# Patient Record
Sex: MALE
[De-identification: names, ages, dates, MRNs, and addresses within clinical notes are randomized per-mention and may not be internally consistent; named-entity substitution may affect disease eponyms.]

---

## 2019-06-24 ENCOUNTER — TRANSCRIPTION ENCOUNTER (OUTPATIENT)
Age: 69
End: 2019-06-24

## 2019-06-25 ENCOUNTER — TRANSCRIPTION ENCOUNTER (OUTPATIENT)
Age: 69
End: 2019-06-25

## 2019-06-25 ENCOUNTER — INPATIENT (INPATIENT)
Facility: HOSPITAL | Age: 69
LOS: 38 days | Discharge: NURSING, REHAB OR LONG-TERM CARE | DRG: 463 | End: 2019-08-03
Attending: SURGERY | Admitting: SURGERY
Payer: COMMERCIAL

## 2019-06-25 VITALS
HEIGHT: 71 IN | OXYGEN SATURATION: 100 % | HEART RATE: 62 BPM | WEIGHT: 199.96 LBS | DIASTOLIC BLOOD PRESSURE: 61 MMHG | SYSTOLIC BLOOD PRESSURE: 105 MMHG | RESPIRATION RATE: 16 BRPM

## 2019-06-25 DIAGNOSIS — S81.811A LACERATION WITHOUT FOREIGN BODY, RIGHT LOWER LEG, INITIAL ENCOUNTER: ICD-10-CM

## 2019-06-25 LAB
ALBUMIN SERPL ELPH-MCNC: 3.5 G/DL — SIGNIFICANT CHANGE UP (ref 3.3–5.2)
ALP SERPL-CCNC: 67 U/L — SIGNIFICANT CHANGE UP (ref 40–120)
ALT FLD-CCNC: 17 U/L — SIGNIFICANT CHANGE UP
ANION GAP SERPL CALC-SCNC: 13 MMOL/L — SIGNIFICANT CHANGE UP (ref 5–17)
ANION GAP SERPL CALC-SCNC: 13 MMOL/L — SIGNIFICANT CHANGE UP (ref 5–17)
APTT BLD: 24.6 SEC — LOW (ref 27.5–36.3)
APTT BLD: 77.3 SEC — HIGH (ref 27.5–36.3)
AST SERPL-CCNC: 25 U/L — SIGNIFICANT CHANGE UP
BASE EXCESS BLDV CALC-SCNC: -1.6 MMOL/L — SIGNIFICANT CHANGE UP (ref -2–2)
BASOPHILS # BLD AUTO: 0 K/UL — SIGNIFICANT CHANGE UP (ref 0–0.2)
BASOPHILS # BLD AUTO: 0 K/UL — SIGNIFICANT CHANGE UP (ref 0–0.2)
BASOPHILS NFR BLD AUTO: 0 % — SIGNIFICANT CHANGE UP (ref 0–2)
BASOPHILS NFR BLD AUTO: 0.2 % — SIGNIFICANT CHANGE UP (ref 0–2)
BILIRUB SERPL-MCNC: 0.7 MG/DL — SIGNIFICANT CHANGE UP (ref 0.4–2)
BLD GP AB SCN SERPL QL: SIGNIFICANT CHANGE UP
BUN SERPL-MCNC: 18 MG/DL — SIGNIFICANT CHANGE UP (ref 8–20)
BUN SERPL-MCNC: 20 MG/DL — SIGNIFICANT CHANGE UP (ref 8–20)
CA-I SERPL-SCNC: 1.06 MMOL/L — LOW (ref 1.15–1.33)
CALCIUM SERPL-MCNC: 7.3 MG/DL — LOW (ref 8.6–10.2)
CALCIUM SERPL-MCNC: 8 MG/DL — LOW (ref 8.6–10.2)
CHLORIDE BLDV-SCNC: 109 MMOL/L — HIGH (ref 98–107)
CHLORIDE SERPL-SCNC: 105 MMOL/L — SIGNIFICANT CHANGE UP (ref 98–107)
CHLORIDE SERPL-SCNC: 108 MMOL/L — HIGH (ref 98–107)
CK MB CFR SERPL CALC: 7.8 NG/ML — HIGH (ref 0–6.7)
CK SERPL-CCNC: 632 U/L — HIGH (ref 30–200)
CO2 SERPL-SCNC: 19 MMOL/L — LOW (ref 22–29)
CO2 SERPL-SCNC: 21 MMOL/L — LOW (ref 22–29)
CREAT SERPL-MCNC: 1.05 MG/DL — SIGNIFICANT CHANGE UP (ref 0.5–1.3)
CREAT SERPL-MCNC: 1.34 MG/DL — HIGH (ref 0.5–1.3)
ELLIPTOCYTES BLD QL SMEAR: SLIGHT — SIGNIFICANT CHANGE UP
EOSINOPHIL # BLD AUTO: 0 K/UL — SIGNIFICANT CHANGE UP (ref 0–0.5)
EOSINOPHIL # BLD AUTO: 0.1 K/UL — SIGNIFICANT CHANGE UP (ref 0–0.5)
EOSINOPHIL NFR BLD AUTO: 0 % — SIGNIFICANT CHANGE UP (ref 0–6)
EOSINOPHIL NFR BLD AUTO: 1 % — SIGNIFICANT CHANGE UP (ref 0–6)
ETHANOL SERPL-MCNC: <10 MG/DL — SIGNIFICANT CHANGE UP
GAS PNL BLDV: 141 MMOL/L — SIGNIFICANT CHANGE UP (ref 135–145)
GAS PNL BLDV: SIGNIFICANT CHANGE UP
GAS PNL BLDV: SIGNIFICANT CHANGE UP
GLUCOSE BLDC GLUCOMTR-MCNC: 230 MG/DL — HIGH (ref 70–99)
GLUCOSE BLDV-MCNC: 179 MG/DL — HIGH (ref 70–99)
GLUCOSE SERPL-MCNC: 187 MG/DL — HIGH (ref 70–115)
GLUCOSE SERPL-MCNC: 215 MG/DL — HIGH (ref 70–115)
HCO3 BLDV-SCNC: 23 MMOL/L — SIGNIFICANT CHANGE UP (ref 20–26)
HCT VFR BLD CALC: 30 % — LOW (ref 39–50)
HCT VFR BLD CALC: 33 % — LOW (ref 42–52)
HCT VFR BLDA CALC: 34 — LOW (ref 39–50)
HGB BLD CALC-MCNC: 11.2 G/DL — LOW (ref 13–17)
HGB BLD-MCNC: 10.7 G/DL — LOW (ref 14–18)
HGB BLD-MCNC: 9.8 G/DL — LOW (ref 13–17)
INR BLD: 1.13 RATIO — SIGNIFICANT CHANGE UP (ref 0.88–1.16)
INR BLD: 1.22 RATIO — HIGH (ref 0.88–1.16)
LACTATE BLDV-MCNC: 1.9 MMOL/L — SIGNIFICANT CHANGE UP (ref 0.5–2)
LIDOCAIN IGE QN: 42 U/L — SIGNIFICANT CHANGE UP (ref 22–51)
LYMPHOCYTES # BLD AUTO: 0.23 K/UL — LOW (ref 1–3.3)
LYMPHOCYTES # BLD AUTO: 1.7 % — LOW (ref 13–44)
LYMPHOCYTES # BLD AUTO: 3.2 K/UL — SIGNIFICANT CHANGE UP (ref 1–4.8)
LYMPHOCYTES # BLD AUTO: 53 % — SIGNIFICANT CHANGE UP (ref 20–55)
MAGNESIUM SERPL-MCNC: 1.6 MG/DL — SIGNIFICANT CHANGE UP (ref 1.6–2.6)
MANUAL SMEAR VERIFICATION: SIGNIFICANT CHANGE UP
MCHC RBC-ENTMCNC: 27.2 PG — SIGNIFICANT CHANGE UP (ref 27–34)
MCHC RBC-ENTMCNC: 28.1 PG — SIGNIFICANT CHANGE UP (ref 27–31)
MCHC RBC-ENTMCNC: 32.4 G/DL — SIGNIFICANT CHANGE UP (ref 32–36)
MCHC RBC-ENTMCNC: 32.7 GM/DL — SIGNIFICANT CHANGE UP (ref 32–36)
MCV RBC AUTO: 83.3 FL — SIGNIFICANT CHANGE UP (ref 80–100)
MCV RBC AUTO: 86.6 FL — SIGNIFICANT CHANGE UP (ref 80–94)
METAMYELOCYTES # FLD: 0.9 % — HIGH (ref 0–0)
MONOCYTES # BLD AUTO: 0 K/UL — SIGNIFICANT CHANGE UP (ref 0–0.9)
MONOCYTES # BLD AUTO: 0.5 K/UL — SIGNIFICANT CHANGE UP (ref 0–0.8)
MONOCYTES NFR BLD AUTO: 0 % — LOW (ref 2–14)
MONOCYTES NFR BLD AUTO: 8.7 % — SIGNIFICANT CHANGE UP (ref 3–10)
NEUTROPHILS # BLD AUTO: 13.39 K/UL — HIGH (ref 1.8–7.4)
NEUTROPHILS # BLD AUTO: 2.2 K/UL — SIGNIFICANT CHANGE UP (ref 1.8–8)
NEUTROPHILS NFR BLD AUTO: 36.8 % — LOW (ref 37–73)
NEUTROPHILS NFR BLD AUTO: 86.1 % — HIGH (ref 43–77)
NEUTS BAND # BLD: 11.3 % — HIGH (ref 0–8)
OTHER CELLS CSF MANUAL: 15 ML/DL — LOW (ref 18–22)
OVALOCYTES BLD QL SMEAR: SLIGHT — SIGNIFICANT CHANGE UP
PCO2 BLDV: 39 MMHG — SIGNIFICANT CHANGE UP (ref 35–50)
PH BLDV: 7.39 — SIGNIFICANT CHANGE UP (ref 7.32–7.43)
PHOSPHATE SERPL-MCNC: 3 MG/DL — SIGNIFICANT CHANGE UP (ref 2.4–4.7)
PLAT MORPH BLD: NORMAL — SIGNIFICANT CHANGE UP
PLATELET # BLD AUTO: 176 K/UL — SIGNIFICANT CHANGE UP (ref 150–400)
PLATELET # BLD AUTO: 285 K/UL — SIGNIFICANT CHANGE UP (ref 150–400)
PO2 BLDV: 101 MMHG — HIGH (ref 25–45)
POIKILOCYTOSIS BLD QL AUTO: SLIGHT — SIGNIFICANT CHANGE UP
POTASSIUM BLDV-SCNC: 3.5 MMOL/L — SIGNIFICANT CHANGE UP (ref 3.4–4.5)
POTASSIUM SERPL-MCNC: 3.5 MMOL/L — SIGNIFICANT CHANGE UP (ref 3.5–5.3)
POTASSIUM SERPL-MCNC: 4.2 MMOL/L — SIGNIFICANT CHANGE UP (ref 3.5–5.3)
POTASSIUM SERPL-SCNC: 3.5 MMOL/L — SIGNIFICANT CHANGE UP (ref 3.5–5.3)
POTASSIUM SERPL-SCNC: 4.2 MMOL/L — SIGNIFICANT CHANGE UP (ref 3.5–5.3)
PROT SERPL-MCNC: 6 G/DL — LOW (ref 6.6–8.7)
PROTHROM AB SERPL-ACNC: 13.1 SEC — HIGH (ref 10–12.9)
PROTHROM AB SERPL-ACNC: 14.1 SEC — HIGH (ref 10–12.9)
RBC # BLD: 3.6 M/UL — LOW (ref 4.2–5.8)
RBC # BLD: 3.81 M/UL — LOW (ref 4.6–6.2)
RBC # FLD: 12.7 % — SIGNIFICANT CHANGE UP (ref 11–15.6)
RBC # FLD: 16.7 % — HIGH (ref 10.3–14.5)
RBC BLD AUTO: ABNORMAL
SAO2 % BLDV: 98 % — SIGNIFICANT CHANGE UP
SODIUM SERPL-SCNC: 139 MMOL/L — SIGNIFICANT CHANGE UP (ref 135–145)
SODIUM SERPL-SCNC: 140 MMOL/L — SIGNIFICANT CHANGE UP (ref 135–145)
TYPE + AB SCN PNL BLD: SIGNIFICANT CHANGE UP
WBC # BLD: 13.75 K/UL — HIGH (ref 3.8–10.5)
WBC # BLD: 6 K/UL — SIGNIFICANT CHANGE UP (ref 4.8–10.8)
WBC # FLD AUTO: 13.75 K/UL — HIGH (ref 3.8–10.5)
WBC # FLD AUTO: 6 K/UL — SIGNIFICANT CHANGE UP (ref 4.8–10.8)

## 2019-06-25 PROCEDURE — 71045 X-RAY EXAM CHEST 1 VIEW: CPT | Mod: 26

## 2019-06-25 PROCEDURE — 99222 1ST HOSP IP/OBS MODERATE 55: CPT

## 2019-06-25 PROCEDURE — 64784 REMOVE NERVE LESION: CPT | Mod: AS,59

## 2019-06-25 PROCEDURE — 15002 WOUND PREP TRK/ARM/LEG: CPT | Mod: AS

## 2019-06-25 PROCEDURE — 27310 EXPLORATION OF KNEE JOINT: CPT | Mod: 80,RT

## 2019-06-25 PROCEDURE — 27602 DECOMPRESSION OF LOWER LEG: CPT | Mod: 80,RT

## 2019-06-25 PROCEDURE — 27310 EXPLORATION OF KNEE JOINT: CPT | Mod: RT

## 2019-06-25 PROCEDURE — 35571 ART BYP POP-TIBL-PRL-OTHER: CPT | Mod: RT

## 2019-06-25 PROCEDURE — 72125 CT NECK SPINE W/O DYE: CPT | Mod: 26

## 2019-06-25 PROCEDURE — 35571 ART BYP POP-TIBL-PRL-OTHER: CPT | Mod: 80,RT

## 2019-06-25 PROCEDURE — 64913 NRV RPR W/NRV ALGRFT EA ADDL: CPT | Mod: AS

## 2019-06-25 PROCEDURE — 70450 CT HEAD/BRAIN W/O DYE: CPT | Mod: 26

## 2019-06-25 PROCEDURE — 75635 CT ANGIO ABDOMINAL ARTERIES: CPT | Mod: 26

## 2019-06-25 PROCEDURE — 64912 NRV RPR W/NRV ALGRFT 1ST: CPT | Mod: AS

## 2019-06-25 PROCEDURE — 74177 CT ABD & PELVIS W/CONTRAST: CPT | Mod: 26

## 2019-06-25 PROCEDURE — 71260 CT THORAX DX C+: CPT | Mod: 26

## 2019-06-25 PROCEDURE — 27602 DECOMPRESSION OF LOWER LEG: CPT | Mod: RT

## 2019-06-25 PROCEDURE — 99291 CRITICAL CARE FIRST HOUR: CPT

## 2019-06-25 PROCEDURE — 73560 X-RAY EXAM OF KNEE 1 OR 2: CPT | Mod: 26,RT

## 2019-06-25 RX ORDER — HYDROMORPHONE HYDROCHLORIDE 2 MG/ML
1 INJECTION INTRAMUSCULAR; INTRAVENOUS; SUBCUTANEOUS
Refills: 0 | Status: DISCONTINUED | OUTPATIENT
Start: 2019-06-25 | End: 2019-06-25

## 2019-06-25 RX ORDER — HYDROMORPHONE HYDROCHLORIDE 2 MG/ML
0.5 INJECTION INTRAMUSCULAR; INTRAVENOUS; SUBCUTANEOUS EVERY 6 HOURS
Refills: 0 | Status: DISCONTINUED | OUTPATIENT
Start: 2019-06-25 | End: 2019-06-25

## 2019-06-25 RX ORDER — INSULIN LISPRO 100/ML
VIAL (ML) SUBCUTANEOUS EVERY 4 HOURS
Refills: 0 | Status: DISCONTINUED | OUTPATIENT
Start: 2019-06-25 | End: 2019-06-26

## 2019-06-25 RX ORDER — ASPIRIN/CALCIUM CARB/MAGNESIUM 324 MG
81 TABLET ORAL DAILY
Refills: 0 | Status: DISCONTINUED | OUTPATIENT
Start: 2019-06-25 | End: 2019-06-25

## 2019-06-25 RX ORDER — HEPARIN SODIUM 5000 [USP'U]/ML
5000 INJECTION INTRAVENOUS; SUBCUTANEOUS EVERY 8 HOURS
Refills: 0 | Status: DISCONTINUED | OUTPATIENT
Start: 2019-06-25 | End: 2019-06-26

## 2019-06-25 RX ORDER — TETANUS TOXOID, REDUCED DIPHTHERIA TOXOID AND ACELLULAR PERTUSSIS VACCINE, ADSORBED 5; 2.5; 8; 8; 2.5 [IU]/.5ML; [IU]/.5ML; UG/.5ML; UG/.5ML; UG/.5ML
0.5 SUSPENSION INTRAMUSCULAR ONCE
Refills: 0 | Status: COMPLETED | OUTPATIENT
Start: 2019-06-25 | End: 2019-06-25

## 2019-06-25 RX ORDER — MAGNESIUM SULFATE 500 MG/ML
2 VIAL (ML) INJECTION ONCE
Refills: 0 | Status: COMPLETED | OUTPATIENT
Start: 2019-06-25 | End: 2019-06-25

## 2019-06-25 RX ORDER — CEFAZOLIN SODIUM 1 G
VIAL (EA) INJECTION
Refills: 0 | Status: DISCONTINUED | OUTPATIENT
Start: 2019-06-25 | End: 2019-06-26

## 2019-06-25 RX ORDER — DEXTROSE 50 % IN WATER 50 %
25 SYRINGE (ML) INTRAVENOUS ONCE
Refills: 0 | Status: DISCONTINUED | OUTPATIENT
Start: 2019-06-25 | End: 2019-06-26

## 2019-06-25 RX ORDER — PANTOPRAZOLE SODIUM 20 MG/1
40 TABLET, DELAYED RELEASE ORAL DAILY
Refills: 0 | Status: DISCONTINUED | OUTPATIENT
Start: 2019-06-25 | End: 2019-06-25

## 2019-06-25 RX ORDER — CEFAZOLIN SODIUM 1 G
2000 VIAL (EA) INJECTION ONCE
Refills: 0 | Status: DISCONTINUED | OUTPATIENT
Start: 2019-06-25 | End: 2019-06-25

## 2019-06-25 RX ORDER — ACETAMINOPHEN 500 MG
1000 TABLET ORAL ONCE
Refills: 0 | Status: DISCONTINUED | OUTPATIENT
Start: 2019-06-25 | End: 2019-06-26

## 2019-06-25 RX ORDER — ACETAMINOPHEN 500 MG
650 TABLET ORAL EVERY 6 HOURS
Refills: 0 | Status: DISCONTINUED | OUTPATIENT
Start: 2019-06-25 | End: 2019-06-26

## 2019-06-25 RX ORDER — SIMVASTATIN 20 MG/1
20 TABLET, FILM COATED ORAL AT BEDTIME
Refills: 0 | Status: DISCONTINUED | OUTPATIENT
Start: 2019-06-25 | End: 2019-06-26

## 2019-06-25 RX ORDER — OXYCODONE HYDROCHLORIDE 5 MG/1
10 TABLET ORAL EVERY 4 HOURS
Refills: 0 | Status: DISCONTINUED | OUTPATIENT
Start: 2019-06-25 | End: 2019-06-26

## 2019-06-25 RX ORDER — OXYCODONE HYDROCHLORIDE 5 MG/1
5 TABLET ORAL EVERY 4 HOURS
Refills: 0 | Status: DISCONTINUED | OUTPATIENT
Start: 2019-06-25 | End: 2019-06-26

## 2019-06-25 RX ORDER — FENTANYL CITRATE 50 UG/ML
50 INJECTION INTRAVENOUS ONCE
Refills: 0 | Status: DISCONTINUED | OUTPATIENT
Start: 2019-06-25 | End: 2019-06-25

## 2019-06-25 RX ORDER — ONDANSETRON 8 MG/1
4 TABLET, FILM COATED ORAL EVERY 6 HOURS
Refills: 0 | Status: DISCONTINUED | OUTPATIENT
Start: 2019-06-25 | End: 2019-06-26

## 2019-06-25 RX ORDER — ASPIRIN/CALCIUM CARB/MAGNESIUM 324 MG
81 TABLET ORAL DAILY
Refills: 0 | Status: DISCONTINUED | OUTPATIENT
Start: 2019-06-25 | End: 2019-06-26

## 2019-06-25 RX ORDER — DOCUSATE SODIUM 100 MG
100 CAPSULE ORAL EVERY 8 HOURS
Refills: 0 | Status: DISCONTINUED | OUTPATIENT
Start: 2019-06-25 | End: 2019-06-26

## 2019-06-25 RX ORDER — CEFAZOLIN SODIUM 1 G
2000 VIAL (EA) INJECTION EVERY 8 HOURS
Refills: 0 | Status: DISCONTINUED | OUTPATIENT
Start: 2019-06-26 | End: 2019-06-26

## 2019-06-25 RX ORDER — SODIUM CHLORIDE 9 MG/ML
1000 INJECTION, SOLUTION INTRAVENOUS
Refills: 0 | Status: DISCONTINUED | OUTPATIENT
Start: 2019-06-25 | End: 2019-06-25

## 2019-06-25 RX ORDER — DEXTROSE 50 % IN WATER 50 %
12.5 SYRINGE (ML) INTRAVENOUS ONCE
Refills: 0 | Status: DISCONTINUED | OUTPATIENT
Start: 2019-06-25 | End: 2019-06-26

## 2019-06-25 RX ORDER — HYDROMORPHONE HYDROCHLORIDE 2 MG/ML
0.5 INJECTION INTRAMUSCULAR; INTRAVENOUS; SUBCUTANEOUS EVERY 4 HOURS
Refills: 0 | Status: DISCONTINUED | OUTPATIENT
Start: 2019-06-25 | End: 2019-06-26

## 2019-06-25 RX ORDER — HYDROMORPHONE HYDROCHLORIDE 2 MG/ML
0.5 INJECTION INTRAMUSCULAR; INTRAVENOUS; SUBCUTANEOUS
Refills: 0 | Status: DISCONTINUED | OUTPATIENT
Start: 2019-06-25 | End: 2019-06-25

## 2019-06-25 RX ORDER — CEFAZOLIN SODIUM 1 G
2000 VIAL (EA) INJECTION ONCE
Refills: 0 | Status: COMPLETED | OUTPATIENT
Start: 2019-06-25 | End: 2019-06-25

## 2019-06-25 RX ORDER — ASPIRIN/CALCIUM CARB/MAGNESIUM 324 MG
81 TABLET ORAL ONCE
Refills: 0 | Status: DISCONTINUED | OUTPATIENT
Start: 2019-06-25 | End: 2019-06-25

## 2019-06-25 RX ORDER — AMLODIPINE BESYLATE 2.5 MG/1
10 TABLET ORAL DAILY
Refills: 0 | Status: DISCONTINUED | OUTPATIENT
Start: 2019-06-26 | End: 2019-06-26

## 2019-06-25 RX ORDER — CHLORHEXIDINE GLUCONATE 213 G/1000ML
1 SOLUTION TOPICAL
Refills: 0 | Status: DISCONTINUED | OUTPATIENT
Start: 2019-06-25 | End: 2019-06-26

## 2019-06-25 RX ORDER — SODIUM CHLORIDE 9 MG/ML
1000 INJECTION, SOLUTION INTRAVENOUS
Refills: 0 | Status: DISCONTINUED | OUTPATIENT
Start: 2019-06-25 | End: 2019-06-26

## 2019-06-25 RX ORDER — HYDROMORPHONE HYDROCHLORIDE 2 MG/ML
0.5 INJECTION INTRAMUSCULAR; INTRAVENOUS; SUBCUTANEOUS EVERY 4 HOURS
Refills: 0 | Status: DISCONTINUED | OUTPATIENT
Start: 2019-06-25 | End: 2019-06-25

## 2019-06-25 RX ORDER — DEXTROSE 50 % IN WATER 50 %
15 SYRINGE (ML) INTRAVENOUS ONCE
Refills: 0 | Status: DISCONTINUED | OUTPATIENT
Start: 2019-06-25 | End: 2019-06-26

## 2019-06-25 RX ORDER — GLUCAGON INJECTION, SOLUTION 0.5 MG/.1ML
1 INJECTION, SOLUTION SUBCUTANEOUS ONCE
Refills: 0 | Status: DISCONTINUED | OUTPATIENT
Start: 2019-06-25 | End: 2019-06-26

## 2019-06-25 RX ADMIN — SIMVASTATIN 20 MILLIGRAM(S): 20 TABLET, FILM COATED ORAL at 22:28

## 2019-06-25 RX ADMIN — HYDROMORPHONE HYDROCHLORIDE 1 MILLIGRAM(S): 2 INJECTION INTRAMUSCULAR; INTRAVENOUS; SUBCUTANEOUS at 19:00

## 2019-06-25 RX ADMIN — Medication 2: at 22:54

## 2019-06-25 RX ADMIN — Medication 50 GRAM(S): at 22:28

## 2019-06-25 RX ADMIN — HYDROMORPHONE HYDROCHLORIDE 1 MILLIGRAM(S): 2 INJECTION INTRAMUSCULAR; INTRAVENOUS; SUBCUTANEOUS at 20:39

## 2019-06-25 RX ADMIN — SODIUM CHLORIDE 100 MILLILITER(S): 9 INJECTION, SOLUTION INTRAVENOUS at 22:27

## 2019-06-25 RX ADMIN — Medication 100 MILLIGRAM(S): at 22:28

## 2019-06-25 RX ADMIN — Medication 81 MILLIGRAM(S): at 22:28

## 2019-06-25 RX ADMIN — HYDROMORPHONE HYDROCHLORIDE 1 MILLIGRAM(S): 2 INJECTION INTRAMUSCULAR; INTRAVENOUS; SUBCUTANEOUS at 20:44

## 2019-06-25 RX ADMIN — HEPARIN SODIUM 5000 UNIT(S): 5000 INJECTION INTRAVENOUS; SUBCUTANEOUS at 22:28

## 2019-06-25 RX ADMIN — Medication 100 MILLIGRAM(S): at 14:00

## 2019-06-25 NOTE — ED PROVIDER NOTE - ATTENDING CONTRIBUTION TO CARE
Dr. Cassidy : I have personally seen and examined this patient at the bedside. I have fully participated in the care of this patient. I have reviewed all pertinent clinical information, including history, physical exam, plan and the Resident's note and agree except as noted.     67yo M with hx of HLD, GERD pw right LE injury sp [place] crush injury. + head trauma fell 1 ft down with ? LOC. as per EMS lost 2L of blood. tourniquet place at 10:19am by EMS.  Denies f/c/n/v/cp/sob/palpitations/cough/abd.pain/d/c/dysuria/hematuria. no sick contacts/recent travel.    PE:  Head: atraumatic, normacephalic  Face: atraumatic, no crepitus no orbiral/maxillary/mandibular ttp  eyes: perrla eomi  heart: rrr s1s2  lungs: ctab  abd: soft, nt nd +bs no rebound/guarding no cva ttp  skin: warm  pelvis:stable  LE: no swelling, no calf ttp; right LE: no pulses; 15cm deep lac with bone and tendoin exposure to lateral aspect of distal thigh /right above the knee  back: no midline cervical/thoracic/lumbar ttp    -->code trauma B ; ABC stable given 2units of blood; ancef; tdap; ct scan admit to trauma team

## 2019-06-25 NOTE — CONSULT NOTE ADULT - SUBJECTIVE AND OBJECTIVE BOX
Pt Name: MARCE DE LEON    MRN: [id number]      Patient is a 68y Male admitted to the SICU s/p fork lift injury resulting in a vascular injury to the RLE. Pt also c/o left knee pain. CT scan shows a left tibial plateau fx. Pt otherwise denies c/p, sob, abdominal pain, n/v, numbness/tingling and has no other complaints.  .      REVIEW OF SYSTEMS    General: Alert, responsive, in NAD    Skin/Breast: (Pt with ace wrap dressing of the entire RLE- as per ED note pt has 10cc laceration to the RLE.)  	  Ophthalmologic: No visual changes. No redness.   	  ENMT:	No discharge. No swelling.    Respiratory and Thorax: No difficulty breathing. No cough.  	   Cardiovascular:	No chest pain. No palpitations.    Gastrointestinal:	 No abdominal pain. No diarrhea.     Genitourinary: No dysuria. No bleeding.    Musculoskeletal: SEE HPI.    Neurological: No sensory or motor changes.     Psychiatric: No anxiety or depression.    Hematology/Lymphatics: No swelling.    Endocrine: No Hx of diabetes.    ROS is otherwise negative.    PAST MEDICAL & SURGICAL HISTORY:  PAST MEDICAL & SURGICAL HISTORY:  GERD (gastroesophageal reflux disease)  HLD (hyperlipidemia)  No significant past surgical history      Allergies: No Known Allergies      Medications: acetaminophen   Tablet .. 650 milliGRAM(s) Oral every 6 hours  acetaminophen  IVPB .. 1000 milliGRAM(s) IV Intermittent once PRN  aspirin  chewable 81 milliGRAM(s) Oral daily  ceFAZolin   IVPB      chlorhexidine 2% Cloths 1 Application(s) Topical <User Schedule>  dextrose 40% Gel 15 Gram(s) Oral once PRN  dextrose 50% Injectable 12.5 Gram(s) IV Push once  dextrose 50% Injectable 25 Gram(s) IV Push once  dextrose 50% Injectable 25 Gram(s) IV Push once  diphtheria/tetanus/pertussis (acellular) Vaccine (ADAcel) 0.5 milliLiter(s) IntraMuscular once  docusate sodium 100 milliGRAM(s) Oral every 8 hours  glucagon  Injectable 1 milliGRAM(s) IntraMuscular once PRN  heparin  Injectable 5000 Unit(s) SubCutaneous every 8 hours  HYDROmorphone  Injectable 0.5 milliGRAM(s) IV Push every 6 hours PRN  HYDROmorphone  Injectable 1 milliGRAM(s) IV Push every 10 minutes PRN  insulin lispro (HumaLOG) corrective regimen sliding scale   SubCutaneous every 4 hours  magnesium sulfate  IVPB 2 Gram(s) IV Intermittent once  multiple electrolytes Injection Type 1 1000 milliLiter(s) IV Continuous <Continuous>  ondansetron Injectable 4 milliGRAM(s) IV Push every 6 hours PRN  oxyCODONE    IR 5 milliGRAM(s) Oral every 4 hours PRN  oxyCODONE    IR 10 milliGRAM(s) Oral every 4 hours PRN  simvastatin 20 milliGRAM(s) Oral at bedtime      FAMILY HISTORY:  : non-contributory    Social History:     Ambulation: Walking independently [ ] With Cane [ ] With Walker [ ]  Bedbound [ ]                           9.8    13.75 )-----------( 176      ( 25 Jun 2019 18:43 )             30.0       06-25    140  |  108<H>  |  18.0  ----------------------------<  215<H>  4.2   |  19.0<L>  |  1.05    Ca    7.3<L>      25 Jun 2019 18:43  Phos  3.0     06-25  Mg     1.6     06-25    TPro  6.0<L>  /  Alb  3.5  /  TBili  0.7  /  DBili  x   /  AST  25  /  ALT  17  /  AlkPhos  67  06-25      Vital Signs Last 24 Hrs  T(C): 36.4 (25 Jun 2019 20:00), Max: 36.7 (25 Jun 2019 17:55)  T(F): 97.6 (25 Jun 2019 20:00), Max: 98 (25 Jun 2019 17:55)  HR: 95 (25 Jun 2019 22:00) (55 - 110)  BP: 130/67 (25 Jun 2019 19:00) (105/61 - 141/63)  BP(mean): --  RR: 18 (25 Jun 2019 22:00) (16 - 23)  SpO2: 99% (25 Jun 2019 22:00) (98% - 100%)    Daily Height in cm: 170.18 (25 Jun 2019 12:02)    Daily       PHYSICAL EXAM:      Appearance: Alert, responsive, in no acute distress.    Neurological: Sensation is grossly intact to light touch. 5/5 motor function of all extremities. No focal deficits or weaknesses found.    Skin: no rash on visible skin. Skin is clean, dry and intact. No bleeding. No abrasions. No ulcerations.    Vascular: 2+ distal pulses. Cap refill < 2 sec. No signs of venous insufficiency or stasis. No extremity ulcerations. No cyanosis.    Musculoskeletal:         Left Upper Extremity:       Right Upper Extremity:       Left Lower Extremity:       Right Lower Extremity:    Imaging Studies:    A/P:  Pt is a  68y Male with    found to have    PLAN:   * Pt Name: MARCE DE LEON    MRN: [id number]      Patient is a 68y Male admitted to the SICU s/p fork lift injury resulting in a vascular injury to the RLE. Patient went straight to the OR and underwent a Right popliteal bypass and RLE fasciotomies. P Pt also c/o left knee pain. CT scan shows a left tibial plateau fx. Pt otherwise denies c/p, sob, abdominal pain, n/v, numbness/tingling and has no other complaints.  .      REVIEW OF SYSTEMS    General: Alert, responsive, in NAD    Skin/Breast: (Pt with ace wrap dressing of the entire RLE- as per ED note pt has 10cc laceration to the RLE.)  	  Ophthalmologic: No visual changes. No redness.   	  ENMT:	No discharge. No swelling.    Respiratory and Thorax: No difficulty breathing. No cough.  	   Cardiovascular:	No chest pain. No palpitations.    Gastrointestinal:	 No abdominal pain. No diarrhea.     Genitourinary: No dysuria. No bleeding.    Musculoskeletal: SEE HPI.    Neurological: No sensory or motor changes.     Psychiatric: No anxiety or depression.    Hematology/Lymphatics: No swelling.    Endocrine: No Hx of diabetes.    ROS is otherwise negative.    PAST MEDICAL & SURGICAL HISTORY:  PAST MEDICAL & SURGICAL HISTORY:  GERD (gastroesophageal reflux disease)  HLD (hyperlipidemia)  No significant past surgical history      Allergies: No Known Allergies      Medications: acetaminophen   Tablet .. 650 milliGRAM(s) Oral every 6 hours  acetaminophen  IVPB .. 1000 milliGRAM(s) IV Intermittent once PRN  aspirin  chewable 81 milliGRAM(s) Oral daily  ceFAZolin   IVPB      chlorhexidine 2% Cloths 1 Application(s) Topical <User Schedule>  dextrose 40% Gel 15 Gram(s) Oral once PRN  dextrose 50% Injectable 12.5 Gram(s) IV Push once  dextrose 50% Injectable 25 Gram(s) IV Push once  dextrose 50% Injectable 25 Gram(s) IV Push once  diphtheria/tetanus/pertussis (acellular) Vaccine (ADAcel) 0.5 milliLiter(s) IntraMuscular once  docusate sodium 100 milliGRAM(s) Oral every 8 hours  glucagon  Injectable 1 milliGRAM(s) IntraMuscular once PRN  heparin  Injectable 5000 Unit(s) SubCutaneous every 8 hours  HYDROmorphone  Injectable 0.5 milliGRAM(s) IV Push every 6 hours PRN  HYDROmorphone  Injectable 1 milliGRAM(s) IV Push every 10 minutes PRN  insulin lispro (HumaLOG) corrective regimen sliding scale   SubCutaneous every 4 hours  magnesium sulfate  IVPB 2 Gram(s) IV Intermittent once  multiple electrolytes Injection Type 1 1000 milliLiter(s) IV Continuous <Continuous>  ondansetron Injectable 4 milliGRAM(s) IV Push every 6 hours PRN  oxyCODONE    IR 5 milliGRAM(s) Oral every 4 hours PRN  oxyCODONE    IR 10 milliGRAM(s) Oral every 4 hours PRN  simvastatin 20 milliGRAM(s) Oral at bedtime      FAMILY HISTORY:  : non-contributory    Social History: Denies ETOH abuse.    Ambulation: Walking independently [ x ] With Cane [ ] With Walker [ ]  Bedbound [ ]                           9.8    13.75 )-----------( 176      ( 25 Jun 2019 18:43 )             30.0       06-25    140  |  108<H>  |  18.0  ----------------------------<  215<H>  4.2   |  19.0<L>  |  1.05    Ca    7.3<L>      25 Jun 2019 18:43  Phos  3.0     06-25  Mg     1.6     06-25    TPro  6.0<L>  /  Alb  3.5  /  TBili  0.7  /  DBili  x   /  AST  25  /  ALT  17  /  AlkPhos  67  06-25      Vital Signs Last 24 Hrs  T(C): 36.4 (25 Jun 2019 20:00), Max: 36.7 (25 Jun 2019 17:55)  T(F): 97.6 (25 Jun 2019 20:00), Max: 98 (25 Jun 2019 17:55)  HR: 95 (25 Jun 2019 22:00) (55 - 110)  BP: 130/67 (25 Jun 2019 19:00) (105/61 - 141/63)  BP(mean): --  RR: 18 (25 Jun 2019 22:00) (16 - 23)  SpO2: 99% (25 Jun 2019 22:00) (98% - 100%)    Daily Height in cm: 170.18 (25 Jun 2019 12:02)    Daily       PHYSICAL EXAM:      Appearance: Alert, responsive, in no acute distress.    Skin: no rash on visible skin. Skin is clean, dry and intact. No bleeding. No abrasions. No ulcerations. RLE completely wrapped in ACE wrap from procedure done by vascular team.    Musculoskeletal:         Left Upper Extremity:  + NROM. Non-tender. No signs of trauma. SILT. 2+ radial pulse. Compartments soft and compressible.       Right Upper Extremity:  + NROM. Non-tender. No signs of trauma. SILT. 2+ radial pulse. Compartments soft and compressible.       Left Lower Extremity: +TTP of the left knee with decreased ROM due to reported pain/injury, +dressing of the left proximal thigh where vessel was used for RLE bypass, +plantar/dorsiflexion/EHL/FHL intact, Compartments soft and compressible.       Right Lower Extremity: +ACE wrap applied by vascular team of the entire RLE. +plantarflexion. NO of dorsiflexion, EHL/FHL not intact. +Decreased sensation of the right foot.    Procedure: SPLINTING   PROCEDURE NOTE: Splinting    Performed by: Pedro Guerra PA-C     Indication: left tibial plateau fracture    The LLE was appropriately positioned. A knee immobilizer was applied. Distally, the extremity was neurovascular intact following the procedure. The patient tolerated the procedure well.     Imaging Studies: < from: CT Angio Abd Aorta w/run-off w/ IV Cont (06.25.19 @ 11:44) >   EXAM:  CT ANGIO ABD AOR W RUN(W)AW IC                         EXAM:  CT ABDOMEN AND PELVIS IC                         EXAM:  CT CHEST IC                          PROCEDURE DATE:  06/25/2019          INTERPRETATION:  CTA of the chest, abdomen and pelvis and the bilateral   lower extremities    COMPARISON: .    CLINICAL INFORMATION: .     TECHNIQUE: Contiguous axial 2.5 mm images of the chest were obtained   without intravenous contrast, followed by contiguous axial 1.25 mm slice   thickness images of the chest, abdomen and pelvis with 125 cc Omnipaque   intravenous contrast administration. Axial, sagittal and coronal and   images obtained. 3-D reconstructed images obtained.      100 mls of Omnipaque 300 was administered intravenously without   complication and 0 mls were discarded.    FINDINGS:    The airway is patent with normal caliber and contour.    There are no lung parenchymal consolidations.    There is no pleural effusion or pneumothorax.    [The thoracic and abdominal aorta arenormal in caliber. No dissection or   aneurysm seen.]     [The origins of the brachiocephalic vessels and mesenteric arteries are   patent.] The rest of the mediastinal vessels are normal.    [Cardiac chambers normal in size.]    No pericardial effusion.     The liver, spleen, gallbladder, pancreas and adrenal glands are normal.     Both kidneys show symmetric contrast uptake. There is no hydronephrosis.     Prostate mildly enlarged compressing bladder base. No bladder outlet   obstruction.     The unopacified gastrointestinal tract is within normal limits.     There are no retroperitoneal masses or lymphadenopathy    Osseous thorax, shoulder joints, scapula Y, clavicles, lower cervical   thoracal lumbar spine sacrum osseous pelvis and hips intact. Lower   extremity arterial runoff displays following findings:  A RIGHT lower extremity common femoral artery, deep femoral artery, and   the superficial femoral artery patent with abrupt occlusion of the   popliteal artery.  . Distal imaging shows collateral reconstitution of the tibioperoneal   trunk and vessels of trifurcation to the level of the ankle joint.   Dorsalis pedis artery patent. Delayed imaging shows no evidence of   extravasated contrast.  There is a deep lateral soft tissueopened wound with air lucencies   between semimembranosus muscle, gastrocnemius muscle with an air   lucencies seen within the intra-articular joint capsule. Punctate air   lucencies within the substance of the gastrocnemius muscle  No large intramuscular hematoma identified.   There is possible laceration of the of RIGHT lateral biceps femoris   muscle.  Distal calf musculature intact. No RIGHT femoral, and the, tibia-fibula   fracture deformity.    LEFT lower extremity arterial runoff shows patent common femoral,   femoral, popliteal artery, deep femoral artery, tibioperoneal trunk and   vessel trifurcation to the level of the ankle joint.  There is a depressed lateral tibial plateau fracture deformity. The LEFT   femur, femoral condyles, fibula head and neck and distal tibia fibula are   intact.  LEFT lower extremity musculature intact without hematoma.  RIGHT knee    IMPRESSION:    Occluded popliteal artery with collateral reconstitution of the   tibioperoneal trunk and the vessels oftrifurcation to the RIGHT foot.   Depressed LEFT lateral tibial plateau fracture.  "Deep laceration lateral knee soft tissues with subcutaneous and deep   interfascial air dissection with intra-articular air confirming joint   capsule laceration as described. Punctate air lucencies within the   gastrocnemius muscle.       Chest abdomen pelvic viscera intact.     No other fracture deformities.    Critical value  discussed with Dr. Nakul Orozco, on 6/25/2019 at 12:05   PM with read back.  Hospital policies for critical values including read back   policy were followed.  The verbal communication of the critical value   supplements this written report.                 DOMINGUEZ BO M.D., ATTENDING RADIOLOGIST  This document has been electronically signed. Jun 25 2019 12:46PM        < end of copied text >      A/P:  Pt is a  68y Male with a left tibial plateau fx s/p forklift crush injury today    PLAN d/w Dr. Dela Cruz:   * Dedicated L knee CT reconstruction ordered  * Maintain left knee immobilizer at all times  * NWB of the LLE  * Pain control as clinically indicated  * NPO after midnight for possible surgery tomorrow with Dr. Dela Cruz  * Continue care as per primary team Pt Name: MARCE DE LEON    MRN: [id number]      Patient is a 68y Male admitted to the SICU s/p fork lift injury resulting in a vascular injury to the RLE. Patient went straight to the OR and underwent a Right popliteal bypass and RLE fasciotomies. P Pt also c/o left knee pain. CT scan shows a left tibial plateau fx. Pt otherwise denies c/p, sob, abdominal pain, n/v, numbness/tingling and has no other complaints.  .      REVIEW OF SYSTEMS    General: Alert, responsive, in NAD    Skin/Breast: (Pt with ace wrap dressing of the entire RLE- as per ED note pt has 10cc laceration to the RLE.)  	  Ophthalmologic: No visual changes. No redness.   	  ENMT:	No discharge. No swelling.    Respiratory and Thorax: No difficulty breathing. No cough.  	   Cardiovascular:	No chest pain. No palpitations.    Gastrointestinal:	 No abdominal pain. No diarrhea.     Genitourinary: No dysuria. No bleeding.    Musculoskeletal: SEE HPI.    Neurological: +decreased sensation of the RLE    Psychiatric: No anxiety or depression.    Hematology/Lymphatics: No swelling.    Endocrine: No Hx of diabetes.    ROS is otherwise negative.    PAST MEDICAL & SURGICAL HISTORY:  PAST MEDICAL & SURGICAL HISTORY:  GERD (gastroesophageal reflux disease)  HLD (hyperlipidemia)  No significant past surgical history      Allergies: No Known Allergies      Medications: acetaminophen   Tablet .. 650 milliGRAM(s) Oral every 6 hours  acetaminophen  IVPB .. 1000 milliGRAM(s) IV Intermittent once PRN  aspirin  chewable 81 milliGRAM(s) Oral daily  ceFAZolin   IVPB      chlorhexidine 2% Cloths 1 Application(s) Topical <User Schedule>  dextrose 40% Gel 15 Gram(s) Oral once PRN  dextrose 50% Injectable 12.5 Gram(s) IV Push once  dextrose 50% Injectable 25 Gram(s) IV Push once  dextrose 50% Injectable 25 Gram(s) IV Push once  diphtheria/tetanus/pertussis (acellular) Vaccine (ADAcel) 0.5 milliLiter(s) IntraMuscular once  docusate sodium 100 milliGRAM(s) Oral every 8 hours  glucagon  Injectable 1 milliGRAM(s) IntraMuscular once PRN  heparin  Injectable 5000 Unit(s) SubCutaneous every 8 hours  HYDROmorphone  Injectable 0.5 milliGRAM(s) IV Push every 6 hours PRN  HYDROmorphone  Injectable 1 milliGRAM(s) IV Push every 10 minutes PRN  insulin lispro (HumaLOG) corrective regimen sliding scale   SubCutaneous every 4 hours  magnesium sulfate  IVPB 2 Gram(s) IV Intermittent once  multiple electrolytes Injection Type 1 1000 milliLiter(s) IV Continuous <Continuous>  ondansetron Injectable 4 milliGRAM(s) IV Push every 6 hours PRN  oxyCODONE    IR 5 milliGRAM(s) Oral every 4 hours PRN  oxyCODONE    IR 10 milliGRAM(s) Oral every 4 hours PRN  simvastatin 20 milliGRAM(s) Oral at bedtime      FAMILY HISTORY:  : non-contributory    Social History: Denies ETOH abuse.    Ambulation: Walking independently [ x ] With Cane [ ] With Walker [ ]  Bedbound [ ]                           9.8    13.75 )-----------( 176      ( 25 Jun 2019 18:43 )             30.0       06-25    140  |  108<H>  |  18.0  ----------------------------<  215<H>  4.2   |  19.0<L>  |  1.05    Ca    7.3<L>      25 Jun 2019 18:43  Phos  3.0     06-25  Mg     1.6     06-25    TPro  6.0<L>  /  Alb  3.5  /  TBili  0.7  /  DBili  x   /  AST  25  /  ALT  17  /  AlkPhos  67  06-25      Vital Signs Last 24 Hrs  T(C): 36.4 (25 Jun 2019 20:00), Max: 36.7 (25 Jun 2019 17:55)  T(F): 97.6 (25 Jun 2019 20:00), Max: 98 (25 Jun 2019 17:55)  HR: 95 (25 Jun 2019 22:00) (55 - 110)  BP: 130/67 (25 Jun 2019 19:00) (105/61 - 141/63)  BP(mean): --  RR: 18 (25 Jun 2019 22:00) (16 - 23)  SpO2: 99% (25 Jun 2019 22:00) (98% - 100%)    Daily Height in cm: 170.18 (25 Jun 2019 12:02)    Daily       PHYSICAL EXAM:      Appearance: Alert, responsive, in no acute distress.    Skin: no rash on visible skin. Skin is clean, dry and intact. No bleeding. No abrasions. No ulcerations. RLE completely wrapped in ACE wrap from procedure done by vascular team.    Musculoskeletal:         Left Upper Extremity:  + NROM. Non-tender. No signs of trauma. SILT. 2+ radial pulse. Compartments soft and compressible.       Right Upper Extremity:  + NROM. Non-tender. No signs of trauma. SILT. 2+ radial pulse. Compartments soft and compressible.       Left Lower Extremity: +TTP of the left knee with decreased ROM due to reported pain/injury, +dressing of the left proximal thigh where vessel was used for RLE bypass, +plantar/dorsiflexion/EHL/FHL intact, Compartments soft and compressible. No open wounds, lacerations or active bleeding noted.       Right Lower Extremity: +ACE wrap applied by vascular team of the entire RLE. +plantarflexion. NO of dorsiflexion, EHL/FHL not intact. +Decreased sensation of the right foot.    Procedure: SPLINTING   PROCEDURE NOTE: Splinting    Performed by: Pedro Guerra PA-C     Indication: left tibial plateau fracture    The LLE was appropriately positioned. A knee immobilizer was applied. Distally, the extremity was neurovascular intact following the procedure. The patient tolerated the procedure well.     Imaging Studies: < from: CT Angio Abd Aorta w/run-off w/ IV Cont (06.25.19 @ 11:44) >   EXAM:  CT ANGIO ABD AOR W RUN(W)AW IC                         EXAM:  CT ABDOMEN AND PELVIS IC                         EXAM:  CT CHEST IC                          PROCEDURE DATE:  06/25/2019          INTERPRETATION:  CTA of the chest, abdomen and pelvis and the bilateral   lower extremities    COMPARISON: .    CLINICAL INFORMATION: .     TECHNIQUE: Contiguous axial 2.5 mm images of the chest were obtained   without intravenous contrast, followed by contiguous axial 1.25 mm slice   thickness images of the chest, abdomen and pelvis with 125 cc Omnipaque   intravenous contrast administration. Axial, sagittal and coronal and   images obtained. 3-D reconstructed images obtained.      100 mls of Omnipaque 300 was administered intravenously without   complication and 0 mls were discarded.    FINDINGS:    The airway is patent with normal caliber and contour.    There are no lung parenchymal consolidations.    There is no pleural effusion or pneumothorax.    [The thoracic and abdominal aorta arenormal in caliber. No dissection or   aneurysm seen.]     [The origins of the brachiocephalic vessels and mesenteric arteries are   patent.] The rest of the mediastinal vessels are normal.    [Cardiac chambers normal in size.]    No pericardial effusion.     The liver, spleen, gallbladder, pancreas and adrenal glands are normal.     Both kidneys show symmetric contrast uptake. There is no hydronephrosis.     Prostate mildly enlarged compressing bladder base. No bladder outlet   obstruction.     The unopacified gastrointestinal tract is within normal limits.     There are no retroperitoneal masses or lymphadenopathy    Osseous thorax, shoulder joints, scapula Y, clavicles, lower cervical   thoracal lumbar spine sacrum osseous pelvis and hips intact. Lower   extremity arterial runoff displays following findings:  A RIGHT lower extremity common femoral artery, deep femoral artery, and   the superficial femoral artery patent with abrupt occlusion of the   popliteal artery.  . Distal imaging shows collateral reconstitution of the tibioperoneal   trunk and vessels of trifurcation to the level of the ankle joint.   Dorsalis pedis artery patent. Delayed imaging shows no evidence of   extravasated contrast.  There is a deep lateral soft tissueopened wound with air lucencies   between semimembranosus muscle, gastrocnemius muscle with an air   lucencies seen within the intra-articular joint capsule. Punctate air   lucencies within the substance of the gastrocnemius muscle  No large intramuscular hematoma identified.   There is possible laceration of the of RIGHT lateral biceps femoris   muscle.  Distal calf musculature intact. No RIGHT femoral, and the, tibia-fibula   fracture deformity.    LEFT lower extremity arterial runoff shows patent common femoral,   femoral, popliteal artery, deep femoral artery, tibioperoneal trunk and   vessel trifurcation to the level of the ankle joint.  There is a depressed lateral tibial plateau fracture deformity. The LEFT   femur, femoral condyles, fibula head and neck and distal tibia fibula are   intact.  LEFT lower extremity musculature intact without hematoma.  RIGHT knee    IMPRESSION:    Occluded popliteal artery with collateral reconstitution of the   tibioperoneal trunk and the vessels oftrifurcation to the RIGHT foot.   Depressed LEFT lateral tibial plateau fracture.  "Deep laceration lateral knee soft tissues with subcutaneous and deep   interfascial air dissection with intra-articular air confirming joint   capsule laceration as described. Punctate air lucencies within the   gastrocnemius muscle.       Chest abdomen pelvic viscera intact.     No other fracture deformities.    Critical value  discussed with Dr. Nakul Orozco, on 6/25/2019 at 12:05   PM with read back.  Hospital policies for critical values including read back   policy were followed.  The verbal communication of the critical value   supplements this written report.                 DOMINGUEZ BO M.D., ATTENDING RADIOLOGIST  This document has been electronically signed. Jun 25 2019 12:46PM        < end of copied text >      A/P:  Pt is a  68y Male with a left tibial plateau fx s/p forklift crush injury today    PLAN d/w Dr. Dela Cruz:   * Dedicated L knee CT reconstruction ordered  * Maintain left knee immobilizer at all times  * NWB of the LLE  * Pain control as clinically indicated  * NPO after midnight for possible surgery tomorrow with Dr. Dela Cruz  * Continue care as per primary team

## 2019-06-25 NOTE — CONSULT NOTE ADULT - SUBJECTIVE AND OBJECTIVE BOX
Ortho Preop Note    Patient is a 68y old  Male who presents with a chief complaint of Forklift crush to RLE (25 Jun 2019 11:48)                            10.7   6.0   )-----------( 285      ( 25 Jun 2019 10:57 )             33.0     06-25    139  |  105  |  20.0  ----------------------------<  187<H>  3.5   |  21.0<L>  |  1.34<H>    Ca    8.0<L>      25 Jun 2019 10:57    TPro  6.0<L>  /  Alb  3.5  /  TBili  0.7  /  DBili  x   /  AST  25  /  ALT  17  /  AlkPhos  67  06-25    PT/INR - ( 25 Jun 2019 10:57 )   PT: 13.1 sec;   INR: 1.13 ratio         PTT - ( 25 Jun 2019 10:57 )  PTT:24.6 sec              Unable to examine patient due to urgency of vascular injury as he was taken to the OR.   Was told by ACS team and seen on CT that the patient has a left tibial plateau fx.   We will examine patient once out of OR.   Asked ACS team to please place a KI on left leg while in OR.       Full consult to follow

## 2019-06-25 NOTE — ED ADULT TRIAGE NOTE - CHIEF COMPLAINT QUOTE
blunt force injury from a machine to right leg. Pt then fell back and hit his head. Lac to right thigh with tourniquet applied by EMS at 10:19. EMS reports approximate blood loss of 1.5 liters.

## 2019-06-25 NOTE — H&P ADULT - NSHPPHYSICALEXAM_GEN_ALL_CORE
Constitutional: Well-developed well nourished Male, no acute distress    Neurological: GCS: 15 (4/5/6). A&O x 3    HEENT: Head is normocephalic and atraumatic, mandibular alignment intact, midface stable, no blood or discharge from nares or oral cavity, no mijares sign / racoon eyes, EOMI b/l, pupils 2mm round and reactive to light b/l, no active drainage or redness    Neck: cervical collar in place, trachea midline    Respiratory: Breath sounds CTA b/l respirations are unlabored, no accessory muscle use, no conversational dyspnea    Cardiovascular: Regular rate & rhythm, +S1, S1, Chest wall is non-tender to palpation, no subQ emphysema or crepitus palpated    Gastrointestinal: Abdomen soft, non-tender, non-distended, no rebound tenderness / guarding, no ecchymosis or external signs of abdominal trauma    Pelvis: stable, Tenderness to bilateral hips    Neurospinal: C/T/L/S spines have no tenderness to palpation, no step-offs or signs of external trauma.    Musculoskeletal: unable to dorsiflex or plantarflex Right foot. Decreased sensation distal to right knee. Bilateral upper extremities mechanically stable.     Vascular: 2+ radial, femoral pulses bilaterally. 2+ DP/PT LLE, No signals in RLE DP/PT. No popliteal pulse. 2+ right femoral pulse.     Skin: 10cm full thickness laceration to posterior lateral right leg just superior to knee.

## 2019-06-25 NOTE — ED PROVIDER NOTE - PHYSICAL EXAMINATION
Gen: NAD  Head: NCAT  HEENT: PERRL, MMM, normal conjunctiva, anicteric, neck supple; 2mm reactive bilaterally  Lung: CTAB, no adventitious sounds  CV: RRR, no murmurs, rubs or gallops  Abd: soft, NTND, no rebound or guarding, no CVAT  MSK: 10cm deep laceration to the bone on the R lateral leg, no active bleeding after tourniquet released  Neuro: No focal neurologic deficits. CN II-XII grossly intact. 5/5 strength and normal sensation in all extremities. pulses questionable on the R sided DP; L DP 2+, able to move toes bilaterally  Skin: Warm and dry, no evidence of rash  Psych: normal mood and affect

## 2019-06-25 NOTE — CONSULT NOTE ADULT - SUBJECTIVE AND OBJECTIVE BOX
SICU CONSULT NOTE  Luiz Saunders  1950    HPI: 68y Male present to ED as Trauma Code B s/p crush from   Patient denies fevers/chills, denies lightheadedness/dizziness, denies SOB/chest pain, denies nausea/vomiting, denies constipation/diarrhea.  ***    ROS:  General: denies fatigue, unintended weight loss or night sweats  CV: denies chest pain, chest pressure or palpitations  Resp: denies shortness of breath, pleuritic pain, cough, or wheezing  GI: denies changes in bowel movement, melena or BRBPR  : denies dysuria, hematuria, urinary frequency or urinary urgency  MSK: denies myalgias    PAST MEDICAL & SURGICAL HISTORY:  GERD (gastroesophageal reflux disease)  HLD (hyperlipidemia)  HTN  DM  No significant past surgical history    FAMILY HISTORY:    [x] Family history not pertinent as reviewed with the patient and family    SOCIAL HISTORY:  denies toxic habits    ALLERGIES: NKA No Known Allergies      HOME MEDICATIONS: confirmed with [facility]  simvastatin 20, asa81, gentadueto (linagliptin/meformin) 2.1000 BID, luiz (amlodipine/olmesartan) 10/40 daily    --------------------------------------------------------------------------------------------    PHYSICAL EXAM: ***  General: NAD, Lying in bed comfortably  Neuro: A+Ox3  HEENT: NC/AT, EOMI  Neck: Soft, supple  Cardio: RRR, nml S1/S2  Resp: Good effort, CTA b/l  Thorax: No chest wall tenderness  Breast: No lesions/masses, no drainage  GI/Abd: Soft, NT/ND, no rebound/guarding, no masses palpated  Vascular: All 4 extremities warm.  Skin: Intact, no breakdown  Lymphatic/Nodes: No palpable lymphadenopathy  Pelvis: stable  Musculoskeletal: All 4 extremities moving spontaneously, no limitations, no spinal tenderness or stepoffs  --------------------------------------------------------------------------------------------    LABS                 9.8    x      )----------(  176       ( 2019 18:43 )               30.0      139    |  105    |  20.0   ----------------------------<  187        ( 2019 10:57 )  3.5     |  21.0   |  1.34     Ca    8.0        ( 2019 10:57 )    TPro  6.0    /  Alb  3.5    /  TBili  0.7    /  DBili  x      /  AST  25     /  ALT  17     /  AlkPhos  67     ( 2019 10:57 )    LIVER FUNCTIONS - ( 2019 10:57 )  Alb: 3.5 g/dL / Pro: 6.0 g/dL / ALK PHOS: 67 U/L / ALT: 17 U/L / AST: 25 U/L / GGT: x           PT/INR -  14.1 sec / 1.22 ratio   ( 2019 18:43 )       PTT -  77.3 sec   ( 2019 18:43 )  CAPILLARY BLOOD GLUCOSE            10:56 - VBG - pH: 7.39  | pCO2: 39    | pO2: 101   | Lactate: 1.9      --------------------------------------------------------------------------------------------  IMAGING  ***    ASSESSMENT: Patient is a 68y old male with ***    PLAN:    - INCOMPLETE***  - NPO  - IVF  - pain control  - DVT ppx  - OOB/ambulate  - strict I/Os  - Patient seen/examined with attending.  - Plan to be discussed with Attending,  SICU CONSULT NOTE  Luiz Saunders  1950    HPI: 68y Male present to ED as Trauma Code B s/p crush from forklift to RLE. Patient alleged loss 1-2L blood in the field. In trauma bay, no pulses and with R tib plateau fx. CTA done w extrav. 2U PRBC preop. Patient underwent R popliteal bypass which went uncompllicated. Postop had palpable DP/PT pulses on R. , IVF 2500, no pressor requirement. Postoperatively, patient pain improved but still has pain, numbness and tingling in RLE. Patient denies fevers/chills, denies lightheadedness/dizziness, denies SOB/chest pain, denies nausea/vomiting.    ROS:  General: denies fatigue, unintended weight loss or night sweats  CV: denies chest pain, chest pressure or palpitations  Resp: denies shortness of breath, pleuritic pain, cough, or wheezing  GI: denies changes in bowel movement, melena or BRBPR  : denies dysuria, hematuria, urinary frequency or urinary urgency  MSK: denies myalgias    PAST MEDICAL & SURGICAL HISTORY:  GERD (gastroesophageal reflux disease)  HLD (hyperlipidemia)  HTN  DM  No significant past surgical history    FAMILY HISTORY:    [x] Family history not pertinent as reviewed with the patient and family    SOCIAL HISTORY:  denies toxic habits    ALLERGIES: NKA No Known Allergies      HOME MEDICATIONS: confirmed with [facility] [place] [facility]  simvastatin 20, asa81, gentadueto (linagliptin/meformin) 2.1000 BID, luiz (amlodipine/olmesartan) 10/40 daily    --------------------------------------------------------------------------------------------    PHYSICAL EXAM:   General: NAD, Lying in bed comfortably  Neuro: A+Ox3, LLE motor and sensation intact, R foot minimal sensation and no motor below knee.  HEENT: NC/AT, EOMI  Neck: Soft, supple  Cardio: RRR, nml S1/S2  Resp: Good effort, CTA b/l  Thorax: No chest wall tenderness  GI/Abd: Soft, NT/ND, no rebound/guarding, no masses palpated  Vascular: All 4 extremities warm. including R lower leg  Skin: Intact, no breakdown. dressings c/d/i  Lymphatic/Nodes: No palpable lymphadenopathy  Pelvis: stable  Musculoskeletal: All 4 extremities moving spontaneously though limited in distal RLE, no spinal tenderness or stepoffs  --------------------------------------------------------------------------------------------    LABS                 9.8    x      )----------(  176       ( 2019 18:43 )               30.0      139    |  105    |  20.0   ----------------------------<  187        ( 2019 10:57 )  3.5     |  21.0   |  1.34     Ca    8.0        ( 2019 10:57 )    TPro  6.0    /  Alb  3.5    /  TBili  0.7    /  DBili  x      /  AST  25     /  ALT  17     /  AlkPhos  67     ( 2019 10:57 )    LIVER FUNCTIONS - ( 2019 10:57 )  Alb: 3.5 g/dL / Pro: 6.0 g/dL / ALK PHOS: 67 U/L / ALT: 17 U/L / AST: 25 U/L / GGT: x           PT/INR -  14.1 sec / 1.22 ratio   ( 2019 18:43 )       PTT -  77.3 sec   ( 2019 18:43 )        10:56 - VBG - pH: 7.39  | pCO2: 39    | pO2: 101   | Lactate: 1.9      --------------------------------------------------------------------------------------------  IMAGING  CTA Occluded popliteal artery with collateral reconstitution of the   tibioperoneal trunk and the vessels oftrifurcation to the RIGHT foot.   Depressed LEFT lateral tibial plateau fracture.  "Deep laceration lateral knee soft tissues with subcutaneous and deep   interfascial air dissection with intra-articular air confirming joint   capsule laceration as described. Punctate air lucencies within the   gastrocnemius muscle.       CT head/C spine neg

## 2019-06-25 NOTE — CONSULT NOTE ADULT - SUBJECTIVE AND OBJECTIVE BOX
Called to Trauma Room for consult for pt [place] [facility]  Pt seen in CT scan-unable to perform full exam  Noted with injury to RLE   No appreciable pulses or signals to R foot  Gross motor deficit noted in RLE  CT findings: arterial disruption below R knee  Will need urgent revascularization  High risk for limb loss  OR notified Called to Trauma Room for consult for pt [place] [facility]  Pt seen in CT scan-unable to perform full exam  Noted with injury to RLE   No appreciable pulses or signals to R foot  Gross motor deficit noted in RLE  CT findings: arterial disruption below R knee  Will need urgent revascularization  High risk for limb loss  OR notified  Ortho notified re: displaced L tibial plateau fx noted on CT  Plastic surgery Dr Ron called for consult for possible acute nerve injury RLE

## 2019-06-25 NOTE — ED PROVIDER NOTE - NS ED ROS FT
ROS: denies HA, weakness, dizziness, fevers/chills, nausea/vomiting, chest pain, SOB, diaphoresis, abdominal pain, diarrhea, joint pain, neuro deficits, dysuria/hematuria, rash    +R leg pain

## 2019-06-25 NOTE — ED PROVIDER NOTE - CLINICAL SUMMARY MEDICAL DECISION MAKING FREE TEXT BOX
trauma to R leg, leg laceration, 2L blood loss. will transfuse, trauma B activated. Will CT, likely OR

## 2019-06-25 NOTE — H&P ADULT - ASSESSMENT
67yo male s/p crush/impalement to right lower extremity by forklift in work related accident. Patient w/ pulseless RLE and in need of immediate revascularization as well as washout/possible closure of RLE laceration. Patient remains at high risk for loss of function and limb loss given patient already has reduced motor function and sensation in RLE. Patient also found to have possible Tibial plateau fracture.     - F/u imaging results  - 2g ancef and Tdap given in Trauma bay  - 1U PRBC given, 2U PRBC on hold for OR  - Emergent OR for revascularization by Vascular surgery who has already been consulted and has evaluated patient  - Ortho consult for evaluation of left tibial plateau fracture.   - After OR, admit to trauma under Dr. Caballero.   - dispo pending operative course by vascular.

## 2019-06-25 NOTE — H&P ADULT - HISTORY OF PRESENT ILLNESS
HPI:   68yMale BIBEMS on 06-25-19 by ground  activated as code B trauma. Patient was at work when he was reportedly crushed/impaled by forklift. Possible associated fall, pt denies any LOC. Per EMS, pt lost approximately 1-2 Liters of blood from RLE laceration before a tourniquet was applied at approximately 10:19am. Patient brought to [facility] where on arrival, patient protecting airway, able to with ease, had nonlabored breathing, and had good central pulses. Patient's tournigquet and dressing over RLE posterior lateral laceration taken down. No active bleeding noted from approximately 10cm full thickness laceration. No pulse signals distal to right knee. Pt unable to dorsiflex or plantarflex right foot and had decreased sensation distal to knee.       Prehospital interventions: cervical collar, right thigh tourniquet    A: Protected, patient conversing  B: CTABL. Symmetrical chest rise  C: 2+ central (carotid, femoral). 2+ LLE pulse. RLE w/o DP/PT signals  D: GCS 15, interacting. unable to dorsiflex or plantarflex right foot.   E: 10cm full thickness laceration to posterior lateral right leg.     Initial Vitals:  Temp:    98  HR:    96  BP:   98/56    RR:     18  SpO2:   95  %    CXR: Negative for evidence of hemo/pneumothorax    Trauma bay interventions: cervical collar exchanged, 1U PRBC, adacel, 2g ancef,     PMH:  GERD (gastroesophageal reflux disease) [Active]  HLD (hyperlipidemia) [Active]      PSH:  Denies any significant past surgical history        Home Medications:  states he takes metoprolol and a statin, unsure of doses    ALL:  NKDA    FMH:  No significant family history    SOC Hx:   Denies etoh, tobacco, or drug use

## 2019-06-25 NOTE — CONSULT NOTE ADULT - ASSESSMENT
68M s/p crush injury to RLE by forklift with L tibial plateau fx and R popliteal artery occlusion s/p R popliteal bypass 6/25 for acute limb ischemia    N: tylenol, oxy SS, dilaudid IV PRN, q1hr neurovascular checks  CV: amlodipine, holding home olmesartan will restart if hypertensive, appreciate vascular surgery recs  R: wean NC as tolerated to RA  GI: NPO except meds, reg diet in AM  FEN: LR@125  : jose alejandro Min in AM  Endo: ISS  ID: ancef x 3 doses postop  Hem: HSQ, asa81  MSK: plastics for possible nerve injury, ortho for tib plateau fx no official operative plan yet, flat for 6 hours, bedrest x 24 hours or until cleared by vascular surgery  Skin: R posterolateral knee lac mostly closed and packed. Dressing and packing changes after 24 hours by vascular  Ppx: no gi ppx  Dispo: SICU  discussed with attending Dr. Benavides 68M s/p crush injury to RLE by forklift with L tibial plateau fx and R popliteal artery occlusion s/p R popliteal bypass 6/25 for acute limb ischemia    N: tylenol, oxy SS, dilaudid IV PRN, q1hr neurovascular checks  CV: amlodipine, holding home olmesartan will restart if hypertensive, appreciate vascular surgery recs  R: wean NC as tolerated to RA  GI: NPO except meds, reg diet in AM  FEN: LR@125  : jose alejandro Min in AM  Endo: ISS  ID: ancef x 3 doses postop  Hem: HSQ, asa81  MSK: plastics for possible nerve injury, ortho for tib plateau fx no official operative plan yet, L knee immobilizer, HOB 30-45 degrees, bedrest x 24 hours or until cleared by vascular surgery  Skin: R posterolateral knee lac mostly closed and packed. Dressing and packing changes after 24 hours by vascular  Ppx: no gi ppx  Dispo: SICU  discussed with attending Dr. Benavides

## 2019-06-25 NOTE — H&P ADULT - ATTENDING COMMENTS
Patient seen and examined on arrival  ABC intact, mildly hypotensive. emergency release blood ordered.  CXr no PTX, no FELIPE  Right lateral knee laceration into popliteal fossa, tourniquet taken down no bleeding.  unable to obtained  doppler signals so right lower extremity  vascular consulted for pop injury  pan scanned left tibial plateau fx, right popliteal arterial injury  OR for reperfusion, hog risk for limb loss and compartment syndrome after reperfusion.

## 2019-06-25 NOTE — PATIENT PROFILE ADULT - VISION (WITH CORRECTIVE LENSES IF THE PATIENT USUALLY WEARS THEM):
Normal vision: sees adequately in most situations; can see medication labels, newsprint/Prescription Glasses

## 2019-06-25 NOTE — CONSULT NOTE ADULT - ASSESSMENT
workplace accident with penetrating right leg injury  Mechanism of injury unclear  Reported bleeding in the field, 1-2L.   Tourniquet applied for 20 mins, no bleeding in ED when tourniquet taken off.    On exam  Mildly drowsy  Right leg with blood stained dressing over popliteal area  Absent pedal pulses in RLE. Gross motor absent to intrinsic foot muscles, absent plantarflexion and dorsiflexion  Gross sensation absent in forefoot.  Palpable left DP pulse    CTA reviewed  Injuries: Right popliteal occlusion, left tibial plateau injury    Plan for emergent RLE revascularization, control of bleeding, fasciotomy, possible angiogram.  Will consult plastic surgery, likely associated nerve injury (?tibial nerve)  I explained to patient using a [place]an creole . he is at high risk for long term disability or limb loss. he expresses understanding and wishes to proceed.

## 2019-06-25 NOTE — ED PROVIDER NOTE - OBJECTIVE STATEMENT
69yo M with hx of HLD, GERD presents s/p trauma. crush injury by fork lift 1 hour ago, lost 2L. Speaks creole. Questionable LOC. +head trauma. 1ft fall onto ground. 10cm laceration to the lateral knee area, tourniquet was placed at 1019am.

## 2019-06-26 LAB
ALBUMIN SERPL ELPH-MCNC: 3.2 G/DL — LOW (ref 3.3–5.2)
ALP SERPL-CCNC: 55 U/L — SIGNIFICANT CHANGE UP (ref 40–120)
ALT FLD-CCNC: 16 U/L — SIGNIFICANT CHANGE UP
ANION GAP SERPL CALC-SCNC: 11 MMOL/L — SIGNIFICANT CHANGE UP (ref 5–17)
ANION GAP SERPL CALC-SCNC: 12 MMOL/L — SIGNIFICANT CHANGE UP (ref 5–17)
ANION GAP SERPL CALC-SCNC: 16 MMOL/L — SIGNIFICANT CHANGE UP (ref 5–17)
APPEARANCE UR: ABNORMAL
AST SERPL-CCNC: 28 U/L — SIGNIFICANT CHANGE UP
BACTERIA # UR AUTO: ABNORMAL
BASOPHILS # BLD AUTO: 0.03 K/UL — SIGNIFICANT CHANGE UP (ref 0–0.2)
BASOPHILS NFR BLD AUTO: 0.2 % — SIGNIFICANT CHANGE UP (ref 0–2)
BILIRUB SERPL-MCNC: 0.6 MG/DL — SIGNIFICANT CHANGE UP (ref 0.4–2)
BILIRUB UR-MCNC: NEGATIVE — SIGNIFICANT CHANGE UP
BUN SERPL-MCNC: 23 MG/DL — HIGH (ref 8–20)
BUN SERPL-MCNC: 29 MG/DL — HIGH (ref 8–20)
BUN SERPL-MCNC: 30 MG/DL — HIGH (ref 8–20)
CALCIUM SERPL-MCNC: 7.2 MG/DL — LOW (ref 8.6–10.2)
CALCIUM SERPL-MCNC: 7.3 MG/DL — LOW (ref 8.6–10.2)
CALCIUM SERPL-MCNC: 7.3 MG/DL — LOW (ref 8.6–10.2)
CHLORIDE SERPL-SCNC: 103 MMOL/L — SIGNIFICANT CHANGE UP (ref 98–107)
CHLORIDE SERPL-SCNC: 103 MMOL/L — SIGNIFICANT CHANGE UP (ref 98–107)
CHLORIDE SERPL-SCNC: 104 MMOL/L — SIGNIFICANT CHANGE UP (ref 98–107)
CK MB CFR SERPL CALC: 6 NG/ML — SIGNIFICANT CHANGE UP (ref 0–6.7)
CK MB CFR SERPL CALC: 8.3 NG/ML — HIGH (ref 0–6.7)
CK SERPL-CCNC: 532 U/L — HIGH (ref 30–200)
CK SERPL-CCNC: 891 U/L — HIGH (ref 30–200)
CK SERPL-CCNC: 974 U/L — HIGH (ref 30–200)
CO2 SERPL-SCNC: 21 MMOL/L — LOW (ref 22–29)
CO2 SERPL-SCNC: 22 MMOL/L — SIGNIFICANT CHANGE UP (ref 22–29)
CO2 SERPL-SCNC: 23 MMOL/L — SIGNIFICANT CHANGE UP (ref 22–29)
COLOR SPEC: YELLOW — SIGNIFICANT CHANGE UP
CREAT SERPL-MCNC: 1.35 MG/DL — HIGH (ref 0.5–1.3)
CREAT SERPL-MCNC: 1.35 MG/DL — HIGH (ref 0.5–1.3)
CREAT SERPL-MCNC: 1.36 MG/DL — HIGH (ref 0.5–1.3)
DIFF PNL FLD: ABNORMAL
EOSINOPHIL # BLD AUTO: 0.11 K/UL — SIGNIFICANT CHANGE UP (ref 0–0.5)
EOSINOPHIL NFR BLD AUTO: 0.8 % — SIGNIFICANT CHANGE UP (ref 0–6)
EPI CELLS # UR: SIGNIFICANT CHANGE UP
GLUCOSE BLDC GLUCOMTR-MCNC: 135 MG/DL — HIGH (ref 70–99)
GLUCOSE BLDC GLUCOMTR-MCNC: 144 MG/DL — HIGH (ref 70–99)
GLUCOSE BLDC GLUCOMTR-MCNC: 150 MG/DL — HIGH (ref 70–99)
GLUCOSE BLDC GLUCOMTR-MCNC: 151 MG/DL — HIGH (ref 70–99)
GLUCOSE BLDC GLUCOMTR-MCNC: 174 MG/DL — HIGH (ref 70–99)
GLUCOSE BLDC GLUCOMTR-MCNC: 177 MG/DL — HIGH (ref 70–99)
GLUCOSE BLDC GLUCOMTR-MCNC: 180 MG/DL — HIGH (ref 70–99)
GLUCOSE SERPL-MCNC: 142 MG/DL — HIGH (ref 70–115)
GLUCOSE SERPL-MCNC: 151 MG/DL — HIGH (ref 70–115)
GLUCOSE SERPL-MCNC: 169 MG/DL — HIGH (ref 70–115)
GLUCOSE UR QL: NEGATIVE MG/DL — SIGNIFICANT CHANGE UP
HBA1C BLD-MCNC: 5.9 % — HIGH (ref 4–5.6)
HCT VFR BLD CALC: 29.3 % — LOW (ref 39–50)
HCV AB S/CO SERPL IA: 0.12 S/CO — SIGNIFICANT CHANGE UP (ref 0–0.99)
HCV AB SERPL-IMP: SIGNIFICANT CHANGE UP
HGB BLD-MCNC: 9.3 G/DL — LOW (ref 13–17)
HYALINE CASTS # UR AUTO: ABNORMAL /LPF
IMM GRANULOCYTES NFR BLD AUTO: 0.5 % — SIGNIFICANT CHANGE UP (ref 0–1.5)
KETONES UR-MCNC: NEGATIVE — SIGNIFICANT CHANGE UP
LACTATE SERPL-SCNC: 1.8 MMOL/L — SIGNIFICANT CHANGE UP (ref 0.5–2)
LACTATE SERPL-SCNC: 3.3 MMOL/L — HIGH (ref 0.5–2)
LEUKOCYTE ESTERASE UR-ACNC: NEGATIVE — SIGNIFICANT CHANGE UP
LYMPHOCYTES # BLD AUTO: 0.92 K/UL — LOW (ref 1–3.3)
LYMPHOCYTES # BLD AUTO: 7 % — LOW (ref 13–44)
MAGNESIUM SERPL-MCNC: 2.5 MG/DL — SIGNIFICANT CHANGE UP (ref 1.6–2.6)
MCHC RBC-ENTMCNC: 26.8 PG — LOW (ref 27–34)
MCHC RBC-ENTMCNC: 31.7 GM/DL — LOW (ref 32–36)
MCV RBC AUTO: 84.4 FL — SIGNIFICANT CHANGE UP (ref 80–100)
MONOCYTES # BLD AUTO: 1.24 K/UL — HIGH (ref 0–0.9)
MONOCYTES NFR BLD AUTO: 9.4 % — SIGNIFICANT CHANGE UP (ref 2–14)
NEUTROPHILS # BLD AUTO: 10.86 K/UL — HIGH (ref 1.8–7.4)
NEUTROPHILS NFR BLD AUTO: 82.1 % — HIGH (ref 43–77)
NITRITE UR-MCNC: NEGATIVE — SIGNIFICANT CHANGE UP
PH UR: 5 — SIGNIFICANT CHANGE UP (ref 5–8)
PLATELET # BLD AUTO: 176 K/UL — SIGNIFICANT CHANGE UP (ref 150–400)
POTASSIUM SERPL-MCNC: 4.3 MMOL/L — SIGNIFICANT CHANGE UP (ref 3.5–5.3)
POTASSIUM SERPL-MCNC: 4.4 MMOL/L — SIGNIFICANT CHANGE UP (ref 3.5–5.3)
POTASSIUM SERPL-MCNC: 5.1 MMOL/L — SIGNIFICANT CHANGE UP (ref 3.5–5.3)
POTASSIUM SERPL-SCNC: 4.3 MMOL/L — SIGNIFICANT CHANGE UP (ref 3.5–5.3)
POTASSIUM SERPL-SCNC: 4.4 MMOL/L — SIGNIFICANT CHANGE UP (ref 3.5–5.3)
POTASSIUM SERPL-SCNC: 5.1 MMOL/L — SIGNIFICANT CHANGE UP (ref 3.5–5.3)
PROT SERPL-MCNC: 5.4 G/DL — LOW (ref 6.6–8.7)
PROT UR-MCNC: 30 MG/DL
RBC # BLD: 3.47 M/UL — LOW (ref 4.2–5.8)
RBC # FLD: 17.5 % — HIGH (ref 10.3–14.5)
RBC CASTS # UR COMP ASSIST: >50 /HPF (ref 0–4)
SODIUM SERPL-SCNC: 136 MMOL/L — SIGNIFICANT CHANGE UP (ref 135–145)
SODIUM SERPL-SCNC: 138 MMOL/L — SIGNIFICANT CHANGE UP (ref 135–145)
SODIUM SERPL-SCNC: 141 MMOL/L — SIGNIFICANT CHANGE UP (ref 135–145)
SP GR SPEC: 1.01 — SIGNIFICANT CHANGE UP (ref 1.01–1.02)
UROBILINOGEN FLD QL: NEGATIVE MG/DL — SIGNIFICANT CHANGE UP
WBC # BLD: 13.23 K/UL — HIGH (ref 3.8–10.5)
WBC # FLD AUTO: 13.23 K/UL — HIGH (ref 3.8–10.5)
WBC UR QL: SIGNIFICANT CHANGE UP

## 2019-06-26 PROCEDURE — 73562 X-RAY EXAM OF KNEE 3: CPT | Mod: 26,LT

## 2019-06-26 PROCEDURE — 99232 SBSQ HOSP IP/OBS MODERATE 35: CPT

## 2019-06-26 PROCEDURE — 27535 TREAT KNEE FRACTURE: CPT | Mod: LT

## 2019-06-26 PROCEDURE — 93010 ELECTROCARDIOGRAM REPORT: CPT

## 2019-06-26 PROCEDURE — 77071 MNL APPL STRS JT RADIOGRAPHY: CPT | Mod: LT

## 2019-06-26 PROCEDURE — 99291 CRITICAL CARE FIRST HOUR: CPT | Mod: 25

## 2019-06-26 PROCEDURE — 27331 EXPLORE/TREAT KNEE JOINT: CPT | Mod: LT

## 2019-06-26 PROCEDURE — 76000 FLUOROSCOPY <1 HR PHYS/QHP: CPT | Mod: 26

## 2019-06-26 RX ORDER — CEFAZOLIN SODIUM 1 G
2000 VIAL (EA) INJECTION
Refills: 0 | Status: COMPLETED | OUTPATIENT
Start: 2019-06-26 | End: 2019-06-27

## 2019-06-26 RX ORDER — SODIUM CHLORIDE 9 MG/ML
1000 INJECTION, SOLUTION INTRAVENOUS
Refills: 0 | Status: DISCONTINUED | OUTPATIENT
Start: 2019-06-26 | End: 2019-06-27

## 2019-06-26 RX ORDER — FENTANYL CITRATE 50 UG/ML
50 INJECTION INTRAVENOUS
Refills: 0 | Status: DISCONTINUED | OUTPATIENT
Start: 2019-06-26 | End: 2019-06-27

## 2019-06-26 RX ORDER — SODIUM CHLORIDE 9 MG/ML
1000 INJECTION, SOLUTION INTRAVENOUS ONCE
Refills: 0 | Status: COMPLETED | OUTPATIENT
Start: 2019-06-26 | End: 2019-06-26

## 2019-06-26 RX ORDER — GABAPENTIN 400 MG/1
300 CAPSULE ORAL THREE TIMES A DAY
Refills: 0 | Status: DISCONTINUED | OUTPATIENT
Start: 2019-06-26 | End: 2019-06-26

## 2019-06-26 RX ORDER — ONDANSETRON 8 MG/1
4 TABLET, FILM COATED ORAL ONCE
Refills: 0 | Status: DISCONTINUED | OUTPATIENT
Start: 2019-06-26 | End: 2019-06-27

## 2019-06-26 RX ADMIN — GABAPENTIN 300 MILLIGRAM(S): 400 CAPSULE ORAL at 13:22

## 2019-06-26 RX ADMIN — Medication 650 MILLIGRAM(S): at 01:45

## 2019-06-26 RX ADMIN — Medication 1: at 03:26

## 2019-06-26 RX ADMIN — SODIUM CHLORIDE 100 MILLILITER(S): 9 INJECTION, SOLUTION INTRAVENOUS at 21:45

## 2019-06-26 RX ADMIN — Medication 1: at 06:22

## 2019-06-26 RX ADMIN — Medication 100 MILLIGRAM(S): at 13:18

## 2019-06-26 RX ADMIN — FENTANYL CITRATE 50 MICROGRAM(S): 50 INJECTION INTRAVENOUS at 23:13

## 2019-06-26 RX ADMIN — AMLODIPINE BESYLATE 10 MILLIGRAM(S): 2.5 TABLET ORAL at 10:24

## 2019-06-26 RX ADMIN — OXYCODONE HYDROCHLORIDE 10 MILLIGRAM(S): 5 TABLET ORAL at 13:18

## 2019-06-26 RX ADMIN — HEPARIN SODIUM 5000 UNIT(S): 5000 INJECTION INTRAVENOUS; SUBCUTANEOUS at 05:23

## 2019-06-26 RX ADMIN — HYDROMORPHONE HYDROCHLORIDE 0.5 MILLIGRAM(S): 2 INJECTION INTRAMUSCULAR; INTRAVENOUS; SUBCUTANEOUS at 10:23

## 2019-06-26 RX ADMIN — OXYCODONE HYDROCHLORIDE 5 MILLIGRAM(S): 5 TABLET ORAL at 01:45

## 2019-06-26 RX ADMIN — SODIUM CHLORIDE 100 MILLILITER(S): 9 INJECTION, SOLUTION INTRAVENOUS at 05:16

## 2019-06-26 RX ADMIN — Medication 650 MILLIGRAM(S): at 05:23

## 2019-06-26 RX ADMIN — OXYCODONE HYDROCHLORIDE 10 MILLIGRAM(S): 5 TABLET ORAL at 14:00

## 2019-06-26 RX ADMIN — HYDROMORPHONE HYDROCHLORIDE 0.5 MILLIGRAM(S): 2 INJECTION INTRAMUSCULAR; INTRAVENOUS; SUBCUTANEOUS at 09:55

## 2019-06-26 RX ADMIN — HEPARIN SODIUM 5000 UNIT(S): 5000 INJECTION INTRAVENOUS; SUBCUTANEOUS at 13:18

## 2019-06-26 RX ADMIN — SODIUM CHLORIDE 1000 MILLILITER(S): 9 INJECTION, SOLUTION INTRAVENOUS at 08:03

## 2019-06-26 RX ADMIN — Medication 81 MILLIGRAM(S): at 11:43

## 2019-06-26 RX ADMIN — Medication 100 MILLIGRAM(S): at 05:15

## 2019-06-26 RX ADMIN — Medication 100 MILLIGRAM(S): at 15:12

## 2019-06-26 RX ADMIN — Medication 100 MILLIGRAM(S): at 05:23

## 2019-06-26 RX ADMIN — Medication 650 MILLIGRAM(S): at 11:44

## 2019-06-26 RX ADMIN — Medication 650 MILLIGRAM(S): at 17:22

## 2019-06-26 RX ADMIN — CHLORHEXIDINE GLUCONATE 1 APPLICATION(S): 213 SOLUTION TOPICAL at 05:24

## 2019-06-26 RX ADMIN — OXYCODONE HYDROCHLORIDE 5 MILLIGRAM(S): 5 TABLET ORAL at 02:45

## 2019-06-26 RX ADMIN — OXYCODONE HYDROCHLORIDE 10 MILLIGRAM(S): 5 TABLET ORAL at 07:56

## 2019-06-26 RX ADMIN — HYDROMORPHONE HYDROCHLORIDE 0.5 MILLIGRAM(S): 2 INJECTION INTRAMUSCULAR; INTRAVENOUS; SUBCUTANEOUS at 03:41

## 2019-06-26 RX ADMIN — Medication 650 MILLIGRAM(S): at 12:00

## 2019-06-26 RX ADMIN — HYDROMORPHONE HYDROCHLORIDE 0.5 MILLIGRAM(S): 2 INJECTION INTRAMUSCULAR; INTRAVENOUS; SUBCUTANEOUS at 03:26

## 2019-06-26 RX ADMIN — OXYCODONE HYDROCHLORIDE 10 MILLIGRAM(S): 5 TABLET ORAL at 09:00

## 2019-06-26 RX ADMIN — FENTANYL CITRATE 50 MICROGRAM(S): 50 INJECTION INTRAVENOUS at 23:30

## 2019-06-26 NOTE — PROGRESS NOTE ADULT - SUBJECTIVE AND OBJECTIVE BOX
INTERVAL HPI/OVERNIGHT EVENTS/SUBJECTIVE:  POD 1 Rt fem pop bypass for injury to popliteal vessel from fork lift.  Pt remained on bedrest through the night, palpable pulses BL DP.  Continues to lack sensation to Right forefoot.  Some sensation to Rt 5th digit.  Minimal dorsiflexion noted.  Some pain, relief with pain meds.      ICU Vital Signs Last 24 Hrs  T(C): 36.8 (26 Jun 2019 04:04), Max: 36.8 (26 Jun 2019 04:04)  T(F): 98.2 (26 Jun 2019 04:04), Max: 98.2 (26 Jun 2019 04:04)  HR: 82 (26 Jun 2019 05:00) (55 - 110)  BP: 130/67 (25 Jun 2019 19:00) (105/61 - 141/63)  BP(mean): --  ABP: 106/48 (26 Jun 2019 05:00) (91/40 - 170/68)  ABP(mean): 65 (26 Jun 2019 05:00) (55 - 94)  RR: 20 (26 Jun 2019 05:00) (12 - 27)  SpO2: 99% (26 Jun 2019 05:00) (98% - 100%)      I&O's Detail    25 Jun 2019 07:01  -  26 Jun 2019 05:57  --------------------------------------------------------  IN:    multiple electrolytes Injection Type 1: 1100 mL    Oral Fluid: 200 mL    Solution: 50 mL  Total IN: 1350 mL    OUT:    Indwelling Catheter - Urethral: 685 mL    Voided: 400 mL  Total OUT: 1085 mL    Total NET: 265 mL                MEDICATIONS  (STANDING):  acetaminophen   Tablet .. 650 milliGRAM(s) Oral every 6 hours  amLODIPine   Tablet 10 milliGRAM(s) Oral daily  aspirin  chewable 81 milliGRAM(s) Oral daily  ceFAZolin   IVPB 2000 milliGRAM(s) IV Intermittent every 8 hours  ceFAZolin   IVPB      chlorhexidine 2% Cloths 1 Application(s) Topical <User Schedule>  dextrose 50% Injectable 12.5 Gram(s) IV Push once  dextrose 50% Injectable 25 Gram(s) IV Push once  dextrose 50% Injectable 25 Gram(s) IV Push once  docusate sodium 100 milliGRAM(s) Oral every 8 hours  heparin  Injectable 5000 Unit(s) SubCutaneous every 8 hours  insulin lispro (HumaLOG) corrective regimen sliding scale   SubCutaneous every 4 hours  multiple electrolytes Injection Type 1 1000 milliLiter(s) (100 mL/Hr) IV Continuous <Continuous>  simvastatin 20 milliGRAM(s) Oral at bedtime    MEDICATIONS  (PRN):  acetaminophen  IVPB .. 1000 milliGRAM(s) IV Intermittent once PRN Mild Pain (1 - 3)  dextrose 40% Gel 15 Gram(s) Oral once PRN Blood Glucose LESS THAN 70 milliGRAM(s)/deciliter  glucagon  Injectable 1 milliGRAM(s) IntraMuscular once PRN Glucose LESS THAN 70 milligrams/deciliter  HYDROmorphone  Injectable 0.5 milliGRAM(s) IV Push every 4 hours PRN breakthrough pain  ondansetron Injectable 4 milliGRAM(s) IV Push every 6 hours PRN Nausea  oxyCODONE    IR 5 milliGRAM(s) Oral every 4 hours PRN Moderate Pain (4 - 6)  oxyCODONE    IR 10 milliGRAM(s) Oral every 4 hours PRN Severe Pain (7 - 10)      NUTRITION/IVF: NPO/Plasmalyte @ 100cc/hr    [place]:   Yes    A-LINE:   RRAL    PHYSICAL EXAM:    Gen:  NAD, awake    Eyes:  EMOI's    Neurological:  A&O x3    ENMT:  MMM    Neck:  Supple, trachea midine    Pulmonary:  Unlabored    Cardiovascular:  NSR, +2 palpable DP BL     Gastrointestinal:  soft, NTND    Genitourinary:  [place]    Extremities:  Lt knee in immobilizer    Skin:  Post op dressing with some oozing.  Reinforced overnight by nursing    Musculoskeletal:  Soft compartments noted to Rt lower extremity.  Able to feel sensation to Rt 5th digit, not to digits 1-4.  Some dorsiflexion noted to Rt foot.              LABS:  CBC Full  -  ( 26 Jun 2019 05:14 )  WBC Count : 13.23 K/uL  RBC Count : 3.47 M/uL  Hemoglobin : 9.3 g/dL  Hematocrit : 29.3 %  Platelet Count - Automated : 176 K/uL  Mean Cell Volume : 84.4 fl  Mean Cell Hemoglobin : 26.8 pg  Mean Cell Hemoglobin Concentration : 31.7 gm/dL  Auto Neutrophil # : 10.86 K/uL  Auto Lymphocyte # : 0.92 K/uL  Auto Monocyte # : 1.24 K/uL  Auto Eosinophil # : 0.11 K/uL  Auto Basophil # : 0.03 K/uL  Auto Neutrophil % : 82.1 %  Auto Lymphocyte % : 7.0 %  Auto Monocyte % : 9.4 %  Auto Eosinophil % : 0.8 %  Auto Basophil % : 0.2 %    06-26    141  |  104  |  23.0<H>  ----------------------------<  169<H>  5.1   |  21.0<L>  |  1.35<H>    Ca    7.3<L>      26 Jun 2019 05:14  Phos  3.0     06-25  Mg     2.5     06-26    TPro  5.4<L>  /  Alb  3.2<L>  /  TBili  0.6  /  DBili  x   /  AST  28  /  ALT  16  /  AlkPhos  55  06-26    PT/INR - ( 25 Jun 2019 18:43 )   PT: 14.1 sec;   INR: 1.22 ratio         PTT - ( 25 Jun 2019 18:43 )  PTT:77.3 sec    RECENT CULTURES:      LIVER FUNCTIONS - ( 26 Jun 2019 05:14 )  Alb: 3.2 g/dL / Pro: 5.4 g/dL / ALK PHOS: 55 U/L / ALT: 16 U/L / AST: 28 U/L / GGT: x           CARDIAC MARKERS ( 26 Jun 2019 05:13 )  x     / x     / 532 U/L / x     / 6.0 ng/mL  CARDIAC MARKERS ( 25 Jun 2019 18:43 )  x     / x     / 632 U/L / x     / 7.8 ng/mL      CAPILLARY BLOOD GLUCOSE      RADIOLOGY & ADDITIONAL STUDIES:    ASSESSMENT/PLAN:  68yMale presenting with:  s/p crush injury to LLE with tibial plateau fx, traumatic Rt popliteal artery POD 1 fempop bypass, SERENITY, lactic acidosis    Neuro:  Continue with pain regimen.  Maintain sleep/wake cycle    CV:  Hemodynamically normal.  On home antihypertensives.  Q2 hr vascular checks    Pulm:  OOBTC today, IS    GI/Nutrition:  Advance diet pending OR plan with Ortho    /Renal:  [place], consider d/c pending SERENITY and OR plan.  IVF rate increased with LA 3.3  Will recheck labs later today    ID:  [email or web address]    Lines/Tubes:  [place], PIV, RRAL    Endo:  ISS    Skin:  Dressings per Vascular    Heme:  H/H stable, CPK downtrending    Proph:  SQH, SCD    Dispo:  Continue SICU care Q2 hr vasc checks, likely to deescalate today. Possible OR today with Ortho for tibial plateau fx      CRITICAL CARE TIME SPENT:

## 2019-06-26 NOTE — CONSULT NOTE ADULT - ASSESSMENT
67 y/o male PMH HTN, HLD, DM presented to ED code Trauma B, crush/ impalement injury to lower extremities by forklift Pt underwent emergent surgery to repair R potpliteal artery, now requiring sx to repair L tibial plateau fracture. Denies fever, chills, cough, phlegm production, shortness of breath, dyspnea on exertion, orthopnea, PND, edema, chest pain, pressure, palpitations, irregular, fast heart beat, nausea, vomiting, melena, rectal bleed, hematuria, lightheadedness, dizziness, syncope, near syncope. Denies any cardiac history, never seen cardiologist in past.     Pre-operative cardiovascular risk evaluation and management.   - Pt underwent emergent surgery 6/25  - EKG- SR, lafb, non specific T wave abnl.   - No cardiac symptoms. Non-ischemic ECG. METs > 4/undetermined.  RCRI (revised cardiac risk index score) < %. NSQIP score < 1%. No further cardiac work up is needed if TTE normal.  Benefits of surgery outweigh the risk. Further cardiac work up will not change risk of the patient. Proceed for surgery as indicated.    HTN- continue home medications  HLD- continue statin

## 2019-06-26 NOTE — CONSULT NOTE ADULT - ATTENDING COMMENTS
Ortho Trauma Attending:  Agree with above PA note.  Note edited where necessary.  Patient has emergent surgical needs at this time. Would recommend vascular I&D open knee arthrotomy as part of their vascular repair. Will place for ORIF plateau when stable clinically. Ortho will continue to follow.    Maxwell Dela Cruz MD  Orthopaedic Trauma Surgery
69 y/o male PMH HTN, HLD, DM presented to ED code Trauma B after a  crush/ impalement injury to lower extremities by forklift. On 6/25/2019 underwent emergency surgery to repair Rt potpliteal artery and will require additional surgery to repair L tibial plateau fracture. Cardiac risk assessment was requested prior to the second surgery.   No prior history of heart disease and is very active, with physical labor.   Denies fever, chills, cough, phlegm production, shortness of breath, dyspnea on exertion, orthopnea, PND, edema, chest pain, pressure, palpitations, irregular, fast heart beat, nausea, vomiting, melena, rectal bleed, hematuria, lightheadedness, dizziness, syncope, near syncope. Denies any cardiac history, never seen cardiologist in past.   Physical exam was WNL except for LSB systolic murmur 3/6 radiates to the base of the heart. Second sound preserved (Mild aortic stenosis vs aortic valve sclerosis).   EKG- SR, lafb, non specific T wave abnl.   TTE was performed but results are not available.  Patient with no cardiac history will undergo intermediate risk surgery. Non-ischemic ECG. METs > 4/undetermined.  RCRI (revised cardiac risk index score) < 1%. NSQIP score < 1%.   No further cardiac work up is needed at the present time.  Benefits of surgery outweigh the risk. Further cardiac work up will not change risk of the patient. Proceed for surgery as indicated.
Ortho Trauma Attending:  Agree with above PA note.  Note edited where necessary.  Will discuss with ACS team timing of surgery for the plateau fracture. Possible OR as early as 6/26. Will update PRN.    Maxwell Dela Cruz MD  Orthopaedic Trauma Surgery
Workplace accident with penetrating right leg injury  Mechanism of injury unclear  Reported bleeding in the field, 1-2L.   Tourniquet applied for 20 mins, no bleeding in ED when tourniquet taken off.    On exam  Mildly drowsy  Right leg with blood stained dressing over popliteal area  Absent pedal pulses in RLE. Gross motor absent to intrinsic foot muscles, absent plantarflexion and dorsiflexion  Gross sensation absent in forefoot.  Palpable left DP pulse    CTA reviewed  Injuries: Right popliteal occlusion, left tibial plateau injury    Plan for emergent RLE revascularization, control of bleeding, fasciotomy, possible angiogram.  Will consult plastic surgery, likely associated nerve injury (?tibial nerve)  I explained to patient using a Gu[place]ian creole . he is at high risk for long term disability or limb loss. he expresses understanding and wishes to proceed.

## 2019-06-26 NOTE — PROGRESS NOTE ADULT - SUBJECTIVE AND OBJECTIVE BOX
Ortho Preop Note    [facility]    [id number]    Patient is a 68y old  Male who presents with a chief complaint of Forklift crush to RLE iwth a LLE tibial plateau fracture pending surgical repair. (2019 08:48)      Diagnosis: LLE tibial plateau fracture     Procedure: open reduction internal fixation     Surgeon: NEIL                          [id number]    13.23 )-----------( 176      ( 2019 05:14 )             29.3         141  |  104  |  23.0<H>  ----------------------------<  169<H>  5.1   |  21.0<L>  |  1.35<H>    Ca    7.3<L>      2019 05:14  Phos  3.0       Mg     2.5         TPro  5.4<L>  /  Alb  3.2<L>  /  TBili  0.6  /  DBili  x   /  AST  28  /  ALT  16  /  AlkPhos  55      PT/INR - ( 2019 18:43 )   PT: 14.1 sec;   INR: 1.22 ratio         PTT - ( 2019 18:43 )  PTT:77.3 sec  Urinalysis Basic - ( 2019 06:33 )    Color: Yellow / Appearance: Slightly Turbid / S.015 / pH: x  Gluc: x / Ketone: Negative  / Bili: Negative / Urobili: Negative mg/dL   Blood: x / Protein: 30 mg/dL / Nitrite: Negative   Leuk Esterase: Negative / RBC: >50 /HPF / WBC 0-2   Sq Epi: x / Non Sq Epi: Few / Bacteria: Few        T(C): 36.9 (19 @ 08:00), Max: 36.9 (19 @ 08:00)  HR: 70 (19 @ 09:00) (55 - 110)  BP: 98/57 (19 @ 06:20) (98/57 - 141/63)  RR: 24 (19 @ 09:00) (12 - 27)  SpO2: 94% (19 @ 09:00) (94% - 100%)    PE:    Gen: Alert, responsive, in discomfort with movement    Skin: Warm, dry and intact. No erythema or ecchymosis or bleeding noted of the injured site. No abrasions of the anterior knee.     PV: 2+ DP. No cyanosis. No calf tenderness.    Sensation: Grossly intact to light touch of the affected extremity.    Motor: + 5/5 Motor function of the affected extremity. No left LE foot drop.    MS: + left knee effusion. + left knee tenderness. + knee immobilizer in place.      Assessment & Plan:    68y  Male with a LLE tibial plateau fracture pending repair    - For OR once cleared by trauma  - HOLD as NPO until OR decision is made  - Bed rest  - DVTP - as prescribed  - continue use of knee immobilizer

## 2019-06-26 NOTE — PROGRESS NOTE ADULT - SUBJECTIVE AND OBJECTIVE BOX
Patient is a 68y old  Male who presents with a chief complaint of Forklift crush to RLE (26 Jun 2019 05:57)    Pt is S/P  R AK pop- BK pop bypass; 4 compartment fasciotomies                                   POD#   1  No acute events overnight. Pt c/o pain in RLE "pins and needles"    Vital Signs Last 24 Hrs  T(C): 36.9 (26 Jun 2019 08:00), Max: 36.9 (26 Jun 2019 08:00)  T(F): 98.4 (26 Jun 2019 08:00), Max: 98.4 (26 Jun 2019 08:00)  HR: 78 (26 Jun 2019 08:00) (55 - 110)  BP: 98/57 (26 Jun 2019 06:20) (98/57 - 141/63)  BP(mean): 71 (26 Jun 2019 06:20) (71 - 71)  RR: 23 (26 Jun 2019 08:00) (12 - 27)  SpO2: 98% (26 Jun 2019 08:00) (96% - 100%)  I&O's Detail    25 Jun 2019 07:01  -  26 Jun 2019 07:00  --------------------------------------------------------  IN:    multiple electrolytes Injection Type 1: 1200 mL    Oral Fluid: 300 mL    Solution: 50 mL    Solution: 50 mL  Total IN: 1600 mL    OUT:    Indwelling Catheter - Urethral: 755 mL    Voided: 400 mL  Total OUT: 1155 mL    Total NET: 445 mL      26 Jun 2019 07:01  -  26 Jun 2019 08:48  --------------------------------------------------------  IN:    multiple electrolytes Injection Type 1 Bolus: 1000 mL  Total IN: 1000 mL    OUT:    Indwelling Catheter - Urethral: 40 mL  Total OUT: 40 mL    Total NET: 960 mL    MEDICATIONS  (STANDING):  acetaminophen   Tablet .. 650 milliGRAM(s) Oral every 6 hours  amLODIPine   Tablet 10 milliGRAM(s) Oral daily  aspirin  chewable 81 milliGRAM(s) Oral daily  ceFAZolin   IVPB 2000 milliGRAM(s) IV Intermittent every 8 hours  ceFAZolin   IVPB      chlorhexidine 2% Cloths 1 Application(s) Topical <User Schedule>  dextrose 50% Injectable 12.5 Gram(s) IV Push once  dextrose 50% Injectable 25 Gram(s) IV Push once  dextrose 50% Injectable 25 Gram(s) IV Push once  docusate sodium 100 milliGRAM(s) Oral every 8 hours  heparin  Injectable 5000 Unit(s) SubCutaneous every 8 hours  insulin lispro (HumaLOG) corrective regimen sliding scale   SubCutaneous every 4 hours  multiple electrolytes Injection Type 1 1000 milliLiter(s) (125 mL/Hr) IV Continuous <Continuous>  simvastatin 20 milliGRAM(s) Oral at bedtime    MEDICATIONS  (PRN):  acetaminophen  IVPB .. 1000 milliGRAM(s) IV Intermittent once PRN Mild Pain (1 - 3)  dextrose 40% Gel 15 Gram(s) Oral once PRN Blood Glucose LESS THAN 70 milliGRAM(s)/deciliter  glucagon  Injectable 1 milliGRAM(s) IntraMuscular once PRN Glucose LESS THAN 70 milligrams/deciliter  HYDROmorphone  Injectable 0.5 milliGRAM(s) IV Push every 4 hours PRN breakthrough pain  ondansetron Injectable 4 milliGRAM(s) IV Push every 6 hours PRN Nausea  oxyCODONE    IR 5 milliGRAM(s) Oral every 4 hours PRN Moderate Pain (4 - 6)  oxyCODONE    IR 10 milliGRAM(s) Oral every 4 hours PRN Severe Pain (7 - 10)    PAST MEDICAL & SURGICAL HISTORY:  GERD (gastroesophageal reflux disease)  HLD (hyperlipidemia)  No significant past surgical history    Physical Exam:  General: NAD, resting comfortably in bed  Pulmonary: Nonlabored breathing, CTA  Cardiovascular: NSR  Abdominal: soft, NT/ND  Extremities: RLE wrapped with ACE which is CDI. R foot mild edema, reports sensation on dorsum of foot but platar surface insensate at this time. Gross motor absent to intrinsic foot muscles, absent plantarflexion and dorsiflexion. 2+ R DP pulse. LLE in immobilizer. Dressing is CDI.        LABS:                        9.3    13.23 )-----------( 176      ( 26 Jun 2019 05:14 )             29.3     06-26    141  |  104  |  23.0<H>  ----------------------------<  169<H>  5.1   |  21.0<L>  |  1.35<H>    Ca    7.3<L>      26 Jun 2019 05:14  Phos  3.0     06-25  Mg     2.5     06-26    TPro  5.4<L>  /  Alb  3.2<L>  /  TBili  0.6  /  DBili  x   /  AST  28  /  ALT  16  /  AlkPhos  55  06-26    PT/INR - ( 25 Jun 2019 18:43 )   PT: 14.1 sec;   INR: 1.22 ratio    PTT - ( 25 Jun 2019 18:43 )  PTT:77.3 sec    CAPILLARY BLOOD GLUCOSE  POCT Blood Glucose.: 177 mg/dL (26 Jun 2019 06:07)  POCT Blood Glucose.: 180 mg/dL (26 Jun 2019 03:20)  POCT Blood Glucose.: 230 mg/dL (25 Jun 2019 22:42)      Radiology and Additional Studies:    Assessment:68yMaleHPI:  HPI:   68yMale LEOEMS on 06-25-19 by ground  activated as code B trauma. Patient was at work when he was reportedly crushed/impaled by forklift. Possible associated fall, pt denies any LOC. Per EMS, pt lost approximately 1-2 Liters of blood from RLE laceration before a tourniquet was applied at approximately 10:19am. Patient brought to [facility] where on arrival, patient protecting airway, able to with ease, had nonlabored breathing, and had good central pulses. Patient's tournigquet and dressing over RLE posterior lateral laceration taken down. No active bleeding noted from approximately 10cm full thickness laceration. No pulse signals distal to right knee. Pt unable to dorsiflex or plantarflex right foot and had decreased sensation distal to knee.    S/P  R AK pop- BK pop bypass; 4 compartment fasciotomies                                     POD#   1    Plan:  Cont ASA and statin  Pt to RTOR today with ortho  Neurovasc checks  DM management  Consulted plastic surgery as there is likely associated nerve injury (?tibial nerve)  Pt remains at high risk for long term disability or limb loss.  Care as per primary team Patient is a 68y old  Male who presents with a chief complaint of Forklift crush to RLE (26 Jun 2019 05:57)    Pt is S/P  R AK pop- BK pop bypass; 4 compartment fasciotomies                                   POD#   1  No acute events overnight. Pt c/o pain in RLE "pins and needles"    Vital Signs Last 24 Hrs  T(C): 36.9 (26 Jun 2019 08:00), Max: 36.9 (26 Jun 2019 08:00)  T(F): 98.4 (26 Jun 2019 08:00), Max: 98.4 (26 Jun 2019 08:00)  HR: 78 (26 Jun 2019 08:00) (55 - 110)  BP: 98/57 (26 Jun 2019 06:20) (98/57 - 141/63)  BP(mean): 71 (26 Jun 2019 06:20) (71 - 71)  RR: 23 (26 Jun 2019 08:00) (12 - 27)  SpO2: 98% (26 Jun 2019 08:00) (96% - 100%)  I&O's Detail    25 Jun 2019 07:01  -  26 Jun 2019 07:00  --------------------------------------------------------  IN:    multiple electrolytes Injection Type 1: 1200 mL    Oral Fluid: 300 mL    Solution: 50 mL    Solution: 50 mL  Total IN: 1600 mL    OUT:    Indwelling Catheter - Urethral: 755 mL    Voided: 400 mL  Total OUT: 1155 mL    Total NET: 445 mL      26 Jun 2019 07:01  -  26 Jun 2019 08:48  --------------------------------------------------------  IN:    multiple electrolytes Injection Type 1 Bolus: 1000 mL  Total IN: 1000 mL    OUT:    Indwelling Catheter - Urethral: 40 mL  Total OUT: 40 mL    Total NET: 960 mL    MEDICATIONS  (STANDING):  acetaminophen   Tablet .. 650 milliGRAM(s) Oral every 6 hours  amLODIPine   Tablet 10 milliGRAM(s) Oral daily  aspirin  chewable 81 milliGRAM(s) Oral daily  ceFAZolin   IVPB 2000 milliGRAM(s) IV Intermittent every 8 hours  ceFAZolin   IVPB      chlorhexidine 2% Cloths 1 Application(s) Topical <User Schedule>  dextrose 50% Injectable 12.5 Gram(s) IV Push once  dextrose 50% Injectable 25 Gram(s) IV Push once  dextrose 50% Injectable 25 Gram(s) IV Push once  docusate sodium 100 milliGRAM(s) Oral every 8 hours  heparin  Injectable 5000 Unit(s) SubCutaneous every 8 hours  insulin lispro (HumaLOG) corrective regimen sliding scale   SubCutaneous every 4 hours  multiple electrolytes Injection Type 1 1000 milliLiter(s) (125 mL/Hr) IV Continuous <Continuous>  simvastatin 20 milliGRAM(s) Oral at bedtime    MEDICATIONS  (PRN):  acetaminophen  IVPB .. 1000 milliGRAM(s) IV Intermittent once PRN Mild Pain (1 - 3)  dextrose 40% Gel 15 Gram(s) Oral once PRN Blood Glucose LESS THAN 70 milliGRAM(s)/deciliter  glucagon  Injectable 1 milliGRAM(s) IntraMuscular once PRN Glucose LESS THAN 70 milligrams/deciliter  HYDROmorphone  Injectable 0.5 milliGRAM(s) IV Push every 4 hours PRN breakthrough pain  ondansetron Injectable 4 milliGRAM(s) IV Push every 6 hours PRN Nausea  oxyCODONE    IR 5 milliGRAM(s) Oral every 4 hours PRN Moderate Pain (4 - 6)  oxyCODONE    IR 10 milliGRAM(s) Oral every 4 hours PRN Severe Pain (7 - 10)    PAST MEDICAL & SURGICAL HISTORY:  GERD (gastroesophageal reflux disease)  HLD (hyperlipidemia)  No significant past surgical history    Physical Exam:  General: NAD, resting comfortably in bed  Pulmonary: Nonlabored breathing, CTA  Cardiovascular: NSR  Abdominal: soft, NT/ND  Extremities: RLE wrapped with ACE which is CDI. R foot mild edema, reports sensation on dorsum of foot but platar surface insensate at this time. Gross motor absent to intrinsic foot muscles, absent plantarflexion and dorsiflexion. 2+ R DP pulse. LLE in immobilizer. Dressing is CDI.        LABS:                        9.3    13.23 )-----------( 176      ( 26 Jun 2019 05:14 )             29.3     06-26    141  |  104  |  23.0<H>  ----------------------------<  169<H>  5.1   |  21.0<L>  |  1.35<H>    Ca    7.3<L>      26 Jun 2019 05:14  Phos  3.0     06-25  Mg     2.5     06-26    TPro  5.4<L>  /  Alb  3.2<L>  /  TBili  0.6  /  DBili  x   /  AST  28  /  ALT  16  /  AlkPhos  55  06-26    PT/INR - ( 25 Jun 2019 18:43 )   PT: 14.1 sec;   INR: 1.22 ratio    PTT - ( 25 Jun 2019 18:43 )  PTT:77.3 sec    CAPILLARY BLOOD GLUCOSE  POCT Blood Glucose.: 177 mg/dL (26 Jun 2019 06:07)  POCT Blood Glucose.: 180 mg/dL (26 Jun 2019 03:20)  POCT Blood Glucose.: 230 mg/dL (25 Jun 2019 22:42)      Radiology and Additional Studies:    Assessment:68yMaleHPI:  HPI:   68yMale BIBEMS on 06-25-19 by ground  activated as code B trauma. Patient was at work when he was reportedly crushed/impaled by [employer]. Possible associated fall, pt denies any LOC. Per EMS, pt lost approximately 1-2 Liters of blood from RLE laceration before a tourniquet was applied at approximately 10:19am. Patient brought to [facility] where on arrival, patient protecting airway, able to with ease, had nonlabored breathing, and had good central pulses. Patient's tournigquet and dressing over RLE posterior lateral laceration taken down. No active bleeding noted from approximately 10cm full thickness laceration. No pulse signals distal to right knee. Pt unable to dorsiflex or plantarflex right foot and had decreased sensation distal to knee.    S/P  R AK pop- BK pop bypass; 4 compartment fasciotomies                                     POD#   1    Plan:  Cont ASA and statin  Pt to RTOR today with ortho  Neurovasc checks q4h  DM management  Consulted plastic surgery as there is likely associated nerve injury (?tibial nerve)  Pt remains at high risk for long term disability or limb loss.  Care as per primary team    ADDENDUM:  Pt seen at bedside with Dr Ron  RLE dressings removed.  R upper thigh and medial calf with staples CDI  R calf soft, lat fasciotomy with pink muscle, min edema. Compartments soft,  Post pop space with interrupted sutures and packing removed and replaced  Xeroform to fasciotmy site, abd pads and wrapped with kerlex. ACE wrap for gentle compression  L groin/inner thigh dressing removed. Incision with staples CDI. DSD applied

## 2019-06-26 NOTE — PROGRESS NOTE ADULT - SUBJECTIVE AND OBJECTIVE BOX
see dictated consult    Pt with RLE crush injury and laceration to posterior lateral thigh.  S/p emergent revascularization and fasciotomy yesterday, perfusion restored.    Pt with defecits in sciatic distribution with complete loss of motor and plantar sensation.      Plan for exploration next week with nerve repair possible graft.      Pt understands severity of injury, guarded prognosis with nerve repair at this level in his age population.    Continue supportive care.

## 2019-06-26 NOTE — CONSULT NOTE ADULT - SUBJECTIVE AND OBJECTIVE BOX
[facility]                                                        Office: [address] [address]                                                       Telephone: [phone]. Fax:[phone]                                                              CARDIOLOGY CONSULTATION NOTE                                                                                             Consult requested by:  Dr. Orozco    Reason for Consultation: Pre-operative cardiovascular risk evaluation and management.     History obtained by: Patient and medical record     obtained: No    Chief complaint:    Patient is a 68y old  Male who presents with a chief complaint of Forklift crush to RLE (2019 10:40)      HPI: 69 y/o male PMH HTN, HLD, DM presented to ED code Trauma B, crush/ impalement injury to lower extremities by forklift Pt underwent emergent surgery to repair R potpliteal artery, now requiring sx to repair L tibial plateau fracture. Denies fever, chills, cough, phlegm production, shortness of breath, dyspnea on exertion, orthopnea, PND, edema, chest pain, pressure, palpitations, irregular, fast heart beat, nausea, vomiting, melena, rectal bleed, hematuria, lightheadedness, dizziness, syncope, near syncope. Denies any cardiac history, never seen cardiologist in past.       REVIEW OF SYMPTOMS: Cardiovascular:  See HPI. No chest pain,  No dyspnea,  No syncope,  No palpitations, No dizziness, No Orthopnea, No Paroxsymal nocturnal dyspnea;  Respiratory:  No Dyspnea, No cough,     Genitourinary:  No dysuria, no hematuria; Gastrointestinal:  No nausea, no vomiting. No diarrhea.  No abdominal pain. No dark color stool, no melena ; Neurological: No headache, no dizziness, no slurred speech;  Psychiatric: No agitation, no anxiety.  ALL OTHER REVIEW OF SYSTEMS ARE NEGATIVE.    ALLERGIES: Allergies  No Known Allergies  Intolerances      CURRENT MEDICATIONS:  amLODIPine   Tablet 10 milliGRAM(s) Oral daily  acetaminophen   Tablet ..  gabapentin  docusate sodium  aspirin  chewable  ceFAZolin   IVPB  ceFAZolin   IVPB  chlorhexidine 2% Cloths  dextrose 50% Injectable  dextrose 50% Injectable  dextrose 50% Injectable  heparin  Injectable  insulin lispro (HumaLOG) corrective regimen sliding scale  multiple electrolytes Injection Type 1  simvastatin      HOME MEDICATIONS:    PAST MEDICAL HISTORY  GERD (gastroesophageal reflux disease)  HLD (hyperlipidemia)      PAST SURGICAL HISTORY  No significant past surgical history      FAMILY HISTORY:      SOCIAL HISTORY:  Denies smoking/alcohol/drugs      Vital Signs Last 24 Hrs  T(C): 36.8 (2019 12:00), Max: 36.9 (2019 08:00)  T(F): 98.3 (2019 12:00), Max: 98.4 (2019 08:00)  HR: 77 (2019 12:00) (65 - 110)  BP: 98/57 (2019 06:20) (98/57 - 132/67)  BP(mean): 71 (2019 06:20) (71 - 71)  RR: 23 (2019 12:00) (12 - 29)  SpO2: 95% (2019 12:00) (94% - 100%)      PHYSICAL EXAM:  Constitutional: Comfortable . No acute distress.   HEENT: Atraumatic and normcephalic , neck is supple . no JVD. No carotid bruit. PEERL   CNS: A&Ox3. No focal deficits. EOMI. Cranial nerves II-IX are intact.   Lymph Nodes: Cervical : Not palpable.  Respiratory: CTAB  Cardiovascular: S1S2 RRR. No murmur/rubs or gallop.  Gastrointestinal: Soft non-tender and non distended . +Bowel sounds. negative Slater's sign.  Extremities: No edema.   Psychiatric: Calm . no agitation.  Skin: No skin rash/ulcers visualized to face, hands or feet.      Intake and output:    @ 07: @ 07:00  --------------------------------------------------------  IN: 1600 mL / OUT: 1155 mL / NET: 445 mL     @ 07:  -   @ 13:12  --------------------------------------------------------  IN: 1375 mL / OUT: 300 mL / NET: 1075 mL        LABS:                        9.3    13.23 )-----------( 176      ( 2019 05:14 )             29.3         136  |  103  |  29.0<H>  ----------------------------<  142<H>  4.4   |  22.0  |  1.35<H>    Ca    7.3<L>      2019 10:26  Phos  3.0       Mg     2.5         TPro  5.4<L>  /  Alb  3.2<L>  /  TBili  0.6  /  DBili  x   /  AST  28  /  ALT  16  /  AlkPhos  55      CARDIAC MARKERS ( 2019 10:26 )  x     / x     / 891 U/L / x     / 8.3 ng/mL  CARDIAC MARKERS ( 2019 05:13 )  x     / x     / 532 U/L / x     / 6.0 ng/mL  CARDIAC MARKERS ( 2019 18:43 )  x     / x     / 632 U/L / x     / 7.8 ng/mL      PT/INR - ( 2019 18:43 )   PT: 14.1 sec;   INR: 1.22 ratio         PTT - ( 2019 18:43 )  PTT:77.3 sec  Urinalysis Basic - ( 2019 06:33 )    Color: Yellow / Appearance: Slightly Turbid / S.015 / pH: x  Gluc: x / Ketone: Negative  / Bili: Negative / Urobili: Negative mg/dL   Blood: x / Protein: 30 mg/dL / Nitrite: Negative   Leuk Esterase: Negative / RBC: >50 /HPF / WBC 0-2   Sq Epi: x / Non Sq Epi: Few / Bacteria: Few        INTERPRETATION OF TELEMETRY: Reviewed by me.   ECG: SR, lafb, non specific T wave changes    RADIOLOGY & ADDITIONAL STUDIES:    X-ray:  reviewed by me.   CT scan:     ECHO FINDINGS:   pending results

## 2019-06-27 LAB
ANION GAP SERPL CALC-SCNC: 11 MMOL/L — SIGNIFICANT CHANGE UP (ref 5–17)
BASOPHILS # BLD AUTO: 0.01 K/UL — SIGNIFICANT CHANGE UP (ref 0–0.2)
BASOPHILS NFR BLD AUTO: 0.1 % — SIGNIFICANT CHANGE UP (ref 0–2)
BUN SERPL-MCNC: 29 MG/DL — HIGH (ref 8–20)
CALCIUM SERPL-MCNC: 8 MG/DL — LOW (ref 8.6–10.2)
CHLORIDE SERPL-SCNC: 106 MMOL/L — SIGNIFICANT CHANGE UP (ref 98–107)
CK MB CFR SERPL CALC: 7.9 NG/ML — HIGH (ref 0–6.7)
CK SERPL-CCNC: 1821 U/L — HIGH (ref 30–200)
CO2 SERPL-SCNC: 24 MMOL/L — SIGNIFICANT CHANGE UP (ref 22–29)
CREAT SERPL-MCNC: 1.36 MG/DL — HIGH (ref 0.5–1.3)
EOSINOPHIL # BLD AUTO: 0 K/UL — SIGNIFICANT CHANGE UP (ref 0–0.5)
EOSINOPHIL NFR BLD AUTO: 0 % — SIGNIFICANT CHANGE UP (ref 0–6)
GLUCOSE SERPL-MCNC: 174 MG/DL — HIGH (ref 70–115)
HCT VFR BLD CALC: 23.4 % — LOW (ref 39–50)
HGB BLD-MCNC: 7.4 G/DL — LOW (ref 13–17)
IMM GRANULOCYTES NFR BLD AUTO: 0.5 % — SIGNIFICANT CHANGE UP (ref 0–1.5)
LYMPHOCYTES # BLD AUTO: 0.73 K/UL — LOW (ref 1–3.3)
LYMPHOCYTES # BLD AUTO: 5.6 % — LOW (ref 13–44)
MAGNESIUM SERPL-MCNC: 3 MG/DL — HIGH (ref 1.6–2.6)
MCHC RBC-ENTMCNC: 27.2 PG — SIGNIFICANT CHANGE UP (ref 27–34)
MCHC RBC-ENTMCNC: 31.6 GM/DL — LOW (ref 32–36)
MCV RBC AUTO: 86 FL — SIGNIFICANT CHANGE UP (ref 80–100)
MONOCYTES # BLD AUTO: 1.04 K/UL — HIGH (ref 0–0.9)
MONOCYTES NFR BLD AUTO: 8 % — SIGNIFICANT CHANGE UP (ref 2–14)
NEUTROPHILS # BLD AUTO: 11.15 K/UL — HIGH (ref 1.8–7.4)
NEUTROPHILS NFR BLD AUTO: 85.8 % — HIGH (ref 43–77)
PHOSPHATE SERPL-MCNC: 2.9 MG/DL — SIGNIFICANT CHANGE UP (ref 2.4–4.7)
PLATELET # BLD AUTO: 171 K/UL — SIGNIFICANT CHANGE UP (ref 150–400)
POTASSIUM SERPL-MCNC: 4.8 MMOL/L — SIGNIFICANT CHANGE UP (ref 3.5–5.3)
POTASSIUM SERPL-SCNC: 4.8 MMOL/L — SIGNIFICANT CHANGE UP (ref 3.5–5.3)
RBC # BLD: 2.72 M/UL — LOW (ref 4.2–5.8)
RBC # FLD: 17.5 % — HIGH (ref 10.3–14.5)
SODIUM SERPL-SCNC: 141 MMOL/L — SIGNIFICANT CHANGE UP (ref 135–145)
WBC # BLD: 13 K/UL — HIGH (ref 3.8–10.5)
WBC # FLD AUTO: 13 K/UL — HIGH (ref 3.8–10.5)

## 2019-06-27 PROCEDURE — 99231 SBSQ HOSP IP/OBS SF/LOW 25: CPT

## 2019-06-27 RX ORDER — OXYCODONE HYDROCHLORIDE 5 MG/1
10 TABLET ORAL EVERY 4 HOURS
Refills: 0 | Status: DISCONTINUED | OUTPATIENT
Start: 2019-06-27 | End: 2019-07-03

## 2019-06-27 RX ORDER — DOCUSATE SODIUM 100 MG
100 CAPSULE ORAL
Refills: 0 | Status: DISCONTINUED | OUTPATIENT
Start: 2019-06-27 | End: 2019-07-03

## 2019-06-27 RX ORDER — OXYCODONE HYDROCHLORIDE 5 MG/1
5 TABLET ORAL EVERY 4 HOURS
Refills: 0 | Status: DISCONTINUED | OUTPATIENT
Start: 2019-06-27 | End: 2019-07-03

## 2019-06-27 RX ORDER — ASPIRIN/CALCIUM CARB/MAGNESIUM 324 MG
81 TABLET ORAL DAILY
Refills: 0 | Status: DISCONTINUED | OUTPATIENT
Start: 2019-06-27 | End: 2019-07-03

## 2019-06-27 RX ORDER — HEPARIN SODIUM 5000 [USP'U]/ML
5000 INJECTION INTRAVENOUS; SUBCUTANEOUS EVERY 8 HOURS
Refills: 0 | Status: DISCONTINUED | OUTPATIENT
Start: 2019-06-27 | End: 2019-07-03

## 2019-06-27 RX ORDER — HYDROMORPHONE HYDROCHLORIDE 2 MG/ML
1 INJECTION INTRAMUSCULAR; INTRAVENOUS; SUBCUTANEOUS
Refills: 0 | Status: DISCONTINUED | OUTPATIENT
Start: 2019-06-27 | End: 2019-07-03

## 2019-06-27 RX ORDER — ONDANSETRON 8 MG/1
4 TABLET, FILM COATED ORAL EVERY 6 HOURS
Refills: 0 | Status: DISCONTINUED | OUTPATIENT
Start: 2019-06-27 | End: 2019-06-27

## 2019-06-27 RX ORDER — ONDANSETRON 8 MG/1
4 TABLET, FILM COATED ORAL EVERY 6 HOURS
Refills: 0 | Status: DISCONTINUED | OUTPATIENT
Start: 2019-06-27 | End: 2019-07-03

## 2019-06-27 RX ORDER — ACETAMINOPHEN 500 MG
650 TABLET ORAL EVERY 6 HOURS
Refills: 0 | Status: DISCONTINUED | OUTPATIENT
Start: 2019-06-27 | End: 2019-07-03

## 2019-06-27 RX ORDER — SENNA PLUS 8.6 MG/1
1 TABLET ORAL
Refills: 0 | Status: DISCONTINUED | OUTPATIENT
Start: 2019-06-27 | End: 2019-07-03

## 2019-06-27 RX ORDER — SIMVASTATIN 20 MG/1
20 TABLET, FILM COATED ORAL AT BEDTIME
Refills: 0 | Status: DISCONTINUED | OUTPATIENT
Start: 2019-06-27 | End: 2019-07-03

## 2019-06-27 RX ADMIN — FENTANYL CITRATE 50 MICROGRAM(S): 50 INJECTION INTRAVENOUS at 00:21

## 2019-06-27 RX ADMIN — Medication 81 MILLIGRAM(S): at 14:15

## 2019-06-27 RX ADMIN — Medication 100 MILLIGRAM(S): at 18:07

## 2019-06-27 RX ADMIN — Medication 100 MILLIGRAM(S): at 05:10

## 2019-06-27 RX ADMIN — SIMVASTATIN 20 MILLIGRAM(S): 20 TABLET, FILM COATED ORAL at 21:15

## 2019-06-27 RX ADMIN — HYDROMORPHONE HYDROCHLORIDE 1 MILLIGRAM(S): 2 INJECTION INTRAMUSCULAR; INTRAVENOUS; SUBCUTANEOUS at 21:14

## 2019-06-27 RX ADMIN — SENNA PLUS 1 TABLET(S): 8.6 TABLET ORAL at 18:07

## 2019-06-27 RX ADMIN — OXYCODONE HYDROCHLORIDE 5 MILLIGRAM(S): 5 TABLET ORAL at 12:38

## 2019-06-27 RX ADMIN — FENTANYL CITRATE 50 MICROGRAM(S): 50 INJECTION INTRAVENOUS at 00:40

## 2019-06-27 RX ADMIN — Medication 81 MILLIGRAM(S): at 06:10

## 2019-06-27 RX ADMIN — Medication 100 MILLIGRAM(S): at 12:36

## 2019-06-27 RX ADMIN — HEPARIN SODIUM 5000 UNIT(S): 5000 INJECTION INTRAVENOUS; SUBCUTANEOUS at 14:15

## 2019-06-27 RX ADMIN — HYDROMORPHONE HYDROCHLORIDE 1 MILLIGRAM(S): 2 INJECTION INTRAMUSCULAR; INTRAVENOUS; SUBCUTANEOUS at 21:30

## 2019-06-27 RX ADMIN — HEPARIN SODIUM 5000 UNIT(S): 5000 INJECTION INTRAVENOUS; SUBCUTANEOUS at 21:15

## 2019-06-27 RX ADMIN — HEPARIN SODIUM 5000 UNIT(S): 5000 INJECTION INTRAVENOUS; SUBCUTANEOUS at 06:09

## 2019-06-27 RX ADMIN — OXYCODONE HYDROCHLORIDE 5 MILLIGRAM(S): 5 TABLET ORAL at 13:16

## 2019-06-27 NOTE — PROGRESS NOTE ADULT - ASSESSMENT
A/P: 69yo M s/p forklift crush injury to b/l LEs and s/p L greater saph vein harvest, super to inf R fempop bypass, fasciotomy 6/25, also now s/p ORIF L tibial plateau fx    -NWB LLE, f/u XR of knee, will eventually need b/l Knee MRI as per ortho  -ortho following patient  -OR with Dr. Ron on 7/1 for nerve reconstruction surgery  -cards consult for OR clearance  -reg diet  -PT/PT/PMR  -neurovasc checks q4h

## 2019-06-27 NOTE — PROGRESS NOTE ADULT - SUBJECTIVE AND OBJECTIVE BOX
ORTHO-POST-OP PROGRESS NOTE:      [id number]    [facility]      PROCEDURE: ORIF of Left lateral tibial plateau  Surgeon: Dr. Dela Cruz  DOS: 6/26     Patient seen and examined. Patient reports of moderate discomfort that is controlled by pain medications. Patient denies of acute sensory or motor changes.                             9.3    13.23 )-----------( 176      ( 26 Jun 2019 05:14 )             29.3           I&O's Detail    25 Jun 2019 07:01  -  26 Jun 2019 07:00  --------------------------------------------------------  IN:    multiple electrolytes Injection Type 1multiple electrolytes Injection Type 1: 1200 mL    Oral Fluid: 300 mL    Solution: 50 mL    Solution: 50 mL  Total IN: 1600 mL    OUT:    Indwelling Catheter - Urethral: 755 mL    Voided: 400 mL  Total OUT: 1155 mL    Total NET: 445 mL      26 Jun 2019 07:01  -  27 Jun 2019 01:24  --------------------------------------------------------  IN:    lactated ringers.: 300 mL    multiple electrolytes Injection Type 1 Bolus: 1000 mL    multiple electrolytes Injection Type 1multiple electrolytes Injection Type 1: 1000 mL    Solution: 50 mL  Total IN: 2350 mL    OUT:    Indwelling Catheter - Urethral: 330 mL    Voided: 650 mL  Total OUT: 980 mL    Total NET: 1370 mL        aspirin  chewable 81 milliGRAM(s) Oral daily  ceFAZolin   IVPB 2000 milliGRAM(s) IV Intermittent <User Schedule>  heparin  Injectable 5000 Unit(s) SubCutaneous every 8 hours        T(C): 36.9 (06-27-19 @ 00:47), Max: 37.4 (06-26-19 @ 21:26)  HR: 101 (06-27-19 @ 00:47) (70 - 106)  BP: 148/81 (06-27-19 @ 00:47) (89/52 - 148/81)  RR: 20 (06-27-19 @ 00:47) (7 - 29)  SpO2: 98% (06-27-19 @ 00:47) (94% - 100%)  Wt(kg): --      PHYSICAL EXAM:     Constitutional: Alert, responsive, in no acute distress.     Left lower Extremity: Dressing: Clean/dry/intact. No active bleeding or discharge noted. ACE wrap without staining. Knee immobilizer in place. SILT L2-S2. Calf soft nontender. +plantarflexion/dorsiflexion/EHL/FHl. Dorsalis pedis pulse 2+. BCR.                                                                A/P :  67 y/o male S/P ORIF Left tibial plateau  POD# 0    -  Pain control  -  DVT ppx: as per primary team-Hep/ASA  -  Physical therapy  -  Weight bearing status: Non-weight bearing of Left lower extremity  -  Elevate LLE  -  Postop abx: Ancef  -  Hinged knee brace of LLE  to be ordered in morning  -  Will need Bilateral knee MRI to investigate ligamentous laxity  -  Continue rest of care as per primary team

## 2019-06-27 NOTE — PROGRESS NOTE ADULT - SUBJECTIVE AND OBJECTIVE BOX
Patient is a 68y old  Male who presents with a chief complaint of Forklift crush to RLE (26 Jun 2019 05:57)    Pt is S/P  R AK pop- BK pop bypass; 4 compartment fasciotomies                                   POD# 2  Downgraded to floor yesterday. No acute events overnight. Pt c/o pain in BLE controlled with prescribed meds    Vital Signs Last 24 Hrs  T(C): 36.9 (27 Jun 2019 08:09), Max: 37.4 (26 Jun 2019 21:26)  T(F): 98.5 (27 Jun 2019 08:09), Max: 99.4 (26 Jun 2019 21:26)  HR: 91 (27 Jun 2019 08:09) (74 - 106)  BP: 132/74 (27 Jun 2019 08:09) (89/52 - 148/81)  BP(mean): 77 (27 Jun 2019 00:30) (59 - 91)  RR: 18 (27 Jun 2019 08:09) (7 - 29)  SpO2: 98% (27 Jun 2019 08:09) (94% - 100%)    MEDICATIONS  (STANDING):  aspirin  chewable 81 milliGRAM(s) Oral daily  ceFAZolin   IVPB 2000 milliGRAM(s) IV Intermittent <User Schedule>  heparin  Injectable 5000 Unit(s) SubCutaneous every 8 hours    MEDICATIONS  (PRN):    PAST MEDICAL & SURGICAL HISTORY:  GERD (gastroesophageal reflux disease)  HLD (hyperlipidemia)  No significant past surgical history    Physical Exam:  General: NAD, resting comfortably in bed  Extremities: RLE wrapped with ACE which is CDI. R foot mild edema, reports diminished sensation on dorsum of foot but plantar surface insensate at this time. Gross motor absent to intrinsic foot muscles, absent plantarflexion and dorsiflexion. 2+ R DP pulse.   RLE dressings removed.  R upper thigh and medial calf with staples CDI  R calf soft, lat fasciotomy with pink muscle, min edema. Compartments soft,  Post pop/thigh space with interrupted sutures and packing removed  Xeroform to fasciotomy site, abd pads and wrapped with kerlex. ACE wrap for gentle compression  L groin/inner thigh incision with staples CDI.     LABS:                         9.3    13.23 )-----------( 176      ( 26 Jun 2019 05:14 )             29.3   06-26    138  |  103  |  30.0<H>  ----------------------------<  151<H>  4.3   |  23.0  |  1.36<H>    Ca    7.2<L>      26 Jun 2019 15:58  Phos  3.0     06-25  Mg     2.5     06-26    TPro  5.4<L>  /  Alb  3.2<L>  /  TBili  0.6  /  DBili  x   /  AST  28  /  ALT  16  /  AlkPhos  55  06-26    PT/INR - ( 25 Jun 2019 18:43 )   PT: 14.1 sec;   INR: 1.22 ratio    PTT - ( 25 Jun 2019 18:43 )  PTT:77.3 sec    CAPILLARY BLOOD GLUCOSE  POCT Blood Glucose.: 174 mg/dL (26 Jun 2019 23:34)  POCT Blood Glucose.: 151 mg/dL (26 Jun 2019 19:49)  POCT Blood Glucose.: 135 mg/dL (26 Jun 2019 17:21)  POCT Blood Glucose.: 150 mg/dL (26 Jun 2019 14:24)  POCT Blood Glucose.: 144 mg/dL (26 Jun 2019 10:18)    Assessment:68yMaleHPI:  HPI:   68yMale BIBEMS on 06-25-19 by ground  activated as code B trauma. Patient was at work when he was reportedly crushed/impaled by forklift. Possible associated fall, pt denies any LOC. Per EMS, pt lost approximately 1-2 Liters of blood from RLE laceration before a tourniquet was applied at approximately 10:19am. Patient brought to [facility] where on arrival, patient protecting airway, able to with ease, had nonlabored breathing, and had good central pulses. Patient's tournigquet and dressing over RLE posterior lateral laceration taken down. No active bleeding noted from approximately 10cm full thickness laceration. No pulse signals distal to right knee. Pt unable to dorsiflex or plantarflex right foot and had decreased sensation distal to knee.    S/P  R AK pop- BK pop bypass; 4 compartment fasciotomies                                     POD#  2    Plan:  Cont ASA and statin  Neurovasc checks q4h  DM management  Appreciate plastic surgery input.  Cont local wound care  Pt remains at high risk for long term disability or limb loss.  Care as per primary team

## 2019-06-27 NOTE — PROGRESS NOTE ADULT - SUBJECTIVE AND OBJECTIVE BOX
INTERVAL HPI/OVERNIGHT EVENTS:    SUBJECTIVE:  Overnight, pt was taken to OR for ORIF of L tibial plateau fx.  No complications, stable postop.  Was downgraded from SICU to floor after OR.  Pt was s/p forklift crush to RLE p/w displaced L tib plateau fx now s/p ORIF of L tibial plateau.  Pt also had popliteal artery transection.  Pt's reportedly baseline paresthesias distal to knee with no propioception or motor distal to knee.  Pt also had SERENITY in unit, but resolved before downgrade.  Pt also passed TOV overnight and voided several times.  Diet was restarted and is tolerating it.  Pain controlled with pain meds.  No sob/cp/n/v/f/ch.      MEDICATIONS  (STANDING):  aspirin  chewable 81 milliGRAM(s) Oral daily  ceFAZolin   IVPB 2000 milliGRAM(s) IV Intermittent <User Schedule>  heparin  Injectable 5000 Unit(s) SubCutaneous every 8 hours    MEDICATIONS  (PRN):      Vital Signs Last 24 Hrs  T(C): 36.9 (2019 00:47), Max: 37.4 (2019 21:26)  T(F): 98.4 (2019 00:47), Max: 99.4 (2019 21:26)  HR: 101 (2019 00:47) (70 - 106)  BP: 148/81 (2019 00:47) (89/52 - 148/81)  BP(mean): 77 (2019 00:30) (59 - 91)  RR: 20 (2019 00:47) (7 - 29)  SpO2: 98% (2019 00:47) (94% - 100%)    PE  Gen: NAD  Pulm: nonlabored breathing  CV: RRR  Abd: soft, nt, nd  : voiding adequately  Ext: LLE brace in place, RLE ace wrap with moderate saturation at posterior part of ace wrap.  Vasc: 2+ dp's  Neuro: AAOx3, mild paresthesia on RLE, able to have sensory intact on LLE; able to dorsiflex/plantarflex L foot, but weaker on R foot      I&O's Detail    2019 07:01  -  2019 07:00  --------------------------------------------------------  IN:    lactated ringers.: 300 mL    multiple electrolytes Injection Type 1 Bolus: 1000 mL    multiple electrolytes Injection Type 1multiple electrolytes Injection Type 1: 1000 mL    Solution: 50 mL  Total IN: 2350 mL    OUT:    Indwelling Catheter - Urethral: 330 mL    Voided: 650 mL  Total OUT: 980 mL    Total NET: 1370 mL          LABS:                        9.3    13.23 )-----------( 176      ( 2019 05:14 )             29.3         138  |  103  |  30.0<H>  ----------------------------<  151<H>  4.3   |  23.0  |  1.36<H>    Ca    7.2<L>      2019 15:58  Phos  3.0       Mg     2.5         TPro  5.4<L>  /  Alb  3.2<L>  /  TBili  0.6  /  DBili  x   /  AST  28  /  ALT  16  /  AlkPhos  55      PT/INR - ( 2019 18:43 )   PT: 14.1 sec;   INR: 1.22 ratio         PTT - ( 2019 18:43 )  PTT:77.3 sec  Urinalysis Basic - ( 2019 06:33 )    Color: Yellow / Appearance: Slightly Turbid / S.015 / pH: x  Gluc: x / Ketone: Negative  / Bili: Negative / Urobili: Negative mg/dL   Blood: x / Protein: 30 mg/dL / Nitrite: Negative   Leuk Esterase: Negative / RBC: >50 /HPF / WBC 0-2   Sq Epi: x / Non Sq Epi: Few / Bacteria: Few        RADIOLOGY & ADDITIONAL STUDIES:

## 2019-06-27 NOTE — PROGRESS NOTE ADULT - SUBJECTIVE AND OBJECTIVE BOX
AIMEE SOLER    [id number]    History: 68y Male is status post L tibial plateau ORIF POD # 1. Patient is doing well and is comfortable. The patient's pain is controlled using the prescribed pain medications. The patient is participating in physical therapy. Denies nausea, vomiting, chest pain, shortness of breath, abdominal pain or fever. No new complaints.                            7.4    13.00 )-----------( 171      ( 27 Jun 2019 11:42 )             23.4     06-27    141  |  106  |  29.0<H>  ----------------------------<  174<H>  4.8   |  24.0  |  1.36<H>    Ca    8.0<L>      27 Jun 2019 11:42  Phos  2.9     06-27  Mg     3.0     06-27    TPro  5.4<L>  /  Alb  3.2<L>  /  TBili  0.6  /  DBili  x   /  AST  28  /  ALT  16  /  AlkPhos  55  06-26      MEDICATIONS  (STANDING):  aspirin  chewable 81 milliGRAM(s) Oral daily  docusate sodium 100 milliGRAM(s) Oral two times a day  heparin  Injectable 5000 Unit(s) SubCutaneous every 8 hours  senna 1 Tablet(s) Oral two times a day  simvastatin 20 milliGRAM(s) Oral at bedtime    MEDICATIONS  (PRN):  acetaminophen   Tablet .. 650 milliGRAM(s) Oral every 6 hours PRN Temp greater or equal to 38C (100.4F), Mild Pain (1 - 3)  HYDROmorphone  Injectable 1 milliGRAM(s) IV Push every 3 hours PRN Severe Pain (7 - 10)  ondansetron Injectable 4 milliGRAM(s) IV Push every 6 hours PRN Nausea and/or Vomiting  oxyCODONE    IR 5 milliGRAM(s) Oral every 4 hours PRN Moderate Pain (4 - 6)  oxyCODONE    IR 10 milliGRAM(s) Oral every 4 hours PRN Severe Pain (7 - 10)      Physical exam: Knee immobilizer in place. The dressing is clean, dry and intact. No drainage or discharge. No erythema is noted. The calf is supple nontender. Sensation to light touch is grossly intact distally. Extensor hallucis longus and flexor hallucis longus are intact. No foot drop. 2+ dorsalis pedis pulse. Capillary refill is less than 2 seconds. No cyanosis.    Primary Orthopedic Assessment:  • 68y Male is status post L tibial plateau ORIF POD # 1    Secondary  Orthopedic Assessment(s):   •     Secondary  Medical Assessment(s):   •     Plan:   • Pain control as clinically indicated  • DVT prophylaxis as prescribed, including use of compression devices and ankle pumps  • Continue physical therapy  • Non-weightbearing of the left lower extremity with assistance of a walker  • Incentive spirometry encouraged  • Discharge planning – anticipated discharge is for rehab

## 2019-06-28 LAB
ALBUMIN SERPL ELPH-MCNC: 3.1 G/DL — LOW (ref 3.3–5.2)
ALP SERPL-CCNC: 60 U/L — SIGNIFICANT CHANGE UP (ref 40–120)
ALT FLD-CCNC: 13 U/L — SIGNIFICANT CHANGE UP
ANION GAP SERPL CALC-SCNC: 10 MMOL/L — SIGNIFICANT CHANGE UP (ref 5–17)
AST SERPL-CCNC: 44 U/L — HIGH
BASOPHILS # BLD AUTO: 0.01 K/UL — SIGNIFICANT CHANGE UP (ref 0–0.2)
BASOPHILS NFR BLD AUTO: 0.1 % — SIGNIFICANT CHANGE UP (ref 0–2)
BILIRUB SERPL-MCNC: 0.4 MG/DL — SIGNIFICANT CHANGE UP (ref 0.4–2)
BLD GP AB SCN SERPL QL: SIGNIFICANT CHANGE UP
BUN SERPL-MCNC: 34 MG/DL — HIGH (ref 8–20)
CALCIUM SERPL-MCNC: 7.9 MG/DL — LOW (ref 8.6–10.2)
CHLORIDE SERPL-SCNC: 107 MMOL/L — SIGNIFICANT CHANGE UP (ref 98–107)
CK MB CFR SERPL CALC: 3.3 NG/ML — SIGNIFICANT CHANGE UP (ref 0–6.7)
CK SERPL-CCNC: 1509 U/L — HIGH (ref 30–200)
CO2 SERPL-SCNC: 25 MMOL/L — SIGNIFICANT CHANGE UP (ref 22–29)
CREAT SERPL-MCNC: 1.64 MG/DL — HIGH (ref 0.5–1.3)
EOSINOPHIL # BLD AUTO: 0.01 K/UL — SIGNIFICANT CHANGE UP (ref 0–0.5)
EOSINOPHIL NFR BLD AUTO: 0.1 % — SIGNIFICANT CHANGE UP (ref 0–6)
GLUCOSE BLDC GLUCOMTR-MCNC: 140 MG/DL — HIGH (ref 70–99)
GLUCOSE BLDC GLUCOMTR-MCNC: 148 MG/DL — HIGH (ref 70–99)
GLUCOSE BLDC GLUCOMTR-MCNC: 163 MG/DL — HIGH (ref 70–99)
GLUCOSE SERPL-MCNC: 198 MG/DL — HIGH (ref 70–115)
HCT VFR BLD CALC: 19.2 % — CRITICAL LOW (ref 39–50)
HGB BLD-MCNC: 6.3 G/DL — CRITICAL LOW (ref 13–17)
IMM GRANULOCYTES NFR BLD AUTO: 2.2 % — HIGH (ref 0–1.5)
LYMPHOCYTES # BLD AUTO: 0.97 K/UL — LOW (ref 1–3.3)
LYMPHOCYTES # BLD AUTO: 10.1 % — LOW (ref 13–44)
MAGNESIUM SERPL-MCNC: 3.2 MG/DL — HIGH (ref 1.8–2.6)
MCHC RBC-ENTMCNC: 26.8 PG — LOW (ref 27–34)
MCHC RBC-ENTMCNC: 31 GM/DL — LOW (ref 32–36)
MCV RBC AUTO: 86.4 FL — SIGNIFICANT CHANGE UP (ref 80–100)
MONOCYTES # BLD AUTO: 0.8 K/UL — SIGNIFICANT CHANGE UP (ref 0–0.9)
MONOCYTES NFR BLD AUTO: 8.3 % — SIGNIFICANT CHANGE UP (ref 2–14)
NEUTROPHILS # BLD AUTO: 7.63 K/UL — HIGH (ref 1.8–7.4)
NEUTROPHILS NFR BLD AUTO: 79.2 % — HIGH (ref 43–77)
PHOSPHATE SERPL-MCNC: 2.3 MG/DL — LOW (ref 2.4–4.7)
PLATELET # BLD AUTO: 158 K/UL — SIGNIFICANT CHANGE UP (ref 150–400)
POTASSIUM SERPL-MCNC: 4.4 MMOL/L — SIGNIFICANT CHANGE UP (ref 3.5–5.3)
POTASSIUM SERPL-SCNC: 4.4 MMOL/L — SIGNIFICANT CHANGE UP (ref 3.5–5.3)
PROT SERPL-MCNC: 5.7 G/DL — LOW (ref 6.6–8.7)
RBC # BLD: 2.28 M/UL — LOW (ref 4.2–5.8)
RBC # FLD: 17.7 % — HIGH (ref 10.3–14.5)
SODIUM SERPL-SCNC: 142 MMOL/L — SIGNIFICANT CHANGE UP (ref 135–145)
TYPE + AB SCN PNL BLD: SIGNIFICANT CHANGE UP
WBC # BLD: 9.63 K/UL — SIGNIFICANT CHANGE UP (ref 3.8–10.5)
WBC # FLD AUTO: 9.63 K/UL — SIGNIFICANT CHANGE UP (ref 3.8–10.5)

## 2019-06-28 PROCEDURE — 99231 SBSQ HOSP IP/OBS SF/LOW 25: CPT

## 2019-06-28 PROCEDURE — 73721 MRI JNT OF LWR EXTRE W/O DYE: CPT | Mod: 26,RT,77

## 2019-06-28 PROCEDURE — 73721 MRI JNT OF LWR EXTRE W/O DYE: CPT | Mod: 26,LT

## 2019-06-28 RX ORDER — SODIUM CHLORIDE 9 MG/ML
1000 INJECTION, SOLUTION INTRAVENOUS
Refills: 0 | Status: DISCONTINUED | OUTPATIENT
Start: 2019-06-28 | End: 2019-06-29

## 2019-06-28 RX ORDER — DEXTROSE 50 % IN WATER 50 %
25 SYRINGE (ML) INTRAVENOUS ONCE
Refills: 0 | Status: DISCONTINUED | OUTPATIENT
Start: 2019-06-28 | End: 2019-07-03

## 2019-06-28 RX ORDER — LOSARTAN POTASSIUM 100 MG/1
100 TABLET, FILM COATED ORAL DAILY
Refills: 0 | Status: DISCONTINUED | OUTPATIENT
Start: 2019-06-28 | End: 2019-06-30

## 2019-06-28 RX ORDER — INSULIN LISPRO 100/ML
VIAL (ML) SUBCUTANEOUS
Refills: 0 | Status: DISCONTINUED | OUTPATIENT
Start: 2019-06-28 | End: 2019-07-03

## 2019-06-28 RX ORDER — GLUCAGON INJECTION, SOLUTION 0.5 MG/.1ML
1 INJECTION, SOLUTION SUBCUTANEOUS ONCE
Refills: 0 | Status: DISCONTINUED | OUTPATIENT
Start: 2019-06-28 | End: 2019-07-03

## 2019-06-28 RX ORDER — DEXTROSE 50 % IN WATER 50 %
15 SYRINGE (ML) INTRAVENOUS ONCE
Refills: 0 | Status: DISCONTINUED | OUTPATIENT
Start: 2019-06-28 | End: 2019-07-03

## 2019-06-28 RX ORDER — DEXTROSE 50 % IN WATER 50 %
12.5 SYRINGE (ML) INTRAVENOUS ONCE
Refills: 0 | Status: DISCONTINUED | OUTPATIENT
Start: 2019-06-28 | End: 2019-07-03

## 2019-06-28 RX ORDER — SODIUM CHLORIDE 9 MG/ML
1000 INJECTION, SOLUTION INTRAVENOUS
Refills: 0 | Status: DISCONTINUED | OUTPATIENT
Start: 2019-06-28 | End: 2019-07-03

## 2019-06-28 RX ORDER — AMLODIPINE BESYLATE 2.5 MG/1
10 TABLET ORAL DAILY
Refills: 0 | Status: DISCONTINUED | OUTPATIENT
Start: 2019-06-28 | End: 2019-07-03

## 2019-06-28 RX ORDER — ACETAMINOPHEN 500 MG
650 TABLET ORAL ONCE
Refills: 0 | Status: COMPLETED | OUTPATIENT
Start: 2019-06-28 | End: 2019-06-28

## 2019-06-28 RX ADMIN — Medication 81 MILLIGRAM(S): at 11:51

## 2019-06-28 RX ADMIN — Medication 650 MILLIGRAM(S): at 20:36

## 2019-06-28 RX ADMIN — Medication 100 MILLIGRAM(S): at 05:38

## 2019-06-28 RX ADMIN — SIMVASTATIN 20 MILLIGRAM(S): 20 TABLET, FILM COATED ORAL at 22:19

## 2019-06-28 RX ADMIN — SODIUM CHLORIDE 125 MILLILITER(S): 9 INJECTION, SOLUTION INTRAVENOUS at 09:55

## 2019-06-28 RX ADMIN — HEPARIN SODIUM 5000 UNIT(S): 5000 INJECTION INTRAVENOUS; SUBCUTANEOUS at 16:47

## 2019-06-28 RX ADMIN — OXYCODONE HYDROCHLORIDE 5 MILLIGRAM(S): 5 TABLET ORAL at 09:48

## 2019-06-28 RX ADMIN — HEPARIN SODIUM 5000 UNIT(S): 5000 INJECTION INTRAVENOUS; SUBCUTANEOUS at 22:19

## 2019-06-28 RX ADMIN — OXYCODONE HYDROCHLORIDE 5 MILLIGRAM(S): 5 TABLET ORAL at 10:51

## 2019-06-28 RX ADMIN — SENNA PLUS 1 TABLET(S): 8.6 TABLET ORAL at 05:37

## 2019-06-28 RX ADMIN — LOSARTAN POTASSIUM 100 MILLIGRAM(S): 100 TABLET, FILM COATED ORAL at 13:41

## 2019-06-28 RX ADMIN — SENNA PLUS 1 TABLET(S): 8.6 TABLET ORAL at 17:18

## 2019-06-28 RX ADMIN — AMLODIPINE BESYLATE 10 MILLIGRAM(S): 2.5 TABLET ORAL at 11:51

## 2019-06-28 RX ADMIN — SODIUM CHLORIDE 125 MILLILITER(S): 9 INJECTION, SOLUTION INTRAVENOUS at 11:51

## 2019-06-28 RX ADMIN — Medication 85 MILLIMOLE(S): at 13:41

## 2019-06-28 RX ADMIN — HEPARIN SODIUM 5000 UNIT(S): 5000 INJECTION INTRAVENOUS; SUBCUTANEOUS at 05:38

## 2019-06-28 RX ADMIN — SODIUM CHLORIDE 125 MILLILITER(S): 9 INJECTION, SOLUTION INTRAVENOUS at 13:42

## 2019-06-28 RX ADMIN — Medication 650 MILLIGRAM(S): at 21:40

## 2019-06-28 RX ADMIN — SODIUM CHLORIDE 125 MILLILITER(S): 9 INJECTION, SOLUTION INTRAVENOUS at 20:36

## 2019-06-28 RX ADMIN — Medication 100 MILLIGRAM(S): at 17:18

## 2019-06-28 NOTE — PROGRESS NOTE ADULT - SUBJECTIVE AND OBJECTIVE BOX
Patient is a 68y old  Male who presents with a chief complaint of Forklift crush to RLE (26 Jun 2019 05:57)    Pt is S/P  R AK pop- BK pop bypass; 4 compartment fasciotomies                                   POD# 3  No events overnight. No complaints    Vital Signs Last 24 Hrs  T(C): 36.9 (28 Jun 2019 07:53), Max: 36.9 (28 Jun 2019 07:53)  T(F): 98.5 (28 Jun 2019 07:53), Max: 98.5 (28 Jun 2019 07:53)  HR: 95 (28 Jun 2019 07:53) (93 - 115)  BP: 127/67 (28 Jun 2019 07:53) (127/67 - 159/63)  BP(mean): --  RR: 20 (28 Jun 2019 07:53) (18 - 20)  SpO2: 92% (27 Jun 2019 23:03) (92% - 93%)    MEDICATIONS  (STANDING):  aspirin  chewable 81 milliGRAM(s) Oral daily  docusate sodium 100 milliGRAM(s) Oral two times a day  heparin  Injectable 5000 Unit(s) SubCutaneous every 8 hours  multiple electrolytes Injection Type 1 1000 milliLiter(s) (125 mL/Hr) IV Continuous <Continuous>  senna 1 Tablet(s) Oral two times a day  simvastatin 20 milliGRAM(s) Oral at bedtime    MEDICATIONS  (PRN):  acetaminophen   Tablet .. 650 milliGRAM(s) Oral every 6 hours PRN Temp greater or equal to 38C (100.4F), Mild Pain (1 - 3)  HYDROmorphone  Injectable 1 milliGRAM(s) IV Push every 3 hours PRN Severe Pain (7 - 10)  ondansetron Injectable 4 milliGRAM(s) IV Push every 6 hours PRN Nausea and/or Vomiting  oxyCODONE    IR 5 milliGRAM(s) Oral every 4 hours PRN Moderate Pain (4 - 6)  oxyCODONE    IR 10 milliGRAM(s) Oral every 4 hours PRN Severe Pain (7 - 10)    MEDICATIONS  (PRN):    PAST MEDICAL & SURGICAL HISTORY:  GERD (gastroesophageal reflux disease)  HLD (hyperlipidemia)  No significant past surgical history    Physical Exam:  General: NAD, resting comfortably in bed  Extremities: RLE dressings removed. R upper thigh and medial calf with staples CDI  lat fasciotomy with pink muscle, min edema. Compartments soft,  Post pop/thigh space with interrupted sutures oozing serosang fluid  Xeroform to fasciotomy site, abd pads and wrapped with kerlex. ACE wrap for gentle compression.R foot mild edema, reports no sensation on dorsum or plantar surface at this time. Gross motor absent to intrinsic foot muscles, absent plantarflexion and dorsiflexion. 2+ R DP pulse.   L groin/inner thigh incision with staples CDI.     LABS:                         7.4    13.00 )-----------( 171      ( 27 Jun 2019 11:42 )             23.4   06-27    141  |  106  |  29.0<H>  ----------------------------<  174<H>  4.8   |  24.0  |  1.36<H>    Ca    8.0<L>      27 Jun 2019 11:42  Phos  2.9     06-27  Mg     3.0     06-27    CAPILLARY BLOOD GLUCOSE    Assessment:HPI:   68yMale BIBEMS on 06-25-19 by ground  activated as code B trauma. Patient was at work when he was reportedly crushed/impaled by [employer]. Possible associated fall, pt denies any LOC. Per EMS, pt lost approximately 1-2 Liters of blood from RLE laceration before a tourniquet was applied at approximately 10:19am. Patient brought to [facility] where on arrival, patient protecting airway, able to with ease, had nonlabored breathing, and had good central pulses. Patient's tournigquet and dressing over RLE posterior lateral laceration taken down. No active bleeding noted from approximately 10cm full thickness laceration. No pulse signals distal to right knee. Pt unable to dorsiflex or plantarflex right foot and had decreased sensation distal to knee.    S/P  R AK pop- BK pop bypass; 4 compartment fasciotomies                                     POD#  3    Plan:  Cont ASA and statin  Neurovasc checks qshift  DM management  Appreciate plastic surgery input.  Cont local wound care  Pt remains at high risk for long term disability or limb loss.  Care as per primary team Patient is a 68y old  Male who presents with a chief complaint of Forklift crush to RLE (26 Jun 2019 05:57)    Pt is S/P  R AK pop- BK pop bypass; 4 compartment fasciotomies                                   POD# 3  No events overnight. No complaints    Vital Signs Last 24 Hrs  T(C): 36.9 (28 Jun 2019 07:53), Max: 36.9 (28 Jun 2019 07:53)  T(F): 98.5 (28 Jun 2019 07:53), Max: 98.5 (28 Jun 2019 07:53)  HR: 95 (28 Jun 2019 07:53) (93 - 115)  BP: 127/67 (28 Jun 2019 07:53) (127/67 - 159/63)  BP(mean): --  RR: 20 (28 Jun 2019 07:53) (18 - 20)  SpO2: 92% (27 Jun 2019 23:03) (92% - 93%)    MEDICATIONS  (STANDING):  aspirin  chewable 81 milliGRAM(s) Oral daily  docusate sodium 100 milliGRAM(s) Oral two times a day  heparin  Injectable 5000 Unit(s) SubCutaneous every 8 hours  multiple electrolytes Injection Type 1 1000 milliLiter(s) (125 mL/Hr) IV Continuous <Continuous>  senna 1 Tablet(s) Oral two times a day  simvastatin 20 milliGRAM(s) Oral at bedtime    MEDICATIONS  (PRN):  acetaminophen   Tablet .. 650 milliGRAM(s) Oral every 6 hours PRN Temp greater or equal to 38C (100.4F), Mild Pain (1 - 3)  HYDROmorphone  Injectable 1 milliGRAM(s) IV Push every 3 hours PRN Severe Pain (7 - 10)  ondansetron Injectable 4 milliGRAM(s) IV Push every 6 hours PRN Nausea and/or Vomiting  oxyCODONE    IR 5 milliGRAM(s) Oral every 4 hours PRN Moderate Pain (4 - 6)  oxyCODONE    IR 10 milliGRAM(s) Oral every 4 hours PRN Severe Pain (7 - 10)    MEDICATIONS  (PRN):    PAST MEDICAL & SURGICAL HISTORY:  GERD (gastroesophageal reflux disease)  HLD (hyperlipidemia)  No significant past surgical history    Physical Exam:  General: NAD, resting comfortably in bed  Extremities: RLE dressings removed. R upper thigh and medial calf with staples CDI  lat fasciotomy with pink muscle, min edema. Compartments soft,  Post pop/thigh space with interrupted sutures oozing serosang fluid  Xeroform to fasciotomy site, abd pads and wrapped with kerlex. ACE wrap for gentle compression.R foot mild edema, reports no sensation on dorsum or plantar surface at this time. Gross motor absent to intrinsic foot muscles, absent plantarflexion and dorsiflexion. 2+ R DP pulse.   LLE with edema, soft. L groin/inner thigh incision with staples CDI.     LABS:                         7.4    13.00 )-----------( 171      ( 27 Jun 2019 11:42 )             23.4   06-27    141  |  106  |  29.0<H>  ----------------------------<  174<H>  4.8   |  24.0  |  1.36<H>    Ca    8.0<L>      27 Jun 2019 11:42  Phos  2.9     06-27  Mg     3.0     06-27    CAPILLARY BLOOD GLUCOSE    Assessment:HPI:   68yMale BIBEMS on 06-25-19 by ground  activated as code B trauma. Patient was at work when he was reportedly crushed/impaled by [employer]. Possible associated fall, pt denies any LOC. Per EMS, pt lost approximately 1-2 Liters of blood from RLE laceration before a tourniquet was applied at approximately 10:19am. Patient brought to [facility] where on arrival, patient protecting airway, able to with ease, had nonlabored breathing, and had good central pulses. Patient's tournigquet and dressing over RLE posterior lateral laceration taken down. No active bleeding noted from approximately 10cm full thickness laceration. No pulse signals distal to right knee. Pt unable to dorsiflex or plantarflex right foot and had decreased sensation distal to knee.    S/P  R AK pop- BK pop bypass; 4 compartment fasciotomies                                     POD#  3    Plan:  Cont ASA and statin  Neurovasc checks qshift  DM management  Appreciate plastic surgery input.  Cont local wound care  Monitor CK's-pt with RLE fasciotomies and soft compartments  Pt remains at high risk for long term disability or limb loss.  Care as per primary team

## 2019-06-28 NOTE — PROGRESS NOTE ADULT - SUBJECTIVE AND OBJECTIVE BOX
AIMEE SOLER    [id number]    History:  The patient is status post left tibial plateau ORIF, POD 2. Patient is doing well. The patient's pain is controlled using the prescribed pain medications. The patient has yet to participate in physical therapy. Denies nausea, vomiting, chest pain, shortness of breath, abdominal pain or fever. No new complaints. patient reports no improvement to motor/sensory fxn of the RIGHT le    Vital Signs Last 24 Hrs  T(C): 36.7 (27 Jun 2019 23:03), Max: 36.9 (27 Jun 2019 08:09)  T(F): 98.1 (27 Jun 2019 23:03), Max: 98.5 (27 Jun 2019 08:09)  HR: 115 (27 Jun 2019 23:03) (91 - 115)  BP: 159/63 (27 Jun 2019 23:03) (132/74 - 159/63)  BP(mean): --  RR: 20 (27 Jun 2019 23:03) (18 - 20)  SpO2: 92% (27 Jun 2019 23:03) (92% - 98%)  I&O's Summary    27 Jun 2019 07:01  -  28 Jun 2019 07:00  --------------------------------------------------------  IN: 0 mL / OUT: 400 mL / NET: -400 mL                              7.4    13.00 )-----------( 171      ( 27 Jun 2019 11:42 )             23.4     06-27    141  |  106  |  29.0<H>  ----------------------------<  174<H>  4.8   |  24.0  |  1.36<H>    Ca    8.0<L>      27 Jun 2019 11:42  Phos  2.9     06-27  Mg     3.0     06-27        MEDICATIONS  (STANDING):  aspirin  chewable 81 milliGRAM(s) Oral daily  docusate sodium 100 milliGRAM(s) Oral two times a day  heparin  Injectable 5000 Unit(s) SubCutaneous every 8 hours  multiple electrolytes Injection Type 1 1000 milliLiter(s) (125 mL/Hr) IV Continuous <Continuous>  senna 1 Tablet(s) Oral two times a day  simvastatin 20 milliGRAM(s) Oral at bedtime    MEDICATIONS  (PRN):  acetaminophen   Tablet .. 650 milliGRAM(s) Oral every 6 hours PRN Temp greater or equal to 38C (100.4F), Mild Pain (1 - 3)  HYDROmorphone  Injectable 1 milliGRAM(s) IV Push every 3 hours PRN Severe Pain (7 - 10)  ondansetron Injectable 4 milliGRAM(s) IV Push every 6 hours PRN Nausea and/or Vomiting  oxyCODONE    IR 5 milliGRAM(s) Oral every 4 hours PRN Moderate Pain (4 - 6)  oxyCODONE    IR 10 milliGRAM(s) Oral every 4 hours PRN Severe Pain (7 - 10)      Physical exam: Left LE, Well padded Knee immobilizer, to the left Le.  dRESSINGS CHANGED. The wounds are clean, dry and intact. No wound erythema, discharge, drainage is noted. Sensation to light touch is grossly intact without focal deficit to the left le. No calf tenderness. Sensation to light touch is grossly intact to the left le. Left LE Motor function distally is 5/5. No foot drop. 2+ dorsalis pedis pulse. Capillary refill is less than 2 seconds. No cyanosis.  Right Le with clean ace wrap.  No change to motor/sensory exam.  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

## 2019-06-29 LAB
ANION GAP SERPL CALC-SCNC: 13 MMOL/L — SIGNIFICANT CHANGE UP (ref 5–17)
APPEARANCE UR: CLEAR — SIGNIFICANT CHANGE UP
BASOPHILS # BLD AUTO: 0.03 K/UL — SIGNIFICANT CHANGE UP (ref 0–0.2)
BASOPHILS NFR BLD AUTO: 0.3 % — SIGNIFICANT CHANGE UP (ref 0–2)
BILIRUB UR-MCNC: NEGATIVE — SIGNIFICANT CHANGE UP
BUN SERPL-MCNC: 35 MG/DL — HIGH (ref 8–20)
CALCIUM SERPL-MCNC: 7.9 MG/DL — LOW (ref 8.6–10.2)
CHLORIDE SERPL-SCNC: 104 MMOL/L — SIGNIFICANT CHANGE UP (ref 98–107)
CK MB CFR SERPL CALC: 1.6 NG/ML — SIGNIFICANT CHANGE UP (ref 0–6.7)
CK MB CFR SERPL CALC: 2 NG/ML — SIGNIFICANT CHANGE UP (ref 0–6.7)
CK SERPL-CCNC: 1120 U/L — HIGH (ref 30–200)
CK SERPL-CCNC: 769 U/L — HIGH (ref 30–200)
CO2 SERPL-SCNC: 25 MMOL/L — SIGNIFICANT CHANGE UP (ref 22–29)
COLOR SPEC: YELLOW — SIGNIFICANT CHANGE UP
CREAT SERPL-MCNC: 1.92 MG/DL — HIGH (ref 0.5–1.3)
DIFF PNL FLD: ABNORMAL
EOSINOPHIL # BLD AUTO: 0.09 K/UL — SIGNIFICANT CHANGE UP (ref 0–0.5)
EOSINOPHIL NFR BLD AUTO: 1 % — SIGNIFICANT CHANGE UP (ref 0–6)
EPI CELLS # UR: SIGNIFICANT CHANGE UP
GLUCOSE BLDC GLUCOMTR-MCNC: 135 MG/DL — HIGH (ref 70–99)
GLUCOSE BLDC GLUCOMTR-MCNC: 143 MG/DL — HIGH (ref 70–99)
GLUCOSE BLDC GLUCOMTR-MCNC: 152 MG/DL — HIGH (ref 70–99)
GLUCOSE BLDC GLUCOMTR-MCNC: 161 MG/DL — HIGH (ref 70–99)
GLUCOSE SERPL-MCNC: 132 MG/DL — HIGH (ref 70–115)
GLUCOSE UR QL: NEGATIVE MG/DL — SIGNIFICANT CHANGE UP
HCT VFR BLD CALC: 25.5 % — LOW (ref 39–50)
HCT VFR BLD CALC: 28.7 % — LOW (ref 39–50)
HGB BLD-MCNC: 8.2 G/DL — LOW (ref 13–17)
HGB BLD-MCNC: 9 G/DL — LOW (ref 13–17)
IMM GRANULOCYTES NFR BLD AUTO: 2.2 % — HIGH (ref 0–1.5)
KETONES UR-MCNC: NEGATIVE — SIGNIFICANT CHANGE UP
LEUKOCYTE ESTERASE UR-ACNC: NEGATIVE — SIGNIFICANT CHANGE UP
LYMPHOCYTES # BLD AUTO: 0.93 K/UL — LOW (ref 1–3.3)
LYMPHOCYTES # BLD AUTO: 10.4 % — LOW (ref 13–44)
MAGNESIUM SERPL-MCNC: 3.5 MG/DL — HIGH (ref 1.6–2.6)
MCHC RBC-ENTMCNC: 26.9 PG — LOW (ref 27–34)
MCHC RBC-ENTMCNC: 27.8 PG — SIGNIFICANT CHANGE UP (ref 27–34)
MCHC RBC-ENTMCNC: 31.4 GM/DL — LOW (ref 32–36)
MCHC RBC-ENTMCNC: 32.2 GM/DL — SIGNIFICANT CHANGE UP (ref 32–36)
MCV RBC AUTO: 85.7 FL — SIGNIFICANT CHANGE UP (ref 80–100)
MCV RBC AUTO: 86.4 FL — SIGNIFICANT CHANGE UP (ref 80–100)
MONOCYTES # BLD AUTO: 0.75 K/UL — SIGNIFICANT CHANGE UP (ref 0–0.9)
MONOCYTES NFR BLD AUTO: 8.4 % — SIGNIFICANT CHANGE UP (ref 2–14)
NEUTROPHILS # BLD AUTO: 6.97 K/UL — SIGNIFICANT CHANGE UP (ref 1.8–7.4)
NEUTROPHILS NFR BLD AUTO: 77.7 % — HIGH (ref 43–77)
NITRITE UR-MCNC: NEGATIVE — SIGNIFICANT CHANGE UP
PH UR: 6.5 — SIGNIFICANT CHANGE UP (ref 5–8)
PHOSPHATE SERPL-MCNC: 4.3 MG/DL — SIGNIFICANT CHANGE UP (ref 2.4–4.7)
PLATELET # BLD AUTO: 175 K/UL — SIGNIFICANT CHANGE UP (ref 150–400)
PLATELET # BLD AUTO: 185 K/UL — SIGNIFICANT CHANGE UP (ref 150–400)
POTASSIUM SERPL-MCNC: 4.6 MMOL/L — SIGNIFICANT CHANGE UP (ref 3.5–5.3)
POTASSIUM SERPL-SCNC: 4.6 MMOL/L — SIGNIFICANT CHANGE UP (ref 3.5–5.3)
PROT UR-MCNC: 15 MG/DL
RBC # BLD: 2.95 M/UL — LOW (ref 4.2–5.8)
RBC # BLD: 3.35 M/UL — LOW (ref 4.2–5.8)
RBC # FLD: 16.4 % — HIGH (ref 10.3–14.5)
RBC # FLD: 16.5 % — HIGH (ref 10.3–14.5)
RBC CASTS # UR COMP ASSIST: SIGNIFICANT CHANGE UP /HPF (ref 0–4)
SODIUM SERPL-SCNC: 142 MMOL/L — SIGNIFICANT CHANGE UP (ref 135–145)
SP GR SPEC: 1.01 — SIGNIFICANT CHANGE UP (ref 1.01–1.02)
UROBILINOGEN FLD QL: NEGATIVE MG/DL — SIGNIFICANT CHANGE UP
WBC # BLD: 8.97 K/UL — SIGNIFICANT CHANGE UP (ref 3.8–10.5)
WBC # BLD: 9.35 K/UL — SIGNIFICANT CHANGE UP (ref 3.8–10.5)
WBC # FLD AUTO: 8.97 K/UL — SIGNIFICANT CHANGE UP (ref 3.8–10.5)
WBC # FLD AUTO: 9.35 K/UL — SIGNIFICANT CHANGE UP (ref 3.8–10.5)
WBC UR QL: NEGATIVE — SIGNIFICANT CHANGE UP

## 2019-06-29 PROCEDURE — 99231 SBSQ HOSP IP/OBS SF/LOW 25: CPT

## 2019-06-29 RX ORDER — SODIUM CHLORIDE 9 MG/ML
1000 INJECTION INTRAMUSCULAR; INTRAVENOUS; SUBCUTANEOUS
Refills: 0 | Status: DISCONTINUED | OUTPATIENT
Start: 2019-06-29 | End: 2019-06-30

## 2019-06-29 RX ORDER — LACTULOSE 10 G/15ML
10 SOLUTION ORAL DAILY
Refills: 0 | Status: DISCONTINUED | OUTPATIENT
Start: 2019-06-29 | End: 2019-07-03

## 2019-06-29 RX ORDER — SODIUM CHLORIDE 9 MG/ML
1000 INJECTION, SOLUTION INTRAVENOUS
Refills: 0 | Status: DISCONTINUED | OUTPATIENT
Start: 2019-06-29 | End: 2019-06-29

## 2019-06-29 RX ADMIN — Medication 100 MILLIGRAM(S): at 17:04

## 2019-06-29 RX ADMIN — Medication 2: at 16:55

## 2019-06-29 RX ADMIN — LACTULOSE 10 GRAM(S): 10 SOLUTION ORAL at 12:00

## 2019-06-29 RX ADMIN — AMLODIPINE BESYLATE 10 MILLIGRAM(S): 2.5 TABLET ORAL at 06:09

## 2019-06-29 RX ADMIN — SODIUM CHLORIDE 100 MILLILITER(S): 9 INJECTION INTRAMUSCULAR; INTRAVENOUS; SUBCUTANEOUS at 21:49

## 2019-06-29 RX ADMIN — Medication 100 MILLIGRAM(S): at 06:09

## 2019-06-29 RX ADMIN — OXYCODONE HYDROCHLORIDE 5 MILLIGRAM(S): 5 TABLET ORAL at 07:15

## 2019-06-29 RX ADMIN — HEPARIN SODIUM 5000 UNIT(S): 5000 INJECTION INTRAVENOUS; SUBCUTANEOUS at 06:09

## 2019-06-29 RX ADMIN — SENNA PLUS 1 TABLET(S): 8.6 TABLET ORAL at 17:04

## 2019-06-29 RX ADMIN — OXYCODONE HYDROCHLORIDE 10 MILLIGRAM(S): 5 TABLET ORAL at 16:57

## 2019-06-29 RX ADMIN — OXYCODONE HYDROCHLORIDE 10 MILLIGRAM(S): 5 TABLET ORAL at 12:42

## 2019-06-29 RX ADMIN — HEPARIN SODIUM 5000 UNIT(S): 5000 INJECTION INTRAVENOUS; SUBCUTANEOUS at 14:45

## 2019-06-29 RX ADMIN — OXYCODONE HYDROCHLORIDE 10 MILLIGRAM(S): 5 TABLET ORAL at 17:26

## 2019-06-29 RX ADMIN — Medication 2: at 11:57

## 2019-06-29 RX ADMIN — LOSARTAN POTASSIUM 100 MILLIGRAM(S): 100 TABLET, FILM COATED ORAL at 06:09

## 2019-06-29 RX ADMIN — OXYCODONE HYDROCHLORIDE 10 MILLIGRAM(S): 5 TABLET ORAL at 12:01

## 2019-06-29 RX ADMIN — SODIUM CHLORIDE 100 MILLILITER(S): 9 INJECTION INTRAMUSCULAR; INTRAVENOUS; SUBCUTANEOUS at 14:48

## 2019-06-29 RX ADMIN — Medication 81 MILLIGRAM(S): at 12:01

## 2019-06-29 RX ADMIN — OXYCODONE HYDROCHLORIDE 5 MILLIGRAM(S): 5 TABLET ORAL at 06:12

## 2019-06-29 RX ADMIN — SENNA PLUS 1 TABLET(S): 8.6 TABLET ORAL at 06:09

## 2019-06-29 RX ADMIN — SIMVASTATIN 20 MILLIGRAM(S): 20 TABLET, FILM COATED ORAL at 21:49

## 2019-06-29 RX ADMIN — HEPARIN SODIUM 5000 UNIT(S): 5000 INJECTION INTRAVENOUS; SUBCUTANEOUS at 21:49

## 2019-06-29 NOTE — PROGRESS NOTE ADULT - SUBJECTIVE AND OBJECTIVE BOX
HPI/OVERNIGHT EVENTS: Patient seen and examined at bedside this AM.  No acute events overnight. RLE dressing changed. Denies fever, chills, nausea, vomiting chest pain, SOB, dizziness, abd pain or any other concerning symptoms    Vital Signs Last 24 Hrs  T(C): 37.4 (28 Jun 2019 23:00), Max: 37.8 (28 Jun 2019 20:40)  T(F): 99.4 (28 Jun 2019 23:00), Max: 100 (28 Jun 2019 20:40)  HR: 91 (28 Jun 2019 23:00) (88 - 95)  BP: 145/80 (28 Jun 2019 23:00) (125/68 - 149/78)  BP(mean): --  RR: 16 (28 Jun 2019 23:00) (16 - 20)  SpO2: 97% (28 Jun 2019 23:00) (91% - 97%)    I&O's Detail    28 Jun 2019 07:01  -  29 Jun 2019 07:00  --------------------------------------------------------  IN:    multiple electrolytes Injection Type 1: 1875 mL    Packed Red Blood Cells: 572 mL    Solution: 170 mL  Total IN: 2617 mL    OUT:    Voided: 1250 mL  Total OUT: 1250 mL    Total NET: 1367 mL    Constitutional: Patient resting comfortably in bed in no acute distress   HEENT: EOMI/PERRL b/l  Neck: No JVD  Respiratory: CTAB with unlabored respirations, no accessory muscle use or conversational dyspnea   Cardiovascular: Regular rate and rhythm with no arrythmias or murmurs?   Gastrointestinal: Abdomen soft, non-tender, non-distended with no rebound tenderness or guarding   Rectal: Not indicated Neurological: GCS 15 (E4V5M6) with no gross sensory, motor or coordination deficits   Psychiatric: Depressed mood and flat affect   Extremities: RLE dressings removed. R upper thigh and medial calf with staples CDI; lat fasciotomy with pink muscle, min edema. Compartments soft,  Post pop/thigh space with interrupted sutures oozing serosang fluid; Xeroform to fasciotomy site, abd pads and wrapped with kerlex. ACE wrap for gentle compression. R foot mild edema, reports no sensation on dorsum or plantar surface at this time. Gross motor absent to intrinsic foot muscles, absent plantarflexion and dorsiflexion. 2+ R DP pulse.   LLE with edema, soft. L groin/inner thigh incision with staples CDI.    LABS:                        8.2    9.35  )-----------( 175      ( 29 Jun 2019 01:26 )             25.5     06-28    142  |  107  |  34.0<H>  ----------------------------<  198<H>  4.4   |  25.0  |  1.64<H>    Ca    7.9<L>      28 Jun 2019 09:42  Phos  2.3     06-28  Mg     3.2     06-28    TPro  5.7<L>  /  Alb  3.1<L>  /  TBili  0.4  /  DBili  x   /  AST  44<H>  /  ALT  13  /  AlkPhos  60  06-28    MEDICATIONS  (STANDING):  amLODIPine   Tablet 10 milliGRAM(s) Oral daily  aspirin  chewable 81 milliGRAM(s) Oral daily  dextrose 5%. 1000 milliLiter(s) (50 mL/Hr) IV Continuous <Continuous>  dextrose 50% Injectable 12.5 Gram(s) IV Push once  dextrose 50% Injectable 25 Gram(s) IV Push once  dextrose 50% Injectable 25 Gram(s) IV Push once  docusate sodium 100 milliGRAM(s) Oral two times a day  heparin  Injectable 5000 Unit(s) SubCutaneous every 8 hours  insulin lispro (HumaLOG) corrective regimen sliding scale   SubCutaneous three times a day before meals  lactulose Syrup 10 Gram(s) Oral daily  losartan 100 milliGRAM(s) Oral daily  multiple electrolytes Injection Type 1 1000 milliLiter(s) (125 mL/Hr) IV Continuous <Continuous>  senna 1 Tablet(s) Oral two times a day  simvastatin 20 milliGRAM(s) Oral at bedtime    MEDICATIONS  (PRN):  acetaminophen   Tablet .. 650 milliGRAM(s) Oral every 6 hours PRN Temp greater or equal to 38C (100.4F), Mild Pain (1 - 3)  dextrose 40% Gel 15 Gram(s) Oral once PRN Blood Glucose LESS THAN 70 milliGRAM(s)/deciliter  glucagon  Injectable 1 milliGRAM(s) IntraMuscular once PRN Glucose LESS THAN 70 milligrams/deciliter  HYDROmorphone  Injectable 1 milliGRAM(s) IV Push every 3 hours PRN Severe Pain (7 - 10)  ondansetron Injectable 4 milliGRAM(s) IV Push every 6 hours PRN Nausea and/or Vomiting  oxyCODONE    IR 5 milliGRAM(s) Oral every 4 hours PRN Moderate Pain (4 - 6)  oxyCODONE    IR 10 milliGRAM(s) Oral every 4 hours PRN Severe Pain (7 - 10)    MICRO:     STUDIES: HPI/OVERNIGHT EVENTS: Patient seen and examined at bedside this AM.  No acute events overnight. RLE dressing changed. Denies fever, chills, nausea, vomiting chest pain, SOB, dizziness, abd pain or any other concerning symptoms    Vital Signs Last 24 Hrs  T(C): 37.4 (28 Jun 2019 23:00), Max: 37.8 (28 Jun 2019 20:40)  T(F): 99.4 (28 Jun 2019 23:00), Max: 100 (28 Jun 2019 20:40)  HR: 91 (28 Jun 2019 23:00) (88 - 95)  BP: 145/80 (28 Jun 2019 23:00) (125/68 - 149/78)  BP(mean): --  RR: 16 (28 Jun 2019 23:00) (16 - 20)  SpO2: 97% (28 Jun 2019 23:00) (91% - 97%)    I&O's Detail    28 Jun 2019 07:01  -  29 Jun 2019 07:00  --------------------------------------------------------  IN:    multiple electrolytes Injection Type 1: 1875 mL    Packed Red Blood Cells: 572 mL    Solution: 170 mL  Total IN: 2617 mL    OUT:    Voided: 1250 mL  Total OUT: 1250 mL    Total NET: 1367 mL    Constitutional: Patient resting comfortably in bed in no acute distress   HEENT: EOMI/PERRL b/l  Neck: No JVD  Respiratory: CTAB with unlabored respirations, no accessory muscle use or conversational dyspnea   Cardiovascular: Normal S1 and S2   Gastrointestinal: Abdomen soft, non-tender, non-distended with no rebound tenderness or guarding   Rectal: Not indicated Neurological: GCS 15 (E4V5M6) with no gross sensory, motor or coordination deficits   Psychiatric: Depressed mood and flat affect   Extremities: RLE dressings removed. R upper thigh and medial calf with staples CDI; lat fasciotomy with pink muscle, min edema. Compartments soft,  Post pop/thigh space with interrupted sutures oozing serosang fluid; Xeroform to fasciotomy site, abd pads and wrapped with kerlex. ACE wrap for gentle compression. R foot mild edema, reports no sensation on dorsum or plantar surface at this time. Gross motor absent to intrinsic foot muscles, absent plantarflexion and dorsiflexion. 2+ R DP pulse.   LLE with edema, soft. L groin/inner thigh incision with staples CDI.    LABS:                        8.2    9.35  )-----------( 175      ( 29 Jun 2019 01:26 )             25.5     06-28    142  |  107  |  34.0<H>  ----------------------------<  198<H>  4.4   |  25.0  |  1.64<H>    Ca    7.9<L>      28 Jun 2019 09:42  Phos  2.3     06-28  Mg     3.2     06-28    TPro  5.7<L>  /  Alb  3.1<L>  /  TBili  0.4  /  DBili  x   /  AST  44<H>  /  ALT  13  /  AlkPhos  60  06-28    MEDICATIONS  (STANDING):  amLODIPine   Tablet 10 milliGRAM(s) Oral daily  aspirin  chewable 81 milliGRAM(s) Oral daily  dextrose 5%. 1000 milliLiter(s) (50 mL/Hr) IV Continuous <Continuous>  dextrose 50% Injectable 12.5 Gram(s) IV Push once  dextrose 50% Injectable 25 Gram(s) IV Push once  dextrose 50% Injectable 25 Gram(s) IV Push once  docusate sodium 100 milliGRAM(s) Oral two times a day  heparin  Injectable 5000 Unit(s) SubCutaneous every 8 hours  insulin lispro (HumaLOG) corrective regimen sliding scale   SubCutaneous three times a day before meals  lactulose Syrup 10 Gram(s) Oral daily  losartan 100 milliGRAM(s) Oral daily  multiple electrolytes Injection Type 1 1000 milliLiter(s) (125 mL/Hr) IV Continuous <Continuous>  senna 1 Tablet(s) Oral two times a day  simvastatin 20 milliGRAM(s) Oral at bedtime    MEDICATIONS  (PRN):  acetaminophen   Tablet .. 650 milliGRAM(s) Oral every 6 hours PRN Temp greater or equal to 38C (100.4F), Mild Pain (1 - 3)  dextrose 40% Gel 15 Gram(s) Oral once PRN Blood Glucose LESS THAN 70 milliGRAM(s)/deciliter  glucagon  Injectable 1 milliGRAM(s) IntraMuscular once PRN Glucose LESS THAN 70 milligrams/deciliter  HYDROmorphone  Injectable 1 milliGRAM(s) IV Push every 3 hours PRN Severe Pain (7 - 10)  ondansetron Injectable 4 milliGRAM(s) IV Push every 6 hours PRN Nausea and/or Vomiting  oxyCODONE    IR 5 milliGRAM(s) Oral every 4 hours PRN Moderate Pain (4 - 6)  oxyCODONE    IR 10 milliGRAM(s) Oral every 4 hours PRN Severe Pain (7 - 10)    MICRO:     STUDIES:

## 2019-06-29 NOTE — PROGRESS NOTE ADULT - ASSESSMENT
A/P: 69yo M s/p forklift crush injury to b/l LEs and s/p L greater saph vein harvest, super to inf R fempop bypass, fasciotomy 6/25, also now s/p ORIF L tibial plateau fx.  High CPK downtrending, Cr wnl, voiding adequately.  s/p transfusion with appropirate response    -NWB LLE; MR b/l Knees done; f/u ortho recs  -ortho following patient  -OR with Dr. Ron on 7/1 for nerve reconstruction surgery  -reg diet  -PT/PT/PMR  -neurovasc checks q4h  -IVF, trend CPK, f/u Cr  -f/u H/H and vitals

## 2019-06-29 NOTE — PROGRESS NOTE ADULT - ASSESSMENT
68 year old male POD#4 s/p R AK pop- BK pop bypass and 4 compartment fasciotomies currently afebrile and hemodynamically stable with improving CPK. received 2 units pRBC with H/H increase of 6/3/19.2 to 8.2/25.5. Continue ASA and statin in addition to neurovascular checks. Rest of medical mgmt per primary team.

## 2019-06-29 NOTE — PROGRESS NOTE ADULT - SUBJECTIVE AND OBJECTIVE BOX
History:  The patient is status post left tibial plateau ORIF, POD 3. The patient's pain is controlled using the prescribed pain medications. The patient has yet to participate in physical therapy. Denies nausea, vomiting, chest pain, shortness of breath, abdominal pain or fever. No new complaints or overnight events. . patient reports no improvement to motor/sensory fxn of the RLE    Vital Signs Last 24 Hrs  T(C): 36.4 (29 Jun 2019 08:26), Max: 37.8 (28 Jun 2019 20:40)  T(F): 97.5 (29 Jun 2019 08:26), Max: 100 (28 Jun 2019 20:40)  HR: 88 (29 Jun 2019 08:26) (88 - 95)  BP: 157/83 (29 Jun 2019 08:26) (138/70 - 157/83)  BP(mean): --  RR: 19 (29 Jun 2019 08:26) (16 - 20)  SpO2: 97% (28 Jun 2019 23:00) (96% - 97%)                          8.2    9.35  )-----------( 175      ( 29 Jun 2019 01:26 )             25.5       Physical exam:NAD, resting comfortably  LLE;   well padded brace noted.  dressings  are clean, dry and intact.   Sensation to light touch is grossly intact without focal deficit to the left le.   Left LE Motor function distally is 5/5. No foot drop. 2+ dorsalis pedis pulse. Capillary refill is less than 2 seconds. No cyanosis.  compartments soft, NT  No change to motor/sensory exam.  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

## 2019-06-29 NOTE — PROGRESS NOTE ADULT - SUBJECTIVE AND OBJECTIVE BOX
INTERVAL HPI/OVERNIGHT EVENTS:    SUBJECTIVE: Pt received two units PRBC and had appropriate increase of H/H.  CPK downtrending as well. Low grade temp of 100 was reported.  Pt given tylenol.  No lightheadedness/dizziness/chills/sob/cp/n/v.  Seen this AM also does not c/o fevers.      MEDICATIONS  (STANDING):  amLODIPine   Tablet 10 milliGRAM(s) Oral daily  aspirin  chewable 81 milliGRAM(s) Oral daily  dextrose 5%. 1000 milliLiter(s) (50 mL/Hr) IV Continuous <Continuous>  dextrose 50% Injectable 12.5 Gram(s) IV Push once  dextrose 50% Injectable 25 Gram(s) IV Push once  dextrose 50% Injectable 25 Gram(s) IV Push once  docusate sodium 100 milliGRAM(s) Oral two times a day  heparin  Injectable 5000 Unit(s) SubCutaneous every 8 hours  insulin lispro (HumaLOG) corrective regimen sliding scale   SubCutaneous three times a day before meals  losartan 100 milliGRAM(s) Oral daily  multiple electrolytes Injection Type 1 1000 milliLiter(s) (125 mL/Hr) IV Continuous <Continuous>  senna 1 Tablet(s) Oral two times a day  simvastatin 20 milliGRAM(s) Oral at bedtime    MEDICATIONS  (PRN):  acetaminophen   Tablet .. 650 milliGRAM(s) Oral every 6 hours PRN Temp greater or equal to 38C (100.4F), Mild Pain (1 - 3)  dextrose 40% Gel 15 Gram(s) Oral once PRN Blood Glucose LESS THAN 70 milliGRAM(s)/deciliter  glucagon  Injectable 1 milliGRAM(s) IntraMuscular once PRN Glucose LESS THAN 70 milligrams/deciliter  HYDROmorphone  Injectable 1 milliGRAM(s) IV Push every 3 hours PRN Severe Pain (7 - 10)  ondansetron Injectable 4 milliGRAM(s) IV Push every 6 hours PRN Nausea and/or Vomiting  oxyCODONE    IR 5 milliGRAM(s) Oral every 4 hours PRN Moderate Pain (4 - 6)  oxyCODONE    IR 10 milliGRAM(s) Oral every 4 hours PRN Severe Pain (7 - 10)      Vital Signs Last 24 Hrs  T(C): 37.4 (28 Jun 2019 23:00), Max: 37.8 (28 Jun 2019 20:40)  T(F): 99.4 (28 Jun 2019 23:00), Max: 100 (28 Jun 2019 20:40)  HR: 91 (28 Jun 2019 23:00) (88 - 95)  BP: 145/80 (28 Jun 2019 23:00) (125/68 - 149/78)  BP(mean): --  RR: 16 (28 Jun 2019 23:00) (16 - 20)  SpO2: 97% (28 Jun 2019 23:00) (91% - 97%)    PE  Gen: NAD  Pulm: nonlabored breathing  CV: RRR  Abd: soft, nt, nd  : voiding adequately  Ext: LLE brace in place, RLE ace wrap with moderate saturation at posterior part of ace wrap; R foot edema  Vasc: 2+ dp's  Neuro: AAOx3, no sensation on RLE, able to have sensory intact on LLE; able to dorsiflex/plantarflex L foot, but weaker on R foot      I&O's Detail    27 Jun 2019 07:01  -  28 Jun 2019 07:00  --------------------------------------------------------  IN:  Total IN: 0 mL    OUT:    Voided: 400 mL  Total OUT: 400 mL    Total NET: -400 mL      28 Jun 2019 07:01  -  29 Jun 2019 05:05  --------------------------------------------------------  IN:    multiple electrolytes Injection Type 1: 1125 mL    Packed Red Blood Cells: 289 mL    Solution: 170 mL  Total IN: 1584 mL    OUT:    Voided: 800 mL  Total OUT: 800 mL    Total NET: 784 mL          LABS:                        8.2    9.35  )-----------( 175      ( 29 Jun 2019 01:26 )             25.5     06-28    142  |  107  |  34.0<H>  ----------------------------<  198<H>  4.4   |  25.0  |  1.64<H>    Ca    7.9<L>      28 Jun 2019 09:42  Phos  2.3     06-28  Mg     3.2     06-28    TPro  5.7<L>  /  Alb  3.1<L>  /  TBili  0.4  /  DBili  x   /  AST  44<H>  /  ALT  13  /  AlkPhos  60  06-28          RADIOLOGY & ADDITIONAL STUDIES:    MR Right knee: Findings:    There is a large posterior lateral wound as seen on the prior knee CT   which extends into the subjacent posterior lateral myofascial planes of   the gastrocnemius and soleus musculature. There is fluid and air within   this laceration defect. There is adjacent irregularity of the popliteal   vessels in this region corresponding to the site of prior popliteal   artery occlusion. The common tibial nerve is effaced and poorly defined   at the site of this laceration with the proximal and distal portions   appearing normal in signal. This may be related to underlying common   tibial nerve injury. The common peroneal nerve is surrounded by fluid   within the laceration gap with the intrinsic signal of the common   peroneal nerve appearing grossly intact without definite discontinuity.   There are cutaneous surgical staples are noted along the medial aspect of   the knee. Foci of subcutaneous air are noted anteromedially with   confluent fluid signal likely related to hematoma. There is also   intra-articular air noted within the medial aspect of the suprapatellar   recess likely related to traumatic arthrotomy. There is no knee joint   effusion. There is extensive subcutaneous edema noted throughout the   knee. There is intramuscular and myofascial edema about the imaged   portion of the soleus and lateral gastrocnemius muscle and within the   anterolateral musculature of the lower leg. Findings may be posttraumatic   in nature or be secondary to underlying subacute denervation related   changes.    There is osseous edema along the posterior margin of the lateral tibial   plateau without discrete fracture line consistent with osseous contusion.   Additional subarticular osseous contusion is noted along the posterior   margin of the lateral femoral condyle without discrete fracture line.   There is no evidence of osteonecrosis.    There is intrasubstance degeneration within the body and posterior horn   of the medial meniscus which does not definitely contact an articular   surface to constitute a tear. The articular surfaces appear grossly   intact.    The lateral meniscus is grossly preserved. The articular surfaces of the   lateral tibiofemoral compartment are grossly preserved.    The patellofemoral articular surfaces are intact.    The extensor mechanism is intact.    The anterior and posterior cruciate ligaments are preserved. Medial   collateral ligament is intact. The iliotibial band, fibular collateral   ligament, biceps femoris tendon, and popliteus tendons are intact.   Semimembranous tendinous insertion is preserved.    Impression:    Intact cruciate and collateral ligamentous structures. No evidence of   meniscal tear.    Mild osseous contusions along the posterior margin of the lateral femoral   condyle and lateral tibial plateau.    Large wound along the posterior lateral aspect of the knee with   irregularity of the adjacent popliteal vessels at the site of previously   noted right popliteal artery occlusion. The common tibial nerve in this   region is effaced and poorly defined at the level of the wound which may   be related to focal injury. The intrinsic signal of the common tibial   nerve in this region appears grossly preserved. The common peroneal nerve   is surrounded by fluid within the laceration gap without abnormal   intrinsic signal. There is diffuse intramuscular and myofascial edema   within the anterolateral musculature of the lower leg and within the   gastrocnemius and soleus musculature. Findings may be related to   myofascial strain or be secondary to subacute denervation related changes.    Diffuse subcutaneous edema about the knee with confluent fluid noted   along the anteromedial subcutaneous fat.    Air within the suprapatellar joint recess likely related to traumatic   arthrotomy.            MR Left knee:  FINDINGS:   Metallic artifact from surgical hardware in the proximal tibia   significantly limits evaluation. The ACL appears grossly intact,   evaluation of distal portions limited by artifacts. The PCL is intact.   Evaluation of the medial collateral ligament is limited due to metallic   artifacts. There is heterogeneous increased signal in the medial   collateral ligament and thinning of the ligament, possible sprain/tear.   Evaluation of the lateral collateral ligament is limited due to metallic   artifacts. Visualized portions of the fibular collateral ligament appear   grossly intact, with distal portions obscured by metallic artifact.   Evaluation of the menisci is limited due to metallic artifact. There is   peripheral extrusion of the body of the medial meniscus. Increased signal   in the body of the medial meniscus, question degeneration. Evaluation of   the lateral meniscus is severely limited due to metallic artifact.   Evaluation of the proximal tibia is limited due to extensive metallic   artifact. Limited evaluation of known proximal tibia fracture. There is a   large knee joint effusion with foci of low signal intensity consistent   with intra-articular gas. The distal quadriceps tendon and patellar   tendon are grossly intact. There is diffuse subcutaneous edema about the   knee. Subcutaneous edema and small fluid at the anterior aspect of the   patellar tendon and patella.    IMPRESSION: Severely limited exam due to metallic artifact from surgical   hardware in the proximal tibia. Limited evaluation of known proximal   tibia fracture. Large knee joint effusion with intra-articular gas.    The ACL appears grossly intact, evaluation of distal portions limited by   artifacts. The PCL is intact. Evaluation of the medial collateral   ligament is limited due to metallic artifacts. There is heterogeneous   increased signal in the medial collateral ligament and thinning of the   ligament, possible sprain/tear. Evaluation of the lateral collateral   ligament is limited due to metallic artifacts. Visualized portions of the   fibular collateral ligament appear grossly intact, with distal portions   obscured by metallic artifact.     Evaluation of the menisci is severely limited due to metallic artifact.   There is peripheral extrusion of the body medial meniscus. Increased   signal in the body of the medial meniscus, question degeneration. The   lateral meniscus cannot be evaluated for tear.

## 2019-06-30 LAB
ANION GAP SERPL CALC-SCNC: 13 MMOL/L — SIGNIFICANT CHANGE UP (ref 5–17)
ANION GAP SERPL CALC-SCNC: 13 MMOL/L — SIGNIFICANT CHANGE UP (ref 5–17)
ANION GAP SERPL CALC-SCNC: 14 MMOL/L — SIGNIFICANT CHANGE UP (ref 5–17)
BASOPHILS # BLD AUTO: 0.02 K/UL — SIGNIFICANT CHANGE UP (ref 0–0.2)
BASOPHILS # BLD AUTO: 0.03 K/UL — SIGNIFICANT CHANGE UP (ref 0–0.2)
BASOPHILS NFR BLD AUTO: 0.2 % — SIGNIFICANT CHANGE UP (ref 0–2)
BASOPHILS NFR BLD AUTO: 0.4 % — SIGNIFICANT CHANGE UP (ref 0–2)
BLD GP AB SCN SERPL QL: SIGNIFICANT CHANGE UP
BUN SERPL-MCNC: 49 MG/DL — HIGH (ref 8–20)
BUN SERPL-MCNC: 57 MG/DL — HIGH (ref 8–20)
BUN SERPL-MCNC: 59 MG/DL — HIGH (ref 8–20)
CALCIUM SERPL-MCNC: 8.1 MG/DL — LOW (ref 8.6–10.2)
CALCIUM SERPL-MCNC: 8.2 MG/DL — LOW (ref 8.6–10.2)
CALCIUM SERPL-MCNC: 9 MG/DL — SIGNIFICANT CHANGE UP (ref 8.6–10.2)
CHLORIDE SERPL-SCNC: 105 MMOL/L — SIGNIFICANT CHANGE UP (ref 98–107)
CHLORIDE SERPL-SCNC: 106 MMOL/L — SIGNIFICANT CHANGE UP (ref 98–107)
CHLORIDE SERPL-SCNC: 107 MMOL/L — SIGNIFICANT CHANGE UP (ref 98–107)
CK MB CFR SERPL CALC: 2.4 NG/ML — SIGNIFICANT CHANGE UP (ref 0–6.7)
CK SERPL-CCNC: 741 U/L — HIGH (ref 30–200)
CO2 SERPL-SCNC: 22 MMOL/L — SIGNIFICANT CHANGE UP (ref 22–29)
CO2 SERPL-SCNC: 23 MMOL/L — SIGNIFICANT CHANGE UP (ref 22–29)
CO2 SERPL-SCNC: 24 MMOL/L — SIGNIFICANT CHANGE UP (ref 22–29)
CREAT ?TM UR-MCNC: 61 MG/DL — SIGNIFICANT CHANGE UP
CREAT SERPL-MCNC: 2.98 MG/DL — HIGH (ref 0.5–1.3)
CREAT SERPL-MCNC: 3.96 MG/DL — HIGH (ref 0.5–1.3)
CREAT SERPL-MCNC: 4.4 MG/DL — HIGH (ref 0.5–1.3)
EOSINOPHIL # BLD AUTO: 0.25 K/UL — SIGNIFICANT CHANGE UP (ref 0–0.5)
EOSINOPHIL # BLD AUTO: 0.27 K/UL — SIGNIFICANT CHANGE UP (ref 0–0.5)
EOSINOPHIL NFR BLD AUTO: 3.1 % — SIGNIFICANT CHANGE UP (ref 0–6)
EOSINOPHIL NFR BLD AUTO: 3.4 % — SIGNIFICANT CHANGE UP (ref 0–6)
GLUCOSE BLDC GLUCOMTR-MCNC: 129 MG/DL — HIGH (ref 70–99)
GLUCOSE BLDC GLUCOMTR-MCNC: 131 MG/DL — HIGH (ref 70–99)
GLUCOSE BLDC GLUCOMTR-MCNC: 160 MG/DL — HIGH (ref 70–99)
GLUCOSE BLDC GLUCOMTR-MCNC: 200 MG/DL — HIGH (ref 70–99)
GLUCOSE SERPL-MCNC: 114 MG/DL — SIGNIFICANT CHANGE UP (ref 70–115)
GLUCOSE SERPL-MCNC: 115 MG/DL — SIGNIFICANT CHANGE UP (ref 70–115)
GLUCOSE SERPL-MCNC: 138 MG/DL — HIGH (ref 70–115)
HCT VFR BLD CALC: 24.3 % — LOW (ref 39–50)
HCT VFR BLD CALC: 28.3 % — LOW (ref 39–50)
HGB BLD-MCNC: 7.7 G/DL — LOW (ref 13–17)
HGB BLD-MCNC: 8.9 G/DL — LOW (ref 13–17)
IMM GRANULOCYTES NFR BLD AUTO: 2.6 % — HIGH (ref 0–1.5)
IMM GRANULOCYTES NFR BLD AUTO: 3.6 % — HIGH (ref 0–1.5)
LYMPHOCYTES # BLD AUTO: 0.95 K/UL — LOW (ref 1–3.3)
LYMPHOCYTES # BLD AUTO: 1.16 K/UL — SIGNIFICANT CHANGE UP (ref 1–3.3)
LYMPHOCYTES # BLD AUTO: 11.8 % — LOW (ref 13–44)
LYMPHOCYTES # BLD AUTO: 14.7 % — SIGNIFICANT CHANGE UP (ref 13–44)
MAGNESIUM SERPL-MCNC: 3.4 MG/DL — HIGH (ref 1.8–2.6)
MAGNESIUM UR-MCNC: 4.9 MG/DL — SIGNIFICANT CHANGE UP
MCHC RBC-ENTMCNC: 27.6 PG — SIGNIFICANT CHANGE UP (ref 27–34)
MCHC RBC-ENTMCNC: 27.6 PG — SIGNIFICANT CHANGE UP (ref 27–34)
MCHC RBC-ENTMCNC: 31.4 GM/DL — LOW (ref 32–36)
MCHC RBC-ENTMCNC: 31.7 GM/DL — LOW (ref 32–36)
MCV RBC AUTO: 87.1 FL — SIGNIFICANT CHANGE UP (ref 80–100)
MCV RBC AUTO: 87.9 FL — SIGNIFICANT CHANGE UP (ref 80–100)
MONOCYTES # BLD AUTO: 0.67 K/UL — SIGNIFICANT CHANGE UP (ref 0–0.9)
MONOCYTES # BLD AUTO: 0.75 K/UL — SIGNIFICANT CHANGE UP (ref 0–0.9)
MONOCYTES NFR BLD AUTO: 8.3 % — SIGNIFICANT CHANGE UP (ref 2–14)
MONOCYTES NFR BLD AUTO: 9.5 % — SIGNIFICANT CHANGE UP (ref 2–14)
NEUTROPHILS # BLD AUTO: 5.39 K/UL — SIGNIFICANT CHANGE UP (ref 1.8–7.4)
NEUTROPHILS # BLD AUTO: 5.97 K/UL — SIGNIFICANT CHANGE UP (ref 1.8–7.4)
NEUTROPHILS NFR BLD AUTO: 68.4 % — SIGNIFICANT CHANGE UP (ref 43–77)
NEUTROPHILS NFR BLD AUTO: 74 % — SIGNIFICANT CHANGE UP (ref 43–77)
OSMOLALITY UR: 318 MOSM/KG — SIGNIFICANT CHANGE UP (ref 300–1000)
PHOSPHATE SERPL-MCNC: 5.4 MG/DL — HIGH (ref 2.4–4.7)
PLATELET # BLD AUTO: 198 K/UL — SIGNIFICANT CHANGE UP (ref 150–400)
PLATELET # BLD AUTO: 257 K/UL — SIGNIFICANT CHANGE UP (ref 150–400)
POTASSIUM SERPL-MCNC: 4.7 MMOL/L — SIGNIFICANT CHANGE UP (ref 3.5–5.3)
POTASSIUM SERPL-MCNC: 4.8 MMOL/L — SIGNIFICANT CHANGE UP (ref 3.5–5.3)
POTASSIUM SERPL-MCNC: 4.9 MMOL/L — SIGNIFICANT CHANGE UP (ref 3.5–5.3)
POTASSIUM SERPL-SCNC: 4.7 MMOL/L — SIGNIFICANT CHANGE UP (ref 3.5–5.3)
POTASSIUM SERPL-SCNC: 4.8 MMOL/L — SIGNIFICANT CHANGE UP (ref 3.5–5.3)
POTASSIUM SERPL-SCNC: 4.9 MMOL/L — SIGNIFICANT CHANGE UP (ref 3.5–5.3)
RBC # BLD: 2.79 M/UL — LOW (ref 4.2–5.8)
RBC # BLD: 3.22 M/UL — LOW (ref 4.2–5.8)
RBC # FLD: 16 % — HIGH (ref 10.3–14.5)
RBC # FLD: 16 % — HIGH (ref 10.3–14.5)
SODIUM SERPL-SCNC: 142 MMOL/L — SIGNIFICANT CHANGE UP (ref 135–145)
SODIUM SERPL-SCNC: 142 MMOL/L — SIGNIFICANT CHANGE UP (ref 135–145)
SODIUM SERPL-SCNC: 143 MMOL/L — SIGNIFICANT CHANGE UP (ref 135–145)
SODIUM UR-SCNC: 59 MMOL/L — SIGNIFICANT CHANGE UP
TYPE + AB SCN PNL BLD: SIGNIFICANT CHANGE UP
WBC # BLD: 7.88 K/UL — SIGNIFICANT CHANGE UP (ref 3.8–10.5)
WBC # BLD: 8.07 K/UL — SIGNIFICANT CHANGE UP (ref 3.8–10.5)
WBC # FLD AUTO: 7.88 K/UL — SIGNIFICANT CHANGE UP (ref 3.8–10.5)
WBC # FLD AUTO: 8.07 K/UL — SIGNIFICANT CHANGE UP (ref 3.8–10.5)

## 2019-06-30 PROCEDURE — 76775 US EXAM ABDO BACK WALL LIM: CPT | Mod: 26

## 2019-06-30 PROCEDURE — 99223 1ST HOSP IP/OBS HIGH 75: CPT

## 2019-06-30 PROCEDURE — 99231 SBSQ HOSP IP/OBS SF/LOW 25: CPT

## 2019-06-30 RX ORDER — CHLORHEXIDINE GLUCONATE 213 G/1000ML
1 SOLUTION TOPICAL DAILY
Refills: 0 | Status: DISCONTINUED | OUTPATIENT
Start: 2019-06-30 | End: 2019-07-01

## 2019-06-30 RX ORDER — SODIUM CHLORIDE 9 MG/ML
1000 INJECTION, SOLUTION INTRAVENOUS
Refills: 0 | Status: DISCONTINUED | OUTPATIENT
Start: 2019-06-30 | End: 2019-07-03

## 2019-06-30 RX ORDER — LACTULOSE 10 G/15ML
30 SOLUTION ORAL ONCE
Refills: 0 | Status: COMPLETED | OUTPATIENT
Start: 2019-06-30 | End: 2019-06-30

## 2019-06-30 RX ORDER — SODIUM CHLORIDE 9 MG/ML
1000 INJECTION INTRAMUSCULAR; INTRAVENOUS; SUBCUTANEOUS ONCE
Refills: 0 | Status: COMPLETED | OUTPATIENT
Start: 2019-06-30 | End: 2019-06-30

## 2019-06-30 RX ORDER — SODIUM CHLORIDE 9 MG/ML
1000 INJECTION INTRAMUSCULAR; INTRAVENOUS; SUBCUTANEOUS
Refills: 0 | Status: DISCONTINUED | OUTPATIENT
Start: 2019-06-30 | End: 2019-06-30

## 2019-06-30 RX ADMIN — OXYCODONE HYDROCHLORIDE 10 MILLIGRAM(S): 5 TABLET ORAL at 22:30

## 2019-06-30 RX ADMIN — OXYCODONE HYDROCHLORIDE 10 MILLIGRAM(S): 5 TABLET ORAL at 07:10

## 2019-06-30 RX ADMIN — Medication 2: at 11:40

## 2019-06-30 RX ADMIN — SODIUM CHLORIDE 150 MILLILITER(S): 9 INJECTION, SOLUTION INTRAVENOUS at 12:46

## 2019-06-30 RX ADMIN — OXYCODONE HYDROCHLORIDE 10 MILLIGRAM(S): 5 TABLET ORAL at 21:45

## 2019-06-30 RX ADMIN — SODIUM CHLORIDE 200 MILLILITER(S): 9 INJECTION, SOLUTION INTRAVENOUS at 17:57

## 2019-06-30 RX ADMIN — SENNA PLUS 1 TABLET(S): 8.6 TABLET ORAL at 06:08

## 2019-06-30 RX ADMIN — SODIUM CHLORIDE 200 MILLILITER(S): 9 INJECTION, SOLUTION INTRAVENOUS at 16:09

## 2019-06-30 RX ADMIN — Medication 100 MILLIGRAM(S): at 06:08

## 2019-06-30 RX ADMIN — SODIUM CHLORIDE 100 MILLILITER(S): 9 INJECTION INTRAMUSCULAR; INTRAVENOUS; SUBCUTANEOUS at 08:31

## 2019-06-30 RX ADMIN — CHLORHEXIDINE GLUCONATE 1 APPLICATION(S): 213 SOLUTION TOPICAL at 11:40

## 2019-06-30 RX ADMIN — SODIUM CHLORIDE 200 MILLILITER(S): 9 INJECTION, SOLUTION INTRAVENOUS at 23:28

## 2019-06-30 RX ADMIN — LACTULOSE 30 GRAM(S): 10 SOLUTION ORAL at 08:30

## 2019-06-30 RX ADMIN — HEPARIN SODIUM 5000 UNIT(S): 5000 INJECTION INTRAVENOUS; SUBCUTANEOUS at 16:09

## 2019-06-30 RX ADMIN — HEPARIN SODIUM 5000 UNIT(S): 5000 INJECTION INTRAVENOUS; SUBCUTANEOUS at 21:43

## 2019-06-30 RX ADMIN — LOSARTAN POTASSIUM 100 MILLIGRAM(S): 100 TABLET, FILM COATED ORAL at 06:08

## 2019-06-30 RX ADMIN — Medication 100 MILLIGRAM(S): at 17:57

## 2019-06-30 RX ADMIN — SODIUM CHLORIDE 1000 MILLILITER(S): 9 INJECTION INTRAMUSCULAR; INTRAVENOUS; SUBCUTANEOUS at 10:26

## 2019-06-30 RX ADMIN — SENNA PLUS 1 TABLET(S): 8.6 TABLET ORAL at 17:57

## 2019-06-30 RX ADMIN — AMLODIPINE BESYLATE 10 MILLIGRAM(S): 2.5 TABLET ORAL at 06:08

## 2019-06-30 RX ADMIN — SIMVASTATIN 20 MILLIGRAM(S): 20 TABLET, FILM COATED ORAL at 21:43

## 2019-06-30 RX ADMIN — LACTULOSE 10 GRAM(S): 10 SOLUTION ORAL at 11:41

## 2019-06-30 RX ADMIN — OXYCODONE HYDROCHLORIDE 10 MILLIGRAM(S): 5 TABLET ORAL at 06:09

## 2019-06-30 RX ADMIN — Medication 81 MILLIGRAM(S): at 11:40

## 2019-06-30 RX ADMIN — HEPARIN SODIUM 5000 UNIT(S): 5000 INJECTION INTRAVENOUS; SUBCUTANEOUS at 06:08

## 2019-06-30 NOTE — PROGRESS NOTE ADULT - ASSESSMENT
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  High CPK downtrending, Cr uptrending and now back on fluids  -NWB LLE; MR b/l Knees done; f/u ortho recs  -ortho following patient  -OR with Dr. Ron on 7/1 for nerve reconstruction surgery  -reg diet  -PT/PT/PMR  -neurovasc checks q4h  -IVF, trend CPK, f/u Cr  -f/u H/H and vitals

## 2019-06-30 NOTE — PROGRESS NOTE ADULT - SUBJECTIVE AND OBJECTIVE BOX
Cr uptrending, restarted on fluids. Lost access over night which was reestablished this morning on rounds. Otherwise doing well; no concerns at this time. MRI complete, plan pending review by Ortho.     MEDICATIONS  (STANDING):  amLODIPine   Tablet 10 milliGRAM(s) Oral daily  aspirin  chewable 81 milliGRAM(s) Oral daily  dextrose 5%. 1000 milliLiter(s) (50 mL/Hr) IV Continuous <Continuous>  dextrose 50% Injectable 12.5 Gram(s) IV Push once  dextrose 50% Injectable 25 Gram(s) IV Push once  dextrose 50% Injectable 25 Gram(s) IV Push once  docusate sodium 100 milliGRAM(s) Oral two times a day  heparin  Injectable 5000 Unit(s) SubCutaneous every 8 hours  insulin lispro (HumaLOG) corrective regimen sliding scale   SubCutaneous three times a day before meals  lactulose Syrup 10 Gram(s) Oral daily  losartan 100 milliGRAM(s) Oral daily  senna 1 Tablet(s) Oral two times a day  simvastatin 20 milliGRAM(s) Oral at bedtime  sodium chloride 0.9%. 1000 milliLiter(s) (100 mL/Hr) IV Continuous <Continuous>    MEDICATIONS  (PRN):  acetaminophen   Tablet .. 650 milliGRAM(s) Oral every 6 hours PRN Temp greater or equal to 38C (100.4F), Mild Pain (1 - 3)  dextrose 40% Gel 15 Gram(s) Oral once PRN Blood Glucose LESS THAN 70 milliGRAM(s)/deciliter  glucagon  Injectable 1 milliGRAM(s) IntraMuscular once PRN Glucose LESS THAN 70 milligrams/deciliter  HYDROmorphone  Injectable 1 milliGRAM(s) IV Push every 3 hours PRN Severe Pain (7 - 10)  ondansetron Injectable 4 milliGRAM(s) IV Push every 6 hours PRN Nausea and/or Vomiting  oxyCODONE    IR 5 milliGRAM(s) Oral every 4 hours PRN Moderate Pain (4 - 6)  oxyCODONE    IR 10 milliGRAM(s) Oral every 4 hours PRN Severe Pain (7 - 10)      Vital Signs Last 24 Hrs  T(C): 37.3 (2019 00:15), Max: 37.3 (2019 00:15)  T(F): 99.2 (2019 00:15), Max: 99.2 (2019 00:15)  HR: 90 (2019 00:15) (88 - 98)  BP: 133/73 (2019 00:15) (133/73 - 157/83)  BP(mean): --  RR: 20 (2019 00:15) (19 - 20)  SpO2: 97% (2019 00:15) (95% - 97%)    PE  Gen:  Pulm:  CV:  Abd:  :  Ext:  Vasc:  Neuro:      I&O's Detail    2019 07:01  -  2019 07:00  --------------------------------------------------------  IN:    multiple electrolytes Injection Type 1multiple electrolytes Injection Type 1: 375 mL    sodium chloride 0.9%.: 1100 mL  Total IN: 1475 mL    OUT:    Voided: 1500 mL  Total OUT: 1500 mL    Total NET: -25 mL          LABS:                        9.0    8.97  )-----------( 185      ( 2019 12:04 )             28.7     06-29    142  |  104  |  35.0<H>  ----------------------------<  132<H>  4.6   |  25.0  |  1.92<H>    Ca    7.9<L>      2019 12:04  Phos  4.3       Mg     3.5         TPro  5.7<L>  /  Alb  3.1<L>  /  TBili  0.4  /  DBili  x   /  AST  44<H>  /  ALT  13  /  AlkPhos  60  06      Urinalysis Basic - ( 2019 22:19 )    Color: Yellow / Appearance: Clear / S.010 / pH: x  Gluc: x / Ketone: Negative  / Bili: Negative / Urobili: Negative mg/dL   Blood: x / Protein: 15 mg/dL / Nitrite: Negative   Leuk Esterase: Negative / RBC: 0-2 /HPF / WBC Negative   Sq Epi: x / Non Sq Epi: Occasional / Bacteria: x        RADIOLOGY & ADDITIONAL STUDIES: Cr uptrending, restarted on fluids. Lost access over night which was reestablished this morning on rounds. Otherwise doing well; no concerns at this time. MRI complete, plan pending review by Ortho.     MEDICATIONS  (STANDING):  amLODIPine   Tablet 10 milliGRAM(s) Oral daily  aspirin  chewable 81 milliGRAM(s) Oral daily  dextrose 5%. 1000 milliLiter(s) (50 mL/Hr) IV Continuous <Continuous>  dextrose 50% Injectable 12.5 Gram(s) IV Push once  dextrose 50% Injectable 25 Gram(s) IV Push once  dextrose 50% Injectable 25 Gram(s) IV Push once  docusate sodium 100 milliGRAM(s) Oral two times a day  heparin  Injectable 5000 Unit(s) SubCutaneous every 8 hours  insulin lispro (HumaLOG) corrective regimen sliding scale   SubCutaneous three times a day before meals  lactulose Syrup 10 Gram(s) Oral daily  losartan 100 milliGRAM(s) Oral daily  senna 1 Tablet(s) Oral two times a day  simvastatin 20 milliGRAM(s) Oral at bedtime  sodium chloride 0.9%. 1000 milliLiter(s) (100 mL/Hr) IV Continuous <Continuous>    MEDICATIONS  (PRN):  acetaminophen   Tablet .. 650 milliGRAM(s) Oral every 6 hours PRN Temp greater or equal to 38C (100.4F), Mild Pain (1 - 3)  dextrose 40% Gel 15 Gram(s) Oral once PRN Blood Glucose LESS THAN 70 milliGRAM(s)/deciliter  glucagon  Injectable 1 milliGRAM(s) IntraMuscular once PRN Glucose LESS THAN 70 milligrams/deciliter  HYDROmorphone  Injectable 1 milliGRAM(s) IV Push every 3 hours PRN Severe Pain (7 - 10)  ondansetron Injectable 4 milliGRAM(s) IV Push every 6 hours PRN Nausea and/or Vomiting  oxyCODONE    IR 5 milliGRAM(s) Oral every 4 hours PRN Moderate Pain (4 - 6)  oxyCODONE    IR 10 milliGRAM(s) Oral every 4 hours PRN Severe Pain (7 - 10)      Vital Signs Last 24 Hrs  T(C): 37.3 (2019 00:15), Max: 37.3 (2019 00:15)  T(F): 99.2 (2019 00:15), Max: 99.2 (2019 00:15)  HR: 90 (2019 00:15) (88 - 98)  BP: 133/73 (2019 00:15) (133/73 - 157/83)  BP(mean): --  RR: 20 (2019 00:15) (19 - 20)  SpO2: 97% (2019 00:15) (95% - 97%)    PE  Gen: NAD  Pulm: nonlabored breathing  CV: RRR  Abd: soft, nt, nd  : voiding adequately  Ext: LLE brace in place, RLE ace wrap with moderate saturation at posterior part of ace wrap; R foot edema  Vasc: 2+ dp's  Neuro: AAOx3, no sensation on RLE, able to have sensory intact on LLE; able to dorsiflex/plantarflex L foot, but weaker on R     I&O's Detail    2019 07:01  -  2019 07:00  --------------------------------------------------------  IN:    multiple electrolytes Injection Type 1multiple electrolytes Injection Type 1: 375 mL    sodium chloride 0.9%.: 1100 mL  Total IN: 1475 mL    OUT:    Voided: 1500 mL  Total OUT: 1500 mL    Total NET: -25 mL          LABS:                        9.0    8.97  )-----------( 185      ( 2019 12:04 )             28.7         142  |  104  |  35.0<H>  ----------------------------<  132<H>  4.6   |  25.0  |  1.92<H>    Ca    7.9<L>      2019 12:04  Phos  4.3       Mg     3.5         TPro  5.7<L>  /  Alb  3.1<L>  /  TBili  0.4  /  DBili  x   /  AST  44<H>  /  ALT  13  /  AlkPhos  60        Urinalysis Basic - ( 2019 22:19 )    Color: Yellow / Appearance: Clear / S.010 / pH: x  Gluc: x / Ketone: Negative  / Bili: Negative / Urobili: Negative mg/dL   Blood: x / Protein: 15 mg/dL / Nitrite: Negative   Leuk Esterase: Negative / RBC: 0-2 /HPF / WBC Negative   Sq Epi: x / Non Sq Epi: Occasional / Bacteria: x        RADIOLOGY & ADDITIONAL STUDIES:

## 2019-06-30 NOTE — CONSULT NOTE ADULT - ASSESSMENT
Dx : Acute Obstructive uropathy,    ( Phimosis )    Maintain Min, Hold ARBs,    Monitor U.O & Aggressive Hydration to maintain + Fluid Balance in the face of  Post - Obstructive diuresis,     Will F.U,

## 2019-06-30 NOTE — PROGRESS NOTE ADULT - ASSESSMENT
68 year old male POD#5 s/p R AK pop- BK pop bypass and 4 compartment fasciotomies     Continue management per ACS team  Continue pulse checks q 4 hrs of RLE  CPKs trending down, urine myoglobin pending  Hgb stable   Creatinine trending up, continue to give fluids and monitor UOP   Continue ASA and statin in addition to neurovascular checks  PT/PMR

## 2019-06-30 NOTE — PROGRESS NOTE ADULT - SUBJECTIVE AND OBJECTIVE BOX
SUBJECTIVE: Patient seen and examined at bedside this AM.  No acute events overnight. RLE dressing with some serosanguinous drainage. Pain is well controlled. Denies fever, chills, nausea, vomiting chest pain, SOB, dizziness.      MEDICATIONS  (STANDING):  amLODIPine   Tablet 10 milliGRAM(s) Oral daily  aspirin  chewable 81 milliGRAM(s) Oral daily  dextrose 5%. 1000 milliLiter(s) (50 mL/Hr) IV Continuous <Continuous>  dextrose 50% Injectable 12.5 Gram(s) IV Push once  dextrose 50% Injectable 25 Gram(s) IV Push once  dextrose 50% Injectable 25 Gram(s) IV Push once  docusate sodium 100 milliGRAM(s) Oral two times a day  heparin  Injectable 5000 Unit(s) SubCutaneous every 8 hours  insulin lispro (HumaLOG) corrective regimen sliding scale   SubCutaneous three times a day before meals  lactulose Syrup 10 Gram(s) Oral daily  losartan 100 milliGRAM(s) Oral daily  senna 1 Tablet(s) Oral two times a day  simvastatin 20 milliGRAM(s) Oral at bedtime  sodium chloride 0.9%. 1000 milliLiter(s) (100 mL/Hr) IV Continuous <Continuous>    MEDICATIONS  (PRN):  acetaminophen   Tablet .. 650 milliGRAM(s) Oral every 6 hours PRN Temp greater or equal to 38C (100.4F), Mild Pain (1 - 3)  dextrose 40% Gel 15 Gram(s) Oral once PRN Blood Glucose LESS THAN 70 milliGRAM(s)/deciliter  glucagon  Injectable 1 milliGRAM(s) IntraMuscular once PRN Glucose LESS THAN 70 milligrams/deciliter  HYDROmorphone  Injectable 1 milliGRAM(s) IV Push every 3 hours PRN Severe Pain (7 - 10)  ondansetron Injectable 4 milliGRAM(s) IV Push every 6 hours PRN Nausea and/or Vomiting  oxyCODONE    IR 5 milliGRAM(s) Oral every 4 hours PRN Moderate Pain (4 - 6)  oxyCODONE    IR 10 milliGRAM(s) Oral every 4 hours PRN Severe Pain (7 - 10)      Vital Signs Last 24 Hrs  T(C): 37.3 (2019 00:15), Max: 37.3 (2019 00:15)  T(F): 99.2 (2019 00:15), Max: 99.2 (2019 00:15)  HR: 90 (2019 00:15) (88 - 98)  BP: 133/73 (2019 00:15) (133/73 - 157/83)  BP(mean): --  RR: 20 (2019 00:15) (19 - 20)  SpO2: 97% (2019 00:15) (95% - 97%)    PE  Constitutional: Patient resting comfortably in bed in no acute distress   Respiratory: CTAB with unlabored respirations  Cardiovascular: Normal S1 and S2   Gastrointestinal: Abdomen soft, non-tender, non-distended with no rebound tenderness or guarding   Extremities: RLE dressings removed. R upper thigh and medial calf with staples CDI;  fasciotomy wound with pink muscle, min edema. Compartments soft,  Post pop/thigh space with interrupted sutures; Xeroform to fasciotomy site, abd pads and wrapped with kerlex. ACE wrap for gentle compression. R foot mild edema, reports no sensation on dorsum or plantar surface at this time. Gross motor absent to intrinsic foot muscles, absent plantarflexion and dorsiflexion. 2+ R DP pulse.   LLE with edema, soft. L groin/inner thigh incision with staples CDI.    I&O's Detail    2019 07:01  -  2019 07:00  --------------------------------------------------------  IN:    multiple electrolytes Injection Type 1multiple electrolytes Injection Type 1: 1875 mL    Packed Red Blood Cells: 572 mL    Solution: 170 mL  Total IN: 2617 mL    OUT:    Voided: 1250 mL  Total OUT: 1250 mL    Total NET: 1367 mL      2019 07:01  -  2019 03:45  --------------------------------------------------------  IN:    multiple electrolytes Injection Type 1multiple electrolytes Injection Type 1: 375 mL    sodium chloride 0.9%.: 600 mL  Total IN: 975 mL    OUT:    Voided: 675 mL  Total OUT: 675 mL    Total NET: 300 mL          LABS:                        9.0    8.97  )-----------( 185      ( 2019 12:04 )             28.7         142  |  104  |  35.0<H>  ----------------------------<  132<H>  4.6   |  25.0  |  1.92<H>    Ca    7.9<L>      2019 12:04  Phos  4.3       Mg     3.5         TPro  5.7<L>  /  Alb  3.1<L>  /  TBili  0.4  /  DBili  x   /  AST  44<H>  /  ALT  13  /  AlkPhos  60        Urinalysis Basic - ( 2019 22:19 )    Color: Yellow / Appearance: Clear / S.010 / pH: x  Gluc: x / Ketone: Negative  / Bili: Negative / Urobili: Negative mg/dL   Blood: x / Protein: 15 mg/dL / Nitrite: Negative   Leuk Esterase: Negative / RBC: 0-2 /HPF / WBC Negative   Sq Epi: x / Non Sq Epi: Occasional / Bacteria: x        RADIOLOGY & ADDITIONAL STUDIES:

## 2019-06-30 NOTE — CONSULT NOTE ADULT - SUBJECTIVE AND OBJECTIVE BOX
Vital Signs Last 24 Hrs,    T(C): 36.8 (2019 15:47), Max: 37.3 (2019 00:15)  T(F): 98.3 (2019 15:47), Max: 99.2 (2019 00:15)  HR: 95 (2019 15:47) (80 - 95)  BP: 142/72 (2019 15:56) (122/68 - 163/79)  RR: 18 (2019 15:47) (18 - 20)  SpO2: 96% (2019 15:47) (96% - 98%)    142    |  107    |  57.0<H>  ----------------------------<  138<H>  Ca: 8.2<L> (2019 13:38)  4.9     |  22.0   |  3.96<H>    eGFR if : 17 <L>    TPro  5.7<L>  /  Alb  3.1<L>  /  TBili  0.4    /  DBili  x      /  AST  44<H>  /  ALT  13     /  AlkPhos  60     2019 09:42                         7.7<L>  8.07  )-----------( 198      ( 2019 08:50 )             24.3<L>    Phos: 5.4 mg/dL<H> Mg:3.4 mg/dL<H>     Urinalysis Basic - ( 2019 22:19 )  Color: Yellow / Appearance: Clear / S.010 / pH: x  Gluc: x / Ketone: Negative  / Bili: Negative / Urobili: Negative mg/dL   Blood: x / Protein: 15 mg/dL<!> / Nitrite: Negative   Leuk Esterase: Negative / RBC: 0-2 /HPF / WBC Negative   Sq Epi: x / Non Sq Epi: Occasional / Bacteria: x    UCr: 61 mg/dL   Nicola:59 mmol/L UOsm:318 mosm/kg      EXAM:  CT ANGIO ABD AOR W RUN(W)AW IC                         EXAM:  CT ABDOMEN AND PELVIS IC                         EXAM:  CT CHEST IC                          PROCEDURE DATE:  2019      INTERPRETATION:  CTA of the chest, abdomen and pelvis and the bilateral   lower extremities    COMPARISON:     CLINICAL INFORMATION:     TECHNIQUE: Contiguous axial 2.5 mm images of the chest were obtained   without intravenous contrast, followed by contiguous axial 1.25 mm slice   thickness images of the chest, abdomen and pelvis with 125 cc Omnipaque   intravenous contrast administration. Axial, sagittal and coronal and   images obtained. 3-D reconstructed images obtained.    100 mls of Omnipaque 300 was administered intravenously without   complication and 0 mls were discarded.    FINDINGS:    The airway is patent with normal caliber and contour.    There are no lung parenchymal consolidations.    There is no pleural effusion or pneumothorax.    [The thoracic and abdominal aorta arenormal in caliber. No dissection or   aneurysm seen.]     [The origins of the brachiocephalic vessels and mesenteric arteries are   patent.] The rest of the mediastinal vessels are normal.    [Cardiac chambers normal in size.]    No pericardial effusion.     The liver, spleen, gallbladder, pancreas and adrenal glands are normal.     Both kidneys show symmetric contrast uptake. There is no hydronephrosis.     Prostate mildly enlarged compressing bladder base. No bladder outlet   obstruction.     The unopacified gastrointestinal tract is within normal limits.     There are no retroperitoneal masses or lymphadenopathy    Osseous thorax, shoulder joints, scapula Y, clavicles, lower cervical   thoracal lumbar spine sacrum osseous pelvis and hips intact. Lower   extremity arterial runoff displays following findings:  A RIGHT lower extremity common femoral artery, deep femoral artery, and   the superficial femoral artery patent with abrupt occlusion of the   popliteal artery.  . Distal imaging shows collateral reconstitution of the tibioperoneal   trunk and vessels of trifurcation to the level of the ankle joint.   Dorsalis pedis artery patent. Delayed imaging shows no evidence of   extravasated contrast.  There is a deep lateral soft tissueopened wound with air lucencies   between semimembranosus muscle, gastrocnemius muscle with an air   lucencies seen within the intra-articular joint capsule. Punctate air   lucencies within the substance of the gastrocnemius muscle  No large intramuscular hematoma identified.   There is possible laceration of the of RIGHT lateral biceps femoris   muscle.  Distal calf musculature intact. No RIGHT femoral, and the, tibia-fibula   fracture deformity.    LEFT lower extremity arterial runoff shows patent common femoral,   femoral, popliteal artery, deep femoral artery, tibioperoneal trunk and   vessel trifurcation to the level of the ankle joint.  There is a depressed lateral tibial plateau fracture deformity. The LEFT   femur, femoral condyles, fibula head and neck and distal tibia fibula are   intact.  LEFT lower extremity musculature intact without hematoma.  RIGHT knee    IMPRESSION:    Occluded popliteal artery with collateral reconstitution of the   tibioperoneal trunk and the vessels oftrifurcation to the RIGHT foot.   Depressed LEFT lateral tibial plateau fracture.  "Deep laceration lateral knee soft tissues with subcutaneous and deep   interfascial air dissection with intra-articular air confirming joint   capsule laceration as described. Punctate air lucencies within the   gastrocnemius muscle.       Chest abdomen pelvic viscera intact.     No other fracture deformities.    Critical value  discussed with Dr. Nakul Orozco, on 2019 at 12:05   PM with read back.  Hospital policies for critical values including read back   policy were followed.  The verbal communication of the critical value   supplements this written report.     DOMINGUEZ BO M.D., ATTENDING RADIOLOGIST  This document has been electronically signed. 2019 12:46PM

## 2019-07-01 LAB
ALBUMIN SERPL ELPH-MCNC: 3.2 G/DL — LOW (ref 3.3–5.2)
ALP SERPL-CCNC: 75 U/L — SIGNIFICANT CHANGE UP (ref 40–120)
ALT FLD-CCNC: 36 U/L — SIGNIFICANT CHANGE UP
ANION GAP SERPL CALC-SCNC: 11 MMOL/L — SIGNIFICANT CHANGE UP (ref 5–17)
ANION GAP SERPL CALC-SCNC: 11 MMOL/L — SIGNIFICANT CHANGE UP (ref 5–17)
AST SERPL-CCNC: 47 U/L — HIGH
BASOPHILS # BLD AUTO: 0.04 K/UL — SIGNIFICANT CHANGE UP (ref 0–0.2)
BASOPHILS NFR BLD AUTO: 0.5 % — SIGNIFICANT CHANGE UP (ref 0–2)
BILIRUB SERPL-MCNC: 0.8 MG/DL — SIGNIFICANT CHANGE UP (ref 0.4–2)
BUN SERPL-MCNC: 19 MG/DL — SIGNIFICANT CHANGE UP (ref 8–20)
BUN SERPL-MCNC: 22 MG/DL — HIGH (ref 8–20)
CALCIUM SERPL-MCNC: 8.7 MG/DL — SIGNIFICANT CHANGE UP (ref 8.6–10.2)
CALCIUM SERPL-MCNC: 9 MG/DL — SIGNIFICANT CHANGE UP (ref 8.6–10.2)
CHLORIDE SERPL-SCNC: 103 MMOL/L — SIGNIFICANT CHANGE UP (ref 98–107)
CHLORIDE SERPL-SCNC: 106 MMOL/L — SIGNIFICANT CHANGE UP (ref 98–107)
CK MB CFR SERPL CALC: 1.6 NG/ML — SIGNIFICANT CHANGE UP (ref 0–6.7)
CK SERPL-CCNC: 439 U/L — HIGH (ref 30–200)
CO2 SERPL-SCNC: 24 MMOL/L — SIGNIFICANT CHANGE UP (ref 22–29)
CO2 SERPL-SCNC: 27 MMOL/L — SIGNIFICANT CHANGE UP (ref 22–29)
CREAT SERPL-MCNC: 0.93 MG/DL — SIGNIFICANT CHANGE UP (ref 0.5–1.3)
CREAT SERPL-MCNC: 1.1 MG/DL — SIGNIFICANT CHANGE UP (ref 0.5–1.3)
EOSINOPHIL # BLD AUTO: 0.26 K/UL — SIGNIFICANT CHANGE UP (ref 0–0.5)
EOSINOPHIL NFR BLD AUTO: 3.1 % — SIGNIFICANT CHANGE UP (ref 0–6)
GLUCOSE BLDC GLUCOMTR-MCNC: 114 MG/DL — HIGH (ref 70–99)
GLUCOSE BLDC GLUCOMTR-MCNC: 126 MG/DL — HIGH (ref 70–99)
GLUCOSE BLDC GLUCOMTR-MCNC: 152 MG/DL — HIGH (ref 70–99)
GLUCOSE BLDC GLUCOMTR-MCNC: 158 MG/DL — HIGH (ref 70–99)
GLUCOSE SERPL-MCNC: 108 MG/DL — SIGNIFICANT CHANGE UP (ref 70–115)
GLUCOSE SERPL-MCNC: 156 MG/DL — HIGH (ref 70–115)
HCT VFR BLD CALC: 26.9 % — LOW (ref 39–50)
HGB BLD-MCNC: 8.3 G/DL — LOW (ref 13–17)
IMM GRANULOCYTES NFR BLD AUTO: 2.5 % — HIGH (ref 0–1.5)
LYMPHOCYTES # BLD AUTO: 1.36 K/UL — SIGNIFICANT CHANGE UP (ref 1–3.3)
LYMPHOCYTES # BLD AUTO: 16.2 % — SIGNIFICANT CHANGE UP (ref 13–44)
MAGNESIUM SERPL-MCNC: 2.2 MG/DL — SIGNIFICANT CHANGE UP (ref 1.6–2.6)
MAGNESIUM SERPL-MCNC: 2.6 MG/DL — SIGNIFICANT CHANGE UP (ref 1.6–2.6)
MCHC RBC-ENTMCNC: 27.4 PG — SIGNIFICANT CHANGE UP (ref 27–34)
MCHC RBC-ENTMCNC: 30.9 GM/DL — LOW (ref 32–36)
MCV RBC AUTO: 88.8 FL — SIGNIFICANT CHANGE UP (ref 80–100)
MONOCYTES # BLD AUTO: 0.76 K/UL — SIGNIFICANT CHANGE UP (ref 0–0.9)
MONOCYTES NFR BLD AUTO: 9.1 % — SIGNIFICANT CHANGE UP (ref 2–14)
NEUTROPHILS # BLD AUTO: 5.75 K/UL — SIGNIFICANT CHANGE UP (ref 1.8–7.4)
NEUTROPHILS NFR BLD AUTO: 68.6 % — SIGNIFICANT CHANGE UP (ref 43–77)
PHOSPHATE SERPL-MCNC: 3.1 MG/DL — SIGNIFICANT CHANGE UP (ref 2.4–4.7)
PHOSPHATE SERPL-MCNC: 3.5 MG/DL — SIGNIFICANT CHANGE UP (ref 2.4–4.7)
PLATELET # BLD AUTO: 251 K/UL — SIGNIFICANT CHANGE UP (ref 150–400)
POTASSIUM SERPL-MCNC: 4.6 MMOL/L — SIGNIFICANT CHANGE UP (ref 3.5–5.3)
POTASSIUM SERPL-MCNC: 5.2 MMOL/L — SIGNIFICANT CHANGE UP (ref 3.5–5.3)
POTASSIUM SERPL-SCNC: 4.6 MMOL/L — SIGNIFICANT CHANGE UP (ref 3.5–5.3)
POTASSIUM SERPL-SCNC: 5.2 MMOL/L — SIGNIFICANT CHANGE UP (ref 3.5–5.3)
PROT SERPL-MCNC: 6.6 G/DL — SIGNIFICANT CHANGE UP (ref 6.6–8.7)
RBC # BLD: 3.03 M/UL — LOW (ref 4.2–5.8)
RBC # FLD: 15.7 % — HIGH (ref 10.3–14.5)
SODIUM SERPL-SCNC: 138 MMOL/L — SIGNIFICANT CHANGE UP (ref 135–145)
SODIUM SERPL-SCNC: 144 MMOL/L — SIGNIFICANT CHANGE UP (ref 135–145)
WBC # BLD: 8.38 K/UL — SIGNIFICANT CHANGE UP (ref 3.8–10.5)
WBC # FLD AUTO: 8.38 K/UL — SIGNIFICANT CHANGE UP (ref 3.8–10.5)

## 2019-07-01 PROCEDURE — 99233 SBSQ HOSP IP/OBS HIGH 50: CPT

## 2019-07-01 PROCEDURE — 99231 SBSQ HOSP IP/OBS SF/LOW 25: CPT

## 2019-07-01 RX ADMIN — OXYCODONE HYDROCHLORIDE 10 MILLIGRAM(S): 5 TABLET ORAL at 05:10

## 2019-07-01 RX ADMIN — Medication 81 MILLIGRAM(S): at 11:29

## 2019-07-01 RX ADMIN — SODIUM CHLORIDE 200 MILLILITER(S): 9 INJECTION, SOLUTION INTRAVENOUS at 04:25

## 2019-07-01 RX ADMIN — HEPARIN SODIUM 5000 UNIT(S): 5000 INJECTION INTRAVENOUS; SUBCUTANEOUS at 05:42

## 2019-07-01 RX ADMIN — SODIUM CHLORIDE 200 MILLILITER(S): 9 INJECTION, SOLUTION INTRAVENOUS at 17:42

## 2019-07-01 RX ADMIN — Medication 2: at 17:42

## 2019-07-01 RX ADMIN — SENNA PLUS 1 TABLET(S): 8.6 TABLET ORAL at 17:42

## 2019-07-01 RX ADMIN — Medication 100 MILLIGRAM(S): at 17:42

## 2019-07-01 RX ADMIN — OXYCODONE HYDROCHLORIDE 10 MILLIGRAM(S): 5 TABLET ORAL at 22:34

## 2019-07-01 RX ADMIN — Medication 2: at 11:29

## 2019-07-01 RX ADMIN — HEPARIN SODIUM 5000 UNIT(S): 5000 INJECTION INTRAVENOUS; SUBCUTANEOUS at 14:02

## 2019-07-01 RX ADMIN — OXYCODONE HYDROCHLORIDE 10 MILLIGRAM(S): 5 TABLET ORAL at 04:24

## 2019-07-01 RX ADMIN — SODIUM CHLORIDE 200 MILLILITER(S): 9 INJECTION, SOLUTION INTRAVENOUS at 11:30

## 2019-07-01 RX ADMIN — OXYCODONE HYDROCHLORIDE 10 MILLIGRAM(S): 5 TABLET ORAL at 23:34

## 2019-07-01 RX ADMIN — Medication 100 MILLIGRAM(S): at 05:41

## 2019-07-01 RX ADMIN — SIMVASTATIN 20 MILLIGRAM(S): 20 TABLET, FILM COATED ORAL at 22:35

## 2019-07-01 RX ADMIN — AMLODIPINE BESYLATE 10 MILLIGRAM(S): 2.5 TABLET ORAL at 05:41

## 2019-07-01 RX ADMIN — HEPARIN SODIUM 5000 UNIT(S): 5000 INJECTION INTRAVENOUS; SUBCUTANEOUS at 22:35

## 2019-07-01 RX ADMIN — SENNA PLUS 1 TABLET(S): 8.6 TABLET ORAL at 05:41

## 2019-07-01 RX ADMIN — OXYCODONE HYDROCHLORIDE 5 MILLIGRAM(S): 5 TABLET ORAL at 14:25

## 2019-07-01 RX ADMIN — OXYCODONE HYDROCHLORIDE 5 MILLIGRAM(S): 5 TABLET ORAL at 14:02

## 2019-07-01 NOTE — PHYSICAL THERAPY INITIAL EVALUATION ADULT - DID THE PATIENT HAVE SURGERY?
yes/ORIF for left tibial plateau fx(6/26/19); right popliteal artery bypass and 4 compartment fasciotomy (6/25/19)

## 2019-07-01 NOTE — PROGRESS NOTE ADULT - SUBJECTIVE AND OBJECTIVE BOX
67yo M examined at bedside and found in no distress. No complaints. Yesterday patient was noticed to have SERENITY and was properly managed. No overnight events. Denies fever, chills, nausea, vomiting, diarrhea, constipation, chest pain, and sob.     Vital Signs Last 24 Hrs  T(C): 36.9 (2019 23:54), Max: 37.1 (2019 21:48)  T(F): 98.4 (2019 23:54), Max: 98.8 (2019 21:48)  HR: 92 (2019 23:54) (80 - 95)  BP: 145/83 (2019 23:54) (122/68 - 163/79)  BP(mean): --  RR: 19 (2019 23:54) (18 - 20)  SpO2: 96% (2019 21:48) (96% - 98%)    Constitutional: patient resting comfortably in bed, in no acute distress  HEENT: EOMI / PERRLA, MMM   Respiratory: non labored breathing.   Cardiovascular: RRR  Gastrointestinal: Abdomen soft, non-tender, non-distended, no rebound tenderness / guarding  Msk: LLE with brace, NWB. Diminished sensation in toes.  LABS:                        8.9    7.88  )-----------( 257      ( 2019 19:50 )             28.3     06-30    143  |  106  |  49.0<H>  ----------------------------<  115  4.7   |  24.0  |  2.98<H>    Ca    9.0      2019 19:50  Phos  5.4     06-30  Mg     3.4     06-30        Urinalysis Basic - ( 2019 22:19 )    Color: Yellow / Appearance: Clear / S.010 / pH: x  Gluc: x / Ketone: Negative  / Bili: Negative / Urobili: Negative mg/dL   Blood: x / Protein: 15 mg/dL / Nitrite: Negative   Leuk Esterase: Negative / RBC: 0-2 /HPF / WBC Negative   Sq Epi: x / Non Sq Epi: Occasional / Bacteria: x        MEDICATIONS  (STANDING):  amLODIPine   Tablet 10 milliGRAM(s) Oral daily  aspirin  chewable 81 milliGRAM(s) Oral daily  chlorhexidine 2% Cloths 1 Application(s) Topical daily  dextrose 5%. 1000 milliLiter(s) (50 mL/Hr) IV Continuous <Continuous>  dextrose 50% Injectable 12.5 Gram(s) IV Push once  dextrose 50% Injectable 25 Gram(s) IV Push once  dextrose 50% Injectable 25 Gram(s) IV Push once  docusate sodium 100 milliGRAM(s) Oral two times a day  heparin  Injectable 5000 Unit(s) SubCutaneous every 8 hours  insulin lispro (HumaLOG) corrective regimen sliding scale   SubCutaneous three times a day before meals  lactulose Syrup 10 Gram(s) Oral daily  multiple electrolytes Injection Type 1 1000 milliLiter(s) (200 mL/Hr) IV Continuous <Continuous>  senna 1 Tablet(s) Oral two times a day  simvastatin 20 milliGRAM(s) Oral at bedtime    MEDICATIONS  (PRN):  acetaminophen   Tablet .. 650 milliGRAM(s) Oral every 6 hours PRN Temp greater or equal to 38C (100.4F), Mild Pain (1 - 3)  dextrose 40% Gel 15 Gram(s) Oral once PRN Blood Glucose LESS THAN 70 milliGRAM(s)/deciliter  glucagon  Injectable 1 milliGRAM(s) IntraMuscular once PRN Glucose LESS THAN 70 milligrams/deciliter  HYDROmorphone  Injectable 1 milliGRAM(s) IV Push every 3 hours PRN Severe Pain (7 - 10)  ondansetron Injectable 4 milliGRAM(s) IV Push every 6 hours PRN Nausea and/or Vomiting  oxyCODONE    IR 5 milliGRAM(s) Oral every 4 hours PRN Moderate Pain (4 - 6)  oxyCODONE    IR 10 milliGRAM(s) Oral every 4 hours PRN Severe Pain (7 - 10)

## 2019-07-01 NOTE — PROGRESS NOTE ADULT - SUBJECTIVE AND OBJECTIVE BOX
[facility] DIVISION OF KIDNEY DISEASES AND HYPERTENSION -- FOLLOW UP NOTE  --------------------------------------------------------------------------------  Chief Complaint: Obstructive nephropathy    24 hour events/subjective:  Cr improving with villegas; decompression;        PAST HISTORY  --------------------------------------------------------------------------------  No significant changes to PMH, PSH, FHx, SHx, unless otherwise noted    ALLERGIES & MEDICATIONS  --------------------------------------------------------------------------------  Allergies    No Known Allergies    Intolerances      Standing Inpatient Medications  amLODIPine   Tablet 10 milliGRAM(s) Oral daily  aspirin  chewable 81 milliGRAM(s) Oral daily  dextrose 5%. 1000 milliLiter(s) IV Continuous <Continuous>  dextrose 50% Injectable 12.5 Gram(s) IV Push once  dextrose 50% Injectable 25 Gram(s) IV Push once  dextrose 50% Injectable 25 Gram(s) IV Push once  docusate sodium 100 milliGRAM(s) Oral two times a day  heparin  Injectable 5000 Unit(s) SubCutaneous every 8 hours  insulin lispro (HumaLOG) corrective regimen sliding scale   SubCutaneous three times a day before meals  lactulose Syrup 10 Gram(s) Oral daily  multiple electrolytes Injection Type 1 1000 milliLiter(s) IV Continuous <Continuous>  senna 1 Tablet(s) Oral two times a day  simvastatin 20 milliGRAM(s) Oral at bedtime    PRN Inpatient Medications  acetaminophen   Tablet .. 650 milliGRAM(s) Oral every 6 hours PRN  dextrose 40% Gel 15 Gram(s) Oral once PRN  glucagon  Injectable 1 milliGRAM(s) IntraMuscular once PRN  HYDROmorphone  Injectable 1 milliGRAM(s) IV Push every 3 hours PRN  ondansetron Injectable 4 milliGRAM(s) IV Push every 6 hours PRN  oxyCODONE    IR 5 milliGRAM(s) Oral every 4 hours PRN  oxyCODONE    IR 10 milliGRAM(s) Oral every 4 hours PRN      REVIEW OF SYSTEMS  --------------------------------------------------------------------------------  Gen: No weight changes, fatigue, fevers/chills, weakness  Skin: No rashes  Head/Eyes/Ears/Mouth: No headache; Normal hearing; Normal vision w/o blurriness; No sinus pain/discomfort, sore throat  Respiratory: No dyspnea, cough, wheezing, hemoptysis  CV: No chest pain, PND, orthopnea  GI: No abdominal pain, diarrhea, constipation, nausea, vomiting, melena, hematochezia  : No increased frequency, dysuria, hematuria, nocturia  MSK: No joint pain/swelling; no back pain; no edema  Neuro: No dizziness/lightheadedness, weakness, seizures, numbness, tingling  Heme: No easy bruising or bleeding  Endo: No heat/cold intolerance  Psych: No significant nervousness, anxiety, stress, depression    All other systems were reviewed and are negative, except as noted.    VITALS/PHYSICAL EXAM  --------------------------------------------------------------------------------  T(C): 36.6 (07-01-19 @ 07:24), Max: 37.1 (06-30-19 @ 21:48)  HR: 79 (07-01-19 @ 07:24) (79 - 95)  BP: 157/80 (07-01-19 @ 07:24) (122/68 - 163/79)  RR: 19 (07-01-19 @ 07:24) (18 - 19)  SpO2: 96% (06-30-19 @ 21:48) (96% - 98%)  Wt(kg): --        06-30-19 @ 07:01  -  07-01-19 @ 07:00  --------------------------------------------------------  IN: 3950 mL / OUT: 9850 mL / NET: -5900 mL    07-01-19 @ 07:01  -  07-01-19 @ 11:52  --------------------------------------------------------  IN: 600 mL / OUT: 3000 mL / NET: -2400 mL      Physical Exam:  Constitutional: Patient resting comfortably in bed in no acute distress   Extremities: RLE dressings removed. R upper thigh and medial calf with staples CDI;  fasciotomy wound with pink muscle, no sig edema. Compartments soft,  Post pop/thigh space with interrupted sutures; Xeroform to fasciotomy site, abd pads and wrapped with kerlex. ACE wrap for gentle compression. R foot mild edema, reports no sensation on dorsum or plantar surface at this time. Gross motor absent to intrinsic foot muscles, absent plantarflexion and dorsiflexion. 2+ R DP pulse.   LLE with edema, soft. L groin/inner thigh incision with staples CDI.    LABS/STUDIES  --------------------------------------------------------------------------------              8.3    8.38  >-----------<  251      [07-01-19 @ 09:17]              26.9     144  |  106  |  22.0  ----------------------------<  108      [07-01-19 @ 09:17]  4.6   |  27.0  |  1.10        Ca     8.7     [07-01-19 @ 09:17]      Mg     2.6     [07-01-19 @ 09:17]      Phos  3.1     [07-01-19 @ 09:17]    TPro  6.6  /  Alb  3.2  /  TBili  0.8  /  DBili  x   /  AST  47  /  ALT  36  /  AlkPhos  75  [07-01-19 @ 09:17]              [07-01-19 @ 09:17]    Creatinine Trend:  SCr 1.10 [07-01 @ 09:17]  SCr 2.98 [06-30 @ 19:50]  SCr 3.96 [06-30 @ 13:38]  SCr 4.40 [06-30 @ 08:49]  SCr 1.92 [06-29 @ 12:04]    Urinalysis - [06-29-19 @ 22:19]      Color Yellow / Appearance Clear / SG 1.010 / pH 6.5      Gluc Negative / Ketone Negative  / Bili Negative / Urobili Negative       Blood Small / Protein 15 / Leuk Est Negative / Nitrite Negative      RBC 0-2 / WBC Negative / Hyaline  / Gran  / Sq Epi  / Non Sq Epi Occasional / Bacteria     Urine Creatinine 61      [06-30-19 @ 11:17]  Urine Sodium 59      [06-30-19 @ 11:17]  Urine Osmolality 318      [06-30-19 @ 11:17]    HbA1c 5.9      [06-26-19 @ 05:14]    HCV 0.12, Nonreact      [06-26-19 @ 20:00]

## 2019-07-01 NOTE — PROGRESS NOTE ADULT - ASSESSMENT
68 year old male POD#6 s/p R AK pop- BK pop bypass and 4 compartment fasciotomies     Continue management per ACS team  Continue pulse checks q 4 hrs of RLE  Continue ASA and statin in addition to neurovascular checks  PT/PMR

## 2019-07-01 NOTE — PROGRESS NOTE ADULT - ASSESSMENT
Dx : Acute Obstructive uropathy,    ( Phimosis )    Maintain Min, Hold ARBs,    Monitor U.O & Aggressive Hydration to maintain + Fluid Balance in the face of  Post - Obstructive diuresis,     Creatinine normalized  Signing off  Please recall if needed

## 2019-07-01 NOTE — PROGRESS NOTE ADULT - SUBJECTIVE AND OBJECTIVE BOX
SUBJECTIVE: Patient seen and examined at bedside this AM.  No acute events overnight. RLE dressing with some serosanguinous drainage. Pain is well controlled. Denies fever, chills, nausea, vomiting chest pain, SOB, dizziness. NPO for OR today for closure of fasciotomy/RLE exploration and possible nerve repair    MEDICATIONS  (STANDING):  amLODIPine   Tablet 10 milliGRAM(s) Oral daily  aspirin  chewable 81 milliGRAM(s) Oral daily  chlorhexidine 2% Cloths 1 Application(s) Topical daily  dextrose 5%. 1000 milliLiter(s) (50 mL/Hr) IV Continuous <Continuous>  dextrose 50% Injectable 12.5 Gram(s) IV Push once  dextrose 50% Injectable 25 Gram(s) IV Push once  dextrose 50% Injectable 25 Gram(s) IV Push once  docusate sodium 100 milliGRAM(s) Oral two times a day  heparin  Injectable 5000 Unit(s) SubCutaneous every 8 hours  insulin lispro (HumaLOG) corrective regimen sliding scale   SubCutaneous three times a day before meals  lactulose Syrup 10 Gram(s) Oral daily  multiple electrolytes Injection Type 1 1000 milliLiter(s) (200 mL/Hr) IV Continuous <Continuous>  senna 1 Tablet(s) Oral two times a day  simvastatin 20 milliGRAM(s) Oral at bedtime    MEDICATIONS  (PRN):  acetaminophen   Tablet .. 650 milliGRAM(s) Oral every 6 hours PRN Temp greater or equal to 38C (100.4F), Mild Pain (1 - 3)  dextrose 40% Gel 15 Gram(s) Oral once PRN Blood Glucose LESS THAN 70 milliGRAM(s)/deciliter  glucagon  Injectable 1 milliGRAM(s) IntraMuscular once PRN Glucose LESS THAN 70 milligrams/deciliter  HYDROmorphone  Injectable 1 milliGRAM(s) IV Push every 3 hours PRN Severe Pain (7 - 10)  ondansetron Injectable 4 milliGRAM(s) IV Push every 6 hours PRN Nausea and/or Vomiting  oxyCODONE    IR 5 milliGRAM(s) Oral every 4 hours PRN Moderate Pain (4 - 6)  oxyCODONE    IR 10 milliGRAM(s) Oral every 4 hours PRN Severe Pain (7 - 10)      Vital Signs Last 24 Hrs  T(C): 36.6 (2019 07:24), Max: 37.1 (2019 21:48)  T(F): 97.8 (2019 07:24), Max: 98.8 (2019 21:48)  HR: 79 (2019 07:24) (79 - 95)  BP: 157/80 (2019 07:24) (122/68 - 163/79)  BP(mean): --  RR: 19 (2019 07:24) (18 - 19)  SpO2: 96% (2019 21:48) (96% - 98%)    PE  Constitutional: Patient resting comfortably in bed in no acute distress   Extremities: RLE dressings removed. R upper thigh and medial calf with staples CDI;  fasciotomy wound with pink muscle, no sig edema. Compartments soft,  Post pop/thigh space with interrupted sutures; Xeroform to fasciotomy site, abd pads and wrapped with kerlex. ACE wrap for gentle compression. R foot mild edema, reports no sensation on dorsum or plantar surface at this time. Gross motor absent to intrinsic foot muscles, absent plantarflexion and dorsiflexion. 2+ R DP pulse.   LLE with edema, soft. L groin/inner thigh incision with staples CDI.      LABS:                        8.9    7.88  )-----------( 257      ( 2019 19:50 )             28.3   06-30    143  |  106  |  49.0<H>  ----------------------------<  115  4.7   |  24.0  |  2.98<H>    Ca    9.0      2019 19:50  Phos  5.4     06-30  Mg     3.4     06-30    Urinalysis Basic - ( 2019 22:19 )    Color: Yellow / Appearance: Clear / S.010 / pH: x  Gluc: x / Ketone: Negative  / Bili: Negative / Urobili: Negative mg/dL   Blood: x / Protein: 15 mg/dL / Nitrite: Negative   Leuk Esterase: Negative / RBC: 0-2 /HPF / WBC Negative   Sq Epi: x / Non Sq Epi: Occasional / Bacteria: x        RADIOLOGY & ADDITIONAL STUDIES:

## 2019-07-01 NOTE — PROGRESS NOTE ADULT - ASSESSMENT
69yo M s/p crush injury to LLE who developed SERENITY yesterday and was treated w aggressive IVF resuscitation. Patient has been responding. Nephrology is following.   -POss OR today w Dr. Ron  -SHAWNA [facility]  -f/u urine myoglobin, urine lytes, and urine cultures

## 2019-07-02 ENCOUNTER — TRANSCRIPTION ENCOUNTER (OUTPATIENT)
Age: 69
End: 2019-07-02

## 2019-07-02 LAB
ACANTHOCYTES BLD QL SMEAR: SLIGHT — SIGNIFICANT CHANGE UP
ANION GAP SERPL CALC-SCNC: 9 MMOL/L — SIGNIFICANT CHANGE UP (ref 5–17)
ANISOCYTOSIS BLD QL: SLIGHT — SIGNIFICANT CHANGE UP
BASOPHILS # BLD AUTO: 0.08 K/UL — SIGNIFICANT CHANGE UP (ref 0–0.2)
BASOPHILS NFR BLD AUTO: 0.9 % — SIGNIFICANT CHANGE UP (ref 0–2)
BUN SERPL-MCNC: 16 MG/DL — SIGNIFICANT CHANGE UP (ref 8–20)
CALCIUM SERPL-MCNC: 8.9 MG/DL — SIGNIFICANT CHANGE UP (ref 8.6–10.2)
CHLORIDE SERPL-SCNC: 103 MMOL/L — SIGNIFICANT CHANGE UP (ref 98–107)
CO2 SERPL-SCNC: 25 MMOL/L — SIGNIFICANT CHANGE UP (ref 22–29)
CREAT SERPL-MCNC: 1 MG/DL — SIGNIFICANT CHANGE UP (ref 0.5–1.3)
DACRYOCYTES BLD QL SMEAR: SLIGHT — SIGNIFICANT CHANGE UP
ELLIPTOCYTES BLD QL SMEAR: SLIGHT — SIGNIFICANT CHANGE UP
EOSINOPHIL # BLD AUTO: 0.49 K/UL — SIGNIFICANT CHANGE UP (ref 0–0.5)
EOSINOPHIL NFR BLD AUTO: 5.3 % — SIGNIFICANT CHANGE UP (ref 0–6)
GIANT PLATELETS BLD QL SMEAR: PRESENT — SIGNIFICANT CHANGE UP
GLUCOSE BLDC GLUCOMTR-MCNC: 103 MG/DL — HIGH (ref 70–99)
GLUCOSE BLDC GLUCOMTR-MCNC: 119 MG/DL — HIGH (ref 70–99)
GLUCOSE BLDC GLUCOMTR-MCNC: 124 MG/DL — HIGH (ref 70–99)
GLUCOSE BLDC GLUCOMTR-MCNC: 133 MG/DL — HIGH (ref 70–99)
GLUCOSE BLDC GLUCOMTR-MCNC: 157 MG/DL — HIGH (ref 70–99)
GLUCOSE SERPL-MCNC: 96 MG/DL — SIGNIFICANT CHANGE UP (ref 70–115)
HCT VFR BLD CALC: 26.1 % — LOW (ref 39–50)
HGB BLD-MCNC: 8 G/DL — LOW (ref 13–17)
HYPOCHROMIA BLD QL: SLIGHT — SIGNIFICANT CHANGE UP
LYMPHOCYTES # BLD AUTO: 2.19 K/UL — SIGNIFICANT CHANGE UP (ref 1–3.3)
LYMPHOCYTES # BLD AUTO: 23.9 % — SIGNIFICANT CHANGE UP (ref 13–44)
MACROCYTES BLD QL: SLIGHT — SIGNIFICANT CHANGE UP
MAGNESIUM SERPL-MCNC: 2.1 MG/DL — SIGNIFICANT CHANGE UP (ref 1.6–2.6)
MANUAL SMEAR VERIFICATION: SIGNIFICANT CHANGE UP
MCHC RBC-ENTMCNC: 27 PG — SIGNIFICANT CHANGE UP (ref 27–34)
MCHC RBC-ENTMCNC: 30.7 GM/DL — LOW (ref 32–36)
MCV RBC AUTO: 88.2 FL — SIGNIFICANT CHANGE UP (ref 80–100)
MICROCYTES BLD QL: SLIGHT — SIGNIFICANT CHANGE UP
MONOCYTES # BLD AUTO: 0.24 K/UL — SIGNIFICANT CHANGE UP (ref 0–0.9)
MONOCYTES NFR BLD AUTO: 2.6 % — SIGNIFICANT CHANGE UP (ref 2–14)
MYELOCYTES NFR BLD: 0.9 % — HIGH (ref 0–0)
NEUTROPHILS # BLD AUTO: 6.1 K/UL — SIGNIFICANT CHANGE UP (ref 1.8–7.4)
NEUTROPHILS NFR BLD AUTO: 66.4 % — SIGNIFICANT CHANGE UP (ref 43–77)
OVALOCYTES BLD QL SMEAR: SLIGHT — SIGNIFICANT CHANGE UP
PHOSPHATE SERPL-MCNC: 3.2 MG/DL — SIGNIFICANT CHANGE UP (ref 2.4–4.7)
PLAT MORPH BLD: ABNORMAL
PLATELET # BLD AUTO: 270 K/UL — SIGNIFICANT CHANGE UP (ref 150–400)
PLATELET COUNT - ESTIMATE: NORMAL — SIGNIFICANT CHANGE UP
POIKILOCYTOSIS BLD QL AUTO: SLIGHT — SIGNIFICANT CHANGE UP
POTASSIUM SERPL-MCNC: 4.8 MMOL/L — SIGNIFICANT CHANGE UP (ref 3.5–5.3)
POTASSIUM SERPL-SCNC: 4.8 MMOL/L — SIGNIFICANT CHANGE UP (ref 3.5–5.3)
RBC # BLD: 2.96 M/UL — LOW (ref 4.2–5.8)
RBC # FLD: 15.1 % — HIGH (ref 10.3–14.5)
RBC BLD AUTO: ABNORMAL
SMUDGE CELLS # BLD: PRESENT — SIGNIFICANT CHANGE UP
SODIUM SERPL-SCNC: 137 MMOL/L — SIGNIFICANT CHANGE UP (ref 135–145)
WBC # BLD: 9.18 K/UL — SIGNIFICANT CHANGE UP (ref 3.8–10.5)
WBC # FLD AUTO: 9.18 K/UL — SIGNIFICANT CHANGE UP (ref 3.8–10.5)

## 2019-07-02 PROCEDURE — 99231 SBSQ HOSP IP/OBS SF/LOW 25: CPT

## 2019-07-02 RX ADMIN — SENNA PLUS 1 TABLET(S): 8.6 TABLET ORAL at 16:17

## 2019-07-02 RX ADMIN — HEPARIN SODIUM 5000 UNIT(S): 5000 INJECTION INTRAVENOUS; SUBCUTANEOUS at 05:33

## 2019-07-02 RX ADMIN — OXYCODONE HYDROCHLORIDE 10 MILLIGRAM(S): 5 TABLET ORAL at 21:46

## 2019-07-02 RX ADMIN — SENNA PLUS 1 TABLET(S): 8.6 TABLET ORAL at 05:33

## 2019-07-02 RX ADMIN — OXYCODONE HYDROCHLORIDE 10 MILLIGRAM(S): 5 TABLET ORAL at 04:53

## 2019-07-02 RX ADMIN — HEPARIN SODIUM 5000 UNIT(S): 5000 INJECTION INTRAVENOUS; SUBCUTANEOUS at 13:04

## 2019-07-02 RX ADMIN — Medication 100 MILLIGRAM(S): at 05:33

## 2019-07-02 RX ADMIN — OXYCODONE HYDROCHLORIDE 10 MILLIGRAM(S): 5 TABLET ORAL at 03:53

## 2019-07-02 RX ADMIN — Medication 100 MILLIGRAM(S): at 16:16

## 2019-07-02 RX ADMIN — OXYCODONE HYDROCHLORIDE 10 MILLIGRAM(S): 5 TABLET ORAL at 13:04

## 2019-07-02 RX ADMIN — Medication 81 MILLIGRAM(S): at 11:07

## 2019-07-02 RX ADMIN — LACTULOSE 10 GRAM(S): 10 SOLUTION ORAL at 11:07

## 2019-07-02 RX ADMIN — ONDANSETRON 4 MILLIGRAM(S): 8 TABLET, FILM COATED ORAL at 00:45

## 2019-07-02 RX ADMIN — OXYCODONE HYDROCHLORIDE 10 MILLIGRAM(S): 5 TABLET ORAL at 20:46

## 2019-07-02 RX ADMIN — SIMVASTATIN 20 MILLIGRAM(S): 20 TABLET, FILM COATED ORAL at 20:46

## 2019-07-02 RX ADMIN — OXYCODONE HYDROCHLORIDE 10 MILLIGRAM(S): 5 TABLET ORAL at 14:01

## 2019-07-02 RX ADMIN — AMLODIPINE BESYLATE 10 MILLIGRAM(S): 2.5 TABLET ORAL at 05:33

## 2019-07-02 NOTE — PROGRESS NOTE ADULT - ASSESSMENT
67yo M s/p crush injury to LLE who developed SERENITY yesterday and was treated w aggressive IVF resuscitation. Patient has been responding. Nephrology is following.   -POss OR today w Dr. Ron  -SHAWNA L[facility]  -pre-op for OR wednesday 7/3 with Dr. Ron for nerve repair. NPO past MN  Creatiine normalized but continue to follow  [facility] surgery plan re: weight bearing after rahul's procedure  IVF, resuscitation  Serial vascular/neuro exams  Dressing changes by vasc team 69yo M s/p crush injury to LLE who developed SERENITY yesterday and was treated w aggressive IVF resuscitation. Patient has been responding. Nephrology is following.   -POss OR today w Dr. Ron  -DARLENEB LLE  -pre-op for OR wednesday 7/3 with Dr. Ron for nerve repair. NPO past MN  Maintain Min for Phimosis and OR tomorrow.  Creatiine normalized but continue to follow  [facility] surgery plan re: weight bearing after rahul's procedure  IVF, resuscitation  Serial vascular/neuro exams  Dressing changes by vasc team

## 2019-07-02 NOTE — PROGRESS NOTE ADULT - SUBJECTIVE AND OBJECTIVE BOX
Vascular surgery brief PA note    patient is s/p right Le revascularization after traumatic injury     No change to general exam    Right LE with clean ace wrap in place    Motor-sensory examination to the right Le unchanged    - stable from vascular standpoint  - continue daily dressing changed to the fasciotomy site  - Plastics to take patient to OR this week for closure and exploration to determine the extent of the peripheral nerve injury

## 2019-07-02 NOTE — PROGRESS NOTE ADULT - SUBJECTIVE AND OBJECTIVE BOX
INTERVAL HPI/OVERNIGHT EVENTS:    SUBJECTIVE:  No acute events overnight. No spp complaints besides his baseline pain  Sodium has normalized to 138 yesterday as well  Ebonie reg diet  Palpable DP pulses b/l. Mild LE Edema R foot; Dressings being changed by vasc team; CDI  CK has normalized (439 from >1K); Creatinine has normalized    MEDICATIONS  (STANDING):  amLODIPine   Tablet 10 milliGRAM(s) Oral daily  aspirin  chewable 81 milliGRAM(s) Oral daily  dextrose 5%. 1000 milliLiter(s) (50 mL/Hr) IV Continuous <Continuous>  dextrose 50% Injectable 12.5 Gram(s) IV Push once  dextrose 50% Injectable 25 Gram(s) IV Push once  dextrose 50% Injectable 25 Gram(s) IV Push once  docusate sodium 100 milliGRAM(s) Oral two times a day  heparin  Injectable 5000 Unit(s) SubCutaneous every 8 hours  insulin lispro (HumaLOG) corrective regimen sliding scale   SubCutaneous three times a day before meals  lactulose Syrup 10 Gram(s) Oral daily  multiple electrolytes Injection Type 1 1000 milliLiter(s) (200 mL/Hr) IV Continuous <Continuous>  senna 1 Tablet(s) Oral two times a day  simvastatin 20 milliGRAM(s) Oral at bedtime    MEDICATIONS  (PRN):  acetaminophen   Tablet .. 650 milliGRAM(s) Oral every 6 hours PRN Temp greater or equal to 38C (100.4F), Mild Pain (1 - 3)  dextrose 40% Gel 15 Gram(s) Oral once PRN Blood Glucose LESS THAN 70 milliGRAM(s)/deciliter  glucagon  Injectable 1 milliGRAM(s) IntraMuscular once PRN Glucose LESS THAN 70 milligrams/deciliter  HYDROmorphone  Injectable 1 milliGRAM(s) IV Push every 3 hours PRN Severe Pain (7 - 10)  ondansetron Injectable 4 milliGRAM(s) IV Push every 6 hours PRN Nausea and/or Vomiting  oxyCODONE    IR 5 milliGRAM(s) Oral every 4 hours PRN Moderate Pain (4 - 6)  oxyCODONE    IR 10 milliGRAM(s) Oral every 4 hours PRN Severe Pain (7 - 10)      Vital Signs Last 24 Hrs  T(C): 37.3 (02 Jul 2019 00:43), Max: 37.3 (02 Jul 2019 00:43)  T(F): 99.1 (02 Jul 2019 00:43), Max: 99.1 (02 Jul 2019 00:43)  HR: 94 (02 Jul 2019 00:43) (64 - 94)  BP: 146/86 (02 Jul 2019 00:43) (131/71 - 157/80)  BP(mean): --  RR: 17 (02 Jul 2019 00:43) (17 - 19)  SpO2: 96% (01 Jul 2019 23:21) (96% - 96%)    PE  Constitutional: patient resting comfortably in bed, in no acute distress  HEENT: EOMI / PERRLA, MMM   Respiratory: non labored breathing.   Cardiovascular: RRR  Gastrointestinal: Abdomen soft, non-tender, non-distended, no rebound tenderness / guarding  Msk: LLE with brace, NWB. Diminished sensation in toes.      I&O's Detail    30 Jun 2019 07:01  -  01 Jul 2019 07:00  --------------------------------------------------------  IN:    multiple electrolytes Injection Type 1: 3650 mL    sodium chloride 0.9%: 300 mL  Total IN: 3950 mL    OUT:    Indwelling Catheter - Urethral: 9550 mL    Voided: 300 mL  Total OUT: 9850 mL    Total NET: -5900 mL      01 Jul 2019 07:01  -  02 Jul 2019 01:51  --------------------------------------------------------  IN:    multiple electrolytes Injection Type 1: 2800 mL  Total IN: 2800 mL    OUT:    Indwelling Catheter - Urethral: 6550 mL  Total OUT: 6550 mL    Total NET: -3750 mL          LABS:                        8.3    8.38  )-----------( 251      ( 01 Jul 2019 09:17 )             26.9     07-01    138  |  103  |  19.0  ----------------------------<  156<H>  5.2   |  24.0  |  0.93    Ca    9.0      01 Jul 2019 19:41  Phos  3.5     07-01  Mg     2.2     07-01    TPro  6.6  /  Alb  3.2<L>  /  TBili  0.8  /  DBili  x   /  AST  47<H>  /  ALT  36  /  AlkPhos  75  07-01          RADIOLOGY & ADDITIONAL STUDIES:

## 2019-07-03 ENCOUNTER — RESULT REVIEW (OUTPATIENT)
Age: 69
End: 2019-07-03

## 2019-07-03 LAB
ANION GAP SERPL CALC-SCNC: 10 MMOL/L — SIGNIFICANT CHANGE UP (ref 5–17)
APTT BLD: 29.7 SEC — SIGNIFICANT CHANGE UP (ref 27.5–36.3)
BASOPHILS # BLD AUTO: 0.04 K/UL — SIGNIFICANT CHANGE UP (ref 0–0.2)
BASOPHILS NFR BLD AUTO: 0.4 % — SIGNIFICANT CHANGE UP (ref 0–2)
BLD GP AB SCN SERPL QL: SIGNIFICANT CHANGE UP
BUN SERPL-MCNC: 17 MG/DL — SIGNIFICANT CHANGE UP (ref 8–20)
CALCIUM SERPL-MCNC: 8.4 MG/DL — LOW (ref 8.6–10.2)
CHLORIDE SERPL-SCNC: 104 MMOL/L — SIGNIFICANT CHANGE UP (ref 98–107)
CO2 SERPL-SCNC: 25 MMOL/L — SIGNIFICANT CHANGE UP (ref 22–29)
CREAT SERPL-MCNC: 1.01 MG/DL — SIGNIFICANT CHANGE UP (ref 0.5–1.3)
EOSINOPHIL # BLD AUTO: 0.32 K/UL — SIGNIFICANT CHANGE UP (ref 0–0.5)
EOSINOPHIL NFR BLD AUTO: 2.8 % — SIGNIFICANT CHANGE UP (ref 0–6)
GLUCOSE BLDC GLUCOMTR-MCNC: 147 MG/DL — HIGH (ref 70–99)
GLUCOSE BLDC GLUCOMTR-MCNC: 193 MG/DL — HIGH (ref 70–99)
GLUCOSE SERPL-MCNC: 128 MG/DL — HIGH (ref 70–115)
HCT VFR BLD CALC: 25.8 % — LOW (ref 39–50)
HGB BLD-MCNC: 8 G/DL — LOW (ref 13–17)
IMM GRANULOCYTES NFR BLD AUTO: 5.4 % — HIGH (ref 0–1.5)
INR BLD: 1.06 RATIO — SIGNIFICANT CHANGE UP (ref 0.88–1.16)
LYMPHOCYTES # BLD AUTO: 1.7 K/UL — SIGNIFICANT CHANGE UP (ref 1–3.3)
LYMPHOCYTES # BLD AUTO: 15 % — SIGNIFICANT CHANGE UP (ref 13–44)
MAGNESIUM SERPL-MCNC: 1.8 MG/DL — SIGNIFICANT CHANGE UP (ref 1.6–2.6)
MCHC RBC-ENTMCNC: 26.8 PG — LOW (ref 27–34)
MCHC RBC-ENTMCNC: 31 GM/DL — LOW (ref 32–36)
MCV RBC AUTO: 86.3 FL — SIGNIFICANT CHANGE UP (ref 80–100)
MONOCYTES # BLD AUTO: 0.83 K/UL — SIGNIFICANT CHANGE UP (ref 0–0.9)
MONOCYTES NFR BLD AUTO: 7.3 % — SIGNIFICANT CHANGE UP (ref 2–14)
NEUTROPHILS # BLD AUTO: 7.83 K/UL — HIGH (ref 1.8–7.4)
NEUTROPHILS NFR BLD AUTO: 69.1 % — SIGNIFICANT CHANGE UP (ref 43–77)
PHOSPHATE SERPL-MCNC: 3 MG/DL — SIGNIFICANT CHANGE UP (ref 2.4–4.7)
PLATELET # BLD AUTO: 301 K/UL — SIGNIFICANT CHANGE UP (ref 150–400)
POTASSIUM SERPL-MCNC: 4.7 MMOL/L — SIGNIFICANT CHANGE UP (ref 3.5–5.3)
POTASSIUM SERPL-SCNC: 4.7 MMOL/L — SIGNIFICANT CHANGE UP (ref 3.5–5.3)
PROTHROM AB SERPL-ACNC: 12.2 SEC — SIGNIFICANT CHANGE UP (ref 10–12.9)
RBC # BLD: 2.99 M/UL — LOW (ref 4.2–5.8)
RBC # FLD: 14.7 % — HIGH (ref 10.3–14.5)
SODIUM SERPL-SCNC: 139 MMOL/L — SIGNIFICANT CHANGE UP (ref 135–145)
TYPE + AB SCN PNL BLD: SIGNIFICANT CHANGE UP
WBC # BLD: 11.33 K/UL — HIGH (ref 3.8–10.5)
WBC # FLD AUTO: 11.33 K/UL — HIGH (ref 3.8–10.5)

## 2019-07-03 PROCEDURE — 88304 TISSUE EXAM BY PATHOLOGIST: CPT | Mod: 26

## 2019-07-03 PROCEDURE — 99231 SBSQ HOSP IP/OBS SF/LOW 25: CPT

## 2019-07-03 RX ORDER — HYDROMORPHONE HYDROCHLORIDE 2 MG/ML
0.5 INJECTION INTRAMUSCULAR; INTRAVENOUS; SUBCUTANEOUS
Refills: 0 | Status: DISCONTINUED | OUTPATIENT
Start: 2019-07-03 | End: 2019-07-03

## 2019-07-03 RX ORDER — ONDANSETRON 8 MG/1
4 TABLET, FILM COATED ORAL ONCE
Refills: 0 | Status: DISCONTINUED | OUTPATIENT
Start: 2019-07-03 | End: 2019-07-03

## 2019-07-03 RX ORDER — CEFAZOLIN SODIUM 1 G
1000 VIAL (EA) INJECTION EVERY 8 HOURS
Refills: 0 | Status: DISCONTINUED | OUTPATIENT
Start: 2019-07-03 | End: 2019-07-05

## 2019-07-03 RX ORDER — OXYCODONE HYDROCHLORIDE 5 MG/1
5 TABLET ORAL EVERY 4 HOURS
Refills: 0 | Status: DISCONTINUED | OUTPATIENT
Start: 2019-07-03 | End: 2019-07-10

## 2019-07-03 RX ORDER — SODIUM CHLORIDE 9 MG/ML
1000 INJECTION, SOLUTION INTRAVENOUS
Refills: 0 | Status: DISCONTINUED | OUTPATIENT
Start: 2019-07-03 | End: 2019-07-04

## 2019-07-03 RX ORDER — MAGNESIUM SULFATE 500 MG/ML
2 VIAL (ML) INJECTION ONCE
Refills: 0 | Status: DISCONTINUED | OUTPATIENT
Start: 2019-07-03 | End: 2019-07-03

## 2019-07-03 RX ORDER — ACETAMINOPHEN 500 MG
1000 TABLET ORAL ONCE
Refills: 0 | Status: COMPLETED | OUTPATIENT
Start: 2019-07-03 | End: 2019-07-04

## 2019-07-03 RX ORDER — DOCUSATE SODIUM 100 MG
100 CAPSULE ORAL
Refills: 0 | Status: DISCONTINUED | OUTPATIENT
Start: 2019-07-03 | End: 2019-07-14

## 2019-07-03 RX ORDER — ONDANSETRON 8 MG/1
4 TABLET, FILM COATED ORAL ONCE
Refills: 0 | Status: DISCONTINUED | OUTPATIENT
Start: 2019-07-03 | End: 2019-07-14

## 2019-07-03 RX ORDER — HYDROMORPHONE HYDROCHLORIDE 2 MG/ML
1 INJECTION INTRAMUSCULAR; INTRAVENOUS; SUBCUTANEOUS
Refills: 0 | Status: DISCONTINUED | OUTPATIENT
Start: 2019-07-03 | End: 2019-07-10

## 2019-07-03 RX ORDER — SENNA PLUS 8.6 MG/1
1 TABLET ORAL
Refills: 0 | Status: DISCONTINUED | OUTPATIENT
Start: 2019-07-03 | End: 2019-07-14

## 2019-07-03 RX ORDER — GABAPENTIN 400 MG/1
300 CAPSULE ORAL THREE TIMES A DAY
Refills: 0 | Status: DISCONTINUED | OUTPATIENT
Start: 2019-07-03 | End: 2019-07-14

## 2019-07-03 RX ORDER — SIMVASTATIN 20 MG/1
20 TABLET, FILM COATED ORAL AT BEDTIME
Refills: 0 | Status: DISCONTINUED | OUTPATIENT
Start: 2019-07-03 | End: 2019-07-14

## 2019-07-03 RX ORDER — AMLODIPINE BESYLATE 2.5 MG/1
10 TABLET ORAL DAILY
Refills: 0 | Status: DISCONTINUED | OUTPATIENT
Start: 2019-07-03 | End: 2019-07-14

## 2019-07-03 RX ORDER — SODIUM CHLORIDE 9 MG/ML
1000 INJECTION, SOLUTION INTRAVENOUS
Refills: 0 | Status: DISCONTINUED | OUTPATIENT
Start: 2019-07-03 | End: 2019-07-03

## 2019-07-03 RX ORDER — AMLODIPINE BESYLATE 2.5 MG/1
10 TABLET ORAL DAILY
Refills: 0 | Status: DISCONTINUED | OUTPATIENT
Start: 2019-07-03 | End: 2019-07-03

## 2019-07-03 RX ORDER — OXYCODONE HYDROCHLORIDE 5 MG/1
10 TABLET ORAL EVERY 4 HOURS
Refills: 0 | Status: DISCONTINUED | OUTPATIENT
Start: 2019-07-03 | End: 2019-07-10

## 2019-07-03 RX ORDER — GLUCAGON INJECTION, SOLUTION 0.5 MG/.1ML
1 INJECTION, SOLUTION SUBCUTANEOUS ONCE
Refills: 0 | Status: DISCONTINUED | OUTPATIENT
Start: 2019-07-03 | End: 2019-07-14

## 2019-07-03 RX ADMIN — SODIUM CHLORIDE 75 MILLILITER(S): 9 INJECTION, SOLUTION INTRAVENOUS at 16:25

## 2019-07-03 RX ADMIN — OXYCODONE HYDROCHLORIDE 10 MILLIGRAM(S): 5 TABLET ORAL at 19:24

## 2019-07-03 RX ADMIN — GABAPENTIN 300 MILLIGRAM(S): 400 CAPSULE ORAL at 22:11

## 2019-07-03 RX ADMIN — SENNA PLUS 1 TABLET(S): 8.6 TABLET ORAL at 22:11

## 2019-07-03 RX ADMIN — OXYCODONE HYDROCHLORIDE 10 MILLIGRAM(S): 5 TABLET ORAL at 05:43

## 2019-07-03 RX ADMIN — AMLODIPINE BESYLATE 10 MILLIGRAM(S): 2.5 TABLET ORAL at 16:35

## 2019-07-03 RX ADMIN — OXYCODONE HYDROCHLORIDE 10 MILLIGRAM(S): 5 TABLET ORAL at 15:05

## 2019-07-03 RX ADMIN — Medication 100 MILLIGRAM(S): at 22:11

## 2019-07-03 RX ADMIN — SIMVASTATIN 20 MILLIGRAM(S): 20 TABLET, FILM COATED ORAL at 22:12

## 2019-07-03 RX ADMIN — AMLODIPINE BESYLATE 10 MILLIGRAM(S): 2.5 TABLET ORAL at 04:42

## 2019-07-03 RX ADMIN — OXYCODONE HYDROCHLORIDE 10 MILLIGRAM(S): 5 TABLET ORAL at 15:42

## 2019-07-03 RX ADMIN — OXYCODONE HYDROCHLORIDE 10 MILLIGRAM(S): 5 TABLET ORAL at 19:54

## 2019-07-03 RX ADMIN — OXYCODONE HYDROCHLORIDE 10 MILLIGRAM(S): 5 TABLET ORAL at 04:43

## 2019-07-03 NOTE — BRIEF OPERATIVE NOTE - COMMENTS
first vac change POD 5  leave donor site dressing for 1 week unless saturated  knee immobilizer  ZAMZAM drain  restart dvt ppx 7/4   continue ancef
post-op plan: NWB LLE x6 weeks, elevate LLE, hinged ROM brace for MCL deficiency, bilateral knee MRI for ligamentous laxity, DVT prophy per vascular, f/u ortho 7/11 for wound check
Wound class I

## 2019-07-03 NOTE — PROGRESS NOTE ADULT - ASSESSMENT
69yo M s/p crush injury to LLE who developed SERENITY 7/1 and was treated w aggressive IVF resuscitation. Patient has been responding. Nephrology is following.   -OR today 7/3 w/ Dr. Ron  -SHAWNA [facility] surgery plan re: weight bearing after rahul's procedure  IVF, resuscitation  Serial vascular/neuro exams  Dressing changes by [facility] team

## 2019-07-03 NOTE — PROGRESS NOTE ADULT - SUBJECTIVE AND OBJECTIVE BOX
HPI/OVERNIGHT EVENTS: Patient seen and examined at bedside this AM. No acute events overnight per nursing reports. Pain controlled, NPO for OR today, voiding having bowel function. Denies fever, chills, nausea, vomitting, chest pain, SOB, dizziness, abd pain or any other concerning symptoms    Vital Signs Last 24 Hrs  T(C): 36.4 (02 Jul 2019 23:48), Max: 37.2 (02 Jul 2019 09:30)  T(F): 97.5 (02 Jul 2019 23:48), Max: 99 (02 Jul 2019 09:30)  HR: 87 (02 Jul 2019 23:48) (87 - 88)  BP: 147/77 (02 Jul 2019 23:48) (118/75 - 147/77)  BP(mean): --  RR: 18 (02 Jul 2019 23:48) (18 - 19)  SpO2: 94% (02 Jul 2019 23:48) (94% - 99%)    I&O's Detail    01 Jul 2019 07:01  -  02 Jul 2019 07:00  --------------------------------------------------------  IN:    multiple electrolytes Injection Type 1: 4000 mL  Total IN: 4000 mL    OUT:    Indwelling Catheter - Urethral: 7500 mL  Total OUT: 7500 mL    Total NET: -3500 mL      02 Jul 2019 07:01  -  03 Jul 2019 02:37  --------------------------------------------------------  IN:    multiple electrolytes Injection Type 1: 1300 mL  Total IN: 1300 mL    OUT:    Indwelling Catheter - Urethral: 1700 mL  Total OUT: 1700 mL    Total NET: -400 mL              PE  Constitutional: patient resting comfortably in bed, in no acute distress  HEENT: EOMI / PERRLA, MMM   Respiratory: non labored breathing.   Cardiovascular: RRR  Gastrointestinal: Abdomen soft, non-tender, non-distended, no rebound tenderness / guarding  Msk: LLE with brace, NWB. Diminished sensation in toes.    LABS:                        8.0    9.18  )-----------( 270      ( 02 Jul 2019 08:50 )             26.1     07-02    137  |  103  |  16.0  ----------------------------<  96  4.8   |  25.0  |  1.00    Ca    8.9      02 Jul 2019 08:50  Phos  3.2     07-02  Mg     2.1     07-02    TPro  6.6  /  Alb  3.2<L>  /  TBili  0.8  /  DBili  x   /  AST  47<H>  /  ALT  36  /  AlkPhos  75  07-01          MEDICATIONS  (STANDING):  amLODIPine   Tablet 10 milliGRAM(s) Oral daily  aspirin  chewable 81 milliGRAM(s) Oral daily  dextrose 5%. 1000 milliLiter(s) (50 mL/Hr) IV Continuous <Continuous>  dextrose 50% Injectable 12.5 Gram(s) IV Push once  dextrose 50% Injectable 25 Gram(s) IV Push once  dextrose 50% Injectable 25 Gram(s) IV Push once  docusate sodium 100 milliGRAM(s) Oral two times a day  heparin  Injectable 5000 Unit(s) SubCutaneous every 8 hours  insulin lispro (HumaLOG) corrective regimen sliding scale   SubCutaneous three times a day before meals  lactulose Syrup 10 Gram(s) Oral daily  multiple electrolytes Injection Type 1 1000 milliLiter(s) (100 mL/Hr) IV Continuous <Continuous>  senna 1 Tablet(s) Oral two times a day  simvastatin 20 milliGRAM(s) Oral at bedtime    MEDICATIONS  (PRN):  acetaminophen   Tablet .. 650 milliGRAM(s) Oral every 6 hours PRN Temp greater or equal to 38C (100.4F), Mild Pain (1 - 3)  dextrose 40% Gel 15 Gram(s) Oral once PRN Blood Glucose LESS THAN 70 milliGRAM(s)/deciliter  glucagon  Injectable 1 milliGRAM(s) IntraMuscular once PRN Glucose LESS THAN 70 milligrams/deciliter  HYDROmorphone  Injectable 1 milliGRAM(s) IV Push every 3 hours PRN Severe Pain (7 - 10)  ondansetron Injectable 4 milliGRAM(s) IV Push every 6 hours PRN Nausea and/or Vomiting  oxyCODONE    IR 5 milliGRAM(s) Oral every 4 hours PRN Moderate Pain (4 - 6)  oxyCODONE    IR 10 milliGRAM(s) Oral every 4 hours PRN Severe Pain (7 - 10)

## 2019-07-03 NOTE — PROGRESS NOTE ADULT - SUBJECTIVE AND OBJECTIVE BOX
68 M s/p BLE crush injury at work.  RLE revascularization, ORIF left tibial plateau.  Pt with sciatic nerve disfunction of RLE.  Absent motor to foot and ankle, absent plantar and dorsal foot sensation.  Open lateral calf fasciotomy wound.      Dressings c/d.i    Plan for exploration today.  Sciatic nerve repair, likely allorgraft vs autograft repair,  STSG to right calf.  DIscussed R/B/A with  including lengthy recovery for nerve regeneration, possiblity of limited return of sensation or motor function, likely need for assistance with ambulation.  Pt understands and agrees to proceed.

## 2019-07-03 NOTE — CHART NOTE - NSCHARTNOTEFT_GEN_A_CORE
Vascular Surgery Post-Op Note, N:     CC:  Pre-Op Dx: Laceration of right lower leg without foreign body  Tibial plateau fracture, left  Injury of right popliteal artery  Leg laceration, right, initial encounter    Procedure: Repair of sciatic nerve  Open treatment of fracture of tibial plateau  Fasciotomy, decompressive, anterior and lateral compartments, lower extremity  Fasciotomy, decompressive, lower extremity, posterior compartment  Creation, bypass, arterial, popliteal, using in situ vein graft    Surgeon:     Subjective:    Vital Signs Last 24 Hrs  T(C): 36.5 (03 Jul 2019 16:17), Max: 37.4 (03 Jul 2019 03:44)  T(F): 97.7 (03 Jul 2019 16:17), Max: 99.4 (03 Jul 2019 03:44)  HR: 88 (03 Jul 2019 16:17) (67 - 89)  BP: 145/85 (03 Jul 2019 16:17) (136/69 - 147/77)  BP(mean): --  RR: 18 (03 Jul 2019 16:17) (13 - 20)  SpO2: 95% (03 Jul 2019 16:17) (94% - 100%)    Physical Exam:  General: NAD, resting comfortably in bed, describes pain on the legs, no discomfort or other symptomatology.   Pulmonary: Nonlabored breathing, no respiratory distress  Cardiovascular: NSR  Abdominal: soft, NT/ND  Extremities:   Right: No sensation to pain on right foot (plantar, lateral or dorsal sensation). Swelling of the right foot with dorsalis pedis pulse palpable 2+, tibial pulse non palpable due to edema. No cyanosis or signs of ischemia.   Left: Sensation to pain and fine touch. Muscle strength to flexion and extension of the toes  4/5. No swelling. Dorsalis pulse palpable 2+. Good capillary refill, no cyanosis or signs of ischemia.   Skin: no hematoma, rash, ecchymosis  Neuro: A/O x 3, CNs II-XII grossly intact, normal motor/sensation on UEs.      LABS:                        8.0    11.33 )-----------( 301      ( 03 Jul 2019 06:45 )             25.8     07-03    139  |  104  |  17.0  ----------------------------<  128<H>  4.7   |  25.0  |  1.01    Ca    8.4<L>      03 Jul 2019 06:45  Phos  3.0     07-03  Mg     1.8     07-03      PT/INR - ( 03 Jul 2019 06:45 )   PT: 12.2 sec;   INR: 1.06 ratio         PTT - ( 03 Jul 2019 06:45 )  PTT:29.7 sec  CAPILLARY BLOOD GLUCOSE      POCT Blood Glucose.: 147 mg/dL (03 Jul 2019 16:21)          Radiology and Additional Studies:    Assessment:68y Male s/p above procedure    Plan:  Pain/nausea control PRN  Home meds  Incentive spirometer/OOB/Ambulate  Vitals per protocol  IVF, Diet:  ToV? Min?  AM labs Vascular Surgery Post-Op Note, N:     CC:  Pre-Op Dx: Laceration of right lower leg without foreign body  Tibial plateau fracture, left  Injury of right popliteal artery  Leg laceration, right, initial encounter    Procedure: Repair of sciatic nerve  Open treatment of fracture of tibial plateau  Fasciotomy, decompressive, anterior and lateral compartments, lower extremity  Fasciotomy, decompressive, lower extremity, posterior compartment  Creation, bypass, arterial, popliteal, using in situ vein graft    Surgeon:     Subjective:    Vital Signs Last 24 Hrs  T(C): 36.5 (03 Jul 2019 16:17), Max: 37.4 (03 Jul 2019 03:44)  T(F): 97.7 (03 Jul 2019 16:17), Max: 99.4 (03 Jul 2019 03:44)  HR: 88 (03 Jul 2019 16:17) (67 - 89)  BP: 145/85 (03 Jul 2019 16:17) (136/69 - 147/77)  BP(mean): --  RR: 18 (03 Jul 2019 16:17) (13 - 20)  SpO2: 95% (03 Jul 2019 16:17) (94% - 100%)  Grenade drainage: 10cc.     Physical Exam:  General: NAD, resting comfortably in bed, describes pain on the legs, no discomfort or other symptomatology.   Pulmonary: Nonlabored breathing, no respiratory distress  Cardiovascular: NSR  Abdominal: soft, NT/ND  Extremities:   Right: No sensation to pain on right foot (plantar, lateral or dorsal sensation). Swelling of the right foot with dorsalis pedis pulse palpable 2+, tibial pulse non palpable due to edema. No cyanosis or signs of ischemia.   Left: Sensation to pain and fine touch. Muscle strength to flexion and extension of the toes  4/5. No swelling. Dorsalis pulse palpable 2+. Good capillary refill, no cyanosis or signs of ischemia.   Skin: no hematoma, rash, ecchymosis  Neuro: A/O x 3, CNs II-XII grossly intact, normal motor/sensation on UEs.      LABS:                        8.0    11.33 )-----------( 301      ( 03 Jul 2019 06:45 )             25.8     07-03    139  |  104  |  17.0  ----------------------------<  128<H>  4.7   |  25.0  |  1.01    Ca    8.4<L>      03 Jul 2019 06:45  Phos  3.0     07-03  Mg     1.8     07-03      PT/INR - ( 03 Jul 2019 06:45 )   PT: 12.2 sec;   INR: 1.06 ratio         PTT - ( 03 Jul 2019 06:45 )  PTT:29.7 sec  CAPILLARY BLOOD GLUCOSE      POCT Blood Glucose.: 147 mg/dL (03 Jul 2019 16:21)          Radiology and Additional Studies:    Assessment:68y Male s/p above procedure    Plan:  Pain/nausea control PRN  Home meds  Incentive spirometer/OOB/Ambulate  Vitals per protocol  IVF, Diet:  ToV? Min?  AM labs Vascular Surgery Post-Op Note, N:     CC:  Pre-Op Dx: Laceration of right lower leg without foreign body  Tibial plateau fracture, left  Injury of right popliteal artery  Leg laceration, right, initial encounter    Procedure: Repair of sciatic nerve  Open treatment of fracture of tibial plateau  Fasciotomy, decompressive, anterior and lateral compartments, lower extremity  Fasciotomy, decompressive, lower extremity, posterior compartment  Creation, bypass, arterial, popliteal, using in situ vein graft    Surgeon:     Subjective:    Vital Signs Last 24 Hrs  T(C): 36.5 (03 Jul 2019 16:17), Max: 37.4 (03 Jul 2019 03:44)  T(F): 97.7 (03 Jul 2019 16:17), Max: 99.4 (03 Jul 2019 03:44)  HR: 88 (03 Jul 2019 16:17) (67 - 89)  BP: 145/85 (03 Jul 2019 16:17) (136/69 - 147/77)  BP(mean): --  RR: 18 (03 Jul 2019 16:17) (13 - 20)  SpO2: 95% (03 Jul 2019 16:17) (94% - 100%)  Grenade drainage: 10cc.     Physical Exam:  General: NAD, resting comfortably in bed, describes pain on the legs, no discomfort or other symptomatology.   Pulmonary: Nonlabored breathing, no respiratory distress  Cardiovascular: NSR  Abdominal: soft, NT/ND  Extremities:   Right: No sensation to pain on right foot (plantar, lateral or dorsal sensation). Muscle strength on the right foot 0/5. Swelling of the right foot with dorsalis pedis pulse palpable 2+, tibial pulse non palpable due to edema. No cyanosis or signs of ischemia.   Left: Sensation to pain and fine touch. Muscle strength to flexion and extension of the toes  4/5. No swelling. Dorsalis pulse palpable 2+. Good capillary refill, no cyanosis or signs of ischemia.   Skin: no hematoma, rash, ecchymosis  Neuro: A/O x 3, CNs II-XII grossly intact, normal motor/sensation on UEs.      LABS:                        8.0    11.33 )-----------( 301      ( 03 Jul 2019 06:45 )             25.8     07-03    139  |  104  |  17.0  ----------------------------<  128<H>  4.7   |  25.0  |  1.01    Ca    8.4<L>      03 Jul 2019 06:45  Phos  3.0     07-03  Mg     1.8     07-03      PT/INR - ( 03 Jul 2019 06:45 )   PT: 12.2 sec;   INR: 1.06 ratio         PTT - ( 03 Jul 2019 06:45 )  PTT:29.7 sec  CAPILLARY BLOOD GLUCOSE      POCT Blood Glucose.: 147 mg/dL (03 Jul 2019 16:21)          Radiology and Additional Studies:    Assessment:68y Male s/p above procedure    Plan:  Pain/nausea control PRN  Home meds  Incentive spirometer/OOB/Ambulate  Vitals per protocol  IVF, Diet:  ToV? Min?  AM labs Vascular Surgery Post-Op Note, N:     CC:  Pre-Op Dx: Laceration of right lower leg without foreign body  Tibial plateau fracture, left  Injury of right popliteal artery  Leg laceration, right, initial encounter    Procedure: Repair of sciatic nerve  Open treatment of fracture of tibial plateau  Fasciotomy, decompressive, anterior and lateral compartments, lower extremity  Fasciotomy, decompressive, lower extremity, posterior compartment  Creation, bypass, arterial, popliteal, using in situ vein graft    Surgeon:     Subjective:    Vital Signs Last 24 Hrs  T(C): 36.5 (03 Jul 2019 16:17), Max: 37.4 (03 Jul 2019 03:44)  T(F): 97.7 (03 Jul 2019 16:17), Max: 99.4 (03 Jul 2019 03:44)  HR: 88 (03 Jul 2019 16:17) (67 - 89)  BP: 145/85 (03 Jul 2019 16:17) (136/69 - 147/77)  BP(mean): --  RR: 18 (03 Jul 2019 16:17) (13 - 20)  SpO2: 95% (03 Jul 2019 16:17) (94% - 100%)  Grenade drainage: 10cc.     Physical Exam:  General: NAD, resting comfortably in bed, describes pain on the legs, no discomfort or other symptomatology.   Pulmonary: Nonlabored breathing, no respiratory distress  Cardiovascular: NSR  Abdominal: soft, NT/ND  Extremities:   Right: No sensation to pain on right foot (plantar, lateral or dorsal sensation). Muscle strength on the right foot 0/5. Swelling of the right foot with dorsalis pedis pulse palpable 2+, tibial pulse non palpable due to edema. No cyanosis or signs of ischemia.   Left: Sensation to pain and fine touch. Muscle strength to flexion and extension of the toes  4/5. No swelling. Dorsalis pulse palpable 2+. Good capillary refill, no cyanosis or signs of ischemia.   Skin: no hematoma, rash, ecchymosis  Neuro: A/O x 3, CNs II-XII grossly intact, normal motor/sensation on UEs.      LABS:                        8.0    11.33 )-----------( 301      ( 03 Jul 2019 06:45 )             25.8     07-03    139  |  104  |  17.0  ----------------------------<  128<H>  4.7   |  25.0  |  1.01    Ca    8.4<L>      03 Jul 2019 06:45  Phos  3.0     07-03  Mg     1.8     07-03      PT/INR - ( 03 Jul 2019 06:45 )   PT: 12.2 sec;   INR: 1.06 ratio         PTT - ( 03 Jul 2019 06:45 )  PTT:29.7 sec  CAPILLARY BLOOD GLUCOSE      POCT Blood Glucose.: 147 mg/dL (03 Jul 2019 16:21)          Radiology and Additional Studies:    Assessment:68y Male s/p above procedure    Plan:  Pain/nausea control PRN  Home meds  Incentive spirometer/OOB/Ambulate  Vitals per protocol  IVF, Diet:  ToV? Min?  AM labs.

## 2019-07-04 LAB
ANION GAP SERPL CALC-SCNC: 12 MMOL/L — SIGNIFICANT CHANGE UP (ref 5–17)
BASOPHILS # BLD AUTO: 0.03 K/UL — SIGNIFICANT CHANGE UP (ref 0–0.2)
BASOPHILS NFR BLD AUTO: 0.2 % — SIGNIFICANT CHANGE UP (ref 0–2)
BUN SERPL-MCNC: 24 MG/DL — HIGH (ref 8–20)
CALCIUM SERPL-MCNC: 8.6 MG/DL — SIGNIFICANT CHANGE UP (ref 8.6–10.2)
CHLORIDE SERPL-SCNC: 102 MMOL/L — SIGNIFICANT CHANGE UP (ref 98–107)
CO2 SERPL-SCNC: 24 MMOL/L — SIGNIFICANT CHANGE UP (ref 22–29)
CREAT SERPL-MCNC: 1.09 MG/DL — SIGNIFICANT CHANGE UP (ref 0.5–1.3)
EOSINOPHIL # BLD AUTO: 0.01 K/UL — SIGNIFICANT CHANGE UP (ref 0–0.5)
EOSINOPHIL NFR BLD AUTO: 0.1 % — SIGNIFICANT CHANGE UP (ref 0–6)
GLUCOSE BLDC GLUCOMTR-MCNC: 136 MG/DL — HIGH (ref 70–99)
GLUCOSE BLDC GLUCOMTR-MCNC: 154 MG/DL — HIGH (ref 70–99)
GLUCOSE BLDC GLUCOMTR-MCNC: 168 MG/DL — HIGH (ref 70–99)
GLUCOSE BLDC GLUCOMTR-MCNC: 175 MG/DL — HIGH (ref 70–99)
GLUCOSE SERPL-MCNC: 143 MG/DL — HIGH (ref 70–115)
HCT VFR BLD CALC: 31 % — LOW (ref 39–50)
HGB BLD-MCNC: 9.9 G/DL — LOW (ref 13–17)
IMM GRANULOCYTES NFR BLD AUTO: 1.8 % — HIGH (ref 0–1.5)
LYMPHOCYTES # BLD AUTO: 1.4 K/UL — SIGNIFICANT CHANGE UP (ref 1–3.3)
LYMPHOCYTES # BLD AUTO: 10.2 % — LOW (ref 13–44)
MAGNESIUM SERPL-MCNC: 2 MG/DL — SIGNIFICANT CHANGE UP (ref 1.6–2.6)
MCHC RBC-ENTMCNC: 28.3 PG — SIGNIFICANT CHANGE UP (ref 27–34)
MCHC RBC-ENTMCNC: 31.9 GM/DL — LOW (ref 32–36)
MCV RBC AUTO: 88.6 FL — SIGNIFICANT CHANGE UP (ref 80–100)
MONOCYTES # BLD AUTO: 0.83 K/UL — SIGNIFICANT CHANGE UP (ref 0–0.9)
MONOCYTES NFR BLD AUTO: 6 % — SIGNIFICANT CHANGE UP (ref 2–14)
NEUTROPHILS # BLD AUTO: 11.22 K/UL — HIGH (ref 1.8–7.4)
NEUTROPHILS NFR BLD AUTO: 81.7 % — HIGH (ref 43–77)
PHOSPHATE SERPL-MCNC: 3.3 MG/DL — SIGNIFICANT CHANGE UP (ref 2.4–4.7)
PLATELET # BLD AUTO: 342 K/UL — SIGNIFICANT CHANGE UP (ref 150–400)
POTASSIUM SERPL-MCNC: 4.7 MMOL/L — SIGNIFICANT CHANGE UP (ref 3.5–5.3)
POTASSIUM SERPL-SCNC: 4.7 MMOL/L — SIGNIFICANT CHANGE UP (ref 3.5–5.3)
RBC # BLD: 3.5 M/UL — LOW (ref 4.2–5.8)
RBC # FLD: 15.1 % — HIGH (ref 10.3–14.5)
SODIUM SERPL-SCNC: 138 MMOL/L — SIGNIFICANT CHANGE UP (ref 135–145)
WBC # BLD: 13.74 K/UL — HIGH (ref 3.8–10.5)
WBC # FLD AUTO: 13.74 K/UL — HIGH (ref 3.8–10.5)

## 2019-07-04 PROCEDURE — 99231 SBSQ HOSP IP/OBS SF/LOW 25: CPT

## 2019-07-04 RX ORDER — HEPARIN SODIUM 5000 [USP'U]/ML
5000 INJECTION INTRAVENOUS; SUBCUTANEOUS EVERY 8 HOURS
Refills: 0 | Status: DISCONTINUED | OUTPATIENT
Start: 2019-07-04 | End: 2019-07-05

## 2019-07-04 RX ORDER — MAGNESIUM SULFATE 500 MG/ML
2 VIAL (ML) INJECTION ONCE
Refills: 0 | Status: COMPLETED | OUTPATIENT
Start: 2019-07-04 | End: 2019-07-04

## 2019-07-04 RX ADMIN — OXYCODONE HYDROCHLORIDE 10 MILLIGRAM(S): 5 TABLET ORAL at 21:18

## 2019-07-04 RX ADMIN — Medication 50 GRAM(S): at 13:10

## 2019-07-04 RX ADMIN — OXYCODONE HYDROCHLORIDE 10 MILLIGRAM(S): 5 TABLET ORAL at 11:56

## 2019-07-04 RX ADMIN — HYDROMORPHONE HYDROCHLORIDE 1 MILLIGRAM(S): 2 INJECTION INTRAMUSCULAR; INTRAVENOUS; SUBCUTANEOUS at 23:53

## 2019-07-04 RX ADMIN — Medication 100 MILLIGRAM(S): at 06:03

## 2019-07-04 RX ADMIN — SENNA PLUS 1 TABLET(S): 8.6 TABLET ORAL at 17:01

## 2019-07-04 RX ADMIN — HYDROMORPHONE HYDROCHLORIDE 1 MILLIGRAM(S): 2 INJECTION INTRAMUSCULAR; INTRAVENOUS; SUBCUTANEOUS at 06:13

## 2019-07-04 RX ADMIN — OXYCODONE HYDROCHLORIDE 10 MILLIGRAM(S): 5 TABLET ORAL at 11:32

## 2019-07-04 RX ADMIN — Medication 400 MILLIGRAM(S): at 12:01

## 2019-07-04 RX ADMIN — OXYCODONE HYDROCHLORIDE 10 MILLIGRAM(S): 5 TABLET ORAL at 02:47

## 2019-07-04 RX ADMIN — Medication 1000 MILLIGRAM(S): at 13:00

## 2019-07-04 RX ADMIN — GABAPENTIN 300 MILLIGRAM(S): 400 CAPSULE ORAL at 21:17

## 2019-07-04 RX ADMIN — SENNA PLUS 1 TABLET(S): 8.6 TABLET ORAL at 06:03

## 2019-07-04 RX ADMIN — OXYCODONE HYDROCHLORIDE 10 MILLIGRAM(S): 5 TABLET ORAL at 03:47

## 2019-07-04 RX ADMIN — HYDROMORPHONE HYDROCHLORIDE 1 MILLIGRAM(S): 2 INJECTION INTRAMUSCULAR; INTRAVENOUS; SUBCUTANEOUS at 05:58

## 2019-07-04 RX ADMIN — HEPARIN SODIUM 5000 UNIT(S): 5000 INJECTION INTRAVENOUS; SUBCUTANEOUS at 21:18

## 2019-07-04 RX ADMIN — Medication 100 MILLIGRAM(S): at 06:01

## 2019-07-04 RX ADMIN — Medication 100 MILLIGRAM(S): at 13:17

## 2019-07-04 RX ADMIN — Medication 100 MILLIGRAM(S): at 21:17

## 2019-07-04 RX ADMIN — AMLODIPINE BESYLATE 10 MILLIGRAM(S): 2.5 TABLET ORAL at 06:03

## 2019-07-04 RX ADMIN — GABAPENTIN 300 MILLIGRAM(S): 400 CAPSULE ORAL at 13:10

## 2019-07-04 RX ADMIN — GABAPENTIN 300 MILLIGRAM(S): 400 CAPSULE ORAL at 06:03

## 2019-07-04 RX ADMIN — SIMVASTATIN 20 MILLIGRAM(S): 20 TABLET, FILM COATED ORAL at 21:18

## 2019-07-04 RX ADMIN — Medication 100 MILLIGRAM(S): at 17:01

## 2019-07-04 RX ADMIN — OXYCODONE HYDROCHLORIDE 10 MILLIGRAM(S): 5 TABLET ORAL at 22:12

## 2019-07-04 RX ADMIN — HEPARIN SODIUM 5000 UNIT(S): 5000 INJECTION INTRAVENOUS; SUBCUTANEOUS at 13:10

## 2019-07-04 NOTE — PROGRESS NOTE ADULT - SUBJECTIVE AND OBJECTIVE BOX
69y/o Male s/p exploration right lower extremity with GETA. Doing well, VSS, Pain controlled.  No anesthetic complications noted at this time.
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
## 2019-07-04 NOTE — PROGRESS NOTE ADULT - ASSESSMENT
68 M s/p BLE crush injury at work.  RLE profunda to pop w/ gsv bypass, ORIF left tibial plateau.  Pt with sciatic nerve disfunction of RLE, Absent motor to foot and ankle, absent plantar and dorsal foot sensation.   s/p repair and graft of R common peroneal nerve     donor aqua cell w/ ioban to stay until pod8  vac to stay till pod5   remaining care per primary

## 2019-07-04 NOTE — PROGRESS NOTE ADULT - SUBJECTIVE AND OBJECTIVE BOX
POD1 doing well, pain controlled.  absent motor of R foot unchanged     MEDICATIONS  (STANDING):  amLODIPine   Tablet 10 milliGRAM(s) Oral daily  ceFAZolin   IVPB 1000 milliGRAM(s) IV Intermittent every 8 hours  docusate sodium 100 milliGRAM(s) Oral two times a day  gabapentin 300 milliGRAM(s) Oral three times a day  heparin  Injectable 5000 Unit(s) SubCutaneous every 8 hours  senna 1 Tablet(s) Oral two times a day  simvastatin 20 milliGRAM(s) Oral at bedtime    MEDICATIONS  (PRN):  acetaminophen  IVPB .. 1000 milliGRAM(s) IV Intermittent once PRN Mild Pain (1 - 3)  glucagon  Injectable 1 milliGRAM(s) IntraMuscular once PRN Glucose LESS THAN 70 milligrams/deciliter  HYDROmorphone  Injectable 1 milliGRAM(s) IV Push every 3 hours PRN Severe Pain (7 - 10)  ondansetron Injectable 4 milliGRAM(s) IV Push once PRN Nausea and/or Vomiting  oxyCODONE    IR 5 milliGRAM(s) Oral every 4 hours PRN Moderate Pain (4 - 6)  oxyCODONE    IR 10 milliGRAM(s) Oral every 4 hours PRN Severe Pain (7 - 10)      Vital Signs Last 24 Hrs  T(C): 36.5 (04 Jul 2019 00:09), Max: 36.9 (03 Jul 2019 13:15)  T(F): 97.7 (04 Jul 2019 00:09), Max: 98.5 (03 Jul 2019 13:15)  HR: 87 (04 Jul 2019 00:09) (67 - 89)  BP: 131/77 (04 Jul 2019 00:09) (131/77 - 147/77)  BP(mean): --  RR: 18 (04 Jul 2019 00:09) (13 - 20)  SpO2: 99% (04 Jul 2019 00:09) (95% - 100%)    Neurological:  No sensory/motor deficits  Respiratory: Breath Sounds equal & clear to auscultation, no accessory muscle use  Cardiovascular: Regular rate & rhythm, normal S1, S2; no murmurs, gallops or rubs  Gastrointestinal: Soft, non-tender  Extremities: No peripheral edema, No cyanosis, clubbing , Dressing CDI   Vascular: Equal and normal pulses: 2+ peripheral pulses throughout. decreased sensation grossly right foot                I&O's Detail    03 Jul 2019 07:01  -  04 Jul 2019 07:00  --------------------------------------------------------  IN:  Total IN: 0 mL    OUT:    Bulb: 40 mL    Indwelling Catheter - Urethral: 2750 mL  Total OUT: 2790 mL    Total NET: -2790 mL          LABS:                        8.0    11.33 )-----------( 301      ( 03 Jul 2019 06:45 )             25.8     07-03    139  |  104  |  17.0  ----------------------------<  128<H>  4.7   |  25.0  |  1.01    Ca    8.4<L>      03 Jul 2019 06:45  Phos  3.0     07-03  Mg     1.8     07-03      PT/INR - ( 03 Jul 2019 06:45 )   PT: 12.2 sec;   INR: 1.06 ratio         PTT - ( 03 Jul 2019 06:45 )  PTT:29.7 sec      RADIOLOGY & ADDITIONAL STUDIES: POD1 doing well, pain controlled.  absent motor of R foot unchanged     MEDICATIONS  (STANDING):  amLODIPine   Tablet 10 milliGRAM(s) Oral daily  ceFAZolin   IVPB 1000 milliGRAM(s) IV Intermittent every 8 hours  docusate sodium 100 milliGRAM(s) Oral two times a day  gabapentin 300 milliGRAM(s) Oral three times a day  heparin  Injectable 5000 Unit(s) SubCutaneous every 8 hours  senna 1 Tablet(s) Oral two times a day  simvastatin 20 milliGRAM(s) Oral at bedtime    MEDICATIONS  (PRN):  acetaminophen  IVPB .. 1000 milliGRAM(s) IV Intermittent once PRN Mild Pain (1 - 3)  glucagon  Injectable 1 milliGRAM(s) IntraMuscular once PRN Glucose LESS THAN 70 milligrams/deciliter  HYDROmorphone  Injectable 1 milliGRAM(s) IV Push every 3 hours PRN Severe Pain (7 - 10)  ondansetron Injectable 4 milliGRAM(s) IV Push once PRN Nausea and/or Vomiting  oxyCODONE    IR 5 milliGRAM(s) Oral every 4 hours PRN Moderate Pain (4 - 6)  oxyCODONE    IR 10 milliGRAM(s) Oral every 4 hours PRN Severe Pain (7 - 10)      Vital Signs Last 24 Hrs  T(C): 36.5 (04 Jul 2019 00:09), Max: 36.9 (03 Jul 2019 13:15)  T(F): 97.7 (04 Jul 2019 00:09), Max: 98.5 (03 Jul 2019 13:15)  HR: 87 (04 Jul 2019 00:09) (67 - 89)  BP: 131/77 (04 Jul 2019 00:09) (131/77 - 147/77)  BP(mean): --  RR: 18 (04 Jul 2019 00:09) (13 - 20)  SpO2: 99% (04 Jul 2019 00:09) (95% - 100%)    Neurological:  No sensory/motor deficits  Respiratory: Breath Sounds equal & clear to auscultation, no accessory muscle use  Cardiovascular: Regular rate & rhythm, normal S1, S2; no murmurs, gallops or rubs  Gastrointestinal: Soft, non-tender  Extremities: No peripheral edema, No cyanosis, clubbing , Dressing CDI   Musculoskeletal: RLE with VAC applies to lateral calf on suction, donor site lateral thigh dressing intact, swelling around area of superior calf, no sensation or motor function appreciated in RLE  Vascular: Equal and normal pulses: 2+ peripheral pulses throughout.              I&O's Detail    03 Jul 2019 07:01  -  04 Jul 2019 07:00  --------------------------------------------------------  IN:  Total IN: 0 mL    OUT:    Bulb: 40 mL    Indwelling Catheter - Urethral: 2750 mL  Total OUT: 2790 mL    Total NET: -2790 mL          LABS:                        8.0    11.33 )-----------( 301      ( 03 Jul 2019 06:45 )             25.8     07-03    139  |  104  |  17.0  ----------------------------<  128<H>  4.7   |  25.0  |  1.01    Ca    8.4<L>      03 Jul 2019 06:45  Phos  3.0     07-03  Mg     1.8     07-03      PT/INR - ( 03 Jul 2019 06:45 )   PT: 12.2 sec;   INR: 1.06 ratio         PTT - ( 03 Jul 2019 06:45 )  PTT:29.7 sec      RADIOLOGY & ADDITIONAL STUDIES:

## 2019-07-04 NOTE — PROGRESS NOTE ADULT - SUBJECTIVE AND OBJECTIVE BOX
s/p repair and graft of R common peroneal nerve POD 1. no acute issues. pain controlled. tolerating diet      MEDICATIONS  (STANDING):  amLODIPine   Tablet 10 milliGRAM(s) Oral daily  ceFAZolin   IVPB 1000 milliGRAM(s) IV Intermittent every 8 hours  docusate sodium 100 milliGRAM(s) Oral two times a day  gabapentin 300 milliGRAM(s) Oral three times a day  lactated ringers. 1000 milliLiter(s) (75 mL/Hr) IV Continuous <Continuous>  senna 1 Tablet(s) Oral two times a day  simvastatin 20 milliGRAM(s) Oral at bedtime    MEDICATIONS  (PRN):  acetaminophen  IVPB .. 1000 milliGRAM(s) IV Intermittent once PRN Mild Pain (1 - 3)  glucagon  Injectable 1 milliGRAM(s) IntraMuscular once PRN Glucose LESS THAN 70 milligrams/deciliter  HYDROmorphone  Injectable 1 milliGRAM(s) IV Push every 3 hours PRN Severe Pain (7 - 10)  ondansetron Injectable 4 milliGRAM(s) IV Push once PRN Nausea and/or Vomiting  oxyCODONE    IR 5 milliGRAM(s) Oral every 4 hours PRN Moderate Pain (4 - 6)  oxyCODONE    IR 10 milliGRAM(s) Oral every 4 hours PRN Severe Pain (7 - 10)      Vital Signs Last 24 Hrs  T(C): 36.5 (04 Jul 2019 00:09), Max: 37.4 (03 Jul 2019 03:44)  T(F): 97.7 (04 Jul 2019 00:09), Max: 99.4 (03 Jul 2019 03:44)  HR: 87 (04 Jul 2019 00:09) (67 - 89)  BP: 131/77 (04 Jul 2019 00:09) (131/77 - 147/77)  BP(mean): --  RR: 18 (04 Jul 2019 00:09) (13 - 20)  SpO2: 99% (04 Jul 2019 00:09) (95% - 100%)    Physical Exam:    Neurological:  No sensory/motor deficits    Respiratory: Breath Sounds equal & clear to auscultation, no accessory muscle use    Cardiovascular: Regular rate & rhythm, normal S1, S2; no murmurs, gallops or rubs    Gastrointestinal: Soft, non-tender, normal bowel sounds    Extremities: No peripheral edema, No cyanosis, clubbing     Vascular: Equal and normal pulses: 2+ peripheral pulses throughout. decreased sensation grossly right foot    Musculoskeletal: No joint pain, swelling or deformity; no limitation of movement    Skin: No rashes      I&O's Detail    02 Jul 2019 07:01  -  03 Jul 2019 07:00  --------------------------------------------------------  IN:    multiple electrolytes Injection Type 1multiple electrolytes Injection Type 1: 1800 mL  Total IN: 1800 mL    OUT:    Indwelling Catheter - Urethral: 4600 mL  Total OUT: 4600 mL    Total NET: -2800 mL      03 Jul 2019 07:01  -  04 Jul 2019 03:36  --------------------------------------------------------  IN:  Total IN: 0 mL    OUT:    Bulb: 20 mL    Indwelling Catheter - Urethral: 2000 mL  Total OUT: 2020 mL    Total NET: -2020 mL          LABS:                        8.0    11.33 )-----------( 301      ( 03 Jul 2019 06:45 )             25.8     07-03    139  |  104  |  17.0  ----------------------------<  128<H>  4.7   |  25.0  |  1.01    Ca    8.4<L>      03 Jul 2019 06:45  Phos  3.0     07-03  Mg     1.8     07-03      PT/INR - ( 03 Jul 2019 06:45 )   PT: 12.2 sec;   INR: 1.06 ratio         PTT - ( 03 Jul 2019 06:45 )  PTT:29.7 sec      RADIOLOGY & ADDITIONAL STUDIES:

## 2019-07-05 DIAGNOSIS — S81.811A LACERATION WITHOUT FOREIGN BODY, RIGHT LOWER LEG, INITIAL ENCOUNTER: ICD-10-CM

## 2019-07-05 LAB
ANION GAP SERPL CALC-SCNC: 13 MMOL/L — SIGNIFICANT CHANGE UP (ref 5–17)
APPEARANCE UR: CLEAR — SIGNIFICANT CHANGE UP
BACTERIA # UR AUTO: ABNORMAL
BASOPHILS # BLD AUTO: 0.03 K/UL — SIGNIFICANT CHANGE UP (ref 0–0.2)
BASOPHILS NFR BLD AUTO: 0.2 % — SIGNIFICANT CHANGE UP (ref 0–2)
BILIRUB UR-MCNC: NEGATIVE — SIGNIFICANT CHANGE UP
BUN SERPL-MCNC: 27 MG/DL — HIGH (ref 8–20)
CALCIUM SERPL-MCNC: 8.9 MG/DL — SIGNIFICANT CHANGE UP (ref 8.6–10.2)
CHLORIDE SERPL-SCNC: 101 MMOL/L — SIGNIFICANT CHANGE UP (ref 98–107)
CO2 SERPL-SCNC: 24 MMOL/L — SIGNIFICANT CHANGE UP (ref 22–29)
COLOR SPEC: YELLOW — SIGNIFICANT CHANGE UP
CREAT SERPL-MCNC: 1.29 MG/DL — SIGNIFICANT CHANGE UP (ref 0.5–1.3)
DIFF PNL FLD: ABNORMAL
EOSINOPHIL # BLD AUTO: 0.07 K/UL — SIGNIFICANT CHANGE UP (ref 0–0.5)
EOSINOPHIL NFR BLD AUTO: 0.4 % — SIGNIFICANT CHANGE UP (ref 0–6)
EPI CELLS # UR: SIGNIFICANT CHANGE UP
GLUCOSE BLDC GLUCOMTR-MCNC: 113 MG/DL — HIGH (ref 70–99)
GLUCOSE BLDC GLUCOMTR-MCNC: 137 MG/DL — HIGH (ref 70–99)
GLUCOSE BLDC GLUCOMTR-MCNC: 146 MG/DL — HIGH (ref 70–99)
GLUCOSE BLDC GLUCOMTR-MCNC: 161 MG/DL — HIGH (ref 70–99)
GLUCOSE SERPL-MCNC: 112 MG/DL — SIGNIFICANT CHANGE UP (ref 70–115)
GLUCOSE UR QL: NEGATIVE MG/DL — SIGNIFICANT CHANGE UP
HCT VFR BLD CALC: 30.6 % — LOW (ref 39–50)
HGB BLD-MCNC: 9.5 G/DL — LOW (ref 13–17)
HYALINE CASTS # UR AUTO: ABNORMAL /LPF
IMM GRANULOCYTES NFR BLD AUTO: 1.3 % — SIGNIFICANT CHANGE UP (ref 0–1.5)
KETONES UR-MCNC: NEGATIVE — SIGNIFICANT CHANGE UP
LEUKOCYTE ESTERASE UR-ACNC: ABNORMAL
LYMPHOCYTES # BLD AUTO: 1.98 K/UL — SIGNIFICANT CHANGE UP (ref 1–3.3)
LYMPHOCYTES # BLD AUTO: 11.9 % — LOW (ref 13–44)
MAGNESIUM SERPL-MCNC: 2.3 MG/DL — SIGNIFICANT CHANGE UP (ref 1.6–2.6)
MCHC RBC-ENTMCNC: 28 PG — SIGNIFICANT CHANGE UP (ref 27–34)
MCHC RBC-ENTMCNC: 31 GM/DL — LOW (ref 32–36)
MCV RBC AUTO: 90.3 FL — SIGNIFICANT CHANGE UP (ref 80–100)
MONOCYTES # BLD AUTO: 1.03 K/UL — HIGH (ref 0–0.9)
MONOCYTES NFR BLD AUTO: 6.2 % — SIGNIFICANT CHANGE UP (ref 2–14)
NEUTROPHILS # BLD AUTO: 13.29 K/UL — HIGH (ref 1.8–7.4)
NEUTROPHILS NFR BLD AUTO: 80 % — HIGH (ref 43–77)
NITRITE UR-MCNC: NEGATIVE — SIGNIFICANT CHANGE UP
PH UR: 5 — SIGNIFICANT CHANGE UP (ref 5–8)
PHOSPHATE SERPL-MCNC: 3.8 MG/DL — SIGNIFICANT CHANGE UP (ref 2.4–4.7)
PLATELET # BLD AUTO: 378 K/UL — SIGNIFICANT CHANGE UP (ref 150–400)
POTASSIUM SERPL-MCNC: 4.9 MMOL/L — SIGNIFICANT CHANGE UP (ref 3.5–5.3)
POTASSIUM SERPL-SCNC: 4.9 MMOL/L — SIGNIFICANT CHANGE UP (ref 3.5–5.3)
PROT UR-MCNC: 15 MG/DL
RBC # BLD: 3.39 M/UL — LOW (ref 4.2–5.8)
RBC # FLD: 15.5 % — HIGH (ref 10.3–14.5)
RBC CASTS # UR COMP ASSIST: ABNORMAL /HPF (ref 0–4)
SODIUM SERPL-SCNC: 138 MMOL/L — SIGNIFICANT CHANGE UP (ref 135–145)
SP GR SPEC: 1.02 — SIGNIFICANT CHANGE UP (ref 1.01–1.02)
UROBILINOGEN FLD QL: NEGATIVE MG/DL — SIGNIFICANT CHANGE UP
WBC # BLD: 16.62 K/UL — HIGH (ref 3.8–10.5)
WBC # FLD AUTO: 16.62 K/UL — HIGH (ref 3.8–10.5)
WBC UR QL: SIGNIFICANT CHANGE UP

## 2019-07-05 PROCEDURE — 71045 X-RAY EXAM CHEST 1 VIEW: CPT | Mod: 26

## 2019-07-05 PROCEDURE — 99231 SBSQ HOSP IP/OBS SF/LOW 25: CPT

## 2019-07-05 RX ORDER — DEXTROSE 50 % IN WATER 50 %
12.5 SYRINGE (ML) INTRAVENOUS ONCE
Refills: 0 | Status: DISCONTINUED | OUTPATIENT
Start: 2019-07-05 | End: 2019-07-08

## 2019-07-05 RX ORDER — SODIUM CHLORIDE 9 MG/ML
1000 INJECTION, SOLUTION INTRAVENOUS
Refills: 0 | Status: DISCONTINUED | OUTPATIENT
Start: 2019-07-05 | End: 2019-07-08

## 2019-07-05 RX ORDER — INSULIN LISPRO 100/ML
VIAL (ML) SUBCUTANEOUS AT BEDTIME
Refills: 0 | Status: DISCONTINUED | OUTPATIENT
Start: 2019-07-05 | End: 2019-07-10

## 2019-07-05 RX ORDER — INSULIN LISPRO 100/ML
VIAL (ML) SUBCUTANEOUS
Refills: 0 | Status: DISCONTINUED | OUTPATIENT
Start: 2019-07-05 | End: 2019-07-10

## 2019-07-05 RX ORDER — KETOROLAC TROMETHAMINE 30 MG/ML
15 SYRINGE (ML) INJECTION ONCE
Refills: 0 | Status: DISCONTINUED | OUTPATIENT
Start: 2019-07-05 | End: 2019-07-05

## 2019-07-05 RX ORDER — TAMSULOSIN HYDROCHLORIDE 0.4 MG/1
0.4 CAPSULE ORAL ONCE
Refills: 0 | Status: COMPLETED | OUTPATIENT
Start: 2019-07-05 | End: 2019-07-05

## 2019-07-05 RX ORDER — DEXTROSE 50 % IN WATER 50 %
25 SYRINGE (ML) INTRAVENOUS ONCE
Refills: 0 | Status: DISCONTINUED | OUTPATIENT
Start: 2019-07-05 | End: 2019-07-08

## 2019-07-05 RX ORDER — ENOXAPARIN SODIUM 100 MG/ML
40 INJECTION SUBCUTANEOUS DAILY
Refills: 0 | Status: DISCONTINUED | OUTPATIENT
Start: 2019-07-05 | End: 2019-07-05

## 2019-07-05 RX ORDER — ACETAMINOPHEN 500 MG
650 TABLET ORAL EVERY 6 HOURS
Refills: 0 | Status: DISCONTINUED | OUTPATIENT
Start: 2019-07-05 | End: 2019-07-11

## 2019-07-05 RX ORDER — DEXTROSE 50 % IN WATER 50 %
15 SYRINGE (ML) INTRAVENOUS ONCE
Refills: 0 | Status: DISCONTINUED | OUTPATIENT
Start: 2019-07-05 | End: 2019-07-08

## 2019-07-05 RX ORDER — TAMSULOSIN HYDROCHLORIDE 0.4 MG/1
0.4 CAPSULE ORAL AT BEDTIME
Refills: 0 | Status: DISCONTINUED | OUTPATIENT
Start: 2019-07-06 | End: 2019-07-14

## 2019-07-05 RX ORDER — GLUCAGON INJECTION, SOLUTION 0.5 MG/.1ML
1 INJECTION, SOLUTION SUBCUTANEOUS ONCE
Refills: 0 | Status: DISCONTINUED | OUTPATIENT
Start: 2019-07-05 | End: 2019-07-08

## 2019-07-05 RX ORDER — CEFAZOLIN SODIUM 1 G
1000 VIAL (EA) INJECTION EVERY 8 HOURS
Refills: 0 | Status: DISCONTINUED | OUTPATIENT
Start: 2019-07-05 | End: 2019-07-09

## 2019-07-05 RX ORDER — ENOXAPARIN SODIUM 100 MG/ML
30 INJECTION SUBCUTANEOUS EVERY 12 HOURS
Refills: 0 | Status: DISCONTINUED | OUTPATIENT
Start: 2019-07-05 | End: 2019-07-07

## 2019-07-05 RX ADMIN — ENOXAPARIN SODIUM 40 MILLIGRAM(S): 100 INJECTION SUBCUTANEOUS at 12:20

## 2019-07-05 RX ADMIN — AMLODIPINE BESYLATE 10 MILLIGRAM(S): 2.5 TABLET ORAL at 05:06

## 2019-07-05 RX ADMIN — Medication 2: at 18:30

## 2019-07-05 RX ADMIN — Medication 100 MILLIGRAM(S): at 05:05

## 2019-07-05 RX ADMIN — OXYCODONE HYDROCHLORIDE 10 MILLIGRAM(S): 5 TABLET ORAL at 14:00

## 2019-07-05 RX ADMIN — OXYCODONE HYDROCHLORIDE 10 MILLIGRAM(S): 5 TABLET ORAL at 18:56

## 2019-07-05 RX ADMIN — OXYCODONE HYDROCHLORIDE 10 MILLIGRAM(S): 5 TABLET ORAL at 18:31

## 2019-07-05 RX ADMIN — GABAPENTIN 300 MILLIGRAM(S): 400 CAPSULE ORAL at 05:06

## 2019-07-05 RX ADMIN — HYDROMORPHONE HYDROCHLORIDE 1 MILLIGRAM(S): 2 INJECTION INTRAMUSCULAR; INTRAVENOUS; SUBCUTANEOUS at 00:23

## 2019-07-05 RX ADMIN — OXYCODONE HYDROCHLORIDE 10 MILLIGRAM(S): 5 TABLET ORAL at 02:23

## 2019-07-05 RX ADMIN — TAMSULOSIN HYDROCHLORIDE 0.4 MILLIGRAM(S): 0.4 CAPSULE ORAL at 22:59

## 2019-07-05 RX ADMIN — Medication 100 MILLIGRAM(S): at 18:30

## 2019-07-05 RX ADMIN — Medication 650 MILLIGRAM(S): at 02:22

## 2019-07-05 RX ADMIN — Medication 100 MILLIGRAM(S): at 21:33

## 2019-07-05 RX ADMIN — SIMVASTATIN 20 MILLIGRAM(S): 20 TABLET, FILM COATED ORAL at 21:33

## 2019-07-05 RX ADMIN — Medication 15 MILLIGRAM(S): at 03:26

## 2019-07-05 RX ADMIN — Medication 100 MILLIGRAM(S): at 05:06

## 2019-07-05 RX ADMIN — GABAPENTIN 300 MILLIGRAM(S): 400 CAPSULE ORAL at 21:33

## 2019-07-05 RX ADMIN — OXYCODONE HYDROCHLORIDE 10 MILLIGRAM(S): 5 TABLET ORAL at 02:53

## 2019-07-05 RX ADMIN — SENNA PLUS 1 TABLET(S): 8.6 TABLET ORAL at 05:06

## 2019-07-05 RX ADMIN — Medication 15 MILLIGRAM(S): at 03:56

## 2019-07-05 RX ADMIN — OXYCODONE HYDROCHLORIDE 10 MILLIGRAM(S): 5 TABLET ORAL at 14:56

## 2019-07-05 RX ADMIN — GABAPENTIN 300 MILLIGRAM(S): 400 CAPSULE ORAL at 13:59

## 2019-07-05 RX ADMIN — SENNA PLUS 1 TABLET(S): 8.6 TABLET ORAL at 18:30

## 2019-07-05 RX ADMIN — Medication 650 MILLIGRAM(S): at 03:08

## 2019-07-05 RX ADMIN — HEPARIN SODIUM 5000 UNIT(S): 5000 INJECTION INTRAVENOUS; SUBCUTANEOUS at 05:07

## 2019-07-05 NOTE — PROGRESS NOTE ADULT - ASSESSMENT
68 M s/p BLE crush injury at work.  RLE profunda to pop w/ gsv bypass, ORIF left tibial plateau.  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    donor aqua cell w/ ioban to stay until pod8  vac to stay till pod5   remaining care per primary

## 2019-07-05 NOTE — PROGRESS NOTE ADULT - SUBJECTIVE AND OBJECTIVE BOX
HPI/OVERNIGHT EVENTS: Patient seen and examined at bedside this AM. Patient febrile to 101 overnight. Patient's pain well controlled, tolerating diet, voiding through Min. Denies fever, chills, nausea, vomitting, chest pain, SOB, dizziness, abd pain or any other concerning symptoms    Vital Signs Last 24 Hrs  T(C): 37.1 (05 Jul 2019 04:01), Max: 38.5 (04 Jul 2019 23:49)  T(F): 98.8 (05 Jul 2019 04:01), Max: 101.3 (04 Jul 2019 23:49)  HR: 84 (05 Jul 2019 04:01) (84 - 98)  BP: 130/70 (05 Jul 2019 04:01) (122/71 - 131/72)  BP(mean): --  RR: 18 (05 Jul 2019 04:01) (18 - 18)  SpO2: 97% (05 Jul 2019 04:01) (96% - 100%)    I&O's Detail    04 Jul 2019 07:01  -  05 Jul 2019 07:00  --------------------------------------------------------  IN:    Oral Fluid: 600 mL    Solution: 100 mL  Total IN: 700 mL    OUT:    Bulb: 15 mL    Indwelling Catheter - Urethral: 4000 mL  Total OUT: 4015 mL    Total NET: -3315 mL              Constitutional: patient resting comfortably in bed, in no acute distress  HEENT: EOMI / PERRL b/l   Respiratory: CTAB respirations are unlabored, no accessory muscle use, no conversational dyspnea  Cardiovascular: regular rate & rhythm   Gastrointestinal: Abdomen soft, non-tender, non-distended, no rebound tenderness / guarding  Musculoskeletal: RLE with VAC applies to lateral calf on suction, donor site lateral thigh dressing intact, swelling around area of superior calf, no sensation or motor function appreciated in RLE  LABS:                        9.9    13.74 )-----------( 342      ( 04 Jul 2019 11:58 )             31.0     07-04    138  |  102  |  24.0<H>  ----------------------------<  143<H>  4.7   |  24.0  |  1.09    Ca    8.6      04 Jul 2019 11:58  Phos  3.3     07-04  Mg     2.0     07-04            MEDICATIONS  (STANDING):  amLODIPine   Tablet 10 milliGRAM(s) Oral daily  ceFAZolin   IVPB 1000 milliGRAM(s) IV Intermittent every 8 hours  docusate sodium 100 milliGRAM(s) Oral two times a day  gabapentin 300 milliGRAM(s) Oral three times a day  heparin  Injectable 5000 Unit(s) SubCutaneous every 8 hours  senna 1 Tablet(s) Oral two times a day  simvastatin 20 milliGRAM(s) Oral at bedtime    MEDICATIONS  (PRN):  acetaminophen   Tablet .. 650 milliGRAM(s) Oral every 6 hours PRN Temp greater or equal to 38C (100.4F), Mild Pain (1 - 3)  glucagon  Injectable 1 milliGRAM(s) IntraMuscular once PRN Glucose LESS THAN 70 milligrams/deciliter  HYDROmorphone  Injectable 1 milliGRAM(s) IV Push every 3 hours PRN Severe Pain (7 - 10)  ondansetron Injectable 4 milliGRAM(s) IV Push once PRN Nausea and/or Vomiting  oxyCODONE    IR 5 milliGRAM(s) Oral every 4 hours PRN Moderate Pain (4 - 6)  oxyCODONE    IR 10 milliGRAM(s) Oral every 4 hours PRN Severe Pain (7 - 10)
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
## 2019-07-05 NOTE — PROGRESS NOTE ADULT - SUBJECTIVE AND OBJECTIVE BOX
HPI/OVERNIGHT EVENTS:  Patient examined at bedside. O/N patient was febrile to Tmax 101.3. Fever resolved with PO tylenol. This am he denies f/n/v/cp/sob.     MEDICATIONS  (STANDING):  amLODIPine   Tablet 10 milliGRAM(s) Oral daily  docusate sodium 100 milliGRAM(s) Oral two times a day  enoxaparin Injectable 40 milliGRAM(s) SubCutaneous daily  gabapentin 300 milliGRAM(s) Oral three times a day  senna 1 Tablet(s) Oral two times a day  simvastatin 20 milliGRAM(s) Oral at bedtime    MEDICATIONS  (PRN):  acetaminophen   Tablet .. 650 milliGRAM(s) Oral every 6 hours PRN Temp greater or equal to 38C (100.4F), Mild Pain (1 - 3)  glucagon  Injectable 1 milliGRAM(s) IntraMuscular once PRN Glucose LESS THAN 70 milligrams/deciliter  HYDROmorphone  Injectable 1 milliGRAM(s) IV Push every 3 hours PRN Severe Pain (7 - 10)  ondansetron Injectable 4 milliGRAM(s) IV Push once PRN Nausea and/or Vomiting  oxyCODONE    IR 5 milliGRAM(s) Oral every 4 hours PRN Moderate Pain (4 - 6)  oxyCODONE    IR 10 milliGRAM(s) Oral every 4 hours PRN Severe Pain (7 - 10)      Vital Signs Last 24 Hrs  T(C): 37 (05 Jul 2019 08:25), Max: 38.5 (04 Jul 2019 23:49)  T(F): 98.6 (05 Jul 2019 08:25), Max: 101.3 (04 Jul 2019 23:49)  HR: 81 (05 Jul 2019 08:25) (81 - 98)  BP: 126/70 (05 Jul 2019 08:25) (126/70 - 131/72)  BP(mean): --  RR: 18 (05 Jul 2019 08:25) (18 - 18)  SpO2: 98% (05 Jul 2019 08:25) (97% - 100%)    Constitutional: patient resting comfortably in bed, in no acute distress  HEENT: EOMI / PERRL b/l, no active drainage or redness  Neck: No JVD, full ROM without pain  Respiratory: respirations are unlabored, no accessory muscle use, no conversational dyspnea  Cardiovascular: regular rate & rhythm  Gastrointestinal: Abdomen soft, non-tender, non-distended, no rebound tenderness / guarding  Neurological: GCS: 15 (4/5/6). A&O x 3; no gross sensory / motor / coordination deficits  Psychiatric: Normal mood, normal affect  Musculoskeletal: No joint pain, swelling or deformity; no limitation of movement      I&O's Detail    04 Jul 2019 07:01  -  05 Jul 2019 07:00  --------------------------------------------------------  IN:    Oral Fluid: 600 mL    Solution: 100 mL  Total IN: 700 mL    OUT:    Bulb: 15 mL    Indwelling Catheter - Urethral: 4000 mL  Total OUT: 4015 mL    Total NET: -3315 mL          LABS:                        9.9    13.74 )-----------( 342      ( 04 Jul 2019 11:58 )             31.0     07-04    138  |  102  |  24.0<H>  ----------------------------<  143<H>  4.7   |  24.0  |  1.09    Ca    8.6      04 Jul 2019 11:58  Phos  3.3     07-04  Mg     2.0     07-04 HPI/OVERNIGHT EVENTS:  Patient examined at bedside. O/N patient was febrile to Tmax 101.3. Fever resolved with PO tylenol. This AM he denies f/n/v/cp/sob. Pain is well controlled. Min in place and voiding appropriately. ZAMZAM in place.   Patient was examined by Plastics. Rec's reviewed.      MEDICATIONS  (STANDING):  amLODIPine   Tablet 10 milliGRAM(s) Oral daily  docusate sodium 100 milliGRAM(s) Oral two times a day  enoxaparin Injectable 40 milliGRAM(s) SubCutaneous daily  gabapentin 300 milliGRAM(s) Oral three times a day  senna 1 Tablet(s) Oral two times a day  simvastatin 20 milliGRAM(s) Oral at bedtime    MEDICATIONS  (PRN):  acetaminophen   Tablet .. 650 milliGRAM(s) Oral every 6 hours PRN Temp greater or equal to 38C (100.4F), Mild Pain (1 - 3)  glucagon  Injectable 1 milliGRAM(s) IntraMuscular once PRN Glucose LESS THAN 70 milligrams/deciliter  HYDROmorphone  Injectable 1 milliGRAM(s) IV Push every 3 hours PRN Severe Pain (7 - 10)  ondansetron Injectable 4 milliGRAM(s) IV Push once PRN Nausea and/or Vomiting  oxyCODONE    IR 5 milliGRAM(s) Oral every 4 hours PRN Moderate Pain (4 - 6)  oxyCODONE    IR 10 milliGRAM(s) Oral every 4 hours PRN Severe Pain (7 - 10)      Vital Signs Last 24 Hrs  T(C): 37 (05 Jul 2019 08:25), Max: 38.5 (04 Jul 2019 23:49)  T(F): 98.6 (05 Jul 2019 08:25), Max: 101.3 (04 Jul 2019 23:49)  HR: 81 (05 Jul 2019 08:25) (81 - 98)  BP: 126/70 (05 Jul 2019 08:25) (126/70 - 131/72)  BP(mean): --  RR: 18 (05 Jul 2019 08:25) (18 - 18)  SpO2: 98% (05 Jul 2019 08:25) (97% - 100%)    Constitutional: patient resting comfortably in bed, in no acute distress  HEENT: EOMI / PERRL b/l, no active drainage or redness  Neck: No JVD, full ROM without pain  Respiratory: respirations are unlabored, no accessory muscle use, no conversational dyspnea  Cardiovascular: regular rate & rhythm  Gastrointestinal: Abdomen soft, non-tender, non-distended, no rebound tenderness / guarding  Neurological: GCS: 15 (4/5/6). A&O x 3; no gross sensory / motor / coordination deficits  Psychiatric: Normal mood, normal affect  Musculoskeletal: No joint pain, swelling or deformity; no limitation of movement      I&O's Detail    04 Jul 2019 07:01  -  05 Jul 2019 07:00  --------------------------------------------------------  IN:    Oral Fluid: 600 mL    Solution: 100 mL  Total IN: 700 mL    OUT:    Bulb: 15 mL    Indwelling Catheter - Urethral: 4000 mL  Total OUT: 4015 mL    Total NET: -3315 mL          LABS:                        9.9    13.74 )-----------( 342      ( 04 Jul 2019 11:58 )             31.0     07-04    138  |  102  |  24.0<H>  ----------------------------<  143<H>  4.7   |  24.0  |  1.09    Ca    8.6      04 Jul 2019 11:58  Phos  3.3     07-04  Mg     2.0     07-04

## 2019-07-06 LAB
ANION GAP SERPL CALC-SCNC: 12 MMOL/L — SIGNIFICANT CHANGE UP (ref 5–17)
ANION GAP SERPL CALC-SCNC: 12 MMOL/L — SIGNIFICANT CHANGE UP (ref 5–17)
BASOPHILS # BLD AUTO: 0.04 K/UL — SIGNIFICANT CHANGE UP (ref 0–0.2)
BASOPHILS NFR BLD AUTO: 0.3 % — SIGNIFICANT CHANGE UP (ref 0–2)
BUN SERPL-MCNC: 22 MG/DL — HIGH (ref 8–20)
BUN SERPL-MCNC: 22 MG/DL — HIGH (ref 8–20)
CALCIUM SERPL-MCNC: 8.7 MG/DL — SIGNIFICANT CHANGE UP (ref 8.6–10.2)
CALCIUM SERPL-MCNC: 8.8 MG/DL — SIGNIFICANT CHANGE UP (ref 8.6–10.2)
CHLORIDE SERPL-SCNC: 100 MMOL/L — SIGNIFICANT CHANGE UP (ref 98–107)
CHLORIDE SERPL-SCNC: 98 MMOL/L — SIGNIFICANT CHANGE UP (ref 98–107)
CO2 SERPL-SCNC: 22 MMOL/L — SIGNIFICANT CHANGE UP (ref 22–29)
CO2 SERPL-SCNC: 25 MMOL/L — SIGNIFICANT CHANGE UP (ref 22–29)
CREAT SERPL-MCNC: 0.95 MG/DL — SIGNIFICANT CHANGE UP (ref 0.5–1.3)
CREAT SERPL-MCNC: 1 MG/DL — SIGNIFICANT CHANGE UP (ref 0.5–1.3)
EOSINOPHIL # BLD AUTO: 0.17 K/UL — SIGNIFICANT CHANGE UP (ref 0–0.5)
EOSINOPHIL NFR BLD AUTO: 1.2 % — SIGNIFICANT CHANGE UP (ref 0–6)
GLUCOSE BLDC GLUCOMTR-MCNC: 127 MG/DL — HIGH (ref 70–99)
GLUCOSE BLDC GLUCOMTR-MCNC: 132 MG/DL — HIGH (ref 70–99)
GLUCOSE BLDC GLUCOMTR-MCNC: 136 MG/DL — HIGH (ref 70–99)
GLUCOSE BLDC GLUCOMTR-MCNC: 154 MG/DL — HIGH (ref 70–99)
GLUCOSE SERPL-MCNC: 154 MG/DL — HIGH (ref 70–115)
GLUCOSE SERPL-MCNC: 171 MG/DL — HIGH (ref 70–115)
HCT VFR BLD CALC: 28.4 % — LOW (ref 39–50)
HGB BLD-MCNC: 8.8 G/DL — LOW (ref 13–17)
IMM GRANULOCYTES NFR BLD AUTO: 1 % — SIGNIFICANT CHANGE UP (ref 0–1.5)
LYMPHOCYTES # BLD AUTO: 1.25 K/UL — SIGNIFICANT CHANGE UP (ref 1–3.3)
LYMPHOCYTES # BLD AUTO: 8.5 % — LOW (ref 13–44)
MAGNESIUM SERPL-MCNC: 2.1 MG/DL — SIGNIFICANT CHANGE UP (ref 1.8–2.6)
MCHC RBC-ENTMCNC: 27.9 PG — SIGNIFICANT CHANGE UP (ref 27–34)
MCHC RBC-ENTMCNC: 31 GM/DL — LOW (ref 32–36)
MCV RBC AUTO: 90.2 FL — SIGNIFICANT CHANGE UP (ref 80–100)
MONOCYTES # BLD AUTO: 0.83 K/UL — SIGNIFICANT CHANGE UP (ref 0–0.9)
MONOCYTES NFR BLD AUTO: 5.6 % — SIGNIFICANT CHANGE UP (ref 2–14)
NEUTROPHILS # BLD AUTO: 12.27 K/UL — HIGH (ref 1.8–7.4)
NEUTROPHILS NFR BLD AUTO: 83.4 % — HIGH (ref 43–77)
PHOSPHATE SERPL-MCNC: 3.3 MG/DL — SIGNIFICANT CHANGE UP (ref 2.4–4.7)
PLATELET # BLD AUTO: 388 K/UL — SIGNIFICANT CHANGE UP (ref 150–400)
POTASSIUM SERPL-MCNC: 4.5 MMOL/L — SIGNIFICANT CHANGE UP (ref 3.5–5.3)
POTASSIUM SERPL-MCNC: 5.2 MMOL/L — SIGNIFICANT CHANGE UP (ref 3.5–5.3)
POTASSIUM SERPL-SCNC: 4.5 MMOL/L — SIGNIFICANT CHANGE UP (ref 3.5–5.3)
POTASSIUM SERPL-SCNC: 5.2 MMOL/L — SIGNIFICANT CHANGE UP (ref 3.5–5.3)
RBC # BLD: 3.15 M/UL — LOW (ref 4.2–5.8)
RBC # FLD: 15.4 % — HIGH (ref 10.3–14.5)
SODIUM SERPL-SCNC: 132 MMOL/L — LOW (ref 135–145)
SODIUM SERPL-SCNC: 137 MMOL/L — SIGNIFICANT CHANGE UP (ref 135–145)
WBC # BLD: 14.71 K/UL — HIGH (ref 3.8–10.5)
WBC # FLD AUTO: 14.71 K/UL — HIGH (ref 3.8–10.5)

## 2019-07-06 PROCEDURE — 99231 SBSQ HOSP IP/OBS SF/LOW 25: CPT

## 2019-07-06 RX ORDER — SODIUM CHLORIDE 9 MG/ML
500 INJECTION INTRAMUSCULAR; INTRAVENOUS; SUBCUTANEOUS ONCE
Refills: 0 | Status: COMPLETED | OUTPATIENT
Start: 2019-07-06 | End: 2019-07-06

## 2019-07-06 RX ADMIN — SIMVASTATIN 20 MILLIGRAM(S): 20 TABLET, FILM COATED ORAL at 21:13

## 2019-07-06 RX ADMIN — OXYCODONE HYDROCHLORIDE 10 MILLIGRAM(S): 5 TABLET ORAL at 19:53

## 2019-07-06 RX ADMIN — Medication 2: at 17:04

## 2019-07-06 RX ADMIN — GABAPENTIN 300 MILLIGRAM(S): 400 CAPSULE ORAL at 21:13

## 2019-07-06 RX ADMIN — ENOXAPARIN SODIUM 30 MILLIGRAM(S): 100 INJECTION SUBCUTANEOUS at 23:35

## 2019-07-06 RX ADMIN — AMLODIPINE BESYLATE 10 MILLIGRAM(S): 2.5 TABLET ORAL at 05:08

## 2019-07-06 RX ADMIN — ENOXAPARIN SODIUM 30 MILLIGRAM(S): 100 INJECTION SUBCUTANEOUS at 11:36

## 2019-07-06 RX ADMIN — ENOXAPARIN SODIUM 30 MILLIGRAM(S): 100 INJECTION SUBCUTANEOUS at 00:27

## 2019-07-06 RX ADMIN — Medication 1 ENEMA: at 21:12

## 2019-07-06 RX ADMIN — OXYCODONE HYDROCHLORIDE 10 MILLIGRAM(S): 5 TABLET ORAL at 06:08

## 2019-07-06 RX ADMIN — Medication 100 MILLIGRAM(S): at 21:12

## 2019-07-06 RX ADMIN — Medication 100 MILLIGRAM(S): at 05:09

## 2019-07-06 RX ADMIN — Medication 100 MILLIGRAM(S): at 13:32

## 2019-07-06 RX ADMIN — GABAPENTIN 300 MILLIGRAM(S): 400 CAPSULE ORAL at 13:32

## 2019-07-06 RX ADMIN — HYDROMORPHONE HYDROCHLORIDE 1 MILLIGRAM(S): 2 INJECTION INTRAMUSCULAR; INTRAVENOUS; SUBCUTANEOUS at 21:17

## 2019-07-06 RX ADMIN — SODIUM CHLORIDE 166.67 MILLILITER(S): 9 INJECTION INTRAMUSCULAR; INTRAVENOUS; SUBCUTANEOUS at 17:33

## 2019-07-06 RX ADMIN — HYDROMORPHONE HYDROCHLORIDE 1 MILLIGRAM(S): 2 INJECTION INTRAMUSCULAR; INTRAVENOUS; SUBCUTANEOUS at 21:12

## 2019-07-06 RX ADMIN — Medication 100 MILLIGRAM(S): at 17:04

## 2019-07-06 RX ADMIN — Medication 100 MILLIGRAM(S): at 05:08

## 2019-07-06 RX ADMIN — TAMSULOSIN HYDROCHLORIDE 0.4 MILLIGRAM(S): 0.4 CAPSULE ORAL at 21:13

## 2019-07-06 RX ADMIN — OXYCODONE HYDROCHLORIDE 10 MILLIGRAM(S): 5 TABLET ORAL at 20:53

## 2019-07-06 RX ADMIN — SENNA PLUS 1 TABLET(S): 8.6 TABLET ORAL at 05:08

## 2019-07-06 RX ADMIN — SENNA PLUS 1 TABLET(S): 8.6 TABLET ORAL at 17:04

## 2019-07-06 RX ADMIN — GABAPENTIN 300 MILLIGRAM(S): 400 CAPSULE ORAL at 05:08

## 2019-07-06 RX ADMIN — OXYCODONE HYDROCHLORIDE 10 MILLIGRAM(S): 5 TABLET ORAL at 05:08

## 2019-07-06 NOTE — PROGRESS NOTE ADULT - SUBJECTIVE AND OBJECTIVE BOX
INTERVAL HPI/OVERNIGHT EVENTS:    SUBJECTIVE:  Patient seen and examined at bedside this AM. Patient temp max to 100.1 overnight but remained afebrile. Patient's pain well controlled, improved since yesterady, tolerating diet, voiding through Min, which had to be reinserted after he failed TOV with acute urinary retention. Flomax was begun and will retry trial voiding in few days. Denies fever, chills, nausea, vomiting, chest pain, SOB, dizziness, abd pain or any other concerning symptoms.        MEDICATIONS  (STANDING):  amLODIPine   Tablet 10 milliGRAM(s) Oral daily  ceFAZolin   IVPB 1000 milliGRAM(s) IV Intermittent every 8 hours  dextrose 5%. 1000 milliLiter(s) (50 mL/Hr) IV Continuous <Continuous>  dextrose 50% Injectable 12.5 Gram(s) IV Push once  dextrose 50% Injectable 25 Gram(s) IV Push once  dextrose 50% Injectable 25 Gram(s) IV Push once  docusate sodium 100 milliGRAM(s) Oral two times a day  enoxaparin Injectable 30 milliGRAM(s) SubCutaneous every 12 hours  gabapentin 300 milliGRAM(s) Oral three times a day  insulin lispro (HumaLOG) corrective regimen sliding scale   SubCutaneous three times a day before meals  insulin lispro (HumaLOG) corrective regimen sliding scale   SubCutaneous at bedtime  senna 1 Tablet(s) Oral two times a day  simvastatin 20 milliGRAM(s) Oral at bedtime  tamsulosin 0.4 milliGRAM(s) Oral at bedtime    MEDICATIONS  (PRN):  acetaminophen   Tablet .. 650 milliGRAM(s) Oral every 6 hours PRN Temp greater or equal to 38C (100.4F), Mild Pain (1 - 3)  dextrose 40% Gel 15 Gram(s) Oral once PRN Blood Glucose LESS THAN 70 milliGRAM(s)/deciliter  glucagon  Injectable 1 milliGRAM(s) IntraMuscular once PRN Glucose LESS THAN 70 milligrams/deciliter  glucagon  Injectable 1 milliGRAM(s) IntraMuscular once PRN Glucose LESS THAN 70 milligrams/deciliter  HYDROmorphone  Injectable 1 milliGRAM(s) IV Push every 3 hours PRN Severe Pain (7 - 10)  ondansetron Injectable 4 milliGRAM(s) IV Push once PRN Nausea and/or Vomiting  oxyCODONE    IR 5 milliGRAM(s) Oral every 4 hours PRN Moderate Pain (4 - 6)  oxyCODONE    IR 10 milliGRAM(s) Oral every 4 hours PRN Severe Pain (7 - 10)      Vital Signs Last 24 Hrs  T(C): 37.8 (2019 23:54), Max: 38.2 (2019 03:08)  T(F): 100.1 (2019 23:54), Max: 100.8 (2019 03:08)  HR: 100 (2019 23:54) (81 - 100)  BP: 114/70 (2019 23:54) (114/70 - 130/70)  BP(mean): --  RR: 18 (2019 23:54) (18 - 18)  SpO2: 96% (2019 23:54) (96% - 98%)    PE  Constitutional: patient resting comfortably in bed, in no acute distress  HEENT: EOMI / PERRL b/l   Respiratory: CTAB respirations are unlabored, no accessory muscle use, no conversational dyspnea  Cardiovascular: regular rate & rhythm   Gastrointestinal: Abdomen soft, non-tender, non-distended, no rebound tenderness / guarding  Musculoskeletal: RLE with VAC applies to lateral calf on suction, donor site lateral thigh dressing intact, swelling around area of superior calf, no sensation or motor function appreciated in RLE      I&O's Detail    2019 07:01  -  2019 07:00  --------------------------------------------------------  IN:    Oral Fluid: 600 mL    Solution: 100 mL  Total IN: 700 mL    OUT:    Bulb: 15 mL    Indwelling Catheter - Urethral: 4000 mL  Total OUT: 4015 mL    Total NET: -3315 mL      2019 07:01  -  2019 02:44  --------------------------------------------------------  IN:    Oral Fluid: 805 mL  Total IN: 805 mL    OUT:    Indwelling Catheter - Urethral: 525 mL  Total OUT: 525 mL    Total NET: 280 mL          LABS:                        9.5    16.62 )-----------( 378      ( 2019 08:52 )             30.6     07-05    138  |  101  |  27.0<H>  ----------------------------<  112  4.9   |  24.0  |  1.29    Ca    8.9      2019 08:52  Phos  3.8     07-  Mg     2.3     07-05        Urinalysis Basic - ( 2019 14:10 )    Color: Yellow / Appearance: Clear / S.020 / pH: x  Gluc: x / Ketone: Negative  / Bili: Negative / Urobili: Negative mg/dL   Blood: x / Protein: 15 mg/dL / Nitrite: Negative   Leuk Esterase: Trace / RBC: 11-25 /HPF / WBC 0-2   Sq Epi: x / Non Sq Epi: Few / Bacteria: Few        RADIOLOGY & ADDITIONAL STUDIES: INTERVAL HPI/OVERNIGHT EVENTS:    SUBJECTIVE:  Patient seen and examined at bedside this AM. Patient temp max to 100.1 overnight but remained afebrile. Patient's pain well controlled, improved since yesterady, tolerating diet, voiding through Min, which had to be reinserted after he failed TOV with acute urinary retention. Flomax was begun and will retry trial voiding in few days. Denies fever, chills, nausea, vomiting, chest pain, SOB, dizziness, abd pain.    Patient has not had a bowel movement for 4 days but is passing flatus. On rectal exam scanty dark brown stool is retrieved from the anal vault. Findings and plan to purse tap water enema was discussed.        MEDICATIONS  (STANDING):  amLODIPine   Tablet 10 milliGRAM(s) Oral daily  ceFAZolin   IVPB 1000 milliGRAM(s) IV Intermittent every 8 hours  dextrose 5%. 1000 milliLiter(s) (50 mL/Hr) IV Continuous <Continuous>  dextrose 50% Injectable 12.5 Gram(s) IV Push once  dextrose 50% Injectable 25 Gram(s) IV Push once  dextrose 50% Injectable 25 Gram(s) IV Push once  docusate sodium 100 milliGRAM(s) Oral two times a day  enoxaparin Injectable 30 milliGRAM(s) SubCutaneous every 12 hours  gabapentin 300 milliGRAM(s) Oral three times a day  insulin lispro (HumaLOG) corrective regimen sliding scale   SubCutaneous three times a day before meals  insulin lispro (HumaLOG) corrective regimen sliding scale   SubCutaneous at bedtime  senna 1 Tablet(s) Oral two times a day  simvastatin 20 milliGRAM(s) Oral at bedtime  tamsulosin 0.4 milliGRAM(s) Oral at bedtime    MEDICATIONS  (PRN):  acetaminophen   Tablet .. 650 milliGRAM(s) Oral every 6 hours PRN Temp greater or equal to 38C (100.4F), Mild Pain (1 - 3)  dextrose 40% Gel 15 Gram(s) Oral once PRN Blood Glucose LESS THAN 70 milliGRAM(s)/deciliter  glucagon  Injectable 1 milliGRAM(s) IntraMuscular once PRN Glucose LESS THAN 70 milligrams/deciliter  glucagon  Injectable 1 milliGRAM(s) IntraMuscular once PRN Glucose LESS THAN 70 milligrams/deciliter  HYDROmorphone  Injectable 1 milliGRAM(s) IV Push every 3 hours PRN Severe Pain (7 - 10)  ondansetron Injectable 4 milliGRAM(s) IV Push once PRN Nausea and/or Vomiting  oxyCODONE    IR 5 milliGRAM(s) Oral every 4 hours PRN Moderate Pain (4 - 6)  oxyCODONE    IR 10 milliGRAM(s) Oral every 4 hours PRN Severe Pain (7 - 10)      Vital Signs Last 24 Hrs  T(C): 37.8 (2019 23:54), Max: 38.2 (2019 03:08)  T(F): 100.1 (2019 23:54), Max: 100.8 (2019 03:08)  HR: 100 (2019 23:54) (81 - 100)  BP: 114/70 (2019 23:54) (114/70 - 130/70)  BP(mean): --  RR: 18 (2019 23:54) (18 - 18)  SpO2: 96% (2019 23:54) (96% - 98%)    PE  Constitutional: patient resting comfortably in bed, in no acute distress  HEENT: EOMI / PERRL b/l   Respiratory: CTAB respirations are unlabored, no accessory muscle use, no conversational dyspnea  Cardiovascular: regular rate & rhythm   Gastrointestinal: Abdomen soft, non-tender, non-distended, no rebound tenderness / guarding  Musculoskeletal: RLE with VAC applies to lateral calf on suction, donor site lateral thigh dressing intact, swelling around area of superior calf, no sensation or motor function appreciated in RLE      I&O's Detail    2019 07:  -  2019 07:00  --------------------------------------------------------  IN:    Oral Fluid: 600 mL    Solution: 100 mL  Total IN: 700 mL    OUT:    Bulb: 15 mL    Indwelling Catheter - Urethral: 4000 mL  Total OUT: 4015 mL    Total NET: -3315 mL      2019 07:01  -  2019 02:44  --------------------------------------------------------  IN:    Oral Fluid: 805 mL  Total IN: 805 mL    OUT:    Indwelling Catheter - Urethral: 525 mL  Total OUT: 525 mL    Total NET: 280 mL          LABS:                        9.5    16.62 )-----------( 378      ( 2019 08:52 )             30.6     07-05    138  |  101  |  27.0<H>  ----------------------------<  112  4.9   |  24.0  |  1.29    Ca    8.9      2019 08:52  Phos  3.8     07-05  Mg     2.3     07-05        Urinalysis Basic - ( 2019 14:10 )    Color: Yellow / Appearance: Clear / S.020 / pH: x  Gluc: x / Ketone: Negative  / Bili: Negative / Urobili: Negative mg/dL   Blood: x / Protein: 15 mg/dL / Nitrite: Negative   Leuk Esterase: Trace / RBC: 11-25 /HPF / WBC 0-2   Sq Epi: x / Non Sq Epi: Few / Bacteria: Few        RADIOLOGY & ADDITIONAL STUDIES: INTERVAL HPI/OVERNIGHT EVENTS:    SUBJECTIVE:  Patient seen and examined at bedside this AM. Patient temp max to 100.1 overnight but remained afebrile. Patient's pain well controlled, improved since yesterady, tolerating diet, voiding through Min, which had to be reinserted after he failed TOV with acute urinary retention. Flomax was begun and will retry trial voiding in few days. Denies fever, chills, nausea, vomiting, chest pain, SOB, dizziness, abd pain.    Patient has not had a bowel movement for 4 days but is passing flatus. On rectal exam scanty dark brown stool is retrieved from the anal vault. Findings and plan to purse tap water enema was discussed. One 2mm Pressure point ulcer was noted on the right glute.          MEDICATIONS  (STANDING):  amLODIPine   Tablet 10 milliGRAM(s) Oral daily  ceFAZolin   IVPB 1000 milliGRAM(s) IV Intermittent every 8 hours  dextrose 5%. 1000 milliLiter(s) (50 mL/Hr) IV Continuous <Continuous>  dextrose 50% Injectable 12.5 Gram(s) IV Push once  dextrose 50% Injectable 25 Gram(s) IV Push once  dextrose 50% Injectable 25 Gram(s) IV Push once  docusate sodium 100 milliGRAM(s) Oral two times a day  enoxaparin Injectable 30 milliGRAM(s) SubCutaneous every 12 hours  gabapentin 300 milliGRAM(s) Oral three times a day  insulin lispro (HumaLOG) corrective regimen sliding scale   SubCutaneous three times a day before meals  insulin lispro (HumaLOG) corrective regimen sliding scale   SubCutaneous at bedtime  senna 1 Tablet(s) Oral two times a day  simvastatin 20 milliGRAM(s) Oral at bedtime  tamsulosin 0.4 milliGRAM(s) Oral at bedtime    MEDICATIONS  (PRN):  acetaminophen   Tablet .. 650 milliGRAM(s) Oral every 6 hours PRN Temp greater or equal to 38C (100.4F), Mild Pain (1 - 3)  dextrose 40% Gel 15 Gram(s) Oral once PRN Blood Glucose LESS THAN 70 milliGRAM(s)/deciliter  glucagon  Injectable 1 milliGRAM(s) IntraMuscular once PRN Glucose LESS THAN 70 milligrams/deciliter  glucagon  Injectable 1 milliGRAM(s) IntraMuscular once PRN Glucose LESS THAN 70 milligrams/deciliter  HYDROmorphone  Injectable 1 milliGRAM(s) IV Push every 3 hours PRN Severe Pain (7 - 10)  ondansetron Injectable 4 milliGRAM(s) IV Push once PRN Nausea and/or Vomiting  oxyCODONE    IR 5 milliGRAM(s) Oral every 4 hours PRN Moderate Pain (4 - 6)  oxyCODONE    IR 10 milliGRAM(s) Oral every 4 hours PRN Severe Pain (7 - 10)      Vital Signs Last 24 Hrs  T(C): 37.8 (2019 23:54), Max: 38.2 (2019 03:08)  T(F): 100.1 (2019 23:54), Max: 100.8 (2019 03:08)  HR: 100 (2019 23:54) (81 - 100)  BP: 114/70 (2019 23:54) (114/70 - 130/70)  BP(mean): --  RR: 18 (2019 23:54) (18 - 18)  SpO2: 96% (2019 23:54) (96% - 98%)    PE  Constitutional: patient resting comfortably in bed, in no acute distress  HEENT: EOMI / PERRL b/l   Respiratory: CTAB respirations are unlabored, no accessory muscle use, no conversational dyspnea  Cardiovascular: regular rate & rhythm   Gastrointestinal: Abdomen soft, non-tender, non-distended, no rebound tenderness / guarding  Musculoskeletal: RLE with VAC applies to lateral calf on suction, donor site lateral thigh dressing intact, swelling around area of superior calf, no sensation or motor function appreciated in RLE      I&O's Detail    2019 07:01  -  2019 07:00  --------------------------------------------------------  IN:    Oral Fluid: 600 mL    Solution: 100 mL  Total IN: 700 mL    OUT:    Bulb: 15 mL    Indwelling Catheter - Urethral: 4000 mL  Total OUT: 4015 mL    Total NET: -3315 mL      2019 07:01  -  2019 02:44  --------------------------------------------------------  IN:    Oral Fluid: 805 mL  Total IN: 805 mL    OUT:    Indwelling Catheter - Urethral: 525 mL  Total OUT: 525 mL    Total NET: 280 mL          LABS:                        9.5    16.62 )-----------( 378      ( 2019 08:52 )             30.6     07-05    138  |  101  |  27.0<H>  ----------------------------<  112  4.9   |  24.0  |  1.29    Ca    8.9      2019 08:52  Phos  3.8     07-05  Mg     2.3     07-05        Urinalysis Basic - ( 2019 14:10 )    Color: Yellow / Appearance: Clear / S.020 / pH: x  Gluc: x / Ketone: Negative  / Bili: Negative / Urobili: Negative mg/dL   Blood: x / Protein: 15 mg/dL / Nitrite: Negative   Leuk Esterase: Trace / RBC: 11-25 /HPF / WBC 0-2   Sq Epi: x / Non Sq Epi: Few / Bacteria: Few        RADIOLOGY & ADDITIONAL STUDIES:

## 2019-07-06 NOTE — PROGRESS NOTE ADULT - SUBJECTIVE AND OBJECTIVE BOX
HPI/OVERNIGHT EVENTS: Patient seen and examined at bedside this AM. Patient febrile to 100 overnight. Patient's pain well controlled, tolerating diet, voiding through Min. Denies fever, chills, nausea, vomiting, chest pain, SOB, dizziness, abd pain or any other concerning symptoms    Vital Signs Last 24 Hrs  T(C): 37.8 (2019 23:54), Max: 38.3 (2019 00:49)  T(F): 100.1 (2019 23:54), Max: 100.9 (2019 00:49)  HR: 100 (2019 23:54) (81 - 100)  BP: 114/70 (2019 23:54) (114/70 - 130/70)  BP(mean): --  RR: 18 (2019 23:54) (18 - 18)  SpO2: 96% (2019 23:54) (96% - 98%)    I&O's Detail    2019 07:01  -  2019 07:00  --------------------------------------------------------  IN:    Oral Fluid: 600 mL    Solution: 100 mL  Total IN: 700 mL    OUT:    Bulb: 15 mL    Indwelling Catheter - Urethral: 4000 mL  Total OUT: 4015 mL    Total NET: -3315 mL      2019 07:01  -  2019 00:14  --------------------------------------------------------  IN:    Oral Fluid: 805 mL  Total IN: 805 mL    OUT:    Indwelling Catheter - Urethral: 525 mL  Total OUT: 525 mL    Total NET: 280 mL              Constitutional: patient resting comfortably in bed, in no acute distress  HEENT: EOMI / PERRL b/l   Respiratory: CTAB respirations are unlabored, no accessory muscle use, no conversational dyspnea  Cardiovascular: regular rate & rhythm   Gastrointestinal: Abdomen soft, non-tender, non-distended, no rebound tenderness / guarding  Musculoskeletal: RLE with VAC applies to lateral calf on suction, donor site lateral thigh dressing intact, swelling around area of superior calf, no sensation or motor function appreciated in RLE    LABS:                        9.5    16.62 )-----------( 378      ( 2019 08:52 )             30.6     07-05    138  |  101  |  27.0<H>  ----------------------------<  112  4.9   |  24.0  |  1.29    Ca    8.9      2019 08:52  Phos  3.8     07-05  Mg     2.3     07-05        Urinalysis Basic - ( 2019 14:10 )    Color: Yellow / Appearance: Clear / S.020 / pH: x  Gluc: x / Ketone: Negative  / Bili: Negative / Urobili: Negative mg/dL   Blood: x / Protein: 15 mg/dL / Nitrite: Negative   Leuk Esterase: Trace / RBC: 11-25 /HPF / WBC 0-2   Sq Epi: x / Non Sq Epi: Few / Bacteria: Few        MEDICATIONS  (STANDING):  amLODIPine   Tablet 10 milliGRAM(s) Oral daily  ceFAZolin   IVPB 1000 milliGRAM(s) IV Intermittent every 8 hours  dextrose 5%. 1000 milliLiter(s) (50 mL/Hr) IV Continuous <Continuous>  dextrose 50% Injectable 12.5 Gram(s) IV Push once  dextrose 50% Injectable 25 Gram(s) IV Push once  dextrose 50% Injectable 25 Gram(s) IV Push once  docusate sodium 100 milliGRAM(s) Oral two times a day  enoxaparin Injectable 30 milliGRAM(s) SubCutaneous every 12 hours  gabapentin 300 milliGRAM(s) Oral three times a day  insulin lispro (HumaLOG) corrective regimen sliding scale   SubCutaneous three times a day before meals  insulin lispro (HumaLOG) corrective regimen sliding scale   SubCutaneous at bedtime  senna 1 Tablet(s) Oral two times a day  simvastatin 20 milliGRAM(s) Oral at bedtime  tamsulosin 0.4 milliGRAM(s) Oral at bedtime    MEDICATIONS  (PRN):  acetaminophen   Tablet .. 650 milliGRAM(s) Oral every 6 hours PRN Temp greater or equal to 38C (100.4F), Mild Pain (1 - 3)  dextrose 40% Gel 15 Gram(s) Oral once PRN Blood Glucose LESS THAN 70 milliGRAM(s)/deciliter  glucagon  Injectable 1 milliGRAM(s) IntraMuscular once PRN Glucose LESS THAN 70 milligrams/deciliter  glucagon  Injectable 1 milliGRAM(s) IntraMuscular once PRN Glucose LESS THAN 70 milligrams/deciliter  HYDROmorphone  Injectable 1 milliGRAM(s) IV Push every 3 hours PRN Severe Pain (7 - 10)  ondansetron Injectable 4 milliGRAM(s) IV Push once PRN Nausea and/or Vomiting  oxyCODONE    IR 5 milliGRAM(s) Oral every 4 hours PRN Moderate Pain (4 - 6)  oxyCODONE    IR 10 milliGRAM(s) Oral every 4 hours PRN Severe Pain (7 - 10)

## 2019-07-06 NOTE — PROGRESS NOTE ADULT - ASSESSMENT
68 M s/p BLE crush injury at work.  RLE profunda to pop w/ gsv bypass, ORIF left tibial plateau.  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  -Continue antibiotics per Ori  -Dressing changes by Plastics team; VAC and donor site were inspected and noted to be noninfect. Comparments RLE soft  -Donor aqua cell w/ ioban to stay until pod8  -VAC to stay till pod5   -Monitor Creatinine: 1.3 up from 1.1 prior  -Flomax is on; Will re-try dcing villegas after 1-2 doses. 68 M s/p BLE crush injury at work.  RLE profunda to pop w/ gsv bypass, ORIF left tibial plateau.  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  -Continue antibiotics per Ori  -Dressing changes by Plastics team; VAC and donor site were inspected and noted to be noninfect. Comparments RLE soft  -Donor aqua cell w/ ioban to stay until pod8  -Pressure point offloading   -VAC to stay till pod5   -Monitor Creatinine: 1.3 up from 1.1 prior  -Flomax is on; Will re-try dcing bakari after 1-2 doses.

## 2019-07-06 NOTE — PROGRESS NOTE ADULT - ASSESSMENT
68 M s/p BLE crush injury at work.  RLE profunda to pop w/ gsv bypass, ORIF left tibial plateau.  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  -Continue antibiotics  -Donor aqua cell w/ ioban to stay until pod8  -VAC to stay till pod5   -Remaining care per primary

## 2019-07-07 DIAGNOSIS — R33.9 RETENTION OF URINE, UNSPECIFIED: ICD-10-CM

## 2019-07-07 LAB
ANION GAP SERPL CALC-SCNC: 13 MMOL/L — SIGNIFICANT CHANGE UP (ref 5–17)
BASOPHILS # BLD AUTO: 0.03 K/UL — SIGNIFICANT CHANGE UP (ref 0–0.2)
BASOPHILS NFR BLD AUTO: 0.2 % — SIGNIFICANT CHANGE UP (ref 0–2)
BUN SERPL-MCNC: 21 MG/DL — HIGH (ref 8–20)
CALCIUM SERPL-MCNC: 8.8 MG/DL — SIGNIFICANT CHANGE UP (ref 8.6–10.2)
CHLORIDE SERPL-SCNC: 100 MMOL/L — SIGNIFICANT CHANGE UP (ref 98–107)
CO2 SERPL-SCNC: 22 MMOL/L — SIGNIFICANT CHANGE UP (ref 22–29)
CREAT SERPL-MCNC: 1 MG/DL — SIGNIFICANT CHANGE UP (ref 0.5–1.3)
EOSINOPHIL # BLD AUTO: 0.02 K/UL — SIGNIFICANT CHANGE UP (ref 0–0.5)
EOSINOPHIL NFR BLD AUTO: 0.1 % — SIGNIFICANT CHANGE UP (ref 0–6)
GLUCOSE BLDC GLUCOMTR-MCNC: 146 MG/DL — HIGH (ref 70–99)
GLUCOSE BLDC GLUCOMTR-MCNC: 155 MG/DL — HIGH (ref 70–99)
GLUCOSE BLDC GLUCOMTR-MCNC: 200 MG/DL — HIGH (ref 70–99)
GLUCOSE BLDC GLUCOMTR-MCNC: 204 MG/DL — HIGH (ref 70–99)
GLUCOSE SERPL-MCNC: 132 MG/DL — HIGH (ref 70–115)
HCT VFR BLD CALC: 29.8 % — LOW (ref 39–50)
HGB BLD-MCNC: 9.3 G/DL — LOW (ref 13–17)
IMM GRANULOCYTES NFR BLD AUTO: 0.6 % — SIGNIFICANT CHANGE UP (ref 0–1.5)
LYMPHOCYTES # BLD AUTO: 1.11 K/UL — SIGNIFICANT CHANGE UP (ref 1–3.3)
LYMPHOCYTES # BLD AUTO: 7.5 % — LOW (ref 13–44)
MAGNESIUM SERPL-MCNC: 2.2 MG/DL — SIGNIFICANT CHANGE UP (ref 1.8–2.6)
MCHC RBC-ENTMCNC: 28.3 PG — SIGNIFICANT CHANGE UP (ref 27–34)
MCHC RBC-ENTMCNC: 31.2 GM/DL — LOW (ref 32–36)
MCV RBC AUTO: 90.6 FL — SIGNIFICANT CHANGE UP (ref 80–100)
MONOCYTES # BLD AUTO: 0.84 K/UL — SIGNIFICANT CHANGE UP (ref 0–0.9)
MONOCYTES NFR BLD AUTO: 5.7 % — SIGNIFICANT CHANGE UP (ref 2–14)
NEUTROPHILS # BLD AUTO: 12.64 K/UL — HIGH (ref 1.8–7.4)
NEUTROPHILS NFR BLD AUTO: 85.9 % — HIGH (ref 43–77)
PHOSPHATE SERPL-MCNC: 3.7 MG/DL — SIGNIFICANT CHANGE UP (ref 2.4–4.7)
PLATELET # BLD AUTO: 372 K/UL — SIGNIFICANT CHANGE UP (ref 150–400)
POTASSIUM SERPL-MCNC: 4.7 MMOL/L — SIGNIFICANT CHANGE UP (ref 3.5–5.3)
POTASSIUM SERPL-MCNC: 5.5 MMOL/L — HIGH (ref 3.5–5.3)
POTASSIUM SERPL-SCNC: 4.7 MMOL/L — SIGNIFICANT CHANGE UP (ref 3.5–5.3)
POTASSIUM SERPL-SCNC: 5.5 MMOL/L — HIGH (ref 3.5–5.3)
RBC # BLD: 3.29 M/UL — LOW (ref 4.2–5.8)
RBC # FLD: 15.4 % — HIGH (ref 10.3–14.5)
SODIUM SERPL-SCNC: 135 MMOL/L — SIGNIFICANT CHANGE UP (ref 135–145)
WBC # BLD: 14.73 K/UL — HIGH (ref 3.8–10.5)
WBC # FLD AUTO: 14.73 K/UL — HIGH (ref 3.8–10.5)

## 2019-07-07 PROCEDURE — 93970 EXTREMITY STUDY: CPT | Mod: 26

## 2019-07-07 PROCEDURE — 99232 SBSQ HOSP IP/OBS MODERATE 35: CPT

## 2019-07-07 PROCEDURE — 93010 ELECTROCARDIOGRAM REPORT: CPT | Mod: 76

## 2019-07-07 RX ORDER — POLYETHYLENE GLYCOL 3350 17 G/17G
17 POWDER, FOR SOLUTION ORAL
Refills: 0 | Status: DISCONTINUED | OUTPATIENT
Start: 2019-07-07 | End: 2019-07-14

## 2019-07-07 RX ORDER — CALCIUM GLUCONATE 100 MG/ML
1 VIAL (ML) INTRAVENOUS ONCE
Refills: 0 | Status: COMPLETED | OUTPATIENT
Start: 2019-07-07 | End: 2019-07-07

## 2019-07-07 RX ORDER — SODIUM POLYSTYRENE SULFONATE 4.1 MEQ/G
15 POWDER, FOR SUSPENSION ORAL ONCE
Refills: 0 | Status: COMPLETED | OUTPATIENT
Start: 2019-07-07 | End: 2019-07-07

## 2019-07-07 RX ORDER — ENOXAPARIN SODIUM 100 MG/ML
80 INJECTION SUBCUTANEOUS
Refills: 0 | Status: DISCONTINUED | OUTPATIENT
Start: 2019-07-07 | End: 2019-07-14

## 2019-07-07 RX ADMIN — POLYETHYLENE GLYCOL 3350 17 GRAM(S): 17 POWDER, FOR SOLUTION ORAL at 17:48

## 2019-07-07 RX ADMIN — Medication 100 MILLIGRAM(S): at 13:54

## 2019-07-07 RX ADMIN — Medication 650 MILLIGRAM(S): at 08:00

## 2019-07-07 RX ADMIN — Medication 100 MILLIGRAM(S): at 05:28

## 2019-07-07 RX ADMIN — GABAPENTIN 300 MILLIGRAM(S): 400 CAPSULE ORAL at 22:41

## 2019-07-07 RX ADMIN — OXYCODONE HYDROCHLORIDE 10 MILLIGRAM(S): 5 TABLET ORAL at 19:41

## 2019-07-07 RX ADMIN — TAMSULOSIN HYDROCHLORIDE 0.4 MILLIGRAM(S): 0.4 CAPSULE ORAL at 22:41

## 2019-07-07 RX ADMIN — Medication 4: at 16:33

## 2019-07-07 RX ADMIN — ENOXAPARIN SODIUM 80 MILLIGRAM(S): 100 INJECTION SUBCUTANEOUS at 13:54

## 2019-07-07 RX ADMIN — SODIUM POLYSTYRENE SULFONATE 15 GRAM(S): 4.1 POWDER, FOR SUSPENSION ORAL at 13:55

## 2019-07-07 RX ADMIN — Medication 100 MILLIGRAM(S): at 17:48

## 2019-07-07 RX ADMIN — GABAPENTIN 300 MILLIGRAM(S): 400 CAPSULE ORAL at 13:55

## 2019-07-07 RX ADMIN — Medication 10 MILLIGRAM(S): at 13:55

## 2019-07-07 RX ADMIN — Medication 100 MILLIGRAM(S): at 22:41

## 2019-07-07 RX ADMIN — SIMVASTATIN 20 MILLIGRAM(S): 20 TABLET, FILM COATED ORAL at 22:41

## 2019-07-07 RX ADMIN — Medication 200 GRAM(S): at 14:27

## 2019-07-07 RX ADMIN — AMLODIPINE BESYLATE 10 MILLIGRAM(S): 2.5 TABLET ORAL at 05:28

## 2019-07-07 RX ADMIN — SENNA PLUS 1 TABLET(S): 8.6 TABLET ORAL at 05:28

## 2019-07-07 RX ADMIN — SENNA PLUS 1 TABLET(S): 8.6 TABLET ORAL at 17:48

## 2019-07-07 RX ADMIN — Medication 2: at 11:48

## 2019-07-07 RX ADMIN — Medication 650 MILLIGRAM(S): at 07:43

## 2019-07-07 RX ADMIN — OXYCODONE HYDROCHLORIDE 10 MILLIGRAM(S): 5 TABLET ORAL at 20:40

## 2019-07-07 RX ADMIN — GABAPENTIN 300 MILLIGRAM(S): 400 CAPSULE ORAL at 05:28

## 2019-07-07 NOTE — PROGRESS NOTE ADULT - ASSESSMENT
Pt is a 68 M Pt is 67 yo M after Forklift crush to RLE.     Control wound healing, manage pain medication.

## 2019-07-07 NOTE — CONSULT NOTE ADULT - SUBJECTIVE AND OBJECTIVE BOX
HPI:  HPI:   68yMale BIBEMS on 19 by ground  activated as code B trauma. Patient was at work when he was reportedly crushed/impaled by forklift. Possible associated fall, pt denies any LOC. Per EMS, pt lost approximately 1-2 Liters of blood from RLE laceration before a tourniquet was applied at approximately 10:19am. Patient brought to [facility] where on arrival, patient protecting airway, able to with ease, had nonlabored breathing, and had good central pulses. Patient's tournigquet and dressing over RLE posterior lateral laceration taken down. No active bleeding noted from approximately 10cm full thickness laceration. No pulse signals distal to right knee. Pt unable to dorsiflex or plantarflex right foot and had decreased sensation distal to knee.       Prehospital interventions: cervical collar, right thigh tourniquet    A: Protected, patient conversing  B: CTABL. Symmetrical chest rise  C: 2+ central (carotid, femoral). 2+ LLE pulse. RLE w/o DP/PT signals  D: GCS 15, interacting. unable to dorsiflex or plantarflex right foot.   E: 10cm full thickness laceration to posterior lateral right leg.     Initial Vitals:  Temp:    98  HR:    96  BP:   98/56    RR:     18  SpO2:   95  %    CXR: Negative for evidence of hemo/pneumothorax    Trauma bay interventions: cervical collar exchanged, 1U PRBC, adacel, 2g ancef,     Asked to evaluate foir urinary retention.  The patient denies baseline problems with voiding.  He does not recall having his villegas removed while in the hospital and denies failing tov.  NO FH prostate cancer.    PMH:  GERD (gastroesophageal reflux disease) [Active]  HLD (hyperlipidemia) [Active]      PSH:  Denies any significant past surgical history        Home Medications:  states he takes metoprolol and a statin, unsure of doses    ALL:  NKDA    FMH:  No significant family history    SOC Hx:   Denies etoh, tobacco, or drug use (2019 11:43)      PAST MEDICAL & SURGICAL HISTORY:  GERD (gastroesophageal reflux disease)  HLD (hyperlipidemia)  No significant past surgical history      REVIEW OF SYSTEMS:    Constitutional: No fever, weight loss or fatigue  Eyes: No eye pain, visual disturbances, or discharge  ENMT:  No difficulty hearing, tinnitus, vertigo; No sinus or throat pain  Respiratory: No cough, wheezing, chills or hemoptysis  Cardiovascular: No chest pain, palpitations, shortness of breath, dizziness or leg swelling  Gastrointestinal: No abdominal or epigastric pain. No nausea, vomiting or hematemesis; No diarrhea or constipation. No melena or hematochezia.  Genitourinary: No dysuria, frequency, hematuria or incontinence  Rectal: No pain, hemorrhoids or incontinence  Neurological: No headaches, memory loss, loss of strength, numbness or tremors  Skin: No itching, burning, rashes or lesions   Lymph Nodes: No enlarged glands  Musculoskeletal: No joint pain or swelling; No muscle, back or extremity pain  Psychiatric: No depression, anxiety, mood swings or difficulty sleeping  Heme/Lymph: No easy bruising or bleeding gums  Allergy and Immunologic: No hives or eczema    MEDICATIONS  (STANDING):  amLODIPine   Tablet 10 milliGRAM(s) Oral daily  bisacodyl Suppository 10 milliGRAM(s) Rectal once  calcium gluconate IVPB 1 Gram(s) IV Intermittent once  ceFAZolin   IVPB 1000 milliGRAM(s) IV Intermittent every 8 hours  dextrose 5%. 1000 milliLiter(s) (50 mL/Hr) IV Continuous <Continuous>  dextrose 50% Injectable 12.5 Gram(s) IV Push once  dextrose 50% Injectable 25 Gram(s) IV Push once  dextrose 50% Injectable 25 Gram(s) IV Push once  docusate sodium 100 milliGRAM(s) Oral two times a day  enoxaparin Injectable 80 milliGRAM(s) SubCutaneous two times a day  gabapentin 300 milliGRAM(s) Oral three times a day  insulin lispro (HumaLOG) corrective regimen sliding scale   SubCutaneous three times a day before meals  insulin lispro (HumaLOG) corrective regimen sliding scale   SubCutaneous at bedtime  polyethylene glycol 3350 17 Gram(s) Oral two times a day  senna 1 Tablet(s) Oral two times a day  simvastatin 20 milliGRAM(s) Oral at bedtime  sodium polystyrene sulfonate Suspension 15 Gram(s) Oral once  tamsulosin 0.4 milliGRAM(s) Oral at bedtime    MEDICATIONS  (PRN):  acetaminophen   Tablet .. 650 milliGRAM(s) Oral every 6 hours PRN Temp greater or equal to 38C (100.4F), Mild Pain (1 - 3)  dextrose 40% Gel 15 Gram(s) Oral once PRN Blood Glucose LESS THAN 70 milliGRAM(s)/deciliter  glucagon  Injectable 1 milliGRAM(s) IntraMuscular once PRN Glucose LESS THAN 70 milligrams/deciliter  glucagon  Injectable 1 milliGRAM(s) IntraMuscular once PRN Glucose LESS THAN 70 milligrams/deciliter  HYDROmorphone  Injectable 1 milliGRAM(s) IV Push every 3 hours PRN Severe Pain (7 - 10)  ondansetron Injectable 4 milliGRAM(s) IV Push once PRN Nausea and/or Vomiting  oxyCODONE    IR 5 milliGRAM(s) Oral every 4 hours PRN Moderate Pain (4 - 6)  oxyCODONE    IR 10 milliGRAM(s) Oral every 4 hours PRN Severe Pain (7 - 10)      Allergies    No Known Allergies    Intolerances        SOCIAL HISTORY:    FAMILY HISTORY:      Vital Signs Last 24 Hrs  T(C): 37 (2019 10:57), Max: 38.3 (2019 15:55)  T(F): 98.6 (2019 10:57), Max: 101 (2019 15:55)  HR: 84 (2019 07:43) (84 - 95)  BP: 129/75 (2019 07:43) (120/70 - 132/70)  BP(mean): --  RR: 19 (2019 00:28) (18 - 19)  SpO2: 98% (2019 15:55) (98% - 98%)    PHYSICAL EXAM:    General: Well developed; well nourished; in no acute distress  Head: Normocephalic; atraumatic  Respiratory: No wheezes, rales or rhonchi  Cardiovascular: Regular rate and rhythm. S1 and S2 Normal; No murmurs, gallops or rubs  Gastrointestinal: Soft non-tender non-distended; Normal bowel sounds; No hepatosplenomegally  -  Villegas draining clear yellow urine  No costovertebral angle tenderness.  Urinary bladder is clinically not distended       LABS:                        9.3    14.73 )-----------( 372      ( 2019 08:39 )             29.8         135  |  100  |  21.0<H>  ----------------------------<  132<H>  5.5<H>   |  22.0  |  1.00    Ca    8.8      2019 08:39  Phos  3.7     07-07  Mg     2.2     07-07        Urinalysis Basic - ( 2019 14:10 )    Color: Yellow / Appearance: Clear / S.020 / pH: x  Gluc: x / Ketone: Negative  / Bili: Negative / Urobili: Negative mg/dL   Blood: x / Protein: 15 mg/dL / Nitrite: Negative   Leuk Esterase: Trace / RBC: 11-25 /HPF / WBC 0-2   Sq Epi: x / Non Sq Epi: Few / Bacteria: Few        RADIOLOGY & ADDITIONAL STUDIES:

## 2019-07-07 NOTE — PROGRESS NOTE ADULT - SUBJECTIVE AND OBJECTIVE BOX
HPI/OVERNIGHT EVENTS:    BMP in the afternoon showed diminished Na+, Saline bolus was order. PJ bags draining <10 cc dark serosanguineous.    MEDICATIONS  (STANDING):  amLODIPine   Tablet 10 milliGRAM(s) Oral daily  ceFAZolin   IVPB 1000 milliGRAM(s) IV Intermittent every 8 hours  dextrose 5%. 1000 milliLiter(s) (50 mL/Hr) IV Continuous <Continuous>  dextrose 50% Injectable 12.5 Gram(s) IV Push once  dextrose 50% Injectable 25 Gram(s) IV Push once  dextrose 50% Injectable 25 Gram(s) IV Push once  docusate sodium 100 milliGRAM(s) Oral two times a day  enoxaparin Injectable 30 milliGRAM(s) SubCutaneous every 12 hours  gabapentin 300 milliGRAM(s) Oral three times a day  insulin lispro (HumaLOG) corrective regimen sliding scale   SubCutaneous three times a day before meals  insulin lispro (HumaLOG) corrective regimen sliding scale   SubCutaneous at bedtime  senna 1 Tablet(s) Oral two times a day  simvastatin 20 milliGRAM(s) Oral at bedtime  tamsulosin 0.4 milliGRAM(s) Oral at bedtime    MEDICATIONS  (PRN):  acetaminophen   Tablet .. 650 milliGRAM(s) Oral every 6 hours PRN Temp greater or equal to 38C (100.4F), Mild Pain (1 - 3)  dextrose 40% Gel 15 Gram(s) Oral once PRN Blood Glucose LESS THAN 70 milliGRAM(s)/deciliter  glucagon  Injectable 1 milliGRAM(s) IntraMuscular once PRN Glucose LESS THAN 70 milligrams/deciliter  glucagon  Injectable 1 milliGRAM(s) IntraMuscular once PRN Glucose LESS THAN 70 milligrams/deciliter  HYDROmorphone  Injectable 1 milliGRAM(s) IV Push every 3 hours PRN Severe Pain (7 - 10)  ondansetron Injectable 4 milliGRAM(s) IV Push once PRN Nausea and/or Vomiting  oxyCODONE    IR 5 milliGRAM(s) Oral every 4 hours PRN Moderate Pain (4 - 6)  oxyCODONE    IR 10 milliGRAM(s) Oral every 4 hours PRN Severe Pain (7 - 10)      Vital Signs Last 24 Hrs  T(C): 37 (2019 00:28), Max: 38.3 (2019 15:55)  T(F): 98.6 (2019 00:28), Max: 101 (2019 15:55)  HR: 93 (2019 00:28) (92 - 95)  BP: 124/78 (2019 00:28) (110/62 - 124/78)  BP(mean): --  RR: 19 (2019 00:28) (18 - 19)  SpO2: 98% (2019 15:55) (98% - 98%)    Constitutional: patient resting comfortably in bed, in no acute distress  HEENT: EOMI / PERRL b/l, no active drainage or redness  Neck: No JVD, full ROM without pain  Respiratory: respirations are unlabored, no accessory muscle use, no conversational dyspnea  Cardiovascular: regular rate & rhythm  Gastrointestinal: Abdomen soft, non-tender, non-distended, no rebound tenderness / guarding  Neurological: GCS: 15 (4/5/6). A&O x 3; Left leg sensation and motor compromised.   Psychiatric: Normal mood, normal affect  Musculoskeletal: No joint pain, swelling or deformity; no limitation of movement      I&O's Detail    2019 07:01  -  2019 07:00  --------------------------------------------------------  IN:    Oral Fluid: 805 mL  Total IN: 805 mL    OUT:    Bulb: 15 mL    Indwelling Catheter - Urethral: 2075 mL    VAC (Vacuum Assisted Closure) System: 300 mL  Total OUT: 2390 mL    Total NET: -1585 mL      2019 07:01  -  2019 01:18  --------------------------------------------------------  IN:  Total IN: 0 mL    OUT:    Bulb: 2.5 mL    Indwelling Catheter - Urethral: 900 mL    Voided: 1200 mL  Total OUT: 2102.5 mL    Total NET: -2102.5 mL          LABS:                        8.8    14.71 )-----------( 388      ( 2019 09:43 )             28.4     07-06    132<L>  |  98  |  22.0<H>  ----------------------------<  154<H>  5.2   |  22.0  |  0.95    Ca    8.8      2019 14:54  Phos  3.3     07-  Mg     2.1     -        Urinalysis Basic - ( 2019 14:10 )    Color: Yellow / Appearance: Clear / S.020 / pH: x  Gluc: x / Ketone: Negative  / Bili: Negative / Urobili: Negative mg/dL   Blood: x / Protein: 15 mg/dL / Nitrite: Negative   Leuk Esterase: Trace / RBC: 11-25 /HPF / WBC 0-2   Sq Epi: x / Non Sq Epi: Few / Bacteria: Few

## 2019-07-08 LAB
ALBUMIN SERPL ELPH-MCNC: 2.7 G/DL — LOW (ref 3.3–5.2)
ALP SERPL-CCNC: 116 U/L — SIGNIFICANT CHANGE UP (ref 40–120)
ALT FLD-CCNC: 122 U/L — HIGH
ANION GAP SERPL CALC-SCNC: 8 MMOL/L — SIGNIFICANT CHANGE UP (ref 5–17)
ANION GAP SERPL CALC-SCNC: 9 MMOL/L — SIGNIFICANT CHANGE UP (ref 5–17)
AST SERPL-CCNC: 142 U/L — HIGH
BASOPHILS # BLD AUTO: 0.02 K/UL — SIGNIFICANT CHANGE UP (ref 0–0.2)
BASOPHILS NFR BLD AUTO: 0.2 % — SIGNIFICANT CHANGE UP (ref 0–2)
BILIRUB SERPL-MCNC: 0.6 MG/DL — SIGNIFICANT CHANGE UP (ref 0.4–2)
BUN SERPL-MCNC: 15 MG/DL — SIGNIFICANT CHANGE UP (ref 8–20)
BUN SERPL-MCNC: 17 MG/DL — SIGNIFICANT CHANGE UP (ref 8–20)
CALCIUM SERPL-MCNC: 8.5 MG/DL — LOW (ref 8.6–10.2)
CALCIUM SERPL-MCNC: 8.5 MG/DL — LOW (ref 8.6–10.2)
CHLORIDE SERPL-SCNC: 100 MMOL/L — SIGNIFICANT CHANGE UP (ref 98–107)
CHLORIDE SERPL-SCNC: 97 MMOL/L — LOW (ref 98–107)
CO2 SERPL-SCNC: 27 MMOL/L — SIGNIFICANT CHANGE UP (ref 22–29)
CO2 SERPL-SCNC: 27 MMOL/L — SIGNIFICANT CHANGE UP (ref 22–29)
CREAT SERPL-MCNC: 0.84 MG/DL — SIGNIFICANT CHANGE UP (ref 0.5–1.3)
CREAT SERPL-MCNC: 0.87 MG/DL — SIGNIFICANT CHANGE UP (ref 0.5–1.3)
EOSINOPHIL # BLD AUTO: 0.16 K/UL — SIGNIFICANT CHANGE UP (ref 0–0.5)
EOSINOPHIL NFR BLD AUTO: 1.6 % — SIGNIFICANT CHANGE UP (ref 0–6)
GLUCOSE BLDC GLUCOMTR-MCNC: 152 MG/DL — HIGH (ref 70–99)
GLUCOSE BLDC GLUCOMTR-MCNC: 153 MG/DL — HIGH (ref 70–99)
GLUCOSE BLDC GLUCOMTR-MCNC: 163 MG/DL — HIGH (ref 70–99)
GLUCOSE BLDC GLUCOMTR-MCNC: 205 MG/DL — HIGH (ref 70–99)
GLUCOSE BLDC GLUCOMTR-MCNC: 214 MG/DL — HIGH (ref 70–99)
GLUCOSE SERPL-MCNC: 144 MG/DL — HIGH (ref 70–115)
GLUCOSE SERPL-MCNC: 201 MG/DL — HIGH (ref 70–115)
HCT VFR BLD CALC: 25.7 % — LOW (ref 39–50)
HCT VFR BLD CALC: 31.7 % — LOW (ref 39–50)
HGB BLD-MCNC: 8.3 G/DL — LOW (ref 13–17)
HGB BLD-MCNC: 9.4 G/DL — LOW (ref 13–17)
IMM GRANULOCYTES NFR BLD AUTO: 0.8 % — SIGNIFICANT CHANGE UP (ref 0–1.5)
LACTATE BLDV-MCNC: 0.8 MMOL/L — SIGNIFICANT CHANGE UP (ref 0.5–2)
LYMPHOCYTES # BLD AUTO: 1.33 K/UL — SIGNIFICANT CHANGE UP (ref 1–3.3)
LYMPHOCYTES # BLD AUTO: 13.1 % — SIGNIFICANT CHANGE UP (ref 13–44)
MAGNESIUM SERPL-MCNC: 2 MG/DL — SIGNIFICANT CHANGE UP (ref 1.6–2.6)
MAGNESIUM SERPL-MCNC: 2 MG/DL — SIGNIFICANT CHANGE UP (ref 1.8–2.6)
MCHC RBC-ENTMCNC: 27.8 PG — SIGNIFICANT CHANGE UP (ref 27–34)
MCHC RBC-ENTMCNC: 28.7 PG — SIGNIFICANT CHANGE UP (ref 27–34)
MCHC RBC-ENTMCNC: 29.7 GM/DL — LOW (ref 32–36)
MCHC RBC-ENTMCNC: 32.3 GM/DL — SIGNIFICANT CHANGE UP (ref 32–36)
MCV RBC AUTO: 88.9 FL — SIGNIFICANT CHANGE UP (ref 80–100)
MCV RBC AUTO: 93.8 FL — SIGNIFICANT CHANGE UP (ref 80–100)
MONOCYTES # BLD AUTO: 0.83 K/UL — SIGNIFICANT CHANGE UP (ref 0–0.9)
MONOCYTES NFR BLD AUTO: 8.2 % — SIGNIFICANT CHANGE UP (ref 2–14)
NEUTROPHILS # BLD AUTO: 7.71 K/UL — HIGH (ref 1.8–7.4)
NEUTROPHILS NFR BLD AUTO: 76.1 % — SIGNIFICANT CHANGE UP (ref 43–77)
PHOSPHATE SERPL-MCNC: 2.5 MG/DL — SIGNIFICANT CHANGE UP (ref 2.4–4.7)
PHOSPHATE SERPL-MCNC: 2.6 MG/DL — SIGNIFICANT CHANGE UP (ref 2.4–4.7)
PLATELET # BLD AUTO: 389 K/UL — SIGNIFICANT CHANGE UP (ref 150–400)
PLATELET # BLD AUTO: 415 K/UL — HIGH (ref 150–400)
POTASSIUM SERPL-MCNC: 4.3 MMOL/L — SIGNIFICANT CHANGE UP (ref 3.5–5.3)
POTASSIUM SERPL-MCNC: 4.4 MMOL/L — SIGNIFICANT CHANGE UP (ref 3.5–5.3)
POTASSIUM SERPL-SCNC: 4.3 MMOL/L — SIGNIFICANT CHANGE UP (ref 3.5–5.3)
POTASSIUM SERPL-SCNC: 4.4 MMOL/L — SIGNIFICANT CHANGE UP (ref 3.5–5.3)
PROT SERPL-MCNC: 6.9 G/DL — SIGNIFICANT CHANGE UP (ref 6.6–8.7)
RBC # BLD: 2.89 M/UL — LOW (ref 4.2–5.8)
RBC # BLD: 3.38 M/UL — LOW (ref 4.2–5.8)
RBC # FLD: 15.1 % — HIGH (ref 10.3–14.5)
RBC # FLD: 15.3 % — HIGH (ref 10.3–14.5)
SODIUM SERPL-SCNC: 132 MMOL/L — LOW (ref 135–145)
SODIUM SERPL-SCNC: 136 MMOL/L — SIGNIFICANT CHANGE UP (ref 135–145)
WBC # BLD: 10.13 K/UL — SIGNIFICANT CHANGE UP (ref 3.8–10.5)
WBC # BLD: 8.65 K/UL — SIGNIFICANT CHANGE UP (ref 3.8–10.5)
WBC # FLD AUTO: 10.13 K/UL — SIGNIFICANT CHANGE UP (ref 3.8–10.5)
WBC # FLD AUTO: 8.65 K/UL — SIGNIFICANT CHANGE UP (ref 3.8–10.5)

## 2019-07-08 PROCEDURE — 93010 ELECTROCARDIOGRAM REPORT: CPT

## 2019-07-08 PROCEDURE — 99231 SBSQ HOSP IP/OBS SF/LOW 25: CPT

## 2019-07-08 RX ORDER — SODIUM CHLORIDE 9 MG/ML
2000 INJECTION, SOLUTION INTRAVENOUS ONCE
Refills: 0 | Status: COMPLETED | OUTPATIENT
Start: 2019-07-08 | End: 2019-07-08

## 2019-07-08 RX ADMIN — OXYCODONE HYDROCHLORIDE 10 MILLIGRAM(S): 5 TABLET ORAL at 10:00

## 2019-07-08 RX ADMIN — Medication 100 MILLIGRAM(S): at 06:26

## 2019-07-08 RX ADMIN — GABAPENTIN 300 MILLIGRAM(S): 400 CAPSULE ORAL at 13:44

## 2019-07-08 RX ADMIN — SODIUM CHLORIDE 4000 MILLILITER(S): 9 INJECTION, SOLUTION INTRAVENOUS at 01:04

## 2019-07-08 RX ADMIN — POLYETHYLENE GLYCOL 3350 17 GRAM(S): 17 POWDER, FOR SOLUTION ORAL at 06:26

## 2019-07-08 RX ADMIN — Medication 650 MILLIGRAM(S): at 16:31

## 2019-07-08 RX ADMIN — AMLODIPINE BESYLATE 10 MILLIGRAM(S): 2.5 TABLET ORAL at 06:26

## 2019-07-08 RX ADMIN — Medication 100 MILLIGRAM(S): at 22:47

## 2019-07-08 RX ADMIN — Medication 4: at 11:26

## 2019-07-08 RX ADMIN — SIMVASTATIN 20 MILLIGRAM(S): 20 TABLET, FILM COATED ORAL at 22:47

## 2019-07-08 RX ADMIN — SENNA PLUS 1 TABLET(S): 8.6 TABLET ORAL at 18:10

## 2019-07-08 RX ADMIN — POLYETHYLENE GLYCOL 3350 17 GRAM(S): 17 POWDER, FOR SOLUTION ORAL at 18:10

## 2019-07-08 RX ADMIN — Medication 100 MILLIGRAM(S): at 13:43

## 2019-07-08 RX ADMIN — Medication 650 MILLIGRAM(S): at 00:25

## 2019-07-08 RX ADMIN — GABAPENTIN 300 MILLIGRAM(S): 400 CAPSULE ORAL at 06:26

## 2019-07-08 RX ADMIN — SENNA PLUS 1 TABLET(S): 8.6 TABLET ORAL at 06:26

## 2019-07-08 RX ADMIN — TAMSULOSIN HYDROCHLORIDE 0.4 MILLIGRAM(S): 0.4 CAPSULE ORAL at 22:47

## 2019-07-08 RX ADMIN — ENOXAPARIN SODIUM 80 MILLIGRAM(S): 100 INJECTION SUBCUTANEOUS at 01:05

## 2019-07-08 RX ADMIN — Medication 100 MILLIGRAM(S): at 18:10

## 2019-07-08 RX ADMIN — OXYCODONE HYDROCHLORIDE 10 MILLIGRAM(S): 5 TABLET ORAL at 10:26

## 2019-07-08 RX ADMIN — ENOXAPARIN SODIUM 80 MILLIGRAM(S): 100 INJECTION SUBCUTANEOUS at 11:26

## 2019-07-08 RX ADMIN — Medication 650 MILLIGRAM(S): at 01:07

## 2019-07-08 RX ADMIN — Medication 2: at 16:24

## 2019-07-08 RX ADMIN — ENOXAPARIN SODIUM 80 MILLIGRAM(S): 100 INJECTION SUBCUTANEOUS at 18:10

## 2019-07-08 RX ADMIN — GABAPENTIN 300 MILLIGRAM(S): 400 CAPSULE ORAL at 22:47

## 2019-07-08 NOTE — PROGRESS NOTE ADULT - SUBJECTIVE AND OBJECTIVE BOX
New findings of acute right DVT in distal femoral vein from tibioperoneal trunk. patient was started on therapeutic lovenox. no complaints of shortness of breath, difficulty breathing. tolerating diet, having bowel function  code sepsis at 1AM for fever to 102 and tachycardic to 102. patient was bolused 2L IV fluids and repeat labs drawn. no cultures drawn       MEDICATIONS  (STANDING):  amLODIPine   Tablet 10 milliGRAM(s) Oral daily  ceFAZolin   IVPB 1000 milliGRAM(s) IV Intermittent every 8 hours  dextrose 5%. 1000 milliLiter(s) (50 mL/Hr) IV Continuous <Continuous>  dextrose 50% Injectable 12.5 Gram(s) IV Push once  dextrose 50% Injectable 25 Gram(s) IV Push once  dextrose 50% Injectable 25 Gram(s) IV Push once  docusate sodium 100 milliGRAM(s) Oral two times a day  enoxaparin Injectable 80 milliGRAM(s) SubCutaneous two times a day  gabapentin 300 milliGRAM(s) Oral three times a day  insulin lispro (HumaLOG) corrective regimen sliding scale   SubCutaneous three times a day before meals  insulin lispro (HumaLOG) corrective regimen sliding scale   SubCutaneous at bedtime  polyethylene glycol 3350 17 Gram(s) Oral two times a day  senna 1 Tablet(s) Oral two times a day  simvastatin 20 milliGRAM(s) Oral at bedtime  tamsulosin 0.4 milliGRAM(s) Oral at bedtime    MEDICATIONS  (PRN):  acetaminophen   Tablet .. 650 milliGRAM(s) Oral every 6 hours PRN Temp greater or equal to 38C (100.4F), Mild Pain (1 - 3)  dextrose 40% Gel 15 Gram(s) Oral once PRN Blood Glucose LESS THAN 70 milliGRAM(s)/deciliter  glucagon  Injectable 1 milliGRAM(s) IntraMuscular once PRN Glucose LESS THAN 70 milligrams/deciliter  glucagon  Injectable 1 milliGRAM(s) IntraMuscular once PRN Glucose LESS THAN 70 milligrams/deciliter  HYDROmorphone  Injectable 1 milliGRAM(s) IV Push every 3 hours PRN Severe Pain (7 - 10)  ondansetron Injectable 4 milliGRAM(s) IV Push once PRN Nausea and/or Vomiting  oxyCODONE    IR 5 milliGRAM(s) Oral every 4 hours PRN Moderate Pain (4 - 6)  oxyCODONE    IR 10 milliGRAM(s) Oral every 4 hours PRN Severe Pain (7 - 10)      Vital Signs Last 24 Hrs  T(C): 39.3 (08 Jul 2019 00:19), Max: 39.3 (08 Jul 2019 00:19)  T(F): 102.8 (08 Jul 2019 00:19), Max: 102.8 (08 Jul 2019 00:19)  HR: 100 (08 Jul 2019 00:19) (84 - 100)  BP: 115/61 (08 Jul 2019 00:19) (115/61 - 132/70)  BP(mean): --  RR: 19 (08 Jul 2019 00:19) (18 - 19)  SpO2: 95% (07 Jul 2019 16:31) (93% - 95%)    Physical Exam:    general: no acute distress, AOx3  Respiratory: Breath Sounds equal & clear to auscultation, no accessory muscle use  Cardiovascular: Regular rate & rhythm, normal S1, S2; no murmurs, gallops or rubs  Gastrointestinal: Soft, non-tender, normal bowel sounds  Extremities: right lower extremity with no motor/decreased sensation. edema in calf with wound vac intact. ZAMZAM with serosanguinous fluid. donor site on right thigh with ioban. left lower extremity in ace wrap c/d/i  Vascular: Equal and normal pulses: 2+ peripheral pulses throughout    Musculoskeletal: No joint pain, swelling or deformity; no limitation of movement    Skin: No rashes      I&O's Detail    06 Jul 2019 07:01  -  07 Jul 2019 07:00  --------------------------------------------------------  IN:  Total IN: 0 mL    OUT:    Bulb: 7.5 mL    Indwelling Catheter - Urethral: 1900 mL    Voided: 1200 mL  Total OUT: 3107.5 mL    Total NET: -3107.5 mL      07 Jul 2019 07:01  -  08 Jul 2019 03:53  --------------------------------------------------------  IN:  Total IN: 0 mL    OUT:    Bulb: 3 mL    Indwelling Catheter - Urethral: 2775 mL  Total OUT: 2778 mL    Total NET: -2778 mL          LABS:                        8.3    10.13 )-----------( 415      ( 08 Jul 2019 00:58 )             25.7     07-08    132<L>  |  97<L>  |  17.0  ----------------------------<  144<H>  4.3   |  27.0  |  0.87    Ca    8.5<L>      08 Jul 2019 00:58  Phos  2.5     07-08  Mg     2.0     07-08    TPro  6.9  /  Alb  2.7<L>  /  TBili  0.6  /  DBili  x   /  AST  142<H>  /  ALT  122<H>  /  AlkPhos  116  07-08          RADIOLOGY & ADDITIONAL STUDIES:

## 2019-07-08 NOTE — PROGRESS NOTE ADULT - ASSESSMENT
68 year old male s/p forklift crush injury with fem pop bypass, four compartment fasciotomy; L tibial plateau repair, R common peroneal nerve graft/repair    -pain control  -encourage IS use  -consistent carb diet  -maintain euglycemia  -therapeutic lovenox for RLE DVT  -f/u plastic for dressing change POD 5  -continue flomax; continue villegas for urinary retention

## 2019-07-08 NOTE — RAPID RESPONSE TEAM SUMMARY - NSSITUATIONBACKGROUNDRRT_GEN_ALL_CORE
patient was sitting comfortably in bed. vitals were recorded as febrile to 102.8 tachycardic to 102, normotensive, respiratory rate 16 saturating well on room air. no complaints of pain, shortness of breath    on additional examination, patient had defervesced, had no complaints and was feeling well. on physical exam, lungs were clear, abdomen was soft/nontender. right lower extremity with wound vac taken down by plastic surgery did not reveal any purulent drainage or areas of necrosis.

## 2019-07-08 NOTE — CHART NOTE - NSCHARTNOTEFT_GEN_A_CORE
EVENTS:    Code Sepsis called for change in vitals, Trectal 102.8, , /74, RR17, SaO2 95% RA    Patient was evaluated at bedside. No complaints at this time.   On PE patient was resting comfortably in bed.    General: A&Ox3  CVS: S1,S2 no r/m/g  Lungs: CTA B/L  Abdomen: Soft, NT,ND   Extremities: Wound vac in place on RLE with ZAMZAM drain in place. Right leg is warm to touch and edematous with mild erythema. Pedal pulses are 1+ on the right and 2+ on the left. Staples are placed proximal to the vac site. Back was examined and revealed small areas of skin breakdown with some bleeding from the posterior donor site on the RLE.    Sepsis protocol was followed as below:  -CBC  -CMP  -Mg  -Phosphate  -Lactate  -EKG  -2L LR bolus     Will continue to f/u patient labs and vitals. Possible repeat cultures of wound once staples are removed.

## 2019-07-08 NOTE — PROGRESS NOTE ADULT - SUBJECTIVE AND OBJECTIVE BOX
INTERVAL HPI/OVERNIGHT EVENTS: code sepsis at 1AM for fever to 102 and tachycardic to 102. patient was bolused 2L IV fluids and repeat labs drawn. no cultures drawn. LE US showed DVT in R common femoral vein to the TP trunk and was placed on therapeutic lovonox. Fevers have resolved this am. Wound vac removed this am, no signs of purulent drainage or infection.    SUBJECTIVE: no n/v/sob. tolerating diet with normal bowel function. no sob.      MEDICATIONS  (STANDING):  amLODIPine   Tablet 10 milliGRAM(s) Oral daily  ceFAZolin   IVPB 1000 milliGRAM(s) IV Intermittent every 8 hours  dextrose 5%. 1000 milliLiter(s) (50 mL/Hr) IV Continuous <Continuous>  dextrose 50% Injectable 12.5 Gram(s) IV Push once  dextrose 50% Injectable 25 Gram(s) IV Push once  dextrose 50% Injectable 25 Gram(s) IV Push once  docusate sodium 100 milliGRAM(s) Oral two times a day  enoxaparin Injectable 80 milliGRAM(s) SubCutaneous two times a day  gabapentin 300 milliGRAM(s) Oral three times a day  insulin lispro (HumaLOG) corrective regimen sliding scale   SubCutaneous three times a day before meals  insulin lispro (HumaLOG) corrective regimen sliding scale   SubCutaneous at bedtime  polyethylene glycol 3350 17 Gram(s) Oral two times a day  senna 1 Tablet(s) Oral two times a day  simvastatin 20 milliGRAM(s) Oral at bedtime  tamsulosin 0.4 milliGRAM(s) Oral at bedtime    MEDICATIONS  (PRN):  acetaminophen   Tablet .. 650 milliGRAM(s) Oral every 6 hours PRN Temp greater or equal to 38C (100.4F), Mild Pain (1 - 3)  dextrose 40% Gel 15 Gram(s) Oral once PRN Blood Glucose LESS THAN 70 milliGRAM(s)/deciliter  glucagon  Injectable 1 milliGRAM(s) IntraMuscular once PRN Glucose LESS THAN 70 milligrams/deciliter  glucagon  Injectable 1 milliGRAM(s) IntraMuscular once PRN Glucose LESS THAN 70 milligrams/deciliter  HYDROmorphone  Injectable 1 milliGRAM(s) IV Push every 3 hours PRN Severe Pain (7 - 10)  ondansetron Injectable 4 milliGRAM(s) IV Push once PRN Nausea and/or Vomiting  oxyCODONE    IR 5 milliGRAM(s) Oral every 4 hours PRN Moderate Pain (4 - 6)  oxyCODONE    IR 10 milliGRAM(s) Oral every 4 hours PRN Severe Pain (7 - 10)      Vital Signs Last 24 Hrs  T(C): 37.2 (08 Jul 2019 05:30), Max: 39.3 (08 Jul 2019 00:19)  T(F): 98.9 (08 Jul 2019 05:30), Max: 102.8 (08 Jul 2019 00:19)  HR: 98 (08 Jul 2019 05:30) (94 - 102)  BP: 126/72 (08 Jul 2019 05:30) (115/61 - 145/74)  BP(mean): --  RR: 18 (08 Jul 2019 05:30) (17 - 19)  SpO2: 95% (08 Jul 2019 05:30) (93% - 96%)    PE  Gen: resting comfortably in bed  CV: rrr  Pulm:  Breath Sounds equal & clear to auscultation, no accessory muscle use  GI: soft, nontender  Ext: RLE: 98% take on split thickness skin graft. mild serous, non purulent drainage on lateral open wound near site of the adaptic. Donor site dressing clean with no signs of infection. ZAMZAM drain with sanguinous output.  Vasc: 2+ palpable LE extremities, well perfused RLE, cap refill<2 seconds  Neuro: A/Ox3      I&O's Detail    07 Jul 2019 07:01  -  08 Jul 2019 07:00  --------------------------------------------------------  IN:  Total IN: 0 mL    OUT:    Bulb: 3 mL    Indwelling Catheter - Urethral: 4775 mL  Total OUT: 4778 mL    Total NET: -4778 mL          LABS:                        8.3    10.13 )-----------( 415      ( 08 Jul 2019 00:58 )             25.7     07-08    132<L>  |  97<L>  |  17.0  ----------------------------<  144<H>  4.3   |  27.0  |  0.87    Ca    8.5<L>      08 Jul 2019 00:58  Phos  2.5     07-08  Mg     2.0     07-08    TPro  6.9  /  Alb  2.7<L>  /  TBili  0.6  /  DBili  x   /  AST  142<H>  /  ALT  122<H>  /  AlkPhos  116  07-08          RADIOLOGY & ADDITIONAL STUDIES:

## 2019-07-08 NOTE — CHART NOTE - NSCHARTNOTEFT_GEN_A_CORE
Code sepsis called on patient. Upon arrival to pt room ACS team at pt bedside. Medicine team dismissed, ACS team to complete code.

## 2019-07-08 NOTE — PROGRESS NOTE ADULT - SUBJECTIVE AND OBJECTIVE BOX
INTERVAL HPI/OVERNIGHT EVENTS:  Min was placed one day ago and he has mild penile discomfort.    MEDICATIONS  (STANDING):  amLODIPine   Tablet 10 milliGRAM(s) Oral daily  ceFAZolin   IVPB 1000 milliGRAM(s) IV Intermittent every 8 hours  docusate sodium 100 milliGRAM(s) Oral two times a day  enoxaparin Injectable 80 milliGRAM(s) SubCutaneous two times a day  gabapentin 300 milliGRAM(s) Oral three times a day  insulin lispro (HumaLOG) corrective regimen sliding scale   SubCutaneous three times a day before meals  insulin lispro (HumaLOG) corrective regimen sliding scale   SubCutaneous at bedtime  polyethylene glycol 3350 17 Gram(s) Oral two times a day  senna 1 Tablet(s) Oral two times a day  simvastatin 20 milliGRAM(s) Oral at bedtime  tamsulosin 0.4 milliGRAM(s) Oral at bedtime    MEDICATIONS  (PRN):  acetaminophen   Tablet .. 650 milliGRAM(s) Oral every 6 hours PRN Temp greater or equal to 38C (100.4F), Mild Pain (1 - 3)  glucagon  Injectable 1 milliGRAM(s) IntraMuscular once PRN Glucose LESS THAN 70 milligrams/deciliter  HYDROmorphone  Injectable 1 milliGRAM(s) IV Push every 3 hours PRN Severe Pain (7 - 10)  ondansetron Injectable 4 milliGRAM(s) IV Push once PRN Nausea and/or Vomiting  oxyCODONE    IR 5 milliGRAM(s) Oral every 4 hours PRN Moderate Pain (4 - 6)  oxyCODONE    IR 10 milliGRAM(s) Oral every 4 hours PRN Severe Pain (7 - 10)      Allergies    No Known Allergies    Intolerances        Vital Signs Last 24 Hrs  T(C): 38.4 (08 Jul 2019 16:18), Max: 39.3 (08 Jul 2019 00:19)  T(F): 101.2 (08 Jul 2019 16:18), Max: 102.8 (08 Jul 2019 00:19)  HR: 100 (08 Jul 2019 16:18) (94 - 102)  BP: 127/68 (08 Jul 2019 16:18) (115/61 - 145/74)  BP(mean): --  RR: 18 (08 Jul 2019 16:18) (17 - 19)  SpO2: 96% (08 Jul 2019 16:18) (95% - 96%)     ON PE:  General: alert and awake  Abdomen: soft, nt, nd, bs+  : na    LABS:                        9.4    8.65  )-----------( 389      ( 08 Jul 2019 09:56 )             31.7     07-08    136  |  100  |  15.0  ----------------------------<  201<H>  4.4   |  27.0  |  0.84    Ca    8.5<L>      08 Jul 2019 09:56  Phos  2.6     07-08  Mg     2.0     07-08    TPro  6.9  /  Alb  2.7<L>  /  TBili  0.6  /  DBili  x   /  AST  142<H>  /  ALT  122<H>  /  AlkPhos  116  07-08          RADIOLOGY & ADDITIONAL TESTS:

## 2019-07-09 LAB
ANION GAP SERPL CALC-SCNC: 11 MMOL/L — SIGNIFICANT CHANGE UP (ref 5–17)
BASOPHILS # BLD AUTO: 0.03 K/UL — SIGNIFICANT CHANGE UP (ref 0–0.2)
BASOPHILS NFR BLD AUTO: 0.3 % — SIGNIFICANT CHANGE UP (ref 0–2)
BUN SERPL-MCNC: 12 MG/DL — SIGNIFICANT CHANGE UP (ref 8–20)
CALCIUM SERPL-MCNC: 8.9 MG/DL — SIGNIFICANT CHANGE UP (ref 8.6–10.2)
CHLORIDE SERPL-SCNC: 96 MMOL/L — LOW (ref 98–107)
CO2 SERPL-SCNC: 26 MMOL/L — SIGNIFICANT CHANGE UP (ref 22–29)
CREAT SERPL-MCNC: 0.77 MG/DL — SIGNIFICANT CHANGE UP (ref 0.5–1.3)
EOSINOPHIL # BLD AUTO: 0.06 K/UL — SIGNIFICANT CHANGE UP (ref 0–0.5)
EOSINOPHIL NFR BLD AUTO: 0.6 % — SIGNIFICANT CHANGE UP (ref 0–6)
GLUCOSE BLDC GLUCOMTR-MCNC: 149 MG/DL — HIGH (ref 70–99)
GLUCOSE BLDC GLUCOMTR-MCNC: 155 MG/DL — HIGH (ref 70–99)
GLUCOSE BLDC GLUCOMTR-MCNC: 180 MG/DL — HIGH (ref 70–99)
GLUCOSE BLDC GLUCOMTR-MCNC: 187 MG/DL — HIGH (ref 70–99)
GLUCOSE SERPL-MCNC: 193 MG/DL — HIGH (ref 70–115)
HCT VFR BLD CALC: 29.1 % — LOW (ref 39–50)
HGB BLD-MCNC: 9.1 G/DL — LOW (ref 13–17)
IMM GRANULOCYTES NFR BLD AUTO: 0.9 % — SIGNIFICANT CHANGE UP (ref 0–1.5)
LYMPHOCYTES # BLD AUTO: 0.9 K/UL — LOW (ref 1–3.3)
LYMPHOCYTES # BLD AUTO: 9 % — LOW (ref 13–44)
MAGNESIUM SERPL-MCNC: 2 MG/DL — SIGNIFICANT CHANGE UP (ref 1.6–2.6)
MCHC RBC-ENTMCNC: 28.1 PG — SIGNIFICANT CHANGE UP (ref 27–34)
MCHC RBC-ENTMCNC: 31.3 GM/DL — LOW (ref 32–36)
MCV RBC AUTO: 89.8 FL — SIGNIFICANT CHANGE UP (ref 80–100)
MONOCYTES # BLD AUTO: 0.68 K/UL — SIGNIFICANT CHANGE UP (ref 0–0.9)
MONOCYTES NFR BLD AUTO: 6.8 % — SIGNIFICANT CHANGE UP (ref 2–14)
NEUTROPHILS # BLD AUTO: 8.21 K/UL — HIGH (ref 1.8–7.4)
NEUTROPHILS NFR BLD AUTO: 82.4 % — HIGH (ref 43–77)
PHOSPHATE SERPL-MCNC: 2.7 MG/DL — SIGNIFICANT CHANGE UP (ref 2.4–4.7)
PLATELET # BLD AUTO: 482 K/UL — HIGH (ref 150–400)
POTASSIUM SERPL-MCNC: 4 MMOL/L — SIGNIFICANT CHANGE UP (ref 3.5–5.3)
POTASSIUM SERPL-SCNC: 4 MMOL/L — SIGNIFICANT CHANGE UP (ref 3.5–5.3)
RBC # BLD: 3.24 M/UL — LOW (ref 4.2–5.8)
RBC # FLD: 15.2 % — HIGH (ref 10.3–14.5)
SODIUM SERPL-SCNC: 133 MMOL/L — LOW (ref 135–145)
WBC # BLD: 9.97 K/UL — SIGNIFICANT CHANGE UP (ref 3.8–10.5)
WBC # FLD AUTO: 9.97 K/UL — SIGNIFICANT CHANGE UP (ref 3.8–10.5)

## 2019-07-09 PROCEDURE — 99231 SBSQ HOSP IP/OBS SF/LOW 25: CPT

## 2019-07-09 PROCEDURE — 99223 1ST HOSP IP/OBS HIGH 75: CPT

## 2019-07-09 RX ADMIN — SENNA PLUS 1 TABLET(S): 8.6 TABLET ORAL at 17:28

## 2019-07-09 RX ADMIN — GABAPENTIN 300 MILLIGRAM(S): 400 CAPSULE ORAL at 13:30

## 2019-07-09 RX ADMIN — Medication 100 MILLIGRAM(S): at 06:10

## 2019-07-09 RX ADMIN — Medication 2: at 16:52

## 2019-07-09 RX ADMIN — Medication 2: at 11:46

## 2019-07-09 RX ADMIN — AMLODIPINE BESYLATE 10 MILLIGRAM(S): 2.5 TABLET ORAL at 06:10

## 2019-07-09 RX ADMIN — ENOXAPARIN SODIUM 80 MILLIGRAM(S): 100 INJECTION SUBCUTANEOUS at 17:28

## 2019-07-09 RX ADMIN — POLYETHYLENE GLYCOL 3350 17 GRAM(S): 17 POWDER, FOR SOLUTION ORAL at 17:28

## 2019-07-09 RX ADMIN — SIMVASTATIN 20 MILLIGRAM(S): 20 TABLET, FILM COATED ORAL at 23:09

## 2019-07-09 RX ADMIN — HYDROMORPHONE HYDROCHLORIDE 1 MILLIGRAM(S): 2 INJECTION INTRAMUSCULAR; INTRAVENOUS; SUBCUTANEOUS at 22:14

## 2019-07-09 RX ADMIN — SENNA PLUS 1 TABLET(S): 8.6 TABLET ORAL at 06:10

## 2019-07-09 RX ADMIN — Medication 62.5 MILLIMOLE(S): at 13:30

## 2019-07-09 RX ADMIN — Medication 2: at 08:08

## 2019-07-09 RX ADMIN — Medication 650 MILLIGRAM(S): at 17:27

## 2019-07-09 RX ADMIN — ENOXAPARIN SODIUM 80 MILLIGRAM(S): 100 INJECTION SUBCUTANEOUS at 06:10

## 2019-07-09 RX ADMIN — GABAPENTIN 300 MILLIGRAM(S): 400 CAPSULE ORAL at 23:09

## 2019-07-09 RX ADMIN — GABAPENTIN 300 MILLIGRAM(S): 400 CAPSULE ORAL at 06:10

## 2019-07-09 RX ADMIN — OXYCODONE HYDROCHLORIDE 10 MILLIGRAM(S): 5 TABLET ORAL at 09:53

## 2019-07-09 RX ADMIN — HYDROMORPHONE HYDROCHLORIDE 1 MILLIGRAM(S): 2 INJECTION INTRAMUSCULAR; INTRAVENOUS; SUBCUTANEOUS at 22:29

## 2019-07-09 RX ADMIN — POLYETHYLENE GLYCOL 3350 17 GRAM(S): 17 POWDER, FOR SOLUTION ORAL at 06:10

## 2019-07-09 RX ADMIN — TAMSULOSIN HYDROCHLORIDE 0.4 MILLIGRAM(S): 0.4 CAPSULE ORAL at 23:09

## 2019-07-09 RX ADMIN — Medication 100 MILLIGRAM(S): at 17:28

## 2019-07-09 NOTE — PROGRESS NOTE ADULT - ASSESSMENT
Assessment: 68 year old male s/p forklift crush injury with fem pop bypass (6/25), four compartment fasciotomy; L tibial plateau repair, R common peroneal nerve graft/repair (7/3) being seen for RLE graft and donor site care.    - daily dressings change: pressure dressing xeroform and 4x4s  - encourage oob  - donor site change dressing on POD8 (7/10)

## 2019-07-09 NOTE — PROGRESS NOTE ADULT - SUBJECTIVE AND OBJECTIVE BOX
Subjective and Objective:    68 year old male.................      HPI/OVERNIGHT EVENTS:    MEDICATIONS  (STANDING):  amLODIPine   Tablet 10 milliGRAM(s) Oral daily  ceFAZolin   IVPB 1000 milliGRAM(s) IV Intermittent every 8 hours  docusate sodium 100 milliGRAM(s) Oral two times a day  enoxaparin Injectable 80 milliGRAM(s) SubCutaneous two times a day  gabapentin 300 milliGRAM(s) Oral three times a day  insulin lispro (HumaLOG) corrective regimen sliding scale   SubCutaneous three times a day before meals  insulin lispro (HumaLOG) corrective regimen sliding scale   SubCutaneous at bedtime  polyethylene glycol 3350 17 Gram(s) Oral two times a day  senna 1 Tablet(s) Oral two times a day  simvastatin 20 milliGRAM(s) Oral at bedtime  tamsulosin 0.4 milliGRAM(s) Oral at bedtime    MEDICATIONS  (PRN):  acetaminophen   Tablet .. 650 milliGRAM(s) Oral every 6 hours PRN Temp greater or equal to 38C (100.4F), Mild Pain (1 - 3)  glucagon  Injectable 1 milliGRAM(s) IntraMuscular once PRN Glucose LESS THAN 70 milligrams/deciliter  HYDROmorphone  Injectable 1 milliGRAM(s) IV Push every 3 hours PRN Severe Pain (7 - 10)  ondansetron Injectable 4 milliGRAM(s) IV Push once PRN Nausea and/or Vomiting  oxyCODONE    IR 5 milliGRAM(s) Oral every 4 hours PRN Moderate Pain (4 - 6)  oxyCODONE    IR 10 milliGRAM(s) Oral every 4 hours PRN Severe Pain (7 - 10)      Vital Signs Last 24 Hrs  T(C): 36.9 (09 Jul 2019 00:07), Max: 38.4 (08 Jul 2019 16:18)  T(F): 98.4 (09 Jul 2019 00:07), Max: 101.2 (08 Jul 2019 16:18)  HR: 91 (09 Jul 2019 00:07) (91 - 100)  BP: 137/69 (09 Jul 2019 00:07) (125/72 - 137/69)  BP(mean): --  RR: 18 (09 Jul 2019 00:07) (17 - 18)  SpO2: 96% (08 Jul 2019 16:18) (95% - 96%)    General: patient resting comfortably in bed, AOx3, in NAD  Neck: No JVD, neck supple  Respiratory: respirations are unlabored, no accessory muscle use, no conversational dyspnea  Cardiovascular: regular rate & rhythm  Gastrointestinal: Abdomen soft, non-tender, non-distended,  Psychiatric: Normal mood, normal affect  Ext: RLE: 98% take on split thickness skin graft. mild serous, non purulent drainage on lateral open wound near site of the adaptic. Donor site dressing clean with no signs of infection. ZAMZAM drain with sanguinous output.    general: no acute distress, AOx3  Respiratory: Breath Sounds equal & clear to auscultation, no accessory muscle use  Cardiovascular: Regular rate & rhythm, normal S1, S2; no murmurs, gallops or rubs  Gastrointestinal: Soft, non-tender, normal bowel sounds  Extremities: right lower extremity with no motor/decreased sensation. edema in calf with wound vac intact. ZAMZAM with serosanguinous fluid. donor site on right thigh with ioban. left lower extremity in ace wrap c/d/i  Vascular: Equal and normal pulses: 2+ peripheral pulses throughout    Musculoskeletal: No joint pain, swelling or deformity; no limitation of movement      PE  Gen: resting comfortably in bed  CV: rrr  Pulm:  Breath Sounds equal & clear to auscultation, no accessory muscle use  GI: soft, nontender  Ext: RLE: 98% take on split thickness skin graft. mild serous, non purulent drainage on lateral open wound near site of the adaptic. Donor site dressing clean w/ no signs of infection. ZAMZAM drain with sanguinous output.  Vasc: 2+ palpable LE extremities, well perfused RLE, cap refill<2 secon    &O's Detail    07 Jul 2019 07:01  -  08 Jul 2019 07:00  --------------------------------------------------------  IN:  Total IN: 0 mL    OUT:    Bulb: 3 mL    Indwelling Catheter - Urethral: 4775 mL  Total OUT: 4778 mL    Total NET: -4778 mL      08 Jul 2019 07:01  -  09 Jul 2019 03:43  --------------------------------------------------------  IN:  Total IN: 0 mL    OUT:   Bulb: 4 mL  Indwelling Catheter - Urethral: 2800 mL  Total OUT: 2804 mL    Total NET: -2804 mL    LABS:                        9.4    8.65  )-----------( 389      ( 08 Jul 2019 09:56 )             31.7     07-08    136  |  100  |  15.0  ----------------------------<  201<H>  4.4   |  27.0  |  0.84    Ca    8.5<L>      08 Jul 2019 09:56  Phos  2.6     07-08  Mg     2.0     07-08    TPro  6.9  /  Alb  2.7<L>  /  TBili  0.6  /  DBili  x   /  AST  142<H>  /  ALT  122<H>  /  AlkPhos  116  07-08 Subjective and Objective:    68 year old male    HPI/OVERNIGHT EVENTS:    MEDICATIONS  (STANDING):  amLODIPine   Tablet 10 milliGRAM(s) Oral daily  ceFAZolin   IVPB 1000 milliGRAM(s) IV Intermittent every 8 hours  docusate sodium 100 milliGRAM(s) Oral two times a day  enoxaparin Injectable 80 milliGRAM(s) SubCutaneous two times a day  gabapentin 300 milliGRAM(s) Oral three times a day  insulin lispro (HumaLOG) corrective regimen sliding scale   SubCutaneous three times a day before meals  insulin lispro (HumaLOG) corrective regimen sliding scale   SubCutaneous at bedtime  polyethylene glycol 3350 17 Gram(s) Oral two times a day  senna 1 Tablet(s) Oral two times a day  simvastatin 20 milliGRAM(s) Oral at bedtime  tamsulosin 0.4 milliGRAM(s) Oral at bedtime    MEDICATIONS  (PRN):  acetaminophen   Tablet .. 650 milliGRAM(s) Oral every 6 hours PRN Temp greater or equal to 38C (100.4F), Mild Pain (1 - 3)  glucagon  Injectable 1 milliGRAM(s) IntraMuscular once PRN Glucose LESS THAN 70 milligrams/deciliter  HYDROmorphone  Injectable 1 milliGRAM(s) IV Push every 3 hours PRN Severe Pain (7 - 10)  ondansetron Injectable 4 milliGRAM(s) IV Push once PRN Nausea and/or Vomiting  oxyCODONE    IR 5 milliGRAM(s) Oral every 4 hours PRN Moderate Pain (4 - 6)  oxyCODONE    IR 10 milliGRAM(s) Oral every 4 hours PRN Severe Pain (7 - 10)      Vital Signs Last 24 Hrs  T(C): 36.9 (09 Jul 2019 00:07), Max: 38.4 (08 Jul 2019 16:18)  T(F): 98.4 (09 Jul 2019 00:07), Max: 101.2 (08 Jul 2019 16:18)  HR: 91 (09 Jul 2019 00:07) (91 - 100)  BP: 137/69 (09 Jul 2019 00:07) (125/72 - 137/69)  BP(mean): --  RR: 18 (09 Jul 2019 00:07) (17 - 18)  SpO2: 96% (08 Jul 2019 16:18) (95% - 96%)    General: patient resting comfortably in bed, AOx3, in NAD  Neck: No JVD, neck supple  Respiratory: respirations are unlabored, no accessory muscle use, no conversational dyspnea  Cardiovascular: regular rate & rhythm  Gastrointestinal: Abdomen soft, non-tender, non-distended,  Psychiatric: Normal mood, normal affect  Ext: RLE: 98% take on split thickness skin graft. mild serous, non purulent drainage on lateral open wound near site of the adaptic. Donor site dressing clean with no signs of infection. ZAMZAM drain with sanguinous output.  Vascular: Equal and normal pulses: 2+ peripheral pulses throughout    &O's Detail    07 Jul 2019 07:01  -  08 Jul 2019 07:00  --------------------------------------------------------  IN:  Total IN: 0 mL    OUT:    Bulb: 3 mL    Indwelling Catheter - Urethral: 4775 mL  Total OUT: 4778 mL    Total NET: -4778 mL      08 Jul 2019 07:01  -  09 Jul 2019 03:43  --------------------------------------------------------  IN:  Total IN: 0 mL    OUT:   Bulb: 4 mL  Indwelling Catheter - Urethral: 2800 mL  Total OUT: 2804 mL    Total NET: -2804 mL    LABS:                        9.4    8.65  )-----------( 389      ( 08 Jul 2019 09:56 )             31.7     07-08    136  |  100  |  15.0  ----------------------------<  201<H>  4.4   |  27.0  |  0.84    Ca    8.5<L>      08 Jul 2019 09:56  Phos  2.6     07-08  Mg     2.0     07-08    TPro  6.9  /  Alb  2.7<L>  /  TBili  0.6  /  DBili  x   /  AST  142<H>  /  ALT  122<H>  /  AlkPhos  116  07-08 Subjective and Objective:    68 year old M s/p BLE crush injury at work.  RLE profunda to pop w/ gsv bypass, ORIF left tibial plateau.  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    HPI/OVERNIGHT EVENTS:    MEDICATIONS  (STANDING):  amLODIPine   Tablet 10 milliGRAM(s) Oral daily  ceFAZolin   IVPB 1000 milliGRAM(s) IV Intermittent every 8 hours  docusate sodium 100 milliGRAM(s) Oral two times a day  enoxaparin Injectable 80 milliGRAM(s) SubCutaneous two times a day  gabapentin 300 milliGRAM(s) Oral three times a day  insulin lispro (HumaLOG) corrective regimen sliding scale   SubCutaneous three times a day before meals  insulin lispro (HumaLOG) corrective regimen sliding scale   SubCutaneous at bedtime  polyethylene glycol 3350 17 Gram(s) Oral two times a day  senna 1 Tablet(s) Oral two times a day  simvastatin 20 milliGRAM(s) Oral at bedtime  tamsulosin 0.4 milliGRAM(s) Oral at bedtime    MEDICATIONS  (PRN):  acetaminophen   Tablet .. 650 milliGRAM(s) Oral every 6 hours PRN Temp greater or equal to 38C (100.4F), Mild Pain (1 - 3)  glucagon  Injectable 1 milliGRAM(s) IntraMuscular once PRN Glucose LESS THAN 70 milligrams/deciliter  HYDROmorphone  Injectable 1 milliGRAM(s) IV Push every 3 hours PRN Severe Pain (7 - 10)  ondansetron Injectable 4 milliGRAM(s) IV Push once PRN Nausea and/or Vomiting  oxyCODONE    IR 5 milliGRAM(s) Oral every 4 hours PRN Moderate Pain (4 - 6)  oxyCODONE    IR 10 milliGRAM(s) Oral every 4 hours PRN Severe Pain (7 - 10)      Vital Signs Last 24 Hrs  T(C): 36.9 (09 Jul 2019 00:07), Max: 38.4 (08 Jul 2019 16:18)  T(F): 98.4 (09 Jul 2019 00:07), Max: 101.2 (08 Jul 2019 16:18)  HR: 91 (09 Jul 2019 00:07) (91 - 100)  BP: 137/69 (09 Jul 2019 00:07) (125/72 - 137/69)  BP(mean): --  RR: 18 (09 Jul 2019 00:07) (17 - 18)  SpO2: 96% (08 Jul 2019 16:18) (95% - 96%)    General: patient resting comfortably in bed, AOx3, in NAD  Neck: No JVD, neck supple  Respiratory: respirations are unlabored, no accessory muscle use, no conversational dyspnea  Cardiovascular: regular rate & rhythm  Gastrointestinal: Abdomen soft, non-tender, non-distended,  Psychiatric: Normal mood, normal affect  Ext: RLE: 98% take on split thickness skin graft. mild serous, non purulent drainage on lateral open wound near site of the adaptic. Donor site dressing clean with no signs of infection. ZAMZAM drain with sanguinous output.  Vascular: Equal and normal pulses: 2+ peripheral pulses throughout    &O's Detail    07 Jul 2019 07:01  -  08 Jul 2019 07:00  --------------------------------------------------------  IN:  Total IN: 0 mL    OUT:    Bulb: 3 mL    Indwelling Catheter - Urethral: 4775 mL  Total OUT: 4778 mL    Total NET: -4778 mL      08 Jul 2019 07:01  -  09 Jul 2019 03:43  --------------------------------------------------------  IN:  Total IN: 0 mL    OUT:   Bulb: 4 mL  Indwelling Catheter - Urethral: 2800 mL  Total OUT: 2804 mL    Total NET: -2804 mL    LABS:                        9.4    8.65  )-----------( 389      ( 08 Jul 2019 09:56 )             31.7     07-08    136  |  100  |  15.0  ----------------------------<  201<H>  4.4   |  27.0  |  0.84    Ca    8.5<L>      08 Jul 2019 09:56  Phos  2.6     07-08  Mg     2.0     07-08    TPro  6.9  /  Alb  2.7<L>  /  TBili  0.6  /  DBili  x   /  AST  142<H>  /  ALT  122<H>  /  AlkPhos  116  07-08 Subjective and Objective:    Patient seen and examined at bedside. Patient's pain well controlled, tolerating diet, and is voiding through Min. Pt denies fever, chills, nausea, vomiting, chest pain, SOB, dizziness, abdominal pain or any other concerning symptoms.     MEDICATIONS  (STANDING):  amLODIPine   Tablet 10 milliGRAM(s) Oral daily  ceFAZolin   IVPB 1000 milliGRAM(s) IV Intermittent every 8 hours  docusate sodium 100 milliGRAM(s) Oral two times a day  enoxaparin Injectable 80 milliGRAM(s) SubCutaneous two times a day  gabapentin 300 milliGRAM(s) Oral three times a day  insulin lispro (HumaLOG) corrective regimen sliding scale   SubCutaneous three times a day before meals  insulin lispro (HumaLOG) corrective regimen sliding scale   SubCutaneous at bedtime  polyethylene glycol 3350 17 Gram(s) Oral two times a day  senna 1 Tablet(s) Oral two times a day  simvastatin 20 milliGRAM(s) Oral at bedtime  tamsulosin 0.4 milliGRAM(s) Oral at bedtime    MEDICATIONS  (PRN):  acetaminophen   Tablet .. 650 milliGRAM(s) Oral every 6 hours PRN Temp greater or equal to 38C (100.4F), Mild Pain (1 - 3)  glucagon  Injectable 1 milliGRAM(s) IntraMuscular once PRN Glucose LESS THAN 70 milligrams/deciliter  HYDROmorphone  Injectable 1 milliGRAM(s) IV Push every 3 hours PRN Severe Pain (7 - 10)  ondansetron Injectable 4 milliGRAM(s) IV Push once PRN Nausea and/or Vomiting  oxyCODONE    IR 5 milliGRAM(s) Oral every 4 hours PRN Moderate Pain (4 - 6)  oxyCODONE    IR 10 milliGRAM(s) Oral every 4 hours PRN Severe Pain (7 - 10)      Vital Signs Last 24 Hrs  T(C): 36.9 (09 Jul 2019 00:07), Max: 38.4 (08 Jul 2019 16:18)  T(F): 98.4 (09 Jul 2019 00:07), Max: 101.2 (08 Jul 2019 16:18)  HR: 91 (09 Jul 2019 00:07) (91 - 100)  BP: 137/69 (09 Jul 2019 00:07) (125/72 - 137/69)  BP(mean): --  RR: 18 (09 Jul 2019 00:07) (17 - 18)  SpO2: 96% (08 Jul 2019 16:18) (95% - 96%)    General: patient resting comfortably in bed, AOx3, in NAD  Neck: No JVD, neck supple  Respiratory: respirations are unlabored, no accessory muscle use, no conversational dyspnea  Cardiovascular: regular rate & rhythm  Gastrointestinal: Abdomen soft, non-tender, non-distended,  Psychiatric: Normal mood, normal affect  Ext: RLE: 98% take on split thickness skin graft. mild serous, non purulent drainage on lateral open wound near site of the adaptic. Donor site dressing clean with no signs of infection. ZAMZAM drain with sanguinous output.  Vascular: Equal and normal pulses: 2+ peripheral pulses throughout    &O's Detail    07 Jul 2019 07:01  -  08 Jul 2019 07:00  --------------------------------------------------------  IN:  Total IN: 0 mL    OUT:    Bulb: 3 mL    Indwelling Catheter - Urethral: 4775 mL  Total OUT: 4778 mL    Total NET: -4778 mL      08 Jul 2019 07:01  -  09 Jul 2019 03:43  --------------------------------------------------------  IN:  Total IN: 0 mL    OUT:   Bulb: 4 mL  Indwelling Catheter - Urethral: 2800 mL  Total OUT: 2804 mL    Total NET: -2804 mL    LABS:                        9.4    8.65  )-----------( 389      ( 08 Jul 2019 09:56 )             31.7     07-08    136  |  100  |  15.0  ----------------------------<  201<H>  4.4   |  27.0  |  0.84    Ca    8.5<L>      08 Jul 2019 09:56  Phos  2.6     07-08  Mg     2.0     07-08    TPro  6.9  /  Alb  2.7<L>  /  TBili  0.6  /  DBili  x   /  AST  142<H>  /  ALT  122<H>  /  AlkPhos  116  07-08

## 2019-07-09 NOTE — CONSULT NOTE ADULT - SUBJECTIVE AND OBJECTIVE BOX
68yM was admitted on 06-25 after a forklift crush injury to his BLE while at work. In ED, GCS=15 with visible BLE injuries and 1-2 L of blood from the right LE, absent pulses and inability to move the right ankle.     He was found to have a right popliteal arterial injury and is s/p underwent emergent bypass -- Left greater saphenous vein used to bypass from above knee right popliteal artery to below knee right popliteal artery. He also had a four-compartment lower leg fasciotomy with wound washout and packing. He regained pulses at that time.   He has wound vac in place.     On 6/26, he then underwent ORIF of the left tibial plateau fracture. He is NWB to the [facility] with immobilizer.   He is also s/p Right peroneal repair by Dr. Ron on 7/3.     MRI LEFT KNEE - Severely limited exam due to metallic artifact from surgical   hardware in the proximal tibia. Limited evaluation of known proximal   tibia fracture. Large knee joint effusion with intra-articular gas.    The ACL appears grossly intact, evaluation of distal portions limited by   artifacts. The PCL is intact. Evaluation of the medial collateral   ligament is limited due to metallic artifacts. There is heterogeneous   increased signal in the medial collateral ligament and thinning of the   ligament, possible sprain/tear. Evaluation of the lateral collateral   ligament is limited due to metallic artifacts. Visualized portions of the   fibular collateral ligament appear grossly intact, with distal portions   obscured by metallic artifact.     Evaluation of the menisci is severely limited due to metallic artifact.   There is peripheral extrusion of the body medial meniscus. Increased   signal in the body of the medial meniscus, question degeneration. The   lateral meniscus cannot be evaluated for tear.    MRI RIGHT KNEE - Intact cruciate and collateral ligamentous structures. No evidence of   meniscal tear.    Mild osseous contusions along the posterior margin of the lateral femoral   condyle and lateral tibial plateau.    Large wound along the posterior lateral aspect of the knee with   irregularity of the adjacent popliteal vessels at the site of previously   noted right popliteal artery occlusion. The common tibial nerve in this   region is effaced and poorly defined at the level of the wound which may   be related to focal injury. The intrinsic signal of the common tibial   nerve in this region appears grossly preserved. The common peroneal nerve   is surrounded by fluid within the laceration gap without abnormal   intrinsic signal. There is diffuse intramuscular and myofascial edema   within the anterolateral musculature of the lower leg and within the   gastrocnemius and soleus musculature. Findings may be related to   myofascial strain or be secondary to subacute denervation related changes.    Diffuse subcutaneous edema about the knee with confluent fluid noted   along the anteromedial subcutaneous fat.    Air within the suprapatellar joint recess likely related to traumatic   arthrotomy.    Medically the patient had a fever last night. Found to have a right LE DVT and started on therapeutic Lovenox.   he continues to have urinary retention and requires a villegas.     REVIEW OF SYSTEMS  Constitutional - +fever, No weight loss, +fatigue  HEENT - No eye pain, No visual disturbances, No difficulty hearing, No tinnitus, No vertigo, No neck pain  Respiratory - No cough, No wheezing, No shortness of breath  Cardiovascular - No chest pain, No palpitations  Gastrointestinal - No abdominal pain, No nausea, No vomiting, No diarrhea, No constipation  Genitourinary - No dysuria, No frequency, No hematuria, No incontinence  Neurological - No headaches, No memory loss, +loss of strength, +numbness, No tremors  Skin - No itching, No rashes, +lesions   Endocrine - No temperature intolerance  Musculoskeletal - +joint pain, +joint swelling, +muscle pain  Psychiatric - +depression, No anxiety    VITALS  T(C): 37.4 (07-09-19 @ 08:29), Max: 38.4 (07-08-19 @ 16:18)  HR: 94 (07-09-19 @ 08:29) (91 - 100)  BP: 132/74 (07-09-19 @ 08:29) (127/68 - 137/69)  RR: 18 (07-09-19 @ 08:29) (18 - 18)  SpO2: 96% (07-09-19 @ 08:29) (96% - 96%)  Wt(kg): --    PAST MEDICAL & SURGICAL HISTORY  GERD (gastroesophageal reflux disease)  HLD (hyperlipidemia)  No significant past surgical history      SOCIAL HISTORY  Smoking - Denied  EtOH - Denied   Drugs - Denied    FUNCTIONAL HISTORY  Lives with wife, 2 SHAVON, then 5 STI to apartment  Independent    CURRENT FUNCTIONAL STATUS  Mod a in bed    FAMILY HISTORY   Denied by patient    RECENT LABS/IMAGING  CBC Full  -  ( 09 Jul 2019 09:37 )  WBC Count : 9.97 K/uL  RBC Count : 3.24 M/uL  Hemoglobin : 9.1 g/dL  Hematocrit : 29.1 %  Platelet Count - Automated : 482 K/uL  Mean Cell Volume : 89.8 fl  Mean Cell Hemoglobin : 28.1 pg  Mean Cell Hemoglobin Concentration : 31.3 gm/dL  Auto Neutrophil # : 8.21 K/uL  Auto Lymphocyte # : 0.90 K/uL  Auto Monocyte # : 0.68 K/uL  Auto Eosinophil # : 0.06 K/uL  Auto Basophil # : 0.03 K/uL  Auto Neutrophil % : 82.4 %  Auto Lymphocyte % : 9.0 %  Auto Monocyte % : 6.8 %  Auto Eosinophil % : 0.6 %  Auto Basophil % : 0.3 %    07-09    133<L>  |  96<L>  |  12.0  ----------------------------<  193<H>  4.0   |  26.0  |  0.77    Ca    8.9      09 Jul 2019 09:37  Phos  2.7     07-09  Mg     2.0     07-09    TPro  6.9  /  Alb  2.7<L>  /  TBili  0.6  /  DBili  x   /  AST  142<H>  /  ALT  122<H>  /  AlkPhos  116  07-08        ALLERGIES  No Known Allergies      MEDICATIONS   acetaminophen   Tablet .. 650 milliGRAM(s) Oral every 6 hours PRN  amLODIPine   Tablet 10 milliGRAM(s) Oral daily  docusate sodium 100 milliGRAM(s) Oral two times a day  enoxaparin Injectable 80 milliGRAM(s) SubCutaneous two times a day  gabapentin 300 milliGRAM(s) Oral three times a day  glucagon  Injectable 1 milliGRAM(s) IntraMuscular once PRN  HYDROmorphone  Injectable 1 milliGRAM(s) IV Push every 3 hours PRN  insulin lispro (HumaLOG) corrective regimen sliding scale   SubCutaneous three times a day before meals  insulin lispro (HumaLOG) corrective regimen sliding scale   SubCutaneous at bedtime  ondansetron Injectable 4 milliGRAM(s) IV Push once PRN  oxyCODONE    IR 5 milliGRAM(s) Oral every 4 hours PRN  oxyCODONE    IR 10 milliGRAM(s) Oral every 4 hours PRN  polyethylene glycol 3350 17 Gram(s) Oral two times a day  senna 1 Tablet(s) Oral two times a day  simvastatin 20 milliGRAM(s) Oral at bedtime  sodium phosphate IVPB 15 milliMole(s) IV Intermittent once  tamsulosin 0.4 milliGRAM(s) Oral at bedtime      ----------------------------------------------------------------------------------------  PHYSICAL EXAM  Constitutional - NAD, Uncomfortable  HEENT - NCAT, EOMI  Neck - Supple, No limited ROM  Chest - Breathing comfortably, No wheezing  Cardiovascular - S1S2   Abdomen - Soft   Extremities - Right foot swelling in PRAFO with Wound VAC/ACE wrap; Left LE in knee immbolizer/ACE  Neurologic Exam -                    Cognitive - Awake, Alert, AAO to self, place, date, year, situation     Communication - Fluent, No dysarthria     Motor - Limtied by braces and right foot drop                    LEFT    UE - ShAB 5/5, EF 5/5, EE 5/5, WE 5/5,  5/5                    RIGHT UE - ShAB 5/5, EF 5/5, EE 5/5, WE 5/5,  5/5                    LEFT    LE - HF 1/5, KE -/5, DF 4/5, PF 4/5                    RIGHT LE - HF 2/5, KE 1/5, DF 0/5, PF 0/5        Sensory - Impaired in the right foot  Psychiatric - Mood depressed  ----------------------------------------------------------------------------------------  ASSESSMENT/PLAN  68yMale with functional deficits after a multitrauma injury to the BLE   Right compartment syndrome with right popliteal arterial injury and s/p bypass/fasciotomy - WBAT, Wound VAC  Left tibial plateau fracture s/p ORIF - NWB, Immobilizer  Right foot drop - PRAFO  Right LE  DVT - Lovenox  HTN - Norvasc  HLD - Zocor  Pain - Tylenol, Neurontin, Dilaudid, Oxycodone  Constipation - Miralax, Colace, Senna  Urinary retention - Flomax, Villegas  DVT PPX - SCDs  Rehab - Will continue to follow for ongoing rehab needs and recommendations. PT/OT robby pending.   Recommend ACUTE inpatient rehabilitation for the functional deficits consisting of 3 hours of therapy/day & 24 hour RN/daily PMR physician for comorbid medical management. Will continue to follow for ongoing rehab needs and recommendations. Patient will be able to tolerate 3 hours a day.    Continue bedside therapy as well as OOB throughout the day with mobilization throughout the day with staff to maintain cardiopulmonary function and prevention of secondary complications related to debility. 68yM was admitted on 06-25 after a forklift crush injury to his BLE while at work. In ED, GCS=15 with visible BLE injuries and 1-2 L of blood from the right LE, absent pulses and inability to move the right ankle.     He was found to have a right popliteal arterial injury and is s/p underwent emergent bypass -- Left greater saphenous vein used to bypass from above knee right popliteal artery to below knee right popliteal artery. He also had a four-compartment lower leg fasciotomy with wound washout and packing. He regained pulses at that time.   He has wound vac in place.     On 6/26, he then underwent ORIF of the left tibial plateau fracture. He is NWB to the [facility] with immobilizer.   He is also s/p Right peroneal repair by Dr. Ron on 7/3.     MRI LEFT KNEE - Severely limited exam due to metallic artifact from surgical   hardware in the proximal tibia. Limited evaluation of known proximal   tibia fracture. Large knee joint effusion with intra-articular gas.    The ACL appears grossly intact, evaluation of distal portions limited by   artifacts. The PCL is intact. Evaluation of the medial collateral   ligament is limited due to metallic artifacts. There is heterogeneous   increased signal in the medial collateral ligament and thinning of the   ligament, possible sprain/tear. Evaluation of the lateral collateral   ligament is limited due to metallic artifacts. Visualized portions of the   fibular collateral ligament appear grossly intact, with distal portions   obscured by metallic artifact.     Evaluation of the menisci is severely limited due to metallic artifact.   There is peripheral extrusion of the body medial meniscus. Increased   signal in the body of the medial meniscus, question degeneration. The   lateral meniscus cannot be evaluated for tear.    MRI RIGHT KNEE - Intact cruciate and collateral ligamentous structures. No evidence of   meniscal tear.    Mild osseous contusions along the posterior margin of the lateral femoral   condyle and lateral tibial plateau.    Large wound along the posterior lateral aspect of the knee with   irregularity of the adjacent popliteal vessels at the site of previously   noted right popliteal artery occlusion. The common tibial nerve in this   region is effaced and poorly defined at the level of the wound which may   be related to focal injury. The intrinsic signal of the common tibial   nerve in this region appears grossly preserved. The common peroneal nerve   is surrounded by fluid within the laceration gap without abnormal   intrinsic signal. There is diffuse intramuscular and myofascial edema   within the anterolateral musculature of the lower leg and within the   gastrocnemius and soleus musculature. Findings may be related to   myofascial strain or be secondary to subacute denervation related changes.    Diffuse subcutaneous edema about the knee with confluent fluid noted   along the anteromedial subcutaneous fat.    Air within the suprapatellar joint recess likely related to traumatic   arthrotomy.    Medically the patient had a fever last night. Found to have a right LE DVT and started on therapeutic Lovenox.   he continues to have urinary retention and requires a villegas.     REVIEW OF SYSTEMS  Constitutional - +fever, No weight loss, +fatigue  HEENT - No eye pain, No visual disturbances, No difficulty hearing, No tinnitus, No vertigo, No neck pain  Respiratory - No cough, No wheezing, No shortness of breath  Cardiovascular - No chest pain, No palpitations  Gastrointestinal - No abdominal pain, No nausea, No vomiting, No diarrhea, No constipation  Genitourinary - No dysuria, No frequency, No hematuria, No incontinence  Neurological - No headaches, No memory loss, +loss of strength, +numbness, No tremors  Skin - No itching, No rashes, +lesions   Endocrine - No temperature intolerance  Musculoskeletal - +joint pain, +joint swelling, +muscle pain  Psychiatric - +depression, No anxiety    VITALS  T(C): 37.4 (07-09-19 @ 08:29), Max: 38.4 (07-08-19 @ 16:18)  HR: 94 (07-09-19 @ 08:29) (91 - 100)  BP: 132/74 (07-09-19 @ 08:29) (127/68 - 137/69)  RR: 18 (07-09-19 @ 08:29) (18 - 18)  SpO2: 96% (07-09-19 @ 08:29) (96% - 96%)  Wt(kg): --    PAST MEDICAL & SURGICAL HISTORY  GERD (gastroesophageal reflux disease)  HLD (hyperlipidemia)  No significant past surgical history      SOCIAL HISTORY  Smoking - Denied  EtOH - Denied   Drugs - Denied    FUNCTIONAL HISTORY  Lives with wife, 2 SHAVON, then 5 STI to apartment  Independent    CURRENT FUNCTIONAL STATUS  Mod a in bed    FAMILY HISTORY   Denied by patient    RECENT LABS/IMAGING  CBC Full  -  ( 09 Jul 2019 09:37 )  WBC Count : 9.97 K/uL  RBC Count : 3.24 M/uL  Hemoglobin : 9.1 g/dL  Hematocrit : 29.1 %  Platelet Count - Automated : 482 K/uL  Mean Cell Volume : 89.8 fl  Mean Cell Hemoglobin : 28.1 pg  Mean Cell Hemoglobin Concentration : 31.3 gm/dL  Auto Neutrophil # : 8.21 K/uL  Auto Lymphocyte # : 0.90 K/uL  Auto Monocyte # : 0.68 K/uL  Auto Eosinophil # : 0.06 K/uL  Auto Basophil # : 0.03 K/uL  Auto Neutrophil % : 82.4 %  Auto Lymphocyte % : 9.0 %  Auto Monocyte % : 6.8 %  Auto Eosinophil % : 0.6 %  Auto Basophil % : 0.3 %    07-09    133<L>  |  96<L>  |  12.0  ----------------------------<  193<H>  4.0   |  26.0  |  0.77    Ca    8.9      09 Jul 2019 09:37  Phos  2.7     07-09  Mg     2.0     07-09    TPro  6.9  /  Alb  2.7<L>  /  TBili  0.6  /  DBili  x   /  AST  142<H>  /  ALT  122<H>  /  AlkPhos  116  07-08        ALLERGIES  No Known Allergies      MEDICATIONS   acetaminophen   Tablet .. 650 milliGRAM(s) Oral every 6 hours PRN  amLODIPine   Tablet 10 milliGRAM(s) Oral daily  docusate sodium 100 milliGRAM(s) Oral two times a day  enoxaparin Injectable 80 milliGRAM(s) SubCutaneous two times a day  gabapentin 300 milliGRAM(s) Oral three times a day  glucagon  Injectable 1 milliGRAM(s) IntraMuscular once PRN  HYDROmorphone  Injectable 1 milliGRAM(s) IV Push every 3 hours PRN  insulin lispro (HumaLOG) corrective regimen sliding scale   SubCutaneous three times a day before meals  insulin lispro (HumaLOG) corrective regimen sliding scale   SubCutaneous at bedtime  ondansetron Injectable 4 milliGRAM(s) IV Push once PRN  oxyCODONE    IR 5 milliGRAM(s) Oral every 4 hours PRN  oxyCODONE    IR 10 milliGRAM(s) Oral every 4 hours PRN  polyethylene glycol 3350 17 Gram(s) Oral two times a day  senna 1 Tablet(s) Oral two times a day  simvastatin 20 milliGRAM(s) Oral at bedtime  sodium phosphate IVPB 15 milliMole(s) IV Intermittent once  tamsulosin 0.4 milliGRAM(s) Oral at bedtime      ----------------------------------------------------------------------------------------  PHYSICAL EXAM  Constitutional - NAD, Uncomfortable  HEENT - NCAT, EOMI  Neck - Supple, No limited ROM  Chest - Breathing comfortably, No wheezing  Cardiovascular - S1S2   Abdomen - Soft   Extremities - Right foot swelling in PRAFO with Wound VAC/ACE wrap; Left LE in knee immbolizer/ACE  Neurologic Exam -                    Cognitive - Awake, Alert, AAO to self, place, date, year, situation     Communication - Fluent, No dysarthria     Motor - Limtied by braces and right foot drop                    LEFT    UE - ShAB 5/5, EF 5/5, EE 5/5, WE 5/5,  5/5                    RIGHT UE - ShAB 5/5, EF 5/5, EE 5/5, WE 5/5,  5/5                    LEFT    LE - HF 1/5, KE -/5, DF 4/5, PF 4/5                    RIGHT LE - HF 2/5, KE 1/5, DF 0/5, PF 0/5        Sensory - Impaired in the right foot  Psychiatric - Mood depressed  ----------------------------------------------------------------------------------------  ASSESSMENT/PLAN  68yMale with functional deficits after a multitrauma injury to the BLE   Right compartment syndrome with right popliteal arterial injury and s/p bypass/fasciotomy - WBAT, Wound VAC  Left tibial plateau fracture s/p ORIF - NWB, Immobilizer  Right foot drop - PRAFO  Right LE  DVT - Lovenox  HTN - Norvasc  HLD - Zocor  Pain - Tylenol, Neurontin, Dilaudid, Oxycodone  Constipation - Miralax, Colace, Senna  Urinary retention - Flomax, Villegas  DVT PPX - SCDs  Rehab - Will continue to follow for ongoing rehab needs and recommendations. PT/OT robby pending.   Recommend ACUTE inpatient rehabilitation **[facility]** for the functional deficits consisting of 3 hours of therapy/day & 24 hour RN/daily PMR physician for comorbid medical management. Will continue to follow for ongoing rehab needs and recommendations. Patient will be able to tolerate 3 hours a day.    Continue bedside therapy as well as OOB throughout the day with mobilization throughout the day with staff to maintain cardiopulmonary function and prevention of secondary complications related to debility.

## 2019-07-09 NOTE — PROGRESS NOTE ADULT - ASSESSMENT
Assessment/ Plan:  68 M s/p BLE crush injury at work.  RLE profunda to pop w/ gsv bypass, ORIF left tibial plateau.  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    -Vascular was consulted, who said that he can be liberated from bed rest  -Currently on therapeutic Lovenox  -Weight bearing restrictions have lifted  -PT ordered  -PMNR ordered  -Wound vac has already been removed  -TOV tomorrow, if out of bed Assessment/ Plan:  68 M s/p BLE crush injury at work.  RLE profunda to pop w/ gsv bypass, ORIF left tibial plateau.  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    -Vascular has been appreciated, pt currently liberated from bed rest  -Currently on therapeutic Lovenox  -Weight bearing restrictions have lifted  -PT ordered  -PMNR ordered  -Maintain villegas  -TOV tomorrow, if out of bed Assessment/ Plan:  68 M s/p BLE crush injury at work.  RLE profunda to pop w/ gsv bypass, ORIF left tibial plateau.  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    -Vascular recommendations appreciated, pt currently liberated from bed rest  -Currently on therapeutic Lovenox  -Weight bearing restrictions have lifted  -PT ordered  -PMNR ordered  -Maintain villegas  -TOV tomorrow, if out of bed

## 2019-07-09 NOTE — PROGRESS NOTE ADULT - SUBJECTIVE AND OBJECTIVE BOX
INTERVAL HPI/OVERNIGHT EVENTS:    MEDICATIONS  (STANDING):  amLODIPine   Tablet 10 milliGRAM(s) Oral daily  ceFAZolin   IVPB 1000 milliGRAM(s) IV Intermittent every 8 hours  docusate sodium 100 milliGRAM(s) Oral two times a day  enoxaparin Injectable 80 milliGRAM(s) SubCutaneous two times a day  gabapentin 300 milliGRAM(s) Oral three times a day  insulin lispro (HumaLOG) corrective regimen sliding scale   SubCutaneous three times a day before meals  insulin lispro (HumaLOG) corrective regimen sliding scale   SubCutaneous at bedtime  polyethylene glycol 3350 17 Gram(s) Oral two times a day  senna 1 Tablet(s) Oral two times a day  simvastatin 20 milliGRAM(s) Oral at bedtime  tamsulosin 0.4 milliGRAM(s) Oral at bedtime    MEDICATIONS  (PRN):  acetaminophen   Tablet .. 650 milliGRAM(s) Oral every 6 hours PRN Temp greater or equal to 38C (100.4F), Mild Pain (1 - 3)  glucagon  Injectable 1 milliGRAM(s) IntraMuscular once PRN Glucose LESS THAN 70 milligrams/deciliter  HYDROmorphone  Injectable 1 milliGRAM(s) IV Push every 3 hours PRN Severe Pain (7 - 10)  ondansetron Injectable 4 milliGRAM(s) IV Push once PRN Nausea and/or Vomiting  oxyCODONE    IR 5 milliGRAM(s) Oral every 4 hours PRN Moderate Pain (4 - 6)  oxyCODONE    IR 10 milliGRAM(s) Oral every 4 hours PRN Severe Pain (7 - 10)      Allergies    No Known Allergies    Intolerances        Vital Signs Last 24 Hrs  T(C): 36.9 (09 Jul 2019 00:07), Max: 38.4 (08 Jul 2019 16:18)  T(F): 98.4 (09 Jul 2019 00:07), Max: 101.2 (08 Jul 2019 16:18)  HR: 91 (09 Jul 2019 00:07) (91 - 100)  BP: 137/69 (09 Jul 2019 00:07) (125/72 - 137/69)  BP(mean): --  RR: 18 (09 Jul 2019 00:07) (18 - 18)  SpO2: 96% (08 Jul 2019 16:18) (96% - 96%)     ON PE:  General: alert and awake  Abdomen: no reported flank pain  : bladder decompressed.     LABS:                        9.4    8.65  )-----------( 389      ( 08 Jul 2019 09:56 )             31.7     07-08    136  |  100  |  15.0  ----------------------------<  201<H>  4.4   |  27.0  |  0.84    Ca    8.5<L>      08 Jul 2019 09:56  Phos  2.6     07-08  Mg     2.0     07-08    TPro  6.9  /  Alb  2.7<L>  /  TBili  0.6  /  DBili  x   /  AST  142<H>  /  ALT  122<H>  /  AlkPhos  116  07-08          RADIOLOGY & ADDITIONAL TESTS:

## 2019-07-09 NOTE — PHYSICAL THERAPY INITIAL EVALUATION ADULT - MANUAL MUSCLE TESTING RESULTS, REHAB EVAL
RLE no strength distal to hip, LLE pt has fair quad strength (quad set, no ROM of knee) and ankle WFL

## 2019-07-09 NOTE — PROGRESS NOTE ADULT - SUBJECTIVE AND OBJECTIVE BOX
INTERVAL HPI/OVERNIGHT EVENTS: no fevers overnight. dressings changed this am. Patient's pain well controlled, tolerating diet, and is voiding through Min. Denies n/v/sob/f/c      MEDICATIONS  (STANDING):  amLODIPine   Tablet 10 milliGRAM(s) Oral daily  docusate sodium 100 milliGRAM(s) Oral two times a day  enoxaparin Injectable 80 milliGRAM(s) SubCutaneous two times a day  gabapentin 300 milliGRAM(s) Oral three times a day  insulin lispro (HumaLOG) corrective regimen sliding scale   SubCutaneous three times a day before meals  insulin lispro (HumaLOG) corrective regimen sliding scale   SubCutaneous at bedtime  polyethylene glycol 3350 17 Gram(s) Oral two times a day  senna 1 Tablet(s) Oral two times a day  simvastatin 20 milliGRAM(s) Oral at bedtime  sodium phosphate IVPB 15 milliMole(s) IV Intermittent once  tamsulosin 0.4 milliGRAM(s) Oral at bedtime    MEDICATIONS  (PRN):  acetaminophen   Tablet .. 650 milliGRAM(s) Oral every 6 hours PRN Temp greater or equal to 38C (100.4F), Mild Pain (1 - 3)  glucagon  Injectable 1 milliGRAM(s) IntraMuscular once PRN Glucose LESS THAN 70 milligrams/deciliter  HYDROmorphone  Injectable 1 milliGRAM(s) IV Push every 3 hours PRN Severe Pain (7 - 10)  ondansetron Injectable 4 milliGRAM(s) IV Push once PRN Nausea and/or Vomiting  oxyCODONE    IR 5 milliGRAM(s) Oral every 4 hours PRN Moderate Pain (4 - 6)  oxyCODONE    IR 10 milliGRAM(s) Oral every 4 hours PRN Severe Pain (7 - 10)      Vital Signs Last 24 Hrs  T(C): 37.4 (09 Jul 2019 08:29), Max: 38.4 (08 Jul 2019 16:18)  T(F): 99.4 (09 Jul 2019 08:29), Max: 101.2 (08 Jul 2019 16:18)  HR: 94 (09 Jul 2019 08:29) (91 - 100)  BP: 132/74 (09 Jul 2019 08:29) (127/68 - 137/69)  BP(mean): --  RR: 18 (09 Jul 2019 08:29) (18 - 18)  SpO2: 96% (09 Jul 2019 08:29) (96% - 96%)    PE  Gen: a/ox3	  Pulm: non labored breathing clear to auscultation bl  CV: rrr  Abd: soft non tender  Ext: 98% take on split thickness skin graft. mild serous, no purulent drainage. Donor site dressing clean with no signs of infection. ZAMZAM drain with sanguinous output. dressed with xeroform, 4x4s and curlex.       I&O's Detail    08 Jul 2019 07:01  -  09 Jul 2019 07:00  --------------------------------------------------------  IN:  Total IN: 0 mL    OUT:    Bulb: 4 mL    Indwelling Catheter - Urethral: 3900 mL  Total OUT: 3904 mL    Total NET: -3904 mL          LABS:                        9.1    9.97  )-----------( 482      ( 09 Jul 2019 09:37 )             29.1     07-09    133<L>  |  96<L>  |  12.0  ----------------------------<  193<H>  4.0   |  26.0  |  0.77    Ca    8.9      09 Jul 2019 09:37  Phos  2.7     07-09  Mg     2.0     07-09    TPro  6.9  /  Alb  2.7<L>  /  TBili  0.6  /  DBili  x   /  AST  142<H>  /  ALT  122<H>  /  AlkPhos  116  07-08          RADIOLOGY & ADDITIONAL STUDIES: INTERVAL HPI/OVERNIGHT EVENTS: Spiked 1 fever of 101.2 at 4pm. Dressings changed this am. Patient's pain well controlled, tolerating diet, and is voiding through Min. Denies n/v/sob/f/c      MEDICATIONS  (STANDING):  amLODIPine   Tablet 10 milliGRAM(s) Oral daily  docusate sodium 100 milliGRAM(s) Oral two times a day  enoxaparin Injectable 80 milliGRAM(s) SubCutaneous two times a day  gabapentin 300 milliGRAM(s) Oral three times a day  insulin lispro (HumaLOG) corrective regimen sliding scale   SubCutaneous three times a day before meals  insulin lispro (HumaLOG) corrective regimen sliding scale   SubCutaneous at bedtime  polyethylene glycol 3350 17 Gram(s) Oral two times a day  senna 1 Tablet(s) Oral two times a day  simvastatin 20 milliGRAM(s) Oral at bedtime  sodium phosphate IVPB 15 milliMole(s) IV Intermittent once  tamsulosin 0.4 milliGRAM(s) Oral at bedtime    MEDICATIONS  (PRN):  acetaminophen   Tablet .. 650 milliGRAM(s) Oral every 6 hours PRN Temp greater or equal to 38C (100.4F), Mild Pain (1 - 3)  glucagon  Injectable 1 milliGRAM(s) IntraMuscular once PRN Glucose LESS THAN 70 milligrams/deciliter  HYDROmorphone  Injectable 1 milliGRAM(s) IV Push every 3 hours PRN Severe Pain (7 - 10)  ondansetron Injectable 4 milliGRAM(s) IV Push once PRN Nausea and/or Vomiting  oxyCODONE    IR 5 milliGRAM(s) Oral every 4 hours PRN Moderate Pain (4 - 6)  oxyCODONE    IR 10 milliGRAM(s) Oral every 4 hours PRN Severe Pain (7 - 10)      Vital Signs Last 24 Hrs  T(C): 37.4 (09 Jul 2019 08:29), Max: 38.4 (08 Jul 2019 16:18)  T(F): 99.4 (09 Jul 2019 08:29), Max: 101.2 (08 Jul 2019 16:18)  HR: 94 (09 Jul 2019 08:29) (91 - 100)  BP: 132/74 (09 Jul 2019 08:29) (127/68 - 137/69)  BP(mean): --  RR: 18 (09 Jul 2019 08:29) (18 - 18)  SpO2: 96% (09 Jul 2019 08:29) (96% - 96%)    PE  Gen: a/ox3	  Pulm: non labored breathing clear to auscultation bl  CV: rrr  Abd: soft non tender  Ext: 98% take on split thickness skin graft. mild serous, no purulent drainage. Donor site dressing clean with no signs of infection. ZAMZAM drain with sanguinous output. dressed with xeroform, 4x4s and curlex.       I&O's Detail    08 Jul 2019 07:01  -  09 Jul 2019 07:00  --------------------------------------------------------  IN:  Total IN: 0 mL    OUT:    Bulb: 4 mL    Indwelling Catheter - Urethral: 3900 mL  Total OUT: 3904 mL    Total NET: -3904 mL          LABS:                        9.1    9.97  )-----------( 482      ( 09 Jul 2019 09:37 )             29.1     07-09    133<L>  |  96<L>  |  12.0  ----------------------------<  193<H>  4.0   |  26.0  |  0.77    Ca    8.9      09 Jul 2019 09:37  Phos  2.7     07-09  Mg     2.0     07-09    TPro  6.9  /  Alb  2.7<L>  /  TBili  0.6  /  DBili  x   /  AST  142<H>  /  ALT  122<H>  /  AlkPhos  116  07-08          RADIOLOGY & ADDITIONAL STUDIES:

## 2019-07-09 NOTE — OCCUPATIONAL THERAPY INITIAL EVALUATION ADULT - SENSORY TESTS
unable to palpate bilateral pedal pulses, RN reports pulses confirmed via doppler; pt with +capillary refill in bilateral toes; pt with absent sensation to right foot; pt denies changes numbness/tingling in left foot

## 2019-07-10 ENCOUNTER — TRANSCRIPTION ENCOUNTER (OUTPATIENT)
Age: 69
End: 2019-07-10

## 2019-07-10 LAB
ANION GAP SERPL CALC-SCNC: 12 MMOL/L — SIGNIFICANT CHANGE UP (ref 5–17)
BASOPHILS # BLD AUTO: 0.03 K/UL — SIGNIFICANT CHANGE UP (ref 0–0.2)
BASOPHILS NFR BLD AUTO: 0.3 % — SIGNIFICANT CHANGE UP (ref 0–2)
BUN SERPL-MCNC: 13 MG/DL — SIGNIFICANT CHANGE UP (ref 8–20)
CALCIUM SERPL-MCNC: 8.9 MG/DL — SIGNIFICANT CHANGE UP (ref 8.6–10.2)
CHLORIDE SERPL-SCNC: 96 MMOL/L — LOW (ref 98–107)
CO2 SERPL-SCNC: 26 MMOL/L — SIGNIFICANT CHANGE UP (ref 22–29)
CREAT SERPL-MCNC: 0.79 MG/DL — SIGNIFICANT CHANGE UP (ref 0.5–1.3)
CULTURE RESULTS: SIGNIFICANT CHANGE UP
CULTURE RESULTS: SIGNIFICANT CHANGE UP
EOSINOPHIL # BLD AUTO: 0.05 K/UL — SIGNIFICANT CHANGE UP (ref 0–0.5)
EOSINOPHIL NFR BLD AUTO: 0.5 % — SIGNIFICANT CHANGE UP (ref 0–6)
GLUCOSE BLDC GLUCOMTR-MCNC: 152 MG/DL — HIGH (ref 70–99)
GLUCOSE BLDC GLUCOMTR-MCNC: 156 MG/DL — HIGH (ref 70–99)
GLUCOSE BLDC GLUCOMTR-MCNC: 161 MG/DL — HIGH (ref 70–99)
GLUCOSE BLDC GLUCOMTR-MCNC: 212 MG/DL — HIGH (ref 70–99)
GLUCOSE SERPL-MCNC: 148 MG/DL — HIGH (ref 70–115)
HCT VFR BLD CALC: 28.4 % — LOW (ref 39–50)
HGB BLD-MCNC: 9 G/DL — LOW (ref 13–17)
IMM GRANULOCYTES NFR BLD AUTO: 0.9 % — SIGNIFICANT CHANGE UP (ref 0–1.5)
LYMPHOCYTES # BLD AUTO: 0.83 K/UL — LOW (ref 1–3.3)
LYMPHOCYTES # BLD AUTO: 8.4 % — LOW (ref 13–44)
MAGNESIUM SERPL-MCNC: 2.2 MG/DL — SIGNIFICANT CHANGE UP (ref 1.6–2.6)
MCHC RBC-ENTMCNC: 27.6 PG — SIGNIFICANT CHANGE UP (ref 27–34)
MCHC RBC-ENTMCNC: 31.7 GM/DL — LOW (ref 32–36)
MCV RBC AUTO: 87.1 FL — SIGNIFICANT CHANGE UP (ref 80–100)
MONOCYTES # BLD AUTO: 1.05 K/UL — HIGH (ref 0–0.9)
MONOCYTES NFR BLD AUTO: 10.6 % — SIGNIFICANT CHANGE UP (ref 2–14)
NEUTROPHILS # BLD AUTO: 7.88 K/UL — HIGH (ref 1.8–7.4)
NEUTROPHILS NFR BLD AUTO: 79.3 % — HIGH (ref 43–77)
PHOSPHATE SERPL-MCNC: 2.8 MG/DL — SIGNIFICANT CHANGE UP (ref 2.4–4.7)
PLATELET # BLD AUTO: 517 K/UL — HIGH (ref 150–400)
POTASSIUM SERPL-MCNC: 4.2 MMOL/L — SIGNIFICANT CHANGE UP (ref 3.5–5.3)
POTASSIUM SERPL-SCNC: 4.2 MMOL/L — SIGNIFICANT CHANGE UP (ref 3.5–5.3)
RBC # BLD: 3.26 M/UL — LOW (ref 4.2–5.8)
RBC # FLD: 15.6 % — HIGH (ref 10.3–14.5)
SODIUM SERPL-SCNC: 134 MMOL/L — LOW (ref 135–145)
SPECIMEN SOURCE: SIGNIFICANT CHANGE UP
SPECIMEN SOURCE: SIGNIFICANT CHANGE UP
WBC # BLD: 9.93 K/UL — SIGNIFICANT CHANGE UP (ref 3.8–10.5)
WBC # FLD AUTO: 9.93 K/UL — SIGNIFICANT CHANGE UP (ref 3.8–10.5)

## 2019-07-10 PROCEDURE — 99231 SBSQ HOSP IP/OBS SF/LOW 25: CPT

## 2019-07-10 PROCEDURE — 99232 SBSQ HOSP IP/OBS MODERATE 35: CPT

## 2019-07-10 RX ORDER — HYDROMORPHONE HYDROCHLORIDE 2 MG/ML
1 INJECTION INTRAMUSCULAR; INTRAVENOUS; SUBCUTANEOUS
Refills: 0 | Status: DISCONTINUED | OUTPATIENT
Start: 2019-07-10 | End: 2019-07-11

## 2019-07-10 RX ORDER — SODIUM HYPOCHLORITE 0.125 %
1 SOLUTION, NON-ORAL MISCELLANEOUS DAILY
Refills: 0 | Status: DISCONTINUED | OUTPATIENT
Start: 2019-07-10 | End: 2019-07-11

## 2019-07-10 RX ORDER — OXYCODONE HYDROCHLORIDE 5 MG/1
5 TABLET ORAL EVERY 4 HOURS
Refills: 0 | Status: DISCONTINUED | OUTPATIENT
Start: 2019-07-10 | End: 2019-07-14

## 2019-07-10 RX ADMIN — Medication 2: at 08:19

## 2019-07-10 RX ADMIN — SIMVASTATIN 20 MILLIGRAM(S): 20 TABLET, FILM COATED ORAL at 21:14

## 2019-07-10 RX ADMIN — OXYCODONE HYDROCHLORIDE 10 MILLIGRAM(S): 5 TABLET ORAL at 16:55

## 2019-07-10 RX ADMIN — GABAPENTIN 300 MILLIGRAM(S): 400 CAPSULE ORAL at 21:15

## 2019-07-10 RX ADMIN — POLYETHYLENE GLYCOL 3350 17 GRAM(S): 17 POWDER, FOR SOLUTION ORAL at 17:12

## 2019-07-10 RX ADMIN — OXYCODONE HYDROCHLORIDE 10 MILLIGRAM(S): 5 TABLET ORAL at 22:11

## 2019-07-10 RX ADMIN — AMLODIPINE BESYLATE 10 MILLIGRAM(S): 2.5 TABLET ORAL at 05:51

## 2019-07-10 RX ADMIN — GABAPENTIN 300 MILLIGRAM(S): 400 CAPSULE ORAL at 14:56

## 2019-07-10 RX ADMIN — OXYCODONE HYDROCHLORIDE 5 MILLIGRAM(S): 5 TABLET ORAL at 05:56

## 2019-07-10 RX ADMIN — Medication 650 MILLIGRAM(S): at 08:19

## 2019-07-10 RX ADMIN — ENOXAPARIN SODIUM 80 MILLIGRAM(S): 100 INJECTION SUBCUTANEOUS at 17:11

## 2019-07-10 RX ADMIN — Medication 1 APPLICATION(S): at 16:48

## 2019-07-10 RX ADMIN — OXYCODONE HYDROCHLORIDE 10 MILLIGRAM(S): 5 TABLET ORAL at 21:15

## 2019-07-10 RX ADMIN — SENNA PLUS 1 TABLET(S): 8.6 TABLET ORAL at 17:11

## 2019-07-10 RX ADMIN — Medication 650 MILLIGRAM(S): at 23:22

## 2019-07-10 RX ADMIN — Medication 2: at 17:10

## 2019-07-10 RX ADMIN — OXYCODONE HYDROCHLORIDE 10 MILLIGRAM(S): 5 TABLET ORAL at 12:14

## 2019-07-10 RX ADMIN — ENOXAPARIN SODIUM 80 MILLIGRAM(S): 100 INJECTION SUBCUTANEOUS at 05:51

## 2019-07-10 RX ADMIN — GABAPENTIN 300 MILLIGRAM(S): 400 CAPSULE ORAL at 05:51

## 2019-07-10 RX ADMIN — Medication 100 MILLIGRAM(S): at 17:12

## 2019-07-10 RX ADMIN — OXYCODONE HYDROCHLORIDE 10 MILLIGRAM(S): 5 TABLET ORAL at 14:26

## 2019-07-10 RX ADMIN — OXYCODONE HYDROCHLORIDE 5 MILLIGRAM(S): 5 TABLET ORAL at 06:32

## 2019-07-10 RX ADMIN — OXYCODONE HYDROCHLORIDE 10 MILLIGRAM(S): 5 TABLET ORAL at 17:40

## 2019-07-10 RX ADMIN — Medication 100 MILLIGRAM(S): at 05:51

## 2019-07-10 RX ADMIN — Medication 650 MILLIGRAM(S): at 13:37

## 2019-07-10 RX ADMIN — SENNA PLUS 1 TABLET(S): 8.6 TABLET ORAL at 05:51

## 2019-07-10 RX ADMIN — POLYETHYLENE GLYCOL 3350 17 GRAM(S): 17 POWDER, FOR SOLUTION ORAL at 05:51

## 2019-07-10 RX ADMIN — Medication 4: at 12:23

## 2019-07-10 RX ADMIN — TAMSULOSIN HYDROCHLORIDE 0.4 MILLIGRAM(S): 0.4 CAPSULE ORAL at 21:15

## 2019-07-10 NOTE — DISCHARGE NOTE PROVIDER - CARE PROVIDERS DIRECT ADDRESSES
[email or web address]

## 2019-07-10 NOTE — DISCHARGE NOTE PROVIDER - NSDCCPTREATMENT_GEN_ALL_CORE_FT
PRINCIPAL PROCEDURE  Procedure: Open treatment of fracture of tibial plateau  Findings and Treatment:       SECONDARY PROCEDURE  Procedure: Arthroscopic irrigation of knee  Findings and Treatment:     Procedure: Fasciotomy, decompressive, lower extremity, posterior compartment  Findings and Treatment:     Procedure: Debridement of wound with replacement of vacuum-assisted closure device  Findings and Treatment:     Procedure: Repair of sciatic nerve  Findings and Treatment:     Procedure: Creation, bypass, arterial, popliteal, using in situ vein graft  Findings and Treatment:

## 2019-07-10 NOTE — PROGRESS NOTE ADULT - SUBJECTIVE AND OBJECTIVE BOX
Patient in recliner and wants to go back to bed due to pain.   RN aware, but patient has not spent significant time OOB.  Continues to encourage OOB.  Swelling in the right leg is the cause of his pain.   Worked with PT earlier.     FUNCTIONAL PROGRESS  7/10  Bed Mobility  Bed Mobility Training Supine-to-Sit: moderate assist (50% patient effort);  1 person assist;  bed rails;  elevated HOB;  to long sit in bed  Bed Mobility Training Limitations: decreased ability to use legs for bridging/pushing;  decreased strength;  impaired balance;  pain    Bed-Chair Transfer Training  Transfer Training Bed-to-Chair Transfer: maximum assist (25% patient effort);  2 person assist;  nonweight-bearing   NWB LLE,WBAT RLE   slide board to w/c    Special Training  Special Training Detail: Initiated W/C training including use of leg rests,and use of BUE to propel,Min A needed particularly for turns. Pt able to propel 50ft.         REVIEW OF SYSTEMS  Constitutional - +fever,  +fatigue  Neurological - +loss of strength, +numbness, No tremors  Skin - No rashes, +lesions   Musculoskeletal - +joint pain, +joint swelling, +muscle pain    VITALS  T(C): 38.7 (07-10-19 @ 08:24), Max: 38.7 (07-10-19 @ 08:24)  HR: 91 (07-10-19 @ 08:24) (91 - 105)  BP: 120/69 (07-10-19 @ 08:24) (117/73 - 131/73)  RR: 18 (07-10-19 @ 08:24) (18 - 18)  SpO2: 96% (07-10-19 @ 08:24) (96% - 97%)  Wt(kg): --    MEDICATIONS   acetaminophen   Tablet .. 650 milliGRAM(s) every 6 hours PRN  amLODIPine   Tablet 10 milliGRAM(s) daily  docusate sodium 100 milliGRAM(s) two times a day  enoxaparin Injectable 80 milliGRAM(s) two times a day  gabapentin 300 milliGRAM(s) three times a day  glucagon  Injectable 1 milliGRAM(s) once PRN  HYDROmorphone  Injectable 1 milliGRAM(s) every 3 hours PRN  insulin lispro (HumaLOG) corrective regimen sliding scale   three times a day before meals  insulin lispro (HumaLOG) corrective regimen sliding scale   at bedtime  ondansetron Injectable 4 milliGRAM(s) once PRN  oxyCODONE    IR 5 milliGRAM(s) every 4 hours PRN  oxyCODONE    IR 10 milliGRAM(s) every 4 hours PRN  polyethylene glycol 3350 17 Gram(s) two times a day  senna 1 Tablet(s) two times a day  simvastatin 20 milliGRAM(s) at bedtime  tamsulosin 0.4 milliGRAM(s) at bedtime      RECENT LABS - Reviewed                        9.0    9.93  )-----------( 517      ( 10 Jul 2019 07:49 )             28.4     07-10    134<L>  |  96<L>  |  13.0  ----------------------------<  148<H>  4.2   |  26.0  |  0.79    Ca    8.9      10 Jul 2019 07:49  Phos  2.8     07-10  Mg     2.2     07-10              ----------------------------------------------------------------------------------------  PHYSICAL EXAM  Constitutional - NAD, Uncomfortable  Abdomen - Soft   Extremities - Right foot swelling in PRAFO with Wound VAC/ACE wrap; Left LE in knee immbolizer/ACE  Neurologic Exam -                    Cognitive - Awake, Alert, AAO to self, place, date, year, situation     Motor - Limited by braces and right foot drop                    LEFT    LE - HF 1/5, KE -/5, DF 4/5, PF 4/5                    RIGHT LE - HF 2/5, KE 1/5, DF 0/5, PF 0/5        Sensory - Impaired in the right foot  Psychiatric - Mood depressed  ----------------------------------------------------------------------------------------  ASSESSMENT/PLAN  68yMale with functional deficits after a multitrauma injury to the BLE   Right compartment syndrome with right popliteal arterial injury and s/p bypass/fasciotomy - WBAT, Wound VAC  Left tibial plateau fracture s/p ORIF - NWB, Immobilizer  Right foot drop - PRAFO  Right LE  DVT - Lovenox  HTN - Norvasc  HLD - Zocor  Pain - Tylenol, Neurontin, Dilaudid, Oxycodone  Constipation - Miralax, Colace, Senna  Urinary retention - Flomax, Min  DVT PPX - SCDs  Rehab - Continue to recommend ACUTE inpatient rehabilitation **[facility]** for the functional deficits consisting of 3 hours of therapy/day & 24 hour RN/daily PMR physician for comorbid medical management. Will continue to follow for ongoing rehab needs and recommendations. Patient will be able to tolerate 3 hours a day.    Continue bedside therapy as well as OOB throughout the day with mobilization throughout the day with staff to maintain cardiopulmonary function and prevention of secondary complications related to debility.

## 2019-07-10 NOTE — PROGRESS NOTE ADULT - ASSESSMENT
A/P:   BPH  urinary retention    Continue with villegas catheter.  Continue with flomax  TOV later when patient is at least able to stand if not walk.  Very difficult to urinate in current position.

## 2019-07-10 NOTE — DISCHARGE NOTE PROVIDER - HOSPITAL COURSE
HPI:     Patient was a 67y/o Male BIBEMS on 06-25-19 by ground and was activated as a code Trauma B. Patient was at work when he was reportedly was crushed/impaled by forklift. He possibly had an associated fall. Pt denies any LOC. Per EMS, pt lost approximately 1-2 Liters of blood from RLE laceration before a tourniquet was applied at approximately 10:19am. Patient brought to [facility] where on arrival, patient protecting airway, able to with ease, had nonlabored breathing, and had good central pulses. Patient's tourniquet and dressing over RLE posterior lateral laceration taken down. No active bleeding noted from approximately 10cm full thickness laceration. No pulse signals distal to right knee. Pt unable to dorsiflex or plantarflex right foot and had decreased sensation distal to knee. Pan Scan was completed for trauma. Vascular surgery was consulted in trauma bay. Patient went to OR emergently with Vascular surgery for revascularization of RLE which included a decompressive fasciotomy of anterior, lateral and posterior compartments and a popliteal artery bypass with saphenous vein. Patient had 2+ DP and PT pulses in RLE post-op and patient was transferred to SICU. Neurochecks were performed every 4 hours and ASA and statin were continued. HGB dropped from 8.2 to 6.0 and was transfused 2 units PRBC. Hgb remained stable post transfusion. CPK's continued to trend down. Daily dressing changes were applied to fasciotomy sites and patient was stable from vascular standpoint.        Orthopedics was consulted for LLE Tibial plateau fracture. Patient was taken to OR for open treatment of tibial plateau fracture. Post-op he is to remain NWB on LLE for 6 weeks, wear a hinged ROM brace for MCL deficiency. Bilateral MRI of knees were performed to access for ligamentous laxity. Pain was controlled. Orthopedics was re-consulted for right knee septic arthritis. Patient was brought to OR for arthroscopic irrigation of right knee.         Plastic surgery was consulted for sciatic nerve disfunction of RLE. Patient went to OR with Plastics for repair of Tibial nerve and common peroneal nerve. Grafts were used for repair and nerve protectors were applied. A right partial thickness skin graft from right lateral thigh donor site was applied to right lateral fasciotomy site. Wound vac was applied to skin graft site. ZAMZAM was left in place and d/c after minimal output noted. Wound vac was d/c on POD #5. Anterior knee skin flap was irrigated and packed. Wet to dry dressing and Dakins were applied daily to wounds. Patient to f/u in office as outpatient.         Cardiology was consulted for OR. TTE was performed which revealed an EF of 70-75%. Cardiology recommended proceeding with surgery as needed.        Nephrology was consulted for SERENITY and was found to have acute obstructive uropathy 2/2 phimosis. Min was maintained. Aggressive hydration was performed and patient underwent post-obstructive diuresis. Creatinine normalized. SERENITY resolved.        Urology was consulted for urinary retention. Flomax was started. Min was maintained and continued for prolonged duration due to patient not being able to stand and ambulate for TOV.         PT evaluated patient and continued to work with patient for extremity strengthening and recommended rehab for dispo.        OT evaluated patient as recommended rehab.                  CT Head - IMPRESSION: No acute intracranial hemorrhage or mass effect.     CT Cervical - IMPRESSION: No evidence for acute displaced fracture or subluxation.    CT CAP - IMPRESSION: Occluded popliteal artery with collateral reconstitution of the tibioperoneal trunk and the vessels of trifurcation to the RIGHT foot. Depressed LEFT lateral tibial plateau fracture. Deep laceration lateral knee soft tissues with subcutaneous and deep interfascial air dissection with intra-articular air confirming joint capsule laceration as described. Punctate air lucencies within the gastrocnemius muscle. Chest abdomen pelvic viscera intact. No other fracture deformities.    MRI L Knee -  IMPRESSION: Severely limited exam due to metallic artifact from surgical hardware in the proximal tibia. Large knee joint effusion with intra-articular gas. All ligaments appeared to be grossly intact.     MRI R Knee - Impression: Intact cruciate and collateral ligamentous structures. No evidence of meniscal tear. Mild osseous contusions along the posterior margin of the lateral femoral condyle and lateral tibial plateau. Diffuse subcutaneous edema about the knee with confluent fluid noted along the anteromedial subcutaneous fat. Air within the suprapatellar joint recess likely related to traumatic arthrotomy. HPI:     Patient was a 67y/o Male BIBEMS on 06-25-19 by ground and was activated as a code Trauma B. Patient was at work when he was reportedly was crushed/impaled by forklift. He possibly had an associated fall. Pt denies any LOC. Per EMS, pt lost approximately 1-2 Liters of blood from RLE laceration before a tourniquet was applied at approximately 10:19am. Patient brought to [facility] where on arrival, patient protecting airway, able to with ease, had nonlabored breathing, and had good central pulses. Patient's tourniquet and dressing over RLE posterior lateral laceration taken down. No active bleeding noted from approximately 10cm full thickness laceration. No pulse signals distal to right knee. Pt unable to dorsiflex or plantarflex right foot and had decreased sensation distal to knee. Pan Scan was completed for trauma. Vascular surgery was consulted in trauma bay. Patient went to OR emergently with Vascular surgery for revascularization of RLE which included a decompressive fasciotomy of anterior, lateral and posterior compartments and a popliteal artery bypass with saphenous vein. Patient had 2+ DP and PT pulses in RLE post-op and patient was transferred to SICU. Neurochecks were performed every 4 hours and ASA and statin were continued. HGB dropped from 8.2 to 6.0 and was transfused 2 units PRBC. Hgb remained stable post transfusion. CPK's continued to trend down. Daily dressing changes were applied to fasciotomy sites and patient was stable from vascular standpoint.        Orthopedics was consulted for LLE Tibial plateau fracture. Patient was taken to OR for open treatment of tibial plateau fracture. Post-op he is to remain NWB on LLE for 6 weeks, wear a hinged ROM brace for MCL deficiency. Bilateral MRI of knees were performed to access for ligamentous laxity. Pain was controlled. Orthopedics was re-consulted for right knee septic arthritis. Right knee arthrocentesis was performed showing 44K WBC and GNR. Patient was brought to OR for arthroscopic irrigation of right knee. Patient is WBAT to RLE.         Plastic surgery was consulted for sciatic nerve disfunction of RLE. Patient went to OR with Plastics for repair of Tibial nerve and common peroneal nerve. Grafts were used for repair and nerve protectors were applied. A right partial thickness skin graft from right lateral thigh donor site was applied to right lateral fasciotomy site. Wound vac was applied to skin graft site. ZAMZAM was left in place and d/c after minimal output noted. Wound vac was d/c on POD #5. Anterior knee skin flap was irrigated and packed. Wet to dry dressing and Dakins were applied daily to wounds. Patient to f/u in office as outpatient.         Rapid Response was called for patient being febrile to 102.8 and tachycardic to 102. Physical exam was unremarkable. Right lower extremity wound vac was taken down by Plastic surgery which did not reveal any purulent drainage or areas of necrosis. 2L bolus was admin and patient remained on unit.         Infectious Disease was consulted was right knee septic arthritis. Blood cultures were negative on 7/5 and 7/12. Arthrocentesis showed 44K WBC and GNR on gram stain. Vancomycin and Cefazolin was d/c and Ceftriaxone was started.         Cardiology was consulted for OR. TTE was performed which revealed an EF of 70-75%. Cardiology recommended proceeding with surgery as needed.        Nephrology was consulted for SERENITY and was found to have acute obstructive uropathy 2/2 phimosis. Min was maintained. Aggressive hydration was performed and patient underwent post-obstructive diuresis. Creatinine normalized. SERENITY resolved.        Urology was consulted for urinary retention. Flomax was started. Min was maintained and continued for prolonged duration due to patient not being able to stand and ambulate for TOV.         PT evaluated patient and continued to work with patient for extremity strengthening and recommended acute rehab for dispo.        OT evaluated patient as recommended acute rehab.         PMR evaluated patient and recommended CAT.        Diet was advanced and well tolerated. DVT ppx was provided throughout stay. Min catheter was maintained throughout stay for decreased mobility for TOV, pain was well controlled. Patient was stable for discharge to acute rehab on 8/1/19.                 CT Head - IMPRESSION: No acute intracranial hemorrhage or mass effect.     CT Cervical - IMPRESSION: No evidence for acute displaced fracture or subluxation.    CT CAP - IMPRESSION: Occluded popliteal artery with collateral reconstitution of the tibioperoneal trunk and the vessels of trifurcation to the RIGHT foot. Depressed LEFT lateral tibial plateau fracture. Deep laceration lateral knee soft tissues with subcutaneous and deep interfascial air dissection with intra-articular air confirming joint capsule laceration as described. Punctate air lucencies within the gastrocnemius muscle. Chest abdomen pelvic viscera intact. No other fracture deformities.    MRI L Knee -  IMPRESSION: Severely limited exam due to metallic artifact from surgical hardware in the proximal tibia. Large knee joint effusion with intra-articular gas. All ligaments appeared to be grossly intact.     MRI R Knee - Impression: Intact cruciate and collateral ligamentous structures. No evidence of meniscal tear. Mild osseous contusions along the posterior margin of the lateral femoral condyle and lateral tibial plateau. Diffuse subcutaneous edema about the knee with confluent fluid noted along the anteromedial subcutaneous fat. Air within the suprapatellar joint recess likely related to traumatic arthrotomy. HPI:     Patient was a 67y/o Male BIBEMS on 06-25-19 by ground and was activated as a code Trauma B. Patient was at work when he was reportedly was crushed/impaled by forklift. He possibly had an associated fall. Pt denies any LOC. Per EMS, pt lost approximately 1-2 Liters of blood from RLE laceration before a tourniquet was applied at approximately 10:19am. Patient brought to [facility] where on arrival, patient protecting airway, able to with ease, had nonlabored breathing, and had good central pulses. Patient's tourniquet and dressing over RLE posterior lateral laceration taken down. No active bleeding noted from approximately 10cm full thickness laceration. No pulse signals distal to right knee. Pt unable to dorsiflex or plantarflex right foot and had decreased sensation distal to knee. Pan Scan was completed for trauma. Vascular surgery was consulted in trauma bay. Patient went to OR emergently with Vascular surgery for revascularization of RLE which included a decompressive fasciotomy of anterior, lateral and posterior compartments and a popliteal artery bypass with saphenous vein. Patient had 2+ DP and PT pulses in RLE post-op and patient was transferred to SICU. Neurochecks were performed every 4 hours and ASA and statin were continued. HGB dropped from 8.2 to 6.0 and was transfused 2 units PRBC. Hgb remained stable post transfusion. CPK's continued to trend down. Daily dressing changes were applied to fasciotomy sites and patient was stable from vascular standpoint.        Orthopedics was consulted for LLE Tibial plateau fracture. Patient was taken to OR for open treatment of tibial plateau fracture. Post-op he is to remain NWB on LLE for 6 weeks, wear a hinged ROM brace for MCL deficiency. Bilateral MRI of knees were performed to access for ligamentous laxity. Pain was controlled. Orthopedics was re-consulted for right knee septic arthritis. Right knee arthrocentesis was performed showing 44K WBC and GNR. Patient was brought to OR for arthroscopic irrigation of right knee. Patient is WBAT to RLE.         Plastic surgery was consulted for sciatic nerve disfunction of RLE. Patient went to OR with Plastics for repair of Tibial nerve and common peroneal nerve. Grafts were used for repair and nerve protectors were applied. A right partial thickness skin graft from right lateral thigh donor site was applied to right lateral fasciotomy site. Wound vac was applied to skin graft site. ZAMZAM was left in place and d/c after minimal output noted. Wound vac was d/c on POD #5. Anterior knee skin flap was irrigated and packed. Wet to dry dressing and Dakins were applied daily to wounds. Patient to f/u in office as outpatient.         Rapid Response was called for patient being febrile to 102.8 and tachycardic to 102. Physical exam was unremarkable. Right lower extremity wound vac was taken down by Plastic surgery which did not reveal any purulent drainage or areas of necrosis. 2L bolus was admin and patient remained on unit.         Infectious Disease was consulted was right knee septic arthritis. Blood cultures were negative on 7/5 and 7/12. Arthrocentesis showed 44K WBC and GNR on gram stain. Vancomycin and Cefazolin was d/c and Ceftriaxone was started.         Cardiology was consulted for OR. TTE was performed which revealed an EF of 70-75%. Cardiology recommended proceeding with surgery as needed.        Nephrology was consulted for SERENITY and was found to have acute obstructive uropathy 2/2 phimosis. Min was maintained. Aggressive hydration was performed and patient underwent post-obstructive diuresis. Creatinine normalized. SERENITY resolved.        Urology was consulted for urinary retention. Flomax was started. Min was maintained and continued for prolonged duration due to patient not being able to stand and ambulate for TOV.         PT evaluated patient and continued to work with patient for extremity strengthening and recommended acute rehab for dispo.        OT evaluated patient as recommended acute rehab.         PMR evaluated patient and recommended CAT.        Diet was advanced and well tolerated. DVT ppx was provided throughout stay. Min catheter was maintained throughout stay for decreased mobility for TOV, pain was well controlled. Patient was stable for discharge to rehab on 8/1/19.                 CT Head - IMPRESSION: No acute intracranial hemorrhage or mass effect.     CT Cervical - IMPRESSION: No evidence for acute displaced fracture or subluxation.    CT CAP - IMPRESSION: Occluded popliteal artery with collateral reconstitution of the tibioperoneal trunk and the vessels of trifurcation to the RIGHT foot. Depressed LEFT lateral tibial plateau fracture. Deep laceration lateral knee soft tissues with subcutaneous and deep interfascial air dissection with intra-articular air confirming joint capsule laceration as described. Punctate air lucencies within the gastrocnemius muscle. Chest abdomen pelvic viscera intact. No other fracture deformities.    MRI L Knee -  IMPRESSION: Severely limited exam due to metallic artifact from surgical hardware in the proximal tibia. Large knee joint effusion with intra-articular gas. All ligaments appeared to be grossly intact.     MRI R Knee - Impression: Intact cruciate and collateral ligamentous structures. No evidence of meniscal tear. Mild osseous contusions along the posterior margin of the lateral femoral condyle and lateral tibial plateau. Diffuse subcutaneous edema about the knee with confluent fluid noted along the anteromedial subcutaneous fat. Air within the suprapatellar joint recess likely related to traumatic arthrotomy.

## 2019-07-10 NOTE — PROGRESS NOTE ADULT - SUBJECTIVE AND OBJECTIVE BOX
INTERVAL HPI/OVERNIGHT EVENTS: Patient without new complaints.    MEDICATIONS  (STANDING):  amLODIPine   Tablet 10 milliGRAM(s) Oral daily  docusate sodium 100 milliGRAM(s) Oral two times a day  enoxaparin Injectable 80 milliGRAM(s) SubCutaneous two times a day  gabapentin 300 milliGRAM(s) Oral three times a day  insulin lispro (HumaLOG) corrective regimen sliding scale   SubCutaneous three times a day before meals  insulin lispro (HumaLOG) corrective regimen sliding scale   SubCutaneous at bedtime  polyethylene glycol 3350 17 Gram(s) Oral two times a day  senna 1 Tablet(s) Oral two times a day  simvastatin 20 milliGRAM(s) Oral at bedtime  tamsulosin 0.4 milliGRAM(s) Oral at bedtime    MEDICATIONS  (PRN):  acetaminophen   Tablet .. 650 milliGRAM(s) Oral every 6 hours PRN Temp greater or equal to 38C (100.4F), Mild Pain (1 - 3)  glucagon  Injectable 1 milliGRAM(s) IntraMuscular once PRN Glucose LESS THAN 70 milligrams/deciliter  HYDROmorphone  Injectable 1 milliGRAM(s) IV Push every 3 hours PRN Severe Pain (7 - 10)  ondansetron Injectable 4 milliGRAM(s) IV Push once PRN Nausea and/or Vomiting  oxyCODONE    IR 5 milliGRAM(s) Oral every 4 hours PRN Moderate Pain (4 - 6)  oxyCODONE    IR 10 milliGRAM(s) Oral every 4 hours PRN Severe Pain (7 - 10)      Allergies    No Known Allergies    Intolerances        Vital Signs Last 24 Hrs  T(C): 38.7 (10 Jul 2019 08:24), Max: 38.7 (10 Jul 2019 08:24)  T(F): 101.7 (10 Jul 2019 08:24), Max: 101.7 (10 Jul 2019 08:24)  HR: 91 (10 Jul 2019 08:24) (91 - 105)  BP: 120/69 (10 Jul 2019 08:24) (117/73 - 131/73)  BP(mean): --  RR: 18 (10 Jul 2019 08:24) (18 - 18)  SpO2: 96% (10 Jul 2019 08:24) (96% - 97%)    ROS:  Constitutional: No fever, weight loss or fatigue  Respiratory: No cough, wheezing, chills or hemoptysis  Cardiovascular: No chest pain, palpitations, shortness of breath, dizziness or leg swelling  Genitourinary: No dysuria, frequency, hematuria or incontinence  Skin: No itching, burning, rashes or lesions   Musculoskeletal: No joint pain or swelling; No muscle, back or extremity pain  Psychiatric: No depression, anxiety, mood swings or difficulty sleeping  Allergy and Immunologic: No hives or eczema     ON PE:  General: Well developed; well nourished; in no acute distress  Head: Normocephalic; atraumatic  Respiratory: No tachypnea  Gastrointestinal: Soft non-tender non-distended;  Genitourinary: No costovertebral angle tenderness.  Urinary bladder is clinically not distended  Min draining clear  Extremities: Right leg bandaged; left LE in brace  Neurological: Alert and oriented x4  Skin: Warm and dry. No acute rash  Psychiatric: Cooperative and appropriate      LABS:                        9.0    9.93  )-----------( 517      ( 10 Jul 2019 07:49 )             28.4     07-10    134<L>  |  96<L>  |  13.0  ----------------------------<  148<H>  4.2   |  26.0  |  0.79    Ca    8.9      10 Jul 2019 07:49  Phos  2.8     07-10  Mg     2.2     07-10            RADIOLOGY & ADDITIONAL TESTS:

## 2019-07-10 NOTE — PROGRESS NOTE ADULT - ASSESSMENT
68 M s/p BLE crush injury at work.  RLE profunda to pop w/ gsv bypass, ORIF left tibial plateau.  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  - OT: AR, PT: AR vs CAT, PMR: AR  - WBAT  - plastics to do wound care:  1. recepient site: xeroform, dry  2. donor site: aquacel w dry  3. lateral wound: WTD w/ dakins  4. ZAMZAM site: 1/4" packing     - DVT ppx  - continue LLE splint  - villegas per urology for retention

## 2019-07-10 NOTE — PROGRESS NOTE ADULT - SUBJECTIVE AND OBJECTIVE BOX
HPI/OVERNIGHT EVENTS:  No acute events overnight. Dr. Ron evaluated pt at bedside and noted some murky fluid from the lateral superior aspect of the open wound and did a bedside washout. ZAMZAM was also d/c'ed. Pt tmax of 101.2 early in the evening. Otherwise tolerating a diet, voiding, and having bowel function without issues.    MEDICATIONS  (STANDING):  amLODIPine   Tablet 10 milliGRAM(s) Oral daily  docusate sodium 100 milliGRAM(s) Oral two times a day  enoxaparin Injectable 80 milliGRAM(s) SubCutaneous two times a day  gabapentin 300 milliGRAM(s) Oral three times a day  insulin lispro (HumaLOG) corrective regimen sliding scale   SubCutaneous three times a day before meals  insulin lispro (HumaLOG) corrective regimen sliding scale   SubCutaneous at bedtime  polyethylene glycol 3350 17 Gram(s) Oral two times a day  senna 1 Tablet(s) Oral two times a day  simvastatin 20 milliGRAM(s) Oral at bedtime  tamsulosin 0.4 milliGRAM(s) Oral at bedtime    MEDICATIONS  (PRN):  acetaminophen   Tablet .. 650 milliGRAM(s) Oral every 6 hours PRN Temp greater or equal to 38C (100.4F), Mild Pain (1 - 3)  glucagon  Injectable 1 milliGRAM(s) IntraMuscular once PRN Glucose LESS THAN 70 milligrams/deciliter  HYDROmorphone  Injectable 1 milliGRAM(s) IV Push every 3 hours PRN Severe Pain (7 - 10)  ondansetron Injectable 4 milliGRAM(s) IV Push once PRN Nausea and/or Vomiting  oxyCODONE    IR 5 milliGRAM(s) Oral every 4 hours PRN Moderate Pain (4 - 6)  oxyCODONE    IR 10 milliGRAM(s) Oral every 4 hours PRN Severe Pain (7 - 10)      Vital Signs Last 24 Hrs  T(C): 36.7 (09 Jul 2019 23:57), Max: 38.4 (09 Jul 2019 17:17)  T(F): 98 (09 Jul 2019 23:57), Max: 101.2 (09 Jul 2019 17:17)  HR: 95 (09 Jul 2019 23:57) (94 - 105)  BP: 126/71 (09 Jul 2019 23:57) (117/73 - 132/74)  BP(mean): --  RR: 18 (09 Jul 2019 23:57) (18 - 18)  SpO2: 96% (09 Jul 2019 23:57) (96% - 97%)    gen: nad, a&ox3  cv: rrr  resp: nonlabored breathing  gi: soft, nd, nttp  msk: RLE 98% take on STSG. superior lateral aspect s/p wound washout      I&O's Detail    08 Jul 2019 07:01  -  09 Jul 2019 07:00  --------------------------------------------------------  IN:  Total IN: 0 mL    OUT:    Bulb: 4 mL    Indwelling Catheter - Urethral: 3900 mL  Total OUT: 3904 mL    Total NET: -3904 mL      09 Jul 2019 07:01  -  10 Jul 2019 01:02  --------------------------------------------------------  IN:  Total IN: 0 mL    OUT:    Bulb: 2 mL    Indwelling Catheter - Urethral: 2200 mL  Total OUT: 2202 mL    Total NET: -2202 mL          LABS:                        9.1    9.97  )-----------( 482      ( 09 Jul 2019 09:37 )             29.1     07-09    133<L>  |  96<L>  |  12.0  ----------------------------<  193<H>  4.0   |  26.0  |  0.77    Ca    8.9      09 Jul 2019 09:37  Phos  2.7     07-09  Mg     2.0     07-09

## 2019-07-10 NOTE — DISCHARGE NOTE PROVIDER - PROVIDER TOKENS
PROVIDER:[TOKEN:[[id number]]] PROVIDER:[TOKEN:[[id number]]],PROVIDER:[TOKEN:[[id number]]],PROVIDER:[TOKEN:[[id number]]]

## 2019-07-10 NOTE — PROGRESS NOTE ADULT - ASSESSMENT
Pt is pOD 6 s/p excision and grafting of titbial/peroneal nerve injuries.      Staples removed from STSG  STSG with good take to lateral calf.  Staples removed.  Anterior knee skin flap is fluctuent with epidermolysis.  ZAMZAM clotted and removed.  Able to express 10-20cc seropurulent fluid, skin decompressed.  No pus.  Integra removed.  Open wound with viable tissue.  Anterior pocket irrigated and packed.    Recommend daily packing and wet to dry with Dakins to open wounds  Continue compression wrap, elevation, abx  D/c knee immobilizer  Conitnue supportive care . Pt is pOD 6 s/p excision and grafting of titbial/peroneal nerve injuries.      - Staples removed from STSG  - Anteruior skin flap with epidermolysis and fluctuant  - ZAMZAM removed  - Expressed 10-20mL of seropurulent fluid from integra graft site wound and ZAMZAM site.   - Integra removed, irrigated and packed   - Daily packing and wet to dry with Dakins to open wound of integra site  - Aquacel and dry gauze to donor site  - Xerform dry to recepient site  - Kerlix and ACE to cover al wounds of RLE  - Leg elevation  - D/c knee immobilizer .

## 2019-07-10 NOTE — DISCHARGE NOTE PROVIDER - NSDCCPCAREPLAN_GEN_ALL_CORE_FT
PRINCIPAL DISCHARGE DIAGNOSIS  Diagnosis: Leg laceration, right, initial encounter  Assessment and Plan of Treatment:       SECONDARY DISCHARGE DIAGNOSES  Diagnosis: Tibial plateau fracture  Assessment and Plan of Treatment: Follow all verbal and written instructions. Take medications as prescribed. DO NOT drive, operate machinery, and/or make important decisions while on prescription pain medication. DO NOT hesitate to call Doctor's office with questions or concerns.   * Non-weight bearing of the LEFT LOWER EXTREMITY  * USE hinged knee brace of left lower extremity at all times  * Pain control as clinically indicated PRINCIPAL DISCHARGE DIAGNOSIS  Diagnosis: Leg laceration, right, initial encounter  Assessment and Plan of Treatment: Receipient Site:  Wet to dry dressing until wound vac can be applied.  Upon arrival to rehab facility, apply black foam sponge vac with 100mmHg continuous therapy.  Donor site to air.  Follow up Dr Forman 1-2 weeks from discharge.      SECONDARY DISCHARGE DIAGNOSES  Diagnosis: Tibial plateau fracture  Assessment and Plan of Treatment: Follow all verbal and written instructions. Take medications as prescribed. DO NOT drive, operate machinery, and/or make important decisions while on prescription pain medication. DO NOT hesitate to call Doctor's office with questions or concerns.   * Non-weight bearing of the LEFT LOWER EXTREMITY  * USE hinged knee brace of left lower extremity at all times  * Pain control as clinically indicated

## 2019-07-10 NOTE — PROGRESS NOTE ADULT - SUBJECTIVE AND OBJECTIVE BOX
HPI/OVERNIGHT EVENTS: Patient seen and examined at bedside this AM. Patient febrile to 101.2 overnight. Pain well controlled, tolerating diet, Min in place. Denies  nausea, vomitting, chest pain, SOB, dizziness, abd pain or any other concerning symptoms    Vital Signs Last 24 Hrs  T(C): 36.7 (09 Jul 2019 23:57), Max: 38.4 (09 Jul 2019 17:17)  T(F): 98 (09 Jul 2019 23:57), Max: 101.2 (09 Jul 2019 17:17)  HR: 95 (09 Jul 2019 23:57) (94 - 105)  BP: 126/71 (09 Jul 2019 23:57) (117/73 - 132/74)  BP(mean): --  RR: 18 (09 Jul 2019 23:57) (18 - 18)  SpO2: 96% (09 Jul 2019 23:57) (96% - 97%)    I&O's Detail    08 Jul 2019 07:01  -  09 Jul 2019 07:00  --------------------------------------------------------  IN:  Total IN: 0 mL    OUT:    Bulb: 4 mL    Indwelling Catheter - Urethral: 3900 mL  Total OUT: 3904 mL    Total NET: -3904 mL      09 Jul 2019 07:01  -  10 Jul 2019 02:00  --------------------------------------------------------  IN:  Total IN: 0 mL    OUT:    Bulb: 2 mL    Indwelling Catheter - Urethral: 2200 mL  Total OUT: 2202 mL    Total NET: -2202 mL              Constitutional: patient resting comfortably in bed, in no acute distress  HEENT: EOMI / PERRL b/l   Neck: No JVD, full ROM without pain  Respiratory: CTAB respirations are unlabored, no accessory muscle use, no conversational dyspnea  Cardiovascular: regular rate & rhythm   Gastrointestinal: Abdomen soft, non-tender, non-distended, no rebound tenderness / guarding  Incision/Wound: RLE donor site good granulation, no exudate or erythema; Recipient site 95& graft inclusion, no exudate or erythema,     LABS:                        9.1    9.97  )-----------( 482      ( 09 Jul 2019 09:37 )             29.1     07-09    133<L>  |  96<L>  |  12.0  ----------------------------<  193<H>  4.0   |  26.0  |  0.77    Ca    8.9      09 Jul 2019 09:37  Phos  2.7     07-09  Mg     2.0     07-09            MEDICATIONS  (STANDING):  amLODIPine   Tablet 10 milliGRAM(s) Oral daily  docusate sodium 100 milliGRAM(s) Oral two times a day  enoxaparin Injectable 80 milliGRAM(s) SubCutaneous two times a day  gabapentin 300 milliGRAM(s) Oral three times a day  insulin lispro (HumaLOG) corrective regimen sliding scale   SubCutaneous three times a day before meals  insulin lispro (HumaLOG) corrective regimen sliding scale   SubCutaneous at bedtime  polyethylene glycol 3350 17 Gram(s) Oral two times a day  senna 1 Tablet(s) Oral two times a day  simvastatin 20 milliGRAM(s) Oral at bedtime  tamsulosin 0.4 milliGRAM(s) Oral at bedtime    MEDICATIONS  (PRN):  acetaminophen   Tablet .. 650 milliGRAM(s) Oral every 6 hours PRN Temp greater or equal to 38C (100.4F), Mild Pain (1 - 3)  glucagon  Injectable 1 milliGRAM(s) IntraMuscular once PRN Glucose LESS THAN 70 milligrams/deciliter  HYDROmorphone  Injectable 1 milliGRAM(s) IV Push every 3 hours PRN Severe Pain (7 - 10)  ondansetron Injectable 4 milliGRAM(s) IV Push once PRN Nausea and/or Vomiting  oxyCODONE    IR 5 milliGRAM(s) Oral every 4 hours PRN Moderate Pain (4 - 6)  oxyCODONE    IR 10 milliGRAM(s) Oral every 4 hours PRN Severe Pain (7 - 10) HPI/OVERNIGHT EVENTS: Patient seen and examined at bedside this AM. Patient febrile to 101.2 overnight. Pain well controlled, tolerating diet, Min in place. Denies  nausea, vomitting, chest pain, SOB, dizziness, abd pain or any other concerning symptoms    Vital Signs Last 24 Hrs  T(C): 36.7 (09 Jul 2019 23:57), Max: 38.4 (09 Jul 2019 17:17)  T(F): 98 (09 Jul 2019 23:57), Max: 101.2 (09 Jul 2019 17:17)  HR: 95 (09 Jul 2019 23:57) (94 - 105)  BP: 126/71 (09 Jul 2019 23:57) (117/73 - 132/74)  BP(mean): --  RR: 18 (09 Jul 2019 23:57) (18 - 18)  SpO2: 96% (09 Jul 2019 23:57) (96% - 97%)    I&O's Detail    08 Jul 2019 07:01  -  09 Jul 2019 07:00  --------------------------------------------------------  IN:  Total IN: 0 mL    OUT:    Bulb: 4 mL    Indwelling Catheter - Urethral: 3900 mL  Total OUT: 3904 mL    Total NET: -3904 mL      09 Jul 2019 07:01  -  10 Jul 2019 02:00  --------------------------------------------------------  IN:  Total IN: 0 mL    OUT:    Bulb: 2 mL    Indwelling Catheter - Urethral: 2200 mL  Total OUT: 2202 mL    Total NET: -2202 mL              Constitutional: patient resting comfortably in bed, in no acute distress  HEENT: EOMI / PERRL b/l   Neck: No JVD, full ROM without pain  Respiratory: CTAB respirations are unlabored, no accessory muscle use, no conversational dyspnea  Cardiovascular: regular rate & rhythm   Gastrointestinal: Abdomen soft, non-tender, non-distended, no rebound tenderness / guarding  Incision/Wound: RLE donor site good granulation, no exudate or erythema; Recipient site 95& graft inclusion, no exudate or erythema; iNTEGRA GRAFT wound removed graft, expressed murky fluid about 15mL, no eruythema    LABS:                        9.1    9.97  )-----------( 482      ( 09 Jul 2019 09:37 )             29.1     07-09    133<L>  |  96<L>  |  12.0  ----------------------------<  193<H>  4.0   |  26.0  |  0.77    Ca    8.9      09 Jul 2019 09:37  Phos  2.7     07-09  Mg     2.0     07-09            MEDICATIONS  (STANDING):  amLODIPine   Tablet 10 milliGRAM(s) Oral daily  docusate sodium 100 milliGRAM(s) Oral two times a day  enoxaparin Injectable 80 milliGRAM(s) SubCutaneous two times a day  gabapentin 300 milliGRAM(s) Oral three times a day  insulin lispro (HumaLOG) corrective regimen sliding scale   SubCutaneous three times a day before meals  insulin lispro (HumaLOG) corrective regimen sliding scale   SubCutaneous at bedtime  polyethylene glycol 3350 17 Gram(s) Oral two times a day  senna 1 Tablet(s) Oral two times a day  simvastatin 20 milliGRAM(s) Oral at bedtime  tamsulosin 0.4 milliGRAM(s) Oral at bedtime    MEDICATIONS  (PRN):  acetaminophen   Tablet .. 650 milliGRAM(s) Oral every 6 hours PRN Temp greater or equal to 38C (100.4F), Mild Pain (1 - 3)  glucagon  Injectable 1 milliGRAM(s) IntraMuscular once PRN Glucose LESS THAN 70 milligrams/deciliter  HYDROmorphone  Injectable 1 milliGRAM(s) IV Push every 3 hours PRN Severe Pain (7 - 10)  ondansetron Injectable 4 milliGRAM(s) IV Push once PRN Nausea and/or Vomiting  oxyCODONE    IR 5 milliGRAM(s) Oral every 4 hours PRN Moderate Pain (4 - 6)  oxyCODONE    IR 10 milliGRAM(s) Oral every 4 hours PRN Severe Pain (7 - 10)

## 2019-07-10 NOTE — DISCHARGE NOTE PROVIDER - CARE PROVIDER_API CALL
Maxwell Dela Cruz)  Orthopaedic Surgery  [address] [place] [address]  Phone: [phone]  Fax: [phone]  Follow Up Time: Maxwell Dela Cruz)  Orthopaedic Surgery  [address] [place] [address]  Phone: [phone]  Fax: [phone]  Follow Up Time:     Jaziel Ron)  Plastic Surgery; Surgery of the Hand  [address] [place] [address], [address]  [place] [address]  Phone: [phone]  Fax: [phone]  Follow Up Time:     Karl Murray)  Urology  [address] [facility], [facility] [address]  [place] [address]  Phone: [phone]  Fax: [phone]  Follow Up Time:

## 2019-07-11 LAB
ANION GAP SERPL CALC-SCNC: 13 MMOL/L — SIGNIFICANT CHANGE UP (ref 5–17)
BASOPHILS # BLD AUTO: 0.03 K/UL — SIGNIFICANT CHANGE UP (ref 0–0.2)
BASOPHILS NFR BLD AUTO: 0.3 % — SIGNIFICANT CHANGE UP (ref 0–2)
BUN SERPL-MCNC: 17 MG/DL — SIGNIFICANT CHANGE UP (ref 8–20)
CALCIUM SERPL-MCNC: 9 MG/DL — SIGNIFICANT CHANGE UP (ref 8.6–10.2)
CHLORIDE SERPL-SCNC: 96 MMOL/L — LOW (ref 98–107)
CO2 SERPL-SCNC: 25 MMOL/L — SIGNIFICANT CHANGE UP (ref 22–29)
CREAT SERPL-MCNC: 0.82 MG/DL — SIGNIFICANT CHANGE UP (ref 0.5–1.3)
EOSINOPHIL # BLD AUTO: 0.11 K/UL — SIGNIFICANT CHANGE UP (ref 0–0.5)
EOSINOPHIL NFR BLD AUTO: 1.1 % — SIGNIFICANT CHANGE UP (ref 0–6)
GLUCOSE SERPL-MCNC: 145 MG/DL — HIGH (ref 70–115)
HCT VFR BLD CALC: 27.8 % — LOW (ref 39–50)
HGB BLD-MCNC: 8.8 G/DL — LOW (ref 13–17)
IMM GRANULOCYTES NFR BLD AUTO: 1.1 % — SIGNIFICANT CHANGE UP (ref 0–1.5)
LYMPHOCYTES # BLD AUTO: 0.9 K/UL — LOW (ref 1–3.3)
LYMPHOCYTES # BLD AUTO: 9.3 % — LOW (ref 13–44)
MAGNESIUM SERPL-MCNC: 2.2 MG/DL — SIGNIFICANT CHANGE UP (ref 1.6–2.6)
MCHC RBC-ENTMCNC: 27.9 PG — SIGNIFICANT CHANGE UP (ref 27–34)
MCHC RBC-ENTMCNC: 31.7 GM/DL — LOW (ref 32–36)
MCV RBC AUTO: 88.3 FL — SIGNIFICANT CHANGE UP (ref 80–100)
MONOCYTES # BLD AUTO: 0.97 K/UL — HIGH (ref 0–0.9)
MONOCYTES NFR BLD AUTO: 10 % — SIGNIFICANT CHANGE UP (ref 2–14)
NEUTROPHILS # BLD AUTO: 7.57 K/UL — HIGH (ref 1.8–7.4)
NEUTROPHILS NFR BLD AUTO: 78.2 % — HIGH (ref 43–77)
PHOSPHATE SERPL-MCNC: 3.1 MG/DL — SIGNIFICANT CHANGE UP (ref 2.4–4.7)
PLATELET # BLD AUTO: 521 K/UL — HIGH (ref 150–400)
POTASSIUM SERPL-MCNC: 4.6 MMOL/L — SIGNIFICANT CHANGE UP (ref 3.5–5.3)
POTASSIUM SERPL-SCNC: 4.6 MMOL/L — SIGNIFICANT CHANGE UP (ref 3.5–5.3)
RBC # BLD: 3.15 M/UL — LOW (ref 4.2–5.8)
RBC # FLD: 15.9 % — HIGH (ref 10.3–14.5)
SODIUM SERPL-SCNC: 134 MMOL/L — LOW (ref 135–145)
WBC # BLD: 9.69 K/UL — SIGNIFICANT CHANGE UP (ref 3.8–10.5)
WBC # FLD AUTO: 9.69 K/UL — SIGNIFICANT CHANGE UP (ref 3.8–10.5)

## 2019-07-11 PROCEDURE — 73701 CT LOWER EXTREMITY W/DYE: CPT | Mod: 26,RT

## 2019-07-11 PROCEDURE — 99231 SBSQ HOSP IP/OBS SF/LOW 25: CPT

## 2019-07-11 PROCEDURE — 99232 SBSQ HOSP IP/OBS MODERATE 35: CPT

## 2019-07-11 RX ORDER — OXYCODONE HYDROCHLORIDE 5 MG/1
10 TABLET ORAL EVERY 4 HOURS
Refills: 0 | Status: DISCONTINUED | OUTPATIENT
Start: 2019-07-11 | End: 2019-07-14

## 2019-07-11 RX ORDER — HYDROMORPHONE HYDROCHLORIDE 2 MG/ML
1 INJECTION INTRAMUSCULAR; INTRAVENOUS; SUBCUTANEOUS EVERY 6 HOURS
Refills: 0 | Status: DISCONTINUED | OUTPATIENT
Start: 2019-07-11 | End: 2019-07-13

## 2019-07-11 RX ADMIN — HYDROMORPHONE HYDROCHLORIDE 1 MILLIGRAM(S): 2 INJECTION INTRAMUSCULAR; INTRAVENOUS; SUBCUTANEOUS at 13:03

## 2019-07-11 RX ADMIN — SIMVASTATIN 20 MILLIGRAM(S): 20 TABLET, FILM COATED ORAL at 21:44

## 2019-07-11 RX ADMIN — OXYCODONE HYDROCHLORIDE 10 MILLIGRAM(S): 5 TABLET ORAL at 10:48

## 2019-07-11 RX ADMIN — Medication 100 MILLIGRAM(S): at 05:38

## 2019-07-11 RX ADMIN — SENNA PLUS 1 TABLET(S): 8.6 TABLET ORAL at 17:14

## 2019-07-11 RX ADMIN — GABAPENTIN 300 MILLIGRAM(S): 400 CAPSULE ORAL at 05:38

## 2019-07-11 RX ADMIN — OXYCODONE HYDROCHLORIDE 10 MILLIGRAM(S): 5 TABLET ORAL at 17:14

## 2019-07-11 RX ADMIN — HYDROMORPHONE HYDROCHLORIDE 1 MILLIGRAM(S): 2 INJECTION INTRAMUSCULAR; INTRAVENOUS; SUBCUTANEOUS at 13:40

## 2019-07-11 RX ADMIN — Medication 100 MILLIGRAM(S): at 17:14

## 2019-07-11 RX ADMIN — POLYETHYLENE GLYCOL 3350 17 GRAM(S): 17 POWDER, FOR SOLUTION ORAL at 05:37

## 2019-07-11 RX ADMIN — TAMSULOSIN HYDROCHLORIDE 0.4 MILLIGRAM(S): 0.4 CAPSULE ORAL at 21:44

## 2019-07-11 RX ADMIN — AMLODIPINE BESYLATE 10 MILLIGRAM(S): 2.5 TABLET ORAL at 05:38

## 2019-07-11 RX ADMIN — GABAPENTIN 300 MILLIGRAM(S): 400 CAPSULE ORAL at 14:56

## 2019-07-11 RX ADMIN — GABAPENTIN 300 MILLIGRAM(S): 400 CAPSULE ORAL at 21:44

## 2019-07-11 RX ADMIN — ENOXAPARIN SODIUM 80 MILLIGRAM(S): 100 INJECTION SUBCUTANEOUS at 17:14

## 2019-07-11 RX ADMIN — OXYCODONE HYDROCHLORIDE 10 MILLIGRAM(S): 5 TABLET ORAL at 11:26

## 2019-07-11 RX ADMIN — ENOXAPARIN SODIUM 80 MILLIGRAM(S): 100 INJECTION SUBCUTANEOUS at 05:38

## 2019-07-11 RX ADMIN — SENNA PLUS 1 TABLET(S): 8.6 TABLET ORAL at 05:38

## 2019-07-11 NOTE — PROGRESS NOTE ADULT - SUBJECTIVE AND OBJECTIVE BOX
Patient see and examined oding well tolerating villegas    Vital Signs Last 24 Hrs  T(C): 37.6 (11 Jul 2019 07:48), Max: 38.7 (10 Jul 2019 08:24)  T(F): 99.6 (11 Jul 2019 07:48), Max: 101.7 (10 Jul 2019 08:24)  HR: 88 (11 Jul 2019 07:48) (87 - 104)  BP: 122/67 (11 Jul 2019 07:48) (120/69 - 138/65)  BP(mean): --  RR: 18 (11 Jul 2019 07:48) (18 - 18)  SpO2: 97% (11 Jul 2019 07:48) (96% - 97%)    Gen: NAD  HEENT: NCAT  Abd: Soft  : Villegas draining clear urine                          8.8    9.69  )-----------( 521      ( 11 Jul 2019 07:53 )             27.8     07-10    134<L>  |  96<L>  |  13.0  ----------------------------<  148<H>  4.2   |  26.0  |  0.79    Ca    8.9      10 Jul 2019 07:49  Phos  2.8     07-10  Mg     2.2     07-10

## 2019-07-11 NOTE — PROGRESS NOTE ADULT - SUBJECTIVE AND OBJECTIVE BOX
Patient in bed, wife at bedside.   More alert, eating his breakfast.   Did not tolerate OOB for long yesterday.  Discussed need to be OOB.  Pain is more controlled today. Wound VAC DC'ed.     FUNCTIONAL PROGRESS  7/10  Bed Mobility  Bed Mobility Training Supine-to-Sit: moderate assist (50% patient effort);  1 person assist;  bed rails;  elevated HOB;  to long sit in bed  Bed Mobility Training Limitations: decreased ability to use legs for bridging/pushing;  decreased strength;  impaired balance;  pain    Bed-Chair Transfer Training  Transfer Training Bed-to-Chair Transfer: maximum assist (25% patient effort);  2 person assist;  nonweight-bearing   NWB LLE,WBAT RLE   slide board to w/c    Special Training  Special Training Detail: Initiated W/C training including use of leg rests,and use of BUE to propel,Min A needed particularly for turns. Pt able to propel 50ft.     REVIEW OF SYSTEMS  Constitutional - +fever,  +fatigue  Neurological - +loss of strength, +numbness      VITALS  T(C): 37.6 (07-11-19 @ 07:48), Max: 38.1 (07-10-19 @ 23:53)  HR: 88 (07-11-19 @ 07:48) (87 - 104)  BP: 122/67 (07-11-19 @ 07:48) (122/67 - 138/65)  RR: 18 (07-11-19 @ 07:48) (18 - 18)  SpO2: 97% (07-11-19 @ 07:48) (97% - 97%)  Wt(kg): --    MEDICATIONS   amLODIPine   Tablet 10 milliGRAM(s) daily  docusate sodium 100 milliGRAM(s) two times a day  enoxaparin Injectable 80 milliGRAM(s) two times a day  gabapentin 300 milliGRAM(s) three times a day  glucagon  Injectable 1 milliGRAM(s) once PRN  HYDROmorphone  Injectable 1 milliGRAM(s) every 6 hours PRN  ondansetron Injectable 4 milliGRAM(s) once PRN  oxyCODONE    IR 5 milliGRAM(s) every 4 hours PRN  oxyCODONE    IR 10 milliGRAM(s) every 4 hours PRN  polyethylene glycol 3350 17 Gram(s) two times a day  senna 1 Tablet(s) two times a day  simvastatin 20 milliGRAM(s) at bedtime  tamsulosin 0.4 milliGRAM(s) at bedtime      RECENT LABS - Reviewed                        8.8    9.69  )-----------( 521      ( 11 Jul 2019 07:53 )             27.8     07-11    134<L>  |  96<L>  |  17.0  ----------------------------<  145<H>  4.6   |  25.0  |  0.82    Ca    9.0      11 Jul 2019 07:53  Phos  3.1     07-11  Mg     2.2     07-11              -----------------------------------------------------------------------------  PHYSICAL EXAM  Constitutional - NAD, Uncomfortable  Abdomen - Soft   Extremities - Right foot swelling in PRAFO with ACE wrap; Left LE in knee immobilizer/ACE  Neurologic Exam -                    Cognitive - Awake, Alert, AAO to self, place, date, year, situation     Motor - Limited by braces and right foot drop                    LEFT    LE - HF 1/5, KE -/5, DF 4/5, PF 4/5                    RIGHT LE - HF 2/5, KE 1/5, DF 0/5, PF 0/5        Sensory - Impaired in the right foot  Psychiatric - Mood depressed  ----------------------------------------------------------------------------------------  ASSESSMENT/PLAN  68yMale with functional deficits after a multitrauma injury to the BLE   Right compartment syndrome with right popliteal arterial injury and s/p bypass/fasciotomy - WBAT, Left tibial plateau fracture s/p ORIF - NWB, Immobilizer  Right foot drop - PRAFO  Right LE  DVT - Lovenox  HTN - Norvasc  HLD - Zocor  Pain - Tylenol, Neurontin, Dilaudid, Oxycodone  Constipation - Miralax, Colace, Senna  Urinary retention - Flomax, Min  DVT PPX - SCDs  Rehab - Continue to recommend ACUTE inpatient rehabilitation **[facility]** for the functional deficits consisting of 3 hours of therapy/day & 24 hour RN/daily PMR physician for comorbid medical management. Will continue to follow for ongoing rehab needs and recommendations. Patient will be able to tolerate 3 hours a day.    Continue bedside therapy as well as OOB throughout the day with mobilization throughout the day with staff to maintain cardiopulmonary function and prevention of secondary complications related to debility.

## 2019-07-11 NOTE — PROGRESS NOTE ADULT - SUBJECTIVE AND OBJECTIVE BOX
INTERVAL HPI/OVERNIGHT EVENTS: T of 100.5 and HR of 104 at 2353 and tylenol was given. Patient comfortable and clinically doing well. all other vitals normal and stable. Dressings changed yesterday evening.     SUBJECTIVE: reports pain in RLE. Denies f/c/sob/n/v. patient does not report subjective fevers or changes.       MEDICATIONS  (STANDING):  amLODIPine   Tablet 10 milliGRAM(s) Oral daily  Dakins Solution - 1/4 Strength 1 Application(s) Topical daily  docusate sodium 100 milliGRAM(s) Oral two times a day  enoxaparin Injectable 80 milliGRAM(s) SubCutaneous two times a day  gabapentin 300 milliGRAM(s) Oral three times a day  polyethylene glycol 3350 17 Gram(s) Oral two times a day  senna 1 Tablet(s) Oral two times a day  simvastatin 20 milliGRAM(s) Oral at bedtime  tamsulosin 0.4 milliGRAM(s) Oral at bedtime    MEDICATIONS  (PRN):  glucagon  Injectable 1 milliGRAM(s) IntraMuscular once PRN Glucose LESS THAN 70 milligrams/deciliter  HYDROmorphone  Injectable 1 milliGRAM(s) IV Push every 6 hours PRN Breakthrough pain  ondansetron Injectable 4 milliGRAM(s) IV Push once PRN Nausea and/or Vomiting  oxyCODONE    IR 5 milliGRAM(s) Oral every 4 hours PRN Moderate Pain (4 - 6)  oxyCODONE    IR 10 milliGRAM(s) Oral every 4 hours PRN Severe Pain (7 - 10)      Vital Signs Last 24 Hrs  T(C): 38.1 (10 Jul 2019 23:53), Max: 38.7 (10 Jul 2019 08:24)  T(F): 100.5 (10 Jul 2019 23:53), Max: 101.7 (10 Jul 2019 08:24)  HR: 104 (10 Jul 2019 23:53) (87 - 104)  BP: 124/66 (10 Jul 2019 23:53) (120/69 - 138/65)  BP(mean): --  RR: 18 (10 Jul 2019 23:53) (18 - 18)  SpO2: 97% (10 Jul 2019 23:53) (96% - 97%)    PE  Gen: comfortably sleeping in bed  Pulm: clear to auscultation b/l  CV: rrr  Abd: soft non tender  Ext: LLE incisions c/d/i, RLE donor site good granulation, no exudate or erythema; Recipient site 95& graft inclusion, no exudate or erythema.  Vasc: 2+ palpable R DP    I&O's Detail    09 Jul 2019 07:01  -  10 Jul 2019 07:00  --------------------------------------------------------  IN:  Total IN: 0 mL    OUT:    Bulb: 2 mL    Indwelling Catheter - Urethral: 5000 mL  Total OUT: 5002 mL    Total NET: -5002 mL      10 Jul 2019 07:01  -  11 Jul 2019 02:31  --------------------------------------------------------  IN:  Total IN: 0 mL    OUT:    Indwelling Catheter - Urethral: 500 mL  Total OUT: 500 mL    Total NET: -500 mL    LABS:                        9.0    9.93  )-----------( 517      ( 10 Jul 2019 07:49 )             28.4     07-10    134<L>  |  96<L>  |  13.0  ----------------------------<  148<H>  4.2   |  26.0  |  0.79    Ca    8.9      10 Jul 2019 07:49  Phos  2.8     07-10  Mg     2.2     07-10            RADIOLOGY & ADDITIONAL STUDIES:

## 2019-07-11 NOTE — PROGRESS NOTE ADULT - ASSESSMENT
Pt is pOD 8 s/p excision and grafting of titbial/peroneal nerve injuries.      #Intermittent fever with T of 100.5 at 2400  - clinically stable with no signs of infection  - discontinued tylenol to avoid masking fevers  - consider full workup if T > 101 (CXR, UA, UCx, Blood Cx)    #Wounds  - Staples removed from STSG  - Anteruior skin flap with epidermolysis and fluctuant  - ZAMZAM removed  - Expressed 10-20mL of seropurulent fluid from integra graft site wound and ZAMZAM site.   - Integra removed, irrigated and packed   - Daily packing and wet to dry with Dakins to open wound of integra site  - Aquacel and dry gauze to donor site  - Xerform dry to recepient site  - Kerlix and ACE to cover al wounds of RLE    #mobility  - Leg elevation  - D/c knee immobilizer  - continue LLE splint  - PMNR recs ACUTE inpatient rehabilitation **[facility]** #  - continue villegas per urology recs until standing if not ambulating    #DVT ppx  - lovenox

## 2019-07-11 NOTE — PROGRESS NOTE ADULT - ASSESSMENT
Pt is pOD 8 s/p excision and grafting of titbial/peroneal nerve injuries.      #Wounds  - Staples removed from STSG  - Anteruior skin flap with epidermolysis and fluctuant  - ZAMZAM removed  - Expressed 10-20mL of seropurulent fluid from integra graft site wound and ZAMZAM site.   - Integra removed, irrigated and packed   - Daily packing and wet to dry with Dakins to open wound of integra site  - Aquacel and dry gauze to donor site  - Xerform dry to recepient site  - Kerlix and ACE to cover al wounds of RLE    #mobility  - Leg elevation  - D/c knee immobilizer  - continue LLE splint  - PMNR recs ACUTE inpatient rehabilitation **[facility]**   #  - continue villegas per urology recs until standing if not ambulating    #DVT ppx  - lovenox Pt is pOD 8 s/p excision and grafting of titbial/peroneal nerve injuries.      #Wounds  - Staples removed from STSG  - Anteruior skin flap with epidermolysis and fluctuant  - ZAMZAM removed  - Expressed 10-20mL of seropurulent fluid from integra graft site wound and ZAMZAM site.   - Integra removed, irrigated and packed   - Daily packing and wet to dry with Dakins to open wound of integra site  - Aquacel and dry gauze to donor site  - Xerform dry to recepient site  - Kerlix and ACE to cover al wounds of RLE    #mobility  - Leg elevation  - D/c knee immobilizer  - continue LLE splint  - PMNR recs ACUTE inpatient rehabilitation **[facility]** #  - continue villegas per urology recs until standing if not ambulating    #DVT ppx  - lovenox

## 2019-07-11 NOTE — PROGRESS NOTE ADULT - SUBJECTIVE AND OBJECTIVE BOX
· Subjective and Objective: 	  INTERVAL HPI/OVERNIGHT EVENTS: T of 100.5 and HR of 104 at 2353 and tylenol was given. Patient comfortable and clinically doing well. all other vitals normal and stable. Dressings changed yesterday evening.     SUBJECTIVE: reports pain in RLE. Denies f/c/sob/n/v. patient does not report subjective fevers or changes.       MEDICATIONS  (STANDING):  amLODIPine   Tablet 10 milliGRAM(s) Oral daily  Dakins Solution - 1/4 Strength 1 Application(s) Topical daily  docusate sodium 100 milliGRAM(s) Oral two times a day  enoxaparin Injectable 80 milliGRAM(s) SubCutaneous two times a day  gabapentin 300 milliGRAM(s) Oral three times a day  polyethylene glycol 3350 17 Gram(s) Oral two times a day  senna 1 Tablet(s) Oral two times a day  simvastatin 20 milliGRAM(s) Oral at bedtime  tamsulosin 0.4 milliGRAM(s) Oral at bedtime    MEDICATIONS  (PRN):  glucagon  Injectable 1 milliGRAM(s) IntraMuscular once PRN Glucose LESS THAN 70 milligrams/deciliter  HYDROmorphone  Injectable 1 milliGRAM(s) IV Push every 6 hours PRN Breakthrough pain  ondansetron Injectable 4 milliGRAM(s) IV Push once PRN Nausea and/or Vomiting  oxyCODONE    IR 5 milliGRAM(s) Oral every 4 hours PRN Moderate Pain (4 - 6)  oxyCODONE    IR 10 milliGRAM(s) Oral every 4 hours PRN Severe Pain (7 - 10)      Vital Signs Last 24 Hrs  T(C): 38.1 (10 Jul 2019 23:53), Max: 38.7 (10 Jul 2019 08:24)  T(F): 100.5 (10 Jul 2019 23:53), Max: 101.7 (10 Jul 2019 08:24)  HR: 104 (10 Jul 2019 23:53) (87 - 104)  BP: 124/66 (10 Jul 2019 23:53) (120/69 - 138/65)  BP(mean): --  RR: 18 (10 Jul 2019 23:53) (18 - 18)  SpO2: 97% (10 Jul 2019 23:53) (96% - 97%)    PE  Gen: comfortably sleeping in bed  Pulm: clear to auscultation b/l  CV: rrr  Abd: soft non tender  Ext: LLE incisions c/d/i, RLE donor site good granulation, no exudate or erythema; Recipient site 95& graft inclusion, no exudate or erythema.  Vasc: 2+ palpable R DP    I&O's Detail    09 Jul 2019 07:01  -  10 Jul 2019 07:00  --------------------------------------------------------  IN:  Total IN: 0 mL    OUT:    Bulb: 2 mL    Indwelling Catheter - Urethral: 5000 mL  Total OUT: 5002 mL    Total NET: -5002 mL      10 Jul 2019 07:01  -  11 Jul 2019 02:31  --------------------------------------------------------  IN:  Total IN: 0 mL    OUT:    Indwelling Catheter - Urethral: 500 mL  Total OUT: 500 mL    Total NET: -500 mL    LABS:                        9.0    9.93  )-----------( 517      ( 10 Jul 2019 07:49 )             28.4     07-10    134<L>  |  96<L>  |  13.0  ----------------------------<  148<H>  4.2   |  26.0  |  0.79    Ca    8.9      10 Jul 2019 07:49  Phos  2.8     07-10  Mg     2.2     07-10

## 2019-07-11 NOTE — PROGRESS NOTE ADULT - SUBJECTIVE AND OBJECTIVE BOX
Pt Name: AIMEE SOLER    MRN: [id number]      Received call from ACS team regarding lower extremity CT scan results. Patient seen and evaluated with family at the bedside. Patient is s/p RLE nerve repair (procedure date 6/3/2019), s/p L Tibial plateau open reduction internal fixation (procedure date 6/26/2019), s/p RLE fasciatomy with popliteal bypass graft (procedure date 6/252019).  Patient states that he has pain in the right knee when he touches the area and describes the pain as intermittent and began a few days prior. Patient denies fever/chills, CP, SOB, dizziness, HA, acute changes in motor sensory exam.       ICU Vital Signs Last 24 Hrs  T(C): 37.6 (11 Jul 2019 16:35), Max: 38.9 (11 Jul 2019 14:46)  T(F): 99.7 (11 Jul 2019 16:35), Max: 102.1 (11 Jul 2019 14:46)  HR: 98 (11 Jul 2019 16:35) (88 - 109)  BP: 127/71 (11 Jul 2019 15:20) (122/67 - 127/71)  BP(mean): --  ABP: --  ABP(mean): --  RR: 18 (11 Jul 2019 15:20) (18 - 18)  SpO2: 92% (11 Jul 2019 15:20) (92% - 97%)      PAST MEDICAL & SURGICAL HISTORY:  PAST MEDICAL & SURGICAL HISTORY:  GERD (gastroesophageal reflux disease)  HLD (hyperlipidemia)  No significant past surgical history      Allergies: No Known Allergies      Medications: amLODIPine   Tablet 10 milliGRAM(s) Oral daily  docusate sodium 100 milliGRAM(s) Oral two times a day  enoxaparin Injectable 80 milliGRAM(s) SubCutaneous two times a day  gabapentin 300 milliGRAM(s) Oral three times a day  glucagon  Injectable 1 milliGRAM(s) IntraMuscular once PRN  HYDROmorphone  Injectable 1 milliGRAM(s) IV Push every 6 hours PRN  ondansetron Injectable 4 milliGRAM(s) IV Push once PRN  oxyCODONE    IR 5 milliGRAM(s) Oral every 4 hours PRN  oxyCODONE    IR 10 milliGRAM(s) Oral every 4 hours PRN  polyethylene glycol 3350 17 Gram(s) Oral two times a day  senna 1 Tablet(s) Oral two times a day  simvastatin 20 milliGRAM(s) Oral at bedtime  tamsulosin 0.4 milliGRAM(s) Oral at bedtime      FAMILY HISTORY:  : non-contributory    Social History:     Ambulation: Walking independently [ ] With Cane [ ] With Walker [ ]  Bedbound [ ]                           8.8    9.69  )-----------( 521      ( 11 Jul 2019 07:53 )             27.8       07-11    134<L>  |  96<L>  |  17.0  ----------------------------<  145<H>  4.6   |  25.0  |  0.82    Ca    9.0      11 Jul 2019 07:53  Phos  3.1     07-11  Mg     2.2     07-11        Vital Signs Last 24 Hrs  T(C): 37.6 (11 Jul 2019 16:35), Max: 38.9 (11 Jul 2019 14:46)  T(F): 99.7 (11 Jul 2019 16:35), Max: 102.1 (11 Jul 2019 14:46)  HR: 98 (11 Jul 2019 16:35) (88 - 109)  BP: 127/71 (11 Jul 2019 15:20) (122/67 - 127/71)  BP(mean): --  RR: 18 (11 Jul 2019 15:20) (18 - 18)  SpO2: 92% (11 Jul 2019 15:20) (92% - 97%)    Daily     Daily       PHYSICAL EXAM:      Appearance: Alert, responsive, in no acute distress.    Skin: No bleeding.  No ulcerations.    Vascular: 2+ distal pulses. Cap refill < 2 sec. No signs of venous insufficiency or stasis.     Musculoskeletal:         Left Lower Extremity: Dressing: Clean/dry/intact. No active bleeding or discharge noted. ACE wrap without staining. Knee immobilizer in place. SILT L2-S2. Calf soft nontender.   +plantarflexion/dorsiflexion/EHL/FHl. Dorsalis pedis pulse 2+       Right Lower Extremity: Proximal femur dressing appears C/D/I, Knee: Gauze stained with serosang fluids. Gauze removed, lateral aspect of knee tissue granulation with sutures in place, 3cm opening with packing in place, no active oozing or drainage. TTDeep palpation to anterior/medial knee, +effusion noted around anterior aspect of knee, passive flexion from 0-90 with minimal pain elicited on ROM. Distal brace in place, decreased sensation to distal extremity, minimal motor DF/PF, EHL/FHL not intact. Sterile gauze, abd pad, kerlix wrap placed after exam.     Imaging Studies:  pending official read    A/P:  Pt is a  68y Male s/p RLE nerve repair (procedure date 6/3/2019), s/p L Tibial plateau open reduction internal fixation (procedure date 6/26/2019), s/p RLE fasciatomy with popliteal bypass graft (procedure date 6/252019).     PLAN:   - Pain control   - Will defer to plastics and vascular  - No emergent orthopedic intervention planned  - Further recommendations pending official read on CT scan  - Case d/w Dr. Dela Cruz

## 2019-07-12 LAB
ANION GAP SERPL CALC-SCNC: 12 MMOL/L — SIGNIFICANT CHANGE UP (ref 5–17)
APPEARANCE UR: CLEAR — SIGNIFICANT CHANGE UP
APTT BLD: 41.1 SEC — HIGH (ref 27.5–36.3)
B PERT IGG+IGM PNL SER: ABNORMAL
BACTERIA # UR AUTO: ABNORMAL
BASOPHILS # BLD AUTO: 0.03 K/UL — SIGNIFICANT CHANGE UP (ref 0–0.2)
BASOPHILS NFR BLD AUTO: 0.3 % — SIGNIFICANT CHANGE UP (ref 0–2)
BILIRUB UR-MCNC: NEGATIVE — SIGNIFICANT CHANGE UP
BLD GP AB SCN SERPL QL: SIGNIFICANT CHANGE UP
BUN SERPL-MCNC: 16 MG/DL — SIGNIFICANT CHANGE UP (ref 8–20)
CALCIUM SERPL-MCNC: 8.8 MG/DL — SIGNIFICANT CHANGE UP (ref 8.6–10.2)
CHLORIDE SERPL-SCNC: 97 MMOL/L — LOW (ref 98–107)
CO2 SERPL-SCNC: 26 MMOL/L — SIGNIFICANT CHANGE UP (ref 22–29)
COLOR FLD: YELLOW
COLOR SPEC: YELLOW — SIGNIFICANT CHANGE UP
CREAT SERPL-MCNC: 0.84 MG/DL — SIGNIFICANT CHANGE UP (ref 0.5–1.3)
DIFF PNL FLD: ABNORMAL
EOSINOPHIL # BLD AUTO: 0.08 K/UL — SIGNIFICANT CHANGE UP (ref 0–0.5)
EOSINOPHIL NFR BLD AUTO: 0.9 % — SIGNIFICANT CHANGE UP (ref 0–6)
EPI CELLS # UR: SIGNIFICANT CHANGE UP
FLUID INTAKE SUBSTANCE CLASS: SIGNIFICANT CHANGE UP
FLUID SEGMENTED GRANULOCYTES: 93 % — SIGNIFICANT CHANGE UP
GLUCOSE SERPL-MCNC: 149 MG/DL — HIGH (ref 70–115)
GLUCOSE UR QL: NEGATIVE MG/DL — SIGNIFICANT CHANGE UP
HCT VFR BLD CALC: 24.6 % — LOW (ref 39–50)
HGB BLD-MCNC: 7.9 G/DL — LOW (ref 13–17)
HYALINE CASTS # UR AUTO: ABNORMAL /LPF
IMM GRANULOCYTES NFR BLD AUTO: 1 % — SIGNIFICANT CHANGE UP (ref 0–1.5)
INR BLD: 1.34 RATIO — HIGH (ref 0.88–1.16)
KETONES UR-MCNC: NEGATIVE — SIGNIFICANT CHANGE UP
LEUKOCYTE ESTERASE UR-ACNC: ABNORMAL
LYMPHOCYTES # BLD AUTO: 1.08 K/UL — SIGNIFICANT CHANGE UP (ref 1–3.3)
LYMPHOCYTES # BLD AUTO: 11.6 % — LOW (ref 13–44)
LYMPHOCYTES # FLD: 1 % — SIGNIFICANT CHANGE UP
MAGNESIUM SERPL-MCNC: 2.3 MG/DL — SIGNIFICANT CHANGE UP (ref 1.6–2.6)
MCHC RBC-ENTMCNC: 28.1 PG — SIGNIFICANT CHANGE UP (ref 27–34)
MCHC RBC-ENTMCNC: 32.1 GM/DL — SIGNIFICANT CHANGE UP (ref 32–36)
MCV RBC AUTO: 87.5 FL — SIGNIFICANT CHANGE UP (ref 80–100)
MONOCYTES # BLD AUTO: 1.06 K/UL — HIGH (ref 0–0.9)
MONOCYTES NFR BLD AUTO: 11.4 % — SIGNIFICANT CHANGE UP (ref 2–14)
MONOS+MACROS # FLD: 6 % — SIGNIFICANT CHANGE UP
NEUTROPHILS # BLD AUTO: 6.99 K/UL — SIGNIFICANT CHANGE UP (ref 1.8–7.4)
NEUTROPHILS NFR BLD AUTO: 74.8 % — SIGNIFICANT CHANGE UP (ref 43–77)
NITRITE UR-MCNC: NEGATIVE — SIGNIFICANT CHANGE UP
PH UR: 6.5 — SIGNIFICANT CHANGE UP (ref 5–8)
PHOSPHATE SERPL-MCNC: 3.2 MG/DL — SIGNIFICANT CHANGE UP (ref 2.4–4.7)
PLATELET # BLD AUTO: 532 K/UL — HIGH (ref 150–400)
POTASSIUM SERPL-MCNC: 4.7 MMOL/L — SIGNIFICANT CHANGE UP (ref 3.5–5.3)
POTASSIUM SERPL-SCNC: 4.7 MMOL/L — SIGNIFICANT CHANGE UP (ref 3.5–5.3)
PROT UR-MCNC: 30 MG/DL
PROTHROM AB SERPL-ACNC: 15.6 SEC — HIGH (ref 10–12.9)
RBC # BLD: 2.81 M/UL — LOW (ref 4.2–5.8)
RBC # FLD: 16.2 % — HIGH (ref 10.3–14.5)
RBC CASTS # UR COMP ASSIST: ABNORMAL /HPF (ref 0–4)
RCV VOL RI: 2650 /UL — HIGH (ref 0–5)
SODIUM SERPL-SCNC: 135 MMOL/L — SIGNIFICANT CHANGE UP (ref 135–145)
SP GR SPEC: 1 — LOW (ref 1.01–1.02)
SPECIMEN SOURCE FLD: SIGNIFICANT CHANGE UP
TOTAL NUCLEATED CELL COUNT, BODY FLUID: HIGH /UL (ref 0–5)
TUBE TYPE: SIGNIFICANT CHANGE UP
TYPE + AB SCN PNL BLD: SIGNIFICANT CHANGE UP
UROBILINOGEN FLD QL: 1 MG/DL
WBC # BLD: 9.33 K/UL — SIGNIFICANT CHANGE UP (ref 3.8–10.5)
WBC # FLD AUTO: 9.33 K/UL — SIGNIFICANT CHANGE UP (ref 3.8–10.5)
WBC UR QL: SIGNIFICANT CHANGE UP

## 2019-07-12 PROCEDURE — 71045 X-RAY EXAM CHEST 1 VIEW: CPT | Mod: 26

## 2019-07-12 PROCEDURE — 99231 SBSQ HOSP IP/OBS SF/LOW 25: CPT

## 2019-07-12 PROCEDURE — 99232 SBSQ HOSP IP/OBS MODERATE 35: CPT

## 2019-07-12 RX ORDER — SODIUM CHLORIDE 9 MG/ML
1000 INJECTION, SOLUTION INTRAVENOUS
Refills: 0 | Status: DISCONTINUED | OUTPATIENT
Start: 2019-07-12 | End: 2019-07-13

## 2019-07-12 RX ADMIN — AMLODIPINE BESYLATE 10 MILLIGRAM(S): 2.5 TABLET ORAL at 06:28

## 2019-07-12 RX ADMIN — POLYETHYLENE GLYCOL 3350 17 GRAM(S): 17 POWDER, FOR SOLUTION ORAL at 16:36

## 2019-07-12 RX ADMIN — ENOXAPARIN SODIUM 80 MILLIGRAM(S): 100 INJECTION SUBCUTANEOUS at 16:36

## 2019-07-12 RX ADMIN — OXYCODONE HYDROCHLORIDE 10 MILLIGRAM(S): 5 TABLET ORAL at 17:20

## 2019-07-12 RX ADMIN — SODIUM CHLORIDE 75 MILLILITER(S): 9 INJECTION, SOLUTION INTRAVENOUS at 11:28

## 2019-07-12 RX ADMIN — POLYETHYLENE GLYCOL 3350 17 GRAM(S): 17 POWDER, FOR SOLUTION ORAL at 06:28

## 2019-07-12 RX ADMIN — GABAPENTIN 300 MILLIGRAM(S): 400 CAPSULE ORAL at 22:00

## 2019-07-12 RX ADMIN — OXYCODONE HYDROCHLORIDE 10 MILLIGRAM(S): 5 TABLET ORAL at 16:35

## 2019-07-12 RX ADMIN — ENOXAPARIN SODIUM 80 MILLIGRAM(S): 100 INJECTION SUBCUTANEOUS at 06:29

## 2019-07-12 RX ADMIN — OXYCODONE HYDROCHLORIDE 10 MILLIGRAM(S): 5 TABLET ORAL at 07:23

## 2019-07-12 RX ADMIN — SENNA PLUS 1 TABLET(S): 8.6 TABLET ORAL at 16:35

## 2019-07-12 RX ADMIN — SIMVASTATIN 20 MILLIGRAM(S): 20 TABLET, FILM COATED ORAL at 22:00

## 2019-07-12 RX ADMIN — GABAPENTIN 300 MILLIGRAM(S): 400 CAPSULE ORAL at 15:45

## 2019-07-12 RX ADMIN — Medication 100 MILLIGRAM(S): at 16:36

## 2019-07-12 RX ADMIN — TAMSULOSIN HYDROCHLORIDE 0.4 MILLIGRAM(S): 0.4 CAPSULE ORAL at 22:00

## 2019-07-12 RX ADMIN — SENNA PLUS 1 TABLET(S): 8.6 TABLET ORAL at 06:28

## 2019-07-12 RX ADMIN — Medication 100 MILLIGRAM(S): at 06:28

## 2019-07-12 RX ADMIN — GABAPENTIN 300 MILLIGRAM(S): 400 CAPSULE ORAL at 06:28

## 2019-07-12 RX ADMIN — OXYCODONE HYDROCHLORIDE 10 MILLIGRAM(S): 5 TABLET ORAL at 06:29

## 2019-07-12 NOTE — PROGRESS NOTE ADULT - SUBJECTIVE AND OBJECTIVE BOX
INTERVAL HPI/OVERNIGHT EVENTS: fevers x 3 to 102 since yestderday afternoon. Afebrile this am at 99.8. No other events. Graft site dressing changed this am with xeroform covered by ABD pads and wrapped with kurlex on recipient site. Donor site dressing changed with aquacell and abd pads.     SUBJECTIVE: no n/v. no complaints overnight. Reported pain with movement and dressing changes      MEDICATIONS  (STANDING):  amLODIPine   Tablet 10 milliGRAM(s) Oral daily  docusate sodium 100 milliGRAM(s) Oral two times a day  enoxaparin Injectable 80 milliGRAM(s) SubCutaneous two times a day  gabapentin 300 milliGRAM(s) Oral three times a day  lactated ringers. 1000 milliLiter(s) (75 mL/Hr) IV Continuous <Continuous>  polyethylene glycol 3350 17 Gram(s) Oral two times a day  senna 1 Tablet(s) Oral two times a day  simvastatin 20 milliGRAM(s) Oral at bedtime  tamsulosin 0.4 milliGRAM(s) Oral at bedtime    MEDICATIONS  (PRN):  glucagon  Injectable 1 milliGRAM(s) IntraMuscular once PRN Glucose LESS THAN 70 milligrams/deciliter  HYDROmorphone  Injectable 1 milliGRAM(s) IV Push every 6 hours PRN Breakthrough pain  ondansetron Injectable 4 milliGRAM(s) IV Push once PRN Nausea and/or Vomiting  oxyCODONE    IR 5 milliGRAM(s) Oral every 4 hours PRN Moderate Pain (4 - 6)  oxyCODONE    IR 10 milliGRAM(s) Oral every 4 hours PRN Severe Pain (7 - 10)      Vital Signs Last 24 Hrs  T(C): 37.7 (2019 01:43), Max: 38.9 (2019 14:46)  T(F): 99.8 (2019 01:43), Max: 102.1 (2019 14:46)  HR: 102 (2019 23:48) (88 - 109)  BP: 128/71 (2019 23:48) (122/67 - 128/71)  BP(mean): --  RR: 20 (2019 23:48) (18 - 20)  SpO2: 97% (2019 23:48) (92% - 97%)    PE  Gen: alert and oriented. resting comfortably.   Pulm: non labored  Abd: s/NT/ND  Ext:  Split thickness skin graft: recipient site 95% take with minimal fibrinous exudate along edges.    R knee: open wound on the lateral aspect unchanged from yesterday. Dressings changed and packed. there is de-epithelialization of the skin on the lateral aspect of the knee with new eschar formation. Knee is tender to palpation and mildly fluctuant. palpation of fluctuance elicited minimal serous drainage from lateral open wound on lateral knee. incision on the lateral knee with fibrinous exudate along the staple line        I&O's Detail    2019 07:01  -  2019 07:00  --------------------------------------------------------  IN:  Total IN: 0 mL    OUT:    Indwelling Catheter - Urethral: 825 mL  Total OUT: 825 mL    Total NET: -825 mL          LABS:                        7.9    9.33  )-----------( 532      ( 2019 04:24 )             24.6     07-12    135  |  97<L>  |  16.0  ----------------------------<  149<H>  4.7   |  26.0  |  0.84    Ca    8.8      2019 04:24  Phos  3.2     07-12  Mg     2.3     07-12        Urinalysis Basic - ( 2019 04:35 )    Color: Yellow / Appearance: Clear / S.005 / pH: x  Gluc: x / Ketone: Negative  / Bili: Negative / Urobili: 1 mg/dL   Blood: x / Protein: 30 mg/dL / Nitrite: Negative   Leuk Esterase: Trace / RBC: 25-50 /HPF / WBC 3-5   Sq Epi: x / Non Sq Epi: Occasional / Bacteria: Occasional

## 2019-07-12 NOTE — PROGRESS NOTE ADULT - SUBJECTIVE AND OBJECTIVE BOX
HPI/OVERNIGHT EVENTS:    Pt s/p Saphen Vein [place], Femoral pop bypass and 4Cfsiotomy.  Pt rest comfortable, no acute events. Fever 101F, no complains. Blood cultures and drawn.     MEDICATIONS  (STANDING):  amLODIPine   Tablet 10 milliGRAM(s) Oral daily  docusate sodium 100 milliGRAM(s) Oral two times a day  enoxaparin Injectable 80 milliGRAM(s) SubCutaneous two times a day  gabapentin 300 milliGRAM(s) Oral three times a day  polyethylene glycol 3350 17 Gram(s) Oral two times a day  senna 1 Tablet(s) Oral two times a day  simvastatin 20 milliGRAM(s) Oral at bedtime  tamsulosin 0.4 milliGRAM(s) Oral at bedtime    MEDICATIONS  (PRN):  glucagon  Injectable 1 milliGRAM(s) IntraMuscular once PRN Glucose LESS THAN 70 milligrams/deciliter  HYDROmorphone  Injectable 1 milliGRAM(s) IV Push every 6 hours PRN Breakthrough pain  ondansetron Injectable 4 milliGRAM(s) IV Push once PRN Nausea and/or Vomiting  oxyCODONE    IR 5 milliGRAM(s) Oral every 4 hours PRN Moderate Pain (4 - 6)  oxyCODONE    IR 10 milliGRAM(s) Oral every 4 hours PRN Severe Pain (7 - 10)      Vital Signs Last 24 Hrs  T(C): 38.4 (11 Jul 2019 23:48), Max: 38.9 (11 Jul 2019 14:46)  T(F): 101.1 (11 Jul 2019 23:48), Max: 102.1 (11 Jul 2019 14:46)  HR: 102 (11 Jul 2019 23:48) (88 - 109)  BP: 128/71 (11 Jul 2019 23:48) (122/67 - 128/71)  BP(mean): --  RR: 20 (11 Jul 2019 23:48) (18 - 20)  SpO2: 97% (11 Jul 2019 23:48) (92% - 97%)    Constitutional: patient resting comfortably in bed, in no acute distress  HEENT: EOMI / PERRL b/l, no active drainage or redness  Neck: No JVD, full ROM without pain  Respiratory: respirations are unlabored, no accessory muscle use, no conversational dyspnea  Cardiovascular: regular rate & rhythm  Gastrointestinal: Abdomen soft, non-tender, non-distended, no rebound tenderness / guarding  Neurological: GCS: 15 (4/5/6). A&O x 3; no gross sensory / motor / coordination deficits except for the decrease sensibility and motion in the right feet.  Psychiatric: Normal mood, normal affect  Musculoskeletal: No joint pain, swelling or deformity.      I&O's Detail    10 Jul 2019 07:01  -  11 Jul 2019 07:00  --------------------------------------------------------  IN:  Total IN: 0 mL    OUT:    Indwelling Catheter - Urethral: 1800 mL  Total OUT: 1800 mL    Total NET: -1800 mL      11 Jul 2019 07:01  -  12 Jul 2019 01:35  --------------------------------------------------------  IN:  Total IN: 0 mL    OUT:    Indwelling Catheter - Urethral: 825 mL  Total OUT: 825 mL    Total NET: -825 mL          LABS:                        8.8    9.69  )-----------( 521      ( 11 Jul 2019 07:53 )             27.8     07-11    134<L>  |  96<L>  |  17.0  ----------------------------<  145<H>  4.6   |  25.0  |  0.82    Ca    9.0      11 Jul 2019 07:53  Phos  3.1     07-11  Mg     2.2     07-11 HPI/OVERNIGHT EVENTS:    Pt s/p Saphen Vein [place], Femoral pop bypass and 4Cfsiotomy.  Pt rest comfortable, no acute events. Fever 101F, no complains. Blood cultures and drawn. Chest Xray negative for pneumonia.     MEDICATIONS  (STANDING):  amLODIPine   Tablet 10 milliGRAM(s) Oral daily  docusate sodium 100 milliGRAM(s) Oral two times a day  enoxaparin Injectable 80 milliGRAM(s) SubCutaneous two times a day  gabapentin 300 milliGRAM(s) Oral three times a day  polyethylene glycol 3350 17 Gram(s) Oral two times a day  senna 1 Tablet(s) Oral two times a day  simvastatin 20 milliGRAM(s) Oral at bedtime  tamsulosin 0.4 milliGRAM(s) Oral at bedtime    MEDICATIONS  (PRN):  glucagon  Injectable 1 milliGRAM(s) IntraMuscular once PRN Glucose LESS THAN 70 milligrams/deciliter  HYDROmorphone  Injectable 1 milliGRAM(s) IV Push every 6 hours PRN Breakthrough pain  ondansetron Injectable 4 milliGRAM(s) IV Push once PRN Nausea and/or Vomiting  oxyCODONE    IR 5 milliGRAM(s) Oral every 4 hours PRN Moderate Pain (4 - 6)  oxyCODONE    IR 10 milliGRAM(s) Oral every 4 hours PRN Severe Pain (7 - 10)      Vital Signs Last 24 Hrs  T(C): 38.4 (11 Jul 2019 23:48), Max: 38.9 (11 Jul 2019 14:46)  T(F): 101.1 (11 Jul 2019 23:48), Max: 102.1 (11 Jul 2019 14:46)  HR: 102 (11 Jul 2019 23:48) (88 - 109)  BP: 128/71 (11 Jul 2019 23:48) (122/67 - 128/71)  BP(mean): --  RR: 20 (11 Jul 2019 23:48) (18 - 20)  SpO2: 97% (11 Jul 2019 23:48) (92% - 97%)    Constitutional: patient resting comfortably in bed, in no acute distress  HEENT: EOMI / PERRL b/l, no active drainage or redness  Neck: No JVD, full ROM without pain  Respiratory: respirations are unlabored, no accessory muscle use, no conversational dyspnea  Cardiovascular: regular rate & rhythm  Gastrointestinal: Abdomen soft, non-tender, non-distended, no rebound tenderness / guarding  Neurological: GCS: 15 (4/5/6). A&O x 3; no gross sensory / motor / coordination deficits except for the decrease sensibility and motion in the right feet.  Psychiatric: Normal mood, normal affect  Musculoskeletal: No joint pain, swelling or deformity.      I&O's Detail    10 Jul 2019 07:01  -  11 Jul 2019 07:00  --------------------------------------------------------  IN:  Total IN: 0 mL    OUT:    Indwelling Catheter - Urethral: 1800 mL  Total OUT: 1800 mL    Total NET: -1800 mL      11 Jul 2019 07:01  -  12 Jul 2019 01:35  --------------------------------------------------------  IN:  Total IN: 0 mL    OUT:    Indwelling Catheter - Urethral: 825 mL  Total OUT: 825 mL    Total NET: -825 mL          LABS:                        8.8    9.69  )-----------( 521      ( 11 Jul 2019 07:53 )             27.8     07-11    134<L>  |  96<L>  |  17.0  ----------------------------<  145<H>  4.6   |  25.0  |  0.82    Ca    9.0      11 Jul 2019 07:53  Phos  3.1     07-11  Mg     2.2     07-11

## 2019-07-12 NOTE — PROGRESS NOTE ADULT - SUBJECTIVE AND OBJECTIVE BOX
INTERVAL HPI/OVERNIGHT EVENTS: NOTE: THIS IS A REPEAT NOTE FOR TODAY AS THE COMPUTER FROZE ON PRIOR NOTE  the patient is without new complaints    MEDICATIONS  (STANDING):  amLODIPine   Tablet 10 milliGRAM(s) Oral daily  docusate sodium 100 milliGRAM(s) Oral two times a day  enoxaparin Injectable 80 milliGRAM(s) SubCutaneous two times a day  gabapentin 300 milliGRAM(s) Oral three times a day  lactated ringers. 1000 milliLiter(s) (75 mL/Hr) IV Continuous <Continuous>  polyethylene glycol 3350 17 Gram(s) Oral two times a day  senna 1 Tablet(s) Oral two times a day  simvastatin 20 milliGRAM(s) Oral at bedtime  tamsulosin 0.4 milliGRAM(s) Oral at bedtime    MEDICATIONS  (PRN):  glucagon  Injectable 1 milliGRAM(s) IntraMuscular once PRN Glucose LESS THAN 70 milligrams/deciliter  HYDROmorphone  Injectable 1 milliGRAM(s) IV Push every 6 hours PRN Breakthrough pain  ondansetron Injectable 4 milliGRAM(s) IV Push once PRN Nausea and/or Vomiting  oxyCODONE    IR 5 milliGRAM(s) Oral every 4 hours PRN Moderate Pain (4 - 6)  oxyCODONE    IR 10 milliGRAM(s) Oral every 4 hours PRN Severe Pain (7 - 10)      Allergies    No Known Allergies    Intolerances        Vital Signs Last 24 Hrs  T(C): 37.5 (2019 08:16), Max: 38.9 (2019 14:46)  T(F): 99.5 (2019 08:16), Max: 102.1 (2019 14:46)  HR: 90 (2019 08:16) (90 - 109)  BP: 127/73 (2019 08:16) (126/68 - 128/71)  BP(mean): --  RR: 20 (2019 08:16) (18 - 20)  SpO2: 97% (2019 08:16) (92% - 97%)    ROS:  Constitutional: No fever, weight loss or fatigue  Respiratory: No cough, wheezing, chills or hemoptysis  Cardiovascular: No chest pain, palpitations, shortness of breath, dizziness or leg swelling  Genitourinary: No dysuria, frequency, hematuria or incontinence  Skin: No itching, burning, rashes or lesions   Musculoskeletal: No joint pain or swelling; No muscle, back or extremity pain  Psychiatric: No depression, anxiety, mood swings or difficulty sleeping  Allergy and Immunologic: No hives or eczema     ON PE:  General: Well developed; well nourished; in no acute distress  Head: Normocephalic; atraumatic  Respiratory: No tachypnea  Gastrointestinal: Soft non-tender non-distended;  Genitourinary: No costovertebral angle tenderness.  Urinary bladder is clinically not distended  Min catheter draining clear  Extremities: Right LE bandage; Left LE brace  Neurological: Alert and oriented x4  Skin: Warm and dry. No acute rash  Psychiatric: Cooperative and appropriate      LABS:                        7.9    9.33  )-----------( 532      ( 2019 04:24 )             24.6     07-12    135  |  97<L>  |  16.0  ----------------------------<  149<H>  4.7   |  26.0  |  0.84    Ca    8.8      2019 04:24  Phos  3.2     07-12  Mg     2.3     07-12        Urinalysis Basic - ( 2019 04:35 )    Color: Yellow / Appearance: Clear / S.005 / pH: x  Gluc: x / Ketone: Negative  / Bili: Negative / Urobili: 1 mg/dL   Blood: x / Protein: 30 mg/dL / Nitrite: Negative   Leuk Esterase: Trace / RBC: 25-50 /HPF / WBC 3-5   Sq Epi: x / Non Sq Epi: Occasional / Bacteria: Occasional        RADIOLOGY & ADDITIONAL TESTS:

## 2019-07-12 NOTE — PROGRESS NOTE ADULT - SUBJECTIVE AND OBJECTIVE BOX
No events overnight.  Pt remains with intermittent fevers but normal WBC.  Seen by orthopedics and 40cc cloudy serous fluid aspirated from right knee, effusion seen on CT scan.      RLE wounds are improving.  Decr edema.  Lateral knee eschar is partial thickness and stable.  No erythema.  No purulence.  No subQ fluid collections noted.    Open wound with granulation tissue.  Able to express only a scant amount of serous fluid.  STSG healing well, dec muscle edema.      A/P:  Recommend wound VAC placement at bedside tomorrow  F/u orthopedic reocmmendations  Continue lovenox and abx

## 2019-07-12 NOTE — PROGRESS NOTE ADULT - ASSESSMENT
A/P:  BPH  urinary retention    Continue flomax  Continue villegas  TOV when patient is able to stand/ambulate a bit

## 2019-07-12 NOTE — PROGRESS NOTE ADULT - SUBJECTIVE AND OBJECTIVE BOX
Patient in bed, wife at bedside.   Limited working with therapy.   Limited participation and effort.  Discussed that if want AR, will need to demonstrate participation and tolerance.   Both understood and discussed with CM.     FUNCTIONAL PROGRESS    Bed Mobility  Bed Mobility Training Supine-to-Sit: minimum assist (75% patient effort);  verbal cues;  1 person assist;  cues for technique,assist for RLE placements;  bed rails;  elevated HOB  Bed Mobility Training Limitations: decreased ability to use legs for bridging/pushing;  decreased strength;  impaired balance;  pain    Bed-Chair Transfer Training  Transfer Training Bed-to-Chair Transfer: moderate assist (50% patient effort);  1 person assist;  set-up needed first;  nonweight-bearing   LLE   slide board bed to w/c  Transfer Training Chair-to-Bed Transfer: maximum assist (25% patient effort);  2 person assist;  nonweight-bearing   squat pivot;  LLE   w/c to recliner  Bed-to-Chair Transfer Training Transfer Safety Analysis: decreased balance;  decreased strength;  impaired balance;  pain      REVIEW OF SYSTEMS  Constitutional - +fever,  +fatigue  Neurological - + loss of strength, +numbness, No tremors  Skin - No rashes, +lesions   Musculoskeletal - +joint pain, +joint swelling, +muscle pain  Psychiatric - +depression, No anxiety    VITALS  T(C): 37.5 (19 @ 08:16), Max: 38.9 (19 @ 14:46)  HR: 90 (19 @ 08:16) (90 - 109)  BP: 127/73 (19 @ 08:16) (126/68 - 128/71)  RR: 20 (19 @ 08:16) (18 - 20)  SpO2: 97% (19 @ 08:16) (92% - 97%)  Wt(kg): --    MEDICATIONS   amLODIPine   Tablet 10 milliGRAM(s) daily  docusate sodium 100 milliGRAM(s) two times a day  enoxaparin Injectable 80 milliGRAM(s) two times a day  gabapentin 300 milliGRAM(s) three times a day  glucagon  Injectable 1 milliGRAM(s) once PRN  HYDROmorphone  Injectable 1 milliGRAM(s) every 6 hours PRN  lactated ringers. 1000 milliLiter(s) <Continuous>  ondansetron Injectable 4 milliGRAM(s) once PRN  oxyCODONE    IR 5 milliGRAM(s) every 4 hours PRN  oxyCODONE    IR 10 milliGRAM(s) every 4 hours PRN  polyethylene glycol 3350 17 Gram(s) two times a day  senna 1 Tablet(s) two times a day  simvastatin 20 milliGRAM(s) at bedtime  tamsulosin 0.4 milliGRAM(s) at bedtime      RECENT LABS - Reviewed                        7.9    9.33  )-----------( 532      ( 2019 04:24 )             24.6     07-12    135  |  97<L>  |  16.0  ----------------------------<  149<H>  4.7   |  26.0  |  0.84    Ca    8.8      2019 04:24  Phos  3.2     07-12  Mg     2.3     07-12      PT/INR - ( 2019 11:55 )   PT: 15.6 sec;   INR: 1.34 ratio         PTT - ( 2019 11:55 )  PTT:41.1 sec  Urinalysis Basic - ( 2019 04:35 )    Color: Yellow / Appearance: Clear / S.005 / pH: x  Gluc: x / Ketone: Negative  / Bili: Negative / Urobili: 1 mg/dL   Blood: x / Protein: 30 mg/dL / Nitrite: Negative   Leuk Esterase: Trace / RBC: 25-50 /HPF / WBC 3-5   Sq Epi: x / Non Sq Epi: Occasional / Bacteria: Occasional          -----------------------------------------------------------------------------  PHYSICAL EXAM  Constitutional - NAD, Comfortable  Abdomen - Soft   Extremities - Right foot swelling in PRAFO with ACE wrap; Left LE in knee immobilizer/ACE  Neurologic Exam -                    Cognitive - Awake, Alert, AAO to self, place, date, year, situation     Motor - Limited by braces and right foot drop                    LEFT    LE - HF , KE -/5, DF 4/5, PF 4/5                    RIGHT LE - HF 2/5, KE 1/5, DF 0/5, PF 0/5        Sensory - Impaired in the right foot  Psychiatric - Mood depressed  ----------------------------------------------------------------------------------------  ASSESSMENT/PLAN  68yMale with functional deficits after a multitrauma injury to the BLE   Right compartment syndrome with right popliteal arterial injury and s/p bypass/fasciotomy - WBAT, Left tibial plateau fracture s/p ORIF - NWB, Immobilizer  Right foot drop - PRAFO  Right LE  DVT - Lovenox  HTN - Norvasc  HLD - Zocor  Pain - Tylenol, Neurontin, Dilaudid, Oxycodone  Constipation - Miralax, Colace, Senna  Urinary retention - Flomax, Min  DVT PPX - SCDs  Rehab - Continue to recommend ACUTE inpatient rehabilitation **[facility]** for the functional deficits consisting of 3 hours of therapy/day & 24 hour RN/daily PMR physician for comorbid medical management. Will continue to follow for ongoing rehab needs and recommendations. Patient will be able to tolerate 3 hours a day.    Continue bedside therapy as well as OOB throughout the day with mobilization throughout the day with staff to maintain cardiopulmonary function and prevention of secondary complications related to debility.

## 2019-07-12 NOTE — PROGRESS NOTE ADULT - SUBJECTIVE AND OBJECTIVE BOX
ARTHROCENTSIS  PROCEDURE NOTE: Arthrocentesis    Performed by: Nikole Weinstein/Luis Wheat PA-C    Indication: Effusion / rule out septic joint    Consent: the risks and benefits of arthrocentesis discussed with patient, including the risk of bleeding, infection, and technical failure. The risks of not performing the procedure, failure to diagnose septic joint with resultant systemic infection, discussed with the patient. The alternatives of performing the procedure also discussed. Written consent was obtained following the discussion.    Universal Protocol: A time out was performed and the correct patient and site were verified.    The right knee joint was prepped and draped in proper sterile fashion. An 18 gauge needle was used to aspirate fluid from the joint using appropriate anatomic landmarks. Approximately 40cc cloudy, yellow fluid was obtained from the joint. The fluid was then sent to the lab for appropriate studies. The site was bandaged and no complications were noted. The patient tolerated the procedure well.    Post-Procedure Diagnosis: Effusion  Complications: None  Specimens Removed: fluid only  Prosthetic devices/implants:  total joint      Received called from Dr. Dela Cruz.  CT scan of the right knee shows moderate to large joint effusion.  Patient has been febrile for the past couple of days.  Dr. Dela Cruz would like right knee joint aspirated and sent for culture to evaluate for sight of infection.  Patient seen and examined bedside.  Patient c/o moderate pain to the knee.  Moderate effusion noted to the right knee.  No redness or warmth.  Patient tolerated the procedure well.  Aspiration site was covered with gauze and tape. ARTHROCENTSIS  PROCEDURE NOTE: Arthrocentesis    Performed by: Nikole Weinstein/Luis Wheat PA-C    Indication: Effusion / rule out septic joint    Consent: the risks and benefits of arthrocentesis discussed with patient, including the risk of bleeding, infection, and technical failure. The risks of not performing the procedure, failure to diagnose septic joint with resultant systemic infection, discussed with the patient. The alternatives of performing the procedure also discussed. Written consent was obtained following the discussion.    Universal Protocol: A time out was performed and the correct patient and site were verified.    The right knee joint was prepped and draped in proper sterile fashion. An 18 gauge needle was used to aspirate fluid from the joint using appropriate anatomic landmarks. Approximately 40cc cloudy, yellow fluid was obtained from the joint. The fluid was then sent to the lab for appropriate studies. The site was bandaged and no complications were noted. The patient tolerated the procedure well.    Post-Procedure Diagnosis: Effusion  Complications: None  Specimens Removed: fluid only  Prosthetic devices/implants:  total joint      Received called from Dr. Dela Cruz.  CT scan of the right knee shows moderate to large joint effusion.  Patient has been febrile for the past couple of days.  Dr. Dela Cruz would like right knee joint aspirated and sent for culture to evaluate for sight of infection.  Patient seen and examined bedside.  Patient c/o moderate pain to the knee.  Moderate effusion noted to the right knee.  No redness or warmth.  Patient tolerated the procedure well.  Aspiration site was covered with gauze and tape.    Addendum:  Cell count [id number].  d/w Dr. Dela Cruz.  Will likely need washout, but would prefer to wait until gram stain and cultures return

## 2019-07-12 NOTE — PROGRESS NOTE ADULT - ASSESSMENT
Pt is a 68 years old M  s/p after saphen vein harvest, Sup to inf R femPop Bypasss, 4CFasciotomy.     Plan: pain control. Dressing chances. Control fevers pikes, DVT therapeutic treated with Levonox. OT, PT, PMR f/u. Blood drawn during the night after T101.    DIPO: Acute Rehab when clinically stable. Pt is a 68 years old M  s/p after saphen vein harvest, Sup to inf R femPop Bypasss, 4CFasciotomy.     Plan: pain control. Dressing chances. Control fevers pikes, DVT therapeutic treated with Levonox. OT, PT, PMR f/u. Blood drawn during the night after T101. ChestXray negative for pneumonia.    DIPO: Acute Rehab when clinically stable.

## 2019-07-12 NOTE — PROGRESS NOTE ADULT - ASSESSMENT
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 R knee with fluctuance and tender to palpation in the setting of recurrent fevers    - NPO in anticipation for possible procedure pending final recs from attending     #Intermittent fevers   - discontinued tylenol to avoid masking fevers  - UA with minimal LE  - fu blood cx  - 7/12 CXR pending final read but no obvious sign of consolidation     #Wounds  - Staples removed from STSG  - Anterior skin flap with epidermolysis and fluctuant with eschar formation  - Expressed 5-10mL of serous fluid from integra graft site   - Integra removed, irrigated and packed   - Daily packing and wet to dry with Dakins to open wound of integra site  - Aquacel and dry gauze to donor site  - Xerform dry to recepient site  - Kerlix to cover al wounds of RLE    #mobility  - Leg elevation  - D/c knee immobilizer  - continue LLE splint  - PMNR recs ACUTE inpatient rehabilitation **[facility]** #  - continue villegas per urology recs until standing if not ambulating    #DVT ppx  - lovenox

## 2019-07-13 ENCOUNTER — TRANSCRIPTION ENCOUNTER (OUTPATIENT)
Age: 69
End: 2019-07-13

## 2019-07-13 LAB
ANION GAP SERPL CALC-SCNC: 14 MMOL/L — SIGNIFICANT CHANGE UP (ref 5–17)
B PERT IGG+IGM PNL SER: ABNORMAL
BASOPHILS # BLD AUTO: 0.03 K/UL — SIGNIFICANT CHANGE UP (ref 0–0.2)
BASOPHILS NFR BLD AUTO: 0.3 % — SIGNIFICANT CHANGE UP (ref 0–2)
BUN SERPL-MCNC: 22 MG/DL — HIGH (ref 8–20)
CALCIUM SERPL-MCNC: 9 MG/DL — SIGNIFICANT CHANGE UP (ref 8.6–10.2)
CHLORIDE SERPL-SCNC: 101 MMOL/L — SIGNIFICANT CHANGE UP (ref 98–107)
CO2 SERPL-SCNC: 24 MMOL/L — SIGNIFICANT CHANGE UP (ref 22–29)
COLOR FLD: SIGNIFICANT CHANGE UP
CREAT SERPL-MCNC: 0.89 MG/DL — SIGNIFICANT CHANGE UP (ref 0.5–1.3)
EOSINOPHIL # BLD AUTO: 0.09 K/UL — SIGNIFICANT CHANGE UP (ref 0–0.5)
EOSINOPHIL NFR BLD AUTO: 1 % — SIGNIFICANT CHANGE UP (ref 0–6)
FLUID INTAKE SUBSTANCE CLASS: SIGNIFICANT CHANGE UP
FLUID SEGMENTED GRANULOCYTES: 96 % — SIGNIFICANT CHANGE UP
GLUCOSE SERPL-MCNC: 137 MG/DL — HIGH (ref 70–115)
GRAM STN FLD: SIGNIFICANT CHANGE UP
HCT VFR BLD CALC: 26.4 % — LOW (ref 39–50)
HGB BLD-MCNC: 8.2 G/DL — LOW (ref 13–17)
IMM GRANULOCYTES NFR BLD AUTO: 0.8 % — SIGNIFICANT CHANGE UP (ref 0–1.5)
LYMPHOCYTES # BLD AUTO: 0.95 K/UL — LOW (ref 1–3.3)
LYMPHOCYTES # BLD AUTO: 10.7 % — LOW (ref 13–44)
LYMPHOCYTES # FLD: 3 % — SIGNIFICANT CHANGE UP
MAGNESIUM SERPL-MCNC: 2.4 MG/DL — SIGNIFICANT CHANGE UP (ref 1.6–2.6)
MCHC RBC-ENTMCNC: 27.2 PG — SIGNIFICANT CHANGE UP (ref 27–34)
MCHC RBC-ENTMCNC: 31.1 GM/DL — LOW (ref 32–36)
MCV RBC AUTO: 87.4 FL — SIGNIFICANT CHANGE UP (ref 80–100)
MONOCYTES # BLD AUTO: 0.76 K/UL — SIGNIFICANT CHANGE UP (ref 0–0.9)
MONOCYTES NFR BLD AUTO: 8.5 % — SIGNIFICANT CHANGE UP (ref 2–14)
MONOS+MACROS # FLD: 1 % — SIGNIFICANT CHANGE UP
NEUTROPHILS # BLD AUTO: 6.99 K/UL — SIGNIFICANT CHANGE UP (ref 1.8–7.4)
NEUTROPHILS NFR BLD AUTO: 78.7 % — HIGH (ref 43–77)
PHOSPHATE SERPL-MCNC: 3.6 MG/DL — SIGNIFICANT CHANGE UP (ref 2.4–4.7)
PLATELET # BLD AUTO: 543 K/UL — HIGH (ref 150–400)
POTASSIUM SERPL-MCNC: 4.2 MMOL/L — SIGNIFICANT CHANGE UP (ref 3.5–5.3)
POTASSIUM SERPL-SCNC: 4.2 MMOL/L — SIGNIFICANT CHANGE UP (ref 3.5–5.3)
RBC # BLD: 3.02 M/UL — LOW (ref 4.2–5.8)
RBC # FLD: 16.9 % — HIGH (ref 10.3–14.5)
RCV VOL RI: HIGH /UL (ref 0–5)
SODIUM SERPL-SCNC: 139 MMOL/L — SIGNIFICANT CHANGE UP (ref 135–145)
SPECIMEN SOURCE: SIGNIFICANT CHANGE UP
SPECIMEN SOURCE: SIGNIFICANT CHANGE UP
TOTAL NUCLEATED CELL COUNT, BODY FLUID: HIGH /UL (ref 0–5)
TUBE TYPE: SIGNIFICANT CHANGE UP
WBC # BLD: 8.89 K/UL — SIGNIFICANT CHANGE UP (ref 3.8–10.5)
WBC # FLD AUTO: 8.89 K/UL — SIGNIFICANT CHANGE UP (ref 3.8–10.5)

## 2019-07-13 PROCEDURE — 99231 SBSQ HOSP IP/OBS SF/LOW 25: CPT

## 2019-07-13 RX ORDER — CEFEPIME 1 G/1
2000 INJECTION, POWDER, FOR SOLUTION INTRAMUSCULAR; INTRAVENOUS ONCE
Refills: 0 | Status: COMPLETED | OUTPATIENT
Start: 2019-07-13 | End: 2019-07-13

## 2019-07-13 RX ORDER — CEFEPIME 1 G/1
INJECTION, POWDER, FOR SOLUTION INTRAMUSCULAR; INTRAVENOUS
Refills: 0 | Status: DISCONTINUED | OUTPATIENT
Start: 2019-07-13 | End: 2019-07-14

## 2019-07-13 RX ORDER — CEFEPIME 1 G/1
2000 INJECTION, POWDER, FOR SOLUTION INTRAMUSCULAR; INTRAVENOUS EVERY 8 HOURS
Refills: 0 | Status: DISCONTINUED | OUTPATIENT
Start: 2019-07-14 | End: 2019-07-14

## 2019-07-13 RX ADMIN — Medication 100 MILLIGRAM(S): at 05:45

## 2019-07-13 RX ADMIN — AMLODIPINE BESYLATE 10 MILLIGRAM(S): 2.5 TABLET ORAL at 05:44

## 2019-07-13 RX ADMIN — POLYETHYLENE GLYCOL 3350 17 GRAM(S): 17 POWDER, FOR SOLUTION ORAL at 17:47

## 2019-07-13 RX ADMIN — CEFEPIME 100 MILLIGRAM(S): 1 INJECTION, POWDER, FOR SOLUTION INTRAMUSCULAR; INTRAVENOUS at 20:00

## 2019-07-13 RX ADMIN — OXYCODONE HYDROCHLORIDE 10 MILLIGRAM(S): 5 TABLET ORAL at 05:48

## 2019-07-13 RX ADMIN — GABAPENTIN 300 MILLIGRAM(S): 400 CAPSULE ORAL at 05:45

## 2019-07-13 RX ADMIN — ENOXAPARIN SODIUM 80 MILLIGRAM(S): 100 INJECTION SUBCUTANEOUS at 17:48

## 2019-07-13 RX ADMIN — SODIUM CHLORIDE 75 MILLILITER(S): 9 INJECTION, SOLUTION INTRAVENOUS at 05:45

## 2019-07-13 RX ADMIN — ENOXAPARIN SODIUM 80 MILLIGRAM(S): 100 INJECTION SUBCUTANEOUS at 08:14

## 2019-07-13 RX ADMIN — OXYCODONE HYDROCHLORIDE 10 MILLIGRAM(S): 5 TABLET ORAL at 21:39

## 2019-07-13 RX ADMIN — SIMVASTATIN 20 MILLIGRAM(S): 20 TABLET, FILM COATED ORAL at 21:48

## 2019-07-13 RX ADMIN — GABAPENTIN 300 MILLIGRAM(S): 400 CAPSULE ORAL at 12:38

## 2019-07-13 RX ADMIN — TAMSULOSIN HYDROCHLORIDE 0.4 MILLIGRAM(S): 0.4 CAPSULE ORAL at 21:48

## 2019-07-13 RX ADMIN — GABAPENTIN 300 MILLIGRAM(S): 400 CAPSULE ORAL at 21:48

## 2019-07-13 RX ADMIN — SENNA PLUS 1 TABLET(S): 8.6 TABLET ORAL at 17:47

## 2019-07-13 RX ADMIN — OXYCODONE HYDROCHLORIDE 10 MILLIGRAM(S): 5 TABLET ORAL at 06:40

## 2019-07-13 RX ADMIN — Medication 100 MILLIGRAM(S): at 17:47

## 2019-07-13 RX ADMIN — OXYCODONE HYDROCHLORIDE 10 MILLIGRAM(S): 5 TABLET ORAL at 12:39

## 2019-07-13 RX ADMIN — OXYCODONE HYDROCHLORIDE 10 MILLIGRAM(S): 5 TABLET ORAL at 12:25

## 2019-07-13 RX ADMIN — SENNA PLUS 1 TABLET(S): 8.6 TABLET ORAL at 05:45

## 2019-07-13 RX ADMIN — OXYCODONE HYDROCHLORIDE 10 MILLIGRAM(S): 5 TABLET ORAL at 21:09

## 2019-07-13 NOTE — PROGRESS NOTE ADULT - ASSESSMENT
Pt is a 68 years old M  s/p after saphen vein harvest, Sup to inf R femPop Bypasss, 4CFasciotomy.     Plan:   Pain control  Dressing chances  DVT therapeutic treated with Lovenox  OT, PT, PMR f/u.   Ortho - awaiting GS, will discuss washout. Awaiting final recommendations    DIPO: Acute Rehab when clinically stable.

## 2019-07-13 NOTE — CHART NOTE - NSCHARTNOTEFT_GEN_A_CORE
Black foam wound vac placed on L lateral knee. 4x2x1 cm foam used. no air leaks. suction at 125 mmHg. will change vac qday. next change 7/16

## 2019-07-13 NOTE — PROGRESS NOTE ADULT - SUBJECTIVE AND OBJECTIVE BOX
INTERVAL HPI/OVERNIGHT EVENTS: Patient resting in bed.    MEDICATIONS  (STANDING):  amLODIPine   Tablet 10 milliGRAM(s) Oral daily  docusate sodium 100 milliGRAM(s) Oral two times a day  enoxaparin Injectable 80 milliGRAM(s) SubCutaneous two times a day  gabapentin 300 milliGRAM(s) Oral three times a day  lactated ringers. 1000 milliLiter(s) (75 mL/Hr) IV Continuous <Continuous>  polyethylene glycol 3350 17 Gram(s) Oral two times a day  senna 1 Tablet(s) Oral two times a day  simvastatin 20 milliGRAM(s) Oral at bedtime  tamsulosin 0.4 milliGRAM(s) Oral at bedtime    MEDICATIONS  (PRN):  glucagon  Injectable 1 milliGRAM(s) IntraMuscular once PRN Glucose LESS THAN 70 milligrams/deciliter  HYDROmorphone  Injectable 1 milliGRAM(s) IV Push every 6 hours PRN Breakthrough pain  ondansetron Injectable 4 milliGRAM(s) IV Push once PRN Nausea and/or Vomiting  oxyCODONE    IR 5 milliGRAM(s) Oral every 4 hours PRN Moderate Pain (4 - 6)  oxyCODONE    IR 10 milliGRAM(s) Oral every 4 hours PRN Severe Pain (7 - 10)      Allergies    No Known Allergies    Intolerances        Vital Signs Last 24 Hrs  T(C): 36.6 (2019 08:22), Max: 38 (2019 23:25)  T(F): 97.8 (2019 08:22), Max: 100.4 (2019 23:25)  HR: 102 (2019 08:22) (100 - 102)  BP: 124/65 (2019 08:22) (121/70 - 124/65)  BP(mean): --  RR: 19 (2019 08:22) (19 - 20)  SpO2: 98% (2019 15:40) (98% - 98%)    ROS:  as per HPI     ON PE:  General: Well developed; well nourished; in no acute distress  Head: Normocephalic; atraumatic  Respiratory: No tachypnea  Gastrointestinal: Soft non-tender non-distended;  Genitourinary: No costovertebral angle tenderness.  Urinary bladder is clinically not distended  Min draining clear  Neurological: Alert and oriented x4  Skin: Warm and dry. No acute rash  Psychiatric: Cooperative and appropriate      LABS:                        8.2    8.89  )-----------( 543      ( 2019 09:16 )             26.4     07-13    139  |  101  |  22.0<H>  ----------------------------<  137<H>  4.2   |  24.0  |  0.89    Ca    9.0      2019 09:16  Phos  3.6     07-  Mg     2.4     07-13      PT/INR - ( 2019 11:55 )   PT: 15.6 sec;   INR: 1.34 ratio         PTT - ( 2019 11:55 )  PTT:41.1 sec  Urinalysis Basic - ( 2019 04:35 )    Color: Yellow / Appearance: Clear / S.005 / pH: x  Gluc: x / Ketone: Negative  / Bili: Negative / Urobili: 1 mg/dL   Blood: x / Protein: 30 mg/dL / Nitrite: Negative   Leuk Esterase: Trace / RBC: 25-50 /HPF / WBC 3-5   Sq Epi: x / Non Sq Epi: Occasional / Bacteria: Occasional        RADIOLOGY & ADDITIONAL TESTS:

## 2019-07-13 NOTE — PROGRESS NOTE ADULT - ASSESSMENT
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 R knee with fluctuance and tender to palpation in the setting of recurrent fevers. Now s/p arthrocentesis qwith 40cc of yellow fluid sent for culture from orthopedics team.  - F/u arthrocentesis cultures  - Will continue daily dressing changed to RLE  - Will apply VAC to open wound on posterior lateral wound

## 2019-07-13 NOTE — PROGRESS NOTE ADULT - ASSESSMENT
A/P  Urinary retention  BPH    continue with flomax 0.4 mg  Maintain villegas  TOV when patient is able to stand

## 2019-07-13 NOTE — PROGRESS NOTE ADULT - SUBJECTIVE AND OBJECTIVE BOX
HPI/OVERNIGHT EVENTS: Patient seen and examined at bedside this AM. Patient still spiking fevers overnight, orthopedics performed arthrocentesis at bedside draining 40cc of cloudy yellow fluid. Pain well controlled. Denies fever, chills, nausea, vomitting, chest pain, SOB, dizziness, abd pain or any other concerning symptoms    Vital Signs Last 24 Hrs  T(C): 37.8 (2019 00:45), Max: 38 (2019 23:25)  T(F): 100 (2019 00:45), Max: 100.4 (2019 23:25)  HR: 100 (2019 23:25) (90 - 100)  BP: 121/70 (2019 23:25) (121/70 - 127/73)  BP(mean): --  RR: 20 (2019 23:25) (20 - 20)  SpO2: 98% (2019 15:40) (97% - 98%)    I&O's Detail    2019 07:01  -  2019 07:00  --------------------------------------------------------  IN:  Total IN: 0 mL    OUT:    Indwelling Catheter - Urethral: 825 mL  Total OUT: 825 mL    Total NET: -825 mL      2019 07:01  -  2019 01:35  --------------------------------------------------------  IN:    lactated ringers.: 225 mL  Total IN: 225 mL    OUT:    Indwelling Catheter - Urethral: 400 mL  Total OUT: 400 mL    Total NET: -175 mL              PE  Gen: alert and oriented. resting comfortably.   Pulm: non labored  Abd: s/NT/ND  Ext:  Split thickness skin graft: recipient site 95% take with minimal fibrinous exudate along edges.    R knee: open wound on the lateral aspect unchanged from yesterday. Dressings changed and packed. there is de-epithelialization of the skin on the lateral aspect of the knee with new eschar formation. Knee is tender to palpation and mildly fluctuant. palpation of fluctuance elicited minimal serous drainage from lateral open wound on lateral knee. incision on the lateral knee with fibrinous exudate along the staple line    LABS:                        7.9    9.33  )-----------( 532      ( 2019 04:24 )             24.6     07-12    135  |  97<L>  |  16.0  ----------------------------<  149<H>  4.7   |  26.0  |  0.84    Ca    8.8      2019 04:24  Phos  3.2     07-12  Mg     2.3     07-12      PT/INR - ( 2019 11:55 )   PT: 15.6 sec;   INR: 1.34 ratio         PTT - ( 2019 11:55 )  PTT:41.1 sec  Urinalysis Basic - ( 2019 04:35 )    Color: Yellow / Appearance: Clear / S.005 / pH: x  Gluc: x / Ketone: Negative  / Bili: Negative / Urobili: 1 mg/dL   Blood: x / Protein: 30 mg/dL / Nitrite: Negative   Leuk Esterase: Trace / RBC: 25-50 /HPF / WBC 3-5   Sq Epi: x / Non Sq Epi: Occasional / Bacteria: Occasional        MEDICATIONS  (STANDING):  amLODIPine   Tablet 10 milliGRAM(s) Oral daily  docusate sodium 100 milliGRAM(s) Oral two times a day  enoxaparin Injectable 80 milliGRAM(s) SubCutaneous two times a day  gabapentin 300 milliGRAM(s) Oral three times a day  lactated ringers. 1000 milliLiter(s) (75 mL/Hr) IV Continuous <Continuous>  polyethylene glycol 3350 17 Gram(s) Oral two times a day  senna 1 Tablet(s) Oral two times a day  simvastatin 20 milliGRAM(s) Oral at bedtime  tamsulosin 0.4 milliGRAM(s) Oral at bedtime    MEDICATIONS  (PRN):  glucagon  Injectable 1 milliGRAM(s) IntraMuscular once PRN Glucose LESS THAN 70 milligrams/deciliter  HYDROmorphone  Injectable 1 milliGRAM(s) IV Push every 6 hours PRN Breakthrough pain  ondansetron Injectable 4 milliGRAM(s) IV Push once PRN Nausea and/or Vomiting  oxyCODONE    IR 5 milliGRAM(s) Oral every 4 hours PRN Moderate Pain (4 - 6)  oxyCODONE    IR 10 milliGRAM(s) Oral every 4 hours PRN Severe Pain (7 - 10)

## 2019-07-13 NOTE — PROGRESS NOTE ADULT - SUBJECTIVE AND OBJECTIVE BOX
ARTHROCENTSIS  PROCEDURE NOTE: Arthrocentesis    Performed by: Luis Wheat PA-C    Indication: Effusion / rule out septic joint    Consent: the risks and benefits of arthrocentesis discussed with patient, including the risk of bleeding, infection, and technical failure. The risks of not performing the procedure, failure to diagnose septic joint with resultant systemic infection, discussed with the patient. The alternatives of performing the procedure also discussed. Written consent was obtained following the discussion.    Universal Protocol: A time out was performed and the correct patient and site were verified.    The Right knee joint was prepped and draped in proper sterile fashion.   A 18 gauge needle was used to aspirate fluid from the joint using appropriate anatomic landmarks. Approximately 25 cc cloudy serosanguinous fluid was obtained from the joint. The fluid was then sent to the lab for appropriate studies. The site was bandaged and no complications were noted. The patient tolerated the procedure well.    Post-Procedure Diagnosis: Effusion  Complications: None  Specimens Removed: fluid only  Prosthetic devices/implants:  none. Native joint    -F/U cell count

## 2019-07-13 NOTE — PROGRESS NOTE ADULT - SUBJECTIVE AND OBJECTIVE BOX
SUBJECTIVE:  AALIYAH overnight, Pain is well controlled. Resting comfortably.     MEDICATIONS  (STANDING):  amLODIPine   Tablet 10 milliGRAM(s) Oral daily  docusate sodium 100 milliGRAM(s) Oral two times a day  enoxaparin Injectable 80 milliGRAM(s) SubCutaneous two times a day  gabapentin 300 milliGRAM(s) Oral three times a day  lactated ringers. 1000 milliLiter(s) (75 mL/Hr) IV Continuous <Continuous>  polyethylene glycol 3350 17 Gram(s) Oral two times a day  senna 1 Tablet(s) Oral two times a day  simvastatin 20 milliGRAM(s) Oral at bedtime  tamsulosin 0.4 milliGRAM(s) Oral at bedtime    MEDICATIONS  (PRN):  glucagon  Injectable 1 milliGRAM(s) IntraMuscular once PRN Glucose LESS THAN 70 milligrams/deciliter  HYDROmorphone  Injectable 1 milliGRAM(s) IV Push every 6 hours PRN Breakthrough pain  ondansetron Injectable 4 milliGRAM(s) IV Push once PRN Nausea and/or Vomiting  oxyCODONE    IR 5 milliGRAM(s) Oral every 4 hours PRN Moderate Pain (4 - 6)  oxyCODONE    IR 10 milliGRAM(s) Oral every 4 hours PRN Severe Pain (7 - 10)      Vital Signs Last 24 Hrs  T(C): 37.8 (2019 00:45), Max: 38 (2019 23:25)  T(F): 100 (2019 00:45), Max: 100.4 (2019 23:25)  HR: 100 (2019 23:25) (90 - 100)  BP: 121/70 (2019 23:25) (121/70 - 127/73)  BP(mean): --  RR: 20 (2019 23:25) (20 - 20)  SpO2: 98% (2019 15:40) (97% - 98%)    PE  Constitutional: patient resting comfortably in bed, in no acute distress  HEENT: EOMI / PERRL b/l, no active drainage or redness  Neck: No JVD, full ROM without pain  Respiratory: respirations are unlabored, no accessory muscle use, no conversational dyspnea  Cardiovascular: regular rate & rhythm  Gastrointestinal: Abdomen soft, non-tender, non-distended, no rebound tenderness / guarding  Neurological: GCS: 15 (4/5/6). A&O x 3; no gross sensory / motor / coordination deficits except for the decrease sensibility and motion in the right feet.  Psychiatric: Normal mood, normal affect  Musculoskeletal: No joint pain, swelling or deformity.      I&O's Detail    2019 07:01  -  2019 07:00  --------------------------------------------------------  IN:  Total IN: 0 mL    OUT:    Indwelling Catheter - Urethral: 825 mL  Total OUT: 825 mL    Total NET: -825 mL      2019 07:01  -  2019 01:36  --------------------------------------------------------  IN:    lactated ringers.: 225 mL  Total IN: 225 mL    OUT:    Indwelling Catheter - Urethral: 400 mL  Total OUT: 400 mL    Total NET: -175 mL          LABS:                        7.9    9.33  )-----------( 532      ( 2019 04:24 )             24.6     07-12    135  |  97<L>  |  16.0  ----------------------------<  149<H>  4.7   |  26.0  |  0.84    Ca    8.8      2019 04:24  Phos  3.2     07-12  Mg     2.3     07-12      PT/INR - ( 2019 11:55 )   PT: 15.6 sec;   INR: 1.34 ratio         PTT - ( 2019 11:55 )  PTT:41.1 sec  Urinalysis Basic - ( 2019 04:35 )    Color: Yellow / Appearance: Clear / S.005 / pH: x  Gluc: x / Ketone: Negative  / Bili: Negative / Urobili: 1 mg/dL   Blood: x / Protein: 30 mg/dL / Nitrite: Negative   Leuk Esterase: Trace / RBC: 25-50 /HPF / WBC 3-5   Sq Epi: x / Non Sq Epi: Occasional / Bacteria: Occasional        RADIOLOGY & ADDITIONAL STUDIES:

## 2019-07-14 LAB
-  AMIKACIN: SIGNIFICANT CHANGE UP
-  AMIKACIN: SIGNIFICANT CHANGE UP
-  AZTREONAM: SIGNIFICANT CHANGE UP
-  AZTREONAM: SIGNIFICANT CHANGE UP
-  CEFEPIME: SIGNIFICANT CHANGE UP
-  CEFEPIME: SIGNIFICANT CHANGE UP
-  CEFTAZIDIME: SIGNIFICANT CHANGE UP
-  CEFTAZIDIME: SIGNIFICANT CHANGE UP
-  CIPROFLOXACIN: SIGNIFICANT CHANGE UP
-  CIPROFLOXACIN: SIGNIFICANT CHANGE UP
-  GENTAMICIN: SIGNIFICANT CHANGE UP
-  GENTAMICIN: SIGNIFICANT CHANGE UP
-  IMIPENEM: SIGNIFICANT CHANGE UP
-  IMIPENEM: SIGNIFICANT CHANGE UP
-  LEVOFLOXACIN: SIGNIFICANT CHANGE UP
-  LEVOFLOXACIN: SIGNIFICANT CHANGE UP
-  MEROPENEM: SIGNIFICANT CHANGE UP
-  MEROPENEM: SIGNIFICANT CHANGE UP
-  PIPERACILLIN/TAZOBACTAM: SIGNIFICANT CHANGE UP
-  PIPERACILLIN/TAZOBACTAM: SIGNIFICANT CHANGE UP
-  TOBRAMYCIN: SIGNIFICANT CHANGE UP
-  TOBRAMYCIN: SIGNIFICANT CHANGE UP
ANION GAP SERPL CALC-SCNC: 12 MMOL/L — SIGNIFICANT CHANGE UP (ref 5–17)
BASOPHILS # BLD AUTO: 0.01 K/UL — SIGNIFICANT CHANGE UP (ref 0–0.2)
BASOPHILS NFR BLD AUTO: 0.1 % — SIGNIFICANT CHANGE UP (ref 0–2)
BUN SERPL-MCNC: 14 MG/DL — SIGNIFICANT CHANGE UP (ref 8–20)
CALCIUM SERPL-MCNC: 9.1 MG/DL — SIGNIFICANT CHANGE UP (ref 8.6–10.2)
CHLORIDE SERPL-SCNC: 96 MMOL/L — LOW (ref 98–107)
CO2 SERPL-SCNC: 24 MMOL/L — SIGNIFICANT CHANGE UP (ref 22–29)
CREAT SERPL-MCNC: 0.77 MG/DL — SIGNIFICANT CHANGE UP (ref 0.5–1.3)
CULTURE RESULTS: SIGNIFICANT CHANGE UP
EOSINOPHIL # BLD AUTO: 0 K/UL — SIGNIFICANT CHANGE UP (ref 0–0.5)
EOSINOPHIL NFR BLD AUTO: 0 % — SIGNIFICANT CHANGE UP (ref 0–6)
GLUCOSE BLDC GLUCOMTR-MCNC: 189 MG/DL — HIGH (ref 70–99)
GLUCOSE BLDC GLUCOMTR-MCNC: 194 MG/DL — HIGH (ref 70–99)
GLUCOSE BLDC GLUCOMTR-MCNC: 209 MG/DL — HIGH (ref 70–99)
GLUCOSE BLDC GLUCOMTR-MCNC: 246 MG/DL — HIGH (ref 70–99)
GLUCOSE SERPL-MCNC: 202 MG/DL — HIGH (ref 70–115)
GRAM STN FLD: SIGNIFICANT CHANGE UP
GRAM STN FLD: SIGNIFICANT CHANGE UP
HCT VFR BLD CALC: 25 % — LOW (ref 39–50)
HGB BLD-MCNC: 8 G/DL — LOW (ref 13–17)
IMM GRANULOCYTES NFR BLD AUTO: 1 % — SIGNIFICANT CHANGE UP (ref 0–1.5)
LYMPHOCYTES # BLD AUTO: 0.55 K/UL — LOW (ref 1–3.3)
LYMPHOCYTES # BLD AUTO: 4.5 % — LOW (ref 13–44)
MAGNESIUM SERPL-MCNC: 2.2 MG/DL — SIGNIFICANT CHANGE UP (ref 1.6–2.6)
MCHC RBC-ENTMCNC: 27.8 PG — SIGNIFICANT CHANGE UP (ref 27–34)
MCHC RBC-ENTMCNC: 32 GM/DL — SIGNIFICANT CHANGE UP (ref 32–36)
MCV RBC AUTO: 86.8 FL — SIGNIFICANT CHANGE UP (ref 80–100)
METHOD TYPE: SIGNIFICANT CHANGE UP
METHOD TYPE: SIGNIFICANT CHANGE UP
MONOCYTES # BLD AUTO: 0.49 K/UL — SIGNIFICANT CHANGE UP (ref 0–0.9)
MONOCYTES NFR BLD AUTO: 4 % — SIGNIFICANT CHANGE UP (ref 2–14)
NEUTROPHILS # BLD AUTO: 11.01 K/UL — HIGH (ref 1.8–7.4)
NEUTROPHILS NFR BLD AUTO: 90.4 % — HIGH (ref 43–77)
ORGANISM # SPEC MICROSCOPIC CNT: SIGNIFICANT CHANGE UP
ORGANISM # SPEC MICROSCOPIC CNT: SIGNIFICANT CHANGE UP
PHOSPHATE SERPL-MCNC: 3.6 MG/DL — SIGNIFICANT CHANGE UP (ref 2.4–4.7)
PLATELET # BLD AUTO: 589 K/UL — HIGH (ref 150–400)
POTASSIUM SERPL-MCNC: 5.2 MMOL/L — SIGNIFICANT CHANGE UP (ref 3.5–5.3)
POTASSIUM SERPL-SCNC: 5.2 MMOL/L — SIGNIFICANT CHANGE UP (ref 3.5–5.3)
RBC # BLD: 2.88 M/UL — LOW (ref 4.2–5.8)
RBC # FLD: 16.7 % — HIGH (ref 10.3–14.5)
SODIUM SERPL-SCNC: 132 MMOL/L — LOW (ref 135–145)
SPECIMEN SOURCE: SIGNIFICANT CHANGE UP
WBC # BLD: 12.18 K/UL — HIGH (ref 3.8–10.5)
WBC # FLD AUTO: 12.18 K/UL — HIGH (ref 3.8–10.5)

## 2019-07-14 PROCEDURE — 29877 ARTHRS KNEE SURG DBRDMT/SHVG: CPT | Mod: RT

## 2019-07-14 PROCEDURE — 99232 SBSQ HOSP IP/OBS MODERATE 35: CPT

## 2019-07-14 RX ORDER — ONDANSETRON 8 MG/1
4 TABLET, FILM COATED ORAL ONCE
Refills: 0 | Status: DISCONTINUED | OUTPATIENT
Start: 2019-07-14 | End: 2019-07-14

## 2019-07-14 RX ORDER — ENOXAPARIN SODIUM 100 MG/ML
80 INJECTION SUBCUTANEOUS
Refills: 0 | Status: DISCONTINUED | OUTPATIENT
Start: 2019-07-14 | End: 2019-07-14

## 2019-07-14 RX ORDER — SIMVASTATIN 20 MG/1
20 TABLET, FILM COATED ORAL AT BEDTIME
Refills: 0 | Status: DISCONTINUED | OUTPATIENT
Start: 2019-07-14 | End: 2019-07-23

## 2019-07-14 RX ORDER — DOCUSATE SODIUM 100 MG
100 CAPSULE ORAL
Refills: 0 | Status: DISCONTINUED | OUTPATIENT
Start: 2019-07-14 | End: 2019-07-23

## 2019-07-14 RX ORDER — SENNA PLUS 8.6 MG/1
1 TABLET ORAL
Refills: 0 | Status: DISCONTINUED | OUTPATIENT
Start: 2019-07-14 | End: 2019-07-23

## 2019-07-14 RX ORDER — INSULIN LISPRO 100/ML
VIAL (ML) SUBCUTANEOUS EVERY 4 HOURS
Refills: 0 | Status: DISCONTINUED | OUTPATIENT
Start: 2019-07-14 | End: 2019-07-15

## 2019-07-14 RX ORDER — AMLODIPINE BESYLATE 2.5 MG/1
10 TABLET ORAL DAILY
Refills: 0 | Status: DISCONTINUED | OUTPATIENT
Start: 2019-07-14 | End: 2019-07-14

## 2019-07-14 RX ORDER — ASPIRIN/CALCIUM CARB/MAGNESIUM 324 MG
81 TABLET ORAL DAILY
Refills: 0 | Status: DISCONTINUED | OUTPATIENT
Start: 2019-07-14 | End: 2019-07-23

## 2019-07-14 RX ORDER — POLYETHYLENE GLYCOL 3350 17 G/17G
17 POWDER, FOR SOLUTION ORAL
Refills: 0 | Status: DISCONTINUED | OUTPATIENT
Start: 2019-07-14 | End: 2019-07-23

## 2019-07-14 RX ORDER — SODIUM CHLORIDE 9 MG/ML
1000 INJECTION, SOLUTION INTRAVENOUS
Refills: 0 | Status: DISCONTINUED | OUTPATIENT
Start: 2019-07-14 | End: 2019-07-15

## 2019-07-14 RX ORDER — CEFAZOLIN SODIUM 1 G
2000 VIAL (EA) INJECTION EVERY 8 HOURS
Refills: 0 | Status: DISCONTINUED | OUTPATIENT
Start: 2019-07-14 | End: 2019-07-14

## 2019-07-14 RX ORDER — VANCOMYCIN HCL 1 G
1000 VIAL (EA) INTRAVENOUS
Refills: 0 | Status: DISCONTINUED | OUTPATIENT
Start: 2019-07-14 | End: 2019-07-14

## 2019-07-14 RX ORDER — FENTANYL CITRATE 50 UG/ML
50 INJECTION INTRAVENOUS
Refills: 0 | Status: DISCONTINUED | OUTPATIENT
Start: 2019-07-14 | End: 2019-07-14

## 2019-07-14 RX ORDER — SODIUM CHLORIDE 9 MG/ML
1000 INJECTION, SOLUTION INTRAVENOUS
Refills: 0 | Status: DISCONTINUED | OUTPATIENT
Start: 2019-07-14 | End: 2019-07-14

## 2019-07-14 RX ORDER — OXYCODONE HYDROCHLORIDE 5 MG/1
5 TABLET ORAL EVERY 4 HOURS
Refills: 0 | Status: DISCONTINUED | OUTPATIENT
Start: 2019-07-14 | End: 2019-07-15

## 2019-07-14 RX ORDER — ENOXAPARIN SODIUM 100 MG/ML
80 INJECTION SUBCUTANEOUS
Refills: 0 | Status: DISCONTINUED | OUTPATIENT
Start: 2019-07-14 | End: 2019-07-23

## 2019-07-14 RX ORDER — HYDROMORPHONE HYDROCHLORIDE 2 MG/ML
0.5 INJECTION INTRAMUSCULAR; INTRAVENOUS; SUBCUTANEOUS
Refills: 0 | Status: DISCONTINUED | OUTPATIENT
Start: 2019-07-14 | End: 2019-07-17

## 2019-07-14 RX ORDER — AMLODIPINE BESYLATE 2.5 MG/1
10 TABLET ORAL DAILY
Refills: 0 | Status: DISCONTINUED | OUTPATIENT
Start: 2019-07-14 | End: 2019-07-23

## 2019-07-14 RX ORDER — VANCOMYCIN HCL 1 G
1000 VIAL (EA) INTRAVENOUS ONCE
Refills: 0 | Status: COMPLETED | OUTPATIENT
Start: 2019-07-14 | End: 2019-07-14

## 2019-07-14 RX ORDER — OXYCODONE HYDROCHLORIDE 5 MG/1
10 TABLET ORAL EVERY 4 HOURS
Refills: 0 | Status: DISCONTINUED | OUTPATIENT
Start: 2019-07-14 | End: 2019-07-21

## 2019-07-14 RX ORDER — GABAPENTIN 400 MG/1
300 CAPSULE ORAL THREE TIMES A DAY
Refills: 0 | Status: DISCONTINUED | OUTPATIENT
Start: 2019-07-14 | End: 2019-07-23

## 2019-07-14 RX ORDER — ACETAMINOPHEN 500 MG
650 TABLET ORAL EVERY 6 HOURS
Refills: 0 | Status: DISCONTINUED | OUTPATIENT
Start: 2019-07-14 | End: 2019-07-23

## 2019-07-14 RX ORDER — ONDANSETRON 8 MG/1
4 TABLET, FILM COATED ORAL EVERY 8 HOURS
Refills: 0 | Status: DISCONTINUED | OUTPATIENT
Start: 2019-07-14 | End: 2019-07-23

## 2019-07-14 RX ORDER — TAMSULOSIN HYDROCHLORIDE 0.4 MG/1
0.4 CAPSULE ORAL AT BEDTIME
Refills: 0 | Status: DISCONTINUED | OUTPATIENT
Start: 2019-07-14 | End: 2019-07-17

## 2019-07-14 RX ORDER — LACTULOSE 10 G/15ML
10 SOLUTION ORAL DAILY
Refills: 0 | Status: DISCONTINUED | OUTPATIENT
Start: 2019-07-14 | End: 2019-07-23

## 2019-07-14 RX ORDER — CEFTRIAXONE 500 MG/1
2000 INJECTION, POWDER, FOR SOLUTION INTRAMUSCULAR; INTRAVENOUS EVERY 24 HOURS
Refills: 0 | Status: DISCONTINUED | OUTPATIENT
Start: 2019-07-14 | End: 2019-07-15

## 2019-07-14 RX ADMIN — FENTANYL CITRATE 50 MICROGRAM(S): 50 INJECTION INTRAVENOUS at 09:50

## 2019-07-14 RX ADMIN — LACTULOSE 10 GRAM(S): 10 SOLUTION ORAL at 14:33

## 2019-07-14 RX ADMIN — Medication 2: at 22:12

## 2019-07-14 RX ADMIN — Medication 1: at 11:54

## 2019-07-14 RX ADMIN — CEFEPIME 100 MILLIGRAM(S): 1 INJECTION, POWDER, FOR SOLUTION INTRAMUSCULAR; INTRAVENOUS at 05:42

## 2019-07-14 RX ADMIN — Medication 100 MILLIGRAM(S): at 16:50

## 2019-07-14 RX ADMIN — SODIUM CHLORIDE 100 MILLILITER(S): 9 INJECTION, SOLUTION INTRAVENOUS at 16:51

## 2019-07-14 RX ADMIN — AMLODIPINE BESYLATE 10 MILLIGRAM(S): 2.5 TABLET ORAL at 11:54

## 2019-07-14 RX ADMIN — OXYCODONE HYDROCHLORIDE 10 MILLIGRAM(S): 5 TABLET ORAL at 05:47

## 2019-07-14 RX ADMIN — SIMVASTATIN 20 MILLIGRAM(S): 20 TABLET, FILM COATED ORAL at 22:12

## 2019-07-14 RX ADMIN — Medication 3: at 16:50

## 2019-07-14 RX ADMIN — GABAPENTIN 300 MILLIGRAM(S): 400 CAPSULE ORAL at 14:32

## 2019-07-14 RX ADMIN — OXYCODONE HYDROCHLORIDE 10 MILLIGRAM(S): 5 TABLET ORAL at 11:54

## 2019-07-14 RX ADMIN — FENTANYL CITRATE 50 MICROGRAM(S): 50 INJECTION INTRAVENOUS at 09:39

## 2019-07-14 RX ADMIN — Medication 250 MILLIGRAM(S): at 10:29

## 2019-07-14 RX ADMIN — Medication 250 MILLIGRAM(S): at 08:43

## 2019-07-14 RX ADMIN — AMLODIPINE BESYLATE 10 MILLIGRAM(S): 2.5 TABLET ORAL at 05:43

## 2019-07-14 RX ADMIN — CEFTRIAXONE 100 MILLIGRAM(S): 500 INJECTION, POWDER, FOR SOLUTION INTRAMUSCULAR; INTRAVENOUS at 14:33

## 2019-07-14 RX ADMIN — OXYCODONE HYDROCHLORIDE 10 MILLIGRAM(S): 5 TABLET ORAL at 12:39

## 2019-07-14 RX ADMIN — GABAPENTIN 300 MILLIGRAM(S): 400 CAPSULE ORAL at 22:12

## 2019-07-14 RX ADMIN — TAMSULOSIN HYDROCHLORIDE 0.4 MILLIGRAM(S): 0.4 CAPSULE ORAL at 22:12

## 2019-07-14 RX ADMIN — OXYCODONE HYDROCHLORIDE 10 MILLIGRAM(S): 5 TABLET ORAL at 07:12

## 2019-07-14 RX ADMIN — SENNA PLUS 1 TABLET(S): 8.6 TABLET ORAL at 16:50

## 2019-07-14 RX ADMIN — Medication 81 MILLIGRAM(S): at 11:54

## 2019-07-14 RX ADMIN — POLYETHYLENE GLYCOL 3350 17 GRAM(S): 17 POWDER, FOR SOLUTION ORAL at 16:50

## 2019-07-14 NOTE — CHART NOTE - NSCHARTNOTEFT_GEN_A_CORE
Source: Patient [x]  Family [x]   other [x] EMR    Current Diet: Diet, Consistent Carbohydrate Renal w/Evening Snack (07-14-19 @ 10:51)      Patient reports [ ] nausea  [ ] vomiting [ ] diarrhea [ ] constipation  [ ]chewing problems [ ] swallowing issues  [ ] other:     PO intake:  < 50% [ ]   50-75%  [ ]   %  [x]  other :    Source for PO intake [x] Patient [x] family [x] chart [ ] staff [ ] other    Current Weight:   (7/4) 173.2 lbs  (7/3) 175 lbs  No edema documented     % Weight Change     Pertinent Medications: MEDICATIONS  (STANDING):  amLODIPine   Tablet 10 milliGRAM(s) Oral daily  aspirin  chewable 81 milliGRAM(s) Oral daily  cefTRIAXone   IVPB 2000 milliGRAM(s) IV Intermittent every 24 hours  docusate sodium 100 milliGRAM(s) Oral two times a day  enoxaparin Injectable 80 milliGRAM(s) SubCutaneous two times a day  gabapentin 300 milliGRAM(s) Oral three times a day  insulin lispro (HumaLOG) corrective regimen sliding scale   SubCutaneous every 4 hours  lactated ringers. 1000 milliLiter(s) (100 mL/Hr) IV Continuous <Continuous>  lactulose Syrup 10 Gram(s) Oral daily  polyethylene glycol 3350 17 Gram(s) Oral two times a day  senna 1 Tablet(s) Oral two times a day  simvastatin 20 milliGRAM(s) Oral at bedtime  tamsulosin 0.4 milliGRAM(s) Oral at bedtime    MEDICATIONS  (PRN):  acetaminophen   Tablet .. 650 milliGRAM(s) Oral every 6 hours PRN Temp greater or equal to 38C (100.4F), Mild Pain (1 - 3)  HYDROmorphone  Injectable 0.5 milliGRAM(s) IV Push every 10 minutes PRN Moderate Pain (4 - 6)  ondansetron Injectable 4 milliGRAM(s) IV Push every 8 hours PRN Nausea and/or Vomiting  oxyCODONE    IR 5 milliGRAM(s) Oral every 4 hours PRN Moderate Pain (4 - 6)  oxyCODONE    IR 10 milliGRAM(s) Oral every 4 hours PRN Severe Pain (7 - 10)    Pertinent Labs: CBC Full  -  ( 13 Jul 2019 09:16 )  WBC Count : 8.89 K/uL  RBC Count : 3.02 M/uL  Hemoglobin : 8.2 g/dL  Hematocrit : 26.4 %  Platelet Count - Automated : 543 K/uL  Mean Cell Volume : 87.4 fl  Mean Cell Hemoglobin : 27.2 pg  Mean Cell Hemoglobin Concentration : 31.1 gm/dL  Auto Neutrophil # : 6.99 K/uL  Auto Lymphocyte # : 0.95 K/uL  Auto Monocyte # : 0.76 K/uL  Auto Eosinophil # : 0.09 K/uL  Auto Basophil # : 0.03 K/uL  Auto Neutrophil % : 78.7 %  Auto Lymphocyte % : 10.7 %  Auto Monocyte % : 8.5 %  Auto Eosinophil % : 1.0 %  Auto Basophil % : 0.3 %    Skin: Surgical incision to right leg, left leg, and right lateral thigh per documentation     Nutrition focused physical exam conducted - found signs of malnutrition [x]absent [ ]present    Subcutaneous fat loss: [ ] Orbital fat pads region, [ ]Buccal fat region, [ ]Triceps region,  [ ]Ribs region    Muscle wasting: [ ]Temples region, [ ]Clavicle region, [ ]Shoulder region, [ ]Scapula region, [ ]Interosseous region,  [ ]thigh region, [ ]Calf region    Estimated Needs:   [x] no change since previous assessment  [ ] recalculated:     Current Nutrition Diagnosis: Pt remains at nutrition risk secondary to increased nutrient needs related to increased estimated nutrient needs to facilitate healing as evidenced by large laceration in right leg requiring popliteal bypass and repair and left tibial plateau fracture s/p ORIF, now with right knee septic arthritis. Spoke with pt and family members at bedside. Pt reports good appetite/po intake at all meals, consuming >75%. Family confirms pt eating well. Pt denies any chewing/swallowing difficulty. Last BM 7/12 per documentation.     Recommendations:   1) Change diet to consistent carb, DASH/TLC.   2) Add Glucerna TID to maximize nutrition status and provide an additional 220 kcal, 10g protein per serving.  3) Rx: MVI and vitamin C 500mg daily.  4) Encourage continued good po intake at all meals.     Monitoring and Evaluation:  [x] PO intake [x] Tolerance to diet prescription [X] Weights  [X] Follow up per protocol [X] Labs

## 2019-07-14 NOTE — PROGRESS NOTE ADULT - SUBJECTIVE AND OBJECTIVE BOX
INTERVAL HPI/OVERNIGHT EVENTS: Patient is s/p irrigation of septic knee.  No new complaints    MEDICATIONS  (STANDING):  amLODIPine   Tablet 10 milliGRAM(s) Oral daily  aspirin  chewable 81 milliGRAM(s) Oral daily  cefTRIAXone   IVPB 2000 milliGRAM(s) IV Intermittent every 24 hours  docusate sodium 100 milliGRAM(s) Oral two times a day  enoxaparin Injectable 80 milliGRAM(s) SubCutaneous two times a day  gabapentin 300 milliGRAM(s) Oral three times a day  insulin lispro (HumaLOG) corrective regimen sliding scale   SubCutaneous every 4 hours  lactated ringers. 1000 milliLiter(s) (100 mL/Hr) IV Continuous <Continuous>  lactulose Syrup 10 Gram(s) Oral daily  polyethylene glycol 3350 17 Gram(s) Oral two times a day  senna 1 Tablet(s) Oral two times a day  simvastatin 20 milliGRAM(s) Oral at bedtime  tamsulosin 0.4 milliGRAM(s) Oral at bedtime    MEDICATIONS  (PRN):  acetaminophen   Tablet .. 650 milliGRAM(s) Oral every 6 hours PRN Temp greater or equal to 38C (100.4F), Mild Pain (1 - 3)  HYDROmorphone  Injectable 0.5 milliGRAM(s) IV Push every 10 minutes PRN Moderate Pain (4 - 6)  ondansetron Injectable 4 milliGRAM(s) IV Push every 8 hours PRN Nausea and/or Vomiting  oxyCODONE    IR 5 milliGRAM(s) Oral every 4 hours PRN Moderate Pain (4 - 6)  oxyCODONE    IR 10 milliGRAM(s) Oral every 4 hours PRN Severe Pain (7 - 10)      Allergies    No Known Allergies    Intolerances        Vital Signs Last 24 Hrs  T(C): 37.2 (14 Jul 2019 11:38), Max: 37.4 (14 Jul 2019 02:35)  T(F): 99 (14 Jul 2019 11:38), Max: 99.4 (14 Jul 2019 02:35)  HR: 78 (14 Jul 2019 11:38) (78 - 100)  BP: 130/58 (14 Jul 2019 11:38) (110/92 - 146/81)  BP(mean): --  RR: 18 (14 Jul 2019 11:38) (10 - 21)  SpO2: 99% (14 Jul 2019 11:38) (98% - 100%)    ROS:  as per HPI     ON PE:  General: Well developed; well nourished; in no acute distress  Head: Normocephalic; atraumatic  Respiratory: No tachypnea  Gastrointestinal: Soft non-tender non-distended;  Genitourinary: No costovertebral angle tenderness.  Urinary bladder is clinically not distended  Min draining clear  Neurological: Alert and oriented x4  Skin: Warm and dry. No acute rash  Psychiatric: Cooperative and appropriate      LABS:                        8.2    8.89  )-----------( 543      ( 13 Jul 2019 09:16 )             26.4     07-13    139  |  101  |  22.0<H>  ----------------------------<  137<H>  4.2   |  24.0  |  0.89    Ca    9.0      13 Jul 2019 09:16  Phos  3.6     07-13  Mg     2.4     07-13            RADIOLOGY & ADDITIONAL TESTS:

## 2019-07-14 NOTE — PROGRESS NOTE ADULT - ASSESSMENT
Pt is pOD 11 s/p excision and grafting of titbial/peroneal nerve injuries. R knee with fluctuance and tender to palpation in the setting of recurrent fevers. R knee arthrocentesis x 2 showed GNR and cloudy fluid.     - NPO per orthopedic surgery for washout this am    #Intermittent fevers - no new fevers over last 24  - discontinued tylenol to avoid masking fevers  - UA with minimal LE  - blood Cx 7/12 NGTD  - synovial fluid- cloudy w/ GNR,     #Wounds  - Staples removed from STSG  - Anterior skin flap with epidermolysis and fluctuant with eschar formation  - Integra removed - wound vac in place  -vac change q3dy, 4x2x1  - Aquacel and ABD to donor site  - Xerform and ABD to recipient site  - Kerlix and ACE to cover al wounds of RLE    #mobility  - Leg elevation  - continue LLE splint  - PMNR recs ACUTE inpatient rehabilitation **[facility]ANT[facility]**

## 2019-07-14 NOTE — PROGRESS NOTE ADULT - SUBJECTIVE AND OBJECTIVE BOX
INTERVAL HPI/OVERNIGHT EVENTS: No acute events overnight. No new fevers overnight. Orthopedic surgery performed 2nd arthrocentesis which redemonstrated 20 cc cloudy fluid. Cx from  showed GNR. Wound vac placed on integra site yesterday. Patient tolerated diet but now NPO in anticipation of OR this am, participating in PT and able to move from bed to chair. Voiding, and passing BM. Orthopedic surgery to do R knee washout this am.    SUBJECTIVE:      MEDICATIONS  (STANDING):  amLODIPine   Tablet 10 milliGRAM(s) Oral daily  cefepime   IVPB      cefepime   IVPB 2000 milliGRAM(s) IV Intermittent every 8 hours  docusate sodium 100 milliGRAM(s) Oral two times a day  enoxaparin Injectable 80 milliGRAM(s) SubCutaneous two times a day  gabapentin 300 milliGRAM(s) Oral three times a day  polyethylene glycol 3350 17 Gram(s) Oral two times a day  senna 1 Tablet(s) Oral two times a day  simvastatin 20 milliGRAM(s) Oral at bedtime  tamsulosin 0.4 milliGRAM(s) Oral at bedtime    MEDICATIONS  (PRN):  glucagon  Injectable 1 milliGRAM(s) IntraMuscular once PRN Glucose LESS THAN 70 milligrams/deciliter  ondansetron Injectable 4 milliGRAM(s) IV Push once PRN Nausea and/or Vomiting  oxyCODONE    IR 5 milliGRAM(s) Oral every 4 hours PRN Moderate Pain (4 - 6)  oxyCODONE    IR 10 milliGRAM(s) Oral every 4 hours PRN Severe Pain (7 - 10)      Vital Signs Last 24 Hrs  T(C): 37.4 (2019 02:35), Max: 37.4 (2019 02:35)  T(F): 99.4 (2019 02:35), Max: 99.4 (2019 02:35)  HR: 97 (2019 02:35) (97 - 102)  BP: 127/69 (2019 02:35) (124/65 - 127/69)  BP(mean): --  RR: 18 (2019 02:35) (18 - 19)  SpO2: 98% (2019 02:35) (98% - 98%)    PE  Gen: alert and oriented. resting comfortably.   Pulm: non labored  Abd: s/NT/ND  Ext:  Split thickness skin graft: recipient site 95% take with minimal fibrinous exudate along edges.  R knee: open wound on the lateral aspect unchanged from yesterday. Dressings changed and packed. there is de-epithelialization of the skin on the lateral aspect of the knee with new eschar formation. Knee is tender to palpation and mildly fluctuant. incision on the lateral knee with fibrinous exudate along the staple line. integra site wound vac      I&O's Detail    2019 07:01  -  2019 07:00  --------------------------------------------------------  IN:    lactated ringers.: 1200 mL  Total IN: 1200 mL    OUT:    Indwelling Catheter - Urethral: 1150 mL  Total OUT: 1150 mL    Total NET: 50 mL      2019 07:01  -  2019 03:20  --------------------------------------------------------  IN:    lactated ringers.: 300 mL  Total IN: 300 mL    OUT:    Indwelling Catheter - Urethral: 900 mL  Total OUT: 900 mL    Total NET: -600 mL          LABS:                        8.2    8.89  )-----------( 543      ( 2019 09:16 )             26.4     07-13    139  |  101  |  22.0<H>  ----------------------------<  137<H>  4.2   |  24.0  |  0.89    Ca    9.0      2019 09:16  Phos  3.6     07-13  Mg     2.4     07-13      PT/INR - ( 2019 11:55 )   PT: 15.6 sec;   INR: 1.34 ratio         PTT - ( 2019 11:55 )  PTT:41.1 sec  Urinalysis Basic - ( 2019 04:35 )    Color: Yellow / Appearance: Clear / S.005 / pH: x  Gluc: x / Ketone: Negative  / Bili: Negative / Urobili: 1 mg/dL   Blood: x / Protein: 30 mg/dL / Nitrite: Negative   Leuk Esterase: Trace / RBC: 25-50 /HPF / WBC 3-5   Sq Epi: x / Non Sq Epi: Occasional / Bacteria: Occasional        RADIOLOGY & ADDITIONAL STUDIES:

## 2019-07-14 NOTE — CHART NOTE - NSCHARTNOTEFT_GEN_A_CORE
Post Operative Check    Patient is post op from a Debridement and Arthroscopic irrigation of knee. He is sitting comfortably in bed in no acute distress. Denies any pain, nausea, vomiting, chest pain, or difficulty breathing. Did not have any questions or concerns at this encounter.       Vitals    T(C): 37.2 (07-14-19 @ 11:38), Max: 37.4 (07-14-19 @ 02:35)  HR: 78 (07-14-19 @ 11:38) (78 - 100)  BP: 130/58 (07-14-19 @ 11:38) (110/92 - 146/81)  RR: 18 (07-14-19 @ 11:38) (10 - 21)  SpO2: 99% (07-14-19 @ 11:38) (98% - 100%)  Wt(kg): --      07-13 @ 07:01  -  07-14 @ 07:00  --------------------------------------------------------  IN:    lactated ringers.: 300 mL  Total IN: 300 mL    OUT:    Indwelling Catheter - Urethral: 1900 mL    VAC (Vacuum Assisted Closure) System: 60 mL  Total OUT: 1960 mL    Total NET: -1660 mL      07-14 @ 07:01  -  07-14 @ 21:42  --------------------------------------------------------  IN:    lactated ringers.: 1700 mL  Total IN: 1700 mL    OUT:    Estimated Blood Loss: 5 mL    Indwelling Catheter - Urethral: 1575 mL  Total OUT: 1580 mL    Total NET: 120 mL      Labs                        8.0    12.18 )-----------( 589      ( 14 Jul 2019 15:44 )             25.0       CBC Full  -  ( 14 Jul 2019 15:44 )  WBC Count : 12.18 K/uL  Hemoglobin : 8.0 g/dL  Hematocrit : 25.0 %  Platelet Count - Automated : 589 K/uL  Mean Cell Volume : 86.8 fl  Mean Cell Hemoglobin : 27.8 pg  Mean Cell Hemoglobin Concentration : 32.0 gm/dL  Auto Neutrophil # : 11.01 K/uL  Auto Lymphocyte # : 0.55 K/uL  Auto Monocyte # : 0.49 K/uL  Auto Eosinophil # : 0.00 K/uL  Auto Basophil # : 0.01 K/uL  Auto Neutrophil % : 90.4 %  Auto Lymphocyte % : 4.5 %  Auto Monocyte % : 4.0 %  Auto Eosinophil % : 0.0 %  Auto Basophil % : 0.1 %      PHYSICAL EXAM:  GENERAL: NAD, well-developed, AAOx3  HEAD:  Atraumatic, Normocephalic  EYES: EOMI, PERRLA, conjunctiva and sclera clear  NECK: Supple, No JVD  CHEST/LUNG: Clear to auscultation bilaterally; No wheeze  HEART: Regular rate and rhythm; No rubs or gallops. Murmur appreciated.  ABDOMEN: Soft, Nontender, Nondistended; Bowel sounds present  EXTREMITIES: 2+ UE pulses. 2+ LLE peripheral Pulses w no edema. RLE Pulse dopplerable w 2+ edema. Warm and well perfused  NEUROLOGY: Decreased sensation and motor function of RLE. Sensation and motor function intact in UE and LLE      Patient is a 68y old Male s/p from a Debridement and Arthroscopic irrigation of knee. No acute complaints or pain. Wound vac placed post-operatively.    - ID to follow  - F/u introp cultures  - Ceftriaxone 2gm daily   - Monitor T-max and WBC  - Kerflex and ACE of RLE  - ACE and splint of LLE  - Wound Care per plastics

## 2019-07-14 NOTE — PROVIDER CONTACT NOTE (OTHER) - BACKGROUND
attempted to see pt today, however, upon review of the chart, pt noted to have returned to the OR today for left knee debridement.
patient here with crushing leg injury

## 2019-07-14 NOTE — PROVIDER CONTACT NOTE (OTHER) - RECOMMENDATIONS
please reorder PT, including weightbearing status for both lower extremities, when pt is medically stable.

## 2019-07-14 NOTE — BRIEF OPERATIVE NOTE - NSICDXBRIEFPOSTOP_GEN_ALL_CORE_FT
POST-OP DIAGNOSIS:  Injury of right popliteal artery 25-Jun-2019 18:03:28  Justin Waldron
POST-OP DIAGNOSIS:  Septic arthritis of knee, right 14-Jul-2019 09:11:08  Garrison Comer
POST-OP DIAGNOSIS:  Tibial plateau fracture, left 26-Jun-2019 21:08:13  Maxwell Dela Cruz

## 2019-07-14 NOTE — PROGRESS NOTE ADULT - ASSESSMENT
69y/o man with no significant PMH was admitted on 6/25 due to a trauma at work. He had a large laceration in R leg requiring popliteal bypass and repair and left tibial plateau fracture s/o ORIF. He was febrile on 7/5, started on antibiotics, due to possibility of septic arthritis had R knee arthrocentesis showing 44k WBC and GNR in gran stain, so he was taken to OR for wash out early 7/14. Cefazolin and vanocmycin both are good coverage for GPC, for GNR will switch to 3rd gen cephalosporin.     R knee septic arthritis     - Blood culture negative on 7/5 and 7/12  - Knee aspiration with GNR will follow ID and sensitivity.   - Will stop vancomycin and cefazolin  - Start ceftriaxone 2gm daily   - ESR and CRP for baseline  - Will need about 4 weeks of treatment if responds.   - Will follow Tm and WBC.     Will follow.

## 2019-07-14 NOTE — CONSULT NOTE ADULT - SUBJECTIVE AND OBJECTIVE BOX
Pt Name: AIMEE SOLER    MRN: [id number]      On 6/25, a 68yMale BIBEMS by ground  activated as code B trauma. Patient was at work when he was reportedly crushed/impaled by forklift. Possible associated fall, pt denies any LOC. Per EMS, pt lost approximately 1-2 Liters of blood from RLE laceration before a tourniquet was applied at approximately 10:19am. Patient brought to [facility] where on arrival, patient protecting airway, able to with ease, had nonlabored breathing, and had good central pulses. Patient's tournigquet and dressing over RLE posterior lateral laceration taken down. No active bleeding noted from approximately 10cm full thickness laceration. No pulse signals distal to right knee. Pt unable to dorsiflex or plantarflex right foot and had decreased sensation distal to knee. Patient went straight to the OR and underwent a Right popliteal bypass and RLE fasciotomies. P Pt also c/o left knee pain. CT scan shows a left tibial plateau fx. On June 26, the patient went back to the OR with Dr. Dela Cruz for a ORIF left tibial plateau fracture. Patient also had MCL deficiency and will be treated in a bldesoe brace at this time. On 7/3, the patient went back to the OR for attempted nerve repair and skin grafting with Dr. Ron. They repaired and grafted tibial and common peroneal nerves using graft. Fevers started to develop on 7/5. A code sepsis was called on 7/8 with high grade fever. That resolved two days latera with antibiotics and local wound care. He continued to have low grade fevers. There was a question that patient may have seeded his joint with the infection and an aspiration was performed on 7/12. The cell count was 44,795. The gram stain was positive for GNR. Due to the patient current status patient was not brought to the OR. A second aspiration was performed on 7/13 and again the cell count was high and the gram stain was positive to GNR. The patient was subsequently cleared and the decision was made to perform an arthroscopic irrigation adn debridement      REVIEW OF SYSTEMS    General: Alert, responsive, in NAD    Skin/Breast: No rashes, no pruritis   	  Ophthalmologic: No visual changes. No redness.   	  ENMT:	No discharge. No swelling.    Respiratory and Thorax: No difficulty breathing. No cough.  	   Cardiovascular:	No chest pain. No palpitations.    Gastrointestinal:	 No abdominal pain. No diarrhea.     Genitourinary: No dysuria. No bleeding.    Musculoskeletal: SEE HPI.    Neurological: No sensory or motor changes.     Psychiatric: No anxiety or depression.    Hematology/Lymphatics: No swelling.    Endocrine: No Hx of diabetes.      PAST MEDICAL & SURGICAL HISTORY:  PAST MEDICAL & SURGICAL HISTORY:  GERD (gastroesophageal reflux disease)  HLD (hyperlipidemia)  No significant past surgical history      Allergies: No Known Allergies      Medications: amLODIPine   Tablet 10 milliGRAM(s) Oral daily  cefepime   IVPB      cefepime   IVPB 2000 milliGRAM(s) IV Intermittent every 8 hours  docusate sodium 100 milliGRAM(s) Oral two times a day  enoxaparin Injectable 80 milliGRAM(s) SubCutaneous two times a day  gabapentin 300 milliGRAM(s) Oral three times a day  glucagon  Injectable 1 milliGRAM(s) IntraMuscular once PRN  ondansetron Injectable 4 milliGRAM(s) IV Push once PRN  oxyCODONE    IR 5 milliGRAM(s) Oral every 4 hours PRN  oxyCODONE    IR 10 milliGRAM(s) Oral every 4 hours PRN  polyethylene glycol 3350 17 Gram(s) Oral two times a day  senna 1 Tablet(s) Oral two times a day  simvastatin 20 milliGRAM(s) Oral at bedtime  tamsulosin 0.4 milliGRAM(s) Oral at bedtime  vancomycin  IVPB 1000 milliGRAM(s) IV Intermittent once      FAMILY HISTORY:  : non-contributory    Social History:     Ambulation: Walking independently [ ] With Cane [ ] With Walker [ ]  Bedbound [ ]                           8.2    8.89  )-----------( 543      ( 13 Jul 2019 09:16 )             26.4       07-13    139  |  101  |  22.0<H>  ----------------------------<  137<H>  4.2   |  24.0  |  0.89    Ca    9.0      13 Jul 2019 09:16  Phos  3.6     07-13  Mg     2.4     07-13        Vital Signs Last 24 Hrs  T(C): 37.4 (14 Jul 2019 02:35), Max: 37.4 (14 Jul 2019 02:35)  T(F): 99.4 (14 Jul 2019 02:35), Max: 99.4 (14 Jul 2019 02:35)  HR: 97 (14 Jul 2019 02:35) (97 - 102)  BP: 127/69 (14 Jul 2019 02:35) (124/65 - 127/69)  BP(mean): --  RR: 18 (14 Jul 2019 02:35) (18 - 19)  SpO2: 98% (14 Jul 2019 02:35) (98% - 98%)    Daily     Daily       PHYSICAL EXAM:      Appearance: Alert, responsive, in no acute distress.    Neurological: Sensation is grossly intact to light touch. 5/5 motor function of all extremities. No focal deficits or weaknesses found.    Skin: no rash on visible skin. Skin is clean, dry and intact. No bleeding. No abrasions. No ulcerations.    Vascular: 2+ distal pulses. Cap refill < 2 sec. No signs of venous insufficiency or stasis. No extremity ulcerations. No cyanosis.    rap without staining. Knee immobilizer in place. SILT L2-S2. Calf soft nontender.   +plantarflexion/dorsiflexion/EHL/FHl. Dorsalis pedis pulse 2+       Right Lower Extremity: Proximal femur dressing appears C/D/I, Knee: Gauze stained with serosang fluids. Gauze removed, lateral aspect of knee tissue granulation with sutures in place, 3cm opening with packing in place, no active oozing or drainage. TTDeep palpation to anterior/medial knee, +effusion noted around anterior aspect of knee, passive flexion from 0-90 with minimal pain elicited on ROM. Distal brace in place, decreased sensation to distal extremity, minimal motor DF/PF, EHL/FHL not intact. Sterile gauze, abd pad, kerlix wrap placed after exam.     Imaging Studies:  pending official read    A/P:  Pt is a  68y Male s/p RLE nerve repair (procedure date 6/3/2019), s/p L Tibial plateau open reduction internal fixation (procedure date 6/26/2019), s/p RLE fasciatomy with popliteal bypass graft (procedure date 6/252019).     PLAN:   - Pain control   * Bed rest  - Plan for arthroscopic I&D right LE  - continue abx after surgery  - will take cultures in the OR, although may be false negative due to patient already on Abx.

## 2019-07-14 NOTE — PROGRESS NOTE ADULT - SUBJECTIVE AND OBJECTIVE BOX
[facility]  INFECTIOUS DISEASES AND INTERNAL MEDICINE at [place]  =======================================================  Gabriel Levy MD  Diplomates American Board of Internal Medicine and Infectious Diseases  =======================================================    [id number]  AIMEE NGLLE     CC: Forklift crush to RLE and left tibial fracutre     HPI:   67y/o man with no significant PMH was admitted on 6/25 due to a trauma at work. He had a large laceration in R leg requiring popliteal bypass and repair and left tibial plateau fracture s/o ORIF. He was febrile on 7/5, started on antibiotics, due to possibility of septic arthritis had R knee arthrocentesis showing 44k WBC and GNR in gran stain, so he was taken to OR for wash out early 7/14 and ID was called for recommendation.   He was seen in PACU, awake and alert, only complaint was pain in both legs.     PAST MEDICAL & SURGICAL HISTORY:  GERD (gastroesophageal reflux disease)  HLD (hyperlipidemia)  No significant past surgical history    FMH:  No significant family history    SOC Hx:   Denies etoh, tobacco, or drug use     Allergies  No Known Allergies    Antibiotics:  ceFAZolin   IVPB 2000 milliGRAM(s) IV Intermittent every 8 hours  vancomycin  IVPB 1000 milliGRAM(s) IV Intermittent <User Schedule>    REVIEW OF SYSTEMS:  CONSTITUTIONAL:  No Fever or chills  HEENT:  No diplopia or blurred vision.  No sore throat or runny nose.  CARDIOVASCULAR:  No chest pain or SOB.  RESPIRATORY:  No cough, shortness of breath, PND or orthopnea.  GASTROINTESTINAL:  No nausea, vomiting or diarrhea.  GENITOURINARY:  No dysuria, frequency or urgency. No Blood in urine  MUSCULOSKELETAL: pain in legs  SKIN:  No change in skin, hair or nails.  NEUROLOGIC:  No paresthesias, fasciculations, seizures or weakness.  PSYCHIATRIC:  No disorder of thought or mood.  ENDOCRINE:  No heat or cold intolerance, polyuria or polydipsia.  HEMATOLOGICAL:  No easy bruising or bleeding.     Physical Exam:  Vital Signs Last 24 Hrs  T(C): 37.2 (14 Jul 2019 11:38), Max: 37.4 (14 Jul 2019 02:35)  T(F): 99 (14 Jul 2019 11:38), Max: 99.4 (14 Jul 2019 02:35)  HR: 78 (14 Jul 2019 11:38) (78 - 100)  BP: 130/58 (14 Jul 2019 11:38) (110/92 - 146/81)  RR: 18 (14 Jul 2019 11:38) (10 - 21)  SpO2: 99% (14 Jul 2019 11:38) (98% - 100%)  GEN: NAD  HEENT: normocephalic and atraumatic. EOMI. PERRL.    NECK: Supple.  No lymphadenopathy   LUNGS: Clear to auscultation.  HEART: Regular rate and rhythm without murmur.  ABDOMEN: Soft, nontender, and nondistended.  Positive bowel sounds.    : No CVA tenderness  EXTREMITIES: R leg dressed with a drain, left with fixator   NEUROLOGIC: grossly intact.  PSYCHIATRIC: Appropriate affect .  SKIN: No ulceration or induration present.    Labs:  07-13    139  |  101  |  22.0<H>  ----------------------------<  137<H>  4.2   |  24.0  |  0.89    Ca    9.0      13 Jul 2019 09:16  Phos  3.6     07-13  Mg     2.4     07-13                        8.2    8.89  )-----------( 543      ( 13 Jul 2019 09:16 )             26.4     RECENT CULTURES:  07-12 @ 15:37 .Other right knee aspiration (swabs)     Few Gram Negative Rods Identification and susceptibility to follow.  Culture in progress    07-12 @ 15:16 .Body Fluid right knee synovial fluid     Few Gram Negative Rods Identification and susceptibility to follow.  Culture in progress    Numerous White blood cells  No organisms seen    07-12 @ 02:49 .Blood     No growth at 48 hours    07-05 @ 08:52 .Blood     No growth at 5 days.    All imaging and other data have been reviewed.

## 2019-07-14 NOTE — PROGRESS NOTE ADULT - ASSESSMENT
AALIYAH o/n. Pt is preop for ortho washout of R knee.     -Hold am Lovenox  -ID consult for septic joint  -f/u wound vac placement post-op  -Post-op orders AALIYAH o/n. Pt is preop for ortho washout of R knee.   Medically stable for OR. Therapeutic Lovenox held for procedure.    -Hold am Lovenox  -ID consult for septic joint  -f/u wound vac placement post-op  -Post-op orders AALIYAH o/n. Pt is preop for ortho washout of R knee.   Medically stable for OR. Therapeutic Lovenox held for procedure.    -Hold am Lovenox  -ID consult for septic joint  -f/u wound vac placement intraop or post op	  - Patient is medically stable and optimized for operation by Ortho today  -Post-op orders

## 2019-07-14 NOTE — PROGRESS NOTE ADULT - SUBJECTIVE AND OBJECTIVE BOX
HPI/OVERNIGHT EVENTS:    Pt examined at bedside. O/N he was mildly febrile to Tmax 99.4. No general complaints. He reports mild discomfort with R leg dressing. No CP/ SOB/ n/v/d. He is pre-op for R knee washout this am.     MEDICATIONS  (STANDING):  amLODIPine   Tablet 10 milliGRAM(s) Oral daily  cefepime   IVPB      cefepime   IVPB 2000 milliGRAM(s) IV Intermittent every 8 hours  docusate sodium 100 milliGRAM(s) Oral two times a day  enoxaparin Injectable 80 milliGRAM(s) SubCutaneous two times a day  gabapentin 300 milliGRAM(s) Oral three times a day  polyethylene glycol 3350 17 Gram(s) Oral two times a day  senna 1 Tablet(s) Oral two times a day  simvastatin 20 milliGRAM(s) Oral at bedtime  tamsulosin 0.4 milliGRAM(s) Oral at bedtime    MEDICATIONS  (PRN):  glucagon  Injectable 1 milliGRAM(s) IntraMuscular once PRN Glucose LESS THAN 70 milligrams/deciliter  ondansetron Injectable 4 milliGRAM(s) IV Push once PRN Nausea and/or Vomiting  oxyCODONE    IR 5 milliGRAM(s) Oral every 4 hours PRN Moderate Pain (4 - 6)  oxyCODONE    IR 10 milliGRAM(s) Oral every 4 hours PRN Severe Pain (7 - 10)      Vital Signs Last 24 Hrs  T(C): 36.6 (2019 08:22), Max: 36.6 (2019 08:22)  T(F): 97.8 (2019 08:22), Max: 97.8 (2019 08:22)  HR: 102 (2019 08:22) (102 - 102)  BP: 124/65 (2019 08:22) (124/65 - 124/65)  BP(mean): --  RR: 19 (2019 08:22) (19 - 19)  SpO2: --    Constitutional: patient resting comfortably in bed, in no acute distress  HEENT: EOMI / PERRL b/l, no active drainage or redness  Neck: No JVD, full ROM without pain  Respiratory: respirations are unlabored, no accessory muscle use, no conversational dyspnea  Cardiovascular: regular rate & rhythm  Gastrointestinal: Abdomen soft, non-tender, non-distended, no rebound tenderness / guarding  Neurological:  A&O x 3; no gross sensory / motor / coordination deficits with UE  Psychiatric: Normal mood, normal affect  Musculoskeletal: R LE  joint space swelling. Dressing in place with ace bandage. Wound vac at bedside.       I&O's Detail    2019 07:01  -  2019 07:00  --------------------------------------------------------  IN:    lactated ringers.: 1200 mL  Total IN: 1200 mL    OUT:    Indwelling Catheter - Urethral: 1150 mL  Total OUT: 1150 mL    Total NET: 50 mL      2019 07:01  -  2019 02:33  --------------------------------------------------------  IN:    lactated ringers.: 300 mL  Total IN: 300 mL    OUT:    Indwelling Catheter - Urethral: 900 mL  Total OUT: 900 mL    Total NET: -600 mL          LABS:                        8.2    8.89  )-----------( 543      ( 2019 09:16 )             26.4     07-13    139  |  101  |  22.0<H>  ----------------------------<  137<H>  4.2   |  24.0  |  0.89    Ca    9.0      2019 09:16  Phos  3.6     07-13  Mg     2.4     07-13      PT/INR - ( 2019 11:55 )   PT: 15.6 sec;   INR: 1.34 ratio         PTT - ( 2019 11:55 )  PTT:41.1 sec  Urinalysis Basic - ( 2019 04:35 )    Color: Yellow / Appearance: Clear / S.005 / pH: x  Gluc: x / Ketone: Negative  / Bili: Negative / Urobili: 1 mg/dL   Blood: x / Protein: 30 mg/dL / Nitrite: Negative   Leuk Esterase: Trace / RBC: 25-50 /HPF / WBC 3-5   Sq Epi: x / Non Sq Epi: Occasional / Bacteria: Occasional

## 2019-07-15 LAB
ANION GAP SERPL CALC-SCNC: 11 MMOL/L — SIGNIFICANT CHANGE UP (ref 5–17)
BASOPHILS # BLD AUTO: 0.02 K/UL — SIGNIFICANT CHANGE UP (ref 0–0.2)
BASOPHILS NFR BLD AUTO: 0.2 % — SIGNIFICANT CHANGE UP (ref 0–2)
BUN SERPL-MCNC: 16 MG/DL — SIGNIFICANT CHANGE UP (ref 8–20)
CALCIUM SERPL-MCNC: 8.9 MG/DL — SIGNIFICANT CHANGE UP (ref 8.6–10.2)
CHLORIDE SERPL-SCNC: 101 MMOL/L — SIGNIFICANT CHANGE UP (ref 98–107)
CO2 SERPL-SCNC: 25 MMOL/L — SIGNIFICANT CHANGE UP (ref 22–29)
CREAT SERPL-MCNC: 0.76 MG/DL — SIGNIFICANT CHANGE UP (ref 0.5–1.3)
CRP SERPL-MCNC: 25.59 MG/DL — HIGH (ref 0–0.4)
EOSINOPHIL # BLD AUTO: 0.02 K/UL — SIGNIFICANT CHANGE UP (ref 0–0.5)
EOSINOPHIL NFR BLD AUTO: 0.2 % — SIGNIFICANT CHANGE UP (ref 0–6)
ERYTHROCYTE [SEDIMENTATION RATE] IN BLOOD: 62 MM/HR — HIGH (ref 0–20)
GLUCOSE BLDC GLUCOMTR-MCNC: 136 MG/DL — HIGH (ref 70–99)
GLUCOSE BLDC GLUCOMTR-MCNC: 146 MG/DL — HIGH (ref 70–99)
GLUCOSE BLDC GLUCOMTR-MCNC: 146 MG/DL — HIGH (ref 70–99)
GLUCOSE BLDC GLUCOMTR-MCNC: 157 MG/DL — HIGH (ref 70–99)
GLUCOSE BLDC GLUCOMTR-MCNC: 161 MG/DL — HIGH (ref 70–99)
GLUCOSE BLDC GLUCOMTR-MCNC: 165 MG/DL — HIGH (ref 70–99)
GLUCOSE BLDC GLUCOMTR-MCNC: 169 MG/DL — HIGH (ref 70–99)
GLUCOSE SERPL-MCNC: 194 MG/DL — HIGH (ref 70–115)
HCT VFR BLD CALC: 23.6 % — LOW (ref 39–50)
HGB BLD-MCNC: 7.4 G/DL — LOW (ref 13–17)
IMM GRANULOCYTES NFR BLD AUTO: 1 % — SIGNIFICANT CHANGE UP (ref 0–1.5)
LYMPHOCYTES # BLD AUTO: 0.97 K/UL — LOW (ref 1–3.3)
LYMPHOCYTES # BLD AUTO: 10.4 % — LOW (ref 13–44)
MAGNESIUM SERPL-MCNC: 2.1 MG/DL — SIGNIFICANT CHANGE UP (ref 1.6–2.6)
MCHC RBC-ENTMCNC: 26.7 PG — LOW (ref 27–34)
MCHC RBC-ENTMCNC: 31.4 GM/DL — LOW (ref 32–36)
MCV RBC AUTO: 85.2 FL — SIGNIFICANT CHANGE UP (ref 80–100)
MONOCYTES # BLD AUTO: 0.64 K/UL — SIGNIFICANT CHANGE UP (ref 0–0.9)
MONOCYTES NFR BLD AUTO: 6.9 % — SIGNIFICANT CHANGE UP (ref 2–14)
NEUTROPHILS # BLD AUTO: 7.6 K/UL — HIGH (ref 1.8–7.4)
NEUTROPHILS NFR BLD AUTO: 81.3 % — HIGH (ref 43–77)
PHOSPHATE SERPL-MCNC: 3 MG/DL — SIGNIFICANT CHANGE UP (ref 2.4–4.7)
PLATELET # BLD AUTO: 534 K/UL — HIGH (ref 150–400)
POTASSIUM SERPL-MCNC: 4 MMOL/L — SIGNIFICANT CHANGE UP (ref 3.5–5.3)
POTASSIUM SERPL-SCNC: 4 MMOL/L — SIGNIFICANT CHANGE UP (ref 3.5–5.3)
PREALB SERPL-MCNC: 6 MG/DL — LOW (ref 18–38)
RBC # BLD: 2.77 M/UL — LOW (ref 4.2–5.8)
RBC # FLD: 16.8 % — HIGH (ref 10.3–14.5)
SODIUM SERPL-SCNC: 137 MMOL/L — SIGNIFICANT CHANGE UP (ref 135–145)
WBC # BLD: 9.34 K/UL — SIGNIFICANT CHANGE UP (ref 3.8–10.5)
WBC # FLD AUTO: 9.34 K/UL — SIGNIFICANT CHANGE UP (ref 3.8–10.5)

## 2019-07-15 PROCEDURE — 36569 INSJ PICC 5 YR+ W/O IMAGING: CPT

## 2019-07-15 PROCEDURE — 99232 SBSQ HOSP IP/OBS MODERATE 35: CPT

## 2019-07-15 PROCEDURE — 99231 SBSQ HOSP IP/OBS SF/LOW 25: CPT

## 2019-07-15 PROCEDURE — 76942 ECHO GUIDE FOR BIOPSY: CPT | Mod: 26,59

## 2019-07-15 PROCEDURE — 71045 X-RAY EXAM CHEST 1 VIEW: CPT | Mod: 26

## 2019-07-15 RX ORDER — INSULIN LISPRO 100/ML
VIAL (ML) SUBCUTANEOUS
Refills: 0 | Status: DISCONTINUED | OUTPATIENT
Start: 2019-07-15 | End: 2019-07-23

## 2019-07-15 RX ORDER — SODIUM CHLORIDE 9 MG/ML
10 INJECTION INTRAMUSCULAR; INTRAVENOUS; SUBCUTANEOUS
Refills: 0 | Status: DISCONTINUED | OUTPATIENT
Start: 2019-07-15 | End: 2019-07-23

## 2019-07-15 RX ORDER — CEFEPIME 1 G/1
2000 INJECTION, POWDER, FOR SOLUTION INTRAMUSCULAR; INTRAVENOUS EVERY 8 HOURS
Refills: 0 | Status: DISCONTINUED | OUTPATIENT
Start: 2019-07-15 | End: 2019-07-20

## 2019-07-15 RX ORDER — CEFEPIME 1 G/1
INJECTION, POWDER, FOR SOLUTION INTRAMUSCULAR; INTRAVENOUS
Refills: 0 | Status: DISCONTINUED | OUTPATIENT
Start: 2019-07-15 | End: 2019-07-20

## 2019-07-15 RX ORDER — CEFEPIME 1 G/1
2000 INJECTION, POWDER, FOR SOLUTION INTRAMUSCULAR; INTRAVENOUS ONCE
Refills: 0 | Status: COMPLETED | OUTPATIENT
Start: 2019-07-15 | End: 2019-07-15

## 2019-07-15 RX ORDER — CHLORHEXIDINE GLUCONATE 213 G/1000ML
1 SOLUTION TOPICAL
Refills: 0 | Status: DISCONTINUED | OUTPATIENT
Start: 2019-07-15 | End: 2019-07-23

## 2019-07-15 RX ADMIN — OXYCODONE HYDROCHLORIDE 5 MILLIGRAM(S): 5 TABLET ORAL at 18:12

## 2019-07-15 RX ADMIN — Medication 100 MILLIGRAM(S): at 06:43

## 2019-07-15 RX ADMIN — CEFEPIME 100 MILLIGRAM(S): 1 INJECTION, POWDER, FOR SOLUTION INTRAMUSCULAR; INTRAVENOUS at 21:10

## 2019-07-15 RX ADMIN — Medication 81 MILLIGRAM(S): at 11:59

## 2019-07-15 RX ADMIN — POLYETHYLENE GLYCOL 3350 17 GRAM(S): 17 POWDER, FOR SOLUTION ORAL at 06:43

## 2019-07-15 RX ADMIN — OXYCODONE HYDROCHLORIDE 10 MILLIGRAM(S): 5 TABLET ORAL at 23:01

## 2019-07-15 RX ADMIN — ENOXAPARIN SODIUM 80 MILLIGRAM(S): 100 INJECTION SUBCUTANEOUS at 06:43

## 2019-07-15 RX ADMIN — Medication 1: at 04:02

## 2019-07-15 RX ADMIN — CEFEPIME 100 MILLIGRAM(S): 1 INJECTION, POWDER, FOR SOLUTION INTRAMUSCULAR; INTRAVENOUS at 14:23

## 2019-07-15 RX ADMIN — GABAPENTIN 300 MILLIGRAM(S): 400 CAPSULE ORAL at 14:22

## 2019-07-15 RX ADMIN — OXYCODONE HYDROCHLORIDE 10 MILLIGRAM(S): 5 TABLET ORAL at 11:59

## 2019-07-15 RX ADMIN — TAMSULOSIN HYDROCHLORIDE 0.4 MILLIGRAM(S): 0.4 CAPSULE ORAL at 21:10

## 2019-07-15 RX ADMIN — Medication 1: at 10:44

## 2019-07-15 RX ADMIN — OXYCODONE HYDROCHLORIDE 10 MILLIGRAM(S): 5 TABLET ORAL at 12:59

## 2019-07-15 RX ADMIN — AMLODIPINE BESYLATE 10 MILLIGRAM(S): 2.5 TABLET ORAL at 06:42

## 2019-07-15 RX ADMIN — GABAPENTIN 300 MILLIGRAM(S): 400 CAPSULE ORAL at 06:42

## 2019-07-15 RX ADMIN — Medication 1: at 21:10

## 2019-07-15 RX ADMIN — OXYCODONE HYDROCHLORIDE 5 MILLIGRAM(S): 5 TABLET ORAL at 19:12

## 2019-07-15 RX ADMIN — ENOXAPARIN SODIUM 80 MILLIGRAM(S): 100 INJECTION SUBCUTANEOUS at 18:10

## 2019-07-15 RX ADMIN — GABAPENTIN 300 MILLIGRAM(S): 400 CAPSULE ORAL at 21:10

## 2019-07-15 RX ADMIN — SENNA PLUS 1 TABLET(S): 8.6 TABLET ORAL at 06:42

## 2019-07-15 RX ADMIN — SIMVASTATIN 20 MILLIGRAM(S): 20 TABLET, FILM COATED ORAL at 21:10

## 2019-07-15 RX ADMIN — Medication 1: at 18:10

## 2019-07-15 NOTE — PROGRESS NOTE ADULT - SUBJECTIVE AND OBJECTIVE BOX
[facility]  INFECTIOUS DISEASES AND INTERNAL MEDICINE at [place]  =======================================================  Gabriel Levy MD  Diplomates American Board of Internal Medicine and Infectious Diseases  =======================================================    [id number]  AIMEE SOLER     Follow up: R knee septic arthritis    No fever  Has mild pain. No overnight event.     PAST MEDICAL & SURGICAL HISTORY:  GERD (gastroesophageal reflux disease)  HLD (hyperlipidemia)  No significant past surgical history    FMH:  No significant family history    SOC Hx:   Denies etoh, tobacco, or drug use     Allergies  No Known Allergies    Antibiotics:  Ceftriaxone     REVIEW OF SYSTEMS:  CONSTITUTIONAL:  No Fever or chills  HEENT:  No diplopia or blurred vision.  No sore throat or runny nose.  CARDIOVASCULAR:  No chest pain or SOB.  RESPIRATORY:  No cough, shortness of breath, PND or orthopnea.  GASTROINTESTINAL:  No nausea, vomiting or diarrhea.  GENITOURINARY:  No dysuria, frequency or urgency. No Blood in urine  MUSCULOSKELETAL: pain in legs  SKIN:  No change in skin, hair or nails.  NEUROLOGIC:  No paresthesias, fasciculations, seizures or weakness.  PSYCHIATRIC:  No disorder of thought or mood.  ENDOCRINE:  No heat or cold intolerance, polyuria or polydipsia.  HEMATOLOGICAL:  No easy bruising or bleeding.     Physical Exam:  Vital Signs Last 24 Hrs  T(C): 37.1 (15 Jul 2019 07:23), Max: 37.2 (14 Jul 2019 11:38)  T(F): 98.8 (15 Jul 2019 07:23), Max: 99 (14 Jul 2019 11:38)  HR: 88 (15 Jul 2019 07:23) (69 - 92)  BP: 122/63 (15 Jul 2019 07:23) (118/68 - 130/58)  RR: 18 (15 Jul 2019 07:23) (18 - 19)  SpO2: 97% (15 Jul 2019 07:23) (97% - 99%)  GEN: NAD  HEENT: normocephalic and atraumatic. EOMI. PERRL.    NECK: Supple.  No lymphadenopathy   LUNGS: Clear to auscultation.  HEART: Regular rate and rhythm without murmur.  ABDOMEN: Soft, nontender, and nondistended.  Positive bowel sounds.    : No CVA tenderness  EXTREMITIES: R leg dressed with a drain, left with fixator   NEUROLOGIC: grossly intact.  PSYCHIATRIC: Appropriate affect .  SKIN: No ulceration or induration present.    Labs:  07-15    137  |  101  |  16.0  ----------------------------<  194<H>  4.0   |  25.0  |  0.76    Ca    8.9      15 Jul 2019 09:43  Phos  3.0     07-15  Mg     2.1     07-15                        7.4    9.34  )-----------( 534      ( 15 Jul 2019 09:43 )             23.6     RECENT CULTURES:  07-14 @ 15:02 .Surgical Swab Right knee joint (swabs)       Few White blood cells  No organisms seen    07-14 @ 15:00 .Surgical Swab Right knee (swabs)       Few White blood cells  No organisms seen    07-12 @ 15:37 .Other right knee aspiration (swabs) Pseudomonas aeruginosa    Few Pseudomonas aeruginosa    07-12 @ 15:16 .Body Fluid right knee synovial fluid Pseudomonas aeruginosa    Few Pseudomonas aeruginosa  Culture in progress    Numerous White blood cells  No organisms seen    07-12 @ 14:25 .Blood     No growth at 48 hours    07-12 @ 02:49 .Blood     No growth at 48 hours    07-05 @ 08:52 .Blood     No growth at 5 days.    All imaging and other data have been reviewed.

## 2019-07-15 NOTE — PROGRESS NOTE ADULT - SUBJECTIVE AND OBJECTIVE BOX
Patient in bed, family at bedside.   Patient is now s/p replacement of wound vac. He is s/p wound washout of the right knee. Cultures are pending. IV  antibiotics modified.   Patient continues to be limited in his participation.   As per wife, may want a CAT now, explained the differences and may be better given his progress.     FUNCTIONAL PROGRESS  7/15  +left LE joanne brace,+VAC.+villegas. language line Nauruan creole , yolanda(#[phone]) in use via ipad video.wife present with pt's permission. nick completed bedside. pt declined transfer assessment today due to 6/10 right LE pain rest, 7/10 after movement.returned to bed.left in nad w/all needs.all lines intact. +bed alarm.handoff to nurseanibal(cont)       REVIEW OF SYSTEMS  Constitutional - No fever,  +  Neurological - +loss of strength, +numbness    VITALS  T(C): 37.1 (07-15-19 @ 07:23), Max: 37.1 (07-15-19 @ 07:23)  HR: 88 (07-15-19 @ 07:23) (69 - 92)  BP: 122/63 (07-15-19 @ 07:23) (118/68 - 122/63)  RR: 18 (07-15-19 @ 07:23) (18 - 19)  SpO2: 97% (07-15-19 @ 07:23) (97% - 97%)  Wt(kg): --    MEDICATIONS   acetaminophen   Tablet .. 650 milliGRAM(s) every 6 hours PRN  amLODIPine   Tablet 10 milliGRAM(s) daily  aspirin  chewable 81 milliGRAM(s) daily  cefepime   IVPB     cefepime   IVPB 2000 milliGRAM(s) once  cefepime   IVPB 2000 milliGRAM(s) every 8 hours  docusate sodium 100 milliGRAM(s) two times a day  enoxaparin Injectable 80 milliGRAM(s) two times a day  gabapentin 300 milliGRAM(s) three times a day  HYDROmorphone  Injectable 0.5 milliGRAM(s) every 10 minutes PRN  insulin lispro (HumaLOG) corrective regimen sliding scale   every 4 hours  lactulose Syrup 10 Gram(s) daily  ondansetron Injectable 4 milliGRAM(s) every 8 hours PRN  oxyCODONE    IR 5 milliGRAM(s) every 4 hours PRN  oxyCODONE    IR 10 milliGRAM(s) every 4 hours PRN  polyethylene glycol 3350 17 Gram(s) two times a day  senna 1 Tablet(s) two times a day  simvastatin 20 milliGRAM(s) at bedtime  tamsulosin 0.4 milliGRAM(s) at bedtime      RECENT LABS - Reviewed                        7.4    9.34  )-----------( 534      ( 15 Jul 2019 09:43 )             23.6     07-15    137  |  101  |  16.0  ----------------------------<  194<H>  4.0   |  25.0  |  0.76    Ca    8.9      15 Jul 2019 09:43  Phos  3.0     07-15  Mg     2.1     07-15              -----------------------------------------------------------------------------  PHYSICAL EXAM  Constitutional - NAD, Comfortable  Abdomen - Soft   Extremities - Right foot swelling in PRAFO with ACE wrap; Left LE in knee immobilizer/ACE  Neurologic Exam -                    Cognitive - Awake, Alert, AAO to self, place, date, year, situation     Motor - Limited by braces and right foot drop                    LEFT    LE - HF 1/5, KE -/5, DF 4/5, PF 4/5                    RIGHT LE - HF 2/5, KE 1/5, DF 0/5, PF 0/5        Sensory - Impaired in the right foot  Psychiatric - Mood depressed  ----------------------------------------------------------------------------------------  ASSESSMENT/PLAN  68yMale with functional deficits after a multitrauma injury to the BLE   Right compartment syndrome with right popliteal arterial injury and s/p bypass/fasciotomy - WBAT, Left tibial plateau fracture s/p ORIF - NWB, Immobilizer  Right foot drop - PRAFO  Right LE  DVT - Lovenox  HTN - Norvasc  HLD - Zocor  Pain - Tylenol, Neurontin, Dilaudid, Oxycodone  Constipation - Miralax, Colace, Senna  Urinary retention - Flomax, Villegas  DVT PPX - SCDs  Rehab - Discussed with wife that they are now looking into a different facility in [place]. Given his lack of progress and delayed functional recovery, recommend CAT, patient will not be able to tolerate 3 hours of therapy. Current DC still pending medical and surgical readiness.      Will continue to follow. Functional progress will determine ongoing rehab dispo recommendations, which may change.    Continue bedside therapy as well as OOB throughout the day with mobilization by staff to maintain cardiopulmonary function and prevention of secondary complications related to debility.

## 2019-07-15 NOTE — PHYSICAL THERAPY INITIAL EVALUATION ADULT - EDEMA 2+ (MILD)
right foot
left foot/right foot/bilateral LE exam limited by ace wraps and left LE hinged knee brace

## 2019-07-15 NOTE — PROCEDURE NOTE - NSPOSTPRCRAD_GEN_A_CORE
line in appropriate postion/line adjusted to depth of insertion/chest radiograph/postion of catheter/depth of insertion/ultrasound

## 2019-07-15 NOTE — PROGRESS NOTE ADULT - ASSESSMENT
69y/o man with no significant PMH was admitted on 6/25 due to a trauma at work. He had a large laceration in R leg requiring popliteal bypass and repair and left tibial plateau fracture s/o ORIF. He was febrile on 7/5, started on antibiotics, due to possibility of septic arthritis had R knee arthrocentesis showing 44k WBC and GNR in gran stain, so he was taken to OR for wash out early 7/14.     R knee septic arthritis     - Blood culture negative on 7/5 and 7/12  - Knee aspiration with a sensitive pseudomonas    - Stop ceftriaxone and start cefepime 2gm q8h   - CRP=25.59   - Will need about 4 weeks of treatment  - Will follow Tm and WBC.     Will follow.

## 2019-07-15 NOTE — PHYSICAL THERAPY INITIAL EVALUATION ADULT - MANUAL MUSCLE TESTING RESULTS, REHAB EVAL
*UEs-see OT eval. LE strength testing limited by pain *UEs-see OT eval. LE strength testing results may be limited by pain. left hip flex 3-/5, knee NT due to joanne brace, ankle df 4/5...right hip flex 2-/5, knee flex 2-/5, knee ext 2-/5, ankle df/pf 0/5

## 2019-07-15 NOTE — OCCUPATIONAL THERAPY INITIAL EVALUATION ADULT - LEVEL OF INDEPENDENCE: BED TO CHAIR, REHAB EVAL
as per RN, pt was transferred by staff via popover into chair from bed, pt unable to maintain RLE weightbearing
to be assessed

## 2019-07-15 NOTE — OCCUPATIONAL THERAPY INITIAL EVALUATION ADULT - ADDITIONAL COMMENTS
Pt has tub with door  Pt does not have any DME  Pt is right handed
Pt lives in house with 2 SHAVON and 5 steps inside up to bedroom and bathroom. Bathroom has bathtub with doors. Pt does not own any DME. Pt is right handed. Pt drives. Pt's wife is a CNA at [employer]; wife drives.

## 2019-07-15 NOTE — OCCUPATIONAL THERAPY INITIAL EVALUATION ADULT - PLANNED THERAPY INTERVENTIONS, OT EVAL
ROM/ADL retraining/neuromuscular re-education/bed mobility training/transfer training/strengthening/balance training
strengthening/balance training/transfer training/toilet/ADL retraining/bed mobility training/ROM

## 2019-07-15 NOTE — OCCUPATIONAL THERAPY INITIAL EVALUATION ADULT - LEVEL OF INDEPENDENCE:TOILET, OT EVAL
dependent (less than 25% patients effort)/secondary to catheter
+villegas/dependent (less than 25% patients effort)

## 2019-07-15 NOTE — PROGRESS NOTE ADULT - SUBJECTIVE AND OBJECTIVE BOX
Patient seen and examined. Had irrigation of septic knee. Still not ambulating    Vital Signs Last 24 Hrs  T(C): 37.1 (15 Jul 2019 07:23), Max: 37.2 (14 Jul 2019 11:38)  T(F): 98.8 (15 Jul 2019 07:23), Max: 99 (14 Jul 2019 11:38)  HR: 88 (15 Jul 2019 07:23) (69 - 92)  BP: 122/63 (15 Jul 2019 07:23) (118/68 - 130/58)  BP(mean): --  RR: 18 (15 Jul 2019 07:23) (18 - 19)  SpO2: 97% (15 Jul 2019 07:23) (97% - 99%)    Gen: NAD  HEENT: NCAT  Abd: Soft  : Min draining clear urine                          7.4    9.34  )-----------( 534      ( 15 Jul 2019 09:43 )             23.6     107-15    137  |  101  |  16.0  ----------------------------<  194<H>  4.0   |  25.0  |  0.76    Ca    8.9      15 Jul 2019 09:43  Phos  3.0     07-15  Mg     2.1     07-15

## 2019-07-15 NOTE — PHYSICAL THERAPY INITIAL EVALUATION ADULT - BED MOBILITY LIMITATIONS, REHAB EVAL
decreased ability to use legs for bridging/pushing
PT OOB in chair
impaired ability to control trunk for mobility/decreased ability to use legs for bridging/pushing

## 2019-07-15 NOTE — PROGRESS NOTE ADULT - SUBJECTIVE AND OBJECTIVE BOX
ORTHO PROGRESS NOTE:      [id number]  AIMEE SOLER    PROCEDURE:   1. orif of L tibial plateau (6/26) (Dr. Dela Cruz)  2. Arthroscopic left knee incision and drainage (7/14) (Dr. Comer)      SUBJECTIVE: 68y Patient POD #1 of right knee washout. Patient seen and examined at bedside. Patient reports of moderate discomfort that is controlled by pain medications. Patient denies of acute sensory or motor changes.   Cutltures in progress. Growing gram negative rods. ID d/keri saud and benji, started patient on ceftriaxone.                           8.0    12.18 )-----------( 589      ( 14 Jul 2019 15:44 )             25.0       I&O's Detail    14 Jul 2019 07:01  -  15 Jul 2019 07:00  --------------------------------------------------------  IN:    lactated ringers.: 3000 mL  Total IN: 3000 mL    OUT:    Estimated Blood Loss: 5 mL    Indwelling Catheter - Urethral: 3575 mL  Total OUT: 3580 mL    Total NET: -580 mL      acetaminophen   Tablet .. 650 milliGRAM(s) Oral every 6 hours PRN  amLODIPine   Tablet 10 milliGRAM(s) Oral daily  aspirin  chewable 81 milliGRAM(s) Oral daily  cefTRIAXone   IVPB 2000 milliGRAM(s) IV Intermittent every 24 hours  docusate sodium 100 milliGRAM(s) Oral two times a day  enoxaparin Injectable 80 milliGRAM(s) SubCutaneous two times a day  gabapentin 300 milliGRAM(s) Oral three times a day  HYDROmorphone  Injectable 0.5 milliGRAM(s) IV Push every 10 minutes PRN  insulin lispro (HumaLOG) corrective regimen sliding scale   SubCutaneous every 4 hours  lactated ringers. 1000 milliLiter(s) IV Continuous <Continuous>  lactulose Syrup 10 Gram(s) Oral daily  ondansetron Injectable 4 milliGRAM(s) IV Push every 8 hours PRN  oxyCODONE    IR 5 milliGRAM(s) Oral every 4 hours PRN  oxyCODONE    IR 10 milliGRAM(s) Oral every 4 hours PRN  polyethylene glycol 3350 17 Gram(s) Oral two times a day  senna 1 Tablet(s) Oral two times a day  simvastatin 20 milliGRAM(s) Oral at bedtime  tamsulosin 0.4 milliGRAM(s) Oral at bedtime    T(C): 37.1 (07-15-19 @ 07:23), Max: 37.2 (07-14-19 @ 11:38)  HR: 88 (07-15-19 @ 07:23) (69 - 100)  BP: 122/63 (07-15-19 @ 07:23) (110/92 - 146/81)  RR: 18 (07-15-19 @ 07:23) (10 - 21)  SpO2: 97% (07-15-19 @ 07:23) (97% - 100%)  Wt(kg): --      PHYSICAL EXAM:     Constitutional: Alert, responsive, in no acute distress.     Right Lower extremity:          Dressing: Clean/dry/intact. Leg wrapped with ace. Wound vac appears to be functioning properly. Dressing remained in place. Distal brace positioned.          Skin: Limited due to wrap          Sensation: Denies sensation (unchanged per patient)           Motor exam: minimal motor to distal extremity. ehl/fhl not intact. (unchanged per patient)                                                Vascular:  + DP pulses                                                      Left Lower extremity:          Dressing: Clean/dry/intact. No active bleeding or discharge noted. Brace in place and appropriately positioned         Sensation: normal to light touch. No focal deficits noted of the lower extremities.              Motor exam: EHL/FHL/DF/PF intact                                                   Vascular:  +2 DP pulses intact    A/P :  68y Male S/P Right knee washout  POD# 1    -  ABX as per ID recommendations  -  Follow up cultures  -  Pain control as clinically indicated  -  DVT ppx as per primary team  -  NWB LLE  -  Continue care as per primary team

## 2019-07-15 NOTE — PROGRESS NOTE ADULT - SUBJECTIVE AND OBJECTIVE BOX
INTERVAL HPI/OVERNIGHT EVENTS:    SUBJECTIVE: No acute events overnight  Pt went to OR yesterday for R knee washout and debridement of septic joint; Cx pending  VAC placed on leg wound  ID started ceftriaxone and dced vanc and cefazolin. Likely will need 4 weeks tx if he responds well      MEDICATIONS  (STANDING):  amLODIPine   Tablet 10 milliGRAM(s) Oral daily  aspirin  chewable 81 milliGRAM(s) Oral daily  cefTRIAXone   IVPB 2000 milliGRAM(s) IV Intermittent every 24 hours  docusate sodium 100 milliGRAM(s) Oral two times a day  enoxaparin Injectable 80 milliGRAM(s) SubCutaneous two times a day  gabapentin 300 milliGRAM(s) Oral three times a day  insulin lispro (HumaLOG) corrective regimen sliding scale   SubCutaneous every 4 hours  lactated ringers. 1000 milliLiter(s) (100 mL/Hr) IV Continuous <Continuous>  lactulose Syrup 10 Gram(s) Oral daily  polyethylene glycol 3350 17 Gram(s) Oral two times a day  senna 1 Tablet(s) Oral two times a day  simvastatin 20 milliGRAM(s) Oral at bedtime  tamsulosin 0.4 milliGRAM(s) Oral at bedtime    MEDICATIONS  (PRN):  acetaminophen   Tablet .. 650 milliGRAM(s) Oral every 6 hours PRN Temp greater or equal to 38C (100.4F), Mild Pain (1 - 3)  HYDROmorphone  Injectable 0.5 milliGRAM(s) IV Push every 10 minutes PRN Moderate Pain (4 - 6)  ondansetron Injectable 4 milliGRAM(s) IV Push every 8 hours PRN Nausea and/or Vomiting  oxyCODONE    IR 5 milliGRAM(s) Oral every 4 hours PRN Moderate Pain (4 - 6)  oxyCODONE    IR 10 milliGRAM(s) Oral every 4 hours PRN Severe Pain (7 - 10)      Vital Signs Last 24 Hrs  T(C): 36.9 (14 Jul 2019 23:48), Max: 37.4 (14 Jul 2019 02:35)  T(F): 98.4 (14 Jul 2019 23:48), Max: 99.4 (14 Jul 2019 02:35)  HR: 92 (14 Jul 2019 23:48) (78 - 100)  BP: 119/64 (14 Jul 2019 23:48) (110/92 - 146/81)  BP(mean): --  RR: 19 (14 Jul 2019 23:48) (10 - 21)  SpO2: 99% (14 Jul 2019 11:38) (98% - 100%)    PE  Gen:  Pulm:  CV:  Abd:  :  Ext:  Vasc:  Neuro:      I&O's Detail    13 Jul 2019 07:01  -  14 Jul 2019 07:00  --------------------------------------------------------  IN:    lactated ringers.: 300 mL  Total IN: 300 mL    OUT:    Indwelling Catheter - Urethral: 1900 mL    VAC (Vacuum Assisted Closure) System: 60 mL  Total OUT: 1960 mL    Total NET: -1660 mL      14 Jul 2019 07:01  -  15 Jul 2019 02:23  --------------------------------------------------------  IN:    lactated ringers.: 1700 mL  Total IN: 1700 mL    OUT:    Estimated Blood Loss: 5 mL    Indwelling Catheter - Urethral: 1575 mL  Total OUT: 1580 mL    Total NET: 120 mL          LABS:                        8.0    12.18 )-----------( 589      ( 14 Jul 2019 15:44 )             25.0     07-14    132<L>  |  96<L>  |  14.0  ----------------------------<  202<H>  5.2   |  24.0  |  0.77    Ca    9.1      14 Jul 2019 15:44  Phos  3.6     07-14  Mg     2.2     07-14            RADIOLOGY & ADDITIONAL STUDIES:

## 2019-07-15 NOTE — PROCEDURE NOTE - NSICDXPROCEDURE_GEN_ALL_CORE_FT
PROCEDURES:  US guided needle placement 15-Jul-2019 14:23:22 Alex Shearer  US guidance, for vascular access 15-Jul-2019 14:22:58 Patent right basilic vein Alex Kwok  Placement, PICC, with imaging guidance 15-Jul-2019 14:22:18 4FR  41CM length  21[facility] [employer] POWER PICC LINE ns flush good heme back right basilic vein Alex Kwok

## 2019-07-15 NOTE — OCCUPATIONAL THERAPY INITIAL EVALUATION ADULT - DIAGNOSIS, OT EVAL
s/p trauma due to crush injury with forklift with injury to bilateral LEs
Left tibial plateau fx with split depression treatment; right septic knee arthritis; Right popliteal artery injury; Right LE laceration compartment syndrome; s/p right LE skin graft; s/p fasciotomy

## 2019-07-15 NOTE — PROGRESS NOTE ADULT - ASSESSMENT
Patient is a 68y old Male s/p from a Debridement and Arthroscopic irrigation of knee. No acute complaints or pain. Wound vac placed post-operatively.    - ID to follow  - F/u introp cultures  - Ceftriaxone 2gm daily  likely 4 wks  - Monitor T-max and WBC  - Kerflex and ACE of RLE  - ACE and splint of LLE  - Wound Care per plastics. current VAC was placed in OR on 7/14

## 2019-07-15 NOTE — OCCUPATIONAL THERAPY INITIAL EVALUATION ADULT - LEVEL OF INDEPENDENCE: SIT/SUPINE, REHAB EVAL
moderate assist (50% patients effort)/maximum assist (25% patients effort)
sit to recline position in recliner chair/maximum assist (25% patients effort)

## 2019-07-15 NOTE — PHYSICAL THERAPY INITIAL EVALUATION ADULT - LEVEL OF INDEPENDENCE: CHAIR TO BED, REHAB EVAL
not assessed due to safety concerns at this time/unable to perform
recommended Shruti lift for return to bed
pt declined transfer assessment at this time due to pain

## 2019-07-15 NOTE — OCCUPATIONAL THERAPY INITIAL EVALUATION ADULT - LEVEL OF INDEPENDENCE: DRESS LOWER BODY, OT EVAL
total assist to don socks and PRAFO
left sock, LE positioning with brace/PRAFO adjustments/management/dependent (less than 25% patients effort)

## 2019-07-15 NOTE — OCCUPATIONAL THERAPY INITIAL EVALUATION ADULT - LEVEL OF INDEPENDENCE: SUPINE/SIT, REHAB EVAL
maximum assist (25% patients effort)/reclined to sit position in recliner chair
moderate assist (50% patients effort)

## 2019-07-15 NOTE — OCCUPATIONAL THERAPY INITIAL EVALUATION ADULT - GENERAL OBSERVATIONS, REHAB EVAL
Received pt seated in bedside chair, +IV locks, +villegas, +LLE Monie brace, +RLE knee immobilizer, +RLE PRAFO, +bulb brain, wife and son (sleeping) present; pt agrees to OT,
+IV, +wound vac, +catheter villegas

## 2019-07-15 NOTE — PROGRESS NOTE ADULT - SUBJECTIVE AND OBJECTIVE BOX
ORTHO PROGRESS NOTE:      [id number]  AIMEE SOLER    PROCEDURE:   Arthroscopic left knee incision and drainage      SUBJECTIVE: 68y Patient POD #1 of right knee washout. Patient seen and examined at bedside. Patient reports of moderate discomfort that is controlled by pain medications. Patient denies of acute sensory or motor changes.   Cutltures in progress. Growing gram negative rods. ID d/keri saud and ericef, started patient on ceftriaxone.                           8.0    12.18 )-----------( 589      ( 14 Jul 2019 15:44 )             25.0       I&O's Detail    14 Jul 2019 07:01  -  15 Jul 2019 07:00  --------------------------------------------------------  IN:    lactated ringers.: 3000 mL  Total IN: 3000 mL    OUT:    Estimated Blood Loss: 5 mL    Indwelling Catheter - Urethral: 3575 mL  Total OUT: 3580 mL    Total NET: -580 mL      acetaminophen   Tablet .. 650 milliGRAM(s) Oral every 6 hours PRN  amLODIPine   Tablet 10 milliGRAM(s) Oral daily  aspirin  chewable 81 milliGRAM(s) Oral daily  cefTRIAXone   IVPB 2000 milliGRAM(s) IV Intermittent every 24 hours  docusate sodium 100 milliGRAM(s) Oral two times a day  enoxaparin Injectable 80 milliGRAM(s) SubCutaneous two times a day  gabapentin 300 milliGRAM(s) Oral three times a day  HYDROmorphone  Injectable 0.5 milliGRAM(s) IV Push every 10 minutes PRN  insulin lispro (HumaLOG) corrective regimen sliding scale   SubCutaneous every 4 hours  lactated ringers. 1000 milliLiter(s) IV Continuous <Continuous>  lactulose Syrup 10 Gram(s) Oral daily  ondansetron Injectable 4 milliGRAM(s) IV Push every 8 hours PRN  oxyCODONE    IR 5 milliGRAM(s) Oral every 4 hours PRN  oxyCODONE    IR 10 milliGRAM(s) Oral every 4 hours PRN  polyethylene glycol 3350 17 Gram(s) Oral two times a day  senna 1 Tablet(s) Oral two times a day  simvastatin 20 milliGRAM(s) Oral at bedtime  tamsulosin 0.4 milliGRAM(s) Oral at bedtime    T(C): 37.1 (07-15-19 @ 07:23), Max: 37.2 (07-14-19 @ 11:38)  HR: 88 (07-15-19 @ 07:23) (69 - 100)  BP: 122/63 (07-15-19 @ 07:23) (110/92 - 146/81)  RR: 18 (07-15-19 @ 07:23) (10 - 21)  SpO2: 97% (07-15-19 @ 07:23) (97% - 100%)  Wt(kg): --      PHYSICAL EXAM:     Constitutional: Alert, responsive, in no acute distress.     Right Lower extremity:          Dressing: Clean/dry/intact. Leg wrapped with ace. Wound vac appears to be functioning properly. Dressing remained in place. Distal brace positioned.          Skin: Limited due to wrap          Sensation: Denies sensation (unchanged per patient)           Motor exam: minimal motor to distal extremity. ehl/fhl not intact. (unchanged per patient)                                                Vascular:  + DP pulses                                                      Left Lower extremity:          Dressing: Clean/dry/intact. No active bleeding or discharge noted. Brace in place and appropriately positioned         Sensation: normal to light touch. No focal deficits noted of the lower extremities.              Motor exam: EHL/FHL/DF/PF intact                                                   Vascular:  +2 DP pulses intact    A/P :  68y Male S/P Right knee washout  POD# 1    -  ABX as per ID recommendations  -  Follow up cultures  -  Pain control as clinically indicated  -  DVT ppx as per primary team  -  NWB LLE  -  Continue care as per primary team

## 2019-07-15 NOTE — PHYSICAL THERAPY INITIAL EVALUATION ADULT - DID THE PATIENT HAVE SURGERY?
yes/s/p right knee I & D. prior ORIF left tibial plateau. prior right popliteal artery graft, prior right nerve graft and skin graft right LE.

## 2019-07-15 NOTE — PHYSICAL THERAPY INITIAL EVALUATION ADULT - SITTING BALANCE: STATIC
pt only able to achieve ~half way supine to sit. unable to fully assess unsupported sitting balance at this time.
good balance
good minus

## 2019-07-15 NOTE — OCCUPATIONAL THERAPY INITIAL EVALUATION ADULT - LEVEL OF INDEPENDENCE: SIT/STAND, REHAB EVAL
unable to perform
unable to perform at this time due to LLE NWB and RLE deficits; pt initiated bilateral UE "pushups" from chair to assist with repositioning self side to side and forward/backward; pt unable to position RLE foot due to knee immobilizer

## 2019-07-15 NOTE — PHYSICAL THERAPY INITIAL EVALUATION ADULT - IMPAIRMENTS CONTRIBUTING IMPAIRED BED MOBILITY, REHAB EVAL
decreased strength/decreased ROM/impaired motor control
decreased sensation/pain/impaired motor control/impaired balance/decreased strength/decreased ROM

## 2019-07-15 NOTE — OCCUPATIONAL THERAPY INITIAL EVALUATION ADULT - MANUAL MUSCLE TESTING RESULTS, REHAB EVAL
no strength deficits were identified
bilateral shoulders grossly assessed with AROM against gravity 3/5, bilateral elbows grossly assessed with AROM against gravity 3/5, bilateral gross grasp 5/5

## 2019-07-15 NOTE — OCCUPATIONAL THERAPY INITIAL EVALUATION ADULT - LIVES WITH, PROFILE
spouse/2 children, in a private house with 2 steps to enter no railing and 5 steps inside with railing; wife works but is available to assist/children
spouse/children

## 2019-07-15 NOTE — PROGRESS NOTE ADULT - SUBJECTIVE AND OBJECTIVE BOX
Ortho Post Op Check - late chart entry    Name: AIMEE SOLER    MR #: [id number]    Procedure: R knee arthroscopic washout  Surgeon: Nahomi    Pt comfortable without complaints, pain controlled  Denies CP, SOB, N/V, numbness/tingling     General Exam:  Vital Signs Last 24 Hrs  T(C): --  T(F): --  HR: --  BP: --  BP(mean): --  RR: --  SpO2: --    General: Pt Alert and oriented, NAD, controlled pain.  Dressings C/D/I. No bleeding.  Pulses: 2+ dorsalis pedis pulse. Cap refill < 2 sec.  Sensation: Grossly intact to light touch without deficit.  Motor: + EHL/FHL/TA/GS                          8.0    12.18 )-----------( 589      ( 14 Jul 2019 15:44 )             25.0   14 Jul 2019 15:44    132    |  96     |  14.0   ----------------------------<  202    5.2     |  24.0   |  0.77     Phos  3.6       14 Jul 2019 15:44  Mg     2.2       14 Jul 2019 15:44        Post-op X-Ray:    A/P: 68yMale POD#0 s/p right knee arthroscopic washout  - Stable  - Pain Control  - DVT ppx: lovenox  - Post op abx: as per ID  - Weight bearing status: NWB LLE, WBAT RLE

## 2019-07-16 LAB
-  AMIKACIN: SIGNIFICANT CHANGE UP
-  AMPICILLIN/SULBACTAM: SIGNIFICANT CHANGE UP
-  AMPICILLIN: SIGNIFICANT CHANGE UP
-  AZTREONAM: SIGNIFICANT CHANGE UP
-  CEFEPIME: SIGNIFICANT CHANGE UP
-  CEFOTAXIME: SIGNIFICANT CHANGE UP
-  CEFTAZIDIME: SIGNIFICANT CHANGE UP
-  CEFTRIAXONE: SIGNIFICANT CHANGE UP
-  CIPROFLOXACIN: SIGNIFICANT CHANGE UP
-  ERTAPENEM: SIGNIFICANT CHANGE UP
-  GENTAMICIN: SIGNIFICANT CHANGE UP
-  IMIPENEM: SIGNIFICANT CHANGE UP
-  LEVOFLOXACIN: SIGNIFICANT CHANGE UP
-  MEROPENEM: SIGNIFICANT CHANGE UP
-  PIPERACILLIN/TAZOBACTAM: SIGNIFICANT CHANGE UP
-  TOBRAMYCIN: SIGNIFICANT CHANGE UP
ALBUMIN SERPL ELPH-MCNC: 2.6 G/DL — LOW (ref 3.3–5.2)
ALP SERPL-CCNC: 156 U/L — HIGH (ref 40–120)
ALT FLD-CCNC: 167 U/L — HIGH
ANION GAP SERPL CALC-SCNC: 12 MMOL/L — SIGNIFICANT CHANGE UP (ref 5–17)
ANION GAP SERPL CALC-SCNC: 14 MMOL/L — SIGNIFICANT CHANGE UP (ref 5–17)
APPEARANCE UR: CLEAR — SIGNIFICANT CHANGE UP
APTT BLD: 42 SEC — HIGH (ref 27.5–36.3)
AST SERPL-CCNC: 161 U/L — HIGH
BASOPHILS # BLD AUTO: 0.01 K/UL — SIGNIFICANT CHANGE UP (ref 0–0.2)
BASOPHILS # BLD AUTO: 0.04 K/UL — SIGNIFICANT CHANGE UP (ref 0–0.2)
BASOPHILS NFR BLD AUTO: 0.1 % — SIGNIFICANT CHANGE UP (ref 0–2)
BASOPHILS NFR BLD AUTO: 0.6 % — SIGNIFICANT CHANGE UP (ref 0–2)
BILIRUB SERPL-MCNC: 0.5 MG/DL — SIGNIFICANT CHANGE UP (ref 0.4–2)
BILIRUB UR-MCNC: NEGATIVE — SIGNIFICANT CHANGE UP
BUN SERPL-MCNC: 17 MG/DL — SIGNIFICANT CHANGE UP (ref 8–20)
BUN SERPL-MCNC: 22 MG/DL — HIGH (ref 8–20)
CALCIUM SERPL-MCNC: 8.8 MG/DL — SIGNIFICANT CHANGE UP (ref 8.6–10.2)
CALCIUM SERPL-MCNC: 9.3 MG/DL — SIGNIFICANT CHANGE UP (ref 8.6–10.2)
CHLORIDE SERPL-SCNC: 94 MMOL/L — LOW (ref 98–107)
CHLORIDE SERPL-SCNC: 96 MMOL/L — LOW (ref 98–107)
CO2 SERPL-SCNC: 23 MMOL/L — SIGNIFICANT CHANGE UP (ref 22–29)
CO2 SERPL-SCNC: 25 MMOL/L — SIGNIFICANT CHANGE UP (ref 22–29)
COLOR SPEC: YELLOW — SIGNIFICANT CHANGE UP
CREAT SERPL-MCNC: 0.78 MG/DL — SIGNIFICANT CHANGE UP (ref 0.5–1.3)
CREAT SERPL-MCNC: 0.95 MG/DL — SIGNIFICANT CHANGE UP (ref 0.5–1.3)
DIFF PNL FLD: ABNORMAL
EOSINOPHIL # BLD AUTO: 0.07 K/UL — SIGNIFICANT CHANGE UP (ref 0–0.5)
EOSINOPHIL # BLD AUTO: 0.09 K/UL — SIGNIFICANT CHANGE UP (ref 0–0.5)
EOSINOPHIL NFR BLD AUTO: 1.1 % — SIGNIFICANT CHANGE UP (ref 0–6)
EOSINOPHIL NFR BLD AUTO: 1.1 % — SIGNIFICANT CHANGE UP (ref 0–6)
EPI CELLS # UR: SIGNIFICANT CHANGE UP
GLUCOSE BLDC GLUCOMTR-MCNC: 112 MG/DL — HIGH (ref 70–99)
GLUCOSE BLDC GLUCOMTR-MCNC: 117 MG/DL — HIGH (ref 70–99)
GLUCOSE BLDC GLUCOMTR-MCNC: 150 MG/DL — HIGH (ref 70–99)
GLUCOSE BLDC GLUCOMTR-MCNC: 192 MG/DL — HIGH (ref 70–99)
GLUCOSE SERPL-MCNC: 138 MG/DL — HIGH (ref 70–115)
GLUCOSE SERPL-MCNC: 144 MG/DL — HIGH (ref 70–115)
GLUCOSE UR QL: NEGATIVE MG/DL — SIGNIFICANT CHANGE UP
HCT VFR BLD CALC: 25.7 % — LOW (ref 39–50)
HCT VFR BLD CALC: 28.1 % — LOW (ref 39–50)
HGB BLD-MCNC: 8 G/DL — LOW (ref 13–17)
HGB BLD-MCNC: 8.7 G/DL — LOW (ref 13–17)
IMM GRANULOCYTES NFR BLD AUTO: 1.7 % — HIGH (ref 0–1.5)
IMM GRANULOCYTES NFR BLD AUTO: 1.9 % — HIGH (ref 0–1.5)
INR BLD: 1.34 RATIO — HIGH (ref 0.88–1.16)
KETONES UR-MCNC: NEGATIVE — SIGNIFICANT CHANGE UP
LACTATE BLDV-MCNC: 1.1 MMOL/L — SIGNIFICANT CHANGE UP (ref 0.5–2)
LEUKOCYTE ESTERASE UR-ACNC: NEGATIVE — SIGNIFICANT CHANGE UP
LYMPHOCYTES # BLD AUTO: 0.79 K/UL — LOW (ref 1–3.3)
LYMPHOCYTES # BLD AUTO: 1 K/UL — SIGNIFICANT CHANGE UP (ref 1–3.3)
LYMPHOCYTES # BLD AUTO: 12.2 % — LOW (ref 13–44)
LYMPHOCYTES # BLD AUTO: 12.5 % — LOW (ref 13–44)
MAGNESIUM SERPL-MCNC: 2.1 MG/DL — SIGNIFICANT CHANGE UP (ref 1.6–2.6)
MCHC RBC-ENTMCNC: 26.8 PG — LOW (ref 27–34)
MCHC RBC-ENTMCNC: 27 PG — SIGNIFICANT CHANGE UP (ref 27–34)
MCHC RBC-ENTMCNC: 31 GM/DL — LOW (ref 32–36)
MCHC RBC-ENTMCNC: 31.1 GM/DL — LOW (ref 32–36)
MCV RBC AUTO: 86.5 FL — SIGNIFICANT CHANGE UP (ref 80–100)
MCV RBC AUTO: 86.8 FL — SIGNIFICANT CHANGE UP (ref 80–100)
METHOD TYPE: SIGNIFICANT CHANGE UP
MONOCYTES # BLD AUTO: 0.28 K/UL — SIGNIFICANT CHANGE UP (ref 0–0.9)
MONOCYTES # BLD AUTO: 0.49 K/UL — SIGNIFICANT CHANGE UP (ref 0–0.9)
MONOCYTES NFR BLD AUTO: 4.4 % — SIGNIFICANT CHANGE UP (ref 2–14)
MONOCYTES NFR BLD AUTO: 6 % — SIGNIFICANT CHANGE UP (ref 2–14)
NEUTROPHILS # BLD AUTO: 5.05 K/UL — SIGNIFICANT CHANGE UP (ref 1.8–7.4)
NEUTROPHILS # BLD AUTO: 6.46 K/UL — SIGNIFICANT CHANGE UP (ref 1.8–7.4)
NEUTROPHILS NFR BLD AUTO: 78.7 % — HIGH (ref 43–77)
NEUTROPHILS NFR BLD AUTO: 79.7 % — HIGH (ref 43–77)
NITRITE UR-MCNC: NEGATIVE — SIGNIFICANT CHANGE UP
PH UR: 6.5 — SIGNIFICANT CHANGE UP (ref 5–8)
PHOSPHATE SERPL-MCNC: 4.3 MG/DL — SIGNIFICANT CHANGE UP (ref 2.4–4.7)
PLATELET # BLD AUTO: 513 K/UL — HIGH (ref 150–400)
PLATELET # BLD AUTO: 617 K/UL — HIGH (ref 150–400)
POTASSIUM SERPL-MCNC: 4.2 MMOL/L — SIGNIFICANT CHANGE UP (ref 3.5–5.3)
POTASSIUM SERPL-MCNC: 5 MMOL/L — SIGNIFICANT CHANGE UP (ref 3.5–5.3)
POTASSIUM SERPL-SCNC: 4.2 MMOL/L — SIGNIFICANT CHANGE UP (ref 3.5–5.3)
POTASSIUM SERPL-SCNC: 5 MMOL/L — SIGNIFICANT CHANGE UP (ref 3.5–5.3)
PROT SERPL-MCNC: 7.9 G/DL — SIGNIFICANT CHANGE UP (ref 6.6–8.7)
PROT UR-MCNC: 15 MG/DL
PROTHROM AB SERPL-ACNC: 15.5 SEC — HIGH (ref 10–12.9)
RBC # BLD: 2.96 M/UL — LOW (ref 4.2–5.8)
RBC # BLD: 3.25 M/UL — LOW (ref 4.2–5.8)
RBC # FLD: 17.3 % — HIGH (ref 10.3–14.5)
RBC # FLD: 17.4 % — HIGH (ref 10.3–14.5)
RBC CASTS # UR COMP ASSIST: SIGNIFICANT CHANGE UP /HPF (ref 0–4)
SODIUM SERPL-SCNC: 131 MMOL/L — LOW (ref 135–145)
SODIUM SERPL-SCNC: 133 MMOL/L — LOW (ref 135–145)
SP GR SPEC: 1.01 — SIGNIFICANT CHANGE UP (ref 1.01–1.02)
UROBILINOGEN FLD QL: 1 MG/DL
WBC # BLD: 6.34 K/UL — SIGNIFICANT CHANGE UP (ref 3.8–10.5)
WBC # BLD: 8.21 K/UL — SIGNIFICANT CHANGE UP (ref 3.8–10.5)
WBC # FLD AUTO: 6.34 K/UL — SIGNIFICANT CHANGE UP (ref 3.8–10.5)
WBC # FLD AUTO: 8.21 K/UL — SIGNIFICANT CHANGE UP (ref 3.8–10.5)
WBC UR QL: SIGNIFICANT CHANGE UP

## 2019-07-16 PROCEDURE — 99232 SBSQ HOSP IP/OBS MODERATE 35: CPT

## 2019-07-16 RX ORDER — SODIUM CHLORIDE 9 MG/ML
500 INJECTION, SOLUTION INTRAVENOUS ONCE
Refills: 0 | Status: COMPLETED | OUTPATIENT
Start: 2019-07-16 | End: 2019-07-16

## 2019-07-16 RX ADMIN — POLYETHYLENE GLYCOL 3350 17 GRAM(S): 17 POWDER, FOR SOLUTION ORAL at 17:51

## 2019-07-16 RX ADMIN — ENOXAPARIN SODIUM 80 MILLIGRAM(S): 100 INJECTION SUBCUTANEOUS at 06:16

## 2019-07-16 RX ADMIN — AMLODIPINE BESYLATE 10 MILLIGRAM(S): 2.5 TABLET ORAL at 06:16

## 2019-07-16 RX ADMIN — Medication 650 MILLIGRAM(S): at 16:54

## 2019-07-16 RX ADMIN — CHLORHEXIDINE GLUCONATE 1 APPLICATION(S): 213 SOLUTION TOPICAL at 08:07

## 2019-07-16 RX ADMIN — OXYCODONE HYDROCHLORIDE 10 MILLIGRAM(S): 5 TABLET ORAL at 00:00

## 2019-07-16 RX ADMIN — GABAPENTIN 300 MILLIGRAM(S): 400 CAPSULE ORAL at 13:27

## 2019-07-16 RX ADMIN — Medication 100 MILLIGRAM(S): at 17:51

## 2019-07-16 RX ADMIN — Medication 1: at 12:26

## 2019-07-16 RX ADMIN — OXYCODONE HYDROCHLORIDE 10 MILLIGRAM(S): 5 TABLET ORAL at 06:14

## 2019-07-16 RX ADMIN — OXYCODONE HYDROCHLORIDE 10 MILLIGRAM(S): 5 TABLET ORAL at 21:50

## 2019-07-16 RX ADMIN — SODIUM CHLORIDE 500 MILLILITER(S): 9 INJECTION, SOLUTION INTRAVENOUS at 00:20

## 2019-07-16 RX ADMIN — SENNA PLUS 1 TABLET(S): 8.6 TABLET ORAL at 17:51

## 2019-07-16 RX ADMIN — ENOXAPARIN SODIUM 80 MILLIGRAM(S): 100 INJECTION SUBCUTANEOUS at 17:52

## 2019-07-16 RX ADMIN — OXYCODONE HYDROCHLORIDE 10 MILLIGRAM(S): 5 TABLET ORAL at 16:00

## 2019-07-16 RX ADMIN — CEFEPIME 100 MILLIGRAM(S): 1 INJECTION, POWDER, FOR SOLUTION INTRAMUSCULAR; INTRAVENOUS at 13:27

## 2019-07-16 RX ADMIN — GABAPENTIN 300 MILLIGRAM(S): 400 CAPSULE ORAL at 06:16

## 2019-07-16 RX ADMIN — Medication 81 MILLIGRAM(S): at 12:12

## 2019-07-16 RX ADMIN — Medication 650 MILLIGRAM(S): at 00:15

## 2019-07-16 RX ADMIN — TAMSULOSIN HYDROCHLORIDE 0.4 MILLIGRAM(S): 0.4 CAPSULE ORAL at 21:34

## 2019-07-16 RX ADMIN — OXYCODONE HYDROCHLORIDE 10 MILLIGRAM(S): 5 TABLET ORAL at 21:35

## 2019-07-16 RX ADMIN — Medication 650 MILLIGRAM(S): at 01:10

## 2019-07-16 RX ADMIN — GABAPENTIN 300 MILLIGRAM(S): 400 CAPSULE ORAL at 21:34

## 2019-07-16 RX ADMIN — CEFEPIME 100 MILLIGRAM(S): 1 INJECTION, POWDER, FOR SOLUTION INTRAMUSCULAR; INTRAVENOUS at 21:34

## 2019-07-16 RX ADMIN — OXYCODONE HYDROCHLORIDE 10 MILLIGRAM(S): 5 TABLET ORAL at 15:53

## 2019-07-16 RX ADMIN — SIMVASTATIN 20 MILLIGRAM(S): 20 TABLET, FILM COATED ORAL at 21:35

## 2019-07-16 RX ADMIN — CEFEPIME 100 MILLIGRAM(S): 1 INJECTION, POWDER, FOR SOLUTION INTRAMUSCULAR; INTRAVENOUS at 06:17

## 2019-07-16 NOTE — CHART NOTE - NSCHARTNOTEFT_GEN_A_CORE
Lateral R Knee wound vac changed today 7/16. Wound appeared well with good granulation tissue and bleeding. No fluid accumulation, purulence, or signs of infection. Black foam 4x2x1 was used and vac had good seal with no leak. Next change Friday 7/19

## 2019-07-16 NOTE — PROGRESS NOTE ADULT - SUBJECTIVE AND OBJECTIVE BOX
INTERVAL HPI/OVERNIGHT EVENTS: Code sepsis: at 23:45 a code sepsis was called for Temp 101.7 rectal, , PO2 97%, /78. Villegas out this morning. PT has been able to sit him in chair with lifting device.    MEDICATIONS  (STANDING):  amLODIPine   Tablet 10 milliGRAM(s) Oral daily  aspirin  chewable 81 milliGRAM(s) Oral daily  cefepime   IVPB      cefepime   IVPB 2000 milliGRAM(s) IV Intermittent every 8 hours  chlorhexidine 2% Cloths 1 Application(s) Topical <User Schedule>  docusate sodium 100 milliGRAM(s) Oral two times a day  enoxaparin Injectable 80 milliGRAM(s) SubCutaneous two times a day  gabapentin 300 milliGRAM(s) Oral three times a day  insulin lispro (HumaLOG) corrective regimen sliding scale   SubCutaneous Before meals and at bedtime  lactulose Syrup 10 Gram(s) Oral daily  polyethylene glycol 3350 17 Gram(s) Oral two times a day  senna 1 Tablet(s) Oral two times a day  simvastatin 20 milliGRAM(s) Oral at bedtime  tamsulosin 0.4 milliGRAM(s) Oral at bedtime    MEDICATIONS  (PRN):  acetaminophen   Tablet .. 650 milliGRAM(s) Oral every 6 hours PRN Temp greater or equal to 38C (100.4F), Mild Pain (1 - 3)  HYDROmorphone  Injectable 0.5 milliGRAM(s) IV Push every 10 minutes PRN Moderate Pain (4 - 6)  ondansetron Injectable 4 milliGRAM(s) IV Push every 8 hours PRN Nausea and/or Vomiting  oxyCODONE    IR 5 milliGRAM(s) Oral every 4 hours PRN Moderate Pain (4 - 6)  oxyCODONE    IR 10 milliGRAM(s) Oral every 4 hours PRN Severe Pain (7 - 10)  sodium chloride 0.9% lock flush 10 milliLiter(s) IV Push every 1 hour PRN Pre/post blood products, medications, blood draw, and to maintain line patency      Vital Signs Last 24 Hrs  T(C): 36.9 (2019 07:30), Max: 38.7 (15 Jul 2019 23:58)  T(F): 98.5 (2019 07:30), Max: 101.6 (15 Jul 2019 23:58)  HR: 95 (2019 07:30) (95 - 103)  BP: 134/74 (2019 07:30) (130/73 - 135/74)  BP(mean): --  RR: 20 (2019 07:30) (18 - 20)  SpO2: 98% (2019 07:30) (97% - 100%)    PE  Gen: a/o x 3. non distressed  Pulm: non labored, CTA  CV: warm well perfused, rrr  Abd: soft/ non tender non distended  : villegas out  Ext: RLE: dressings changed. Superficial skin necrosis on the lateral aspect of the R knee. no signs of fluid collection in knee or lower leg. Incisions are C/D/I. wound vac on integra site on Lateral knee place with good suction and no leak. no signs of infection. Split thickness donor graft well healing.  LLE: dressings changed. incisions c/d/i and well healed. no signs of infection  Vasc: 2+ b/l DP pulses      I&O's Detail    15 Jul 2019 07:01  -  2019 07:00  --------------------------------------------------------  IN:  Total IN: 0 mL    OUT:    Indwelling Catheter - Urethral: 1650 mL  Total OUT: 1650 mL    Total NET: -1650 mL          LABS:                        8.7    6.34  )-----------( 513      ( 2019 08:55 )             28.1     07-16    131<L>  |  94<L>  |  22.0<H>  ----------------------------<  144<H>  4.2   |  25.0  |  0.95    Ca    8.8      2019 00:19  Phos  3.0     07-15  Mg     2.1     07-15    TPro  7.9  /  Alb  2.6<L>  /  TBili  0.5  /  DBili  x   /  AST  161<H>  /  ALT  167<H>  /  AlkPhos  156<H>  07-16    PT/INR - ( 2019 00:19 )   PT: 15.5 sec;   INR: 1.34 ratio         PTT - ( 2019 00:19 )  PTT:42.0 sec  Urinalysis Basic - ( 2019 00:32 )    Color: Yellow / Appearance: Clear / S.010 / pH: x  Gluc: x / Ketone: Negative  / Bili: Negative / Urobili: 1 mg/dL   Blood: x / Protein: 15 mg/dL / Nitrite: Negative   Leuk Esterase: Negative / RBC: 0-2 /HPF / WBC 0-2   Sq Epi: x / Non Sq Epi: Few / Bacteria: x        RADIOLOGY & ADDITIONAL STUDIES:

## 2019-07-16 NOTE — PROGRESS NOTE ADULT - SUBJECTIVE AND OBJECTIVE BOX
Patient seen and examined at bedside. comfortable in bed, no orthopedic complaints.    Vital Signs Last 24 Hrs  T(C): 36.6 (16 Jul 2019 06:10), Max: 38.7 (15 Jul 2019 23:58)  T(F): 97.9 (16 Jul 2019 06:10), Max: 101.6 (15 Jul 2019 23:58)  HR: 95 (16 Jul 2019 06:10) (95 - 103)  BP: 132/79 (16 Jul 2019 06:10) (130/73 - 135/74)  BP(mean): --  RR: 20 (16 Jul 2019 00:25) (18 - 20)  SpO2: 100% (16 Jul 2019 00:25) (97% - 100%)    RLE: Ace bandage dressing C/D/I. Cut ace bandage around knee to reveal arthroscopic incisions. Sutures C/D/I. No erythema, no active drainage. covered with dry dressing. Sensation at baseline. no dorsi/plantarflexion (at baseline as per patient). Ext warm cap refill brisk. Compartment soft, compressible throughout.                          8.0    8.21  )-----------( 617      ( 16 Jul 2019 00:19 )             25.7     A/P: 68 y.o M s/p right knee arthroscopic washout POD #2, with ORIF left tib plateau on 6/26  - NWB LLE  - DVTP  - knee dressing changed  - rest of RLE dressing management as per plastics  - abx as per ID  - cont care primary team

## 2019-07-16 NOTE — PROGRESS NOTE ADULT - ASSESSMENT
Pt is a 68 years old male called for a Code sepsis during the night for a 101.7F rectal, , /78. Pt is no distress or discomfort, Vitals stable.     Plan:   f/u blood cultures.   f/u Lactate  Control fever  f/u with ID  Cefepime until 7/15.   Therapeutic Levenox.

## 2019-07-16 NOTE — PROGRESS NOTE ADULT - SUBJECTIVE AND OBJECTIVE BOX
Patient seen and examined at bedside. comfortable in bed, no orthopedic complaints. Doing well. No other complaints. Denies fevers, chills and sweats.     Vital Signs Last 24 Hrs  T(C): 36.6 (16 Jul 2019 06:10), Max: 38.7 (15 Jul 2019 23:58)  T(F): 97.9 (16 Jul 2019 06:10), Max: 101.6 (15 Jul 2019 23:58)  HR: 95 (16 Jul 2019 06:10) (95 - 103)  BP: 132/79 (16 Jul 2019 06:10) (130/73 - 135/74)  BP(mean): --  RR: 20 (16 Jul 2019 00:25) (18 - 20)  SpO2: 100% (16 Jul 2019 00:25) (97% - 100%)    RLE: Ace bandage dressing C/D/I. Cut ace bandage around knee to reveal arthroscopic incisions. Sutures C/D/I. No erythema, no active drainage. covered with dry dressing. Sensation at baseline. no dorsi/plantarflexion (at baseline as per patient). Ext warm cap refill brisk. Compartment soft, compressible throughout.                          8.0    8.21  )-----------( 617      ( 16 Jul 2019 00:19 )             25.7     A/P: 68 y.o M s/p right knee arthroscopic washout POD #2  - NWB LLE  - DVTP  - knee dressing changed by PA  - rest of RLE dressing management as per plastics  - abx as per ID- will need long term antibiotics  - [facility] care primary team

## 2019-07-16 NOTE — PROGRESS NOTE ADULT - SUBJECTIVE AND OBJECTIVE BOX
[facility]  INFECTIOUS DISEASES AND INTERNAL MEDICINE at [place]  =======================================================  Gabriel Levy MD  Diplomates American Board of Internal Medicine and Infectious Diseases  =======================================================    [id number]  AIMEE SOLER     Follow up: R knee septic arthritis    Had fever  Has mild pain.    PAST MEDICAL & SURGICAL HISTORY:  GERD (gastroesophageal reflux disease)  HLD (hyperlipidemia)  No significant past surgical history    FMH:  No significant family history    SOC Hx:   Denies etoh, tobacco, or drug use     Allergies  No Known Allergies    Antibiotics:  Cefepime     REVIEW OF SYSTEMS:  CONSTITUTIONAL:  No Fever or chills  HEENT:  No diplopia or blurred vision.  No sore throat or runny nose.  CARDIOVASCULAR:  No chest pain or SOB.  RESPIRATORY:  No cough, shortness of breath, PND or orthopnea.  GASTROINTESTINAL:  No nausea, vomiting or diarrhea.  GENITOURINARY:  No dysuria, frequency or urgency. No Blood in urine  MUSCULOSKELETAL: pain in legs  SKIN:  No change in skin, hair or nails.  NEUROLOGIC:  No paresthesias, fasciculations, seizures or weakness.  PSYCHIATRIC:  No disorder of thought or mood.  ENDOCRINE:  No heat or cold intolerance, polyuria or polydipsia.  HEMATOLOGICAL:  No easy bruising or bleeding.     Physical Exam:  Vital Signs Last 24 Hrs  T(C): 36.9 (2019 07:30), Max: 38.7 (15 Jul 2019 23:58)  T(F): 98.5 (2019 07:30), Max: 101.6 (15 Jul 2019 23:58)  HR: 95 (2019 07:30) (95 - 103)  BP: 134/74 (2019 07:30) (130/73 - 135/74)  RR: 20 (2019 07:30) (18 - 20)  SpO2: 98% (2019 07:30) (97% - 100%)  GEN: NAD  HEENT: normocephalic and atraumatic. EOMI. PERRL.    NECK: Supple.  No lymphadenopathy   LUNGS: Clear to auscultation.  HEART: Regular rate and rhythm without murmur.  ABDOMEN: Soft, nontender, and nondistended.  Positive bowel sounds.    : No CVA tenderness  EXTREMITIES: R leg dressed with a drain, left with fixator   NEUROLOGIC: grossly intact.  PSYCHIATRIC: Appropriate affect .  SKIN: No ulceration or induration present.    Labs:      133<L>  |  96<L>  |  17.0  ----------------------------<  138<H>  5.0   |  23.0  |  0.78    Ca    9.3      2019 08:55  Phos  4.3       Mg     2.1         TPro  7.9  /  Alb  2.6<L>  /  TBili  0.5  /  DBili  x   /  AST  161<H>  /  ALT  167<H>  /  AlkPhos  156<H>                          8.7    6.34  )-----------( 513      ( 2019 08:55 )             28.1     PT/INR - ( 2019 00:19 )   PT: 15.5 sec;   INR: 1.34 ratio    PTT - ( 2019 00:19 )  PTT:42.0 sec  Urinalysis Basic - ( 2019 00:32 )    Color: Yellow / Appearance: Clear / S.010 / pH: x  Gluc: x / Ketone: Negative  / Bili: Negative / Urobili: 1 mg/dL   Blood: x / Protein: 15 mg/dL / Nitrite: Negative   Leuk Esterase: Negative / RBC: 0-2 /HPF / WBC 0-2   Sq Epi: x / Non Sq Epi: Few / Bacteria: x  LIVER FUNCTIONS - ( 2019 00:19 )  Alb: 2.6 g/dL / Pro: 7.9 g/dL / ALK PHOS: 156 U/L / ALT: 167 U/L / AST: 161 U/L / GGT: x           RECENT CULTURES:   @ 15:02 .Surgical Swab Right knee joint (swabs)     Gram Negative Rods Growing in broth media only  Culture in progress    Few White blood cells  No organisms seen    0714 @ 15:00 .Surgical Swab Right knee (swabs) Pseudomonas aeruginosa    Few Pseudomonas aeruginosa  Culture in progress    Few White blood cells  No organisms seen    07 @ 15:37 .Other right knee aspiration (swabs) Pseudomonas aeruginosa    Few Pseudomonas aeruginosa    07- @ 15:16 .Body Fluid right knee synovial fluid Pseudomonas aeruginosa    Few Pseudomonas aeruginosa  Culture in progress    Numerous White blood cells  No organisms seen    07 @ 14:25 .Blood     No growth at 48 hours    07-12 @ 02:49 .Blood     No growth at 48 hours    07-05 @ 08:52 .Blood     No growth at 5 days.    All imaging and other data have been reviewed.

## 2019-07-16 NOTE — PROGRESS NOTE ADULT - ASSESSMENT
Pt is pOD 13 s/p excision and grafting of titbial/peroneal nerve injuries. POD2 s/p R knee washout after septic joint with GNR (pseudamonas). Patient has had recurrent fevers overnight and a code sepsis this am. upon looking at all surgical incisions and dressings, index of suspicion for Lower extremity infection is low and differential for infection source remains broad. WBCs remain normal.    - will replace wound vac today and look for fluid drainage or signs of infection of R lat knee integra site infection.  - consider repeat CT scan of LEs to look for possible infection source  - continue Abx per primary team and ID recs  - continue daily dressing changes and q3 wound vac changes  - Min out- due to void today. monitor for signs of UTI

## 2019-07-16 NOTE — PROGRESS NOTE ADULT - ASSESSMENT
67y/o man with no significant PMH was admitted on 6/25 due to a trauma at work. He had a large laceration in R leg requiring popliteal bypass and repair and left tibial plateau fracture s/o ORIF. He was febrile on 7/5, started on antibiotics, due to possibility of septic arthritis had R knee arthrocentesis showing 44k WBC and GNR in gram stain, so he was taken to OR for wash out early 7/14.     R knee septic arthritis     - Blood culture negative on 7/5 and 7/12  - Knee aspiration with a sensitive pseudomonas    - Continue cefepime 2gm q8h   - CRP=25.59   - Will need about 4 weeks of treatment  - Will follow Tm, had fever 101. As per ortho wound looked clean with no collection, good granulation tissue.   - No leukocytosis.     Will follow. 67y/o man with no significant PMH was admitted on 6/25 due to a trauma at work. He had a large laceration in R leg requiring popliteal bypass and repair and left tibial plateau fracture s/o ORIF. He was febrile on 7/5, started on antibiotics, due to possibility of septic arthritis had R knee arthrocentesis showing 44k WBC and GNR in gram stain, so he was taken to OR for wash out early 7/14.     R knee septic arthritis     - Blood culture negative on 7/5 and 7/12  - Knee aspiration with a sensitive pseudomonas    - Continue cefepime 2gm q8h   - CRP=25.59   - Will need about 4 weeks of treatment  - Will follow Tm, had fever 101. As per ortho wound looked clean with no collection, good granulation tissue.   - Repeat blood culture today  - No leukocytosis.     Will follow.

## 2019-07-16 NOTE — PROGRESS NOTE ADULT - ASSESSMENT
Pt s/p forklift crush to RLE, displaced L tib plateau fc, posterior Knee lac, popliteal artery transection, crush injury, SERENITY, RLE DVT and resolver hyponatremia. Called for a Code sepsis at 23PM for a fever of 101.7 rectal, HR: 103, O2 97%, /78. Villegas output 1650. Lactate 1.1. Sepsis protocol followed, 500cc Plasmalyte bolus given until Lactate report. Blood cultures and UA.     Plan:     f/u blood cultures,   f/u Cefepime started 7/15 until 7/30  Therapeutic Levenox  ID: ceftriaxone 2 mg daily for 4 weeks.   Maintain villegas until pt can stand.   Seat in the chair with PT help when possible  f/u PT, OT, PMNR

## 2019-07-16 NOTE — PROGRESS NOTE ADULT - SUBJECTIVE AND OBJECTIVE BOX
HPI/OVERNIGHT EVENTS:    Code sepsis called during the night for a fever of 101.7F rectal, HR: 103, /78, O2 97%.  Lactate 1.1. Bolus of plasmalyte given, blood culture drawn.     MEDICATIONS  (STANDING):  amLODIPine   Tablet 10 milliGRAM(s) Oral daily  aspirin  chewable 81 milliGRAM(s) Oral daily  cefepime   IVPB      cefepime   IVPB 2000 milliGRAM(s) IV Intermittent every 8 hours  chlorhexidine 2% Cloths 1 Application(s) Topical <User Schedule>  docusate sodium 100 milliGRAM(s) Oral two times a day  enoxaparin Injectable 80 milliGRAM(s) SubCutaneous two times a day  gabapentin 300 milliGRAM(s) Oral three times a day  insulin lispro (HumaLOG) corrective regimen sliding scale   SubCutaneous Before meals and at bedtime  lactulose Syrup 10 Gram(s) Oral daily  polyethylene glycol 3350 17 Gram(s) Oral two times a day  senna 1 Tablet(s) Oral two times a day  simvastatin 20 milliGRAM(s) Oral at bedtime  tamsulosin 0.4 milliGRAM(s) Oral at bedtime    MEDICATIONS  (PRN):  acetaminophen   Tablet .. 650 milliGRAM(s) Oral every 6 hours PRN Temp greater or equal to 38C (100.4F), Mild Pain (1 - 3)  HYDROmorphone  Injectable 0.5 milliGRAM(s) IV Push every 10 minutes PRN Moderate Pain (4 - 6)  ondansetron Injectable 4 milliGRAM(s) IV Push every 8 hours PRN Nausea and/or Vomiting  oxyCODONE    IR 5 milliGRAM(s) Oral every 4 hours PRN Moderate Pain (4 - 6)  oxyCODONE    IR 10 milliGRAM(s) Oral every 4 hours PRN Severe Pain (7 - 10)  sodium chloride 0.9% lock flush 10 milliLiter(s) IV Push every 1 hour PRN Pre/post blood products, medications, blood draw, and to maintain line patency      Vital Signs Last 24 Hrs  T(C): 36.9 (2019 07:30), Max: 38.7 (15 Jul 2019 23:58)  T(F): 98.5 (2019 07:30), Max: 101.6 (15 Jul 2019 23:58)  HR: 95 (2019 07:30) (95 - 103)  BP: 134/74 (2019 07:30) (130/73 - 135/74)  BP(mean): --  RR: 20 (2019 07:30) (18 - 20)  SpO2: 98% (2019 07:30) (97% - 100%)    Constitutional: patient resting comfortably in bed, in no acute distress  HEENT: EOMI / PERRL b/l, no active drainage or redness  Neck: No JVD, full ROM without pain  Respiratory: respirations are unlabored, no accessory muscle use, no conversational dyspnea  Cardiovascular: regular rate & rhythm  Gastrointestinal: Abdomen soft, non-tender, non-distended, no rebound tenderness / guarding  Neurological: GCS: 15 (4/5/6). A&O x 3; no gross sensory / motor / coordination deficits  Psychiatric: Normal mood, normal affect  Musculoskeletal: usual pain in the right leg.       I&O's Detail    15 Jul 2019 07:01  -  2019 07:00  --------------------------------------------------------  IN:  Total IN: 0 mL    OUT:    Indwelling Catheter - Urethral: 1650 mL  Total OUT: 1650 mL    Total NET: -1650 mL          LABS:                        8.7    6.34  )-----------( 513      ( 2019 08:55 )             28.1     07-16    131<L>  |  94<L>  |  22.0<H>  ----------------------------<  144<H>  4.2   |  25.0  |  0.95    Ca    8.8      2019 00:19  Phos  3.0     07-15  Mg     2.1     07-15    TPro  7.9  /  Alb  2.6<L>  /  TBili  0.5  /  DBili  x   /  AST  161<H>  /  ALT  167<H>  /  AlkPhos  156<H>  07-16    PT/INR - ( 2019 00:19 )   PT: 15.5 sec;   INR: 1.34 ratio         PTT - ( 2019 00:19 )  PTT:42.0 sec  Urinalysis Basic - ( 2019 00:32 )    Color: Yellow / Appearance: Clear / S.010 / pH: x  Gluc: x / Ketone: Negative  / Bili: Negative / Urobili: 1 mg/dL   Blood: x / Protein: 15 mg/dL / Nitrite: Negative   Leuk Esterase: Negative / RBC: 0-2 /HPF / WBC 0-2   Sq Epi: x / Non Sq Epi: Few / Bacteria: x      Code sepsis called on the patient during the night for a rever of 101.7. , O2 97%, BP: 135/78. Lactate 1.1. Fluids were placed, bood culture and PHY done.

## 2019-07-16 NOTE — PROGRESS NOTE ADULT - SUBJECTIVE AND OBJECTIVE BOX
HPI/OVERNIGHT EVENTS:    Pt called for a Code sepsis at 23PM for a fever of 101.7 rectal, HR: 103, O2 97%, /78. Min output 1650. Lactate 1.1. Sepsis protocol followed, 500cc Plasmalyte bolus given until Lactate report. Blood cultures and UA.     MEDICATIONS  (STANDING):  amLODIPine   Tablet 10 milliGRAM(s) Oral daily  aspirin  chewable 81 milliGRAM(s) Oral daily  cefepime   IVPB      cefepime   IVPB 2000 milliGRAM(s) IV Intermittent every 8 hours  chlorhexidine 2% Cloths 1 Application(s) Topical <User Schedule>  docusate sodium 100 milliGRAM(s) Oral two times a day  enoxaparin Injectable 80 milliGRAM(s) SubCutaneous two times a day  gabapentin 300 milliGRAM(s) Oral three times a day  insulin lispro (HumaLOG) corrective regimen sliding scale   SubCutaneous Before meals and at bedtime  lactulose Syrup 10 Gram(s) Oral daily  multiple electrolytes Injection Type 1 Bolus 500 milliLiter(s) IV Bolus once  polyethylene glycol 3350 17 Gram(s) Oral two times a day  senna 1 Tablet(s) Oral two times a day  simvastatin 20 milliGRAM(s) Oral at bedtime  tamsulosin 0.4 milliGRAM(s) Oral at bedtime    MEDICATIONS  (PRN):  acetaminophen   Tablet .. 650 milliGRAM(s) Oral every 6 hours PRN Temp greater or equal to 38C (100.4F), Mild Pain (1 - 3)  HYDROmorphone  Injectable 0.5 milliGRAM(s) IV Push every 10 minutes PRN Moderate Pain (4 - 6)  ondansetron Injectable 4 milliGRAM(s) IV Push every 8 hours PRN Nausea and/or Vomiting  oxyCODONE    IR 5 milliGRAM(s) Oral every 4 hours PRN Moderate Pain (4 - 6)  oxyCODONE    IR 10 milliGRAM(s) Oral every 4 hours PRN Severe Pain (7 - 10)  sodium chloride 0.9% lock flush 10 milliLiter(s) IV Push every 1 hour PRN Pre/post blood products, medications, blood draw, and to maintain line patency      Vital Signs Last 24 Hrs  T(C): 38.1 (2019 00:25), Max: 38.7 (15 Jul 2019 23:58)  T(F): 100.6 (2019 00:25), Max: 101.6 (15 Jul 2019 23:58)  HR: 98 (2019 00:25) (69 - 103)  BP: 132/75 (2019 00:25) (118/68 - 135/74)  BP(mean): --  RR: 20 (2019 00:25) (18 - 20)  SpO2: 100% (2019 00:25) (97% - 100%)    Constitutional: patient resting comfortably in bed, in no acute distress  HEENT: EOMI / PERRL b/l, no active drainage or redness  Neck: No JVD, full ROM without pain  Respiratory: respirations are unlabored, no accessory muscle use, no conversational dyspnea  Cardiovascular: regular rate & rhythm  Gastrointestinal: Abdomen soft, non-tender, non-distended, no rebound tenderness / guarding  Neurological: GCS: 15 (4/5/6). A&O x 3; no gross sensory / motor / coordination deficits  Psychiatric: Normal mood, normal affect  Musculoskeletal: No joint pain, swelling or deformity; no limitation of movement      I&O's Detail    2019 07:01  -  15 Jul 2019 07:00  --------------------------------------------------------  IN:    lactated ringers.: 3000 mL  Total IN: 3000 mL    OUT:    Estimated Blood Loss: 5 mL    Indwelling Catheter - Urethral: 3575 mL  Total OUT: 3580 mL    Total NET: -580 mL      15 Jul 2019 07:01  -  2019 01:40  --------------------------------------------------------  IN:  Total IN: 0 mL    OUT:    Indwelling Catheter - Urethral: 1650 mL  Total OUT: 1650 mL    Total NET: -1650 mL          LABS:                        8.0    8.21  )-----------( 617      ( 2019 00:19 )             25.7     07-16    131<L>  |  94<L>  |  22.0<H>  ----------------------------<  144<H>  4.2   |  25.0  |  0.95    Ca    8.8      2019 00:19  Phos  3.0     07-15  Mg     2.1     07-15    TPro  7.9  /  Alb  2.6<L>  /  TBili  0.5  /  DBili  x   /  AST  161<H>  /  ALT  167<H>  /  AlkPhos  156<H>  07-16    PT/INR - ( 2019 00:19 )   PT: 15.5 sec;   INR: 1.34 ratio         PTT - ( 2019 00:19 )  PTT:42.0 sec  Urinalysis Basic - ( 2019 00:32 )    Color: Yellow / Appearance: Clear / S.010 / pH: x  Gluc: x / Ketone: Negative  / Bili: Negative / Urobili: 1 mg/dL   Blood: x / Protein: 15 mg/dL / Nitrite: Negative   Leuk Esterase: Negative / RBC: 0-2 /HPF / WBC 0-2   Sq Epi: x / Non Sq Epi: Few / Bacteria: x HPI/OVERNIGHT EVENTS:    Pt called for a Code sepsis at 23PM for a fever of 101.7 rectal, HR: 103, O2 97%, /78. Min output 1650. Lactate 1.1. Sepsis protocol followed, 500cc Plasmalyte bolus given until Lactate report. Blood cultures and UA.     MEDICATIONS  (STANDING):  amLODIPine   Tablet 10 milliGRAM(s) Oral daily  aspirin  chewable 81 milliGRAM(s) Oral daily  cefepime   IVPB      cefepime   IVPB 2000 milliGRAM(s) IV Intermittent every 8 hours  chlorhexidine 2% Cloths 1 Application(s) Topical <User Schedule>  docusate sodium 100 milliGRAM(s) Oral two times a day  enoxaparin Injectable 80 milliGRAM(s) SubCutaneous two times a day  gabapentin 300 milliGRAM(s) Oral three times a day  insulin lispro (HumaLOG) corrective regimen sliding scale   SubCutaneous Before meals and at bedtime  lactulose Syrup 10 Gram(s) Oral daily  multiple electrolytes Injection Type 1 Bolus 500 milliLiter(s) IV Bolus once  polyethylene glycol 3350 17 Gram(s) Oral two times a day  senna 1 Tablet(s) Oral two times a day  simvastatin 20 milliGRAM(s) Oral at bedtime  tamsulosin 0.4 milliGRAM(s) Oral at bedtime    MEDICATIONS  (PRN):  acetaminophen   Tablet .. 650 milliGRAM(s) Oral every 6 hours PRN Temp greater or equal to 38C (100.4F), Mild Pain (1 - 3)  HYDROmorphone  Injectable 0.5 milliGRAM(s) IV Push every 10 minutes PRN Moderate Pain (4 - 6)  ondansetron Injectable 4 milliGRAM(s) IV Push every 8 hours PRN Nausea and/or Vomiting  oxyCODONE    IR 5 milliGRAM(s) Oral every 4 hours PRN Moderate Pain (4 - 6)  oxyCODONE    IR 10 milliGRAM(s) Oral every 4 hours PRN Severe Pain (7 - 10)  sodium chloride 0.9% lock flush 10 milliLiter(s) IV Push every 1 hour PRN Pre/post blood products, medications, blood draw, and to maintain line patency      Vital Signs Last 24 Hrs  T(C): 38.1 (2019 00:25), Max: 38.7 (15 Jul 2019 23:58)  T(F): 100.6 (2019 00:25), Max: 101.6 (15 Jul 2019 23:58)  HR: 98 (2019 00:25) (69 - 103)  BP: 132/75 (2019 00:25) (118/68 - 135/74)  BP(mean): --  RR: 20 (2019 00:25) (18 - 20)  SpO2: 100% (2019 00:25) (97% - 100%)    Constitutional: patient resting comfortably in bed, in no acute distress  HEENT: EOMI / PERRL b/l, no active drainage or redness  Neck: No JVD, full ROM without pain  Respiratory: respirations are unlabored, no accessory muscle use, no conversational dyspnea  Cardiovascular: regular rate & rhythm  Gastrointestinal: Abdomen soft, non-tender, non-distended, no rebound tenderness / guarding  Neurological: GCS: 15 (4/5/6). A&O x 3; no gross sensory / motor / coordination deficits except for right leg and toe.   Psychiatric: Normal mood, normal affect  Musculoskeletal: mild pain on right leg.       I&O's Detail    2019 07:01  -  15 Jul 2019 07:00  --------------------------------------------------------  IN:    lactated ringers.: 3000 mL  Total IN: 3000 mL    OUT:    Estimated Blood Loss: 5 mL    Indwelling Catheter - Urethral: 3575 mL  Total OUT: 3580 mL    Total NET: -580 mL      15 Jul 2019 07:01  -  2019 01:40  --------------------------------------------------------  IN:  Total IN: 0 mL    OUT:    Indwelling Catheter - Urethral: 1650 mL  Total OUT: 1650 mL    Total NET: -1650 mL          LABS:                        8.0    8.21  )-----------( 617      ( 2019 00:19 )             25.7     07-16    131<L>  |  94<L>  |  22.0<H>  ----------------------------<  144<H>  4.2   |  25.0  |  0.95    Ca    8.8      2019 00:19  Phos  3.0     07-15  Mg     2.1     07-15    TPro  7.9  /  Alb  2.6<L>  /  TBili  0.5  /  DBili  x   /  AST  161<H>  /  ALT  167<H>  /  AlkPhos  156<H>  07-16    PT/INR - ( 2019 00:19 )   PT: 15.5 sec;   INR: 1.34 ratio         PTT - ( 2019 00:19 )  PTT:42.0 sec  Urinalysis Basic - ( 2019 00:32 )    Color: Yellow / Appearance: Clear / S.010 / pH: x  Gluc: x / Ketone: Negative  / Bili: Negative / Urobili: 1 mg/dL   Blood: x / Protein: 15 mg/dL / Nitrite: Negative   Leuk Esterase: Negative / RBC: 0-2 /HPF / WBC 0-2   Sq Epi: x / Non Sq Epi: Few / Bacteria: x

## 2019-07-16 NOTE — PROGRESS NOTE ADULT - SUBJECTIVE AND OBJECTIVE BOX
HPI/OVERNIGHT EVENTS:    Code sepsis: at 23:45 a code sepsis was called for Temp 101.7 rectal, , PO2 97%, /78.     MEDICATIONS  (STANDING):  amLODIPine   Tablet 10 milliGRAM(s) Oral daily  aspirin  chewable 81 milliGRAM(s) Oral daily  cefepime   IVPB      cefepime   IVPB 2000 milliGRAM(s) IV Intermittent every 8 hours  chlorhexidine 2% Cloths 1 Application(s) Topical <User Schedule>  docusate sodium 100 milliGRAM(s) Oral two times a day  enoxaparin Injectable 80 milliGRAM(s) SubCutaneous two times a day  gabapentin 300 milliGRAM(s) Oral three times a day  insulin lispro (HumaLOG) corrective regimen sliding scale   SubCutaneous Before meals and at bedtime  lactulose Syrup 10 Gram(s) Oral daily  polyethylene glycol 3350 17 Gram(s) Oral two times a day  senna 1 Tablet(s) Oral two times a day  simvastatin 20 milliGRAM(s) Oral at bedtime  tamsulosin 0.4 milliGRAM(s) Oral at bedtime    MEDICATIONS  (PRN):  acetaminophen   Tablet .. 650 milliGRAM(s) Oral every 6 hours PRN Temp greater or equal to 38C (100.4F), Mild Pain (1 - 3)  HYDROmorphone  Injectable 0.5 milliGRAM(s) IV Push every 10 minutes PRN Moderate Pain (4 - 6)  ondansetron Injectable 4 milliGRAM(s) IV Push every 8 hours PRN Nausea and/or Vomiting  oxyCODONE    IR 5 milliGRAM(s) Oral every 4 hours PRN Moderate Pain (4 - 6)  oxyCODONE    IR 10 milliGRAM(s) Oral every 4 hours PRN Severe Pain (7 - 10)  sodium chloride 0.9% lock flush 10 milliLiter(s) IV Push every 1 hour PRN Pre/post blood products, medications, blood draw, and to maintain line patency      Vital Signs Last 24 Hrs  T(C): 101.7 ; Max: 38.7 (15 Jul 2019 23:58)  T(F): 98.5 (2019 07:30), Max: 101.6 (15 Jul 2019 23:58)  HR: 95 (2019 07:30) (95 - 103)  BP: 134/74 (2019 07:30) (130/73 - 135/74)  BP(mean): --  RR: 20 (2019 07:30) (18 - 20)  SpO2: 98% (2019 07:30) (97% - 100%)    Constitutional: patient resting comfortably in bed, in no acute distress  HEENT: EOMI / PERRL b/l, no active drainage or redness  Neck: No JVD, full ROM without pain  Respiratory: respirations are unlabored, no accessory muscle use, no conversational dyspnea  Cardiovascular: regular rate & rhythm  Gastrointestinal: Abdomen soft, non-tender, non-distended, no rebound tenderness / guarding  Neurological: GCS: 15 (4/5/6). A&O x 3; no gross sensory / motor / coordination deficits  Psychiatric: Normal mood, normal affect      I&O's Detail    15 Jul 2019 07:01  -  2019 07:00  --------------------------------------------------------  IN:  Total IN: 0 mL    OUT:    Indwelling Catheter - Urethral: 1650 mL  Total OUT: 1650 mL    Total NET: -1650 mL          LABS:                        8.0    8.21  )-----------( 617      ( 2019 00:19 )             25.7     07-16    131<L>  |  94<L>  |  22.0<H>  ----------------------------<  144<H>  4.2   |  25.0  |  0.95    Ca    8.8      2019 00:19  Phos  3.0     07-15  Mg     2.1     07-15    TPro  7.9  /  Alb  2.6<L>  /  TBili  0.5  /  DBili  x   /  AST  161<H>  /  ALT  167<H>  /  AlkPhos  156<H>  07-16    PT/INR - ( 2019 00:19 )   PT: 15.5 sec;   INR: 1.34 ratio         PTT - ( 2019 00:19 )  PTT:42.0 sec  Urinalysis Basic - ( 2019 00:32 )    Color: Yellow / Appearance: Clear / S.010 / pH: x  Gluc: x / Ketone: Negative  / Bili: Negative / Urobili: 1 mg/dL   Blood: x / Protein: 15 mg/dL / Nitrite: Negative   Leuk Esterase: Negative / RBC: 0-2 /HPF / WBC 0-2   Sq Epi: x / Non Sq Epi: Few / Bacteria: x

## 2019-07-16 NOTE — PROGRESS NOTE ADULT - SUBJECTIVE AND OBJECTIVE BOX
Patient in bed, wife at bedside, as is PT.  Patient planning to work on transfers for OOB.  Continues to be fatigued, with pain.   Overnight had code sepsis. On Maxipime.     FUNCTIONAL PROGRESS    Bed Mobility  Bed Mobility Training Supine-to-Sit: moderate assist (50% patient effort);  2 person assist;  verbal cues  Bed Mobility Training Limitations: decreased ability to use legs for bridging/pushing;  decreased strength;  decreased ROM;  impaired balance;  pain    Bed-Chair Transfer Training  Transfer Training Bed-to-Chair Transfer: moderate assist (50% patient effort);  2 person assist;  verbal cues;  NWB left LE; WBAT right LE    transfer board  Transfer Training Chair-to-Bed Transfer: wc to recliner;  moderate assist (50% patient effort);  2 person assist;  NWB left LE; WBAT right LE    transfer board  Bed-to-Chair Transfer Training Transfer Safety Analysis: decreased balance;  inability to maintain weight-bearing restrictions w/o assist;  decreased ROM;  decreased strength;  pain;  impaired balance;  transfer board      REVIEW OF SYSTEMS  Constitutional - +fever,  +fatigue  Musculoskeletal - +joint pain, +joint swelling, +muscle pain    VITALS  T(C): 36.9 (19 @ 07:30), Max: 38.7 (07-15-19 @ 23:58)  HR: 95 (19 @ 07:30) (95 - 103)  BP: 134/74 (19 @ 07:30) (130/73 - 135/74)  RR: 20 (19 @ 07:30) (18 - 20)  SpO2: 98% (19 @ 07:30) (97% - 100%)  Wt(kg): --    MEDICATIONS   acetaminophen   Tablet .. 650 milliGRAM(s) every 6 hours PRN  amLODIPine   Tablet 10 milliGRAM(s) daily  aspirin  chewable 81 milliGRAM(s) daily  cefepime   IVPB     cefepime   IVPB 2000 milliGRAM(s) every 8 hours  chlorhexidine 2% Cloths 1 Application(s) <User Schedule>  docusate sodium 100 milliGRAM(s) two times a day  enoxaparin Injectable 80 milliGRAM(s) two times a day  gabapentin 300 milliGRAM(s) three times a day  HYDROmorphone  Injectable 0.5 milliGRAM(s) every 10 minutes PRN  insulin lispro (HumaLOG) corrective regimen sliding scale   Before meals and at bedtime  lactulose Syrup 10 Gram(s) daily  ondansetron Injectable 4 milliGRAM(s) every 8 hours PRN  oxyCODONE    IR 5 milliGRAM(s) every 4 hours PRN  oxyCODONE    IR 10 milliGRAM(s) every 4 hours PRN  polyethylene glycol 3350 17 Gram(s) two times a day  senna 1 Tablet(s) two times a day  simvastatin 20 milliGRAM(s) at bedtime  sodium chloride 0.9% lock flush 10 milliLiter(s) every 1 hour PRN  tamsulosin 0.4 milliGRAM(s) at bedtime      RECENT LABS - Reviewed                        8.7    6.34  )-----------( 513      ( 2019 08:55 )             28.1     07-16    133<L>  |  96<L>  |  17.0  ----------------------------<  138<H>  5.0   |  23.0  |  0.78    Ca    9.3      2019 08:55  Phos  4.3     -  Mg     2.1     -    TPro  7.9  /  Alb  2.6<L>  /  TBili  0.5  /  DBili  x   /  AST  161<H>  /  ALT  167<H>  /  AlkPhos  156<H>      PT/INR - ( 2019 00:19 )   PT: 15.5 sec;   INR: 1.34 ratio         PTT - ( 2019 00:19 )  PTT:42.0 sec  Urinalysis Basic - ( 2019 00:32 )    Color: Yellow / Appearance: Clear / S.010 / pH: x  Gluc: x / Ketone: Negative  / Bili: Negative / Urobili: 1 mg/dL   Blood: x / Protein: 15 mg/dL / Nitrite: Negative   Leuk Esterase: Negative / RBC: 0-2 /HPF / WBC 0-2   Sq Epi: x / Non Sq Epi: Few / Bacteria: x            -----------------------------------------------------------------------------  PHYSICAL EXAM  Constitutional - NAD, fatigued  Abdomen - Soft   Extremities - Right foot swelling in PRAFO with ACE wrap; Left LE in knee immobilizer/ACE  Neurologic Exam -                    Cognitive - Awake, Alert, AAO to self, place, date, year, situation     Motor - Limited by braces and right foot drop                    LEFT    LE - HF 1/5, KE -/5, DF 4/5, PF 4/5                    RIGHT LE - HF 2/5, KE 1/5, DF 0/5, PF 0/5        Sensory - Impaired in the right foot  Psychiatric - Mood depressed  ----------------------------------------------------------------------------------------  ASSESSMENT/PLAN  68yMale with functional deficits after a multitrauma injury to the BLE   Right compartment syndrome with right popliteal arterial injury and s/p bypass/fasciotomy - WBAT, Left tibial plateau fracture s/p ORIF - NWB, Immobilizer  Septic Joint - Maxipime  Right foot drop - PRAFO  Right LE  DVT - Lovenox  HTN - Norvasc  HLD - Zocor  Pain - Tylenol, Neurontin, Dilaudid, Oxycodone  Constipation - Miralax, Colace, Senna  Urinary retention - Flomax, Min  DVT PPX - SCDs  Rehab - After multiple discussions, patient is making limited progress in the time frame thus far. Has limited motivation or tolerance to sitting. Have discussed with wife prior and today as well as with PT and CM. Recommend CAT, patient will not be able to tolerate 3 hours of therapy. Current DC still pending medical and surgical readiness.      Continue bedside therapy as well as OOB throughout the day with mobilization by staff to maintain cardiopulmonary function and prevention of secondary complications related to debility.    Will sign off, please reconsult for additional rehab dispo needs if functional status changes.

## 2019-07-16 NOTE — PROGRESS NOTE ADULT - SUBJECTIVE AND OBJECTIVE BOX
INTERVAL HPI/OVERNIGHT EVENTS:    MEDICATIONS  (STANDING):  amLODIPine   Tablet 10 milliGRAM(s) Oral daily  aspirin  chewable 81 milliGRAM(s) Oral daily  cefepime   IVPB      cefepime   IVPB 2000 milliGRAM(s) IV Intermittent every 8 hours  chlorhexidine 2% Cloths 1 Application(s) Topical <User Schedule>  docusate sodium 100 milliGRAM(s) Oral two times a day  enoxaparin Injectable 80 milliGRAM(s) SubCutaneous two times a day  gabapentin 300 milliGRAM(s) Oral three times a day  insulin lispro (HumaLOG) corrective regimen sliding scale   SubCutaneous Before meals and at bedtime  lactulose Syrup 10 Gram(s) Oral daily  polyethylene glycol 3350 17 Gram(s) Oral two times a day  senna 1 Tablet(s) Oral two times a day  simvastatin 20 milliGRAM(s) Oral at bedtime  tamsulosin 0.4 milliGRAM(s) Oral at bedtime    MEDICATIONS  (PRN):  acetaminophen   Tablet .. 650 milliGRAM(s) Oral every 6 hours PRN Temp greater or equal to 38C (100.4F), Mild Pain (1 - 3)  HYDROmorphone  Injectable 0.5 milliGRAM(s) IV Push every 10 minutes PRN Moderate Pain (4 - 6)  ondansetron Injectable 4 milliGRAM(s) IV Push every 8 hours PRN Nausea and/or Vomiting  oxyCODONE    IR 5 milliGRAM(s) Oral every 4 hours PRN Moderate Pain (4 - 6)  oxyCODONE    IR 10 milliGRAM(s) Oral every 4 hours PRN Severe Pain (7 - 10)  sodium chloride 0.9% lock flush 10 milliLiter(s) IV Push every 1 hour PRN Pre/post blood products, medications, blood draw, and to maintain line patency      Allergies    No Known Allergies    Intolerances        Vital Signs Last 24 Hrs  T(C): 36.6 (2019 06:10), Max: 38.7 (15 Jul 2019 23:58)  T(F): 97.9 (2019 06:10), Max: 101.6 (15 Jul 2019 23:58)  HR: 95 (2019 06:10) (95 - 103)  BP: 132/79 (2019 06:10) (130/73 - 135/74)  BP(mean): --  RR: 20 (2019 00:25) (18 - 20)  SpO2: 100% (2019 00:25) (97% - 100%)     ON PE:  General: alert and awake  Abdomen: Bladder decompressed  : urine clear. Min intact    LABS:                        8.0    8.21  )-----------( 617      ( 2019 00:19 )             25.7     07-16    131<L>  |  94<L>  |  22.0<H>  ----------------------------<  144<H>  4.2   |  25.0  |  0.95    Ca    8.8      2019 00:19  Phos  3.0     07-15  Mg     2.1     07-15    TPro  7.9  /  Alb  2.6<L>  /  TBili  0.5  /  DBili  x   /  AST  161<H>  /  ALT  167<H>  /  AlkPhos  156<H>  07-16    PT/INR - ( 2019 00:19 )   PT: 15.5 sec;   INR: 1.34 ratio         PTT - ( 2019 00:19 )  PTT:42.0 sec  Urinalysis Basic - ( 2019 00:32 )    Color: Yellow / Appearance: Clear / S.010 / pH: x  Gluc: x / Ketone: Negative  / Bili: Negative / Urobili: 1 mg/dL   Blood: x / Protein: 15 mg/dL / Nitrite: Negative   Leuk Esterase: Negative / RBC: 0-2 /HPF / WBC 0-2   Sq Epi: x / Non Sq Epi: Few / Bacteria: x        RADIOLOGY & ADDITIONAL TESTS:

## 2019-07-17 LAB
-  AMIKACIN: SIGNIFICANT CHANGE UP
-  AZTREONAM: SIGNIFICANT CHANGE UP
-  CEFEPIME: SIGNIFICANT CHANGE UP
-  CEFTAZIDIME: SIGNIFICANT CHANGE UP
-  CIPROFLOXACIN: SIGNIFICANT CHANGE UP
-  GENTAMICIN: SIGNIFICANT CHANGE UP
-  IMIPENEM: SIGNIFICANT CHANGE UP
-  LEVOFLOXACIN: SIGNIFICANT CHANGE UP
-  MEROPENEM: SIGNIFICANT CHANGE UP
-  PIPERACILLIN/TAZOBACTAM: SIGNIFICANT CHANGE UP
-  TOBRAMYCIN: SIGNIFICANT CHANGE UP
ALBUMIN SERPL ELPH-MCNC: 2.5 G/DL — LOW (ref 3.3–5.2)
ALP SERPL-CCNC: 144 U/L — HIGH (ref 40–120)
ALT FLD-CCNC: 122 U/L — HIGH
ANION GAP SERPL CALC-SCNC: 13 MMOL/L — SIGNIFICANT CHANGE UP (ref 5–17)
APTT BLD: 42.9 SEC — HIGH (ref 27.5–36.3)
AST SERPL-CCNC: 89 U/L — HIGH
BASOPHILS # BLD AUTO: 0.03 K/UL — SIGNIFICANT CHANGE UP (ref 0–0.2)
BASOPHILS NFR BLD AUTO: 0.4 % — SIGNIFICANT CHANGE UP (ref 0–2)
BILIRUB SERPL-MCNC: 0.7 MG/DL — SIGNIFICANT CHANGE UP (ref 0.4–2)
BUN SERPL-MCNC: 18 MG/DL — SIGNIFICANT CHANGE UP (ref 8–20)
CALCIUM SERPL-MCNC: 9 MG/DL — SIGNIFICANT CHANGE UP (ref 8.6–10.2)
CHLORIDE SERPL-SCNC: 97 MMOL/L — LOW (ref 98–107)
CO2 SERPL-SCNC: 25 MMOL/L — SIGNIFICANT CHANGE UP (ref 22–29)
CREAT SERPL-MCNC: 0.74 MG/DL — SIGNIFICANT CHANGE UP (ref 0.5–1.3)
CULTURE RESULTS: SIGNIFICANT CHANGE UP
EOSINOPHIL # BLD AUTO: 0.06 K/UL — SIGNIFICANT CHANGE UP (ref 0–0.5)
EOSINOPHIL NFR BLD AUTO: 0.8 % — SIGNIFICANT CHANGE UP (ref 0–6)
GLUCOSE BLDC GLUCOMTR-MCNC: 130 MG/DL — HIGH (ref 70–99)
GLUCOSE BLDC GLUCOMTR-MCNC: 131 MG/DL — HIGH (ref 70–99)
GLUCOSE BLDC GLUCOMTR-MCNC: 161 MG/DL — HIGH (ref 70–99)
GLUCOSE BLDC GLUCOMTR-MCNC: 194 MG/DL — HIGH (ref 70–99)
GLUCOSE SERPL-MCNC: 135 MG/DL — HIGH (ref 70–115)
HCT VFR BLD CALC: 26.9 % — LOW (ref 39–50)
HGB BLD-MCNC: 8.4 G/DL — LOW (ref 13–17)
IMM GRANULOCYTES NFR BLD AUTO: 1.6 % — HIGH (ref 0–1.5)
INR BLD: 1.41 RATIO — HIGH (ref 0.88–1.16)
LYMPHOCYTES # BLD AUTO: 1.17 K/UL — SIGNIFICANT CHANGE UP (ref 1–3.3)
LYMPHOCYTES # BLD AUTO: 15.6 % — SIGNIFICANT CHANGE UP (ref 13–44)
MAGNESIUM SERPL-MCNC: 2.2 MG/DL — SIGNIFICANT CHANGE UP (ref 1.6–2.6)
MCHC RBC-ENTMCNC: 26.9 PG — LOW (ref 27–34)
MCHC RBC-ENTMCNC: 31.2 GM/DL — LOW (ref 32–36)
MCV RBC AUTO: 86.2 FL — SIGNIFICANT CHANGE UP (ref 80–100)
METHOD TYPE: SIGNIFICANT CHANGE UP
MONOCYTES # BLD AUTO: 0.6 K/UL — SIGNIFICANT CHANGE UP (ref 0–0.9)
MONOCYTES NFR BLD AUTO: 8 % — SIGNIFICANT CHANGE UP (ref 2–14)
NEUTROPHILS # BLD AUTO: 5.5 K/UL — SIGNIFICANT CHANGE UP (ref 1.8–7.4)
NEUTROPHILS NFR BLD AUTO: 73.6 % — SIGNIFICANT CHANGE UP (ref 43–77)
ORGANISM # SPEC MICROSCOPIC CNT: SIGNIFICANT CHANGE UP
ORGANISM # SPEC MICROSCOPIC CNT: SIGNIFICANT CHANGE UP
PHOSPHATE SERPL-MCNC: 3.4 MG/DL — SIGNIFICANT CHANGE UP (ref 2.4–4.7)
PLATELET # BLD AUTO: 581 K/UL — HIGH (ref 150–400)
POTASSIUM SERPL-MCNC: 4.6 MMOL/L — SIGNIFICANT CHANGE UP (ref 3.5–5.3)
POTASSIUM SERPL-SCNC: 4.6 MMOL/L — SIGNIFICANT CHANGE UP (ref 3.5–5.3)
PROT SERPL-MCNC: 7.9 G/DL — SIGNIFICANT CHANGE UP (ref 6.6–8.7)
PROTHROM AB SERPL-ACNC: 16.4 SEC — HIGH (ref 10–12.9)
RBC # BLD: 3.12 M/UL — LOW (ref 4.2–5.8)
RBC # FLD: 17.4 % — HIGH (ref 10.3–14.5)
SODIUM SERPL-SCNC: 135 MMOL/L — SIGNIFICANT CHANGE UP (ref 135–145)
SPECIMEN SOURCE: SIGNIFICANT CHANGE UP
WBC # BLD: 7.48 K/UL — SIGNIFICANT CHANGE UP (ref 3.8–10.5)
WBC # FLD AUTO: 7.48 K/UL — SIGNIFICANT CHANGE UP (ref 3.8–10.5)

## 2019-07-17 PROCEDURE — 99232 SBSQ HOSP IP/OBS MODERATE 35: CPT

## 2019-07-17 RX ORDER — TAMSULOSIN HYDROCHLORIDE 0.4 MG/1
0.8 CAPSULE ORAL AT BEDTIME
Refills: 0 | Status: DISCONTINUED | OUTPATIENT
Start: 2019-07-17 | End: 2019-07-23

## 2019-07-17 RX ADMIN — TAMSULOSIN HYDROCHLORIDE 0.8 MILLIGRAM(S): 0.4 CAPSULE ORAL at 21:20

## 2019-07-17 RX ADMIN — SENNA PLUS 1 TABLET(S): 8.6 TABLET ORAL at 05:51

## 2019-07-17 RX ADMIN — Medication 81 MILLIGRAM(S): at 12:00

## 2019-07-17 RX ADMIN — OXYCODONE HYDROCHLORIDE 10 MILLIGRAM(S): 5 TABLET ORAL at 21:59

## 2019-07-17 RX ADMIN — Medication 100 MILLIGRAM(S): at 05:52

## 2019-07-17 RX ADMIN — AMLODIPINE BESYLATE 10 MILLIGRAM(S): 2.5 TABLET ORAL at 05:52

## 2019-07-17 RX ADMIN — OXYCODONE HYDROCHLORIDE 10 MILLIGRAM(S): 5 TABLET ORAL at 13:02

## 2019-07-17 RX ADMIN — SENNA PLUS 1 TABLET(S): 8.6 TABLET ORAL at 17:34

## 2019-07-17 RX ADMIN — CEFEPIME 100 MILLIGRAM(S): 1 INJECTION, POWDER, FOR SOLUTION INTRAMUSCULAR; INTRAVENOUS at 21:20

## 2019-07-17 RX ADMIN — SIMVASTATIN 20 MILLIGRAM(S): 20 TABLET, FILM COATED ORAL at 21:20

## 2019-07-17 RX ADMIN — ENOXAPARIN SODIUM 80 MILLIGRAM(S): 100 INJECTION SUBCUTANEOUS at 17:34

## 2019-07-17 RX ADMIN — GABAPENTIN 300 MILLIGRAM(S): 400 CAPSULE ORAL at 13:12

## 2019-07-17 RX ADMIN — OXYCODONE HYDROCHLORIDE 10 MILLIGRAM(S): 5 TABLET ORAL at 22:15

## 2019-07-17 RX ADMIN — CEFEPIME 100 MILLIGRAM(S): 1 INJECTION, POWDER, FOR SOLUTION INTRAMUSCULAR; INTRAVENOUS at 05:52

## 2019-07-17 RX ADMIN — GABAPENTIN 300 MILLIGRAM(S): 400 CAPSULE ORAL at 21:20

## 2019-07-17 RX ADMIN — GABAPENTIN 300 MILLIGRAM(S): 400 CAPSULE ORAL at 05:52

## 2019-07-17 RX ADMIN — CEFEPIME 100 MILLIGRAM(S): 1 INJECTION, POWDER, FOR SOLUTION INTRAMUSCULAR; INTRAVENOUS at 13:12

## 2019-07-17 RX ADMIN — OXYCODONE HYDROCHLORIDE 10 MILLIGRAM(S): 5 TABLET ORAL at 17:35

## 2019-07-17 RX ADMIN — LACTULOSE 10 GRAM(S): 10 SOLUTION ORAL at 12:00

## 2019-07-17 RX ADMIN — Medication 1: at 16:41

## 2019-07-17 RX ADMIN — OXYCODONE HYDROCHLORIDE 10 MILLIGRAM(S): 5 TABLET ORAL at 12:02

## 2019-07-17 RX ADMIN — ENOXAPARIN SODIUM 80 MILLIGRAM(S): 100 INJECTION SUBCUTANEOUS at 05:51

## 2019-07-17 RX ADMIN — CHLORHEXIDINE GLUCONATE 1 APPLICATION(S): 213 SOLUTION TOPICAL at 05:52

## 2019-07-17 RX ADMIN — POLYETHYLENE GLYCOL 3350 17 GRAM(S): 17 POWDER, FOR SOLUTION ORAL at 05:51

## 2019-07-17 RX ADMIN — OXYCODONE HYDROCHLORIDE 10 MILLIGRAM(S): 5 TABLET ORAL at 04:17

## 2019-07-17 RX ADMIN — Medication 100 MILLIGRAM(S): at 17:34

## 2019-07-17 RX ADMIN — POLYETHYLENE GLYCOL 3350 17 GRAM(S): 17 POWDER, FOR SOLUTION ORAL at 17:34

## 2019-07-17 RX ADMIN — Medication 1: at 21:20

## 2019-07-17 NOTE — PROGRESS NOTE ADULT - ASSESSMENT
Pt is pOD 14 s/p excision and grafting of titbial/peroneal nerve injuries. POD3 s/p R knee washout after septic joint with GNR (pseudamonas). Patient has had recurrent fevers. upon looking at all surgical incisions and dressings, index of suspicion for Lower extremity infection is low and differential for infection source remains broad. WBCs remain normal.    - daily fevers  - all surgical incisions examined with no source of infection or new fluid collections noted.  - Wound vac replaced yesterday without sign of infection.   - consider repeat CT scan of LEs to look for possible infection source  - continue Abx per primary team and ID recs  - continue daily dressing changes and q3 wound vac changes   - Min replaced yesterday 7/17

## 2019-07-17 NOTE — PROGRESS NOTE ADULT - ASSESSMENT
Pt is pOD 14 s/p excision and grafting of titbial/peroneal nerve injuries. POD3 s/p R knee washout after septic joint with GNR (pseudamonas). Patient has had recurrent fevers. upon looking at all surgical incisions and dressings, index of suspicion for Lower extremity infection is low and differential for infection source remains broad. WBCs remain normal.      #Intermittent fevers - last fever 101.6 7/16  - all blood cx NGTD  - all surgical incisions examined with no source of infection or new fluid collections noted.  - Wound vac replaced yesterday without sign of infection.   - consider repeat CT scan of LEs to look for possible infection source  - cefepime (7/15-??) will appreciate ID recs      #DVT  - Therapeutic lovenox    #Wounds  - no signs of deep infection or underlying abscess   - Integra removed - wound vac in place next change fri 7/19  -vac change q3dy, 4x2x1  - Xerform and ABD to recipient site  - Kerlix and ACE to cover al wounds of RLE    #mobility  - Leg elevation  - continue LLE splint  - PMNR recs ACUTE inpatient rehabilitation

## 2019-07-17 NOTE — PROGRESS NOTE ADULT - SUBJECTIVE AND OBJECTIVE BOX
INTERVAL HPI/OVERNIGHT EVENTS: T101.6 at 1700. Wound vac changed yesterday afternoon with no signs of fluid collection or infection. Good granulation and bleeding in the integra vac site. No other events overnight. no f/c/sob/n/v. trial void failed yesterday. villegas back in place      MEDICATIONS  (STANDING):  amLODIPine   Tablet 10 milliGRAM(s) Oral daily  aspirin  chewable 81 milliGRAM(s) Oral daily  cefepime   IVPB      cefepime   IVPB 2000 milliGRAM(s) IV Intermittent every 8 hours  chlorhexidine 2% Cloths 1 Application(s) Topical <User Schedule>  docusate sodium 100 milliGRAM(s) Oral two times a day  enoxaparin Injectable 80 milliGRAM(s) SubCutaneous two times a day  gabapentin 300 milliGRAM(s) Oral three times a day  insulin lispro (HumaLOG) corrective regimen sliding scale   SubCutaneous Before meals and at bedtime  lactulose Syrup 10 Gram(s) Oral daily  polyethylene glycol 3350 17 Gram(s) Oral two times a day  senna 1 Tablet(s) Oral two times a day  simvastatin 20 milliGRAM(s) Oral at bedtime  tamsulosin 0.4 milliGRAM(s) Oral at bedtime    MEDICATIONS  (PRN):  acetaminophen   Tablet .. 650 milliGRAM(s) Oral every 6 hours PRN Temp greater or equal to 38C (100.4F), Mild Pain (1 - 3)  HYDROmorphone  Injectable 0.5 milliGRAM(s) IV Push every 10 minutes PRN Moderate Pain (4 - 6)  ondansetron Injectable 4 milliGRAM(s) IV Push every 8 hours PRN Nausea and/or Vomiting  oxyCODONE    IR 5 milliGRAM(s) Oral every 4 hours PRN Moderate Pain (4 - 6)  oxyCODONE    IR 10 milliGRAM(s) Oral every 4 hours PRN Severe Pain (7 - 10)  sodium chloride 0.9% lock flush 10 milliLiter(s) IV Push every 1 hour PRN Pre/post blood products, medications, blood draw, and to maintain line patency      Vital Signs Last 24 Hrs  T(C): 37.3 (2019 00:48), Max: 38.7 (2019 16:51)  T(F): 99.1 (2019 00:48), Max: 101.6 (2019 16:51)  HR: 95 (2019 00:48) (95 - 107)  BP: 124/73 (2019 00:48) (116/74 - 134/74)  BP(mean): --  RR: 18 (2019 00:48) (18 - 20)  SpO2: 98% (2019 00:48) (98% - 100%)    Gen: a/o x 3. non distressed  Pulm: non labored, CTA  CV: warm well perfused, rrr  Abd: soft/ non tender non distended  : villegas out  Ext: RLE: dressings changed. Superficial skin necrosis on the lateral aspect of the R knee. no signs of fluid collection in knee or lower leg. Incisions are C/D/I. wound vac on integra site on Lateral knee place with good suction and no leak. no signs of infection. Split thickness donor graft well healing.  LLE: dressings changed. incisions c/d/i and well healed. no signs of infection  Vasc: 2+ b/l DP pulses    I&O's Detail    15 Jul 2019 07:01  -  2019 07:00  --------------------------------------------------------  IN:  Total IN: 0 mL    OUT:    Indwelling Catheter - Urethral: 1650 mL  Total OUT: 1650 mL    Total NET: -1650 mL      2019 07:01  -  2019 02:05  --------------------------------------------------------  IN:  Total IN: 0 mL    OUT:    Indwelling Catheter - Urethral: 1400 mL  Total OUT: 1400 mL    Total NET: -1400 mL          LABS:                        8.7    6.34  )-----------( 513      ( 2019 08:55 )             28.1     07-16    133<L>  |  96<L>  |  17.0  ----------------------------<  138<H>  5.0   |  23.0  |  0.78    Ca    9.3      2019 08:55  Phos  4.3       Mg     2.1         TPro  7.9  /  Alb  2.6<L>  /  TBili  0.5  /  DBili  x   /  AST  161<H>  /  ALT  167<H>  /  AlkPhos  156<H>      PT/INR - ( 2019 00:19 )   PT: 15.5 sec;   INR: 1.34 ratio         PTT - ( 2019 00:19 )  PTT:42.0 sec  Urinalysis Basic - ( 2019 00:32 )    Color: Yellow / Appearance: Clear / S.010 / pH: x  Gluc: x / Ketone: Negative  / Bili: Negative / Urobili: 1 mg/dL   Blood: x / Protein: 15 mg/dL / Nitrite: Negative   Leuk Esterase: Negative / RBC: 0-2 /HPF / WBC 0-2   Sq Epi: x / Non Sq Epi: Few / Bacteria: x        RADIOLOGY & ADDITIONAL STUDIES:

## 2019-07-17 NOTE — PROGRESS NOTE ADULT - SUBJECTIVE AND OBJECTIVE BOX
ORTHO-POST-OP PROGRESS NOTE:      [id number]    AIMEE SOLER      PROCEDURE:  Arthroscopic washout of the right knee 7/14/19; POD #3  SUBJECTIVE: 68y Patient seen and examined bedside. Patient reports of moderate discomfort to his bilateral legs overnight that is controlled by pain medications. Patient denies nausea, vomiting, CP, SOB, fevers, chills.  He has no other orthopedic complaints at this time                            8.7    6.34  )-----------( 513      ( 16 Jul 2019 08:55 )             28.1               I&O's Detail    16 Jul 2019 07:01  -  17 Jul 2019 07:00  --------------------------------------------------------  IN:  Total IN: 0 mL    OUT:    Indwelling Catheter - Urethral: 3200 mL  Total OUT: 3200 mL    Total NET: -3200 mL        Culture - Surgical Swab (07.14.19 @ 15:02)    Gram Stain:   Few White blood cells  No organisms seen    Specimen Source: .Surgical Swab Right knee joint (swabs)    Culture Results:   Gram Negative Rods Growing in broth media only  Culture in progress    Culture - Surgical Swab (07.14.19 @ 15:00)    -  Piperacillin/Tazobactam: S <=16    -  Tobramycin: S <=4    -  Gentamicin: S <=4    -  Imipenem: S <=1    -  Levofloxacin: S <=2    -  Meropenem: S <=1    Gram Stain:   Few White blood cells  No organisms seen    -  Amikacin: S <=16    -  Ampicillin: R >16    -  Ampicillin/Sulbactam: R >16/8    -  Aztreonam: S 8    -  Cefepime: S <=4    -  Cefotaxime: R 16    -  Ceftazidime: S 4    -  Ceftriaxone: R 32    -  Ciprofloxacin: S <=1    -  Ertapenem: R <=1    Specimen Source: .Surgical Swab Right knee (swabs)    Culture Results:   Few Pseudomonas aeruginosa  Culture in progress    Organism Identification: Pseudomonas aeruginosa    Organism: Pseudomonas aeruginosa    Method Type: NICHOLAS                  acetaminophen   Tablet .. 650 milliGRAM(s) Oral every 6 hours PRN  amLODIPine   Tablet 10 milliGRAM(s) Oral daily  aspirin  chewable 81 milliGRAM(s) Oral daily  cefepime   IVPB      cefepime   IVPB 2000 milliGRAM(s) IV Intermittent every 8 hours  chlorhexidine 2% Cloths 1 Application(s) Topical <User Schedule>  docusate sodium 100 milliGRAM(s) Oral two times a day  enoxaparin Injectable 80 milliGRAM(s) SubCutaneous two times a day  gabapentin 300 milliGRAM(s) Oral three times a day  HYDROmorphone  Injectable 0.5 milliGRAM(s) IV Push every 10 minutes PRN  insulin lispro (HumaLOG) corrective regimen sliding scale   SubCutaneous Before meals and at bedtime  lactulose Syrup 10 Gram(s) Oral daily  ondansetron Injectable 4 milliGRAM(s) IV Push every 8 hours PRN  oxyCODONE    IR 5 milliGRAM(s) Oral every 4 hours PRN  oxyCODONE    IR 10 milliGRAM(s) Oral every 4 hours PRN  polyethylene glycol 3350 17 Gram(s) Oral two times a day  senna 1 Tablet(s) Oral two times a day  simvastatin 20 milliGRAM(s) Oral at bedtime  sodium chloride 0.9% lock flush 10 milliLiter(s) IV Push every 1 hour PRN  tamsulosin 0.4 milliGRAM(s) Oral at bedtime        T(C): 37.3 (07-17-19 @ 00:48), Max: 38.7 (07-16-19 @ 16:51)  HR: 100 (07-17-19 @ 05:50) (95 - 107)  BP: 117/69 (07-17-19 @ 05:50) (116/74 - 124/73)  RR: 18 (07-17-19 @ 00:48) (18 - 18)  SpO2: 98% (07-17-19 @ 00:48) (98% - 100%)  Wt(kg): --      PHYSICAL EXAM:   Constitutional: Alert, responsive, in no acute distress.   Right Lower Extremity:          Dressing: Ace wrap Clean/dry/intact. No active bleeding or discharge noted.  Dressings underneath ace wrap at sight of arthroscopic incisions are clean, dry and intact         Skin: clean, dry and intact. No active discharge.          Sensation: sensation decreased of the right dorsal foot, toe and lateral foot - baseline for patient             Motor exam: No EHL/FHL, dorsi/plantarflexion - baseline for patient                                                            Vascular:  No cyanosis + warm well perfused; capillary refill <2 seconds.  2+ distal pulses.  Compartments NTTP, soft/compressible through out    Left Lower Extremity:        Skin: Ace wrap noted under knee immobilizer which is clean, dry and intact.  Skin is clean, dry and intacy        Sensation:  to light touch is grossly intact without focal deficit..         Motor: Left LE Motor function distally is 5/5. No foot drop.         Vascular: 2+ dorsalis pedis pulse. Capillary refill <2 seconds. No cyanosis + warm well perfused.  Compartments NTTP, soft/compressible through out                                                      A/P: 68 y.o M s/p right knee arthroscopic washout POD #3  - NWB LLE  - DVTP  - rest of RLE dressing management as per plastics  - abx as per ID- will need long term antibiotics  - cont care primary team

## 2019-07-17 NOTE — PROGRESS NOTE ADULT - SUBJECTIVE AND OBJECTIVE BOX
INTERVAL HPI/OVERNIGHT EVENTS: T101.6 at 1700. Wound vac changed yesterday afternoon with no signs of fluid collection or infection. Good granulation and bleeding in the integra vac site. No other events overnight.     SUBJECTIVE:      MEDICATIONS  (STANDING):  amLODIPine   Tablet 10 milliGRAM(s) Oral daily  aspirin  chewable 81 milliGRAM(s) Oral daily  cefepime   IVPB      cefepime   IVPB 2000 milliGRAM(s) IV Intermittent every 8 hours  chlorhexidine 2% Cloths 1 Application(s) Topical <User Schedule>  docusate sodium 100 milliGRAM(s) Oral two times a day  enoxaparin Injectable 80 milliGRAM(s) SubCutaneous two times a day  gabapentin 300 milliGRAM(s) Oral three times a day  insulin lispro (HumaLOG) corrective regimen sliding scale   SubCutaneous Before meals and at bedtime  lactulose Syrup 10 Gram(s) Oral daily  polyethylene glycol 3350 17 Gram(s) Oral two times a day  senna 1 Tablet(s) Oral two times a day  simvastatin 20 milliGRAM(s) Oral at bedtime  tamsulosin 0.4 milliGRAM(s) Oral at bedtime    MEDICATIONS  (PRN):  acetaminophen   Tablet .. 650 milliGRAM(s) Oral every 6 hours PRN Temp greater or equal to 38C (100.4F), Mild Pain (1 - 3)  HYDROmorphone  Injectable 0.5 milliGRAM(s) IV Push every 10 minutes PRN Moderate Pain (4 - 6)  ondansetron Injectable 4 milliGRAM(s) IV Push every 8 hours PRN Nausea and/or Vomiting  oxyCODONE    IR 5 milliGRAM(s) Oral every 4 hours PRN Moderate Pain (4 - 6)  oxyCODONE    IR 10 milliGRAM(s) Oral every 4 hours PRN Severe Pain (7 - 10)  sodium chloride 0.9% lock flush 10 milliLiter(s) IV Push every 1 hour PRN Pre/post blood products, medications, blood draw, and to maintain line patency      Vital Signs Last 24 Hrs  T(C): 37.3 (2019 00:48), Max: 38.7 (2019 16:51)  T(F): 99.1 (2019 00:48), Max: 101.6 (2019 16:51)  HR: 95 (2019 00:48) (95 - 107)  BP: 124/73 (2019 00:48) (116/74 - 134/74)  BP(mean): --  RR: 18 (2019 00:48) (18 - 20)  SpO2: 98% (2019 00:48) (98% - 100%)    Gen: a/o x 3. non distressed  Pulm: non labored, CTA  CV: warm well perfused, rrr  Abd: soft/ non tender non distended  : villegas out  Ext: RLE: dressings changed. Superficial skin necrosis on the lateral aspect of the R knee. no signs of fluid collection in knee or lower leg. Incisions are C/D/I. wound vac on integra site on Lateral knee place with good suction and no leak. no signs of infection. Split thickness donor graft well healing.  LLE: dressings changed. incisions c/d/i and well healed. no signs of infection  Vasc: 2+ b/l DP pulses      I&O's Detail    15 Jul 2019 07:01  -  2019 07:00  --------------------------------------------------------  IN:  Total IN: 0 mL    OUT:    Indwelling Catheter - Urethral: 1650 mL  Total OUT: 1650 mL    Total NET: -1650 mL      2019 07:01  -  2019 01:31  --------------------------------------------------------  IN:  Total IN: 0 mL    OUT:    Indwelling Catheter - Urethral: 1400 mL  Total OUT: 1400 mL    Total NET: -1400 mL          LABS:                        8.7    6.34  )-----------( 513      ( 2019 08:55 )             28.1     07-16    133<L>  |  96<L>  |  17.0  ----------------------------<  138<H>  5.0   |  23.0  |  0.78    Ca    9.3      2019 08:55  Phos  4.3     -  Mg     2.1     -    TPro  7.9  /  Alb  2.6<L>  /  TBili  0.5  /  DBili  x   /  AST  161<H>  /  ALT  167<H>  /  AlkPhos  156<H>      PT/INR - ( 2019 00:19 )   PT: 15.5 sec;   INR: 1.34 ratio         PTT - ( 2019 00:19 )  PTT:42.0 sec  Urinalysis Basic - ( 2019 00:32 )    Color: Yellow / Appearance: Clear / S.010 / pH: x  Gluc: x / Ketone: Negative  / Bili: Negative / Urobili: 1 mg/dL   Blood: x / Protein: 15 mg/dL / Nitrite: Negative   Leuk Esterase: Negative / RBC: 0-2 /HPF / WBC 0-2   Sq Epi: x / Non Sq Epi: Few / Bacteria: x        RADIOLOGY & ADDITIONAL STUDIES:

## 2019-07-17 NOTE — PROGRESS NOTE ADULT - SUBJECTIVE AND OBJECTIVE BOX
PROCEDURE:  Arthroscopic washout of the right knee 7/14/19; POD #3  SUBJECTIVE: 68y Patient seen and examined bedside. Patient reports of moderate discomfort to his bilateral legs overnight that is controlled by pain medications. Patient denies nausea, vomiting, CP, SOB, fevers, chills.  He has no other orthopedic complaints at this time                            8.7    6.34  )-----------( 513      ( 16 Jul 2019 08:55 )             28.1               I&O's Detail    16 Jul 2019 07:01  -  17 Jul 2019 07:00  --------------------------------------------------------  IN:  Total IN: 0 mL    OUT:    Indwelling Catheter - Urethral: 3200 mL  Total OUT: 3200 mL    Total NET: -3200 mL        Culture - Surgical Swab (07.14.19 @ 15:02)    Gram Stain:   Few White blood cells  No organisms seen    Specimen Source: .Surgical Swab Right knee joint (swabs)    Culture Results:   Gram Negative Rods Growing in broth media only  Culture in progress    Culture - Surgical Swab (07.14.19 @ 15:00)    -  Piperacillin/Tazobactam: S <=16    -  Tobramycin: S <=4    -  Gentamicin: S <=4    -  Imipenem: S <=1    -  Levofloxacin: S <=2    -  Meropenem: S <=1    Gram Stain:   Few White blood cells  No organisms seen    -  Amikacin: S <=16    -  Ampicillin: R >16    -  Ampicillin/Sulbactam: R >16/8    -  Aztreonam: S 8    -  Cefepime: S <=4    -  Cefotaxime: R 16    -  Ceftazidime: S 4    -  Ceftriaxone: R 32    -  Ciprofloxacin: S <=1    -  Ertapenem: R <=1    Specimen Source: .Surgical Swab Right knee (swabs)    Culture Results:   Few Pseudomonas aeruginosa  Culture in progress    Organism Identification: Pseudomonas aeruginosa    Organism: Pseudomonas aeruginosa    Method Type: NICHOLAS                  acetaminophen   Tablet .. 650 milliGRAM(s) Oral every 6 hours PRN  amLODIPine   Tablet 10 milliGRAM(s) Oral daily  aspirin  chewable 81 milliGRAM(s) Oral daily  cefepime   IVPB      cefepime   IVPB 2000 milliGRAM(s) IV Intermittent every 8 hours  chlorhexidine 2% Cloths 1 Application(s) Topical <User Schedule>  docusate sodium 100 milliGRAM(s) Oral two times a day  enoxaparin Injectable 80 milliGRAM(s) SubCutaneous two times a day  gabapentin 300 milliGRAM(s) Oral three times a day  HYDROmorphone  Injectable 0.5 milliGRAM(s) IV Push every 10 minutes PRN  insulin lispro (HumaLOG) corrective regimen sliding scale   SubCutaneous Before meals and at bedtime  lactulose Syrup 10 Gram(s) Oral daily  ondansetron Injectable 4 milliGRAM(s) IV Push every 8 hours PRN  oxyCODONE    IR 5 milliGRAM(s) Oral every 4 hours PRN  oxyCODONE    IR 10 milliGRAM(s) Oral every 4 hours PRN  polyethylene glycol 3350 17 Gram(s) Oral two times a day  senna 1 Tablet(s) Oral two times a day  simvastatin 20 milliGRAM(s) Oral at bedtime  sodium chloride 0.9% lock flush 10 milliLiter(s) IV Push every 1 hour PRN  tamsulosin 0.4 milliGRAM(s) Oral at bedtime        T(C): 37.3 (07-17-19 @ 00:48), Max: 38.7 (07-16-19 @ 16:51)  HR: 100 (07-17-19 @ 05:50) (95 - 107)  BP: 117/69 (07-17-19 @ 05:50) (116/74 - 124/73)  RR: 18 (07-17-19 @ 00:48) (18 - 18)  SpO2: 98% (07-17-19 @ 00:48) (98% - 100%)  Wt(kg): --      PHYSICAL EXAM:   Constitutional: Alert, responsive, in no acute distress.   Right Lower Extremity:          Dressing: Ace wrap Clean/dry/intact. No active bleeding or discharge noted.  Dressings underneath ace wrap at sight of arthroscopic incisions are clean, dry and intact         Skin: clean, dry and intact. No active discharge.          Sensation: sensation decreased of the right dorsal foot, toe and lateral foot - baseline for patient             Motor exam: No EHL/FHL, dorsi/plantarflexion - baseline for patient                                                            Vascular:  No cyanosis + warm well perfused; capillary refill <2 seconds.  2+ distal pulses.  Compartments NTTP, soft/compressible through out    Left Lower Extremity:        Skin: Ace wrap noted under knee immobilizer which is clean, dry and intact.  Skin is clean, dry and intacy        Sensation:  to light touch is grossly intact without focal deficit..         Motor: Left LE Motor function distally is 5/5. No foot drop.         Vascular: 2+ dorsalis pedis pulse. Capillary refill <2 seconds. No cyanosis + warm well perfused.  Compartments NTTP, soft/compressible through out                                                      A/P: 68 y.o M s/p right knee arthroscopic washout POD #3  - NWB LLE  - DVTP  - rest of RLE dressing management as per plastics  - abx as per ID- will need long term antibiotics  - cont care primary team

## 2019-07-17 NOTE — PROGRESS NOTE ADULT - ASSESSMENT
67y/o man with no significant PMH was admitted on 6/25 due to a trauma at work. He had a large laceration in R leg requiring popliteal bypass and repair and left tibial plateau fracture s/o ORIF. He was febrile on 7/5, started on antibiotics, due to possibility of septic arthritis had R knee arthrocentesis showing 44k WBC and GNR in gram stain, so he was taken to OR for wash out early 7/14.     R knee septic arthritis     - Blood culture negative on 7/5 and 7/12  - Knee aspiration with a sensitive pseudomonas    - Continue cefepime 2gm q8h   - CRP=25.59   - Will need about 4 weeks of treatment  - Will follow Tm, had fever 101 last evening.   - Repeat blood culture pending   - No leukocytosis.     Will follow.

## 2019-07-17 NOTE — PROGRESS NOTE ADULT - SUBJECTIVE AND OBJECTIVE BOX
Pr remains intermittently febrile with no known source.  WBC is normal.  S/p washout of right knee by orhtopedics.  Aspirate growing out GNRs.      RIght knee wound is clean.  VAC changed.  Latera knee eschar is dry and stable, likely partial thickness in most areas.  OPen wound is clean with healthy granulation tissue.  Unable to express drainage, no fluctuence.    Skin graft over calf muscle is healing well.  Decr muscle edema, graft with 80-90% take.    Foot warm.  No sign of wound infection.    A/P:  Knee wound is not the likely source of his persistent fevers  Continue wound VAC for now  Will plan for debridement and integra of lateral knee wound for early next week.    Continue daily dressing changes and abx

## 2019-07-17 NOTE — PROGRESS NOTE ADULT - SUBJECTIVE AND OBJECTIVE BOX
INTERVAL HPI/OVERNIGHT EVENTS: Patient without new complaints.  Min was removed yesterday    MEDICATIONS  (STANDING):  amLODIPine   Tablet 10 milliGRAM(s) Oral daily  aspirin  chewable 81 milliGRAM(s) Oral daily  cefepime   IVPB      cefepime   IVPB 2000 milliGRAM(s) IV Intermittent every 8 hours  chlorhexidine 2% Cloths 1 Application(s) Topical <User Schedule>  docusate sodium 100 milliGRAM(s) Oral two times a day  enoxaparin Injectable 80 milliGRAM(s) SubCutaneous two times a day  gabapentin 300 milliGRAM(s) Oral three times a day  insulin lispro (HumaLOG) corrective regimen sliding scale   SubCutaneous Before meals and at bedtime  lactulose Syrup 10 Gram(s) Oral daily  polyethylene glycol 3350 17 Gram(s) Oral two times a day  senna 1 Tablet(s) Oral two times a day  simvastatin 20 milliGRAM(s) Oral at bedtime  tamsulosin 0.4 milliGRAM(s) Oral at bedtime    MEDICATIONS  (PRN):  acetaminophen   Tablet .. 650 milliGRAM(s) Oral every 6 hours PRN Temp greater or equal to 38C (100.4F), Mild Pain (1 - 3)  HYDROmorphone  Injectable 0.5 milliGRAM(s) IV Push every 10 minutes PRN Moderate Pain (4 - 6)  ondansetron Injectable 4 milliGRAM(s) IV Push every 8 hours PRN Nausea and/or Vomiting  oxyCODONE    IR 5 milliGRAM(s) Oral every 4 hours PRN Moderate Pain (4 - 6)  oxyCODONE    IR 10 milliGRAM(s) Oral every 4 hours PRN Severe Pain (7 - 10)  sodium chloride 0.9% lock flush 10 milliLiter(s) IV Push every 1 hour PRN Pre/post blood products, medications, blood draw, and to maintain line patency      Allergies    No Known Allergies    Intolerances        Vital Signs Last 24 Hrs  T(C): 37.4 (2019 07:45), Max: 38.7 (2019 16:51)  T(F): 99.4 (2019 07:45), Max: 101.6 (2019 16:51)  HR: 98 (2019 07:45) (95 - 107)  BP: 125/75 (2019 07:45) (116/74 - 125/75)  BP(mean): --  RR: 18 (2019 07:45) (18 - 18)  SpO2: 97% (2019 07:45) (97% - 100%)    ROS:  as per HPI     ON PE:  General: Well developed; well nourished; in no acute distress  Head: Normocephalic; atraumatic  Respiratory: No tachypnea  Gastrointestinal: Soft non-tender non-distended;  Genitourinary: No costovertebral angle tenderness.  Urinary bladder is clinically not distended  Min in place draining clear  Extremities:right LE in bandage; left in brace  Neurological: Alert and oriented x4  Skin: Warm and dry. No acute rash  Psychiatric: Cooperative and appropriate      LABS:                        8.4    7.48  )-----------( 581      ( 2019 08:12 )             26.9     07-16    133<L>  |  96<L>  |  17.0  ----------------------------<  138<H>  5.0   |  23.0  |  0.78    Ca    9.3      2019 08:55  Phos  4.3     07-16  Mg     2.1     07-16    TPro  7.9  /  Alb  2.6<L>  /  TBili  0.5  /  DBili  x   /  AST  161<H>  /  ALT  167<H>  /  AlkPhos  156<H>  07-16    PT/INR - ( 2019 08:12 )   PT: 16.4 sec;   INR: 1.41 ratio         PTT - ( 2019 08:12 )  PTT:42.9 sec  Urinalysis Basic - ( 2019 00:32 )    Color: Yellow / Appearance: Clear / S.010 / pH: x  Gluc: x / Ketone: Negative  / Bili: Negative / Urobili: 1 mg/dL   Blood: x / Protein: 15 mg/dL / Nitrite: Negative   Leuk Esterase: Negative / RBC: 0-2 /HPF / WBC 0-2   Sq Epi: x / Non Sq Epi: Few / Bacteria: x        RADIOLOGY & ADDITIONAL TESTS:

## 2019-07-17 NOTE — PROGRESS NOTE ADULT - SUBJECTIVE AND OBJECTIVE BOX
[facility]  INFECTIOUS DISEASES AND INTERNAL MEDICINE at [place]  =======================================================  Gabriel Levy MD  Diplomates American Board of Internal Medicine and Infectious Diseases  =======================================================    [facility]-[id number]  AIMEE SOLER     Follow up: R knee septic arthritis    Had fever yesterday evening, most likely the same infectious process.   Has mild pain.    PAST MEDICAL & SURGICAL HISTORY:  GERD (gastroesophageal reflux disease)  HLD (hyperlipidemia)  No significant past surgical history    FMH:  No significant family history    SOC Hx:   Denies etoh, tobacco, or drug use     Allergies  No Known Allergies    Antibiotics:  Cefepime     REVIEW OF SYSTEMS:  CONSTITUTIONAL:  No Fever or chills  HEENT:  No diplopia or blurred vision.  No sore throat or runny nose.  CARDIOVASCULAR:  No chest pain or SOB.  RESPIRATORY:  No cough, shortness of breath, PND or orthopnea.  GASTROINTESTINAL:  No nausea, vomiting or diarrhea.  GENITOURINARY:  No dysuria, frequency or urgency. No Blood in urine  MUSCULOSKELETAL: pain in legs  SKIN:  No change in skin, hair or nails.  NEUROLOGIC:  No paresthesias, fasciculations, seizures or weakness.  PSYCHIATRIC:  No disorder of thought or mood.  ENDOCRINE:  No heat or cold intolerance, polyuria or polydipsia.  HEMATOLOGICAL:  No easy bruising or bleeding.     Physical Exam:  Vital Signs Last 24 Hrs  T(C): 37.4 (2019 07:45), Max: 38.7 (2019 16:51)  T(F): 99.4 (2019 07:45), Max: 101.6 (2019 16:51)  HR: 98 (2019 07:45) (95 - 107)  BP: 125/75 (2019 07:45) (116/74 - 125/75)  BP(mean): --  RR: 18 (2019 07:45) (18 - 18)  SpO2: 97% (2019 07:45) (97% - 100%)  GEN: NAD  HEENT: normocephalic and atraumatic. EOMI. PERRL.    NECK: Supple.  No lymphadenopathy   LUNGS: Clear to auscultation.  HEART: Regular rate and rhythm without murmur.  ABDOMEN: Soft, nontender, and nondistended.  Positive bowel sounds.    : No CVA tenderness  EXTREMITIES: R leg dressed with a drain, left with fixator   NEUROLOGIC: grossly intact.  PSYCHIATRIC: Appropriate affect .  SKIN: No ulceration or induration present.    Labs:      135  |  97<L>  |  18.0  ----------------------------<  135<H>  4.6   |  25.0  |  0.74    Ca    9.0      2019 08:12  Phos  3.4       Mg     2.2         TPro  7.9  /  Alb  2.5<L>  /  TBili  0.7  /  DBili  x   /  AST  89<H>  /  ALT  122<H>  /  AlkPhos  144<H>                          8.4    7.48  )-----------( 581      ( 2019 08:12 )             26.9     PT/INR - ( 2019 08:12 )   PT: 16.4 sec;   INR: 1.41 ratio    PTT - ( 2019 08:12 )  PTT:42.9 sec  Urinalysis Basic - ( 2019 00:32 )    Color: Yellow / Appearance: Clear / S.010 / pH: x  Gluc: x / Ketone: Negative  / Bili: Negative / Urobili: 1 mg/dL   Blood: x / Protein: 15 mg/dL / Nitrite: Negative   Leuk Esterase: Negative / RBC: 0-2 /HPF / WBC 0-2   Sq Epi: x / Non Sq Epi: Few / Bacteria: x      LIVER FUNCTIONS - ( 2019 08:12 )  Alb: 2.5 g/dL / Pro: 7.9 g/dL / ALK PHOS: 144 U/L / ALT: 122 U/L / AST: 89 U/L / GGT: x           RECENT CULTURES:   @ 15:02 .Surgical Swab Right knee joint (swabs)     Gram Negative Rods Growing in broth media only  Culture in progress    Few White blood cells  No organisms seen     @ 15:00 .Surgical Swab Right knee (swabs) Pseudomonas aeruginosa    Few Pseudomonas aeruginosa  Culture in progress    Few White blood cells  No organisms seen     @ 15:37 .Other right knee aspiration (swabs) Pseudomonas aeruginosa    Few Pseudomonas aeruginosa     @ 15:16 .Body Fluid right knee synovial fluid Pseudomonas aeruginosa    Few Pseudomonas aeruginosa  Culture in progress    Numerous White blood cells  No organisms seen     @ 14:25 .Blood     No growth at 48 hours     @ 02:49 .Blood     No growth at 5 days.     @ 08:52 .Blood     No growth at 5 days.    All imaging and other data have been reviewed.

## 2019-07-17 NOTE — PROGRESS NOTE ADULT - ASSESSMENT
A/P:  BPH  Urinary retention    Apparently villegas was removed yesterday and the patient was unable to void.   Villegas replaced.    DO NOT remove villegas until patient is ambulatory as it is very difficult to void in his current position.  will increase flomax

## 2019-07-18 LAB
ANION GAP SERPL CALC-SCNC: 10 MMOL/L — SIGNIFICANT CHANGE UP (ref 5–17)
APPEARANCE UR: CLEAR — SIGNIFICANT CHANGE UP
BACTERIA # UR AUTO: NEGATIVE — SIGNIFICANT CHANGE UP
BASOPHILS # BLD AUTO: 0.02 K/UL — SIGNIFICANT CHANGE UP (ref 0–0.2)
BASOPHILS NFR BLD AUTO: 0.2 % — SIGNIFICANT CHANGE UP (ref 0–2)
BILIRUB UR-MCNC: NEGATIVE — SIGNIFICANT CHANGE UP
BUN SERPL-MCNC: 18 MG/DL — SIGNIFICANT CHANGE UP (ref 8–20)
CALCIUM SERPL-MCNC: 8.6 MG/DL — SIGNIFICANT CHANGE UP (ref 8.6–10.2)
CHLORIDE SERPL-SCNC: 95 MMOL/L — LOW (ref 98–107)
CHLORIDE UR-SCNC: <27 MMOL/L — SIGNIFICANT CHANGE UP
CO2 SERPL-SCNC: 24 MMOL/L — SIGNIFICANT CHANGE UP (ref 22–29)
COLOR SPEC: YELLOW — SIGNIFICANT CHANGE UP
CREAT ?TM UR-MCNC: 75 MG/DL — SIGNIFICANT CHANGE UP
CREAT SERPL-MCNC: 0.79 MG/DL — SIGNIFICANT CHANGE UP (ref 0.5–1.3)
DIFF PNL FLD: ABNORMAL
EOSINOPHIL # BLD AUTO: 0.08 K/UL — SIGNIFICANT CHANGE UP (ref 0–0.5)
EOSINOPHIL NFR BLD AUTO: 0.9 % — SIGNIFICANT CHANGE UP (ref 0–6)
EPI CELLS # UR: SIGNIFICANT CHANGE UP
GLUCOSE BLDC GLUCOMTR-MCNC: 171 MG/DL — HIGH (ref 70–99)
GLUCOSE BLDC GLUCOMTR-MCNC: 176 MG/DL — HIGH (ref 70–99)
GLUCOSE BLDC GLUCOMTR-MCNC: 178 MG/DL — HIGH (ref 70–99)
GLUCOSE BLDC GLUCOMTR-MCNC: 223 MG/DL — HIGH (ref 70–99)
GLUCOSE SERPL-MCNC: 152 MG/DL — HIGH (ref 70–115)
GLUCOSE UR QL: NEGATIVE MG/DL — SIGNIFICANT CHANGE UP
HCT VFR BLD CALC: 23.7 % — LOW (ref 39–50)
HGB BLD-MCNC: 7.4 G/DL — LOW (ref 13–17)
IMM GRANULOCYTES NFR BLD AUTO: 1.6 % — HIGH (ref 0–1.5)
KETONES UR-MCNC: NEGATIVE — SIGNIFICANT CHANGE UP
LEUKOCYTE ESTERASE UR-ACNC: NEGATIVE — SIGNIFICANT CHANGE UP
LYMPHOCYTES # BLD AUTO: 1.09 K/UL — SIGNIFICANT CHANGE UP (ref 1–3.3)
LYMPHOCYTES # BLD AUTO: 12.3 % — LOW (ref 13–44)
MAGNESIUM SERPL-MCNC: 2.2 MG/DL — SIGNIFICANT CHANGE UP (ref 1.6–2.6)
MCHC RBC-ENTMCNC: 26.5 PG — LOW (ref 27–34)
MCHC RBC-ENTMCNC: 31.2 GM/DL — LOW (ref 32–36)
MCV RBC AUTO: 84.9 FL — SIGNIFICANT CHANGE UP (ref 80–100)
MONOCYTES # BLD AUTO: 0.85 K/UL — SIGNIFICANT CHANGE UP (ref 0–0.9)
MONOCYTES NFR BLD AUTO: 9.6 % — SIGNIFICANT CHANGE UP (ref 2–14)
NEUTROPHILS # BLD AUTO: 6.69 K/UL — SIGNIFICANT CHANGE UP (ref 1.8–7.4)
NEUTROPHILS NFR BLD AUTO: 75.4 % — SIGNIFICANT CHANGE UP (ref 43–77)
NITRITE UR-MCNC: NEGATIVE — SIGNIFICANT CHANGE UP
OSMOLALITY UR: 414 MOSM/KG — SIGNIFICANT CHANGE UP (ref 300–1000)
PH UR: 6 — SIGNIFICANT CHANGE UP (ref 5–8)
PHOSPHATE SERPL-MCNC: 2.7 MG/DL — SIGNIFICANT CHANGE UP (ref 2.4–4.7)
PLATELET # BLD AUTO: 520 K/UL — HIGH (ref 150–400)
POTASSIUM SERPL-MCNC: 4.4 MMOL/L — SIGNIFICANT CHANGE UP (ref 3.5–5.3)
POTASSIUM SERPL-SCNC: 4.4 MMOL/L — SIGNIFICANT CHANGE UP (ref 3.5–5.3)
PROT UR-MCNC: 30 MG/DL
RBC # BLD: 2.79 M/UL — LOW (ref 4.2–5.8)
RBC # FLD: 17.2 % — HIGH (ref 10.3–14.5)
RBC CASTS # UR COMP ASSIST: SIGNIFICANT CHANGE UP /HPF (ref 0–4)
SODIUM SERPL-SCNC: 129 MMOL/L — LOW (ref 135–145)
SODIUM UR-SCNC: <30 MMOL/L — SIGNIFICANT CHANGE UP
SP GR SPEC: 1.01 — SIGNIFICANT CHANGE UP (ref 1.01–1.02)
UROBILINOGEN FLD QL: NEGATIVE MG/DL — SIGNIFICANT CHANGE UP
WBC # BLD: 8.87 K/UL — SIGNIFICANT CHANGE UP (ref 3.8–10.5)
WBC # FLD AUTO: 8.87 K/UL — SIGNIFICANT CHANGE UP (ref 3.8–10.5)
WBC UR QL: SIGNIFICANT CHANGE UP

## 2019-07-18 PROCEDURE — 99232 SBSQ HOSP IP/OBS MODERATE 35: CPT

## 2019-07-18 RX ORDER — HYDROMORPHONE HYDROCHLORIDE 2 MG/ML
0.5 INJECTION INTRAMUSCULAR; INTRAVENOUS; SUBCUTANEOUS ONCE
Refills: 0 | Status: DISCONTINUED | OUTPATIENT
Start: 2019-07-18 | End: 2019-07-18

## 2019-07-18 RX ADMIN — ENOXAPARIN SODIUM 80 MILLIGRAM(S): 100 INJECTION SUBCUTANEOUS at 17:13

## 2019-07-18 RX ADMIN — GABAPENTIN 300 MILLIGRAM(S): 400 CAPSULE ORAL at 13:13

## 2019-07-18 RX ADMIN — OXYCODONE HYDROCHLORIDE 10 MILLIGRAM(S): 5 TABLET ORAL at 00:00

## 2019-07-18 RX ADMIN — Medication 1: at 12:52

## 2019-07-18 RX ADMIN — GABAPENTIN 300 MILLIGRAM(S): 400 CAPSULE ORAL at 22:00

## 2019-07-18 RX ADMIN — POLYETHYLENE GLYCOL 3350 17 GRAM(S): 17 POWDER, FOR SOLUTION ORAL at 17:14

## 2019-07-18 RX ADMIN — CHLORHEXIDINE GLUCONATE 1 APPLICATION(S): 213 SOLUTION TOPICAL at 08:13

## 2019-07-18 RX ADMIN — Medication 81 MILLIGRAM(S): at 11:30

## 2019-07-18 RX ADMIN — SIMVASTATIN 20 MILLIGRAM(S): 20 TABLET, FILM COATED ORAL at 21:59

## 2019-07-18 RX ADMIN — Medication 100 MILLIGRAM(S): at 17:13

## 2019-07-18 RX ADMIN — CEFEPIME 100 MILLIGRAM(S): 1 INJECTION, POWDER, FOR SOLUTION INTRAMUSCULAR; INTRAVENOUS at 05:57

## 2019-07-18 RX ADMIN — OXYCODONE HYDROCHLORIDE 10 MILLIGRAM(S): 5 TABLET ORAL at 21:00

## 2019-07-18 RX ADMIN — GABAPENTIN 300 MILLIGRAM(S): 400 CAPSULE ORAL at 05:56

## 2019-07-18 RX ADMIN — CEFEPIME 100 MILLIGRAM(S): 1 INJECTION, POWDER, FOR SOLUTION INTRAMUSCULAR; INTRAVENOUS at 21:59

## 2019-07-18 RX ADMIN — Medication 1: at 17:13

## 2019-07-18 RX ADMIN — CHLORHEXIDINE GLUCONATE 1 APPLICATION(S): 213 SOLUTION TOPICAL at 05:56

## 2019-07-18 RX ADMIN — Medication 100 MILLIGRAM(S): at 05:57

## 2019-07-18 RX ADMIN — POLYETHYLENE GLYCOL 3350 17 GRAM(S): 17 POWDER, FOR SOLUTION ORAL at 05:57

## 2019-07-18 RX ADMIN — ENOXAPARIN SODIUM 80 MILLIGRAM(S): 100 INJECTION SUBCUTANEOUS at 05:57

## 2019-07-18 RX ADMIN — HYDROMORPHONE HYDROCHLORIDE 0.5 MILLIGRAM(S): 2 INJECTION INTRAMUSCULAR; INTRAVENOUS; SUBCUTANEOUS at 23:37

## 2019-07-18 RX ADMIN — TAMSULOSIN HYDROCHLORIDE 0.8 MILLIGRAM(S): 0.4 CAPSULE ORAL at 21:59

## 2019-07-18 RX ADMIN — OXYCODONE HYDROCHLORIDE 10 MILLIGRAM(S): 5 TABLET ORAL at 20:13

## 2019-07-18 RX ADMIN — HYDROMORPHONE HYDROCHLORIDE 0.5 MILLIGRAM(S): 2 INJECTION INTRAMUSCULAR; INTRAVENOUS; SUBCUTANEOUS at 22:19

## 2019-07-18 RX ADMIN — Medication 1: at 08:12

## 2019-07-18 RX ADMIN — SENNA PLUS 1 TABLET(S): 8.6 TABLET ORAL at 17:14

## 2019-07-18 RX ADMIN — CEFEPIME 100 MILLIGRAM(S): 1 INJECTION, POWDER, FOR SOLUTION INTRAMUSCULAR; INTRAVENOUS at 13:12

## 2019-07-18 RX ADMIN — AMLODIPINE BESYLATE 10 MILLIGRAM(S): 2.5 TABLET ORAL at 05:56

## 2019-07-18 RX ADMIN — OXYCODONE HYDROCHLORIDE 10 MILLIGRAM(S): 5 TABLET ORAL at 11:30

## 2019-07-18 RX ADMIN — SENNA PLUS 1 TABLET(S): 8.6 TABLET ORAL at 05:57

## 2019-07-18 RX ADMIN — Medication 2: at 21:59

## 2019-07-18 NOTE — PROGRESS NOTE ADULT - ASSESSMENT
Pt is pOD 15 s/p excision and grafting of titbial/peroneal nerve injuries. POD4 s/p R knee washout after septic joint with GNR (pseudamonas). Patient was afebrile for the past 24 hours. upon looking at all surgical incisions and dressings, index of suspicion for Lower extremity infection is low and differential for infection source remains broad. WBCs remain normal.    - all surgical incisions examined with no source of infection or new fluid collections noted.  - Wound vac replaced yesterday without sign of infection.   - continue Abx per primary team and ID recs  	- continue daily dressing changes and q3 wound vac changes

## 2019-07-18 NOTE — PROGRESS NOTE ADULT - SUBJECTIVE AND OBJECTIVE BOX
HPI/OVERNIGHT EVENTS: Patient seen and examined at bedside this AM. No acute events overnight per nursing reports. Afebrile for the last 24 hours. No other complaints. Denies fever, chills, nausea, vomitting, chest pain, SOB, dizziness, abd pain or any other concerning symptoms    Vital Signs Last 24 Hrs  T(C): 37 (18 Jul 2019 07:37), Max: 37.7 (17 Jul 2019 15:46)  T(F): 98.6 (18 Jul 2019 07:37), Max: 99.9 (17 Jul 2019 23:40)  HR: 94 (18 Jul 2019 07:37) (88 - 114)  BP: 127/77 (18 Jul 2019 07:37) (116/98 - 131/74)  BP(mean): --  RR: 18 (18 Jul 2019 07:37) (18 - 19)  SpO2: 99% (18 Jul 2019 07:37) (97% - 99%)    I&O's Detail    17 Jul 2019 07:01  -  18 Jul 2019 07:00  --------------------------------------------------------  IN:  Total IN: 0 mL    OUT:    Indwelling Catheter - Urethral: 2450 mL  Total OUT: 2450 mL    Total NET: -2450 mL            Gen: a/o x 3. non distressed  Pulm: non labored, CTA  CV: warm well perfused, rrr  Abd: soft/ non tender non distended  : villegas out  Ext: RLE: dressings changed. Superficial skin necrosis on the lateral aspect of the R knee. no signs of fluid collection in knee or lower leg.  wound vac on integra site on Lateral knee place with good suction and no leak. no signs of infection. Split thickness donor graft well healing 80-90% take.   Vasc: 2+ b/l DP pulses    LABS:                        7.4    8.87  )-----------( 520      ( 18 Jul 2019 05:42 )             23.7     07-18    129<L>  |  95<L>  |  18.0  ----------------------------<  152<H>  4.4   |  24.0  |  0.79    Ca    8.6      18 Jul 2019 05:42  Phos  2.7     07-18  Mg     2.2     07-18    TPro  7.9  /  Alb  2.5<L>  /  TBili  0.7  /  DBili  x   /  AST  89<H>  /  ALT  122<H>  /  AlkPhos  144<H>  07-17    PT/INR - ( 17 Jul 2019 08:12 )   PT: 16.4 sec;   INR: 1.41 ratio         PTT - ( 17 Jul 2019 08:12 )  PTT:42.9 sec      MEDICATIONS  (STANDING):  amLODIPine   Tablet 10 milliGRAM(s) Oral daily  aspirin  chewable 81 milliGRAM(s) Oral daily  cefepime   IVPB      cefepime   IVPB 2000 milliGRAM(s) IV Intermittent every 8 hours  chlorhexidine 2% Cloths 1 Application(s) Topical <User Schedule>  docusate sodium 100 milliGRAM(s) Oral two times a day  enoxaparin Injectable 80 milliGRAM(s) SubCutaneous two times a day  gabapentin 300 milliGRAM(s) Oral three times a day  insulin lispro (HumaLOG) corrective regimen sliding scale   SubCutaneous Before meals and at bedtime  lactulose Syrup 10 Gram(s) Oral daily  polyethylene glycol 3350 17 Gram(s) Oral two times a day  senna 1 Tablet(s) Oral two times a day  simvastatin 20 milliGRAM(s) Oral at bedtime  tamsulosin 0.8 milliGRAM(s) Oral at bedtime    MEDICATIONS  (PRN):  acetaminophen   Tablet .. 650 milliGRAM(s) Oral every 6 hours PRN Temp greater or equal to 38C (100.4F), Mild Pain (1 - 3)  ondansetron Injectable 4 milliGRAM(s) IV Push every 8 hours PRN Nausea and/or Vomiting  oxyCODONE    IR 5 milliGRAM(s) Oral every 4 hours PRN Moderate Pain (4 - 6)  oxyCODONE    IR 10 milliGRAM(s) Oral every 4 hours PRN Severe Pain (7 - 10)  sodium chloride 0.9% lock flush 10 milliLiter(s) IV Push every 1 hour PRN Pre/post blood products, medications, blood draw, and to maintain line patency

## 2019-07-18 NOTE — PROGRESS NOTE ADULT - SUBJECTIVE AND OBJECTIVE BOX
HPI/OVERNIGHT EVENTS:    Vac changed on 7/17. Good granulation and bleeding in the integra vac site. No other events overnight. no f/c/sob/n/v. Min in place.     MEDICATIONS  (STANDING):  amLODIPine   Tablet 10 milliGRAM(s) Oral daily  aspirin  chewable 81 milliGRAM(s) Oral daily  cefepime   IVPB      cefepime   IVPB 2000 milliGRAM(s) IV Intermittent every 8 hours  chlorhexidine 2% Cloths 1 Application(s) Topical <User Schedule>  docusate sodium 100 milliGRAM(s) Oral two times a day  enoxaparin Injectable 80 milliGRAM(s) SubCutaneous two times a day  gabapentin 300 milliGRAM(s) Oral three times a day  insulin lispro (HumaLOG) corrective regimen sliding scale   SubCutaneous Before meals and at bedtime  lactulose Syrup 10 Gram(s) Oral daily  polyethylene glycol 3350 17 Gram(s) Oral two times a day  senna 1 Tablet(s) Oral two times a day  simvastatin 20 milliGRAM(s) Oral at bedtime  tamsulosin 0.8 milliGRAM(s) Oral at bedtime    MEDICATIONS  (PRN):  acetaminophen   Tablet .. 650 milliGRAM(s) Oral every 6 hours PRN Temp greater or equal to 38C (100.4F), Mild Pain (1 - 3)  ondansetron Injectable 4 milliGRAM(s) IV Push every 8 hours PRN Nausea and/or Vomiting  oxyCODONE    IR 5 milliGRAM(s) Oral every 4 hours PRN Moderate Pain (4 - 6)  oxyCODONE    IR 10 milliGRAM(s) Oral every 4 hours PRN Severe Pain (7 - 10)  sodium chloride 0.9% lock flush 10 milliLiter(s) IV Push every 1 hour PRN Pre/post blood products, medications, blood draw, and to maintain line patency      Vital Signs Last 24 Hrs  T(C): 37.7 (17 Jul 2019 23:40), Max: 37.7 (17 Jul 2019 15:46)  T(F): 99.9 (17 Jul 2019 23:40), Max: 99.9 (17 Jul 2019 23:40)  HR: 102 (17 Jul 2019 23:40) (88 - 114)  BP: 131/74 (17 Jul 2019 23:40) (116/98 - 131/74)  BP(mean): --  RR: 18 (17 Jul 2019 23:40) (18 - 19)  SpO2: 99% (17 Jul 2019 23:40) (97% - 99%)    Constitutional: patient resting comfortably in bed, in no acute distress  HEENT: EOMI / PERRL b/l, no active drainage or redness  Neck: No JVD, full ROM without pain  Respiratory: respirations are unlabored, no accessory muscle use, no conversational dyspnea  Cardiovascular: regular rate & rhythm  Gastrointestinal: Abdomen soft, non-tender, non-distended, no rebound tenderness / guarding  Neurological: GCS: 15 (4/5/6). A&O x 3; no gross sensory / motor / coordination deficits  Psychiatric: Normal mood, normal affect  Musculoskeletal: leg pain and limited range of motion bc of the casts. Dressing changes by Plastics.       I&O's Detail    16 Jul 2019 07:01  -  17 Jul 2019 07:00  --------------------------------------------------------  IN:  Total IN: 0 mL    OUT:    Indwelling Catheter - Urethral: 3200 mL  Total OUT: 3200 mL    Total NET: -3200 mL      17 Jul 2019 07:01  -  18 Jul 2019 00:56  --------------------------------------------------------  IN:  Total IN: 0 mL    OUT:    Indwelling Catheter - Urethral: 2450 mL  Total OUT: 2450 mL    Total NET: -2450 mL          LABS:                        8.4    7.48  )-----------( 581      ( 17 Jul 2019 08:12 )             26.9     07-17    135  |  97<L>  |  18.0  ----------------------------<  135<H>  4.6   |  25.0  |  0.74    Ca    9.0      17 Jul 2019 08:12  Phos  3.4     07-17  Mg     2.2     07-17    TPro  7.9  /  Alb  2.5<L>  /  TBili  0.7  /  DBili  x   /  AST  89<H>  /  ALT  122<H>  /  AlkPhos  144<H>  07-17    PT/INR - ( 17 Jul 2019 08:12 )   PT: 16.4 sec;   INR: 1.41 ratio         PTT - ( 17 Jul 2019 08:12 )  PTT:42.9 sec

## 2019-07-18 NOTE — PROVIDER CONTACT NOTE (MEDICATION) - BACKGROUND
pt came in on 6/25/19 after a work accident where he was crushed and impaled by a forklift. pt is post op right popliteal bypass RLE fasciatomies, and right thigh graft.

## 2019-07-18 NOTE — PROGRESS NOTE ADULT - SUBJECTIVE AND OBJECTIVE BOX
INTERVAL HPI/OVERNIGHT EVENTS:  Resting comfortably with the villegas intact.    MEDICATIONS  (STANDING):  amLODIPine   Tablet 10 milliGRAM(s) Oral daily  aspirin  chewable 81 milliGRAM(s) Oral daily  cefepime   IVPB      cefepime   IVPB 2000 milliGRAM(s) IV Intermittent every 8 hours  chlorhexidine 2% Cloths 1 Application(s) Topical <User Schedule>  docusate sodium 100 milliGRAM(s) Oral two times a day  enoxaparin Injectable 80 milliGRAM(s) SubCutaneous two times a day  gabapentin 300 milliGRAM(s) Oral three times a day  insulin lispro (HumaLOG) corrective regimen sliding scale   SubCutaneous Before meals and at bedtime  lactulose Syrup 10 Gram(s) Oral daily  polyethylene glycol 3350 17 Gram(s) Oral two times a day  senna 1 Tablet(s) Oral two times a day  simvastatin 20 milliGRAM(s) Oral at bedtime  tamsulosin 0.8 milliGRAM(s) Oral at bedtime    MEDICATIONS  (PRN):  acetaminophen   Tablet .. 650 milliGRAM(s) Oral every 6 hours PRN Temp greater or equal to 38C (100.4F), Mild Pain (1 - 3)  ondansetron Injectable 4 milliGRAM(s) IV Push every 8 hours PRN Nausea and/or Vomiting  oxyCODONE    IR 5 milliGRAM(s) Oral every 4 hours PRN Moderate Pain (4 - 6)  oxyCODONE    IR 10 milliGRAM(s) Oral every 4 hours PRN Severe Pain (7 - 10)  sodium chloride 0.9% lock flush 10 milliLiter(s) IV Push every 1 hour PRN Pre/post blood products, medications, blood draw, and to maintain line patency      Allergies    No Known Allergies    Intolerances        Vital Signs Last 24 Hrs  T(C): 37 (2019 07:37), Max: 37.7 (2019 23:40)  T(F): 98.6 (2019 07:37), Max: 99.9 (2019 23:40)  HR: 94 (2019 07:37) (88 - 102)  BP: 127/77 (2019 07:37) (123/76 - 131/74)  BP(mean): --  RR: 18 (2019 07:37) (18 - 19)  SpO2: 99% (2019 07:37) (97% - 99%)     ON PE:  General: alert and awake  Abdomen: soft, nt, nd, bs+  : Clear yellow urine in the villegas    LABS:                        7.4    8.87  )-----------( 520      ( 2019 05:42 )             23.7     07-18    129<L>  |  95<L>  |  18.0  ----------------------------<  152<H>  4.4   |  24.0  |  0.79    Ca    8.6      2019 05:42  Phos  2.7     07-18  Mg     2.2     07-18    TPro  7.9  /  Alb  2.5<L>  /  TBili  0.7  /  DBili  x   /  AST  89<H>  /  ALT  122<H>  /  AlkPhos  144<H>  07-17    PT/INR - ( 2019 08:12 )   PT: 16.4 sec;   INR: 1.41 ratio         PTT - ( 2019 08:12 )  PTT:42.9 sec  Urinalysis Basic - ( 2019 16:03 )    Color: Yellow / Appearance: Clear / S.010 / pH: x  Gluc: x / Ketone: Negative  / Bili: Negative / Urobili: Negative mg/dL   Blood: x / Protein: 30 mg/dL / Nitrite: Negative   Leuk Esterase: Negative / RBC: x / WBC x   Sq Epi: x / Non Sq Epi: x / Bacteria: x        RADIOLOGY & ADDITIONAL TESTS:

## 2019-07-18 NOTE — PROGRESS NOTE ADULT - SUBJECTIVE AND OBJECTIVE BOX
[facility]  INFECTIOUS DISEASES AND INTERNAL MEDICINE at [place]  =======================================================  Gabriel Levy MD  Diplomates American Board of Internal Medicine and Infectious Diseases  =======================================================    [id number]  AIMEE SOLER     Follow up: R knee septic arthritis    No more fever. Sitting on chair. No new complaint.   Has mild pain.    PAST MEDICAL & SURGICAL HISTORY:  GERD (gastroesophageal reflux disease)  HLD (hyperlipidemia)  No significant past surgical history    FMH:  No significant family history    SOC Hx:   Denies etoh, tobacco, or drug use     Allergies  No Known Allergies    Antibiotics:  Cefepime     REVIEW OF SYSTEMS:  CONSTITUTIONAL:  No Fever or chills  HEENT:  No diplopia or blurred vision.  No sore throat or runny nose.  CARDIOVASCULAR:  No chest pain or SOB.  RESPIRATORY:  No cough, shortness of breath, PND or orthopnea.  GASTROINTESTINAL:  No nausea, vomiting or diarrhea.  GENITOURINARY:  No dysuria, frequency or urgency. No Blood in urine  MUSCULOSKELETAL: pain in legs  SKIN:  No change in skin, hair or nails.  NEUROLOGIC:  No paresthesias, fasciculations, seizures or weakness.  PSYCHIATRIC:  No disorder of thought or mood.  ENDOCRINE:  No heat or cold intolerance, polyuria or polydipsia.  HEMATOLOGICAL:  No easy bruising or bleeding.     Physical Exam:  Vital Signs Last 24 Hrs  T(C): 37 (18 Jul 2019 07:37), Max: 37.7 (17 Jul 2019 15:46)  T(F): 98.6 (18 Jul 2019 07:37), Max: 99.9 (17 Jul 2019 23:40)  HR: 94 (18 Jul 2019 07:37) (88 - 114)  BP: 127/77 (18 Jul 2019 07:37) (116/98 - 131/74)  BP(mean): --  RR: 18 (18 Jul 2019 07:37) (18 - 19)  SpO2: 99% (18 Jul 2019 07:37) (97% - 99%)  GEN: NAD  HEENT: normocephalic and atraumatic. EOMI. PERRL.    NECK: Supple.  No lymphadenopathy   LUNGS: Clear to auscultation.  HEART: Regular rate and rhythm without murmur.  ABDOMEN: Soft, nontender, and nondistended.  Positive bowel sounds.    : No CVA tenderness  EXTREMITIES: R leg dressed with a drain, left with fixator   NEUROLOGIC: grossly intact.  PSYCHIATRIC: Appropriate affect .  SKIN: No ulceration or induration present.    Labs:  07-18    129<L>  |  95<L>  |  18.0  ----------------------------<  152<H>  4.4   |  24.0  |  0.79    Ca    8.6      18 Jul 2019 05:42  Phos  2.7     07-18  Mg     2.2     07-18    TPro  7.9  /  Alb  2.5<L>  /  TBili  0.7  /  DBili  x   /  AST  89<H>  /  ALT  122<H>  /  AlkPhos  144<H>  07-17                        7.4    8.87  )-----------( 520      ( 18 Jul 2019 05:42 )             23.7     PT/INR - ( 17 Jul 2019 08:12 )   PT: 16.4 sec;   INR: 1.41 ratio     PTT - ( 17 Jul 2019 08:12 )  PTT:42.9 sec    LIVER FUNCTIONS - ( 17 Jul 2019 08:12 )  Alb: 2.5 g/dL / Pro: 7.9 g/dL / ALK PHOS: 144 U/L / ALT: 122 U/L / AST: 89 U/L / GGT: x           RECENT CULTURES:  07-16 @ 00:19 .Blood     No growth at 48 hours    07-14 @ 15:02 .Surgical Swab Right knee joint (swabs) Pseudomonas aeruginosa    Pseudomonas aeruginosa Growing in broth media only  Culture in progress    Few White blood cells  No organisms seen    07-14 @ 15:00 .Surgical Swab Right knee (swabs) Pseudomonas aeruginosa    Few Pseudomonas aeruginosa  Culture in progress    Few White blood cells  No organisms seen    07-12 @ 15:37 .Other right knee aspiration (swabs) Pseudomonas aeruginosa    Few Pseudomonas aeruginosa    07-12 @ 15:16 .Body Fluid right knee synovial fluid Pseudomonas aeruginosa    Few Pseudomonas aeruginosa    Numerous White blood cells  No organisms seen    07-12 @ 14:25 .Blood     No growth at 5 days.    07-12 @ 02:49 .Blood     No growth at 5 days.    07-05 @ 08:52 .Blood     No growth at 5 days.    All imaging and other data have been reviewed.

## 2019-07-18 NOTE — PROGRESS NOTE ADULT - SUBJECTIVE AND OBJECTIVE BOX
Patient seen and eval at bedside. Patient has no complaints. Denies pain. Denies CP, SOB, dizziness.    Vital Signs Last 24 Hrs  T(C): 37 (18 Jul 2019 07:37), Max: 37.7 (17 Jul 2019 15:46)  T(F): 98.6 (18 Jul 2019 07:37), Max: 99.9 (17 Jul 2019 23:40)  HR: 94 (18 Jul 2019 07:37) (88 - 114)  BP: 127/77 (18 Jul 2019 07:37) (116/98 - 131/74)  RR: 18 (18 Jul 2019 07:37) (18 - 19)  SpO2: 99% (18 Jul 2019 07:37) (97% - 99%)    PE: NAD, alert awake  Left LE: Monie brace in place. No skin break down noted on exam from brace, well padded.  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  ·	Pain control  ·	DVT propx: Lov  ·	PT - NWB LLE  ·	PICC line  ·	Cont Abx as per ID  ·	VAC/right LE dressings as per Plastics/primary team  ·	Transfusion prbc as per primary team for anemia  ·	Hyponatremia - management as per primary team  ·	Dispo: Rehab when cleared by primary team

## 2019-07-18 NOTE — PROGRESS NOTE ADULT - ASSESSMENT
Pt is pOD 14 s/p excision and grafting of titbial/peroneal nerve injuries. POD3 s/p R knee washout after septic joint with GNR (pseudamonas). Patient has had recurrent fevers. upon looking at all surgical incisions and dressings, index of suspicion for Lower extremity infection is low and differential for infection source remains broad. WBCs remain normal.      #Intermittent fevers - last fever  - all blood cx NGTD  - all surgical incisions examined with no source of infection or new fluid collections noted.  - Wound vac replaced yesterday without sign of infection.   - consider repeat CT scan of LEs to look for possible infection source  - cefepime (7/15-??) will appreciate ID recs      #DVT  - Therapeutic lovenox    #Wounds  - no signs of deep infection or underlying abscess   - Integra removed - wound vac in place next change fri 7/19  -vac change q3dy, 4x2x1  - Xerform and ABD to recipient site  - Kerlix and ACE to cover al wounds of RLE    #mobility  - Leg elevation  - continue LLE splint  - PMNR recs ACUTE inpatient rehabilitation

## 2019-07-18 NOTE — PROGRESS NOTE ADULT - ASSESSMENT
69y/o man with no significant PMH was admitted on 6/25 due to a trauma at work. He had a large laceration in R leg requiring popliteal bypass and repair and left tibial plateau fracture s/o ORIF. He was febrile on 7/5, started on antibiotics, due to possibility of septic arthritis had R knee arthrocentesis showing 44k WBC and GNR in gram stain, so he was taken to OR for wash out early 7/14.     R knee septic arthritis     - Blood culture negative on 7/5, 7/12 and 7/16  - Knee aspiration with a sensitive pseudomonas    - Continue cefepime 2gm q8h   - CRP=25.59, will trend it   - At least 4 weeks of treatment  - No leukocytosis or fever.  - Will nee a PICC/midline.   - Ortho follow up.     Will follow.

## 2019-07-19 LAB
ALBUMIN SERPL ELPH-MCNC: 2.7 G/DL — LOW (ref 3.3–5.2)
ALP SERPL-CCNC: 140 U/L — HIGH (ref 40–120)
ALT FLD-CCNC: 96 U/L — HIGH
ANION GAP SERPL CALC-SCNC: 11 MMOL/L — SIGNIFICANT CHANGE UP (ref 5–17)
AST SERPL-CCNC: 82 U/L — HIGH
BASOPHILS # BLD AUTO: 0.02 K/UL — SIGNIFICANT CHANGE UP (ref 0–0.2)
BASOPHILS NFR BLD AUTO: 0.2 % — SIGNIFICANT CHANGE UP (ref 0–2)
BILIRUB DIRECT SERPL-MCNC: 0.2 MG/DL — SIGNIFICANT CHANGE UP (ref 0–0.3)
BILIRUB INDIRECT FLD-MCNC: 0.5 MG/DL — SIGNIFICANT CHANGE UP (ref 0.2–1)
BILIRUB SERPL-MCNC: 0.7 MG/DL — SIGNIFICANT CHANGE UP (ref 0.4–2)
BUN SERPL-MCNC: 15 MG/DL — SIGNIFICANT CHANGE UP (ref 8–20)
CALCIUM SERPL-MCNC: 9.1 MG/DL — SIGNIFICANT CHANGE UP (ref 8.6–10.2)
CHLORIDE SERPL-SCNC: 97 MMOL/L — LOW (ref 98–107)
CO2 SERPL-SCNC: 26 MMOL/L — SIGNIFICANT CHANGE UP (ref 22–29)
CREAT SERPL-MCNC: 0.77 MG/DL — SIGNIFICANT CHANGE UP (ref 0.5–1.3)
CRP SERPL-MCNC: 23.08 MG/DL — HIGH (ref 0–0.4)
CULTURE RESULTS: SIGNIFICANT CHANGE UP
CULTURE RESULTS: SIGNIFICANT CHANGE UP
EOSINOPHIL # BLD AUTO: 0.15 K/UL — SIGNIFICANT CHANGE UP (ref 0–0.5)
EOSINOPHIL NFR BLD AUTO: 1.8 % — SIGNIFICANT CHANGE UP (ref 0–6)
GLUCOSE BLDC GLUCOMTR-MCNC: 122 MG/DL — HIGH (ref 70–99)
GLUCOSE BLDC GLUCOMTR-MCNC: 142 MG/DL — HIGH (ref 70–99)
GLUCOSE BLDC GLUCOMTR-MCNC: 148 MG/DL — HIGH (ref 70–99)
GLUCOSE BLDC GLUCOMTR-MCNC: 159 MG/DL — HIGH (ref 70–99)
GLUCOSE SERPL-MCNC: 138 MG/DL — HIGH (ref 70–115)
HCT VFR BLD CALC: 28 % — LOW (ref 39–50)
HGB BLD-MCNC: 8.5 G/DL — LOW (ref 13–17)
IMM GRANULOCYTES NFR BLD AUTO: 1.5 % — SIGNIFICANT CHANGE UP (ref 0–1.5)
LYMPHOCYTES # BLD AUTO: 1.64 K/UL — SIGNIFICANT CHANGE UP (ref 1–3.3)
LYMPHOCYTES # BLD AUTO: 19.9 % — SIGNIFICANT CHANGE UP (ref 13–44)
MAGNESIUM SERPL-MCNC: 2.4 MG/DL — SIGNIFICANT CHANGE UP (ref 1.6–2.6)
MCHC RBC-ENTMCNC: 26.3 PG — LOW (ref 27–34)
MCHC RBC-ENTMCNC: 30.4 GM/DL — LOW (ref 32–36)
MCV RBC AUTO: 86.7 FL — SIGNIFICANT CHANGE UP (ref 80–100)
MONOCYTES # BLD AUTO: 0.62 K/UL — SIGNIFICANT CHANGE UP (ref 0–0.9)
MONOCYTES NFR BLD AUTO: 7.5 % — SIGNIFICANT CHANGE UP (ref 2–14)
NEUTROPHILS # BLD AUTO: 5.7 K/UL — SIGNIFICANT CHANGE UP (ref 1.8–7.4)
NEUTROPHILS NFR BLD AUTO: 69.1 % — SIGNIFICANT CHANGE UP (ref 43–77)
ORGANISM # SPEC MICROSCOPIC CNT: SIGNIFICANT CHANGE UP
PHOSPHATE SERPL-MCNC: 2.9 MG/DL — SIGNIFICANT CHANGE UP (ref 2.4–4.7)
PLATELET # BLD AUTO: 580 K/UL — HIGH (ref 150–400)
POTASSIUM SERPL-MCNC: 4.6 MMOL/L — SIGNIFICANT CHANGE UP (ref 3.5–5.3)
POTASSIUM SERPL-SCNC: 4.6 MMOL/L — SIGNIFICANT CHANGE UP (ref 3.5–5.3)
PROT SERPL-MCNC: 8.2 G/DL — SIGNIFICANT CHANGE UP (ref 6.6–8.7)
RBC # BLD: 3.23 M/UL — LOW (ref 4.2–5.8)
RBC # FLD: 17.2 % — HIGH (ref 10.3–14.5)
SODIUM SERPL-SCNC: 134 MMOL/L — LOW (ref 135–145)
SPECIMEN SOURCE: SIGNIFICANT CHANGE UP
SPECIMEN SOURCE: SIGNIFICANT CHANGE UP
WBC # BLD: 8.25 K/UL — SIGNIFICANT CHANGE UP (ref 3.8–10.5)
WBC # FLD AUTO: 8.25 K/UL — SIGNIFICANT CHANGE UP (ref 3.8–10.5)

## 2019-07-19 PROCEDURE — 99231 SBSQ HOSP IP/OBS SF/LOW 25: CPT

## 2019-07-19 RX ORDER — HYDROMORPHONE HYDROCHLORIDE 2 MG/ML
0.5 INJECTION INTRAMUSCULAR; INTRAVENOUS; SUBCUTANEOUS ONCE
Refills: 0 | Status: DISCONTINUED | OUTPATIENT
Start: 2019-07-19 | End: 2019-07-19

## 2019-07-19 RX ADMIN — HYDROMORPHONE HYDROCHLORIDE 0.5 MILLIGRAM(S): 2 INJECTION INTRAMUSCULAR; INTRAVENOUS; SUBCUTANEOUS at 08:09

## 2019-07-19 RX ADMIN — HYDROMORPHONE HYDROCHLORIDE 0.5 MILLIGRAM(S): 2 INJECTION INTRAMUSCULAR; INTRAVENOUS; SUBCUTANEOUS at 08:24

## 2019-07-19 RX ADMIN — CHLORHEXIDINE GLUCONATE 1 APPLICATION(S): 213 SOLUTION TOPICAL at 05:38

## 2019-07-19 RX ADMIN — GABAPENTIN 300 MILLIGRAM(S): 400 CAPSULE ORAL at 13:06

## 2019-07-19 RX ADMIN — OXYCODONE HYDROCHLORIDE 10 MILLIGRAM(S): 5 TABLET ORAL at 23:30

## 2019-07-19 RX ADMIN — POLYETHYLENE GLYCOL 3350 17 GRAM(S): 17 POWDER, FOR SOLUTION ORAL at 05:38

## 2019-07-19 RX ADMIN — AMLODIPINE BESYLATE 10 MILLIGRAM(S): 2.5 TABLET ORAL at 05:38

## 2019-07-19 RX ADMIN — Medication 1: at 22:35

## 2019-07-19 RX ADMIN — OXYCODONE HYDROCHLORIDE 10 MILLIGRAM(S): 5 TABLET ORAL at 04:26

## 2019-07-19 RX ADMIN — Medication 100 MILLIGRAM(S): at 18:01

## 2019-07-19 RX ADMIN — SENNA PLUS 1 TABLET(S): 8.6 TABLET ORAL at 18:02

## 2019-07-19 RX ADMIN — Medication 81 MILLIGRAM(S): at 13:04

## 2019-07-19 RX ADMIN — CEFEPIME 100 MILLIGRAM(S): 1 INJECTION, POWDER, FOR SOLUTION INTRAMUSCULAR; INTRAVENOUS at 22:35

## 2019-07-19 RX ADMIN — GABAPENTIN 300 MILLIGRAM(S): 400 CAPSULE ORAL at 05:38

## 2019-07-19 RX ADMIN — OXYCODONE HYDROCHLORIDE 10 MILLIGRAM(S): 5 TABLET ORAL at 05:43

## 2019-07-19 RX ADMIN — POLYETHYLENE GLYCOL 3350 17 GRAM(S): 17 POWDER, FOR SOLUTION ORAL at 18:01

## 2019-07-19 RX ADMIN — ENOXAPARIN SODIUM 80 MILLIGRAM(S): 100 INJECTION SUBCUTANEOUS at 18:01

## 2019-07-19 RX ADMIN — OXYCODONE HYDROCHLORIDE 10 MILLIGRAM(S): 5 TABLET ORAL at 22:35

## 2019-07-19 RX ADMIN — SIMVASTATIN 20 MILLIGRAM(S): 20 TABLET, FILM COATED ORAL at 22:35

## 2019-07-19 RX ADMIN — LACTULOSE 10 GRAM(S): 10 SOLUTION ORAL at 13:04

## 2019-07-19 RX ADMIN — CEFEPIME 100 MILLIGRAM(S): 1 INJECTION, POWDER, FOR SOLUTION INTRAMUSCULAR; INTRAVENOUS at 13:04

## 2019-07-19 RX ADMIN — SENNA PLUS 1 TABLET(S): 8.6 TABLET ORAL at 05:38

## 2019-07-19 RX ADMIN — TAMSULOSIN HYDROCHLORIDE 0.8 MILLIGRAM(S): 0.4 CAPSULE ORAL at 22:35

## 2019-07-19 RX ADMIN — CEFEPIME 100 MILLIGRAM(S): 1 INJECTION, POWDER, FOR SOLUTION INTRAMUSCULAR; INTRAVENOUS at 05:38

## 2019-07-19 RX ADMIN — Medication 100 MILLIGRAM(S): at 05:38

## 2019-07-19 RX ADMIN — ENOXAPARIN SODIUM 80 MILLIGRAM(S): 100 INJECTION SUBCUTANEOUS at 05:38

## 2019-07-19 RX ADMIN — GABAPENTIN 300 MILLIGRAM(S): 400 CAPSULE ORAL at 22:35

## 2019-07-19 NOTE — PROGRESS NOTE ADULT - SUBJECTIVE AND OBJECTIVE BOX
Pt Name: AIMEE SOLER    MRN: [id number]      Patient seen and eval at bedside. Patient has no complaints. Denies pain. Denies CP, SOB, dizziness.      PAST MEDICAL & SURGICAL HISTORY:  PAST MEDICAL & SURGICAL HISTORY:  GERD (gastroesophageal reflux disease)  HLD (hyperlipidemia)  No significant past surgical history      Allergies: No Known Allergies      Medications: acetaminophen   Tablet .. 650 milliGRAM(s) Oral every 6 hours PRN  amLODIPine   Tablet 10 milliGRAM(s) Oral daily  aspirin  chewable 81 milliGRAM(s) Oral daily  cefepime   IVPB      cefepime   IVPB 2000 milliGRAM(s) IV Intermittent every 8 hours  chlorhexidine 2% Cloths 1 Application(s) Topical <User Schedule>  docusate sodium 100 milliGRAM(s) Oral two times a day  enoxaparin Injectable 80 milliGRAM(s) SubCutaneous two times a day  gabapentin 300 milliGRAM(s) Oral three times a day  insulin lispro (HumaLOG) corrective regimen sliding scale   SubCutaneous Before meals and at bedtime  lactulose Syrup 10 Gram(s) Oral daily  ondansetron Injectable 4 milliGRAM(s) IV Push every 8 hours PRN  oxyCODONE    IR 5 milliGRAM(s) Oral every 4 hours PRN  oxyCODONE    IR 10 milliGRAM(s) Oral every 4 hours PRN  polyethylene glycol 3350 17 Gram(s) Oral two times a day  senna 1 Tablet(s) Oral two times a day  simvastatin 20 milliGRAM(s) Oral at bedtime  sodium chloride 0.9% lock flush 10 milliLiter(s) IV Push every 1 hour PRN  tamsulosin 0.8 milliGRAM(s) Oral at bedtime        Ambulation: Walking independently [ ] With Cane [ ] With Walker [ ]  Bedbound [ ]                           8.5    8.25  )-----------( 580      ( 19 Jul 2019 09:15 )             28.0     07-19    134<L>  |  97<L>  |  15.0  ----------------------------<  138<H>  4.6   |  26.0  |  0.77    Ca    9.1      19 Jul 2019 09:15  Phos  2.9     07-19  Mg     2.4     07-19    TPro  8.2  /  Alb  2.7<L>  /  TBili  0.7  /  DBili  0.2  /  AST  82<H>  /  ALT  96<H>  /  AlkPhos  140<H>  07-19      PHYSICAL EXAM:    Vital Signs Last 24 Hrs  T(C): 37.4 (19 Jul 2019 07:38), Max: 37.6 (18 Jul 2019 15:35)  T(F): 99.4 (19 Jul 2019 07:38), Max: 99.6 (18 Jul 2019 15:35)  HR: 102 (19 Jul 2019 07:38) (91 - 103)  BP: 138/75 (19 Jul 2019 07:38) (115/72 - 138/75)  BP(mean): --  RR: 18 (19 Jul 2019 07:38) (18 - 18)  SpO2: 99% (19 Jul 2019 07:38) (97% - 99%)  Daily     Daily       PE: NAD, alert awake  Left LE: Monie brace in place. No skin break down noted on exam from brace, well padded.  Dressing over left leg C/D/I, no bleeding or drainage  EHL/TA/GS/FHL intact, Gross SILT s/s/DP/SP/tib distrib, DP pulse 2+, calf soft, NT  Right LE: Dressing/ Wound VAC as per vascular, C/D/I  Sensory/motor exam diminished right foot, AFO brace applied well padded  calf soft, compressible, DP pulse 2+    A/P: s/p left tibial plateau ORIF 6/26/19, s/p right knee arthroscopic I&D POD#5,   ·	Pain control  ·	DVT propx: Lov  ·	PT - NWB LLE  ·	PICC line  ·	Cont Abx as per ID  ·	VAC/right LE dressings as per Plastics/primary team  ·	Transfusion prbc as per primary team for anemia  ·	Hyponatremia - management as per primary team  Dispo: Rehab when cleared by primary team

## 2019-07-19 NOTE — PROGRESS NOTE ADULT - ASSESSMENT
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 Patient was afebrile for the past 24 hours. upon looking at all surgical incisions and dressings, index of suspicion for Lower extremity infection is low and differential for infection source remains broad. WBCs remain normal.    - all surgical incisions examined with no source of infection or new fluid collections noted.  - Wound vac replaced yesterday without sign of infection.   - continue Abx per primary team and ID recs  	- continue daily dressing changes and q3 wound vac changes- next change 7/19 Pt is s/p excision and grafting of titbial/peroneal nerve injuries, s/p R knee washout after septic joint with GNR (pseudamonas). Patient was afebrile for the past 24 hours. upon looking at all surgical incisions and dressings, index of suspicion for Lower extremity infection is low and differential for infection source remains broad. WBCs remain normal.    - all surgical incisions examined with no source of infection or new fluid collections noted. some necrotic eschar on R L knee wound  - Wound vac replaced today without sign of infection.   - continue Abx per primary team and ID recs  	- continue daily dressing changes and q3 wound vac changes- next change 7/19

## 2019-07-19 NOTE — PROGRESS NOTE ADULT - ASSESSMENT
69y/o man with no significant PMH was admitted on 6/25 due to a trauma at work. He had a large laceration in R leg requiring popliteal bypass and repair and left tibial plateau fracture s/o ORIF. He was febrile on 7/5, started on antibiotics, due to possibility of septic arthritis had R knee arthrocentesis showing 44k WBC and pseudomonas he was taken to OR for wash out on 7/14.     R knee septic arthritis     - Blood culture negative on 7/5, 7/12 and 7/16  - Knee aspiration with a sensitive pseudomonas    - Continue cefepime 2gm q8h   - CRP=25.59, will trend it   - At least 4 weeks of treatment  - No leukocytosis or fever.  - Will need a PICC/midline when ready for discharge.   - Surgery follow up noted, no sign of collection or infection.     Will follow.

## 2019-07-19 NOTE — PROGRESS NOTE ADULT - SUBJECTIVE AND OBJECTIVE BOX
INTERVAL HPI/OVERNIGHT EVENTS: no acute events overnight. no new fevers in past 24. No n/v/sob. tolerating diet.     MEDICATIONS  (STANDING):  amLODIPine   Tablet 10 milliGRAM(s) Oral daily  aspirin  chewable 81 milliGRAM(s) Oral daily  cefepime   IVPB      cefepime   IVPB 2000 milliGRAM(s) IV Intermittent every 8 hours  chlorhexidine 2% Cloths 1 Application(s) Topical <User Schedule>  docusate sodium 100 milliGRAM(s) Oral two times a day  enoxaparin Injectable 80 milliGRAM(s) SubCutaneous two times a day  gabapentin 300 milliGRAM(s) Oral three times a day  insulin lispro (HumaLOG) corrective regimen sliding scale   SubCutaneous Before meals and at bedtime  lactulose Syrup 10 Gram(s) Oral daily  polyethylene glycol 3350 17 Gram(s) Oral two times a day  senna 1 Tablet(s) Oral two times a day  simvastatin 20 milliGRAM(s) Oral at bedtime  tamsulosin 0.8 milliGRAM(s) Oral at bedtime    MEDICATIONS  (PRN):  acetaminophen   Tablet .. 650 milliGRAM(s) Oral every 6 hours PRN Temp greater or equal to 38C (100.4F), Mild Pain (1 - 3)  ondansetron Injectable 4 milliGRAM(s) IV Push every 8 hours PRN Nausea and/or Vomiting  oxyCODONE    IR 5 milliGRAM(s) Oral every 4 hours PRN Moderate Pain (4 - 6)  oxyCODONE    IR 10 milliGRAM(s) Oral every 4 hours PRN Severe Pain (7 - 10)  sodium chloride 0.9% lock flush 10 milliLiter(s) IV Push every 1 hour PRN Pre/post blood products, medications, blood draw, and to maintain line patency      Vital Signs Last 24 Hrs  T(C): 37.3 (2019 23:40), Max: 37.6 (2019 15:35)  T(F): 99.2 (2019 23:40), Max: 99.6 (2019 15:35)  HR: 102 (2019 23:40) (91 - 103)  BP: 124/69 (2019 23:40) (124/69 - 127/77)  BP(mean): --  RR: 18 (2019 23:40) (18 - 19)  SpO2: 99% (2019 23:40) (97% - 99%)    Constitutional: patient resting comfortably in bed, in no acute distress  HEENT: EOMI / PERRL b/l  Respiratory: respirations are unlabored, no accessory muscle use, no conversational dyspnea  Cardiovascular: regular rate & rhythm  Gastrointestinal: Abdomen soft, non-tender, non-distended, no rebound tenderness / guarding  Neurological: GCS: 15 (4/5/6). A&O x 3; no gross sensory / motor / coordination deficits  Psychiatric: Normal mood, normal affect  Musculoskeletal: leg pain and limited range of motion bc of the casts. Dressing changes by Plastics.   Extremities: RLE: dressings changed. Superficial skin necrosis on the lateral aspect of the R knee. no signs of fluid collection in knee or lower leg.  wound vac on integra site on Lateral knee place with good suction and no leak. no signs of infection. Split thickness donor graft well healing 80-90% take.   Vasc: 2+ b/l DP pulses    I&O's Detail    2019 07:  -  2019 07:00  --------------------------------------------------------  IN:  Total IN: 0 mL    OUT:    Indwelling Catheter - Urethral: 2450 mL  Total OUT: 2450 mL    Total NET: -2450 mL      2019 07:  -  2019 00:52  --------------------------------------------------------  IN:  Total IN: 0 mL    OUT:    Indwelling Catheter - Urethral: 1750 mL  Total OUT: 1750 mL    Total NET: -1750 mL          LABS:                        7.4    8.87  )-----------( 520      ( 2019 05:42 )             23.7     07-18    129<L>  |  95<L>  |  18.0  ----------------------------<  152<H>  4.4   |  24.0  |  0.79    Ca    8.6      2019 05:42  Phos  2.7     07-  Mg     2.2     -18    TPro  7.9  /  Alb  2.5<L>  /  TBili  0.7  /  DBili  x   /  AST  89<H>  /  ALT  122<H>  /  AlkPhos  144<H>  07-17    PT/INR - ( 2019 08:12 )   PT: 16.4 sec;   INR: 1.41 ratio         PTT - ( 2019 08:12 )  PTT:42.9 sec  Urinalysis Basic - ( 2019 16:03 )    Color: Yellow / Appearance: Clear / S.010 / pH: x  Gluc: x / Ketone: Negative  / Bili: Negative / Urobili: Negative mg/dL   Blood: x / Protein: 30 mg/dL / Nitrite: Negative   Leuk Esterase: Negative / RBC: 0-2 /HPF / WBC 0-2   Sq Epi: x / Non Sq Epi: Few / Bacteria: Negative        RADIOLOGY & ADDITIONAL STUDIES: INTERVAL HPI/OVERNIGHT EVENTS: Patient and wife reported increased pain last night. During PT, RLE was bumped during transfer and no acute events overnight. no new fevers in past 24. No n/v/sob. tolerating diet. Dressings and wound vac changed today. next wound vac change .     MEDICATIONS  (STANDING):  amLODIPine   Tablet 10 milliGRAM(s) Oral daily  aspirin  chewable 81 milliGRAM(s) Oral daily  cefepime   IVPB      cefepime   IVPB 2000 milliGRAM(s) IV Intermittent every 8 hours  chlorhexidine 2% Cloths 1 Application(s) Topical <User Schedule>  docusate sodium 100 milliGRAM(s) Oral two times a day  enoxaparin Injectable 80 milliGRAM(s) SubCutaneous two times a day  gabapentin 300 milliGRAM(s) Oral three times a day  insulin lispro (HumaLOG) corrective regimen sliding scale   SubCutaneous Before meals and at bedtime  lactulose Syrup 10 Gram(s) Oral daily  polyethylene glycol 3350 17 Gram(s) Oral two times a day  senna 1 Tablet(s) Oral two times a day  simvastatin 20 milliGRAM(s) Oral at bedtime  tamsulosin 0.8 milliGRAM(s) Oral at bedtime    MEDICATIONS  (PRN):  acetaminophen   Tablet .. 650 milliGRAM(s) Oral every 6 hours PRN Temp greater or equal to 38C (100.4F), Mild Pain (1 - 3)  ondansetron Injectable 4 milliGRAM(s) IV Push every 8 hours PRN Nausea and/or Vomiting  oxyCODONE    IR 5 milliGRAM(s) Oral every 4 hours PRN Moderate Pain (4 - 6)  oxyCODONE    IR 10 milliGRAM(s) Oral every 4 hours PRN Severe Pain (7 - 10)  sodium chloride 0.9% lock flush 10 milliLiter(s) IV Push every 1 hour PRN Pre/post blood products, medications, blood draw, and to maintain line patency      Vital Signs Last 24 Hrs  T(C): 37.3 (2019 23:40), Max: 37.6 (2019 15:35)  T(F): 99.2 (2019 23:40), Max: 99.6 (2019 15:35)  HR: 102 (2019 23:40) (91 - 103)  BP: 124/69 (2019 23:40) (124/69 - 127/77)  BP(mean): --  RR: 18 (2019 23:40) (18 - 19)  SpO2: 99% (2019 23:40) (97% - 99%)    Constitutional: patient resting comfortably in bed, in no acute distress  HEENT: EOMI / PERRL b/l  Respiratory: respirations are unlabored, no accessory muscle use, no conversational dyspnea  Cardiovascular: regular rate & rhythm  Gastrointestinal: Abdomen soft, non-tender, non-distended, no rebound tenderness / guarding  Neurological A&O x 3  Psychiatric: Normal mood, normal affect  Musculoskeletal: leg pain and limited range of motion bc of the casts. Dressing changes today.  Extremities: RLE: dressings changed. Superficial skin necrosis on the lateral aspect of the R knee. no signs of fluid collection in knee or lower leg.  wound vac on integra site on Lateral knee place with good suction and no leak. no signs of infection. Split thickness donor graft well healing 80-90% take.   Vasc: 2+ b/l DP pulses    I&O's Detail    2019 07:01  -  2019 07:00  --------------------------------------------------------  IN:  Total IN: 0 mL    OUT:    Indwelling Catheter - Urethral: 2450 mL  Total OUT: 2450 mL    Total NET: -2450 mL      2019 07:01  -  2019 00:52  --------------------------------------------------------  IN:  Total IN: 0 mL    OUT:    Indwelling Catheter - Urethral: 1750 mL  Total OUT: 1750 mL    Total NET: -1750 mL          LABS:                        7.4    8.87  )-----------( 520      ( 2019 05:42 )             23.7     07-18    129<L>  |  95<L>  |  18.0  ----------------------------<  152<H>  4.4   |  24.0  |  0.79    Ca    8.6      2019 05:42  Phos  2.7     07-  Mg     2.2     -    TPro  7.9  /  Alb  2.5<L>  /  TBili  0.7  /  DBili  x   /  AST  89<H>  /  ALT  122<H>  /  AlkPhos  144<H>  07    PT/INR - ( 2019 08:12 )   PT: 16.4 sec;   INR: 1.41 ratio         PTT - ( 2019 08:12 )  PTT:42.9 sec  Urinalysis Basic - ( 2019 16:03 )    Color: Yellow / Appearance: Clear / S.010 / pH: x  Gluc: x / Ketone: Negative  / Bili: Negative / Urobili: Negative mg/dL   Blood: x / Protein: 30 mg/dL / Nitrite: Negative   Leuk Esterase: Negative / RBC: 0-2 /HPF / WBC 0-2   Sq Epi: x / Non Sq Epi: Few / Bacteria: Negative        RADIOLOGY & ADDITIONAL STUDIES:

## 2019-07-19 NOTE — PROGRESS NOTE ADULT - ASSESSMENT
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 Patient has had recurrent fevers. upon looking at all surgical incisions and dressings, index of suspicion for Lower extremity infection is low and differential for infection source remains broad. WBCs remain normal.      #Intermittent fevers - last fever  - all blood cx NGTD  - all surgical incisions examined with no source of infection or new fluid collections noted.  - Wound vac replaced yesterday without sign of infection.   - consider repeat CT scan of LEs to look for possible infection source  - cefepime (7/15-??) will appreciate ID recs  - if new fevers, CT LLE      #DVT  - Therapeutic lovenox    #Wounds  - no signs of deep infection or underlying abscess   - Integra removed - wound vac in place next change fri 7/19  -vac change q3dy, 4x2x1  - Xerform and ABD to recipient site  - Kerlix and ACE to cover al wounds of RLE    #mobility  - Leg elevation  - continue LLE splint  - PMNR recs ACUTE inpatient rehabilitation

## 2019-07-19 NOTE — PROGRESS NOTE ADULT - SUBJECTIVE AND OBJECTIVE BOX
INTERVAL HPI/OVERNIGHT EVENTS:    MEDICATIONS  (STANDING):  amLODIPine   Tablet 10 milliGRAM(s) Oral daily  aspirin  chewable 81 milliGRAM(s) Oral daily  cefepime   IVPB      cefepime   IVPB 2000 milliGRAM(s) IV Intermittent every 8 hours  chlorhexidine 2% Cloths 1 Application(s) Topical <User Schedule>  docusate sodium 100 milliGRAM(s) Oral two times a day  enoxaparin Injectable 80 milliGRAM(s) SubCutaneous two times a day  gabapentin 300 milliGRAM(s) Oral three times a day  insulin lispro (HumaLOG) corrective regimen sliding scale   SubCutaneous Before meals and at bedtime  lactulose Syrup 10 Gram(s) Oral daily  polyethylene glycol 3350 17 Gram(s) Oral two times a day  senna 1 Tablet(s) Oral two times a day  simvastatin 20 milliGRAM(s) Oral at bedtime  tamsulosin 0.8 milliGRAM(s) Oral at bedtime    MEDICATIONS  (PRN):  acetaminophen   Tablet .. 650 milliGRAM(s) Oral every 6 hours PRN Temp greater or equal to 38C (100.4F), Mild Pain (1 - 3)  ondansetron Injectable 4 milliGRAM(s) IV Push every 8 hours PRN Nausea and/or Vomiting  oxyCODONE    IR 5 milliGRAM(s) Oral every 4 hours PRN Moderate Pain (4 - 6)  oxyCODONE    IR 10 milliGRAM(s) Oral every 4 hours PRN Severe Pain (7 - 10)  sodium chloride 0.9% lock flush 10 milliLiter(s) IV Push every 1 hour PRN Pre/post blood products, medications, blood draw, and to maintain line patency      Allergies    No Known Allergies    Intolerances        Vital Signs Last 24 Hrs  T(C): 37.4 (2019 07:38), Max: 37.6 (2019 15:35)  T(F): 99.4 (2019 07:38), Max: 99.6 (2019 15:35)  HR: 102 (2019 07:38) (91 - 103)  BP: 138/75 (2019 07:38) (115/72 - 138/75)  BP(mean): --  RR: 18 (2019 07:38) (18 - 18)  SpO2: 99% (2019 07:38) (97% - 99%)     ON PE:  General: alert and awake  Abdomen: no flank pain  : Bladder decompressed    LABS:                        7.4    8.87  )-----------( 520      ( 2019 05:42 )             23.7     07-18    129<L>  |  95<L>  |  18.0  ----------------------------<  152<H>  4.4   |  24.0  |  0.79    Ca    8.6      2019 05:42  Phos  2.7     07-18  Mg     2.2     07-18        Urinalysis Basic - ( 2019 16:03 )    Color: Yellow / Appearance: Clear / S.010 / pH: x  Gluc: x / Ketone: Negative  / Bili: Negative / Urobili: Negative mg/dL   Blood: x / Protein: 30 mg/dL / Nitrite: Negative   Leuk Esterase: Negative / RBC: 0-2 /HPF / WBC 0-2   Sq Epi: x / Non Sq Epi: Few / Bacteria: Negative        RADIOLOGY & ADDITIONAL TESTS:

## 2019-07-19 NOTE — PROGRESS NOTE ADULT - SUBJECTIVE AND OBJECTIVE BOX
INTERVAL HPI/OVERNIGHT EVENTS: No fevers in last 24. Patient reported pain and was given 0.5 IV dilaudid overnight. no other issues. denies F/C/sob/N/V.     SUBJECTIVE:      MEDICATIONS  (STANDING):  amLODIPine   Tablet 10 milliGRAM(s) Oral daily  aspirin  chewable 81 milliGRAM(s) Oral daily  cefepime   IVPB      cefepime   IVPB 2000 milliGRAM(s) IV Intermittent every 8 hours  chlorhexidine 2% Cloths 1 Application(s) Topical <User Schedule>  docusate sodium 100 milliGRAM(s) Oral two times a day  enoxaparin Injectable 80 milliGRAM(s) SubCutaneous two times a day  gabapentin 300 milliGRAM(s) Oral three times a day  insulin lispro (HumaLOG) corrective regimen sliding scale   SubCutaneous Before meals and at bedtime  lactulose Syrup 10 Gram(s) Oral daily  polyethylene glycol 3350 17 Gram(s) Oral two times a day  senna 1 Tablet(s) Oral two times a day  simvastatin 20 milliGRAM(s) Oral at bedtime  tamsulosin 0.8 milliGRAM(s) Oral at bedtime    MEDICATIONS  (PRN):  acetaminophen   Tablet .. 650 milliGRAM(s) Oral every 6 hours PRN Temp greater or equal to 38C (100.4F), Mild Pain (1 - 3)  ondansetron Injectable 4 milliGRAM(s) IV Push every 8 hours PRN Nausea and/or Vomiting  oxyCODONE    IR 5 milliGRAM(s) Oral every 4 hours PRN Moderate Pain (4 - 6)  oxyCODONE    IR 10 milliGRAM(s) Oral every 4 hours PRN Severe Pain (7 - 10)  sodium chloride 0.9% lock flush 10 milliLiter(s) IV Push every 1 hour PRN Pre/post blood products, medications, blood draw, and to maintain line patency      Vital Signs Last 24 Hrs  T(C): 37.3 (2019 23:40), Max: 37.6 (2019 15:35)  T(F): 99.2 (2019 23:40), Max: 99.6 (2019 15:35)  HR: 102 (2019 23:40) (91 - 103)  BP: 124/69 (2019 23:40) (124/69 - 127/77)  BP(mean): --  RR: 18 (2019 23:40) (18 - 19)  SpO2: 99% (2019 23:40) (97% - 99%)    Constitutional: patient resting comfortably in bed, in no acute distress  HEENT: EOMI / PERRL b/l  Respiratory: respirations are unlabored, no accessory muscle use, no conversational dyspnea  Cardiovascular: regular rate & rhythm  Gastrointestinal: Abdomen soft, non-tender, non-distended, no rebound tenderness / guarding  Neurological: GCS: 15 (4/5/6). A&O x 3; no gross sensory / motor / coordination deficits  Psychiatric: Normal mood, normal affect  Musculoskeletal: leg pain and limited range of motion bc of the casts. Dressing changes by Plastics.   Extremities: RLE: dressings changed. Superficial skin necrosis on the lateral aspect of the R knee. no signs of fluid collection in knee or lower leg.  wound vac on integra site on Lateral knee place with good suction and no leak. no signs of infection. Split thickness donor graft well healing 80-90% take.   Vasc: 2+ b/l DP pulses        I&O's Detail    2019 07:01  -  2019 07:00  --------------------------------------------------------  IN:  Total IN: 0 mL    OUT:    Indwelling Catheter - Urethral: 2450 mL  Total OUT: 2450 mL    Total NET: -2450 mL      2019 07:01  -  2019 00:40  --------------------------------------------------------  IN:  Total IN: 0 mL    OUT:    Indwelling Catheter - Urethral: 1750 mL  Total OUT: 1750 mL    Total NET: -1750 mL          LABS:                        7.4    8.87  )-----------( 520      ( 2019 05:42 )             23.7     07-18    129<L>  |  95<L>  |  18.0  ----------------------------<  152<H>  4.4   |  24.0  |  0.79    Ca    8.6      2019 05:42  Phos  2.7       Mg     2.2         TPro  7.9  /  Alb  2.5<L>  /  TBili  0.7  /  DBili  x   /  AST  89<H>  /  ALT  122<H>  /  AlkPhos  144<H>      PT/INR - ( 2019 08:12 )   PT: 16.4 sec;   INR: 1.41 ratio         PTT - ( 2019 08:12 )  PTT:42.9 sec  Urinalysis Basic - ( 2019 16:03 )    Color: Yellow / Appearance: Clear / S.010 / pH: x  Gluc: x / Ketone: Negative  / Bili: Negative / Urobili: Negative mg/dL   Blood: x / Protein: 30 mg/dL / Nitrite: Negative   Leuk Esterase: Negative / RBC: 0-2 /HPF / WBC 0-2   Sq Epi: x / Non Sq Epi: Few / Bacteria: Negative        RADIOLOGY & ADDITIONAL STUDIES:

## 2019-07-20 LAB
ANION GAP SERPL CALC-SCNC: 10 MMOL/L — SIGNIFICANT CHANGE UP (ref 5–17)
BASOPHILS # BLD AUTO: 0.02 K/UL — SIGNIFICANT CHANGE UP (ref 0–0.2)
BASOPHILS NFR BLD AUTO: 0.3 % — SIGNIFICANT CHANGE UP (ref 0–2)
BUN SERPL-MCNC: 16 MG/DL — SIGNIFICANT CHANGE UP (ref 8–20)
CALCIUM SERPL-MCNC: 9 MG/DL — SIGNIFICANT CHANGE UP (ref 8.6–10.2)
CHLORIDE SERPL-SCNC: 97 MMOL/L — LOW (ref 98–107)
CO2 SERPL-SCNC: 25 MMOL/L — SIGNIFICANT CHANGE UP (ref 22–29)
CREAT SERPL-MCNC: 0.73 MG/DL — SIGNIFICANT CHANGE UP (ref 0.5–1.3)
EOSINOPHIL # BLD AUTO: 0.07 K/UL — SIGNIFICANT CHANGE UP (ref 0–0.5)
EOSINOPHIL NFR BLD AUTO: 1.1 % — SIGNIFICANT CHANGE UP (ref 0–6)
GLUCOSE BLDC GLUCOMTR-MCNC: 138 MG/DL — HIGH (ref 70–99)
GLUCOSE BLDC GLUCOMTR-MCNC: 138 MG/DL — HIGH (ref 70–99)
GLUCOSE BLDC GLUCOMTR-MCNC: 141 MG/DL — HIGH (ref 70–99)
GLUCOSE BLDC GLUCOMTR-MCNC: 208 MG/DL — HIGH (ref 70–99)
GLUCOSE SERPL-MCNC: 164 MG/DL — HIGH (ref 70–115)
HCT VFR BLD CALC: 25.6 % — LOW (ref 39–50)
HGB BLD-MCNC: 7.9 G/DL — LOW (ref 13–17)
IMM GRANULOCYTES NFR BLD AUTO: 1.3 % — SIGNIFICANT CHANGE UP (ref 0–1.5)
LYMPHOCYTES # BLD AUTO: 1.11 K/UL — SIGNIFICANT CHANGE UP (ref 1–3.3)
LYMPHOCYTES # BLD AUTO: 17.4 % — SIGNIFICANT CHANGE UP (ref 13–44)
MAGNESIUM SERPL-MCNC: 2.3 MG/DL — SIGNIFICANT CHANGE UP (ref 1.6–2.6)
MCHC RBC-ENTMCNC: 26.3 PG — LOW (ref 27–34)
MCHC RBC-ENTMCNC: 30.9 GM/DL — LOW (ref 32–36)
MCV RBC AUTO: 85.3 FL — SIGNIFICANT CHANGE UP (ref 80–100)
MONOCYTES # BLD AUTO: 0.53 K/UL — SIGNIFICANT CHANGE UP (ref 0–0.9)
MONOCYTES NFR BLD AUTO: 8.3 % — SIGNIFICANT CHANGE UP (ref 2–14)
NEUTROPHILS # BLD AUTO: 4.56 K/UL — SIGNIFICANT CHANGE UP (ref 1.8–7.4)
NEUTROPHILS NFR BLD AUTO: 71.6 % — SIGNIFICANT CHANGE UP (ref 43–77)
PHOSPHATE SERPL-MCNC: 2.8 MG/DL — SIGNIFICANT CHANGE UP (ref 2.4–4.7)
PLATELET # BLD AUTO: 525 K/UL — HIGH (ref 150–400)
POTASSIUM SERPL-MCNC: 4.4 MMOL/L — SIGNIFICANT CHANGE UP (ref 3.5–5.3)
POTASSIUM SERPL-SCNC: 4.4 MMOL/L — SIGNIFICANT CHANGE UP (ref 3.5–5.3)
RBC # BLD: 3 M/UL — LOW (ref 4.2–5.8)
RBC # FLD: 17.2 % — HIGH (ref 10.3–14.5)
SODIUM SERPL-SCNC: 132 MMOL/L — LOW (ref 135–145)
WBC # BLD: 6.37 K/UL — SIGNIFICANT CHANGE UP (ref 3.8–10.5)
WBC # FLD AUTO: 6.37 K/UL — SIGNIFICANT CHANGE UP (ref 3.8–10.5)

## 2019-07-20 PROCEDURE — 99231 SBSQ HOSP IP/OBS SF/LOW 25: CPT

## 2019-07-20 PROCEDURE — 99232 SBSQ HOSP IP/OBS MODERATE 35: CPT

## 2019-07-20 RX ORDER — CEFEPIME 1 G/1
2000 INJECTION, POWDER, FOR SOLUTION INTRAMUSCULAR; INTRAVENOUS EVERY 8 HOURS
Refills: 0 | Status: DISCONTINUED | OUTPATIENT
Start: 2019-07-20 | End: 2019-07-23

## 2019-07-20 RX ADMIN — Medication 2: at 12:43

## 2019-07-20 RX ADMIN — POLYETHYLENE GLYCOL 3350 17 GRAM(S): 17 POWDER, FOR SOLUTION ORAL at 05:44

## 2019-07-20 RX ADMIN — Medication 100 MILLIGRAM(S): at 05:45

## 2019-07-20 RX ADMIN — OXYCODONE HYDROCHLORIDE 10 MILLIGRAM(S): 5 TABLET ORAL at 18:02

## 2019-07-20 RX ADMIN — GABAPENTIN 300 MILLIGRAM(S): 400 CAPSULE ORAL at 05:45

## 2019-07-20 RX ADMIN — OXYCODONE HYDROCHLORIDE 10 MILLIGRAM(S): 5 TABLET ORAL at 12:51

## 2019-07-20 RX ADMIN — Medication 100 MILLIGRAM(S): at 16:41

## 2019-07-20 RX ADMIN — AMLODIPINE BESYLATE 10 MILLIGRAM(S): 2.5 TABLET ORAL at 05:45

## 2019-07-20 RX ADMIN — SIMVASTATIN 20 MILLIGRAM(S): 20 TABLET, FILM COATED ORAL at 22:23

## 2019-07-20 RX ADMIN — GABAPENTIN 300 MILLIGRAM(S): 400 CAPSULE ORAL at 22:22

## 2019-07-20 RX ADMIN — OXYCODONE HYDROCHLORIDE 10 MILLIGRAM(S): 5 TABLET ORAL at 18:29

## 2019-07-20 RX ADMIN — Medication 81 MILLIGRAM(S): at 12:52

## 2019-07-20 RX ADMIN — GABAPENTIN 300 MILLIGRAM(S): 400 CAPSULE ORAL at 12:50

## 2019-07-20 RX ADMIN — Medication 650 MILLIGRAM(S): at 18:29

## 2019-07-20 RX ADMIN — OXYCODONE HYDROCHLORIDE 10 MILLIGRAM(S): 5 TABLET ORAL at 13:50

## 2019-07-20 RX ADMIN — CEFEPIME 100 MILLIGRAM(S): 1 INJECTION, POWDER, FOR SOLUTION INTRAMUSCULAR; INTRAVENOUS at 05:44

## 2019-07-20 RX ADMIN — TAMSULOSIN HYDROCHLORIDE 0.8 MILLIGRAM(S): 0.4 CAPSULE ORAL at 22:22

## 2019-07-20 RX ADMIN — CEFEPIME 100 MILLIGRAM(S): 1 INJECTION, POWDER, FOR SOLUTION INTRAMUSCULAR; INTRAVENOUS at 22:22

## 2019-07-20 RX ADMIN — ENOXAPARIN SODIUM 80 MILLIGRAM(S): 100 INJECTION SUBCUTANEOUS at 05:44

## 2019-07-20 RX ADMIN — ENOXAPARIN SODIUM 80 MILLIGRAM(S): 100 INJECTION SUBCUTANEOUS at 18:01

## 2019-07-20 RX ADMIN — OXYCODONE HYDROCHLORIDE 10 MILLIGRAM(S): 5 TABLET ORAL at 22:23

## 2019-07-20 RX ADMIN — SENNA PLUS 1 TABLET(S): 8.6 TABLET ORAL at 05:44

## 2019-07-20 RX ADMIN — OXYCODONE HYDROCHLORIDE 10 MILLIGRAM(S): 5 TABLET ORAL at 23:15

## 2019-07-20 RX ADMIN — CHLORHEXIDINE GLUCONATE 1 APPLICATION(S): 213 SOLUTION TOPICAL at 05:48

## 2019-07-20 RX ADMIN — LACTULOSE 10 GRAM(S): 10 SOLUTION ORAL at 12:52

## 2019-07-20 RX ADMIN — Medication 650 MILLIGRAM(S): at 16:40

## 2019-07-20 RX ADMIN — SENNA PLUS 1 TABLET(S): 8.6 TABLET ORAL at 16:41

## 2019-07-20 RX ADMIN — CEFEPIME 100 MILLIGRAM(S): 1 INJECTION, POWDER, FOR SOLUTION INTRAMUSCULAR; INTRAVENOUS at 13:54

## 2019-07-20 NOTE — PROGRESS NOTE ADULT - ASSESSMENT
69y/o man with no significant PMH was admitted on 6/25 due to a trauma at work. He had a large laceration in R leg  s/p RLE arterial bypass, s/p RLE fasciotomy on 6/25/19, s/p ORIF LEFT tibial plateau 6/26/19;  s/p Repair of sciatic nerve 7/3/19  He was febrile on 7/5, started on antibiotics, due to possibility of septic arthritis had R knee arthrocentesis showing 44k WBC and pseudomonas he was taken to OR;   s/p debridement and irrigation of right knee 7/14/19      R knee septic arthritis   Pseudomonas infection    - Blood culture negative on 7/5, 7/12 and 7/16  - Knee aspiration with a sensitive pseudomonas    - Continue cefepime 2gm q8h  - last day 8/12/19  - anticipate 4 weeks of IV antibiotics  has  PICC/midline in ARM      - follow up all outstanding cultures  - trend temperature and WBC curve  - repeat cultures from blood and all sources if febrile.

## 2019-07-20 NOTE — PROGRESS NOTE ADULT - ASSESSMENT
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 No evidence of active wound infection of RLE however w/ minor eschar that will require debridement     - To OR this week for debridement of eschar    - continue Abx per primary team and ID recs  	- continue daily dressing changes and q3 wound vac changes

## 2019-07-20 NOTE — PROGRESS NOTE ADULT - SUBJECTIVE AND OBJECTIVE BOX
HPI/OVERNIGHT EVENTS:     No fevers in last 24. no other issues. denies F/C/sob/N/V. PICline bleeding thru the conection/insertion. Sterile procedure done to clean, clear and reseal the picline placement in the right arm.     MEDICATIONS  (STANDING):  amLODIPine   Tablet 10 milliGRAM(s) Oral daily  aspirin  chewable 81 milliGRAM(s) Oral daily  cefepime   IVPB      cefepime   IVPB 2000 milliGRAM(s) IV Intermittent every 8 hours  chlorhexidine 2% Cloths 1 Application(s) Topical <User Schedule>  docusate sodium 100 milliGRAM(s) Oral two times a day  enoxaparin Injectable 80 milliGRAM(s) SubCutaneous two times a day  gabapentin 300 milliGRAM(s) Oral three times a day  insulin lispro (HumaLOG) corrective regimen sliding scale   SubCutaneous Before meals and at bedtime  lactulose Syrup 10 Gram(s) Oral daily  polyethylene glycol 3350 17 Gram(s) Oral two times a day  senna 1 Tablet(s) Oral two times a day  simvastatin 20 milliGRAM(s) Oral at bedtime  tamsulosin 0.8 milliGRAM(s) Oral at bedtime    MEDICATIONS  (PRN):  acetaminophen   Tablet .. 650 milliGRAM(s) Oral every 6 hours PRN Temp greater or equal to 38C (100.4F), Mild Pain (1 - 3)  ondansetron Injectable 4 milliGRAM(s) IV Push every 8 hours PRN Nausea and/or Vomiting  oxyCODONE    IR 5 milliGRAM(s) Oral every 4 hours PRN Moderate Pain (4 - 6)  oxyCODONE    IR 10 milliGRAM(s) Oral every 4 hours PRN Severe Pain (7 - 10)  sodium chloride 0.9% lock flush 10 milliLiter(s) IV Push every 1 hour PRN Pre/post blood products, medications, blood draw, and to maintain line patency      Vital Signs Last 24 Hrs  T(C): 37.9 (2019 23:40), Max: 37.9 (2019 23:40)  T(F): 100.3 (2019 23:40), Max: 100.3 (2019 23:40)  HR: 97 (2019 23:40) (97 - 105)  BP: 121/74 (2019 23:40) (113/66 - 138/75)  BP(mean): --  RR: 18 (2019 23:40) (18 - 19)  SpO2: 97% (2019 23:40) (97% - 99%)    Constitutional: patient resting comfortably in bed, in no acute distress  HEENT: EOMI / PERRL b/l, no active drainage or redness  Neck: No JVD, full ROM without pain  Respiratory: respirations are unlabored, no accessory muscle use, no conversational dyspnea  Cardiovascular: regular rate & rhythm  Gastrointestinal: Abdomen soft, non-tender, non-distended, no rebound tenderness / guarding  Neurological: GCS: 15 (4/5/6). A&O x 3; no gross sensory / motor / coordination deficits  Psychiatric: Normal mood, normal affect  Musculoskeletal: No joint pain, swelling or deformity; Leg cast and graft present.       I&O's Detail    2019 07:01  -  2019 07:00  --------------------------------------------------------  IN:  Total IN: 0 mL    OUT:    Indwelling Catheter - Urethral: 3750 mL  Total OUT: 3750 mL    Total NET: -3750 mL      2019 07:01  -  2019 04:51  --------------------------------------------------------  IN:  Total IN: 0 mL    OUT:    Indwelling Catheter - Urethral: 900 mL  Total OUT: 900 mL    Total NET: -900 mL          LABS:                        8.5    8.25  )-----------( 580      ( 2019 09:15 )             28.0     0719    134<L>  |  97<L>  |  15.0  ----------------------------<  138<H>  4.6   |  26.0  |  0.77    Ca    9.1      2019 09:15  Phos  2.9     0719  Mg     2.4         TPro  8.2  /  Alb  2.7<L>  /  TBili  0.7  /  DBili  0.2  /  AST  82<H>  /  ALT  96<H>  /  AlkPhos  140<H>        Urinalysis Basic - ( 2019 16:03 )    Color: Yellow / Appearance: Clear / S.010 / pH: x  Gluc: x / Ketone: Negative  / Bili: Negative / Urobili: Negative mg/dL   Blood: x / Protein: 30 mg/dL / Nitrite: Negative   Leuk Esterase: Negative / RBC: 0-2 /HPF / WBC 0-2   Sq Epi: x / Non Sq Epi: Few / Bacteria: Negative HPI/OVERNIGHT EVENTS:     No fevers in last 48. no other issues. denies F/C/sob/N/V. PICline bleeding thru the conection/insertion. Sterile procedure done to clean, clear and reseal the picline placement in the right arm.     MEDICATIONS  (STANDING):  amLODIPine   Tablet 10 milliGRAM(s) Oral daily  aspirin  chewable 81 milliGRAM(s) Oral daily  cefepime   IVPB      cefepime   IVPB 2000 milliGRAM(s) IV Intermittent every 8 hours  chlorhexidine 2% Cloths 1 Application(s) Topical <User Schedule>  docusate sodium 100 milliGRAM(s) Oral two times a day  enoxaparin Injectable 80 milliGRAM(s) SubCutaneous two times a day  gabapentin 300 milliGRAM(s) Oral three times a day  insulin lispro (HumaLOG) corrective regimen sliding scale   SubCutaneous Before meals and at bedtime  lactulose Syrup 10 Gram(s) Oral daily  polyethylene glycol 3350 17 Gram(s) Oral two times a day  senna 1 Tablet(s) Oral two times a day  simvastatin 20 milliGRAM(s) Oral at bedtime  tamsulosin 0.8 milliGRAM(s) Oral at bedtime    MEDICATIONS  (PRN):  acetaminophen   Tablet .. 650 milliGRAM(s) Oral every 6 hours PRN Temp greater or equal to 38C (100.4F), Mild Pain (1 - 3)  ondansetron Injectable 4 milliGRAM(s) IV Push every 8 hours PRN Nausea and/or Vomiting  oxyCODONE    IR 5 milliGRAM(s) Oral every 4 hours PRN Moderate Pain (4 - 6)  oxyCODONE    IR 10 milliGRAM(s) Oral every 4 hours PRN Severe Pain (7 - 10)  sodium chloride 0.9% lock flush 10 milliLiter(s) IV Push every 1 hour PRN Pre/post blood products, medications, blood draw, and to maintain line patency      Vital Signs Last 24 Hrs  T(C): 37.9 (2019 23:40), Max: 37.9 (2019 23:40)  T(F): 100.3 (2019 23:40), Max: 100.3 (2019 23:40)  HR: 97 (2019 23:40) (97 - 105)  BP: 121/74 (2019 23:40) (113/66 - 138/75)  BP(mean): --  RR: 18 (2019 23:40) (18 - 19)  SpO2: 97% (2019 23:40) (97% - 99%)    Constitutional: patient resting comfortably in bed, in no acute distress  HEENT: EOMI / PERRL b/l, no active drainage or redness  Neck: No JVD, full ROM without pain  Respiratory: respirations are unlabored, no accessory muscle use, no conversational dyspnea  Cardiovascular: regular rate & rhythm  Gastrointestinal: Abdomen soft, non-tender, non-distended, no rebound tenderness / guarding  Neurological: GCS: 15 (4/5/6). A&O x 3; no gross sensory / motor / coordination deficits  Psychiatric: Normal mood, normal affect  Musculoskeletal: No joint pain, swelling or deformity; Leg cast and graft present.       I&O's Detail    2019 07:01  -  2019 07:00  --------------------------------------------------------  IN:  Total IN: 0 mL    OUT:    Indwelling Catheter - Urethral: 3750 mL  Total OUT: 3750 mL    Total NET: -3750 mL      2019 07:01  -  2019 04:51  --------------------------------------------------------  IN:  Total IN: 0 mL    OUT:    Indwelling Catheter - Urethral: 900 mL  Total OUT: 900 mL    Total NET: -900 mL          LABS:                        8.5    8.25  )-----------( 580      ( 2019 09:15 )             28.0     0719    134<L>  |  97<L>  |  15.0  ----------------------------<  138<H>  4.6   |  26.0  |  0.77    Ca    9.1      2019 09:15  Phos  2.9     0719  Mg     2.4         TPro  8.2  /  Alb  2.7<L>  /  TBili  0.7  /  DBili  0.2  /  AST  82<H>  /  ALT  96<H>  /  AlkPhos  140<H>        Urinalysis Basic - ( 2019 16:03 )    Color: Yellow / Appearance: Clear / S.010 / pH: x  Gluc: x / Ketone: Negative  / Bili: Negative / Urobili: Negative mg/dL   Blood: x / Protein: 30 mg/dL / Nitrite: Negative   Leuk Esterase: Negative / RBC: 0-2 /HPF / WBC 0-2   Sq Epi: x / Non Sq Epi: Few / Bacteria: Negative

## 2019-07-20 NOTE — PROGRESS NOTE ADULT - SUBJECTIVE AND OBJECTIVE BOX
[facility]  INFECTIOUS DISEASES AND INTERNAL MEDICINE at [place]  =======================================================  Gabriel Levy MD  Diplomates American Board of Internal Medicine and Infectious Diseases  Telephone [phone]  Fax            [phone]  =======================================================    [id number]  AIMEE NGLLE   Follow up: R knee septic arthritis; left leg fracture; pseudomonas infection  patient seen and examined.   No more fever.   Pain is controlled.       ===================================================  REVIEW OF SYSTEMS:  as above  all other ROS negative  =======================================================  Allergies    No Known Allergies    Intolerances    Antibiotics:  cefepime   IVPB 2000 milliGRAM(s) IV Intermittent every 8 hours    Other medications:  amLODIPine   Tablet 10 milliGRAM(s) Oral daily  aspirin  chewable 81 milliGRAM(s) Oral daily  chlorhexidine 2% Cloths 1 Application(s) Topical <User Schedule>  docusate sodium 100 milliGRAM(s) Oral two times a day  enoxaparin Injectable 80 milliGRAM(s) SubCutaneous two times a day  gabapentin 300 milliGRAM(s) Oral three times a day  insulin lispro (HumaLOG) corrective regimen sliding scale   SubCutaneous Before meals and at bedtime  lactulose Syrup 10 Gram(s) Oral daily  polyethylene glycol 3350 17 Gram(s) Oral two times a day  senna 1 Tablet(s) Oral two times a day  simvastatin 20 milliGRAM(s) Oral at bedtime  tamsulosin 0.8 milliGRAM(s) Oral at bedtime    ======================================================  Physical Exam:  ============  T(F): 99.6 (20 Jul 2019 09:07), Max: 100.3 (19 Jul 2019 23:40)  HR: 93 (20 Jul 2019 09:07)  BP: 117/72 (20 Jul 2019 09:07)  RR: 18 (20 Jul 2019 09:07)  SpO2: 97% (20 Jul 2019 09:07) (97% - 99%)  temp max in last 48H T(F): , Max: 100.3 (07-19-19 @ 23:40)    General:  No acute distress.  Eye: Pupils are equal, round and reactive to light, Extraocular movements are intact, Normal conjunctiva.  HENT: Normocephalic, Oral mucosa is moist, No pharyngeal erythema, No sinus tenderness.  Neck: Supple, No lymphadenopathy.  Respiratory: Lungs are clear to auscultation, Respirations are non-labored.  Cardiovascular: Normal rate, Regular rhythm,    RIGHT PICC line  Gastrointestinal: Soft, Non-tender, Non-distended, Normal bowel sounds.  Genitourinary: No costovertebral angle tenderness.  Lymphatics: No lymphadenopathy neck,   Musculoskeletal: LEFT knee with brace; LLE with dressing in place,  RLE with dressing + WOUND VAC  Integumentary: No rash.  Neurologic: Alert, Oriented, No focal deficits, Cranial Nerves II-XII are grossly intact.  Psychiatric: Appropriate mood & affect.  =======================================================  Labs:                        8.5    8.25  )-----------( 580      ( 19 Jul 2019 09:15 )             28.0       WBC Count: 8.25 K/uL (07-19-19 @ 09:15)  WBC Count: 8.87 K/uL (07-18-19 @ 05:42)  WBC Count: 7.48 K/uL (07-17-19 @ 08:12)  WBC Count: 6.34 K/uL (07-16-19 @ 08:55)  WBC Count: 8.21 K/uL (07-16-19 @ 00:19)      07-19    134<L>  |  97<L>  |  15.0  ----------------------------<  138<H>  4.6   |  26.0  |  0.77    Ca    9.1      19 Jul 2019 09:15  Phos  2.9     07-19  Mg     2.4     07-19    TPro  8.2  /  Alb  2.7<L>  /  TBili  0.7  /  DBili  0.2  /  AST  82<H>  /  ALT  96<H>  /  AlkPhos  140<H>  07-19      Culture - Blood (collected 07-16-19 @ 00:19)  Source: .Blood    Culture - Blood (collected 07-16-19 @ 00:19)  Source: .Blood    Culture - Surgical Swab (collected 07-14-19 @ 15:02)  Source: .Surgical Swab Right knee joint (swabs)  Gram Stain (07-14-19 @ 16:17):    Few White blood cells    No organisms seen  Final Report (07-19-19 @ 13:45):    Pseudomonas aeruginosa Growing in broth media only  Organism: Pseudomonas aeruginosa (07-19-19 @ 13:45)  Organism: Pseudomonas aeruginosa (07-19-19 @ 13:45)    Sensitivities:      -  Amikacin: S <=16      -  Aztreonam: S 8      -  Cefepime: S <=4      -  Ceftazidime: S 4      -  Ciprofloxacin: S <=1      -  Gentamicin: S <=4      -  Imipenem: S <=1      -  Levofloxacin: S <=2      -  Meropenem: S <=1      -  Piperacillin/Tazobactam: S <=16      -  Tobramycin: S <=4      Method Type: NICHOLAS    Culture - Surgical Swab (collected 07-14-19 @ 15:00)  Source: .Surgical Swab Right knee (swabs)  Gram Stain (07-14-19 @ 16:19):    Few White blood cells    No organisms seen  Final Report (07-19-19 @ 13:45):    Few Pseudomonas aeruginosa  Organism: Pseudomonas aeruginosa (07-19-19 @ 13:45)  Organism: Pseudomonas aeruginosa (07-19-19 @ 13:45)    Sensitivities:      -  Amikacin: S <=16      -  Ampicillin: R >16      -  Ampicillin/Sulbactam: R >16/8      -  Aztreonam: S 8      -  Cefepime: S <=4      -  Cefotaxime: R 16      -  Ceftazidime: S 4      -  Ceftriaxone: R 32      -  Ciprofloxacin: S <=1      -  Ertapenem: R <=1      -  Gentamicin: S <=4      -  Imipenem: S <=1      -  Levofloxacin: S <=2      -  Meropenem: S <=1      -  Piperacillin/Tazobactam: S <=16      -  Tobramycin: S <=4      Method Type: NICHOLAS    Culture - Other (collected 07-12-19 @ 15:37)  Source: .Other right knee aspiration (swabs)  Final Report (07-14-19 @ 15:20):    Few Pseudomonas aeruginosa  Organism: Pseudomonas aeruginosa (07-14-19 @ 15:20)  Organism: Pseudomonas aeruginosa (07-14-19 @ 15:20)    Sensitivities:      -  Amikacin: S <=16      -  Aztreonam: S <=4      -  Cefepime: S <=4      -  Ceftazidime: S 4      -  Ciprofloxacin: S <=1      -  Gentamicin: S <=4      -  Imipenem: S <=1      -  Levofloxacin: S <=2      -  Meropenem: S <=1      -  Piperacillin/Tazobactam: S <=16      -  Tobramycin: S <=4      Method Type: NICHOLAS    Culture - Body Fluid with Gram Stain (collected 07-12-19 @ 15:16)  Source: .Body Fluid right knee synovial fluid  Gram Stain (07-13-19 @ 13:01):    Numerous White blood cells    No organisms seen  Final Report (07-17-19 @ 11:50):    Few Pseudomonas aeruginosa  Organism: Pseudomonas aeruginosa (07-17-19 @ 11:50)  Organism: Pseudomonas aeruginosa (07-17-19 @ 11:50)    Sensitivities:      -  Amikacin: S <=16      -  Aztreonam: S <=4      -  Cefepime: S <=4      -  Ceftazidime: S 4      -  Ciprofloxacin: S <=1      -  Gentamicin: S <=4      -  Imipenem: S <=1      -  Levofloxacin: S <=2      -  Meropenem: S <=1      -  Piperacillin/Tazobactam: S <=16      -  Tobramycin: S <=4      Method Type: NICHOLAS    Culture - Blood (collected 07-12-19 @ 14:25)  Source: .Blood  Final Report (07-17-19 @ 15:00):    No growth at 5 days.    Culture - Blood (collected 07-12-19 @ 14:25)  Source: .Blood  Final Report (07-17-19 @ 15:00):    No growth at 5 days.    Culture - Blood (collected 07-12-19 @ 02:49)  Source: .Blood  Final Report (07-17-19 @ 03:00):    No growth at 5 days.      Creatinine, Serum: 0.77 mg/dL (07-19-19 @ 09:15)  Creatinine, Serum: 0.79 mg/dL (07-18-19 @ 05:42)  Creatinine, Serum: 0.74 mg/dL (07-17-19 @ 08:12)  Creatinine, Serum: 0.78 mg/dL (07-16-19 @ 08:55)  Creatinine, Serum: 0.95 mg/dL (07-16-19 @ 00:19)    C-Reactive Protein, Serum: 23.08 mg/dL (07-19-19 @ 09:15)  C-Reactive Protein, Serum: 25.59 mg/dL (07-15-19 @ 09:43)    Sedimentation Rate, Erythrocyte: 62 mm/hr (07-15-19 @ 09:43)

## 2019-07-20 NOTE — PROGRESS NOTE ADULT - SUBJECTIVE AND OBJECTIVE BOX
pt comfortable in bed this am  feels better   no new complaints   tm 100.3       Musculoskeletal: leg pain and limited range of motion bc of the casts. Dressing changes today.  Extremities: RLE: Superficial skin eschar on the lateral aspect of the R knee. no signs of fluid collection in knee or lower leg.  wound vac on integra site on Lateral knee place with good suction and no leak.  Split thickness donor graft well healing 80-90% take.   Vasc: 2+ b/l DP pulses

## 2019-07-20 NOTE — PROGRESS NOTE ADULT - SUBJECTIVE AND OBJECTIVE BOX
INTERVAL HPI/OVERNIGHT EVENTS:    MEDICATIONS  (STANDING):  amLODIPine   Tablet 10 milliGRAM(s) Oral daily  aspirin  chewable 81 milliGRAM(s) Oral daily  cefepime   IVPB 2000 milliGRAM(s) IV Intermittent every 8 hours  chlorhexidine 2% Cloths 1 Application(s) Topical <User Schedule>  docusate sodium 100 milliGRAM(s) Oral two times a day  enoxaparin Injectable 80 milliGRAM(s) SubCutaneous two times a day  gabapentin 300 milliGRAM(s) Oral three times a day  insulin lispro (HumaLOG) corrective regimen sliding scale   SubCutaneous Before meals and at bedtime  lactulose Syrup 10 Gram(s) Oral daily  polyethylene glycol 3350 17 Gram(s) Oral two times a day  senna 1 Tablet(s) Oral two times a day  simvastatin 20 milliGRAM(s) Oral at bedtime  tamsulosin 0.8 milliGRAM(s) Oral at bedtime    MEDICATIONS  (PRN):  acetaminophen   Tablet .. 650 milliGRAM(s) Oral every 6 hours PRN Temp greater or equal to 38C (100.4F), Mild Pain (1 - 3)  ondansetron Injectable 4 milliGRAM(s) IV Push every 8 hours PRN Nausea and/or Vomiting  oxyCODONE    IR 5 milliGRAM(s) Oral every 4 hours PRN Moderate Pain (4 - 6)  oxyCODONE    IR 10 milliGRAM(s) Oral every 4 hours PRN Severe Pain (7 - 10)  sodium chloride 0.9% lock flush 10 milliLiter(s) IV Push every 1 hour PRN Pre/post blood products, medications, blood draw, and to maintain line patency      Allergies    No Known Allergies    Intolerances        Vital Signs Last 24 Hrs  T(C): 37.6 (2019 09:07), Max: 37.9 (2019 23:40)  T(F): 99.6 (2019 09:07), Max: 100.3 (2019 23:40)  HR: 93 (2019 09:07) (93 - 105)  BP: 117/72 (2019 09:07) (113/66 - 129/73)  BP(mean): --  RR: 18 (2019 09:07) (18 - 19)  SpO2: 97% (2019 09:07) (97% - 99%)     ON PE:  General: alert and awake  Abdomen: No flank pain  : Min intact , Bladder decompressed.    LABS:                        7.9    6.37  )-----------( 525      ( 2019 12:02 )             25.6     07-20    132<L>  |  97<L>  |  16.0  ----------------------------<  164<H>  4.4   |  25.0  |  0.73    Ca    9.0      2019 12:02  Phos  2.8     07-20  Mg     2.3     07-20    TPro  8.2  /  Alb  2.7<L>  /  TBili  0.7  /  DBili  0.2  /  AST  82<H>  /  ALT  96<H>  /  AlkPhos  140<H>  07-19      Urinalysis Basic - ( 2019 16:03 )    Color: Yellow / Appearance: Clear / S.010 / pH: x  Gluc: x / Ketone: Negative  / Bili: Negative / Urobili: Negative mg/dL   Blood: x / Protein: 30 mg/dL / Nitrite: Negative   Leuk Esterase: Negative / RBC: 0-2 /HPF / WBC 0-2   Sq Epi: x / Non Sq Epi: Few / Bacteria: Negative        RADIOLOGY & ADDITIONAL TESTS:

## 2019-07-20 NOTE — PROGRESS NOTE ADULT - ASSESSMENT
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 Patient has had recurrent fevers but right now is in 48 h without fevers. Upon looking at all surgical incisions and dressings, index of suspicion for Lower extremity infection is low and differential for infection source remains broad. WBCs remain normal.    Plan:     Control hyponatremia  Pain management  f/u CRP values.

## 2019-07-21 LAB
ANION GAP SERPL CALC-SCNC: 11 MMOL/L — SIGNIFICANT CHANGE UP (ref 5–17)
APPEARANCE UR: CLEAR — SIGNIFICANT CHANGE UP
BACTERIA # UR AUTO: ABNORMAL
BILIRUB UR-MCNC: NEGATIVE — SIGNIFICANT CHANGE UP
BUN SERPL-MCNC: 20 MG/DL — SIGNIFICANT CHANGE UP (ref 8–20)
CALCIUM SERPL-MCNC: 9.2 MG/DL — SIGNIFICANT CHANGE UP (ref 8.6–10.2)
CHLORIDE SERPL-SCNC: 98 MMOL/L — SIGNIFICANT CHANGE UP (ref 98–107)
CO2 SERPL-SCNC: 24 MMOL/L — SIGNIFICANT CHANGE UP (ref 22–29)
COLOR SPEC: YELLOW — SIGNIFICANT CHANGE UP
CREAT SERPL-MCNC: 0.84 MG/DL — SIGNIFICANT CHANGE UP (ref 0.5–1.3)
CULTURE RESULTS: SIGNIFICANT CHANGE UP
CULTURE RESULTS: SIGNIFICANT CHANGE UP
DIFF PNL FLD: ABNORMAL
EPI CELLS # UR: SIGNIFICANT CHANGE UP
GLUCOSE BLDC GLUCOMTR-MCNC: 110 MG/DL — HIGH (ref 70–99)
GLUCOSE BLDC GLUCOMTR-MCNC: 141 MG/DL — HIGH (ref 70–99)
GLUCOSE BLDC GLUCOMTR-MCNC: 153 MG/DL — HIGH (ref 70–99)
GLUCOSE BLDC GLUCOMTR-MCNC: 167 MG/DL — HIGH (ref 70–99)
GLUCOSE SERPL-MCNC: 117 MG/DL — HIGH (ref 70–115)
GLUCOSE UR QL: NEGATIVE MG/DL — SIGNIFICANT CHANGE UP
HCT VFR BLD CALC: 26.2 % — LOW (ref 39–50)
HGB BLD-MCNC: 8.1 G/DL — LOW (ref 13–17)
HYALINE CASTS # UR AUTO: ABNORMAL /LPF
KETONES UR-MCNC: NEGATIVE — SIGNIFICANT CHANGE UP
LEUKOCYTE ESTERASE UR-ACNC: NEGATIVE — SIGNIFICANT CHANGE UP
MCHC RBC-ENTMCNC: 26.2 PG — LOW (ref 27–34)
MCHC RBC-ENTMCNC: 30.9 GM/DL — LOW (ref 32–36)
MCV RBC AUTO: 84.8 FL — SIGNIFICANT CHANGE UP (ref 80–100)
NITRITE UR-MCNC: NEGATIVE — SIGNIFICANT CHANGE UP
PH UR: 6 — SIGNIFICANT CHANGE UP (ref 5–8)
PLATELET # BLD AUTO: 577 K/UL — HIGH (ref 150–400)
POTASSIUM SERPL-MCNC: 5.4 MMOL/L — HIGH (ref 3.5–5.3)
POTASSIUM SERPL-SCNC: 5.4 MMOL/L — HIGH (ref 3.5–5.3)
PROT UR-MCNC: 30 MG/DL
RBC # BLD: 3.09 M/UL — LOW (ref 4.2–5.8)
RBC # FLD: 17.3 % — HIGH (ref 10.3–14.5)
RBC CASTS # UR COMP ASSIST: SIGNIFICANT CHANGE UP /HPF (ref 0–4)
SODIUM SERPL-SCNC: 133 MMOL/L — LOW (ref 135–145)
SP GR SPEC: 1.01 — SIGNIFICANT CHANGE UP (ref 1.01–1.02)
SPECIMEN SOURCE: SIGNIFICANT CHANGE UP
SPECIMEN SOURCE: SIGNIFICANT CHANGE UP
UROBILINOGEN FLD QL: NEGATIVE MG/DL — SIGNIFICANT CHANGE UP
WBC # BLD: 7.6 K/UL — SIGNIFICANT CHANGE UP (ref 3.8–10.5)
WBC # FLD AUTO: 7.6 K/UL — SIGNIFICANT CHANGE UP (ref 3.8–10.5)
WBC UR QL: SIGNIFICANT CHANGE UP

## 2019-07-21 PROCEDURE — 99232 SBSQ HOSP IP/OBS MODERATE 35: CPT

## 2019-07-21 PROCEDURE — 99223 1ST HOSP IP/OBS HIGH 75: CPT

## 2019-07-21 PROCEDURE — 71045 X-RAY EXAM CHEST 1 VIEW: CPT | Mod: 26

## 2019-07-21 RX ADMIN — ENOXAPARIN SODIUM 80 MILLIGRAM(S): 100 INJECTION SUBCUTANEOUS at 06:50

## 2019-07-21 RX ADMIN — LACTULOSE 10 GRAM(S): 10 SOLUTION ORAL at 12:25

## 2019-07-21 RX ADMIN — GABAPENTIN 300 MILLIGRAM(S): 400 CAPSULE ORAL at 06:50

## 2019-07-21 RX ADMIN — Medication 100 MILLIGRAM(S): at 06:50

## 2019-07-21 RX ADMIN — Medication 100 MILLIGRAM(S): at 17:23

## 2019-07-21 RX ADMIN — Medication 650 MILLIGRAM(S): at 16:25

## 2019-07-21 RX ADMIN — Medication 650 MILLIGRAM(S): at 17:17

## 2019-07-21 RX ADMIN — SIMVASTATIN 20 MILLIGRAM(S): 20 TABLET, FILM COATED ORAL at 22:24

## 2019-07-21 RX ADMIN — Medication 1: at 08:10

## 2019-07-21 RX ADMIN — CEFEPIME 100 MILLIGRAM(S): 1 INJECTION, POWDER, FOR SOLUTION INTRAMUSCULAR; INTRAVENOUS at 14:05

## 2019-07-21 RX ADMIN — GABAPENTIN 300 MILLIGRAM(S): 400 CAPSULE ORAL at 22:24

## 2019-07-21 RX ADMIN — OXYCODONE HYDROCHLORIDE 10 MILLIGRAM(S): 5 TABLET ORAL at 17:42

## 2019-07-21 RX ADMIN — ENOXAPARIN SODIUM 80 MILLIGRAM(S): 100 INJECTION SUBCUTANEOUS at 17:24

## 2019-07-21 RX ADMIN — CEFEPIME 100 MILLIGRAM(S): 1 INJECTION, POWDER, FOR SOLUTION INTRAMUSCULAR; INTRAVENOUS at 22:24

## 2019-07-21 RX ADMIN — POLYETHYLENE GLYCOL 3350 17 GRAM(S): 17 POWDER, FOR SOLUTION ORAL at 06:51

## 2019-07-21 RX ADMIN — POLYETHYLENE GLYCOL 3350 17 GRAM(S): 17 POWDER, FOR SOLUTION ORAL at 17:24

## 2019-07-21 RX ADMIN — SENNA PLUS 1 TABLET(S): 8.6 TABLET ORAL at 17:24

## 2019-07-21 RX ADMIN — OXYCODONE HYDROCHLORIDE 10 MILLIGRAM(S): 5 TABLET ORAL at 07:37

## 2019-07-21 RX ADMIN — OXYCODONE HYDROCHLORIDE 10 MILLIGRAM(S): 5 TABLET ORAL at 06:51

## 2019-07-21 RX ADMIN — TAMSULOSIN HYDROCHLORIDE 0.8 MILLIGRAM(S): 0.4 CAPSULE ORAL at 22:24

## 2019-07-21 RX ADMIN — SENNA PLUS 1 TABLET(S): 8.6 TABLET ORAL at 06:50

## 2019-07-21 RX ADMIN — OXYCODONE HYDROCHLORIDE 10 MILLIGRAM(S): 5 TABLET ORAL at 17:20

## 2019-07-21 RX ADMIN — GABAPENTIN 300 MILLIGRAM(S): 400 CAPSULE ORAL at 12:24

## 2019-07-21 RX ADMIN — Medication 81 MILLIGRAM(S): at 12:24

## 2019-07-21 RX ADMIN — CHLORHEXIDINE GLUCONATE 1 APPLICATION(S): 213 SOLUTION TOPICAL at 07:37

## 2019-07-21 RX ADMIN — CEFEPIME 100 MILLIGRAM(S): 1 INJECTION, POWDER, FOR SOLUTION INTRAMUSCULAR; INTRAVENOUS at 06:50

## 2019-07-21 NOTE — CONSULT NOTE ADULT - SUBJECTIVE AND OBJECTIVE BOX
Patient is a 68y old  Male who presents with a chief complaint of Forklift crush to RLE (21 Jul 2019 15:53)    HPI:   68yMale BIBEMS on 06-25-19 by ground  activated as code B trauma. Patient was at work when he was reportedly crushed/impaled by forklift. Possible associated fall, pt denies any LOC. Per EMS, pt lost approximately 1-2 Liters of blood from RLE laceration before a tourniquet was applied at approximately 10:19am. Patient brought to [facility] where on arrival, patient protecting airway, able to with ease, had nonlabored breathing, and had good central pulses. Patient's tournigquet and dressing over RLE posterior lateral laceration taken down. No active bleeding noted from approximately 10cm full thickness laceration. No pulse signals distal to right knee. Pt unable to dorsiflex or plantarflex right foot and had decreased sensation distal to knee.     Above noted. Pt seen and examined.  He had :  Summary:   1. Left ventricular ejection fraction, by visual estimation, is 70 to   75%.   2. Hyperdynamic global left ventricular systolic function.   3. Moderately increased LV wall thickness.   4. Spectral Doppler shows impaired relaxation pattern of left   ventricular myocardial filling (Grade I diastolic dysfunction).   5. Normal right ventricular size and function.   6. There is no evidence of pericardial effusion.    MD J Luis Electronically signed on 6/26/2019 at 5:41:19 PM  	a large laceration in R leg, s/p RLE arterial bypass, s/p RLE fasciotomy on 6/25/19, s/p ORIF LEFT tibial plateau 6/26/19;  s/p Repair of sciatic nerve 7/3/19, He was febrile on 7/5, started on antibiotics, due to possibility of septic arthritis had R knee arthrocentesis showing 44k WBC   s/p debridement and irrigation of right knee 7/14/19.  Pt is febrile. He has an Eschar and plan is to perform debridement next week. He denies any history of HF, CAD, CRI or insulin requiring diabetes. Prior to this accident, he was active without any cardiac symptoms.      Initial Vitals:  Temp:    98  HR:    96  BP:   98/56    RR:     18  SpO2:   95  %    PMH:  GERD (gastroesophageal reflux disease) [Active]  HLD (hyperlipidemia) [Active]  DM    PSH:  As above    ALL:  NKDA    FMH:  No significant family history    SOC Hx:   Denies etoh, tobacco, or drug use (25 Jun 2019 11:43)    MEDICATIONS  (STANDING):  amLODIPine   Tablet 10 milliGRAM(s) Oral daily  aspirin  chewable 81 milliGRAM(s) Oral daily  cefepime   IVPB 2000 milliGRAM(s) IV Intermittent every 8 hours  chlorhexidine 2% Cloths 1 Application(s) Topical <User Schedule>  docusate sodium 100 milliGRAM(s) Oral two times a day  enoxaparin Injectable 80 milliGRAM(s) SubCutaneous two times a day  gabapentin 300 milliGRAM(s) Oral three times a day  insulin lispro (HumaLOG) corrective regimen sliding scale   SubCutaneous Before meals and at bedtime  lactulose Syrup 10 Gram(s) Oral daily  polyethylene glycol 3350 17 Gram(s) Oral two times a day  senna 1 Tablet(s) Oral two times a day  simvastatin 20 milliGRAM(s) Oral at bedtime  tamsulosin 0.8 milliGRAM(s) Oral at bedtime    MEDICATIONS  (PRN):  acetaminophen   Tablet .. 650 milliGRAM(s) Oral every 6 hours PRN Temp greater or equal to 38C (100.4F), Mild Pain (1 - 3)  ondansetron Injectable 4 milliGRAM(s) IV Push every 8 hours PRN Nausea and/or Vomiting  oxyCODONE    IR 5 milliGRAM(s) Oral every 4 hours PRN Moderate Pain (4 - 6)  oxyCODONE    IR 10 milliGRAM(s) Oral every 4 hours PRN Severe Pain (7 - 10)  sodium chloride 0.9% lock flush 10 milliLiter(s) IV Push every 1 hour PRN Pre/post blood products, medications, blood draw, and to maintain line patency    REVIEW OF SYSTEMS:  CONSTITUTIONAL: + fever, weight loss,   EYES: No eye pain, visual disturbances, or discharge  ENMT:  No difficulty hearing, tinnitus, vertigo; No sinus or throat pain  NECK: No pain or stiffness  RESPIRATORY: No cough, wheezing, chills or hemoptysis; No Shortness of Breath  CARDIOVASCULAR: No chest pain, palpitations, passing out, dizziness,   GASTROINTESTINAL: No abdominal or epigastric pain. No nausea, vomiting, or hematemesis; No diarrhea or constipation. No melena or hematochezia.  GENITOURINARY: Urinary retention, has a villegas now.   NEUROLOGICAL: No headaches, memory loss, loss of strength, numbness, or tremors  SKIN: No itching, burning, rashes, or lesions   LYMPH Nodes: No enlarged glands  ENDOCRINE: No heat or cold intolerance; No hair loss  MUSCULOSKELETAL: + left leg and hip pain.   PSYCHIATRIC: No depression, anxiety, mood swings, or difficulty sleeping  HEME/LYMPH: No easy bruising, or bleeding gums  ALLERY AND IMMUNOLOGIC: No hives or eczema	    Vital Signs Last 24 Hrs  T(C): 38.3 (21 Jul 2019 15:20), Max: 38.3 (21 Jul 2019 15:20)  T(F): 101 (21 Jul 2019 15:20), Max: 101 (21 Jul 2019 15:20)  HR: 97 (21 Jul 2019 15:20) (89 - 97)  BP: 119/76 (21 Jul 2019 15:20) (90/56 - 123/74)  BP(mean): --  RR: 18 (21 Jul 2019 15:20) (18 - 18)  SpO2: 98% (21 Jul 2019 15:20) (98% - 99%)  I&O's Detail    20 Jul 2019 07:01  -  21 Jul 2019 07:00  --------------------------------------------------------  IN:  Total IN: 0 mL    OUT:    Indwelling Catheter - Urethral: 1600 mL  Total OUT: 1600 mL    Total NET: -1600 mL    HYSICAL EXAM:  Appearance: Normal, well nourished	  HEENT:   Normal oral mucosa, PERRL, EOMI, sclera non-icteric	  Lymphatic: No cervical lymphadenopathy  Cardiovascular: Normal S1 S2, No JVD, No cardiac murmurs, No carotid bruits,   Respiratory: Lungs clear to auscultation	  Psychiatry: A & O x 3, Mood & affect appropriate  Gastrointestinal:  Soft, Non-tender, + BS, no bruits	  Skin: No rashes, No ecchymoses, No cyanosis  Neurologic: Grossly non-focal with full strength in all four extremities  Extremities: left leg in brace, right leg wrapped.     ECG:   NSR, LVH by voltage, no new st/t changes/ 	    LABS:                        8.1    7.60  )-----------( 577      ( 21 Jul 2019 11:54 )             26.2     07-21    133<L>  |  98  |  20.0  ----------------------------<  117<H>  5.4<H>   |  24.0  |  0.84    Ca    9.2      21 Jul 2019 11:54  Phos  2.8     07-20  Mg     2.3     07-20

## 2019-07-21 NOTE — PROGRESS NOTE ADULT - SUBJECTIVE AND OBJECTIVE BOX
INTERVAL HPI/OVERNIGHT EVENTS:    SUBJECTIVE: tmax 100.6. laying in bed comfortably. no new complaints. no f/c/n/v. tolerating diet    MEDICATIONS  (STANDING):  amLODIPine   Tablet 10 milliGRAM(s) Oral daily  aspirin  chewable 81 milliGRAM(s) Oral daily  cefepime   IVPB 2000 milliGRAM(s) IV Intermittent every 8 hours  chlorhexidine 2% Cloths 1 Application(s) Topical <User Schedule>  docusate sodium 100 milliGRAM(s) Oral two times a day  enoxaparin Injectable 80 milliGRAM(s) SubCutaneous two times a day  gabapentin 300 milliGRAM(s) Oral three times a day  insulin lispro (HumaLOG) corrective regimen sliding scale   SubCutaneous Before meals and at bedtime  lactulose Syrup 10 Gram(s) Oral daily  polyethylene glycol 3350 17 Gram(s) Oral two times a day  senna 1 Tablet(s) Oral two times a day  simvastatin 20 milliGRAM(s) Oral at bedtime  tamsulosin 0.8 milliGRAM(s) Oral at bedtime    MEDICATIONS  (PRN):  acetaminophen   Tablet .. 650 milliGRAM(s) Oral every 6 hours PRN Temp greater or equal to 38C (100.4F), Mild Pain (1 - 3)  ondansetron Injectable 4 milliGRAM(s) IV Push every 8 hours PRN Nausea and/or Vomiting  oxyCODONE    IR 5 milliGRAM(s) Oral every 4 hours PRN Moderate Pain (4 - 6)  oxyCODONE    IR 10 milliGRAM(s) Oral every 4 hours PRN Severe Pain (7 - 10)  sodium chloride 0.9% lock flush 10 milliLiter(s) IV Push every 1 hour PRN Pre/post blood products, medications, blood draw, and to maintain line patency      Vital Signs Last 24 Hrs  T(C): 36.3 (21 Jul 2019 08:48), Max: 38.1 (20 Jul 2019 16:05)  T(F): 97.4 (21 Jul 2019 08:48), Max: 100.6 (20 Jul 2019 16:05)  HR: 89 (21 Jul 2019 08:48) (89 - 102)  BP: 90/56 (21 Jul 2019 08:48) (90/56 - 123/74)  BP(mean): --  RR: 18 (21 Jul 2019 08:48) (18 - 18)  SpO2: 99% (21 Jul 2019 08:48) (99% - 99%)    PE  Musculoskeletal: leg pain and limited range of motion bc of the casts. Dressing changes today.  Extremities: RLE: Superficial skin eschar on the lateral aspect of the R knee. no signs of fluid collection in knee or lower leg.  wound vac on integra site on Lateral knee place with good suction and no leak.  Split thickness donor graft well healing 80-90% take.   Vasc: 2+ b/l DP pulses    I&O's Detail    20 Jul 2019 07:01  -  21 Jul 2019 07:00  --------------------------------------------------------  IN:  Total IN: 0 mL    OUT:    Indwelling Catheter - Urethral: 1600 mL  Total OUT: 1600 mL    Total NET: -1600 mL          LABS:                        8.1    7.60  )-----------( 577      ( 21 Jul 2019 11:54 )             26.2     07-21    133<L>  |  98  |  20.0  ----------------------------<  117<H>  5.4<H>   |  24.0  |  0.84    Ca    9.2      21 Jul 2019 11:54  Phos  2.8     07-20  Mg     2.3     07-20            RADIOLOGY & ADDITIONAL STUDIES:

## 2019-07-21 NOTE — CONSULT NOTE ADULT - ASSESSMENT
Mr. Saunders had multiple surgeries. He is febrile on ABX.   Septic process is in progress. Cont with BP meds.  Pt has tolerated multiple and long operation in the pasty 2 month without any cardiac issues.   He denies hx of CHF, CRI, cp or insulin requiring diabetes.   He is table from our stand point to proceed with surgery.   DVT prophylaxis as per primay/surgical team.  Please call me if needed.  Thank you

## 2019-07-21 NOTE — PROGRESS NOTE ADULT - SUBJECTIVE AND OBJECTIVE BOX
[facility]  INFECTIOUS DISEASES AND INTERNAL MEDICINE at [place]  =======================================================  Gabriel Bateman MD [facility] HospitalASMITA Levy MD  Diplomates American Board of Internal Medicine and Infectious Diseases  Telephone [phone]  Fax            [phone]  =======================================================    [id number]  AIMEE SOLER   Follow up: R knee septic arthritis; left leg fracture; pseudomonas infection  patient seen and examined.   No more fever.   Pain is controlled.     ===================================================  REVIEW OF SYSTEMS:  as above  all other ROS negative  =======================================================  Allergies  No Known Allergies     Antibiotics:  cefepime   IVPB 2000 milliGRAM(s) IV Intermittent every 8 hours    ======================================================  Physical Exam:  ============  T(F): 97.4 (21 Jul 2019 08:48), Max: 100.6 (20 Jul 2019 16:05)  HR: 89 (21 Jul 2019 08:48)  BP: 90/56 (21 Jul 2019 08:48)  RR: 18 (21 Jul 2019 08:48)  SpO2: 99% (21 Jul 2019 08:48) (99% - 99%)  temp max in last 48H T(F): , Max: 100.6 (07-20-19 @ 16:05)    General:  No acute distress.  Eye: Pupils are equal, round and reactive to light, Extraocular movements are intact, Normal conjunctiva.  HENT: Normocephalic, Oral mucosa is moist, No pharyngeal erythema, No sinus tenderness.  Neck: Supple, No lymphadenopathy.  Respiratory: Lungs are clear to auscultation, Respirations are non-labored.  Cardiovascular: Normal rate, Regular rhythm,    RIGHT PICC line  Gastrointestinal: Soft, Non-tender, Non-distended, Normal bowel sounds.  Genitourinary: No costovertebral angle tenderness.  Lymphatics: No lymphadenopathy neck,   Musculoskeletal: LEFT knee with brace; LLE with dressing in place,  RLE with dressing + WOUND VAC  Integumentary: No rash.  Neurologic: Alert, Oriented, No focal deficits, Cranial Nerves II-XII are grossly intact.  Psychiatric: Appropriate mood & affect.    =======================================================  Labs:                        8.1    7.60  )-----------( 577      ( 21 Jul 2019 11:54 )             26.2       WBC Count: 7.60 K/uL (07-21-19 @ 11:54)  WBC Count: 6.37 K/uL (07-20-19 @ 12:02)  WBC Count: 8.25 K/uL (07-19-19 @ 09:15)  WBC Count: 8.87 K/uL (07-18-19 @ 05:42)  WBC Count: 7.48 K/uL (07-17-19 @ 08:12)      07-21    133<L>  |  98  |  20.0  ----------------------------<  117<H>  5.4<H>   |  24.0  |  0.84    Ca    9.2      21 Jul 2019 11:54  Phos  2.8     07-20  Mg     2.3     07-20        Culture - Blood (collected 07-16-19 @ 00:19)  Source: .Blood  Final Report (07-21-19 @ 01:00):    No growth at 5 days.    Culture - Blood (collected 07-16-19 @ 00:19)  Source: .Blood  Final Report (07-21-19 @ 01:00):    No growth at 5 days.    Culture - Surgical Swab (collected 07-14-19 @ 15:02)  Source: .Surgical Swab Right knee joint (swabs)  Gram Stain (07-14-19 @ 16:17):    Few White blood cells    No organisms seen  Final Report (07-19-19 @ 13:45):    Pseudomonas aeruginosa Growing in broth media only  Organism: Pseudomonas aeruginosa (07-19-19 @ 13:45)  Organism: Pseudomonas aeruginosa (07-19-19 @ 13:45)    Sensitivities:      -  Amikacin: S <=16      -  Aztreonam: S 8      -  Cefepime: S <=4      -  Ceftazidime: S 4      -  Ciprofloxacin: S <=1      -  Gentamicin: S <=4      -  Imipenem: S <=1      -  Levofloxacin: S <=2      -  Meropenem: S <=1      -  Piperacillin/Tazobactam: S <=16      -  Tobramycin: S <=4      Method Type: NICHOLAS    Culture - Surgical Swab (collected 07-14-19 @ 15:00)  Source: .Surgical Swab Right knee (swabs)  Gram Stain (07-14-19 @ 16:19):    Few White blood cells    No organisms seen  Final Report (07-19-19 @ 13:45):    Few Pseudomonas aeruginosa  Organism: Pseudomonas aeruginosa (07-19-19 @ 13:45)  Organism: Pseudomonas aeruginosa (07-19-19 @ 13:45)    Sensitivities:      -  Amikacin: S <=16      -  Ampicillin: R >16      -  Ampicillin/Sulbactam: R >16/8      -  Aztreonam: S 8      -  Cefepime: S <=4      -  Cefotaxime: R 16      -  Ceftazidime: S 4      -  Ceftriaxone: R 32      -  Ciprofloxacin: S <=1      -  Ertapenem: R <=1      -  Gentamicin: S <=4      -  Imipenem: S <=1      -  Levofloxacin: S <=2      -  Meropenem: S <=1      -  Piperacillin/Tazobactam: S <=16      -  Tobramycin: S <=4      Method Type: NICHOLAS    Culture - Other (collected 07-12-19 @ 15:37)  Source: .Other right knee aspiration (swabs)  Final Report (07-14-19 @ 15:20):    Few Pseudomonas aeruginosa  Organism: Pseudomonas aeruginosa (07-14-19 @ 15:20)  Organism: Pseudomonas aeruginosa (07-14-19 @ 15:20)    Sensitivities:      -  Amikacin: S <=16      -  Aztreonam: S <=4      -  Cefepime: S <=4      -  Ceftazidime: S 4      -  Ciprofloxacin: S <=1      -  Gentamicin: S <=4      -  Imipenem: S <=1      -  Levofloxacin: S <=2      -  Meropenem: S <=1      -  Piperacillin/Tazobactam: S <=16      -  Tobramycin: S <=4      Method Type: NICHOLAS    Culture - Body Fluid with Gram Stain (collected 07-12-19 @ 15:16)  Source: .Body Fluid right knee synovial fluid  Gram Stain (07-13-19 @ 13:01):    Numerous White blood cells    No organisms seen  Final Report (07-17-19 @ 11:50):    Few Pseudomonas aeruginosa  Organism: Pseudomonas aeruginosa (07-17-19 @ 11:50)  Organism: Pseudomonas aeruginosa (07-17-19 @ 11:50)    Sensitivities:      -  Amikacin: S <=16      -  Aztreonam: S <=4      -  Cefepime: S <=4      -  Ceftazidime: S 4      -  Ciprofloxacin: S <=1      -  Gentamicin: S <=4      -  Imipenem: S <=1      -  Levofloxacin: S <=2      -  Meropenem: S <=1      -  Piperacillin/Tazobactam: S <=16      -  Tobramycin: S <=4      Method Type: NICHOLAS    Culture - Blood (collected 07-12-19 @ 14:25)  Source: .Blood  Final Report (07-17-19 @ 15:00):    No growth at 5 days.    Culture - Blood (collected 07-12-19 @ 14:25)  Source: .Blood  Final Report (07-17-19 @ 15:00):    No growth at 5 days.    Culture - Blood (collected 07-12-19 @ 02:49)  Source: .Blood  Final Report (07-17-19 @ 03:00):    No growth at 5 days.

## 2019-07-21 NOTE — PROGRESS NOTE ADULT - SUBJECTIVE AND OBJECTIVE BOX
HPI/OVERNIGHT EVENTS: Febrile to 100.6 at 4pm yesterday. Dressing changed in the PM. No acute events overnight. Continues having adequate output from villegas. Pt denies fever, chills, nausea, vomiting, chest pain, SOB, dizziness, abd pain or any other concerning symptoms    Vital Signs Last 24 Hrs  T(C): 37.1 (21 Jul 2019 00:20), Max: 38.1 (20 Jul 2019 16:05)  T(F): 98.7 (21 Jul 2019 00:20), Max: 100.6 (20 Jul 2019 16:05)  HR: 92 (21 Jul 2019 00:20) (92 - 102)  BP: 123/74 (21 Jul 2019 00:20) (115/69 - 129/73)  BP(mean): --  RR: 18 (21 Jul 2019 00:20) (18 - 18)  SpO2: 99% (21 Jul 2019 00:20) (97% - 99%)    I&O's Detail    19 Jul 2019 07:01  -  20 Jul 2019 07:00  --------------------------------------------------------  IN:  Total IN: 0 mL    OUT:    Indwelling Catheter - Urethral: 2650 mL  Total OUT: 2650 mL    Total NET: -2650 mL      20 Jul 2019 07:01  -  21 Jul 2019 02:33  --------------------------------------------------------  IN:  Total IN: 0 mL    OUT:    Indwelling Catheter - Urethral: 1600 mL  Total OUT: 1600 mL    Total NET: -1600 mL      Constitutional: patient resting comfortably in bed, in no acute distress  HEENT: EOMI / PERRL b/l   Neck: No JVD, full ROM without pain  Respiratory: CTAB respirations are unlabored, no accessory muscle use  Cardiovascular: regular rate & rhythm   Gastrointestinal: Abdomen soft, non-tender, non-distended, no rebound tenderness / guarding  Incision/Wound: Wound site healing well, wound vac in place.  Neurological: A&O x 3    LABS:                        7.9    6.37  )-----------( 525      ( 20 Jul 2019 12:02 )             25.6     07-20    132<L>  |  97<L>  |  16.0  ----------------------------<  164<H>  4.4   |  25.0  |  0.73    Ca    9.0      20 Jul 2019 12:02  Phos  2.8     07-20  Mg     2.3     07-20    TPro  8.2  /  Alb  2.7<L>  /  TBili  0.7  /  DBili  0.2  /  AST  82<H>  /  ALT  96<H>  /  AlkPhos  140<H>  07-19    MEDICATIONS  (STANDING):  amLODIPine   Tablet 10 milliGRAM(s) Oral daily  aspirin  chewable 81 milliGRAM(s) Oral daily  cefepime   IVPB 2000 milliGRAM(s) IV Intermittent every 8 hours  chlorhexidine 2% Cloths 1 Application(s) Topical <User Schedule>  docusate sodium 100 milliGRAM(s) Oral two times a day  enoxaparin Injectable 80 milliGRAM(s) SubCutaneous two times a day  gabapentin 300 milliGRAM(s) Oral three times a day  insulin lispro (HumaLOG) corrective regimen sliding scale   SubCutaneous Before meals and at bedtime  lactulose Syrup 10 Gram(s) Oral daily  polyethylene glycol 3350 17 Gram(s) Oral two times a day  senna 1 Tablet(s) Oral two times a day  simvastatin 20 milliGRAM(s) Oral at bedtime  tamsulosin 0.8 milliGRAM(s) Oral at bedtime    MEDICATIONS  (PRN):  acetaminophen   Tablet .. 650 milliGRAM(s) Oral every 6 hours PRN Temp greater or equal to 38C (100.4F), Mild Pain (1 - 3)  ondansetron Injectable 4 milliGRAM(s) IV Push every 8 hours PRN Nausea and/or Vomiting  oxyCODONE    IR 5 milliGRAM(s) Oral every 4 hours PRN Moderate Pain (4 - 6)  oxyCODONE    IR 10 milliGRAM(s) Oral every 4 hours PRN Severe Pain (7 - 10)  sodium chloride 0.9% lock flush 10 milliLiter(s) IV Push every 1 hour PRN Pre/post blood products, medications, blood draw, and to maintain line patency

## 2019-07-21 NOTE — PROGRESS NOTE ADULT - ASSESSMENT
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 Patient spiked a fever of 100.6 yesterday afternoon. WBC of 6.37.    - OR with PRS Tuesday 7/23 for debridement  - per ID, continue cefepime  - per Uro, continue villegas

## 2019-07-21 NOTE — PROGRESS NOTE ADULT - ASSESSMENT
Urinary retention. Maintain Min .Pt is tentatively scheduled (as per notes) for OR for debridement on Tuesday. Would not remove Min at this time.

## 2019-07-21 NOTE — CONSULT NOTE ADULT - PROVIDER SPECIALTY LIST ADULT
Cardiology
Cardiology
Nephrology
Orthopedics
Rehab Medicine
SICU
Vascular Surgery
Urology

## 2019-07-21 NOTE — CONSULT NOTE ADULT - REASON FOR ADMISSION
Forklift crush to RLE
Fork lift crush to LLE
Forklift crush to RLE

## 2019-07-21 NOTE — PROGRESS NOTE ADULT - SUBJECTIVE AND OBJECTIVE BOX
INTERVAL HPI/OVERNIGHT EVENTS:    MEDICATIONS  (STANDING):  amLODIPine   Tablet 10 milliGRAM(s) Oral daily  aspirin  chewable 81 milliGRAM(s) Oral daily  cefepime   IVPB 2000 milliGRAM(s) IV Intermittent every 8 hours  chlorhexidine 2% Cloths 1 Application(s) Topical <User Schedule>  docusate sodium 100 milliGRAM(s) Oral two times a day  enoxaparin Injectable 80 milliGRAM(s) SubCutaneous two times a day  gabapentin 300 milliGRAM(s) Oral three times a day  insulin lispro (HumaLOG) corrective regimen sliding scale   SubCutaneous Before meals and at bedtime  lactulose Syrup 10 Gram(s) Oral daily  polyethylene glycol 3350 17 Gram(s) Oral two times a day  senna 1 Tablet(s) Oral two times a day  simvastatin 20 milliGRAM(s) Oral at bedtime  tamsulosin 0.8 milliGRAM(s) Oral at bedtime    MEDICATIONS  (PRN):  acetaminophen   Tablet .. 650 milliGRAM(s) Oral every 6 hours PRN Temp greater or equal to 38C (100.4F), Mild Pain (1 - 3)  ondansetron Injectable 4 milliGRAM(s) IV Push every 8 hours PRN Nausea and/or Vomiting  oxyCODONE    IR 5 milliGRAM(s) Oral every 4 hours PRN Moderate Pain (4 - 6)  oxyCODONE    IR 10 milliGRAM(s) Oral every 4 hours PRN Severe Pain (7 - 10)  sodium chloride 0.9% lock flush 10 milliLiter(s) IV Push every 1 hour PRN Pre/post blood products, medications, blood draw, and to maintain line patency      Allergies    No Known Allergies    Intolerances        Vital Signs Last 24 Hrs  T(C): 36.3 (21 Jul 2019 08:48), Max: 38.1 (20 Jul 2019 16:05)  T(F): 97.4 (21 Jul 2019 08:48), Max: 100.6 (20 Jul 2019 16:05)  HR: 89 (21 Jul 2019 08:48) (89 - 102)  BP: 90/56 (21 Jul 2019 08:48) (90/56 - 123/74)  BP(mean): --  RR: 18 (21 Jul 2019 08:48) (18 - 18)  SpO2: 99% (21 Jul 2019 08:48) (99% - 99%)     ON PE:  General: alert and awake  Abdomen: No flank pain reported   : urine clear. Bladder decompressed.    LABS:                        8.1    7.60  )-----------( 577      ( 21 Jul 2019 11:54 )             26.2     07-21    133<L>  |  98  |  20.0  ----------------------------<  117<H>  5.4<H>   |  24.0  |  0.84    Ca    9.2      21 Jul 2019 11:54  Phos  2.8     07-20  Mg     2.3     07-20            RADIOLOGY & ADDITIONAL TESTS:

## 2019-07-21 NOTE — PROGRESS NOTE ADULT - ASSESSMENT
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 No evidence of active wound infection of RLE however w/ minor eschar that will require debridement     - To OR this week for debridement of eschar    - continue Abx per primary team and ID recs  	- continue daily dressing changes and q3 wound vac changes: next vac change 7/22

## 2019-07-21 NOTE — CONSULT NOTE ADULT - CONSULT REQUESTED DATE/TIME
14-Jul-2019
21-Jul-2019 16:53
25-Jun-2019 11:48
25-Jun-2019 12:58
25-Jun-2019 19:13
25-Jun-2019 22:28
26-Jun-2019 13:12
30-Jun-2019 16:04
07-Jul-2019

## 2019-07-22 ENCOUNTER — TRANSCRIPTION ENCOUNTER (OUTPATIENT)
Age: 69
End: 2019-07-22

## 2019-07-22 LAB
ANION GAP SERPL CALC-SCNC: 10 MMOL/L — SIGNIFICANT CHANGE UP (ref 5–17)
BASOPHILS # BLD AUTO: 0.03 K/UL — SIGNIFICANT CHANGE UP (ref 0–0.2)
BASOPHILS NFR BLD AUTO: 0.4 % — SIGNIFICANT CHANGE UP (ref 0–2)
BUN SERPL-MCNC: 18 MG/DL — SIGNIFICANT CHANGE UP (ref 8–20)
CALCIUM SERPL-MCNC: 9.1 MG/DL — SIGNIFICANT CHANGE UP (ref 8.6–10.2)
CHLORIDE SERPL-SCNC: 97 MMOL/L — LOW (ref 98–107)
CO2 SERPL-SCNC: 25 MMOL/L — SIGNIFICANT CHANGE UP (ref 22–29)
CREAT SERPL-MCNC: 0.78 MG/DL — SIGNIFICANT CHANGE UP (ref 0.5–1.3)
EOSINOPHIL # BLD AUTO: 0.08 K/UL — SIGNIFICANT CHANGE UP (ref 0–0.5)
EOSINOPHIL NFR BLD AUTO: 1.2 % — SIGNIFICANT CHANGE UP (ref 0–6)
GLUCOSE BLDC GLUCOMTR-MCNC: 122 MG/DL — HIGH (ref 70–99)
GLUCOSE BLDC GLUCOMTR-MCNC: 145 MG/DL — HIGH (ref 70–99)
GLUCOSE BLDC GLUCOMTR-MCNC: 164 MG/DL — HIGH (ref 70–99)
GLUCOSE BLDC GLUCOMTR-MCNC: 189 MG/DL — HIGH (ref 70–99)
GLUCOSE SERPL-MCNC: 130 MG/DL — HIGH (ref 70–115)
HCT VFR BLD CALC: 26.7 % — LOW (ref 39–50)
HGB BLD-MCNC: 8.1 G/DL — LOW (ref 13–17)
IMM GRANULOCYTES NFR BLD AUTO: 2.5 % — HIGH (ref 0–1.5)
LYMPHOCYTES # BLD AUTO: 1.62 K/UL — SIGNIFICANT CHANGE UP (ref 1–3.3)
LYMPHOCYTES # BLD AUTO: 23.9 % — SIGNIFICANT CHANGE UP (ref 13–44)
MAGNESIUM SERPL-MCNC: 2.3 MG/DL — SIGNIFICANT CHANGE UP (ref 1.6–2.6)
MCHC RBC-ENTMCNC: 25.9 PG — LOW (ref 27–34)
MCHC RBC-ENTMCNC: 30.3 GM/DL — LOW (ref 32–36)
MCV RBC AUTO: 85.3 FL — SIGNIFICANT CHANGE UP (ref 80–100)
MONOCYTES # BLD AUTO: 0.54 K/UL — SIGNIFICANT CHANGE UP (ref 0–0.9)
MONOCYTES NFR BLD AUTO: 8 % — SIGNIFICANT CHANGE UP (ref 2–14)
NEUTROPHILS # BLD AUTO: 4.34 K/UL — SIGNIFICANT CHANGE UP (ref 1.8–7.4)
NEUTROPHILS NFR BLD AUTO: 64 % — SIGNIFICANT CHANGE UP (ref 43–77)
PHOSPHATE SERPL-MCNC: 3.1 MG/DL — SIGNIFICANT CHANGE UP (ref 2.4–4.7)
PLATELET # BLD AUTO: 522 K/UL — HIGH (ref 150–400)
POTASSIUM SERPL-MCNC: 4.6 MMOL/L — SIGNIFICANT CHANGE UP (ref 3.5–5.3)
POTASSIUM SERPL-SCNC: 4.6 MMOL/L — SIGNIFICANT CHANGE UP (ref 3.5–5.3)
RBC # BLD: 3.13 M/UL — LOW (ref 4.2–5.8)
RBC # FLD: 17.4 % — HIGH (ref 10.3–14.5)
SODIUM SERPL-SCNC: 132 MMOL/L — LOW (ref 135–145)
WBC # BLD: 6.78 K/UL — SIGNIFICANT CHANGE UP (ref 3.8–10.5)
WBC # FLD AUTO: 6.78 K/UL — SIGNIFICANT CHANGE UP (ref 3.8–10.5)

## 2019-07-22 PROCEDURE — 99231 SBSQ HOSP IP/OBS SF/LOW 25: CPT

## 2019-07-22 PROCEDURE — 99233 SBSQ HOSP IP/OBS HIGH 50: CPT

## 2019-07-22 RX ADMIN — SENNA PLUS 1 TABLET(S): 8.6 TABLET ORAL at 06:55

## 2019-07-22 RX ADMIN — CHLORHEXIDINE GLUCONATE 1 APPLICATION(S): 213 SOLUTION TOPICAL at 06:50

## 2019-07-22 RX ADMIN — CEFEPIME 100 MILLIGRAM(S): 1 INJECTION, POWDER, FOR SOLUTION INTRAMUSCULAR; INTRAVENOUS at 06:54

## 2019-07-22 RX ADMIN — GABAPENTIN 300 MILLIGRAM(S): 400 CAPSULE ORAL at 22:40

## 2019-07-22 RX ADMIN — Medication 650 MILLIGRAM(S): at 13:47

## 2019-07-22 RX ADMIN — POLYETHYLENE GLYCOL 3350 17 GRAM(S): 17 POWDER, FOR SOLUTION ORAL at 06:56

## 2019-07-22 RX ADMIN — OXYCODONE HYDROCHLORIDE 10 MILLIGRAM(S): 5 TABLET ORAL at 01:06

## 2019-07-22 RX ADMIN — CEFEPIME 100 MILLIGRAM(S): 1 INJECTION, POWDER, FOR SOLUTION INTRAMUSCULAR; INTRAVENOUS at 13:48

## 2019-07-22 RX ADMIN — Medication 650 MILLIGRAM(S): at 13:31

## 2019-07-22 RX ADMIN — OXYCODONE HYDROCHLORIDE 10 MILLIGRAM(S): 5 TABLET ORAL at 02:00

## 2019-07-22 RX ADMIN — TAMSULOSIN HYDROCHLORIDE 0.8 MILLIGRAM(S): 0.4 CAPSULE ORAL at 22:40

## 2019-07-22 RX ADMIN — Medication 1: at 22:29

## 2019-07-22 RX ADMIN — GABAPENTIN 300 MILLIGRAM(S): 400 CAPSULE ORAL at 06:58

## 2019-07-22 RX ADMIN — POLYETHYLENE GLYCOL 3350 17 GRAM(S): 17 POWDER, FOR SOLUTION ORAL at 17:38

## 2019-07-22 RX ADMIN — GABAPENTIN 300 MILLIGRAM(S): 400 CAPSULE ORAL at 13:48

## 2019-07-22 RX ADMIN — Medication 81 MILLIGRAM(S): at 11:55

## 2019-07-22 RX ADMIN — CEFEPIME 100 MILLIGRAM(S): 1 INJECTION, POWDER, FOR SOLUTION INTRAMUSCULAR; INTRAVENOUS at 22:40

## 2019-07-22 RX ADMIN — LACTULOSE 10 GRAM(S): 10 SOLUTION ORAL at 11:55

## 2019-07-22 RX ADMIN — AMLODIPINE BESYLATE 10 MILLIGRAM(S): 2.5 TABLET ORAL at 06:54

## 2019-07-22 RX ADMIN — ENOXAPARIN SODIUM 80 MILLIGRAM(S): 100 INJECTION SUBCUTANEOUS at 06:54

## 2019-07-22 RX ADMIN — Medication 100 MILLIGRAM(S): at 06:54

## 2019-07-22 RX ADMIN — ENOXAPARIN SODIUM 80 MILLIGRAM(S): 100 INJECTION SUBCUTANEOUS at 17:38

## 2019-07-22 RX ADMIN — Medication 1: at 17:38

## 2019-07-22 RX ADMIN — SENNA PLUS 1 TABLET(S): 8.6 TABLET ORAL at 17:34

## 2019-07-22 RX ADMIN — SIMVASTATIN 20 MILLIGRAM(S): 20 TABLET, FILM COATED ORAL at 22:40

## 2019-07-22 RX ADMIN — Medication 100 MILLIGRAM(S): at 17:34

## 2019-07-22 NOTE — CHART NOTE - NSCHARTNOTEFT_GEN_A_CORE
Source: Patient     Current Diet: Consistent Carbohydrate, Renal + Glucerna TID     Patient reports appetite/intake improving. Likes and takes Glucerna supplements     PO intake:  75%    Current Weight:   (7/22) 70kg  (7/4) 78kg     % Weight Change -- 1+ pedal edema     Pertinent Medications: MEDICATIONS  (STANDING):  amLODIPine   Tablet 10 milliGRAM(s) Oral daily  aspirin  chewable 81 milliGRAM(s) Oral daily  cefepime   IVPB 2000 milliGRAM(s) IV Intermittent every 8 hours  chlorhexidine 2% Cloths 1 Application(s) Topical <User Schedule>  docusate sodium 100 milliGRAM(s) Oral two times a day  enoxaparin Injectable 80 milliGRAM(s) SubCutaneous two times a day  gabapentin 300 milliGRAM(s) Oral three times a day  insulin lispro (HumaLOG) corrective regimen sliding scale   SubCutaneous Before meals and at bedtime  lactulose Syrup 10 Gram(s) Oral daily  polyethylene glycol 3350 17 Gram(s) Oral two times a day  senna 1 Tablet(s) Oral two times a day  simvastatin 20 milliGRAM(s) Oral at bedtime  tamsulosin 0.8 milliGRAM(s) Oral at bedtime    MEDICATIONS  (PRN):  acetaminophen   Tablet .. 650 milliGRAM(s) Oral every 6 hours PRN Temp greater or equal to 38C (100.4F), Mild Pain (1 - 3)  ondansetron Injectable 4 milliGRAM(s) IV Push every 8 hours PRN Nausea and/or Vomiting  sodium chloride 0.9% lock flush 10 milliLiter(s) IV Push every 1 hour PRN Pre/post blood products, medications, blood draw, and to maintain line patency    Pertinent Labs: CBC Full  -  ( 22 Jul 2019 07:12 )  WBC Count : 6.78 K/uL  RBC Count : 3.13 M/uL  Hemoglobin : 8.1 g/dL  Hematocrit : 26.7 %  Platelet Count - Automated : 522 K/uL  Mean Cell Volume : 85.3 fl  Mean Cell Hemoglobin : 25.9 pg  Mean Cell Hemoglobin Concentration : 30.3 gm/dL  Auto Neutrophil # : 4.34 K/uL  Auto Lymphocyte # : 1.62 K/uL  Auto Monocyte # : 0.54 K/uL  Auto Eosinophil # : 0.08 K/uL  Auto Basophil # : 0.03 K/uL  Auto Neutrophil % : 64.0 %  Auto Lymphocyte % : 23.9 %  Auto Monocyte % : 8.0 %  Auto Eosinophil % : 1.2 %  Auto Basophil % : 0.4 %  Na 132, Glu 130     Skin: Surgical incision to right leg, left leg, and right lateral thigh per documentation     Nutrition focused physical exam previously conducted - found signs of malnutrition [x]absent [ ]present    Subcutaneous fat loss: [ ] Orbital fat pads region, [ ]Buccal fat region, [ ]Triceps region,  [ ]Ribs region    Muscle wasting: [ ]Temples region, [ ]Clavicle region, [ ]Shoulder region, [ ]Scapula region, [ ]Interosseous region,  [ ]thigh region, [ ]Calf region    Estimated Needs:   [x] no change since previous assessment  [ ] recalculated:     Current Nutrition Diagnosis: Pt remains at nutrition risk secondary to increased nutrient needs related to increased estimated nutrient needs to facilitate healing as evidenced by large laceration in right leg requiring popliteal bypass and repair and left tibial plateau fracture s/p ORIF, now with right knee septic arthritis. Spoke with pt and family members at bedside. Pt reports good appetite/po intake at all meals, consuming >75%. Family confirms pt eating well. Pt denies any chewing/swallowing difficulty. Last BM 7/12 per documentation.     Recommendations:   1) Continue current nutrition plan.     Monitoring and Evaluation:  [x] PO intake [x] Tolerance to diet prescription [X] Weights  [X] Follow up per protocol [X] Labs.

## 2019-07-22 NOTE — PROGRESS NOTE ADULT - ASSESSMENT
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 Patient is afebrile with a temp of 98.0. Pt. WBC is within normal range at 6.78    -OR with PRS Tuesday 7/23 for Rt knee debridement and integra. (Pre op with appropriate labs)  -Continue villegas, given presence of urinary retention  -No changes to current pain regimen  -BP well controlled on Norvasc  -Tolerating consistent carbohydrate diet, with plans for NPO after midnight   -Continue with OOBC  -Continue cefepime 2gm q8h  - last day 8/12/19 as per ID recs  -Dvt ppx with Lovenox

## 2019-07-22 NOTE — PROGRESS NOTE ADULT - SUBJECTIVE AND OBJECTIVE BOX
[facility]  INFECTIOUS DISEASES AND INTERNAL MEDICINE at [facility]  =======================================================  Gabriel Levy MD  Diplomates American Board of Internal Medicine and Infectious Diseases  Telephone [phone]  Fax            [phone]  =======================================================    [id number]  AIMEE SOLER   Follow up: R knee septic arthritis; left leg fracture; pseudomonas infection  patient seen and examined.   No more fever.   Pain is controlled.     ===================================================  REVIEW OF SYSTEMS:  as above  all other ROS negative  =======================================================  Allergies  No Known Allergies     Antibiotics:  cefepime   IVPB 2000 milliGRAM(s) IV Intermittent every 8 hours    ======================================================  Physical Exam:  ============   Vital Signs Last 24 Hrs  T(C): 36.7 (22 Jul 2019 08:19), Max: 38.3 (21 Jul 2019 15:20)  T(F): 98 (22 Jul 2019 08:19), Max: 101 (21 Jul 2019 15:20)  HR: 95 (22 Jul 2019 08:19) (88 - 97)  BP: 126/77 (22 Jul 2019 08:19) (108/75 - 126/77)  BP(mean): --  RR: 18 (22 Jul 2019 08:19) (18 - 18)  SpO2: 95% (22 Jul 2019 08:19) (95% - 98%)    General:  No acute distress.  Eye: Pupils are equal, round and reactive to light, Extraocular movements are intact, Normal conjunctiva.  HENT: Normocephalic, Oral mucosa is moist, No pharyngeal erythema, No sinus tenderness.  Neck: Supple, No lymphadenopathy.  Respiratory: Lungs are clear to auscultation, Respirations are non-labored.  Cardiovascular: Normal rate, Regular rhythm,    RIGHT PICC line  Gastrointestinal: Soft, Non-tender, Non-distended, Normal bowel sounds.  Genitourinary: No costovertebral angle tenderness.  Lymphatics: No lymphadenopathy neck,   Musculoskeletal: LEFT knee with brace; LLE with dressing in place,  RLE with dressing + WOUND VAC  Integumentary: No rash.  Neurologic: Alert, Oriented, No focal deficits, Cranial Nerves II-XII are grossly intact.  Psychiatric: Appropriate mood & affect.    =======================================================  Labs:                                             8.1    6.78  )-----------( 522      ( 22 Jul 2019 07:12 )             26.7   07-22    132<L>  |  97<L>  |  18.0  ----------------------------<  130<H>  4.6   |  25.0  |  0.78    Ca    9.1      22 Jul 2019 07:12  Phos  3.1     07-22  Mg     2.3     07-22          WBC Count: 7.60 K/uL (07-21-19 @ 11:54)  WBC Count: 6.37 K/uL (07-20-19 @ 12:02)  WBC Count: 8.25 K/uL (07-19-19 @ 09:15)  WBC Count: 8.87 K/uL (07-18-19 @ 05:42)  WBC Count: 7.48 K/uL (07-17-19 @ 08:12)      07-21    133<L>  |  98  |  20.0  ----------------------------<  117<H>  5.4<H>   |  24.0  |  0.84    Ca    9.2      21 Jul 2019 11:54  Phos  2.8     07-20  Mg     2.3     07-20        Culture - Blood (collected 07-16-19 @ 00:19)  Source: .Blood  Final Report (07-21-19 @ 01:00):    No growth at 5 days.    Culture - Blood (collected 07-16-19 @ 00:19)  Source: .Blood  Final Report (07-21-19 @ 01:00):    No growth at 5 days.    Culture - Surgical Swab (collected 07-14-19 @ 15:02)  Source: .Surgical Swab Right knee joint (swabs)  Gram Stain (07-14-19 @ 16:17):    Few White blood cells    No organisms seen  Final Report (07-19-19 @ 13:45):    Pseudomonas aeruginosa Growing in broth media only  Organism: Pseudomonas aeruginosa (07-19-19 @ 13:45)  Organism: Pseudomonas aeruginosa (07-19-19 @ 13:45)    Sensitivities:      -  Amikacin: S <=16      -  Aztreonam: S 8      -  Cefepime: S <=4      -  Ceftazidime: S 4      -  Ciprofloxacin: S <=1      -  Gentamicin: S <=4      -  Imipenem: S <=1      -  Levofloxacin: S <=2      -  Meropenem: S <=1      -  Piperacillin/Tazobactam: S <=16      -  Tobramycin: S <=4      Method Type: NICHOLAS    Culture - Surgical Swab (collected 07-14-19 @ 15:00)  Source: .Surgical Swab Right knee (swabs)  Gram Stain (07-14-19 @ 16:19):    Few White blood cells    No organisms seen  Final Report (07-19-19 @ 13:45):    Few Pseudomonas aeruginosa  Organism: Pseudomonas aeruginosa (07-19-19 @ 13:45)  Organism: Pseudomonas aeruginosa (07-19-19 @ 13:45)    Sensitivities:      -  Amikacin: S <=16      -  Ampicillin: R >16      -  Ampicillin/Sulbactam: R >16/8      -  Aztreonam: S 8      -  Cefepime: S <=4      -  Cefotaxime: R 16      -  Ceftazidime: S 4      -  Ceftriaxone: R 32      -  Ciprofloxacin: S <=1      -  Ertapenem: R <=1      -  Gentamicin: S <=4      -  Imipenem: S <=1      -  Levofloxacin: S <=2      -  Meropenem: S <=1      -  Piperacillin/Tazobactam: S <=16      -  Tobramycin: S <=4      Method Type: NICHOLAS    Culture - Other (collected 07-12-19 @ 15:37)  Source: .Other right knee aspiration (swabs)  Final Report (07-14-19 @ 15:20):    Few Pseudomonas aeruginosa  Organism: Pseudomonas aeruginosa (07-14-19 @ 15:20)  Organism: Pseudomonas aeruginosa (07-14-19 @ 15:20)    Sensitivities:      -  Amikacin: S <=16      -  Aztreonam: S <=4      -  Cefepime: S <=4      -  Ceftazidime: S 4      -  Ciprofloxacin: S <=1      -  Gentamicin: S <=4      -  Imipenem: S <=1      -  Levofloxacin: S <=2      -  Meropenem: S <=1      -  Piperacillin/Tazobactam: S <=16      -  Tobramycin: S <=4      Method Type: NICHOLAS    Culture - Body Fluid with Gram Stain (collected 07-12-19 @ 15:16)  Source: .Body Fluid right knee synovial fluid  Gram Stain (07-13-19 @ 13:01):    Numerous White blood cells    No organisms seen  Final Report (07-17-19 @ 11:50):    Few Pseudomonas aeruginosa  Organism: Pseudomonas aeruginosa (07-17-19 @ 11:50)  Organism: Pseudomonas aeruginosa (07-17-19 @ 11:50)    Sensitivities:      -  Amikacin: S <=16      -  Aztreonam: S <=4      -  Cefepime: S <=4      -  Ceftazidime: S 4      -  Ciprofloxacin: S <=1      -  Gentamicin: S <=4      -  Imipenem: S <=1      -  Levofloxacin: S <=2      -  Meropenem: S <=1      -  Piperacillin/Tazobactam: S <=16      -  Tobramycin: S <=4      Method Type: NICHOLAS    Culture - Blood (collected 07-12-19 @ 14:25)  Source: .Blood  Final Report (07-17-19 @ 15:00):    No growth at 5 days.    Culture - Blood (collected 07-12-19 @ 14:25)  Source: .Blood  Final Report (07-17-19 @ 15:00):    No growth at 5 days.    Culture - Blood (collected 07-12-19 @ 02:49)  Source: .Blood  Final Report (07-17-19 @ 03:00):    No growth at 5 days.

## 2019-07-22 NOTE — PROGRESS NOTE ADULT - SUBJECTIVE AND OBJECTIVE BOX
INTERVAL HPI/OVERNIGHT EVENTS:  Feels well.  Resting comfortable with the villegas in  MEDICATIONS  (STANDING):  amLODIPine   Tablet 10 milliGRAM(s) Oral daily  aspirin  chewable 81 milliGRAM(s) Oral daily  cefepime   IVPB 2000 milliGRAM(s) IV Intermittent every 8 hours  chlorhexidine 2% Cloths 1 Application(s) Topical <User Schedule>  docusate sodium 100 milliGRAM(s) Oral two times a day  enoxaparin Injectable 80 milliGRAM(s) SubCutaneous two times a day  gabapentin 300 milliGRAM(s) Oral three times a day  insulin lispro (HumaLOG) corrective regimen sliding scale   SubCutaneous Before meals and at bedtime  lactulose Syrup 10 Gram(s) Oral daily  polyethylene glycol 3350 17 Gram(s) Oral two times a day  senna 1 Tablet(s) Oral two times a day  simvastatin 20 milliGRAM(s) Oral at bedtime  tamsulosin 0.8 milliGRAM(s) Oral at bedtime    MEDICATIONS  (PRN):  acetaminophen   Tablet .. 650 milliGRAM(s) Oral every 6 hours PRN Temp greater or equal to 38C (100.4F), Mild Pain (1 - 3)  ondansetron Injectable 4 milliGRAM(s) IV Push every 8 hours PRN Nausea and/or Vomiting  sodium chloride 0.9% lock flush 10 milliLiter(s) IV Push every 1 hour PRN Pre/post blood products, medications, blood draw, and to maintain line patency      Allergies    No Known Allergies    Intolerances        Vital Signs Last 24 Hrs  T(C): 36.7 (2019 08:19), Max: 38.3 (2019 15:20)  T(F): 98 (2019 08:19), Max: 101 (2019 15:20)  HR: 95 (2019 08:19) (88 - 97)  BP: 126/77 (2019 08:19) (108/75 - 126/77)  BP(mean): --  RR: 18 (2019 08:19) (18 - 18)  SpO2: 95% (2019 08:19) (95% - 98%)     ON PE:  General: alert and awake  Abdomen: soft, nt, nd, bs+       LABS:                        8.1    6.78  )-----------( 522      ( 2019 07:12 )             26.7     07-    132<L>  |  97<L>  |  18.0  ----------------------------<  130<H>  4.6   |  25.0  |  0.78    Ca    9.1      2019 07:12  Phos  3.1       Mg     2.3             Urinalysis Basic - ( 2019 17:50 )    Color: Yellow / Appearance: Clear / S.015 / pH: x  Gluc: x / Ketone: Negative  / Bili: Negative / Urobili: Negative mg/dL   Blood: x / Protein: 30 mg/dL / Nitrite: Negative   Leuk Esterase: Negative / RBC: 0-2 /HPF / WBC 0-2   Sq Epi: x / Non Sq Epi: Occasional / Bacteria: Occasional        RADIOLOGY & ADDITIONAL TESTS:

## 2019-07-22 NOTE — PROGRESS NOTE ADULT - SUBJECTIVE AND OBJECTIVE BOX
No new events.  Pt with intermittent low grade fevers.  WBC normal. Feeling better.    Exam:  NAD.  RLE wound clean.  Stable lateral knee eschar, thinning, no erythema.  Knee effusion resolved.  STSG healing well.   Open wound with clean granulation tissue.      A/P: Plan for right knee debridement and integra tomorrow  NPO @ [facility]

## 2019-07-22 NOTE — PROGRESS NOTE ADULT - ASSESSMENT
67y/o man with no significant PMH was admitted on 6/25 due to a trauma at work. He had a large laceration in R leg  s/p RLE arterial bypass, s/p RLE fasciotomy on 6/25/19, s/p ORIF LEFT tibial plateau 6/26/19;  s/p Repair of sciatic nerve 7/3/19  He was febrile on 7/5, started on antibiotics, due to possibility of septic arthritis had R knee arthrocentesis showing 44k WBC and pseudomonas he was taken to OR;   s/p debridement and irrigation of right knee 7/14/19    R knee septic arthritis   Pseudomonas infection    - Blood culture negative on 7/5, 7/12 and 7/16  - Knee aspiration with a sensitive pseudomonas    - Continue cefepime 2gm q8h  - last day 8/12/19  - anticipate 4 weeks of IV antibiotics  has  PICC/midline in ARM  FOR REPEAT DEBRIDEMENT WILL D/W SURGERY

## 2019-07-22 NOTE — PROGRESS NOTE ADULT - SUBJECTIVE AND OBJECTIVE BOX
HPI/OVERNIGHT EVENTS:    Afebrile at 98.0 at 8:19am this morning. Dressing changed in the PM. No acute events overnight. Continues having adequate output from villegas. Pt denies fever, chills, nausea, vomiting, chest pain, SOB, dizziness, abd pain or any other concerning symptoms. Patient is due to go to the OR tomorrow for Rt knee debridement and integra. He has been cleared by plastics who saw him this morning. They state that he has a stable lateral knee eschar, with no erythema and STSG healing well.      MEDICATIONS  (STANDING):  amLODIPine   Tablet 10 milliGRAM(s) Oral daily  aspirin  chewable 81 milliGRAM(s) Oral daily  cefepime   IVPB 2000 milliGRAM(s) IV Intermittent every 8 hours  chlorhexidine 2% Cloths 1 Application(s) Topical <User Schedule>  docusate sodium 100 milliGRAM(s) Oral two times a day  enoxaparin Injectable 80 milliGRAM(s) SubCutaneous two times a day  gabapentin 300 milliGRAM(s) Oral three times a day  insulin lispro (HumaLOG) corrective regimen sliding scale   SubCutaneous Before meals and at bedtime  lactulose Syrup 10 Gram(s) Oral daily  polyethylene glycol 3350 17 Gram(s) Oral two times a day  senna 1 Tablet(s) Oral two times a day  simvastatin 20 milliGRAM(s) Oral at bedtime  tamsulosin 0.8 milliGRAM(s) Oral at bedtime    MEDICATIONS  (PRN):  acetaminophen   Tablet .. 650 milliGRAM(s) Oral every 6 hours PRN Temp greater or equal to 38C (100.4F), Mild Pain (1 - 3)  ondansetron Injectable 4 milliGRAM(s) IV Push every 8 hours PRN Nausea and/or Vomiting  sodium chloride 0.9% lock flush 10 milliLiter(s) IV Push every 1 hour PRN Pre/post blood products, medications, blood draw, and to maintain line patency      Vital Signs Last 24 Hrs  T(C): 36.7 (2019 08:19), Max: 38.3 (2019 15:20)  T(F): 98 (2019 08:19), Max: 101 (2019 15:20)  HR: 95 (2019 08:19) (88 - 97)  BP: 126/77 (2019 08:19) (108/75 - 126/77)  BP(mean): --  RR: 18 (2019 08:19) (18 - 18)  SpO2: 95% (2019 08:19) (95% - 98%)    Constitutional: patient resting comfortably in bed, in no acute distress  HEENT: EOMI / PERRL b/l, no active drainage or redness  Neck: No JVD, full ROM without pain  Respiratory: respirations are unlabored, no accessory muscle use, no conversational dyspnea  Cardiovascular: regular rate & rhythm  Gastrointestinal: Abdomen soft, non-tender, non-distended, no rebound tenderness / guarding  Neurological: GCS: 15 (4/5/6). A&O x 3; no gross sensory / motor / coordination deficits  Psychiatric: Normal mood, normal affect  Musculoskeletal: No joint pain, swelling or deformity; no limitation of movement    I&O's Detail    2019 07:  -  2019 07:00  --------------------------------------------------------  IN:  Total IN: 0 mL    OUT:    Indwelling Catheter - Urethral: 1650 mL  Total OUT: 1650 mL    Total NET: -1650 mL      2019 07:01  -  2019 09:22  --------------------------------------------------------  IN:  Total IN: 0 mL    OUT:    Indwelling Catheter - Urethral: 900 mL  Total OUT: 900 mL    Total NET: -900 mL          LABS:                        8.1    6.78  )-----------( 522      ( 2019 07:12 )             26.7         132<L>  |  97<L>  |  18.0  ----------------------------<  130<H>  4.6   |  25.0  |  0.78    Ca    9.1      2019 07:12  Phos  3.1     07-22  Mg     2.3     07-22        Urinalysis Basic - ( 2019 17:50 )    Color: Yellow / Appearance: Clear / S.015 / pH: x  Gluc: x / Ketone: Negative  / Bili: Negative / Urobili: Negative mg/dL   Blood: x / Protein: 30 mg/dL / Nitrite: Negative   Leuk Esterase: Negative / RBC: 0-2 /HPF / WBC 0-2   Sq Epi: x / Non Sq Epi: Occasional / Bacteria: Occasional

## 2019-07-23 LAB
ANION GAP SERPL CALC-SCNC: 12 MMOL/L — SIGNIFICANT CHANGE UP (ref 5–17)
APTT BLD: 32.8 SEC — SIGNIFICANT CHANGE UP (ref 27.5–36.3)
BASOPHILS # BLD AUTO: 0.02 K/UL — SIGNIFICANT CHANGE UP (ref 0–0.2)
BASOPHILS NFR BLD AUTO: 0.3 % — SIGNIFICANT CHANGE UP (ref 0–2)
BLD GP AB SCN SERPL QL: SIGNIFICANT CHANGE UP
BUN SERPL-MCNC: 20 MG/DL — SIGNIFICANT CHANGE UP (ref 8–20)
CALCIUM SERPL-MCNC: 9.3 MG/DL — SIGNIFICANT CHANGE UP (ref 8.6–10.2)
CHLORIDE SERPL-SCNC: 99 MMOL/L — SIGNIFICANT CHANGE UP (ref 98–107)
CO2 SERPL-SCNC: 24 MMOL/L — SIGNIFICANT CHANGE UP (ref 22–29)
CREAT SERPL-MCNC: 0.76 MG/DL — SIGNIFICANT CHANGE UP (ref 0.5–1.3)
EOSINOPHIL # BLD AUTO: 0.04 K/UL — SIGNIFICANT CHANGE UP (ref 0–0.5)
EOSINOPHIL NFR BLD AUTO: 0.5 % — SIGNIFICANT CHANGE UP (ref 0–6)
GLUCOSE BLDC GLUCOMTR-MCNC: 123 MG/DL — HIGH (ref 70–99)
GLUCOSE BLDC GLUCOMTR-MCNC: 147 MG/DL — HIGH (ref 70–99)
GLUCOSE BLDC GLUCOMTR-MCNC: 164 MG/DL — HIGH (ref 70–99)
GLUCOSE BLDC GLUCOMTR-MCNC: 182 MG/DL — HIGH (ref 70–99)
GLUCOSE SERPL-MCNC: 141 MG/DL — HIGH (ref 70–115)
HCT VFR BLD CALC: 28.7 % — LOW (ref 39–50)
HGB BLD-MCNC: 8.9 G/DL — LOW (ref 13–17)
IMM GRANULOCYTES NFR BLD AUTO: 2.7 % — HIGH (ref 0–1.5)
INR BLD: 1.3 RATIO — HIGH (ref 0.88–1.16)
LYMPHOCYTES # BLD AUTO: 1.47 K/UL — SIGNIFICANT CHANGE UP (ref 1–3.3)
LYMPHOCYTES # BLD AUTO: 18.9 % — SIGNIFICANT CHANGE UP (ref 13–44)
MAGNESIUM SERPL-MCNC: 2.5 MG/DL — SIGNIFICANT CHANGE UP (ref 1.6–2.6)
MCHC RBC-ENTMCNC: 26.7 PG — LOW (ref 27–34)
MCHC RBC-ENTMCNC: 31 GM/DL — LOW (ref 32–36)
MCV RBC AUTO: 86.2 FL — SIGNIFICANT CHANGE UP (ref 80–100)
MONOCYTES # BLD AUTO: 0.53 K/UL — SIGNIFICANT CHANGE UP (ref 0–0.9)
MONOCYTES NFR BLD AUTO: 6.8 % — SIGNIFICANT CHANGE UP (ref 2–14)
NEUTROPHILS # BLD AUTO: 5.49 K/UL — SIGNIFICANT CHANGE UP (ref 1.8–7.4)
NEUTROPHILS NFR BLD AUTO: 70.8 % — SIGNIFICANT CHANGE UP (ref 43–77)
PHOSPHATE SERPL-MCNC: 3 MG/DL — SIGNIFICANT CHANGE UP (ref 2.4–4.7)
PLATELET # BLD AUTO: 553 K/UL — HIGH (ref 150–400)
POTASSIUM SERPL-MCNC: 4.4 MMOL/L — SIGNIFICANT CHANGE UP (ref 3.5–5.3)
POTASSIUM SERPL-SCNC: 4.4 MMOL/L — SIGNIFICANT CHANGE UP (ref 3.5–5.3)
PROTHROM AB SERPL-ACNC: 15.1 SEC — HIGH (ref 10–12.9)
RBC # BLD: 3.33 M/UL — LOW (ref 4.2–5.8)
RBC # FLD: 17.1 % — HIGH (ref 10.3–14.5)
SODIUM SERPL-SCNC: 135 MMOL/L — SIGNIFICANT CHANGE UP (ref 135–145)
WBC # BLD: 7.76 K/UL — SIGNIFICANT CHANGE UP (ref 3.8–10.5)
WBC # FLD AUTO: 7.76 K/UL — SIGNIFICANT CHANGE UP (ref 3.8–10.5)

## 2019-07-23 PROCEDURE — 99231 SBSQ HOSP IP/OBS SF/LOW 25: CPT

## 2019-07-23 PROCEDURE — 99233 SBSQ HOSP IP/OBS HIGH 50: CPT

## 2019-07-23 RX ORDER — TAMSULOSIN HYDROCHLORIDE 0.4 MG/1
0.4 CAPSULE ORAL AT BEDTIME
Refills: 0 | Status: DISCONTINUED | OUTPATIENT
Start: 2019-07-23 | End: 2019-08-03

## 2019-07-23 RX ORDER — ACETAMINOPHEN 500 MG
650 TABLET ORAL EVERY 6 HOURS
Refills: 0 | Status: DISCONTINUED | OUTPATIENT
Start: 2019-07-23 | End: 2019-08-03

## 2019-07-23 RX ORDER — ASPIRIN/CALCIUM CARB/MAGNESIUM 324 MG
81 TABLET ORAL DAILY
Refills: 0 | Status: DISCONTINUED | OUTPATIENT
Start: 2019-07-23 | End: 2019-08-03

## 2019-07-23 RX ORDER — SIMVASTATIN 20 MG/1
20 TABLET, FILM COATED ORAL AT BEDTIME
Refills: 0 | Status: DISCONTINUED | OUTPATIENT
Start: 2019-07-23 | End: 2019-08-03

## 2019-07-23 RX ORDER — OXYCODONE HYDROCHLORIDE 5 MG/1
10 TABLET ORAL EVERY 4 HOURS
Refills: 0 | Status: DISCONTINUED | OUTPATIENT
Start: 2019-07-23 | End: 2019-07-27

## 2019-07-23 RX ORDER — ENOXAPARIN SODIUM 100 MG/ML
80 INJECTION SUBCUTANEOUS
Refills: 0 | Status: DISCONTINUED | OUTPATIENT
Start: 2019-07-23 | End: 2019-08-03

## 2019-07-23 RX ORDER — CEFEPIME 1 G/1
2000 INJECTION, POWDER, FOR SOLUTION INTRAMUSCULAR; INTRAVENOUS EVERY 8 HOURS
Refills: 0 | Status: DISCONTINUED | OUTPATIENT
Start: 2019-07-23 | End: 2019-08-03

## 2019-07-23 RX ORDER — ONDANSETRON 8 MG/1
4 TABLET, FILM COATED ORAL ONCE
Refills: 0 | Status: DISCONTINUED | OUTPATIENT
Start: 2019-07-23 | End: 2019-07-23

## 2019-07-23 RX ORDER — GABAPENTIN 400 MG/1
300 CAPSULE ORAL THREE TIMES A DAY
Refills: 0 | Status: DISCONTINUED | OUTPATIENT
Start: 2019-07-23 | End: 2019-08-03

## 2019-07-23 RX ORDER — SODIUM CHLORIDE 9 MG/ML
1000 INJECTION, SOLUTION INTRAVENOUS
Refills: 0 | Status: DISCONTINUED | OUTPATIENT
Start: 2019-07-23 | End: 2019-07-23

## 2019-07-23 RX ORDER — DOCUSATE SODIUM 100 MG
100 CAPSULE ORAL
Refills: 0 | Status: DISCONTINUED | OUTPATIENT
Start: 2019-07-23 | End: 2019-08-03

## 2019-07-23 RX ORDER — SENNA PLUS 8.6 MG/1
2 TABLET ORAL AT BEDTIME
Refills: 0 | Status: DISCONTINUED | OUTPATIENT
Start: 2019-07-23 | End: 2019-08-03

## 2019-07-23 RX ORDER — OXYCODONE HYDROCHLORIDE 5 MG/1
5 TABLET ORAL EVERY 4 HOURS
Refills: 0 | Status: DISCONTINUED | OUTPATIENT
Start: 2019-07-23 | End: 2019-07-27

## 2019-07-23 RX ORDER — METOCLOPRAMIDE HCL 10 MG
10 TABLET ORAL ONCE
Refills: 0 | Status: DISCONTINUED | OUTPATIENT
Start: 2019-07-23 | End: 2019-07-23

## 2019-07-23 RX ORDER — FENTANYL CITRATE 50 UG/ML
50 INJECTION INTRAVENOUS
Refills: 0 | Status: DISCONTINUED | OUTPATIENT
Start: 2019-07-23 | End: 2019-07-23

## 2019-07-23 RX ORDER — ONDANSETRON 8 MG/1
4 TABLET, FILM COATED ORAL ONCE
Refills: 0 | Status: DISCONTINUED | OUTPATIENT
Start: 2019-07-23 | End: 2019-08-03

## 2019-07-23 RX ORDER — FENTANYL CITRATE 50 UG/ML
25 INJECTION INTRAVENOUS
Refills: 0 | Status: DISCONTINUED | OUTPATIENT
Start: 2019-07-23 | End: 2019-07-23

## 2019-07-23 RX ORDER — AMLODIPINE BESYLATE 2.5 MG/1
10 TABLET ORAL DAILY
Refills: 0 | Status: DISCONTINUED | OUTPATIENT
Start: 2019-07-23 | End: 2019-08-03

## 2019-07-23 RX ADMIN — OXYCODONE HYDROCHLORIDE 10 MILLIGRAM(S): 5 TABLET ORAL at 21:00

## 2019-07-23 RX ADMIN — FENTANYL CITRATE 25 MICROGRAM(S): 50 INJECTION INTRAVENOUS at 19:45

## 2019-07-23 RX ADMIN — FENTANYL CITRATE 25 MICROGRAM(S): 50 INJECTION INTRAVENOUS at 19:35

## 2019-07-23 RX ADMIN — TAMSULOSIN HYDROCHLORIDE 0.4 MILLIGRAM(S): 0.4 CAPSULE ORAL at 21:52

## 2019-07-23 RX ADMIN — GABAPENTIN 300 MILLIGRAM(S): 400 CAPSULE ORAL at 06:04

## 2019-07-23 RX ADMIN — CEFEPIME 100 MILLIGRAM(S): 1 INJECTION, POWDER, FOR SOLUTION INTRAMUSCULAR; INTRAVENOUS at 05:43

## 2019-07-23 RX ADMIN — FENTANYL CITRATE 25 MICROGRAM(S): 50 INJECTION INTRAVENOUS at 20:00

## 2019-07-23 RX ADMIN — FENTANYL CITRATE 25 MICROGRAM(S): 50 INJECTION INTRAVENOUS at 20:15

## 2019-07-23 RX ADMIN — CEFEPIME 100 MILLIGRAM(S): 1 INJECTION, POWDER, FOR SOLUTION INTRAMUSCULAR; INTRAVENOUS at 21:53

## 2019-07-23 RX ADMIN — OXYCODONE HYDROCHLORIDE 10 MILLIGRAM(S): 5 TABLET ORAL at 20:21

## 2019-07-23 RX ADMIN — GABAPENTIN 300 MILLIGRAM(S): 400 CAPSULE ORAL at 21:52

## 2019-07-23 RX ADMIN — SIMVASTATIN 20 MILLIGRAM(S): 20 TABLET, FILM COATED ORAL at 21:52

## 2019-07-23 RX ADMIN — AMLODIPINE BESYLATE 10 MILLIGRAM(S): 2.5 TABLET ORAL at 06:04

## 2019-07-23 RX ADMIN — SODIUM CHLORIDE 125 MILLILITER(S): 9 INJECTION, SOLUTION INTRAVENOUS at 10:04

## 2019-07-23 RX ADMIN — CEFEPIME 100 MILLIGRAM(S): 1 INJECTION, POWDER, FOR SOLUTION INTRAMUSCULAR; INTRAVENOUS at 13:27

## 2019-07-23 RX ADMIN — SENNA PLUS 2 TABLET(S): 8.6 TABLET ORAL at 21:52

## 2019-07-23 RX ADMIN — CHLORHEXIDINE GLUCONATE 1 APPLICATION(S): 213 SOLUTION TOPICAL at 05:43

## 2019-07-23 NOTE — PROGRESS NOTE ADULT - ASSESSMENT
69y/o man with no significant PMH was admitted on 6/25 due to a trauma at work. He had a large laceration in R leg  s/p RLE arterial bypass, s/p RLE fasciotomy on 6/25/19, s/p ORIF LEFT tibial plateau 6/26/19;  s/p Repair of sciatic nerve 7/3/19  He was febrile on 7/5, started on antibiotics, due to possibility of septic arthritis had R knee arthrocentesis showing 44k WBC and pseudomonas he was taken to OR;   s/p debridement and irrigation of right knee 7/14/19    R knee septic arthritis   Pseudomonas infection    - Blood culture negative on 7/5, 7/12 and 7/16  - Knee aspiration with a sensitive pseudomonas    - Continue cefepime 2gm q8h  - last day 8/12/19  - anticipate 4 weeks of IV antibiotics  has  PICC/midline in ARM  FOR REPEAT DEBRIDEMENT TODAY   WILL D/W SURGERY

## 2019-07-23 NOTE — PROGRESS NOTE ADULT - SUBJECTIVE AND OBJECTIVE BOX
SUBJECTIVE:  No acute events overnight. Pain is well controlled. Pt denies fever, chills, nausea, vomiting, chest pain, SOB, dizziness, abd pain or any other concerning symptoms. Currently NPO for OR today.    MEDICATIONS  (STANDING):  amLODIPine   Tablet 10 milliGRAM(s) Oral daily  aspirin  chewable 81 milliGRAM(s) Oral daily  cefepime   IVPB 2000 milliGRAM(s) IV Intermittent every 8 hours  chlorhexidine 2% Cloths 1 Application(s) Topical <User Schedule>  docusate sodium 100 milliGRAM(s) Oral two times a day  enoxaparin Injectable 80 milliGRAM(s) SubCutaneous two times a day  gabapentin 300 milliGRAM(s) Oral three times a day  insulin lispro (HumaLOG) corrective regimen sliding scale   SubCutaneous Before meals and at bedtime  lactulose Syrup 10 Gram(s) Oral daily  polyethylene glycol 3350 17 Gram(s) Oral two times a day  senna 1 Tablet(s) Oral two times a day  simvastatin 20 milliGRAM(s) Oral at bedtime  tamsulosin 0.8 milliGRAM(s) Oral at bedtime    MEDICATIONS  (PRN):  acetaminophen   Tablet .. 650 milliGRAM(s) Oral every 6 hours PRN Temp greater or equal to 38C (100.4F), Mild Pain (1 - 3)  ondansetron Injectable 4 milliGRAM(s) IV Push every 8 hours PRN Nausea and/or Vomiting  sodium chloride 0.9% lock flush 10 milliLiter(s) IV Push every 1 hour PRN Pre/post blood products, medications, blood draw, and to maintain line patency      Vital Signs Last 24 Hrs  T(C): 36.6 (2019 00:08), Max: 37.6 (2019 15:40)  T(F): 97.9 (2019 00:08), Max: 99.7 (2019 15:40)  HR: 91 (2019 00:08) (86 - 95)  BP: 121/79 (2019 00:08) (107/65 - 126/77)  BP(mean): --  RR: 18 (2019 00:08) (17 - 18)  SpO2: 100% (2019 00:08) (95% - 100%)    PE  Constitutional: patient resting comfortably in bed, in no acute distress  HEENT: EOMI / PERRL b/l, no active drainage or redness  Neck: No JVD, full ROM without pain  Respiratory: respirations are unlabored, no accessory muscle use, no conversational dyspnea  Cardiovascular: regular rate & rhythm  Gastrointestinal: Abdomen soft, non-tender, non-distended, no rebound tenderness / guarding  Neurological: GCS: 15 (4/5/6). A&O x 3; no gross sensory / motor / coordination deficits  Psychiatric: Normal mood, normal affect  Musculoskeletal: No joint pain, swelling or deformity; no limitation of movement    I&O's Detail    2019 07:01  -  2019 07:00  --------------------------------------------------------  IN:  Total IN: 0 mL    OUT:    Indwelling Catheter - Urethral: 1650 mL  Total OUT: 1650 mL    Total NET: -1650 mL      2019 07:01  -  2019 01:19  --------------------------------------------------------  IN:    Oral Fluid: 100 mL    Solution: 50 mL  Total IN: 150 mL    OUT:    Indwelling Catheter - Urethral: 2350 mL  Total OUT: 2350 mL    Total NET: -2200 mL          LABS:                        8.1    6.78  )-----------( 522      ( 2019 07:12 )             26.7         132<L>  |  97<L>  |  18.0  ----------------------------<  130<H>  4.6   |  25.0  |  0.78    Ca    9.1      2019 07:12  Phos  3.1     -  Mg     2.3             Urinalysis Basic - ( 2019 17:50 )    Color: Yellow / Appearance: Clear / S.015 / pH: x  Gluc: x / Ketone: Negative  / Bili: Negative / Urobili: Negative mg/dL   Blood: x / Protein: 30 mg/dL / Nitrite: Negative   Leuk Esterase: Negative / RBC: 0-2 /HPF / WBC 0-2   Sq Epi: x / Non Sq Epi: Occasional / Bacteria: Occasional        RADIOLOGY & ADDITIONAL STUDIES:

## 2019-07-23 NOTE — BRIEF OPERATIVE NOTE - OPERATION/FINDINGS
Debridement of RLE lateral wound w/ placement of integra and wound vac
right knee septic arthritis
lateral split depression tibial plateau fracture
wc 2. tibial nerve unsalvageable - 2 cm resected and repaired using graft.   5 cm of common peroneal nerve unsalvageable, resected and repaired using graft.   Nerve protectors applied to the anastomosis.   Wound site fascia and subq tissue closed. Skin closed using running chromic stitch. 3 cm portion left open and covered with integra.   R partial thickness skin graft from R lateral thigh donor site - skin graft applied to R lateral fasciotomy site. Covered with xeroform and wound vac applied.   Repair and grafting of tibial and common peroneal nerves using graft.
Right popliteal artery injury. Left greater saphenous vein used to bypass from above knee right popliteal artery to below knee right popliteal artery. Then a four-compartment lower leg fasciotomy was performed to right lower leg. Right lateral knee wound thoroughly washed out with pulsevac, packed with plain packing and closed primary with nylon sutures. Right dorsalis pedis and posterior tibial pulses 2+ post-op.

## 2019-07-23 NOTE — BRIEF OPERATIVE NOTE - NSICDXBRIEFPROCEDURE_GEN_ALL_CORE_FT
PROCEDURES:  Fasciotomy, decompressive, anterior and lateral compartments, lower extremity 25-Jun-2019 18:06:38  Justin Waldron  Fasciotomy, decompressive, lower extremity, posterior compartment 25-Jun-2019 18:06:03  Justin Waldron  Creation, bypass, arterial, popliteal, using in situ vein graft 25-Jun-2019 18:04:00  Justin Waldron
PROCEDURES:  Debridement of wound with replacement of vacuum-assisted closure device 23-Jul-2019 19:23:44  Misbah Caban
PROCEDURES:  Debridement, knee 14-Jul-2019 09:10:21  Garrison Comer  Arthroscopic irrigation of knee 14-Jul-2019 09:10:14  Garrison Comer
PROCEDURES:  Repair of sciatic nerve 03-Jul-2019 13:51:32  Amber Grover
PROCEDURES:  Open treatment of fracture of tibial plateau 26-Jun-2019 21:07:31  Maxwell Dela Cruz

## 2019-07-23 NOTE — PROGRESS NOTE ADULT - SUBJECTIVE AND OBJECTIVE BOX
Patient seen and examined doing well. Still not ambulating    Vital Signs Last 24 Hrs  T(C): 37.3 (23 Jul 2019 10:45), Max: 37.6 (22 Jul 2019 15:40)  T(F): 99.1 (23 Jul 2019 10:45), Max: 99.7 (22 Jul 2019 15:40)  HR: 82 (23 Jul 2019 10:45) (82 - 92)  BP: 112/72 (23 Jul 2019 10:45) (107/65 - 128/74)  BP(mean): --  RR: 18 (23 Jul 2019 07:55) (17 - 20)  SpO2: 99% (23 Jul 2019 07:55) (98% - 100%)    Gen: NAD  HEENT: NCAT  Abd: Soft  : Min draining clear urine                          8.9    7.76  )-----------( 553      ( 23 Jul 2019 09:15 )             28.7     07-23    135  |  99  |  20.0  ----------------------------<  141<H>  4.4   |  24.0  |  0.76    Ca    9.3      23 Jul 2019 09:15  Phos  3.0     07-23  Mg     2.5     07-23

## 2019-07-23 NOTE — PROGRESS NOTE ADULT - SUBJECTIVE AND OBJECTIVE BOX
[facility]  INFECTIOUS DISEASES AND INTERNAL MEDICINE at [place]  =======================================================  Gabriel Levy MD  Diplomates American Board of Internal Medicine and Infectious Diseases  Telephone [phone]  Fax            [phone]  =======================================================    [id number]  AIMEE SOLER   Follow up: R knee septic arthritis; left leg fracture; pseudomonas infection  patient seen and examined.   No more fever.   Pain is controlled.     ===================================================  REVIEW OF SYSTEMS:  as above  all other ROS negative  =======================================================  Allergies  No Known Allergies     Antibiotics:  cefepime   IVPB 2000 milliGRAM(s) IV Intermittent every 8 hours    ======================================================  Physical Exam:  ============    Vital Signs Last 24 Hrs  T(C): 37.2 (23 Jul 2019 07:55), Max: 37.6 (22 Jul 2019 15:40)  T(F): 99 (23 Jul 2019 07:55), Max: 99.7 (22 Jul 2019 15:40)  HR: 92 (23 Jul 2019 07:55) (86 - 92)  BP: 120/75 (23 Jul 2019 07:55) (107/65 - 128/74)  BP(mean): --  RR: 18 (23 Jul 2019 07:55) (17 - 20)  SpO2: 99% (23 Jul 2019 07:55) (98% - 100%)    General:  No acute distress.  Eye: Pupils are equal, round and reactive to light, Extraocular movements are intact, Normal conjunctiva.  HENT: Normocephalic, Oral mucosa is moist, No pharyngeal erythema, No sinus tenderness.  Neck: Supple, No lymphadenopathy.  Respiratory: Lungs are clear to auscultation, Respirations are non-labored.  Cardiovascular: Normal rate, Regular rhythm,    RIGHT PICC line  Gastrointestinal: Soft, Non-tender, Non-distended, Normal bowel sounds.  Genitourinary: No costovertebral angle tenderness.  Lymphatics: No lymphadenopathy neck,   Musculoskeletal: LEFT knee with brace; LLE with dressing in place,  RLE with dressing + WOUND VAC  Integumentary: No rash.  Neurologic: Alert, Oriented, No focal deficits, Cranial Nerves II-XII are grossly intact.  Psychiatric: Appropriate mood & affect.    =======================================================  Labs:                                             -                       8.9    7.76  )-----------( 553      ( 23 Jul 2019 09:15 )             28.7        07-23    135  |  99  |  20.0  ----------------------------<  141<H>  4.4   |  24.0  |  0.76    Ca    9.3      23 Jul 2019 09:15  Phos  3.0     07-23  Mg     2.5     07-23        Culture - Blood (collected 07-16-19 @ 00:19)  Source: .Blood  Final Report (07-21-19 @ 01:00):    No growth at 5 days.    Culture - Blood (collected 07-16-19 @ 00:19)  Source: .Blood  Final Report (07-21-19 @ 01:00):    No growth at 5 days.    Culture - Surgical Swab (collected 07-14-19 @ 15:02)  Source: .Surgical Swab Right knee joint (swabs)  Gram Stain (07-14-19 @ 16:17):    Few White blood cells    No organisms seen  Final Report (07-19-19 @ 13:45):    Pseudomonas aeruginosa Growing in broth media only  Organism: Pseudomonas aeruginosa (07-19-19 @ 13:45)  Organism: Pseudomonas aeruginosa (07-19-19 @ 13:45)    Sensitivities:      -  Amikacin: S <=16      -  Aztreonam: S 8      -  Cefepime: S <=4      -  Ceftazidime: S 4      -  Ciprofloxacin: S <=1      -  Gentamicin: S <=4      -  Imipenem: S <=1      -  Levofloxacin: S <=2      -  Meropenem: S <=1      -  Piperacillin/Tazobactam: S <=16      -  Tobramycin: S <=4      Method Type: NICHOLAS    Culture - Surgical Swab (collected 07-14-19 @ 15:00)  Source: .Surgical Swab Right knee (swabs)  Gram Stain (07-14-19 @ 16:19):    Few White blood cells    No organisms seen  Final Report (07-19-19 @ 13:45):    Few Pseudomonas aeruginosa  Organism: Pseudomonas aeruginosa (07-19-19 @ 13:45)  Organism: Pseudomonas aeruginosa (07-19-19 @ 13:45)    Sensitivities:      -  Amikacin: S <=16      -  Ampicillin: R >16      -  Ampicillin/Sulbactam: R >16/8      -  Aztreonam: S 8      -  Cefepime: S <=4      -  Cefotaxime: R 16      -  Ceftazidime: S 4      -  Ceftriaxone: R 32      -  Ciprofloxacin: S <=1      -  Ertapenem: R <=1      -  Gentamicin: S <=4      -  Imipenem: S <=1      -  Levofloxacin: S <=2      -  Meropenem: S <=1      -  Piperacillin/Tazobactam: S <=16      -  Tobramycin: S <=4      Method Type: NICHOLAS    Culture - Other (collected 07-12-19 @ 15:37)  Source: .Other right knee aspiration (swabs)  Final Report (07-14-19 @ 15:20):    Few Pseudomonas aeruginosa  Organism: Pseudomonas aeruginosa (07-14-19 @ 15:20)  Organism: Pseudomonas aeruginosa (07-14-19 @ 15:20)    Sensitivities:      -  Amikacin: S <=16      -  Aztreonam: S <=4      -  Cefepime: S <=4      -  Ceftazidime: S 4      -  Ciprofloxacin: S <=1      -  Gentamicin: S <=4      -  Imipenem: S <=1      -  Levofloxacin: S <=2      -  Meropenem: S <=1      -  Piperacillin/Tazobactam: S <=16      -  Tobramycin: S <=4      Method Type: NICHOLAS    Culture - Body Fluid with Gram Stain (collected 07-12-19 @ 15:16)  Source: .Body Fluid right knee synovial fluid  Gram Stain (07-13-19 @ 13:01):    Numerous White blood cells    No organisms seen  Final Report (07-17-19 @ 11:50):    Few Pseudomonas aeruginosa  Organism: Pseudomonas aeruginosa (07-17-19 @ 11:50)  Organism: Pseudomonas aeruginosa (07-17-19 @ 11:50)    Sensitivities:      -  Amikacin: S <=16      -  Aztreonam: S <=4      -  Cefepime: S <=4      -  Ceftazidime: S 4      -  Ciprofloxacin: S <=1      -  Gentamicin: S <=4      -  Imipenem: S <=1      -  Levofloxacin: S <=2      -  Meropenem: S <=1      -  Piperacillin/Tazobactam: S <=16      -  Tobramycin: S <=4      Method Type: NICHOLAS    Culture - Blood (collected 07-12-19 @ 14:25)  Source: .Blood  Final Report (07-17-19 @ 15:00):    No growth at 5 days.    Culture - Blood (collected 07-12-19 @ 14:25)  Source: .Blood  Final Report (07-17-19 @ 15:00):    No growth at 5 days.    Culture - Blood (collected 07-12-19 @ 02:49)  Source: .Blood  Final Report (07-17-19 @ 03:00):    No growth at 5 days.

## 2019-07-23 NOTE — PRE-OP CHECKLIST - NOTHING BY MOUTH SINCE
03-Jul-2019 00:01
22-Jul-2019 11:59
13-Jul-2019 00:00
25-Jun-2019 07:00
25-Jun-2019 07:00
22-Jul-2019 23:59

## 2019-07-23 NOTE — PROGRESS NOTE ADULT - SUBJECTIVE AND OBJECTIVE BOX
Pr remains stable.  No new events.    AVSS    RIght knee wound with stable eschar.  Open posterior wound with grnaulation tissue.  No cellulitis, no knee effusion.      Plan for debridement and integra today to improve soft tissue coverage of open wound.  Decr infection risk.    R/B/A explained and consent obtained.

## 2019-07-23 NOTE — PROGRESS NOTE ADULT - ASSESSMENT
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    -OR with PRS Today  for Rt knee debridement and integra, will need POC after  -Continue villegas, given presence of urinary retention  -No changes to current pain regimen  -BP well controlled on Norvasc  -Tolerating consistent carbohydrate diet, with plans for NPO after midnight   -Continue with OOBC  -Continue cefepime 2gm q8h  - last day 8/12/19 as per ID recs  -Dvt ppx with Lovenox

## 2019-07-23 NOTE — PROGRESS NOTE ADULT - ASSESSMENT
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 No evidence of active wound infection of RLE however w/ minor eschar that will require debridement     - To OR today for debridement, possible integra graft placement

## 2019-07-23 NOTE — CHART NOTE - NSCHARTNOTEFT_GEN_A_CORE
POST-OPERATIVE NOTE    Subjective:    Patient is s/p Right knee debridement of wound and integra with stable eschar. Pt was seen recovering appropriately. Post operatively, we resumed his meds, resumed this diet to regular, and are giving him tylenol for pain. Pt is also a diabetic so we resumed his sliding scale insulin. Pt denies pain, f/c, nausea, vomiting, chest pain, sob. He states that he is not in any discomfort, and just a little tired. No other complaints at this time.     Vital Signs Last 24 Hrs  T(C): 36.3 (23 Jul 2019 19:10), Max: 37.5 (23 Jul 2019 17:42)  T(F): 97.4 (23 Jul 2019 19:10), Max: 99.5 (23 Jul 2019 17:42)  HR: 102 (23 Jul 2019 19:40) (82 - 102)  BP: 145/76 (23 Jul 2019 19:40) (110/66 - 159/80)  BP(mean): 94 (23 Jul 2019 19:40) (94 - 99)  RR: 19 (23 Jul 2019 19:40) (13 - 22)  SpO2: 100% (23 Jul 2019 19:40) (96% - 100%)  I&O's Detail    22 Jul 2019 07:01  -  23 Jul 2019 07:00  --------------------------------------------------------  IN:    Oral Fluid: 100 mL    Solution: 100 mL  Total IN: 200 mL    OUT:    Indwelling Catheter - Urethral: 3050 mL    VAC (Vacuum Assisted Closure) System: 25 mL  Total OUT: 3075 mL    Total NET: -2875 mL      23 Jul 2019 07:01  -  23 Jul 2019 20:15  --------------------------------------------------------  IN:  Total IN: 0 mL    OUT:  Indwelling Catheter - Urethral: 200mL  Total OUT: 200mL    Total NET: -200 mL    cefepime   IVPB 2000  amlodipine   Tablet 10  aspirin enteric coated 81  cefepime   IVPB 2000  enoxaparin Injectable 80  tamsulosin 0.4    PAST MEDICAL & SURGICAL HISTORY:  GERD (gastroesophageal reflux disease)  HLD (hyperlipidemia)  No significant past surgical history    Physical Exam:  General: No acute distress, pt sleeping comfortably in PACU bed. Vitals stable, SaO2 100% on 2L NC  Pulmonary: CTAB, Non labored breathing, no respiratory distress.  Cardiovascular: NSR, S1 S2  Abdominal: soft, Non tender, non distended.   Extremities: Wound vac in place on Right lower leg, running continuous at 125mmHg. No tenderness elicited on RLE and LLE. No sign of effusions.      LABS:                        8.9    7.76  )-----------( 553      ( 23 Jul 2019 09:15 )             28.7     07-23    135  |  99  |  20.0  ----------------------------<  141<H>  4.4   |  24.0  |  0.76    Ca    9.3      23 Jul 2019 09:15  Phos  3.0     07-23  Mg     2.5     07-23      PT/INR - ( 23 Jul 2019 09:15 )   PT: 15.1 sec;   INR: 1.30 ratio         PTT - ( 23 Jul 2019 09:15 )  PTT:32.8 sec  CAPILLARY BLOOD GLUCOSE      POCT Blood Glucose.: 164 mg/dL (23 Jul 2019 19:52)  POCT Blood Glucose.: 123 mg/dL (23 Jul 2019 13:30)  POCT Blood Glucose.: 147 mg/dL (23 Jul 2019 08:07)  POCT Blood Glucose.: 189 mg/dL (22 Jul 2019 21:03)      Radiology and Additional Studies:    Assessment:  The patient is a 68y Male who is s/p Rt knee debridement of wound and integra with stable eschar.     Plan:  - Pain control with tylenol as needed  - Transfer to room  - Resume regular diet  - Resume antibiotics  - Reinstate villegas catheter  - Pt will resume sliding scale insulin to manage his diabetes  - OOB and ambulating as tolerated  - F/u AM labs

## 2019-07-23 NOTE — PRE-OP CHECKLIST - HAIR REMOVAL
hair removal not indicated

## 2019-07-23 NOTE — BRIEF OPERATIVE NOTE - ASSISTANT(S)
Dr. Lamberto Ramos PGY4
Lenore BROWN, Isamar Pgy-2
Manan Gaston, NP
[place]
Dr. Justin Waldron PGY-2  Dr. Alex Christianson PGY-1

## 2019-07-23 NOTE — PROGRESS NOTE ADULT - SUBJECTIVE AND OBJECTIVE BOX
HPI/OVERNIGHT EVENTS: Patient seen and examined at bedside this AM. No acute events overnight per nursing reports. Afebrile overnight, pain well controlled, tolerating diet, having bowel function, Min in place. Denies fever, chills, nausea, vomitting, chest pain, SOB, dizziness, abd pain or any other concerning symptoms    Vital Signs Last 24 Hrs  T(C): 36.8 (2019 06:01), Max: 37.6 (2019 15:40)  T(F): 98.3 (2019 06:01), Max: 99.7 (2019 15:40)  HR: 88 (2019 06:01) (86 - 91)  BP: 128/74 (2019 06:01) (107/65 - 128/74)  BP(mean): --  RR: 20 (2019 06:01) (17 - 20)  SpO2: 98% (2019 06:01) (98% - 100%)    I&O's Detail    2019 07:01  -  2019 07:00  --------------------------------------------------------  IN:    Oral Fluid: 100 mL    Solution: 100 mL  Total IN: 200 mL    OUT:    Indwelling Catheter - Urethral: 3050 mL    VAC (Vacuum Assisted Closure) System: 25 mL  Total OUT: 3075 mL    Total NET: -2875 mL              PE  Musculoskeletal: leg pain and limited range of motion bc of the casts. Dressing changes today.  Extremities: RLE: Superficial skin eschar on the lateral aspect of the R knee. no signs of fluid collection in knee or lower leg.  wound vac on integra site on Lateral knee place with good suction and no leak.  Split thickness donor graft well healing 80-90% take.   Vasc: 2+ b/l DP pulses    LABS:                        8.1    6.78  )-----------( 522      ( 2019 07:12 )             26.7     07-22    132<L>  |  97<L>  |  18.0  ----------------------------<  130<H>  4.6   |  25.0  |  0.78    Ca    9.1      2019 07:12  Phos  3.1     -  Mg     2.3     -        Urinalysis Basic - ( 2019 17:50 )    Color: Yellow / Appearance: Clear / S.015 / pH: x  Gluc: x / Ketone: Negative  / Bili: Negative / Urobili: Negative mg/dL   Blood: x / Protein: 30 mg/dL / Nitrite: Negative   Leuk Esterase: Negative / RBC: 0-2 /HPF / WBC 0-2   Sq Epi: x / Non Sq Epi: Occasional / Bacteria: Occasional        MEDICATIONS  (STANDING):  amLODIPine   Tablet 10 milliGRAM(s) Oral daily  aspirin  chewable 81 milliGRAM(s) Oral daily  cefepime   IVPB 2000 milliGRAM(s) IV Intermittent every 8 hours  chlorhexidine 2% Cloths 1 Application(s) Topical <User Schedule>  docusate sodium 100 milliGRAM(s) Oral two times a day  enoxaparin Injectable 80 milliGRAM(s) SubCutaneous two times a day  gabapentin 300 milliGRAM(s) Oral three times a day  insulin lispro (HumaLOG) corrective regimen sliding scale   SubCutaneous Before meals and at bedtime  lactated ringers. 1000 milliLiter(s) (125 mL/Hr) IV Continuous <Continuous>  lactulose Syrup 10 Gram(s) Oral daily  polyethylene glycol 3350 17 Gram(s) Oral two times a day  senna 1 Tablet(s) Oral two times a day  simvastatin 20 milliGRAM(s) Oral at bedtime  tamsulosin 0.8 milliGRAM(s) Oral at bedtime    MEDICATIONS  (PRN):  acetaminophen   Tablet .. 650 milliGRAM(s) Oral every 6 hours PRN Temp greater or equal to 38C (100.4F), Mild Pain (1 - 3)  ondansetron Injectable 4 milliGRAM(s) IV Push every 8 hours PRN Nausea and/or Vomiting  sodium chloride 0.9% lock flush 10 milliLiter(s) IV Push every 1 hour PRN Pre/post blood products, medications, blood draw, and to maintain line patency

## 2019-07-23 NOTE — BRIEF OPERATIVE NOTE - NSICDXBRIEFPREOP_GEN_ALL_CORE_FT
PRE-OP DIAGNOSIS:  Injury of right popliteal artery 25-Jun-2019 18:03:07  Justin Waldron
PRE-OP DIAGNOSIS:  Wound, open with complication 23-Jul-2019 19:31:33  Misbah Caban
PRE-OP DIAGNOSIS:  Septic arthritis of knee, right 14-Jul-2019 09:10:38  Garrison Comer
PRE-OP DIAGNOSIS:  Tibial plateau fracture, left 26-Jun-2019 21:07:51  Maxwell Dela Cruz

## 2019-07-24 LAB
ANION GAP SERPL CALC-SCNC: 10 MMOL/L — SIGNIFICANT CHANGE UP (ref 5–17)
APTT BLD: 33 SEC — SIGNIFICANT CHANGE UP (ref 27.5–36.3)
BASOPHILS # BLD AUTO: 0.03 K/UL — SIGNIFICANT CHANGE UP (ref 0–0.2)
BASOPHILS NFR BLD AUTO: 0.3 % — SIGNIFICANT CHANGE UP (ref 0–2)
BUN SERPL-MCNC: 20 MG/DL — SIGNIFICANT CHANGE UP (ref 8–20)
CALCIUM SERPL-MCNC: 8.9 MG/DL — SIGNIFICANT CHANGE UP (ref 8.6–10.2)
CHLORIDE SERPL-SCNC: 99 MMOL/L — SIGNIFICANT CHANGE UP (ref 98–107)
CO2 SERPL-SCNC: 23 MMOL/L — SIGNIFICANT CHANGE UP (ref 22–29)
CREAT SERPL-MCNC: 0.71 MG/DL — SIGNIFICANT CHANGE UP (ref 0.5–1.3)
EOSINOPHIL # BLD AUTO: 0.02 K/UL — SIGNIFICANT CHANGE UP (ref 0–0.5)
EOSINOPHIL NFR BLD AUTO: 0.2 % — SIGNIFICANT CHANGE UP (ref 0–6)
GLUCOSE BLDC GLUCOMTR-MCNC: 148 MG/DL — HIGH (ref 70–99)
GLUCOSE BLDC GLUCOMTR-MCNC: 154 MG/DL — HIGH (ref 70–99)
GLUCOSE BLDC GLUCOMTR-MCNC: 160 MG/DL — HIGH (ref 70–99)
GLUCOSE BLDC GLUCOMTR-MCNC: 160 MG/DL — HIGH (ref 70–99)
GLUCOSE SERPL-MCNC: 152 MG/DL — HIGH (ref 70–115)
HCT VFR BLD CALC: 26.8 % — LOW (ref 39–50)
HGB BLD-MCNC: 7.9 G/DL — LOW (ref 13–17)
IMM GRANULOCYTES NFR BLD AUTO: 2.1 % — HIGH (ref 0–1.5)
INR BLD: 1.49 RATIO — HIGH (ref 0.88–1.16)
LYMPHOCYTES # BLD AUTO: 1.79 K/UL — SIGNIFICANT CHANGE UP (ref 1–3.3)
LYMPHOCYTES # BLD AUTO: 20.5 % — SIGNIFICANT CHANGE UP (ref 13–44)
MAGNESIUM SERPL-MCNC: 2.2 MG/DL — SIGNIFICANT CHANGE UP (ref 1.6–2.6)
MCHC RBC-ENTMCNC: 26.5 PG — LOW (ref 27–34)
MCHC RBC-ENTMCNC: 29.5 GM/DL — LOW (ref 32–36)
MCV RBC AUTO: 89.9 FL — SIGNIFICANT CHANGE UP (ref 80–100)
MONOCYTES # BLD AUTO: 0.71 K/UL — SIGNIFICANT CHANGE UP (ref 0–0.9)
MONOCYTES NFR BLD AUTO: 8.1 % — SIGNIFICANT CHANGE UP (ref 2–14)
NEUTROPHILS # BLD AUTO: 6 K/UL — SIGNIFICANT CHANGE UP (ref 1.8–7.4)
NEUTROPHILS NFR BLD AUTO: 68.8 % — SIGNIFICANT CHANGE UP (ref 43–77)
PHOSPHATE SERPL-MCNC: 3.1 MG/DL — SIGNIFICANT CHANGE UP (ref 2.4–4.7)
PLATELET # BLD AUTO: 419 K/UL — HIGH (ref 150–400)
POTASSIUM SERPL-MCNC: 4.6 MMOL/L — SIGNIFICANT CHANGE UP (ref 3.5–5.3)
POTASSIUM SERPL-SCNC: 4.6 MMOL/L — SIGNIFICANT CHANGE UP (ref 3.5–5.3)
PROTHROM AB SERPL-ACNC: 17.4 SEC — HIGH (ref 10–12.9)
RBC # BLD: 2.98 M/UL — LOW (ref 4.2–5.8)
RBC # FLD: 17.3 % — HIGH (ref 10.3–14.5)
SODIUM SERPL-SCNC: 132 MMOL/L — LOW (ref 135–145)
WBC # BLD: 8.73 K/UL — SIGNIFICANT CHANGE UP (ref 3.8–10.5)
WBC # FLD AUTO: 8.73 K/UL — SIGNIFICANT CHANGE UP (ref 3.8–10.5)

## 2019-07-24 PROCEDURE — 99231 SBSQ HOSP IP/OBS SF/LOW 25: CPT

## 2019-07-24 PROCEDURE — 99232 SBSQ HOSP IP/OBS MODERATE 35: CPT

## 2019-07-24 RX ORDER — INSULIN LISPRO 100/ML
VIAL (ML) SUBCUTANEOUS
Refills: 0 | Status: DISCONTINUED | OUTPATIENT
Start: 2019-07-24 | End: 2019-08-03

## 2019-07-24 RX ADMIN — CEFEPIME 100 MILLIGRAM(S): 1 INJECTION, POWDER, FOR SOLUTION INTRAMUSCULAR; INTRAVENOUS at 06:06

## 2019-07-24 RX ADMIN — Medication 1 TABLET(S): at 12:13

## 2019-07-24 RX ADMIN — Medication 81 MILLIGRAM(S): at 12:14

## 2019-07-24 RX ADMIN — SIMVASTATIN 20 MILLIGRAM(S): 20 TABLET, FILM COATED ORAL at 21:56

## 2019-07-24 RX ADMIN — OXYCODONE HYDROCHLORIDE 10 MILLIGRAM(S): 5 TABLET ORAL at 22:31

## 2019-07-24 RX ADMIN — CEFEPIME 100 MILLIGRAM(S): 1 INJECTION, POWDER, FOR SOLUTION INTRAMUSCULAR; INTRAVENOUS at 21:55

## 2019-07-24 RX ADMIN — OXYCODONE HYDROCHLORIDE 10 MILLIGRAM(S): 5 TABLET ORAL at 21:55

## 2019-07-24 RX ADMIN — ENOXAPARIN SODIUM 80 MILLIGRAM(S): 100 INJECTION SUBCUTANEOUS at 17:25

## 2019-07-24 RX ADMIN — GABAPENTIN 300 MILLIGRAM(S): 400 CAPSULE ORAL at 13:14

## 2019-07-24 RX ADMIN — GABAPENTIN 300 MILLIGRAM(S): 400 CAPSULE ORAL at 06:06

## 2019-07-24 RX ADMIN — SENNA PLUS 2 TABLET(S): 8.6 TABLET ORAL at 21:56

## 2019-07-24 RX ADMIN — Medication 100 MILLIGRAM(S): at 06:06

## 2019-07-24 RX ADMIN — ENOXAPARIN SODIUM 80 MILLIGRAM(S): 100 INJECTION SUBCUTANEOUS at 06:06

## 2019-07-24 RX ADMIN — OXYCODONE HYDROCHLORIDE 10 MILLIGRAM(S): 5 TABLET ORAL at 09:05

## 2019-07-24 RX ADMIN — OXYCODONE HYDROCHLORIDE 10 MILLIGRAM(S): 5 TABLET ORAL at 05:15

## 2019-07-24 RX ADMIN — GABAPENTIN 300 MILLIGRAM(S): 400 CAPSULE ORAL at 21:55

## 2019-07-24 RX ADMIN — AMLODIPINE BESYLATE 10 MILLIGRAM(S): 2.5 TABLET ORAL at 06:06

## 2019-07-24 RX ADMIN — TAMSULOSIN HYDROCHLORIDE 0.4 MILLIGRAM(S): 0.4 CAPSULE ORAL at 21:56

## 2019-07-24 RX ADMIN — CEFEPIME 100 MILLIGRAM(S): 1 INJECTION, POWDER, FOR SOLUTION INTRAMUSCULAR; INTRAVENOUS at 13:13

## 2019-07-24 RX ADMIN — Medication 1: at 13:13

## 2019-07-24 RX ADMIN — Medication 100 MILLIGRAM(S): at 17:25

## 2019-07-24 RX ADMIN — Medication 1: at 22:04

## 2019-07-24 RX ADMIN — OXYCODONE HYDROCHLORIDE 10 MILLIGRAM(S): 5 TABLET ORAL at 04:03

## 2019-07-24 NOTE — PROGRESS NOTE ADULT - SUBJECTIVE AND OBJECTIVE BOX
HPI/OVERNIGHT EVENTS: Patient seen and examined at bedside this AM. No acute events overnight per nursing reports. Pain well controlled, tolerating diet. Currently a/p LLE wound debridement and VAC placement. Denies fever, chills, nausea, vomitting, chest pain, SOB, dizziness, abd pain or any other concerning symptoms    Vital Signs Last 24 Hrs  T(C): 36.7 (23 Jul 2019 23:21), Max: 37.5 (23 Jul 2019 17:42)  T(F): 98.1 (23 Jul 2019 23:21), Max: 99.5 (23 Jul 2019 17:42)  HR: 62 (23 Jul 2019 23:21) (62 - 102)  BP: 118/64 (23 Jul 2019 23:21) (112/72 - 159/80)  BP(mean): 94 (23 Jul 2019 19:40) (94 - 99)  RR: 18 (23 Jul 2019 23:21) (13 - 22)  SpO2: 95% (23 Jul 2019 23:21) (95% - 100%)    I&O's Detail    22 Jul 2019 07:01  -  23 Jul 2019 07:00  --------------------------------------------------------  IN:    Oral Fluid: 100 mL    Solution: 100 mL  Total IN: 200 mL    OUT:    Indwelling Catheter - Urethral: 3050 mL    VAC (Vacuum Assisted Closure) System: 25 mL  Total OUT: 3075 mL    Total NET: -2875 mL      23 Jul 2019 07:01  -  24 Jul 2019 00:01  --------------------------------------------------------  IN:    lactated ringers.: 250 mL    Oral Fluid: 120 mL  Total IN: 370 mL    OUT:    Indwelling Catheter - Urethral: 1675 mL  Total OUT: 1675 mL    Total NET: -1305 mL            Musculoskeletal: leg pain and limited range of motion bc of the casts. Dressing changes today.  Extremities: RLE: Superficial skin eschar on the lateral aspect of the R knee. no signs of fluid collection in knee or lower leg.  wound vac on integra site on Lateral knee place with good suction and no leak.  Split thickness donor graft well healing 80-90% take.   Vasc: 2+ b/l DP pulses    LABS:                        8.9    7.76  )-----------( 553      ( 23 Jul 2019 09:15 )             28.7     07-23    135  |  99  |  20.0  ----------------------------<  141<H>  4.4   |  24.0  |  0.76    Ca    9.3      23 Jul 2019 09:15  Phos  3.0     07-23  Mg     2.5     07-23      PT/INR - ( 23 Jul 2019 09:15 )   PT: 15.1 sec;   INR: 1.30 ratio         PTT - ( 23 Jul 2019 09:15 )  PTT:32.8 sec      MEDICATIONS  (STANDING):  amLODIPine   Tablet 10 milliGRAM(s) Oral daily  aspirin enteric coated 81 milliGRAM(s) Oral daily  cefepime   IVPB 2000 milliGRAM(s) IV Intermittent every 8 hours  docusate sodium 100 milliGRAM(s) Oral two times a day  enoxaparin Injectable 80 milliGRAM(s) SubCutaneous two times a day  gabapentin 300 milliGRAM(s) Oral three times a day  multivitamin 1 Tablet(s) Oral daily  senna 2 Tablet(s) Oral at bedtime  simvastatin 20 milliGRAM(s) Oral at bedtime  tamsulosin 0.4 milliGRAM(s) Oral at bedtime    MEDICATIONS  (PRN):  acetaminophen   Tablet .. 650 milliGRAM(s) Oral every 6 hours PRN Temp greater or equal to 38C (100.4F), Mild Pain (1 - 3)  ondansetron Injectable 4 milliGRAM(s) IV Push once PRN Nausea and/or Vomiting  oxyCODONE    IR 5 milliGRAM(s) Oral every 4 hours PRN Moderate Pain (4 - 6)  oxyCODONE    IR 10 milliGRAM(s) Oral every 4 hours PRN Severe Pain (7 - 10)

## 2019-07-24 NOTE — PROGRESS NOTE ADULT - SUBJECTIVE AND OBJECTIVE BOX
INTERVAL HPI/OVERNIGHT EVENTS:    MEDICATIONS  (STANDING):  amLODIPine   Tablet 10 milliGRAM(s) Oral daily  aspirin enteric coated 81 milliGRAM(s) Oral daily  cefepime   IVPB 2000 milliGRAM(s) IV Intermittent every 8 hours  docusate sodium 100 milliGRAM(s) Oral two times a day  enoxaparin Injectable 80 milliGRAM(s) SubCutaneous two times a day  gabapentin 300 milliGRAM(s) Oral three times a day  multivitamin 1 Tablet(s) Oral daily  senna 2 Tablet(s) Oral at bedtime  simvastatin 20 milliGRAM(s) Oral at bedtime  tamsulosin 0.4 milliGRAM(s) Oral at bedtime    MEDICATIONS  (PRN):  acetaminophen   Tablet .. 650 milliGRAM(s) Oral every 6 hours PRN Temp greater or equal to 38C (100.4F), Mild Pain (1 - 3)  ondansetron Injectable 4 milliGRAM(s) IV Push once PRN Nausea and/or Vomiting  oxyCODONE    IR 5 milliGRAM(s) Oral every 4 hours PRN Moderate Pain (4 - 6)  oxyCODONE    IR 10 milliGRAM(s) Oral every 4 hours PRN Severe Pain (7 - 10)      Allergies    No Known Allergies    Intolerances        Vital Signs Last 24 Hrs  T(C): 36.8 (24 Jul 2019 07:37), Max: 37.5 (23 Jul 2019 17:42)  T(F): 98.3 (24 Jul 2019 07:37), Max: 99.5 (23 Jul 2019 17:42)  HR: 100 (24 Jul 2019 07:37) (62 - 107)  BP: 126/75 (24 Jul 2019 07:37) (112/72 - 159/80)  BP(mean): 88 (23 Jul 2019 21:00) (86 - 99)  RR: 18 (24 Jul 2019 07:37) (13 - 22)  SpO2: 100% (24 Jul 2019 07:37) (95% - 100%)     ON PE:  General: alert and awake  Abdomen: no reported flank pain  : bladder decompressed    LABS:                        8.9    7.76  )-----------( 553      ( 23 Jul 2019 09:15 )             28.7     07-23    135  |  99  |  20.0  ----------------------------<  141<H>  4.4   |  24.0  |  0.76    Ca    9.3      23 Jul 2019 09:15  Phos  3.0     07-23  Mg     2.5     07-23      PT/INR - ( 23 Jul 2019 09:15 )   PT: 15.1 sec;   INR: 1.30 ratio         PTT - ( 23 Jul 2019 09:15 )  PTT:32.8 sec      RADIOLOGY & ADDITIONAL TESTS:

## 2019-07-24 NOTE — PROGRESS NOTE ADULT - SUBJECTIVE AND OBJECTIVE BOX
HPI/OVERNIGHT EVENTS: Patient seen and examined at bedside this AM. No acute events overnight per nursing reports. Pain well controlled, tolerating diet. Currently a/p LLE wound debridement and VAC placement. Denies fever, chills, nausea, vomitting, chest pain, SOB, dizziness, abd pain or any other concerning symptoms      Vital Signs Last 24 Hrs  T(C): 36.7 (23 Jul 2019 23:21), Max: 37.5 (23 Jul 2019 17:42)  T(F): 98.1 (23 Jul 2019 23:21), Max: 99.5 (23 Jul 2019 17:42)  HR: 62 (23 Jul 2019 23:21) (62 - 102)  BP: 118/64 (23 Jul 2019 23:21) (112/72 - 159/80)  BP(mean): 94 (23 Jul 2019 19:40) (94 - 99)  RR: 18 (23 Jul 2019 23:21) (13 - 22)  SpO2: 95% (23 Jul 2019 23:21) (95% - 100%)    I&O's Detail    22 Jul 2019 07:01  -  23 Jul 2019 07:00  --------------------------------------------------------  IN:    Oral Fluid: 100 mL    Solution: 100 mL  Total IN: 200 mL    OUT:    Indwelling Catheter - Urethral: 3050 mL    VAC (Vacuum Assisted Closure) System: 25 mL  Total OUT: 3075 mL    Total NET: -2875 mL      23 Jul 2019 07:01  -  24 Jul 2019 00:04  --------------------------------------------------------  IN:    lactated ringers.: 250 mL    Oral Fluid: 120 mL  Total IN: 370 mL    OUT:    Indwelling Catheter - Urethral: 1675 mL  Total OUT: 1675 mL    Total NET: -1305 mL              Constitutional: patient resting comfortably in bed, in no acute distress  HEENT: EOMI / PERRL b/l   Neck: No JVD, full ROM without pain  Respiratory: CTAB respirations are unlabored, no accessory muscle use, no conversational dyspnea  Cardiovascular: regular rate & rhythm   Gastrointestinal: Abdomen soft, non-tender, non-distended, no rebound tenderness / guarding   Musculoskeletal: leg pain and limited range of motion bc of the casts. Dressing changes today.  Extremities: RLE: Superficial skin eschar on the lateral aspect of the R knee. no signs of fluid collection in knee or lower leg.  wound vac on integra site on Lateral knee place with good suction and no leak.  Split thickness donor graft well healing 80-90% take.   Vasc: 2+ b/l DP pulses    LABS:                        8.9    7.76  )-----------( 553      ( 23 Jul 2019 09:15 )             28.7     07-23    135  |  99  |  20.0  ----------------------------<  141<H>  4.4   |  24.0  |  0.76    Ca    9.3      23 Jul 2019 09:15  Phos  3.0     07-23  Mg     2.5     07-23      PT/INR - ( 23 Jul 2019 09:15 )   PT: 15.1 sec;   INR: 1.30 ratio         PTT - ( 23 Jul 2019 09:15 )  PTT:32.8 sec      MEDICATIONS  (STANDING):  amLODIPine   Tablet 10 milliGRAM(s) Oral daily  aspirin enteric coated 81 milliGRAM(s) Oral daily  cefepime   IVPB 2000 milliGRAM(s) IV Intermittent every 8 hours  docusate sodium 100 milliGRAM(s) Oral two times a day  enoxaparin Injectable 80 milliGRAM(s) SubCutaneous two times a day  gabapentin 300 milliGRAM(s) Oral three times a day  multivitamin 1 Tablet(s) Oral daily  senna 2 Tablet(s) Oral at bedtime  simvastatin 20 milliGRAM(s) Oral at bedtime  tamsulosin 0.4 milliGRAM(s) Oral at bedtime    MEDICATIONS  (PRN):  acetaminophen   Tablet .. 650 milliGRAM(s) Oral every 6 hours PRN Temp greater or equal to 38C (100.4F), Mild Pain (1 - 3)  ondansetron Injectable 4 milliGRAM(s) IV Push once PRN Nausea and/or Vomiting  oxyCODONE    IR 5 milliGRAM(s) Oral every 4 hours PRN Moderate Pain (4 - 6)  oxyCODONE    IR 10 milliGRAM(s) Oral every 4 hours PRN Severe Pain (7 - 10)

## 2019-07-24 NOTE — PROGRESS NOTE ADULT - ASSESSMENT
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 No evidence of active wound infection of RLE however; now s/p RLE debridement and VAC placement.   - Pain control as needed  - Next VAC change on 7/26  - Rest of care per primary team

## 2019-07-24 NOTE — PROVIDER CONTACT NOTE (OTHER) - REASON
Chart reviewed. Events noted for Surgical Intervention. PT requests updated MD orders for PT Evaluation

## 2019-07-24 NOTE — PROGRESS NOTE ADULT - SUBJECTIVE AND OBJECTIVE BOX
[facility]  INFECTIOUS DISEASES AND INTERNAL MEDICINE at [place]  =======================================================  Gabriel Levy MD  Diplomates American Board of Internal Medicine and Infectious Diseases  Telephone [phone]  Fax            [phone]  =======================================================    [id number]  AIMEE SOLER   Follow up: R knee septic arthritis; left leg fracture; pseudomonas infection  patient seen and examined.   No more fever.   Pain is controlled.   S/P OR WITH DR FLORES YESTERDAY      ===================================================  REVIEW OF SYSTEMS:  as above  all other ROS negative  =======================================================  Allergies  No Known Allergies     Antibiotics:  cefepime   IVPB 2000 milliGRAM(s) IV Intermittent every 8 hours    ======================================================  Physical Exam:  ============    Vital Signs Last 24 Hrs  T(C): 36.8 (24 Jul 2019 07:37), Max: 37.5 (23 Jul 2019 17:42)  T(F): 98.3 (24 Jul 2019 07:37), Max: 99.5 (23 Jul 2019 17:42)  HR: 100 (24 Jul 2019 07:37) (62 - 107)  BP: 126/75 (24 Jul 2019 07:37) (112/72 - 159/80)  BP(mean): 88 (23 Jul 2019 21:00) (86 - 99)  RR: 18 (24 Jul 2019 07:37) (13 - 22)  SpO2: 100% (24 Jul 2019 07:37) (95% - 100%)    General:  No acute distress.  Eye: Pupils are equal, round and reactive to light, Extraocular movements are intact, Normal conjunctiva.  HENT: Normocephalic, Oral mucosa is moist, No pharyngeal erythema, No sinus tenderness.  Neck: Supple, No lymphadenopathy.  Respiratory: Lungs are clear to auscultation, Respirations are non-labored.  Cardiovascular: Normal rate, Regular rhythm,    RIGHT PICC line  Gastrointestinal: Soft, Non-tender, Non-distended, Normal bowel sounds.  Genitourinary: No costovertebral angle tenderness.  Lymphatics: No lymphadenopathy neck,   Musculoskeletal: LEFT knee with brace; LLE with dressing in place,  RLE with dressing + WOUND VAC  Integumentary: No rash.  Neurologic: Alert, Oriented, No focal deficits, Cranial Nerves II-XII are grossly intact.  Psychiatric: Appropriate mood & affect.    =======================================================  Labs:                                             -                                7.9    8.73  )-----------( 419      ( 24 Jul 2019 08:00 )             26.8   07-24    132<L>  |  99  |  20.0  ----------------------------<  152<H>  4.6   |  23.0  |  0.71    Ca    8.9      24 Jul 2019 08:00  Phos  3.1     07-24  Mg     2.2     07-24            Culture - Blood (collected 07-16-19 @ 00:19)  Source: .Blood  Final Report (07-21-19 @ 01:00):    No growth at 5 days.    Culture - Blood (collected 07-16-19 @ 00:19)  Source: .Blood  Final Report (07-21-19 @ 01:00):    No growth at 5 days.    Culture - Surgical Swab (collected 07-14-19 @ 15:02)  Source: .Surgical Swab Right knee joint (swabs)  Gram Stain (07-14-19 @ 16:17):    Few White blood cells    No organisms seen  Final Report (07-19-19 @ 13:45):    Pseudomonas aeruginosa Growing in broth media only  Organism: Pseudomonas aeruginosa (07-19-19 @ 13:45)  Organism: Pseudomonas aeruginosa (07-19-19 @ 13:45)    Sensitivities:      -  Amikacin: S <=16      -  Aztreonam: S 8      -  Cefepime: S <=4      -  Ceftazidime: S 4      -  Ciprofloxacin: S <=1      -  Gentamicin: S <=4      -  Imipenem: S <=1      -  Levofloxacin: S <=2      -  Meropenem: S <=1      -  Piperacillin/Tazobactam: S <=16      -  Tobramycin: S <=4      Method Type: NICHOLAS    Culture - Surgical Swab (collected 07-14-19 @ 15:00)  Source: .Surgical Swab Right knee (swabs)  Gram Stain (07-14-19 @ 16:19):    Few White blood cells    No organisms seen  Final Report (07-19-19 @ 13:45):    Few Pseudomonas aeruginosa  Organism: Pseudomonas aeruginosa (07-19-19 @ 13:45)  Organism: Pseudomonas aeruginosa (07-19-19 @ 13:45)    Sensitivities:      -  Amikacin: S <=16      -  Ampicillin: R >16      -  Ampicillin/Sulbactam: R >16/8      -  Aztreonam: S 8      -  Cefepime: S <=4      -  Cefotaxime: R 16      -  Ceftazidime: S 4      -  Ceftriaxone: R 32      -  Ciprofloxacin: S <=1      -  Ertapenem: R <=1      -  Gentamicin: S <=4      -  Imipenem: S <=1      -  Levofloxacin: S <=2      -  Meropenem: S <=1      -  Piperacillin/Tazobactam: S <=16      -  Tobramycin: S <=4      Method Type: NICHOLAS    Culture - Other (collected 07-12-19 @ 15:37)  Source: .Other right knee aspiration (swabs)  Final Report (07-14-19 @ 15:20):    Few Pseudomonas aeruginosa  Organism: Pseudomonas aeruginosa (07-14-19 @ 15:20)  Organism: Pseudomonas aeruginosa (07-14-19 @ 15:20)    Sensitivities:      -  Amikacin: S <=16      -  Aztreonam: S <=4      -  Cefepime: S <=4      -  Ceftazidime: S 4      -  Ciprofloxacin: S <=1      -  Gentamicin: S <=4      -  Imipenem: S <=1      -  Levofloxacin: S <=2      -  Meropenem: S <=1      -  Piperacillin/Tazobactam: S <=16      -  Tobramycin: S <=4      Method Type: NICHOLAS    Culture - Body Fluid with Gram Stain (collected 07-12-19 @ 15:16)  Source: .Body Fluid right knee synovial fluid  Gram Stain (07-13-19 @ 13:01):    Numerous White blood cells    No organisms seen  Final Report (07-17-19 @ 11:50):    Few Pseudomonas aeruginosa  Organism: Pseudomonas aeruginosa (07-17-19 @ 11:50)  Organism: Pseudomonas aeruginosa (07-17-19 @ 11:50)    Sensitivities:      -  Amikacin: S <=16      -  Aztreonam: S <=4      -  Cefepime: S <=4      -  Ceftazidime: S 4      -  Ciprofloxacin: S <=1      -  Gentamicin: S <=4      -  Imipenem: S <=1      -  Levofloxacin: S <=2      -  Meropenem: S <=1      -  Piperacillin/Tazobactam: S <=16      -  Tobramycin: S <=4      Method Type: NICHOLAS    Culture - Blood (collected 07-12-19 @ 14:25)  Source: .Blood  Final Report (07-17-19 @ 15:00):    No growth at 5 days.    Culture - Blood (collected 07-12-19 @ 14:25)  Source: .Blood  Final Report (07-17-19 @ 15:00):    No growth at 5 days.    Culture - Blood (collected 07-12-19 @ 02:49)  Source: .Blood  Final Report (07-17-19 @ 03:00):    No growth at 5 days.

## 2019-07-24 NOTE — PROGRESS NOTE ADULT - ASSESSMENT
67y/o man with no significant PMH was admitted on 6/25 due to a trauma at work. He had a large laceration in R leg  s/p RLE arterial bypass, s/p RLE fasciotomy on 6/25/19, s/p ORIF LEFT tibial plateau 6/26/19;  s/p Repair of sciatic nerve 7/3/19  He was febrile on 7/5, started on antibiotics, due to possibility of septic arthritis had R knee arthrocentesis showing 44k WBC and pseudomonas he was taken to OR;   s/p debridement and irrigation of right knee 7/14/19    R knee septic arthritis   Pseudomonas infection    - Blood culture negative on 7/5, 7/12 and 7/16  - Knee aspiration with a sensitive pseudomonas    - Continue cefepime 2gm q8h  - last day 8/12/19  - anticipate 4 weeks of IV antibiotics  has  PICC/midline in ARM  s/p OR BY PLASTIC SURGERY YESTERDAY  EVENTUAL CAT

## 2019-07-25 LAB
CRP SERPL-MCNC: 11.16 MG/DL — HIGH (ref 0–0.4)
GLUCOSE BLDC GLUCOMTR-MCNC: 117 MG/DL — HIGH (ref 70–99)
GLUCOSE BLDC GLUCOMTR-MCNC: 178 MG/DL — HIGH (ref 70–99)
GLUCOSE BLDC GLUCOMTR-MCNC: 190 MG/DL — HIGH (ref 70–99)
GLUCOSE BLDC GLUCOMTR-MCNC: 194 MG/DL — HIGH (ref 70–99)

## 2019-07-25 PROCEDURE — 99232 SBSQ HOSP IP/OBS MODERATE 35: CPT

## 2019-07-25 RX ADMIN — Medication 81 MILLIGRAM(S): at 11:11

## 2019-07-25 RX ADMIN — Medication 1: at 11:11

## 2019-07-25 RX ADMIN — Medication 1: at 11:06

## 2019-07-25 RX ADMIN — Medication 100 MILLIGRAM(S): at 05:31

## 2019-07-25 RX ADMIN — AMLODIPINE BESYLATE 10 MILLIGRAM(S): 2.5 TABLET ORAL at 05:31

## 2019-07-25 RX ADMIN — SENNA PLUS 2 TABLET(S): 8.6 TABLET ORAL at 21:42

## 2019-07-25 RX ADMIN — Medication 100 MILLIGRAM(S): at 19:08

## 2019-07-25 RX ADMIN — ENOXAPARIN SODIUM 80 MILLIGRAM(S): 100 INJECTION SUBCUTANEOUS at 05:31

## 2019-07-25 RX ADMIN — TAMSULOSIN HYDROCHLORIDE 0.4 MILLIGRAM(S): 0.4 CAPSULE ORAL at 21:42

## 2019-07-25 RX ADMIN — GABAPENTIN 300 MILLIGRAM(S): 400 CAPSULE ORAL at 13:38

## 2019-07-25 RX ADMIN — GABAPENTIN 300 MILLIGRAM(S): 400 CAPSULE ORAL at 21:42

## 2019-07-25 RX ADMIN — Medication 650 MILLIGRAM(S): at 20:17

## 2019-07-25 RX ADMIN — CEFEPIME 100 MILLIGRAM(S): 1 INJECTION, POWDER, FOR SOLUTION INTRAMUSCULAR; INTRAVENOUS at 13:38

## 2019-07-25 RX ADMIN — ENOXAPARIN SODIUM 80 MILLIGRAM(S): 100 INJECTION SUBCUTANEOUS at 19:07

## 2019-07-25 RX ADMIN — Medication 650 MILLIGRAM(S): at 19:11

## 2019-07-25 RX ADMIN — GABAPENTIN 300 MILLIGRAM(S): 400 CAPSULE ORAL at 05:30

## 2019-07-25 RX ADMIN — Medication 1 TABLET(S): at 11:11

## 2019-07-25 RX ADMIN — CEFEPIME 100 MILLIGRAM(S): 1 INJECTION, POWDER, FOR SOLUTION INTRAMUSCULAR; INTRAVENOUS at 21:42

## 2019-07-25 RX ADMIN — SIMVASTATIN 20 MILLIGRAM(S): 20 TABLET, FILM COATED ORAL at 21:42

## 2019-07-25 RX ADMIN — CEFEPIME 100 MILLIGRAM(S): 1 INJECTION, POWDER, FOR SOLUTION INTRAMUSCULAR; INTRAVENOUS at 05:31

## 2019-07-25 RX ADMIN — Medication 1: at 19:08

## 2019-07-25 NOTE — PROGRESS NOTE ADULT - SUBJECTIVE AND OBJECTIVE BOX
[facility]  INFECTIOUS DISEASES AND INTERNAL MEDICINE at [place]  =======================================================  Gabriel Levy MD  Diplomates American Board of Internal Medicine and Infectious Diseases  Telephone [phone]  Fax            [phone]  =======================================================    [id number]  AIMEE SOLER   Follow up: R knee septic arthritis; left leg fracture; pseudomonas infection  patient seen and examined.   No more fever.   Pain is controlled.   S/P OR       ===================================================  REVIEW OF SYSTEMS:  as above  all other ROS negative  =======================================================  Allergies  No Known Allergies     Antibiotics:  cefepime   IVPB 2000 milliGRAM(s) IV Intermittent every 8 hours    ======================================================  Physical Exam:  ============    Vital Signs Last 24 Hrs  T(C): 37.7 (25 Jul 2019 07:51), Max: 37.7 (24 Jul 2019 20:35)  T(F): 99.9 (25 Jul 2019 07:51), Max: 99.9 (25 Jul 2019 07:51)  HR: 52 (25 Jul 2019 07:51) (52 - 98)  BP: 115/69 (25 Jul 2019 07:51) (114/67 - 125/73)  BP(mean): --  RR: 18 (25 Jul 2019 07:51) (17 - 18)  SpO2: 99% (25 Jul 2019 07:51) (97% - 100%)    General:  No acute distress.  Eye: Pupils are equal, round and reactive to light, Extraocular movements are intact, Normal conjunctiva.  HENT: Normocephalic, Oral mucosa is moist, No pharyngeal erythema, No sinus tenderness.  Neck: Supple, No lymphadenopathy.  Respiratory: Lungs are clear to auscultation, Respirations are non-labored.  Cardiovascular: Normal rate, Regular rhythm,    RIGHT PICC line  Gastrointestinal: Soft, Non-tender, Non-distended, Normal bowel sounds.  Genitourinary: No costovertebral angle tenderness.  Lymphatics: No lymphadenopathy neck,   Musculoskeletal: LEFT knee with brace; LLE with dressing in place,  RLE with dressing + WOUND VAC  Integumentary: No rash.  Neurologic: Alert, Oriented, No focal deficits, Cranial Nerves II-XII are grossly intact.  Psychiatric: Appropriate mood & affect.    =======================================================  Labs:                                             -                                                   7.9    8.73  )-----------( 419      ( 24 Jul 2019 08:00 )             26.8   07-24    132<L>  |  99  |  20.0  ----------------------------<  152<H>  4.6   |  23.0  |  0.71    Ca    8.9      24 Jul 2019 08:00  Phos  3.1     07-24  Mg     2.2     07-24          Culture - Blood (collected 07-16-19 @ 00:19)  Source: .Blood  Final Report (07-21-19 @ 01:00):    No growth at 5 days.    Culture - Blood (collected 07-16-19 @ 00:19)  Source: .Blood  Final Report (07-21-19 @ 01:00):    No growth at 5 days.    Culture - Surgical Swab (collected 07-14-19 @ 15:02)  Source: .Surgical Swab Right knee joint (swabs)  Gram Stain (07-14-19 @ 16:17):    Few White blood cells    No organisms seen  Final Report (07-19-19 @ 13:45):    Pseudomonas aeruginosa Growing in broth media only  Organism: Pseudomonas aeruginosa (07-19-19 @ 13:45)  Organism: Pseudomonas aeruginosa (07-19-19 @ 13:45)    Sensitivities:      -  Amikacin: S <=16      -  Aztreonam: S 8      -  Cefepime: S <=4      -  Ceftazidime: S 4      -  Ciprofloxacin: S <=1      -  Gentamicin: S <=4      -  Imipenem: S <=1      -  Levofloxacin: S <=2      -  Meropenem: S <=1      -  Piperacillin/Tazobactam: S <=16      -  Tobramycin: S <=4      Method Type: NICHOLAS    Culture - Surgical Swab (collected 07-14-19 @ 15:00)  Source: .Surgical Swab Right knee (swabs)  Gram Stain (07-14-19 @ 16:19):    Few White blood cells    No organisms seen  Final Report (07-19-19 @ 13:45):    Few Pseudomonas aeruginosa  Organism: Pseudomonas aeruginosa (07-19-19 @ 13:45)  Organism: Pseudomonas aeruginosa (07-19-19 @ 13:45)    Sensitivities:      -  Amikacin: S <=16      -  Ampicillin: R >16      -  Ampicillin/Sulbactam: R >16/8      -  Aztreonam: S 8      -  Cefepime: S <=4      -  Cefotaxime: R 16      -  Ceftazidime: S 4      -  Ceftriaxone: R 32      -  Ciprofloxacin: S <=1      -  Ertapenem: R <=1      -  Gentamicin: S <=4      -  Imipenem: S <=1      -  Levofloxacin: S <=2      -  Meropenem: S <=1      -  Piperacillin/Tazobactam: S <=16      -  Tobramycin: S <=4      Method Type: NICHOLAS    Culture - Other (collected 07-12-19 @ 15:37)  Source: .Other right knee aspiration (swabs)  Final Report (07-14-19 @ 15:20):    Few Pseudomonas aeruginosa  Organism: Pseudomonas aeruginosa (07-14-19 @ 15:20)  Organism: Pseudomonas aeruginosa (07-14-19 @ 15:20)    Sensitivities:      -  Amikacin: S <=16      -  Aztreonam: S <=4      -  Cefepime: S <=4      -  Ceftazidime: S 4      -  Ciprofloxacin: S <=1      -  Gentamicin: S <=4      -  Imipenem: S <=1      -  Levofloxacin: S <=2      -  Meropenem: S <=1      -  Piperacillin/Tazobactam: S <=16      -  Tobramycin: S <=4      Method Type: NICHOLAS    Culture - Body Fluid with Gram Stain (collected 07-12-19 @ 15:16)  Source: .Body Fluid right knee synovial fluid  Gram Stain (07-13-19 @ 13:01):    Numerous White blood cells    No organisms seen  Final Report (07-17-19 @ 11:50):    Few Pseudomonas aeruginosa  Organism: Pseudomonas aeruginosa (07-17-19 @ 11:50)  Organism: Pseudomonas aeruginosa (07-17-19 @ 11:50)    Sensitivities:      -  Amikacin: S <=16      -  Aztreonam: S <=4      -  Cefepime: S <=4      -  Ceftazidime: S 4      -  Ciprofloxacin: S <=1      -  Gentamicin: S <=4      -  Imipenem: S <=1      -  Levofloxacin: S <=2      -  Meropenem: S <=1      -  Piperacillin/Tazobactam: S <=16      -  Tobramycin: S <=4      Method Type: NICHOLAS    Culture - Blood (collected 07-12-19 @ 14:25)  Source: .Blood  Final Report (07-17-19 @ 15:00):    No growth at 5 days.    Culture - Blood (collected 07-12-19 @ 14:25)  Source: .Blood  Final Report (07-17-19 @ 15:00):    No growth at 5 days.    Culture - Blood (collected 07-12-19 @ 02:49)  Source: .Blood  Final Report (07-17-19 @ 03:00):    No growth at 5 days.

## 2019-07-25 NOTE — PROGRESS NOTE ADULT - ASSESSMENT
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 Afebrile overnight.    - Pain control as needed  - Next VAC change on 7/26  - Rest of care per primary team

## 2019-07-25 NOTE — PROGRESS NOTE ADULT - ASSESSMENT
69y/o man with no significant PMH was admitted on 6/25 due to a trauma at work. He had a large laceration in R leg  s/p RLE arterial bypass, s/p RLE fasciotomy on 6/25/19, s/p ORIF LEFT tibial plateau 6/26/19;  s/p Repair of sciatic nerve 7/3/19  He was febrile on 7/5, started on antibiotics, due to possibility of septic arthritis had R knee arthrocentesis showing 44k WBC and pseudomonas he was taken to OR;   s/p debridement and irrigation of right knee 7/14/19    R knee septic arthritis   Pseudomonas infection    - Blood culture negative on 7/5, 7/12 and 7/16  - Knee aspiration with a sensitive pseudomonas    - Continue cefepime 2gm q8h  - last day 8/12/19  - anticipate 4 weeks of IV antibiotics  has  PICC/midline in ARM  s/p OR BY PLASTIC SURGERY YESTERDAY  EVENTUAL CAT  WIFE AT BEDSIDE 69y/o man with no significant PMH was admitted on 6/25 due to a trauma at work. He had a large laceration in R leg  s/p RLE arterial bypass, s/p RLE fasciotomy on 6/25/19, s/p ORIF LEFT tibial plateau 6/26/19;  s/p Repair of sciatic nerve 7/3/19  He was febrile on 7/5, started on antibiotics, due to possibility of septic arthritis had R knee arthrocentesis showing 44k WBC and pseudomonas he was taken to OR;   s/p debridement and irrigation of right knee 7/14/19    R knee septic arthritis   Pseudomonas infection    - Blood culture negative on 7/5, 7/12 and 7/16  - Knee aspiration with a sensitive pseudomonas    - Continue cefepime 2gm q8h  - last day 8/12/19  - anticipate 4 weeks of IV antibiotics  has  PICC/midline in ARM  s/p OR BY PLASTIC SURGERY YESTERDAY  EVENTUAL CAT  WIFE AT BEDSIDE  WILL FOLLOW UP AS NEEDED PLEASE CALL IF QUESTIONS

## 2019-07-25 NOTE — PROGRESS NOTE ADULT - ASSESSMENT
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 No evidence of active wound infection of RLE however; now s/p RLE debridement and VAC placement.   - Pain control as needed  - Next VAC change on 7/26  - Rest of care per primary team    DISPO: AR when clinically stable

## 2019-07-25 NOTE — PROGRESS NOTE ADULT - SUBJECTIVE AND OBJECTIVE BOX
HPI/OVERNIGHT EVENTS:    Patient seen and examined at bedside this AM. No acute events overnight per nursing reports. Pain well controlled, tolerating diet. Currently a/p LLE wound debridement and VAC placement. Denies fever, chills, nausea, vomitting, chest pain, SOB, dizziness, abd pain or any other concerning symptoms    MEDICATIONS  (STANDING):  amLODIPine   Tablet 10 milliGRAM(s) Oral daily  aspirin enteric coated 81 milliGRAM(s) Oral daily  cefepime   IVPB 2000 milliGRAM(s) IV Intermittent every 8 hours  docusate sodium 100 milliGRAM(s) Oral two times a day  enoxaparin Injectable 80 milliGRAM(s) SubCutaneous two times a day  gabapentin 300 milliGRAM(s) Oral three times a day  insulin lispro (HumaLOG) corrective regimen sliding scale   SubCutaneous Before meals and at bedtime  multivitamin 1 Tablet(s) Oral daily  senna 2 Tablet(s) Oral at bedtime  simvastatin 20 milliGRAM(s) Oral at bedtime  tamsulosin 0.4 milliGRAM(s) Oral at bedtime    MEDICATIONS  (PRN):  acetaminophen   Tablet .. 650 milliGRAM(s) Oral every 6 hours PRN Temp greater or equal to 38C (100.4F), Mild Pain (1 - 3)  ondansetron Injectable 4 milliGRAM(s) IV Push once PRN Nausea and/or Vomiting  oxyCODONE    IR 5 milliGRAM(s) Oral every 4 hours PRN Moderate Pain (4 - 6)  oxyCODONE    IR 10 milliGRAM(s) Oral every 4 hours PRN Severe Pain (7 - 10)      Vital Signs Last 24 Hrs  T(C): 37.7 (24 Jul 2019 23:30), Max: 37.7 (24 Jul 2019 20:35)  T(F): 99.8 (24 Jul 2019 23:30), Max: 99.8 (24 Jul 2019 20:35)  HR: 89 (24 Jul 2019 23:30) (89 - 100)  BP: 118/73 (24 Jul 2019 23:30) (115/66 - 126/75)  BP(mean): --  RR: 18 (24 Jul 2019 23:30) (17 - 18)  SpO2: 99% (24 Jul 2019 20:35) (97% - 100%)    Constitutional: patient resting comfortably in bed, in no acute distress  HEENT: EOMI / PERRL b/l, no active drainage or redness  Neck: No JVD, full ROM without pain  Respiratory: respirations are unlabored, no accessory muscle use, no conversational dyspnea  Cardiovascular: regular rate & rhythm  Gastrointestinal: Abdomen soft, non-tender, non-distended, no rebound tenderness / guarding  Neurological: GCS: 15 (4/5/6). A&O x 3; no gross sensory / motor / coordination deficits  Psychiatric: Normal mood, normal affect  Musculoskeletal: No joint pain, swelling or deformity; no limitation of movement      I&O's Detail    23 Jul 2019 07:01  -  24 Jul 2019 07:00  --------------------------------------------------------  IN:    lactated ringers.: 250 mL    Oral Fluid: 120 mL  Total IN: 370 mL    OUT:    Indwelling Catheter - Urethral: 1675 mL  Total OUT: 1675 mL    Total NET: -1305 mL          LABS:                        7.9    8.73  )-----------( 419      ( 24 Jul 2019 08:00 )             26.8     07-24    132<L>  |  99  |  20.0  ----------------------------<  152<H>  4.6   |  23.0  |  0.71    Ca    8.9      24 Jul 2019 08:00  Phos  3.1     07-24  Mg     2.2     07-24      PT/INR - ( 24 Jul 2019 08:00 )   PT: 17.4 sec;   INR: 1.49 ratio         PTT - ( 24 Jul 2019 08:00 )  PTT:33.0 sec

## 2019-07-25 NOTE — PROGRESS NOTE ADULT - SUBJECTIVE AND OBJECTIVE BOX
HPI/OVERNIGHT EVENTS:  Patient seen and examined at bedside. No acute events overnight. Pain well controlled and tolerating diet. Denies fever, chills, nausea, vomiting, chest pain, SOB, dizziness, abd pain or any other concerning symptoms.      MEDICATIONS  (STANDING):  amLODIPine   Tablet 10 milliGRAM(s) Oral daily  aspirin enteric coated 81 milliGRAM(s) Oral daily  cefepime   IVPB 2000 milliGRAM(s) IV Intermittent every 8 hours  docusate sodium 100 milliGRAM(s) Oral two times a day  enoxaparin Injectable 80 milliGRAM(s) SubCutaneous two times a day  gabapentin 300 milliGRAM(s) Oral three times a day  insulin lispro (HumaLOG) corrective regimen sliding scale   SubCutaneous Before meals and at bedtime  multivitamin 1 Tablet(s) Oral daily  senna 2 Tablet(s) Oral at bedtime  simvastatin 20 milliGRAM(s) Oral at bedtime  tamsulosin 0.4 milliGRAM(s) Oral at bedtime    MEDICATIONS  (PRN):  acetaminophen   Tablet .. 650 milliGRAM(s) Oral every 6 hours PRN Temp greater or equal to 38C (100.4F), Mild Pain (1 - 3)  ondansetron Injectable 4 milliGRAM(s) IV Push once PRN Nausea and/or Vomiting  oxyCODONE    IR 5 milliGRAM(s) Oral every 4 hours PRN Moderate Pain (4 - 6)  oxyCODONE    IR 10 milliGRAM(s) Oral every 4 hours PRN Severe Pain (7 - 10)      Vital Signs Last 24 Hrs  T(C): 37.7 (24 Jul 2019 23:30), Max: 37.7 (24 Jul 2019 20:35)  T(F): 99.8 (24 Jul 2019 23:30), Max: 99.8 (24 Jul 2019 20:35)  HR: 89 (24 Jul 2019 23:30) (89 - 100)  BP: 118/73 (24 Jul 2019 23:30) (115/66 - 126/75)  BP(mean): --  RR: 18 (24 Jul 2019 23:30) (17 - 18)  SpO2: 99% (24 Jul 2019 20:35) (97% - 100%)    Constitutional: patient resting comfortably in bed, in no acute distress  HEENT: EOMI / PERRL b/l, no active drainage or redness  Neck: No JVD, full ROM without pain  Respiratory: respirations are unlabored, no accessory muscle use, no conversational dyspnea  Cardiovascular: regular rate & rhythm  Gastrointestinal: Abdomen soft, non-tender, non-distended, no rebound tenderness / guarding  Neurological: GCS: 15 (4/5/6). A&O x 3; no gross sensory / motor / coordination deficits  Psychiatric: Normal mood, normal affect  Musculoskeletal: No joint pain, swelling or deformity; no limitation of movement      I&O's Detail    23 Jul 2019 07:01  -  24 Jul 2019 07:00  --------------------------------------------------------  IN:    lactated ringers.: 250 mL    Oral Fluid: 120 mL  Total IN: 370 mL    OUT:    Indwelling Catheter - Urethral: 1675 mL  Total OUT: 1675 mL    Total NET: -1305 mL          LABS:                        7.9    8.73  )-----------( 419      ( 24 Jul 2019 08:00 )             26.8     07-24    132<L>  |  99  |  20.0  ----------------------------<  152<H>  4.6   |  23.0  |  0.71    Ca    8.9      24 Jul 2019 08:00  Phos  3.1     07-24  Mg     2.2     07-24      PT/INR - ( 24 Jul 2019 08:00 )   PT: 17.4 sec;   INR: 1.49 ratio         PTT - ( 24 Jul 2019 08:00 )  PTT:33.0 sec

## 2019-07-26 LAB
ANION GAP SERPL CALC-SCNC: 13 MMOL/L — SIGNIFICANT CHANGE UP (ref 5–17)
BASOPHILS # BLD AUTO: 0.05 K/UL — SIGNIFICANT CHANGE UP (ref 0–0.2)
BASOPHILS NFR BLD AUTO: 0.7 % — SIGNIFICANT CHANGE UP (ref 0–2)
BUN SERPL-MCNC: 16 MG/DL — SIGNIFICANT CHANGE UP (ref 8–20)
CALCIUM SERPL-MCNC: 8.9 MG/DL — SIGNIFICANT CHANGE UP (ref 8.6–10.2)
CHLORIDE SERPL-SCNC: 102 MMOL/L — SIGNIFICANT CHANGE UP (ref 98–107)
CO2 SERPL-SCNC: 21 MMOL/L — LOW (ref 22–29)
CREAT SERPL-MCNC: 0.67 MG/DL — SIGNIFICANT CHANGE UP (ref 0.5–1.3)
CULTURE RESULTS: SIGNIFICANT CHANGE UP
EOSINOPHIL # BLD AUTO: 0.04 K/UL — SIGNIFICANT CHANGE UP (ref 0–0.5)
EOSINOPHIL NFR BLD AUTO: 0.5 % — SIGNIFICANT CHANGE UP (ref 0–6)
GLUCOSE BLDC GLUCOMTR-MCNC: 124 MG/DL — HIGH (ref 70–99)
GLUCOSE BLDC GLUCOMTR-MCNC: 148 MG/DL — HIGH (ref 70–99)
GLUCOSE BLDC GLUCOMTR-MCNC: 185 MG/DL — HIGH (ref 70–99)
GLUCOSE BLDC GLUCOMTR-MCNC: 203 MG/DL — HIGH (ref 70–99)
GLUCOSE SERPL-MCNC: 121 MG/DL — HIGH (ref 70–115)
HCT VFR BLD CALC: 28.5 % — LOW (ref 39–50)
HGB BLD-MCNC: 8.4 G/DL — LOW (ref 13–17)
IMM GRANULOCYTES NFR BLD AUTO: 1.5 % — SIGNIFICANT CHANGE UP (ref 0–1.5)
LYMPHOCYTES # BLD AUTO: 2.48 K/UL — SIGNIFICANT CHANGE UP (ref 1–3.3)
LYMPHOCYTES # BLD AUTO: 33.8 % — SIGNIFICANT CHANGE UP (ref 13–44)
MAGNESIUM SERPL-MCNC: 2.2 MG/DL — SIGNIFICANT CHANGE UP (ref 1.6–2.6)
MCHC RBC-ENTMCNC: 26.6 PG — LOW (ref 27–34)
MCHC RBC-ENTMCNC: 29.5 GM/DL — LOW (ref 32–36)
MCV RBC AUTO: 90.2 FL — SIGNIFICANT CHANGE UP (ref 80–100)
MONOCYTES # BLD AUTO: 0.53 K/UL — SIGNIFICANT CHANGE UP (ref 0–0.9)
MONOCYTES NFR BLD AUTO: 7.2 % — SIGNIFICANT CHANGE UP (ref 2–14)
NEUTROPHILS # BLD AUTO: 4.13 K/UL — SIGNIFICANT CHANGE UP (ref 1.8–7.4)
NEUTROPHILS NFR BLD AUTO: 56.3 % — SIGNIFICANT CHANGE UP (ref 43–77)
PHOSPHATE SERPL-MCNC: 3.1 MG/DL — SIGNIFICANT CHANGE UP (ref 2.4–4.7)
PLATELET # BLD AUTO: 395 K/UL — SIGNIFICANT CHANGE UP (ref 150–400)
POTASSIUM SERPL-MCNC: 5 MMOL/L — SIGNIFICANT CHANGE UP (ref 3.5–5.3)
POTASSIUM SERPL-SCNC: 5 MMOL/L — SIGNIFICANT CHANGE UP (ref 3.5–5.3)
RBC # BLD: 3.16 M/UL — LOW (ref 4.2–5.8)
RBC # FLD: 17 % — HIGH (ref 10.3–14.5)
SODIUM SERPL-SCNC: 136 MMOL/L — SIGNIFICANT CHANGE UP (ref 135–145)
SPECIMEN SOURCE: SIGNIFICANT CHANGE UP
WBC # BLD: 7.34 K/UL — SIGNIFICANT CHANGE UP (ref 3.8–10.5)
WBC # FLD AUTO: 7.34 K/UL — SIGNIFICANT CHANGE UP (ref 3.8–10.5)

## 2019-07-26 PROCEDURE — 99231 SBSQ HOSP IP/OBS SF/LOW 25: CPT

## 2019-07-26 RX ADMIN — CEFEPIME 100 MILLIGRAM(S): 1 INJECTION, POWDER, FOR SOLUTION INTRAMUSCULAR; INTRAVENOUS at 22:32

## 2019-07-26 RX ADMIN — GABAPENTIN 300 MILLIGRAM(S): 400 CAPSULE ORAL at 05:17

## 2019-07-26 RX ADMIN — OXYCODONE HYDROCHLORIDE 5 MILLIGRAM(S): 5 TABLET ORAL at 15:32

## 2019-07-26 RX ADMIN — Medication 1: at 12:04

## 2019-07-26 RX ADMIN — SIMVASTATIN 20 MILLIGRAM(S): 20 TABLET, FILM COATED ORAL at 22:32

## 2019-07-26 RX ADMIN — GABAPENTIN 300 MILLIGRAM(S): 400 CAPSULE ORAL at 22:32

## 2019-07-26 RX ADMIN — Medication 1 TABLET(S): at 12:04

## 2019-07-26 RX ADMIN — Medication 81 MILLIGRAM(S): at 12:05

## 2019-07-26 RX ADMIN — SENNA PLUS 2 TABLET(S): 8.6 TABLET ORAL at 22:32

## 2019-07-26 RX ADMIN — CEFEPIME 100 MILLIGRAM(S): 1 INJECTION, POWDER, FOR SOLUTION INTRAMUSCULAR; INTRAVENOUS at 05:17

## 2019-07-26 RX ADMIN — ENOXAPARIN SODIUM 80 MILLIGRAM(S): 100 INJECTION SUBCUTANEOUS at 05:17

## 2019-07-26 RX ADMIN — GABAPENTIN 300 MILLIGRAM(S): 400 CAPSULE ORAL at 13:51

## 2019-07-26 RX ADMIN — ENOXAPARIN SODIUM 80 MILLIGRAM(S): 100 INJECTION SUBCUTANEOUS at 17:14

## 2019-07-26 RX ADMIN — CEFEPIME 100 MILLIGRAM(S): 1 INJECTION, POWDER, FOR SOLUTION INTRAMUSCULAR; INTRAVENOUS at 13:51

## 2019-07-26 RX ADMIN — Medication 100 MILLIGRAM(S): at 17:14

## 2019-07-26 RX ADMIN — AMLODIPINE BESYLATE 10 MILLIGRAM(S): 2.5 TABLET ORAL at 05:17

## 2019-07-26 RX ADMIN — TAMSULOSIN HYDROCHLORIDE 0.4 MILLIGRAM(S): 0.4 CAPSULE ORAL at 22:32

## 2019-07-26 RX ADMIN — OXYCODONE HYDROCHLORIDE 5 MILLIGRAM(S): 5 TABLET ORAL at 15:30

## 2019-07-26 RX ADMIN — Medication 100 MILLIGRAM(S): at 05:17

## 2019-07-26 NOTE — PROGRESS NOTE ADULT - SUBJECTIVE AND OBJECTIVE BOX
Patient seen and examined doing well resting in bed    Vital Signs Last 24 Hrs  T(C): 36.8 (26 Jul 2019 08:07), Max: 37.8 (25 Jul 2019 16:14)  T(F): 98.2 (26 Jul 2019 08:07), Max: 100.1 (25 Jul 2019 16:14)  HR: 98 (26 Jul 2019 08:07) (85 - 98)  BP: 109/61 (26 Jul 2019 08:07) (109/61 - 119/71)  BP(mean): --  RR: 18 (26 Jul 2019 08:07) (17 - 18)  SpO2: 99% (26 Jul 2019 05:15) (95% - 100%)    Gen: NAD  HEENT: NCAT  Abd: Soft  : Min draining clear urine                          8.4    7.34  )-----------( 395      ( 26 Jul 2019 09:55 )             28.5     07-26    136  |  102  |  16.0  ----------------------------<  121<H>  5.0   |  21.0<L>  |  0.67    Ca    8.9      26 Jul 2019 09:55  Phos  3.1     07-26  Mg     2.2     07-26

## 2019-07-26 NOTE — PROGRESS NOTE ADULT - SUBJECTIVE AND OBJECTIVE BOX
HPI/OVERNIGHT EVENTS:  Pt had a temp to 100.1F overnight. No acute complaints/events. Pt is tolerating diet. Min intact. Denies SOB, CP, n/v.    MEDICATIONS  (STANDING):  amLODIPine   Tablet 10 milliGRAM(s) Oral daily  aspirin enteric coated 81 milliGRAM(s) Oral daily  cefepime   IVPB 2000 milliGRAM(s) IV Intermittent every 8 hours  docusate sodium 100 milliGRAM(s) Oral two times a day  enoxaparin Injectable 80 milliGRAM(s) SubCutaneous two times a day  gabapentin 300 milliGRAM(s) Oral three times a day  insulin lispro (HumaLOG) corrective regimen sliding scale   SubCutaneous Before meals and at bedtime  multivitamin 1 Tablet(s) Oral daily  senna 2 Tablet(s) Oral at bedtime  simvastatin 20 milliGRAM(s) Oral at bedtime  tamsulosin 0.4 milliGRAM(s) Oral at bedtime    MEDICATIONS  (PRN):  acetaminophen   Tablet .. 650 milliGRAM(s) Oral every 6 hours PRN Temp greater or equal to 38C (100.4F), Mild Pain (1 - 3)  ondansetron Injectable 4 milliGRAM(s) IV Push once PRN Nausea and/or Vomiting  oxyCODONE    IR 5 milliGRAM(s) Oral every 4 hours PRN Moderate Pain (4 - 6)  oxyCODONE    IR 10 milliGRAM(s) Oral every 4 hours PRN Severe Pain (7 - 10)      Vital Signs Last 24 Hrs  T(C): 37.4 (25 Jul 2019 23:25), Max: 37.8 (25 Jul 2019 16:14)  T(F): 99.4 (25 Jul 2019 23:25), Max: 100.1 (25 Jul 2019 16:14)  HR: 85 (25 Jul 2019 23:25) (52 - 96)  BP: 117/65 (25 Jul 2019 23:25) (114/67 - 119/71)  BP(mean): --  RR: 18 (25 Jul 2019 23:25) (17 - 18)  SpO2: 95% (25 Jul 2019 23:25) (95% - 100%)    Constitutional: patient resting comfortably in bed, in no acute distress  HEENT: EOMI / PERRL b/l, no active drainage or redness  Neck: No JVD, full ROM without pain  Respiratory: respirations are unlabored, no accessory muscle use, no conversational dyspnea  Cardiovascular: regular rate & rhythm  Gastrointestinal: Abdomen soft, non-tender, non-distended, no rebound tenderness / guarding  Neurological: GCS: 15 (4/5/6). A&O x 3; no gross sensory / motor / coordination deficits  Psychiatric: Normal mood, normal affect  Musculoskeletal: wound vac to RLE with dressing in placed       I&O's Detail    25 Jul 2019 07:01  -  26 Jul 2019 03:14  --------------------------------------------------------  IN:  Total IN: 0 mL    OUT:    Indwelling Catheter - Urethral: 1900 mL  Total OUT: 1900 mL    Total NET: -1900 mL          LABS:                        7.9    8.73  )-----------( 419      ( 24 Jul 2019 08:00 )             26.8     07-24    132<L>  |  99  |  20.0  ----------------------------<  152<H>  4.6   |  23.0  |  0.71    Ca    8.9      24 Jul 2019 08:00  Phos  3.1     07-24  Mg     2.2     07-24      PT/INR - ( 24 Jul 2019 08:00 )   PT: 17.4 sec;   INR: 1.49 ratio         PTT - ( 24 Jul 2019 08:00 )  PTT:33.0 sec

## 2019-07-26 NOTE — PROGRESS NOTE ADULT - ASSESSMENT
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 Afebrile overnight.    - Pain control as needed  - Next VAC change on 7/30  - Abx and rest of care per primary team

## 2019-07-26 NOTE — PROGRESS NOTE ADULT - SUBJECTIVE AND OBJECTIVE BOX
INTERVAL HPI/OVERNIGHT EVENTS:  Had a temp to 100.1F overnight. No acute complaints/events. Pt is tolerating diet. Min intact. Denies SOB, CP, n/v. Wound vac changed today. 7cm x 12cm triangle shaped black foam was used. Vac was appropriately sealed with no leak and good suction at 125mmHg. next vac change due 7/30.      MEDICATIONS  (STANDING):  amLODIPine   Tablet 10 milliGRAM(s) Oral daily  aspirin enteric coated 81 milliGRAM(s) Oral daily  cefepime   IVPB 2000 milliGRAM(s) IV Intermittent every 8 hours  docusate sodium 100 milliGRAM(s) Oral two times a day  enoxaparin Injectable 80 milliGRAM(s) SubCutaneous two times a day  gabapentin 300 milliGRAM(s) Oral three times a day  insulin lispro (HumaLOG) corrective regimen sliding scale   SubCutaneous Before meals and at bedtime  multivitamin 1 Tablet(s) Oral daily  senna 2 Tablet(s) Oral at bedtime  simvastatin 20 milliGRAM(s) Oral at bedtime  tamsulosin 0.4 milliGRAM(s) Oral at bedtime    MEDICATIONS  (PRN):  acetaminophen   Tablet .. 650 milliGRAM(s) Oral every 6 hours PRN Temp greater or equal to 38C (100.4F), Mild Pain (1 - 3)  ondansetron Injectable 4 milliGRAM(s) IV Push once PRN Nausea and/or Vomiting  oxyCODONE    IR 5 milliGRAM(s) Oral every 4 hours PRN Moderate Pain (4 - 6)  oxyCODONE    IR 10 milliGRAM(s) Oral every 4 hours PRN Severe Pain (7 - 10)      Vital Signs Last 24 Hrs  T(C): 36.8 (26 Jul 2019 08:07), Max: 37.8 (25 Jul 2019 16:14)  T(F): 98.2 (26 Jul 2019 08:07), Max: 100.1 (25 Jul 2019 16:14)  HR: 98 (26 Jul 2019 08:07) (85 - 98)  BP: 109/61 (26 Jul 2019 08:07) (109/61 - 119/71)  BP(mean): --  RR: 18 (26 Jul 2019 08:07) (17 - 18)  SpO2: 99% (26 Jul 2019 05:15) (95% - 100%)    PE  Gen: alert and oriented  Pulm: non labored,   CV: warm well perfused. no dyspnea  Ext: wound vac replaced. integra site intact with bleeding and granulation tissue. Graft site with some fibrinous scarring and small areas of bleeding. Donor site healing well with some scabbing. Some dependent edema in R ankle. Dressing changed with xeroform underneath black foam for wound vac. ABD placed on graft site. Xeroform and ace bandage from foot to knee.       I&O's Detail    25 Jul 2019 07:01  -  26 Jul 2019 07:00  --------------------------------------------------------  IN:  Total IN: 0 mL    OUT:    Indwelling Catheter - Urethral: 3900 mL    VAC (Vacuum Assisted Closure) System: 50 mL  Total OUT: 3950 mL    Total NET: -3950 mL          LABS:                        8.4    7.34  )-----------( 395      ( 26 Jul 2019 09:55 )             28.5     07-26    136  |  102  |  16.0  ----------------------------<  121<H>  5.0   |  21.0<L>  |  0.67    Ca    8.9      26 Jul 2019 09:55  Phos  3.1     07-26  Mg     2.2     07-26            RADIOLOGY & ADDITIONAL STUDIES:

## 2019-07-26 NOTE — PROGRESS NOTE ADULT - ASSESSMENT
pt examined at bedside Overnight he had a rise in temp to 100.3. It resolved with Tylenol PO,     -Monitor VS  -Monitor I's and O's   - f/u vac replacement  -f/u Plastics rec

## 2019-07-27 LAB
CRP SERPL-MCNC: 5.88 MG/DL — HIGH (ref 0–0.4)
GLUCOSE BLDC GLUCOMTR-MCNC: 114 MG/DL — HIGH (ref 70–99)
GLUCOSE BLDC GLUCOMTR-MCNC: 126 MG/DL — HIGH (ref 70–99)
GLUCOSE BLDC GLUCOMTR-MCNC: 134 MG/DL — HIGH (ref 70–99)
GLUCOSE BLDC GLUCOMTR-MCNC: 166 MG/DL — HIGH (ref 70–99)

## 2019-07-27 RX ORDER — OXYCODONE HYDROCHLORIDE 5 MG/1
10 TABLET ORAL EVERY 4 HOURS
Refills: 0 | Status: DISCONTINUED | OUTPATIENT
Start: 2019-07-27 | End: 2019-08-03

## 2019-07-27 RX ORDER — OXYCODONE HYDROCHLORIDE 5 MG/1
5 TABLET ORAL EVERY 4 HOURS
Refills: 0 | Status: DISCONTINUED | OUTPATIENT
Start: 2019-07-27 | End: 2019-08-03

## 2019-07-27 RX ADMIN — ENOXAPARIN SODIUM 80 MILLIGRAM(S): 100 INJECTION SUBCUTANEOUS at 17:15

## 2019-07-27 RX ADMIN — Medication 81 MILLIGRAM(S): at 12:41

## 2019-07-27 RX ADMIN — CEFEPIME 100 MILLIGRAM(S): 1 INJECTION, POWDER, FOR SOLUTION INTRAMUSCULAR; INTRAVENOUS at 06:08

## 2019-07-27 RX ADMIN — Medication 100 MILLIGRAM(S): at 06:08

## 2019-07-27 RX ADMIN — SENNA PLUS 2 TABLET(S): 8.6 TABLET ORAL at 21:20

## 2019-07-27 RX ADMIN — GABAPENTIN 300 MILLIGRAM(S): 400 CAPSULE ORAL at 21:20

## 2019-07-27 RX ADMIN — Medication 650 MILLIGRAM(S): at 17:00

## 2019-07-27 RX ADMIN — Medication 1 TABLET(S): at 12:41

## 2019-07-27 RX ADMIN — GABAPENTIN 300 MILLIGRAM(S): 400 CAPSULE ORAL at 06:09

## 2019-07-27 RX ADMIN — AMLODIPINE BESYLATE 10 MILLIGRAM(S): 2.5 TABLET ORAL at 06:09

## 2019-07-27 RX ADMIN — SIMVASTATIN 20 MILLIGRAM(S): 20 TABLET, FILM COATED ORAL at 21:20

## 2019-07-27 RX ADMIN — Medication 650 MILLIGRAM(S): at 16:02

## 2019-07-27 RX ADMIN — ENOXAPARIN SODIUM 80 MILLIGRAM(S): 100 INJECTION SUBCUTANEOUS at 06:09

## 2019-07-27 RX ADMIN — Medication 100 MILLIGRAM(S): at 17:15

## 2019-07-27 RX ADMIN — TAMSULOSIN HYDROCHLORIDE 0.4 MILLIGRAM(S): 0.4 CAPSULE ORAL at 21:20

## 2019-07-27 RX ADMIN — Medication 1: at 17:15

## 2019-07-27 RX ADMIN — CEFEPIME 100 MILLIGRAM(S): 1 INJECTION, POWDER, FOR SOLUTION INTRAMUSCULAR; INTRAVENOUS at 21:19

## 2019-07-27 RX ADMIN — CEFEPIME 100 MILLIGRAM(S): 1 INJECTION, POWDER, FOR SOLUTION INTRAMUSCULAR; INTRAVENOUS at 15:36

## 2019-07-27 RX ADMIN — GABAPENTIN 300 MILLIGRAM(S): 400 CAPSULE ORAL at 15:38

## 2019-07-27 NOTE — PROGRESS NOTE ADULT - ASSESSMENT
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 Afebrile overnight.    - Next VAC change on 7/30  - Abx  per primary team  - Pt cleared by plastics to dc to rehab w/ vac   - Vac until integra incorporated

## 2019-07-27 NOTE — PROGRESS NOTE ADULT - SUBJECTIVE AND OBJECTIVE BOX
Patient seen and examined doing well. No acute events    Vital Signs Last 24 Hrs  T(C): 37.3 (27 Jul 2019 07:49), Max: 37.7 (27 Jul 2019 04:59)  T(F): 99.1 (27 Jul 2019 07:49), Max: 99.8 (27 Jul 2019 04:59)  HR: 82 (27 Jul 2019 07:49) (82 - 100)  BP: 109/69 (27 Jul 2019 07:49) (106/62 - 119/69)  BP(mean): --  RR: 18 (27 Jul 2019 07:49) (18 - 18)  SpO2: 99% (27 Jul 2019 07:49) (99% - 100%)      Gen: NAD  HEENT: NCAT  Abd: Soft  : Min draining clear urine                          8.4    7.34  )-----------( 395      ( 26 Jul 2019 09:55 )             28.5     07-26    136  |  102  |  16.0  ----------------------------<  121<H>  5.0   |  21.0<L>  |  0.67    Ca    8.9      26 Jul 2019 09:55  Phos  3.1     07-26  Mg     2.2     07-26

## 2019-07-27 NOTE — PROGRESS NOTE ADULT - SUBJECTIVE AND OBJECTIVE BOX
afebrile  no leukocytosis   no acute events      MEDICATIONS  (STANDING):  amLODIPine   Tablet 10 milliGRAM(s) Oral daily  aspirin enteric coated 81 milliGRAM(s) Oral daily  cefepime   IVPB 2000 milliGRAM(s) IV Intermittent every 8 hours  docusate sodium 100 milliGRAM(s) Oral two times a day  enoxaparin Injectable 80 milliGRAM(s) SubCutaneous two times a day  gabapentin 300 milliGRAM(s) Oral three times a day  insulin lispro (HumaLOG) corrective regimen sliding scale   SubCutaneous Before meals and at bedtime  multivitamin 1 Tablet(s) Oral daily  senna 2 Tablet(s) Oral at bedtime  simvastatin 20 milliGRAM(s) Oral at bedtime  tamsulosin 0.4 milliGRAM(s) Oral at bedtime    MEDICATIONS  (PRN):  acetaminophen   Tablet .. 650 milliGRAM(s) Oral every 6 hours PRN Temp greater or equal to 38C (100.4F), Mild Pain (1 - 3)  ondansetron Injectable 4 milliGRAM(s) IV Push once PRN Nausea and/or Vomiting  oxyCODONE    IR 5 milliGRAM(s) Oral every 4 hours PRN Moderate Pain (4 - 6)  oxyCODONE    IR 10 milliGRAM(s) Oral every 4 hours PRN Severe Pain (7 - 10)      Vital Signs Last 24 Hrs  T(C): 37.3 (27 Jul 2019 07:49), Max: 37.7 (27 Jul 2019 04:59)  T(F): 99.1 (27 Jul 2019 07:49), Max: 99.8 (27 Jul 2019 04:59)  HR: 82 (27 Jul 2019 07:49) (82 - 100)  BP: 109/69 (27 Jul 2019 07:49) (106/62 - 119/69)  BP(mean): --  RR: 18 (27 Jul 2019 07:49) (18 - 18)  SpO2: 99% (27 Jul 2019 07:49) (99% - 100%)    PE  Gen: alert and oriented  Pulm: non labored,   CV: warm well perfused. no dyspnea  RLE 2+ distal pulses, no sens or motor, no edema, warm well perfused  vac in place w/ suction  donor site well healed         I&O's Detail    26 Jul 2019 07:01  -  27 Jul 2019 07:00  --------------------------------------------------------  IN:  Total IN: 0 mL    OUT:    Indwelling Catheter - Urethral: 1400 mL  Total OUT: 1400 mL    Total NET: -1400 mL          LABS:                        8.4    7.34  )-----------( 395      ( 26 Jul 2019 09:55 )             28.5     07-26    136  |  102  |  16.0  ----------------------------<  121<H>  5.0   |  21.0<L>  |  0.67    Ca    8.9      26 Jul 2019 09:55  Phos  3.1     07-26  Mg     2.2     07-26            RADIOLOGY & ADDITIONAL STUDIES:

## 2019-07-28 LAB
GLUCOSE BLDC GLUCOMTR-MCNC: 106 MG/DL — HIGH (ref 70–99)
GLUCOSE BLDC GLUCOMTR-MCNC: 122 MG/DL — HIGH (ref 70–99)
GLUCOSE BLDC GLUCOMTR-MCNC: 139 MG/DL — HIGH (ref 70–99)
GLUCOSE BLDC GLUCOMTR-MCNC: 166 MG/DL — HIGH (ref 70–99)

## 2019-07-28 PROCEDURE — 99232 SBSQ HOSP IP/OBS MODERATE 35: CPT

## 2019-07-28 RX ADMIN — Medication 100 MILLIGRAM(S): at 05:34

## 2019-07-28 RX ADMIN — Medication 81 MILLIGRAM(S): at 11:52

## 2019-07-28 RX ADMIN — SENNA PLUS 2 TABLET(S): 8.6 TABLET ORAL at 21:20

## 2019-07-28 RX ADMIN — AMLODIPINE BESYLATE 10 MILLIGRAM(S): 2.5 TABLET ORAL at 05:34

## 2019-07-28 RX ADMIN — GABAPENTIN 300 MILLIGRAM(S): 400 CAPSULE ORAL at 13:20

## 2019-07-28 RX ADMIN — Medication 100 MILLIGRAM(S): at 17:54

## 2019-07-28 RX ADMIN — ENOXAPARIN SODIUM 80 MILLIGRAM(S): 100 INJECTION SUBCUTANEOUS at 05:34

## 2019-07-28 RX ADMIN — Medication 1: at 16:55

## 2019-07-28 RX ADMIN — TAMSULOSIN HYDROCHLORIDE 0.4 MILLIGRAM(S): 0.4 CAPSULE ORAL at 21:20

## 2019-07-28 RX ADMIN — GABAPENTIN 300 MILLIGRAM(S): 400 CAPSULE ORAL at 05:34

## 2019-07-28 RX ADMIN — CEFEPIME 100 MILLIGRAM(S): 1 INJECTION, POWDER, FOR SOLUTION INTRAMUSCULAR; INTRAVENOUS at 13:20

## 2019-07-28 RX ADMIN — CEFEPIME 100 MILLIGRAM(S): 1 INJECTION, POWDER, FOR SOLUTION INTRAMUSCULAR; INTRAVENOUS at 21:20

## 2019-07-28 RX ADMIN — CEFEPIME 100 MILLIGRAM(S): 1 INJECTION, POWDER, FOR SOLUTION INTRAMUSCULAR; INTRAVENOUS at 05:35

## 2019-07-28 RX ADMIN — Medication 1 TABLET(S): at 11:52

## 2019-07-28 RX ADMIN — ENOXAPARIN SODIUM 80 MILLIGRAM(S): 100 INJECTION SUBCUTANEOUS at 17:54

## 2019-07-28 RX ADMIN — SIMVASTATIN 20 MILLIGRAM(S): 20 TABLET, FILM COATED ORAL at 21:21

## 2019-07-28 RX ADMIN — GABAPENTIN 300 MILLIGRAM(S): 400 CAPSULE ORAL at 21:20

## 2019-07-28 NOTE — PROGRESS NOTE ADULT - SUBJECTIVE AND OBJECTIVE BOX
HPI/OVERNIGHT EVENTS:  No acute overnight events. Patient denies pain, fever, chest pain, shortness of breath or any new or concerning symptoms.     MEDICATIONS  (STANDING):  amLODIPine   Tablet 10 milliGRAM(s) Oral daily  aspirin enteric coated 81 milliGRAM(s) Oral daily  cefepime   IVPB 2000 milliGRAM(s) IV Intermittent every 8 hours  docusate sodium 100 milliGRAM(s) Oral two times a day  enoxaparin Injectable 80 milliGRAM(s) SubCutaneous two times a day  gabapentin 300 milliGRAM(s) Oral three times a day  insulin lispro (HumaLOG) corrective regimen sliding scale   SubCutaneous Before meals and at bedtime  multivitamin 1 Tablet(s) Oral daily  senna 2 Tablet(s) Oral at bedtime  simvastatin 20 milliGRAM(s) Oral at bedtime  tamsulosin 0.4 milliGRAM(s) Oral at bedtime    MEDICATIONS  (PRN):  acetaminophen   Tablet .. 650 milliGRAM(s) Oral every 6 hours PRN Temp greater or equal to 38C (100.4F), Mild Pain (1 - 3)  ondansetron Injectable 4 milliGRAM(s) IV Push once PRN Nausea and/or Vomiting  oxyCODONE    IR 5 milliGRAM(s) Oral every 4 hours PRN Moderate Pain (4 - 6)  oxyCODONE    IR 10 milliGRAM(s) Oral every 4 hours PRN Severe Pain (7 - 10)      Vital Signs Last 24 Hrs  T(C): 37.7 (27 Jul 2019 23:21), Max: 37.9 (27 Jul 2019 15:59)  T(F): 99.8 (27 Jul 2019 23:21), Max: 100.3 (27 Jul 2019 15:59)  HR: 91 (27 Jul 2019 23:21) (82 - 100)  BP: 123/72 (27 Jul 2019 23:21) (99/55 - 123/72)  BP(mean): --  RR: 18 (27 Jul 2019 23:21) (18 - 18)  SpO2: 100% (27 Jul 2019 23:21) (99% - 100%)    Constitutional: patient resting comfortably in bed, in no acute distress  HEENT: EOMI / PERRL b/l, no active drainage or redness  Neck: No JVD, full ROM without pain  Respiratory: respirations are unlabored, no accessory muscle use, no conversational dyspnea  Cardiovascular: regular rate & rhythm  Gastrointestinal: Abdomen soft, non-tender, non-distended, no rebound tenderness / guarding  Neurological: GCS: 15 (4/5/6). A&O x 3; no gross sensory / motor / coordination deficits  Psychiatric: Normal mood, normal affect  Musculoskeletal: No joint pain, swelling or deformity; no limitation of movement      I&O's Detail    26 Jul 2019 07:01  -  27 Jul 2019 07:00  --------------------------------------------------------  IN:  Total IN: 0 mL    OUT:    Indwelling Catheter - Urethral: 1400 mL  Total OUT: 1400 mL    Total NET: -1400 mL          LABS:                        8.4    7.34  )-----------( 395      ( 26 Jul 2019 09:55 )             28.5     07-26    136  |  102  |  16.0  ----------------------------<  121<H>  5.0   |  21.0<L>  |  0.67    Ca    8.9      26 Jul 2019 09:55  Phos  3.1     07-26  Mg     2.2     07-26 HPI/OVERNIGHT EVENTS:  No acute overnight events. Patient denies pain, fever, chest pain, shortness of breath or any new or concerning symptoms.     MEDICATIONS  (STANDING):  amLODIPine   Tablet 10 milliGRAM(s) Oral daily  aspirin enteric coated 81 milliGRAM(s) Oral daily  cefepime   IVPB 2000 milliGRAM(s) IV Intermittent every 8 hours  docusate sodium 100 milliGRAM(s) Oral two times a day  enoxaparin Injectable 80 milliGRAM(s) SubCutaneous two times a day  gabapentin 300 milliGRAM(s) Oral three times a day  insulin lispro (HumaLOG) corrective regimen sliding scale   SubCutaneous Before meals and at bedtime  multivitamin 1 Tablet(s) Oral daily  senna 2 Tablet(s) Oral at bedtime  simvastatin 20 milliGRAM(s) Oral at bedtime  tamsulosin 0.4 milliGRAM(s) Oral at bedtime    MEDICATIONS  (PRN):  acetaminophen   Tablet .. 650 milliGRAM(s) Oral every 6 hours PRN Temp greater or equal to 38C (100.4F), Mild Pain (1 - 3)  ondansetron Injectable 4 milliGRAM(s) IV Push once PRN Nausea and/or Vomiting  oxyCODONE    IR 5 milliGRAM(s) Oral every 4 hours PRN Moderate Pain (4 - 6)  oxyCODONE    IR 10 milliGRAM(s) Oral every 4 hours PRN Severe Pain (7 - 10)      Vital Signs Last 24 Hrs  T(C): 37.7 (27 Jul 2019 23:21), Max: 37.9 (27 Jul 2019 15:59)  T(F): 99.8 (27 Jul 2019 23:21), Max: 100.3 (27 Jul 2019 15:59)  HR: 91 (27 Jul 2019 23:21) (82 - 100)  BP: 123/72 (27 Jul 2019 23:21) (99/55 - 123/72)  BP(mean): --  RR: 18 (27 Jul 2019 23:21) (18 - 18)  SpO2: 100% (27 Jul 2019 23:21) (99% - 100%)    Constitutional: patient resting comfortably in bed, in no acute distress  HEENT: EOMI / PERRL b/l, no active drainage or redness  Neck: No JVD, full ROM without pain  Respiratory: respirations are unlabored, no accessory muscle use, no conversational dyspnea  Cardiovascular: regular rate & rhythm. Warm and well perfused.  Gastrointestinal: Abdomen soft, non-tender, non-distended, no rebound tenderness / guarding  Neurological: GCS: 15 (4/5/6). A&O x 3; no gross sensory / motor / coordination deficits  Psychiatric: Normal mood, normal affect  Musculoskeletal: No joint pain, swelling or deformity; no limitation of movement  Ext: wound vac replaced. integra site intact with bleeding and granulation tissue. Graft site with some fibrinous scarring and small areas of bleeding. Donor site healing well with some scabbing. Some dependent edema in R ankle. Dressing changed with xeroform underneath black foam for wound vac. ABD placed on graft site. Xeroform and ace bandage from foot to knee.     I&O's Detail    26 Jul 2019 07:01  -  27 Jul 2019 07:00  --------------------------------------------------------  IN:  Total IN: 0 mL    OUT:    Indwelling Catheter - Urethral: 1400 mL  Total OUT: 1400 mL    Total NET: -1400 mL          LABS:                        8.4    7.34  )-----------( 395      ( 26 Jul 2019 09:55 )             28.5     07-26    136  |  102  |  16.0  ----------------------------<  121<H>  5.0   |  21.0<L>  |  0.67    Ca    8.9      26 Jul 2019 09:55  Phos  3.1     07-26  Mg     2.2     07-26

## 2019-07-28 NOTE — PROGRESS NOTE ADULT - SUBJECTIVE AND OBJECTIVE BOX
INTERVAL HPI/OVERNIGHT EVENTS: No acute events.  remains afebrile. Patient is stable: tolerating diet, voiding via villegas, , pain well controlled.     MEDICATIONS  (STANDING):  amLODIPine   Tablet 10 milliGRAM(s) Oral daily  aspirin enteric coated 81 milliGRAM(s) Oral daily  cefepime   IVPB 2000 milliGRAM(s) IV Intermittent every 8 hours  docusate sodium 100 milliGRAM(s) Oral two times a day  enoxaparin Injectable 80 milliGRAM(s) SubCutaneous two times a day  gabapentin 300 milliGRAM(s) Oral three times a day  insulin lispro (HumaLOG) corrective regimen sliding scale   SubCutaneous Before meals and at bedtime  multivitamin 1 Tablet(s) Oral daily  senna 2 Tablet(s) Oral at bedtime  simvastatin 20 milliGRAM(s) Oral at bedtime  tamsulosin 0.4 milliGRAM(s) Oral at bedtime    MEDICATIONS  (PRN):  acetaminophen   Tablet .. 650 milliGRAM(s) Oral every 6 hours PRN Temp greater or equal to 38C (100.4F), Mild Pain (1 - 3)  ondansetron Injectable 4 milliGRAM(s) IV Push once PRN Nausea and/or Vomiting  oxyCODONE    IR 5 milliGRAM(s) Oral every 4 hours PRN Moderate Pain (4 - 6)  oxyCODONE    IR 10 milliGRAM(s) Oral every 4 hours PRN Severe Pain (7 - 10)      Vital Signs Last 24 Hrs  T(C): 37.7 (27 Jul 2019 23:21), Max: 37.9 (27 Jul 2019 15:59)  T(F): 99.8 (27 Jul 2019 23:21), Max: 100.3 (27 Jul 2019 15:59)  HR: 91 (27 Jul 2019 23:21) (82 - 100)  BP: 123/72 (27 Jul 2019 23:21) (99/55 - 123/72)  BP(mean): --  RR: 18 (27 Jul 2019 23:21) (18 - 18)  SpO2: 100% (27 Jul 2019 23:21) (99% - 100%)    Constitutional: NAD, well-groomed, well-developed  HEENT: PERRLA, EOMI, no drainage or redness  Respiratory: Breath Sounds equal & clear to percussion & auscultation, no accessory muscle use  Cardiovascular: Regular rate & rhythm, normal S1, S2; no murmurs, gallops or rubs; no S3, S4  Gastrointestinal: Soft, non-tender, no hepatosplenomegaly, normal bowel sounds  Extremities: No peripheral edema, No cyanosis, clubbing. LLE with extension brace in place.  RLE with posterior flexion brace in place  Vascular: Equal and normal pulses: 2+ peripheral pulses throughout  Neurological: GCS:15 A&O x 3; no sensory, motor or coordination deficits, normal reflexes  Psychiatric: Normal mood, normal affect  Musculoskeletal: No joint pain, swelling or deformity; no limitation of movement  Skin: No rashes      I&O's Detail    26 Jul 2019 07:01  -  27 Jul 2019 07:00  --------------------------------------------------------  IN:  Total IN: 0 mL    OUT:    Indwelling Catheter - Urethral: 1400 mL  Total OUT: 1400 mL    Total NET: -1400 mL          LABS:                        8.4    7.34  )-----------( 395      ( 26 Jul 2019 09:55 )             28.5     07-26    136  |  102  |  16.0  ----------------------------<  121<H>  5.0   |  21.0<L>  |  0.67    Ca    8.9      26 Jul 2019 09:55  Phos  3.1     07-26  Mg     2.2     07-26            RADIOLOGY & ADDITIONAL STUDIES:

## 2019-07-28 NOTE — PROGRESS NOTE ADULT - ASSESSMENT
69yo male s/p Forklift crush injury   1) Displaced L Tibial Plateau fx s/p ORIF 6/26/19  2) Posterior Knee Laceration with transection of Popliteal Artery s/p L Greater Saphenous Vein Super to Inferior Fem-Pop Bypass and Four Compartment Fasciotomy 6/25/19  3) Common Peroneal and Tibial Nerve injuries s/p Nerve Repair and Grafting 7/3/19  4) SERENITY 2/2 obstructive uropathy/rhabdomyolysis now resolved  5) Urinary Retention (Min in place, urology following)  6) RLE DVT on Therapeutic Lovenox  7) Hyponatremia (resolved)  Subsequently developed 8) R Knee Septic Arthritis s/o Athrocentesis 7/12/19 and 7/13/19 and subsequent R knee debridement and arthroscopic irrigation on 7/14/19, 8 week course of cefepime (7/15-8/12/19) appreciate ID recommendations and invovement    R knee wound s/p debridement, washout and integra graft placement 7/23/19, plastic surgery team managing site, appreciate involvement    Clinically stable and doing well.    Continue Therapeutic Lovenox, ASA  Diabetic Diet with Glucerna as tolerated  FSBG, ISS  Cefepime abx therapy via PICC line  OOB with PT, NWB LLE, WBAT RLE  Min catheter to remain in place until mobile as per urology    Dispo to [facility], pending wound vac therapy of graft site. Reassessment Monday 7/29 and Tuesday 7/30 by plastics surgery and further recommendations to follow.

## 2019-07-29 LAB
ANION GAP SERPL CALC-SCNC: 7 MMOL/L — SIGNIFICANT CHANGE UP (ref 5–17)
BASOPHILS # BLD AUTO: 0.03 K/UL — SIGNIFICANT CHANGE UP (ref 0–0.2)
BASOPHILS NFR BLD AUTO: 0.5 % — SIGNIFICANT CHANGE UP (ref 0–2)
BUN SERPL-MCNC: 11 MG/DL — SIGNIFICANT CHANGE UP (ref 8–20)
CALCIUM SERPL-MCNC: 9.1 MG/DL — SIGNIFICANT CHANGE UP (ref 8.6–10.2)
CHLORIDE SERPL-SCNC: 104 MMOL/L — SIGNIFICANT CHANGE UP (ref 98–107)
CO2 SERPL-SCNC: 25 MMOL/L — SIGNIFICANT CHANGE UP (ref 22–29)
CREAT SERPL-MCNC: 0.66 MG/DL — SIGNIFICANT CHANGE UP (ref 0.5–1.3)
EOSINOPHIL # BLD AUTO: 0.07 K/UL — SIGNIFICANT CHANGE UP (ref 0–0.5)
EOSINOPHIL NFR BLD AUTO: 1.1 % — SIGNIFICANT CHANGE UP (ref 0–6)
GLUCOSE BLDC GLUCOMTR-MCNC: 101 MG/DL — HIGH (ref 70–99)
GLUCOSE BLDC GLUCOMTR-MCNC: 131 MG/DL — HIGH (ref 70–99)
GLUCOSE BLDC GLUCOMTR-MCNC: 154 MG/DL — HIGH (ref 70–99)
GLUCOSE BLDC GLUCOMTR-MCNC: 185 MG/DL — HIGH (ref 70–99)
GLUCOSE SERPL-MCNC: 123 MG/DL — HIGH (ref 70–115)
HCT VFR BLD CALC: 28.5 % — LOW (ref 39–50)
HGB BLD-MCNC: 8.5 G/DL — LOW (ref 13–17)
IMM GRANULOCYTES NFR BLD AUTO: 0.9 % — SIGNIFICANT CHANGE UP (ref 0–1.5)
LYMPHOCYTES # BLD AUTO: 2.26 K/UL — SIGNIFICANT CHANGE UP (ref 1–3.3)
LYMPHOCYTES # BLD AUTO: 35.6 % — SIGNIFICANT CHANGE UP (ref 13–44)
MAGNESIUM SERPL-MCNC: 2.1 MG/DL — SIGNIFICANT CHANGE UP (ref 1.8–2.6)
MCHC RBC-ENTMCNC: 25.6 PG — LOW (ref 27–34)
MCHC RBC-ENTMCNC: 29.8 GM/DL — LOW (ref 32–36)
MCV RBC AUTO: 85.8 FL — SIGNIFICANT CHANGE UP (ref 80–100)
MONOCYTES # BLD AUTO: 0.5 K/UL — SIGNIFICANT CHANGE UP (ref 0–0.9)
MONOCYTES NFR BLD AUTO: 7.9 % — SIGNIFICANT CHANGE UP (ref 2–14)
NEUTROPHILS # BLD AUTO: 3.42 K/UL — SIGNIFICANT CHANGE UP (ref 1.8–7.4)
NEUTROPHILS NFR BLD AUTO: 54 % — SIGNIFICANT CHANGE UP (ref 43–77)
PHOSPHATE SERPL-MCNC: 3.5 MG/DL — SIGNIFICANT CHANGE UP (ref 2.4–4.7)
PLATELET # BLD AUTO: 398 K/UL — SIGNIFICANT CHANGE UP (ref 150–400)
POTASSIUM SERPL-MCNC: 4.3 MMOL/L — SIGNIFICANT CHANGE UP (ref 3.5–5.3)
POTASSIUM SERPL-SCNC: 4.3 MMOL/L — SIGNIFICANT CHANGE UP (ref 3.5–5.3)
RBC # BLD: 3.32 M/UL — LOW (ref 4.2–5.8)
RBC # FLD: 17.2 % — HIGH (ref 10.3–14.5)
SODIUM SERPL-SCNC: 136 MMOL/L — SIGNIFICANT CHANGE UP (ref 135–145)
WBC # BLD: 6.34 K/UL — SIGNIFICANT CHANGE UP (ref 3.8–10.5)
WBC # FLD AUTO: 6.34 K/UL — SIGNIFICANT CHANGE UP (ref 3.8–10.5)

## 2019-07-29 RX ADMIN — ENOXAPARIN SODIUM 80 MILLIGRAM(S): 100 INJECTION SUBCUTANEOUS at 18:43

## 2019-07-29 RX ADMIN — Medication 1: at 13:20

## 2019-07-29 RX ADMIN — CEFEPIME 100 MILLIGRAM(S): 1 INJECTION, POWDER, FOR SOLUTION INTRAMUSCULAR; INTRAVENOUS at 21:31

## 2019-07-29 RX ADMIN — TAMSULOSIN HYDROCHLORIDE 0.4 MILLIGRAM(S): 0.4 CAPSULE ORAL at 21:31

## 2019-07-29 RX ADMIN — GABAPENTIN 300 MILLIGRAM(S): 400 CAPSULE ORAL at 21:31

## 2019-07-29 RX ADMIN — AMLODIPINE BESYLATE 10 MILLIGRAM(S): 2.5 TABLET ORAL at 05:27

## 2019-07-29 RX ADMIN — Medication 100 MILLIGRAM(S): at 18:43

## 2019-07-29 RX ADMIN — ENOXAPARIN SODIUM 80 MILLIGRAM(S): 100 INJECTION SUBCUTANEOUS at 05:27

## 2019-07-29 RX ADMIN — GABAPENTIN 300 MILLIGRAM(S): 400 CAPSULE ORAL at 13:19

## 2019-07-29 RX ADMIN — Medication 100 MILLIGRAM(S): at 05:28

## 2019-07-29 RX ADMIN — SIMVASTATIN 20 MILLIGRAM(S): 20 TABLET, FILM COATED ORAL at 21:31

## 2019-07-29 RX ADMIN — Medication 650 MILLIGRAM(S): at 18:43

## 2019-07-29 RX ADMIN — CEFEPIME 100 MILLIGRAM(S): 1 INJECTION, POWDER, FOR SOLUTION INTRAMUSCULAR; INTRAVENOUS at 05:27

## 2019-07-29 RX ADMIN — SENNA PLUS 2 TABLET(S): 8.6 TABLET ORAL at 21:31

## 2019-07-29 RX ADMIN — GABAPENTIN 300 MILLIGRAM(S): 400 CAPSULE ORAL at 05:27

## 2019-07-29 RX ADMIN — Medication 1 TABLET(S): at 13:19

## 2019-07-29 RX ADMIN — Medication 81 MILLIGRAM(S): at 13:19

## 2019-07-29 RX ADMIN — Medication 650 MILLIGRAM(S): at 20:33

## 2019-07-29 RX ADMIN — Medication 1: at 21:31

## 2019-07-29 RX ADMIN — CEFEPIME 100 MILLIGRAM(S): 1 INJECTION, POWDER, FOR SOLUTION INTRAMUSCULAR; INTRAVENOUS at 13:20

## 2019-07-29 NOTE — PHYSICAL THERAPY INITIAL EVALUATION ADULT - MUSCLE TONE ASSESSMENT, REHAB EVAL
bilateral upper extremities/left LE WFL at ankle. unable to assess full left LE due to hinged knee brace. right LE appears WFL for hip flex, knee flex. no active movement right ankle at this time and pain with passive movement. further assessment may be needed./normal
bilateral lower extremities/normal/bilateral upper extremities
normal
(UEs-see OT assessment). left LE tone appears WNL at left ankle and hip. testing wth knee flexion limited by joanne brace...right LE testing limited by pain. unable to passively flex hip/knee for tone assessment and no active right ankle df/pf.

## 2019-07-29 NOTE — PHYSICAL THERAPY INITIAL EVALUATION ADULT - CAPILLARY REFILL, LLE
+pedal pulse/less than/equal to 3 secs
less than/equal to 3 secs
unable to assess pedal pulse due to ace wraps(nurse, demian, made aware); capillary refill 3 seconds

## 2019-07-29 NOTE — PHYSICAL THERAPY INITIAL EVALUATION ADULT - LEVEL OF INDEPENDENCE: STAND/SIT, REHAB EVAL
maximum assist (25% patients effort)
unable to perform/not assessed due to safety concerns at this time. pt unable to sit at edge of bed
pt declined transfer assessment at this time due to pain

## 2019-07-29 NOTE — PHYSICAL THERAPY INITIAL EVALUATION ADULT - NEUROVASCULAR ASSESSMENT LLE
no numbness/no tingling
denies/warm/exam limited by joanne brace
no tingling/no numbness
c/o some numbness in left foot/left foot without discoloration; left LE exam limited by ace wraps and hinged knee brace/warm

## 2019-07-29 NOTE — PHYSICAL THERAPY INITIAL EVALUATION ADULT - ASR EQUIP NEEDS DISCH PT EVAL
rolling walker (5 inch wheels)
ongoing assessment
ongoing assessment.
sliding board/wheelchair/bedside commode

## 2019-07-29 NOTE — PHYSICAL THERAPY INITIAL EVALUATION ADULT - LEVEL OF INDEPENDENCE: SIT/STAND, REHAB EVAL
unable to perform/not assessed due to safety concerns at this time. pt unable to sit at edge of bed.
Pt was able to perform press ups with UEs in chair and clear buttocks from seat as well as reposition himself side to side and forward/back in chair Unable to stand 2*2 no strength in RLE, NWB on LLE and difficulty weightshifting on his feet 2*2 bracing./unable to perform
maximum assist (25% patients effort)
pt declined transfer assessment at this time due to pain

## 2019-07-29 NOTE — PHYSICAL THERAPY INITIAL EVALUATION ADULT - IMPAIRMENTS FOUND, PT EVAL
gait, locomotion, and balance/muscle strength
tone/gait, locomotion, and balance/ROM/muscle strength/sensory integrity
ROM/gait, locomotion, and balance/muscle strength

## 2019-07-29 NOTE — PHYSICAL THERAPY INITIAL EVALUATION ADULT - LEVEL OF INDEPENDENCE: SIT/SUPINE, REHAB EVAL
maximum assist (25% patients effort)
dependent (less than 25% patients effort)/maximum assist (25% patients effort)
maximum assist (25% patients effort)

## 2019-07-29 NOTE — PROGRESS NOTE ADULT - SUBJECTIVE AND OBJECTIVE BOX
INTERVAL HPI/OVERNIGHT EVENTS: No acute events. patient remains afebrile. pain is well controlled. Resting comfortably      MEDICATIONS  (STANDING):  amLODIPine   Tablet 10 milliGRAM(s) Oral daily  aspirin enteric coated 81 milliGRAM(s) Oral daily  cefepime   IVPB 2000 milliGRAM(s) IV Intermittent every 8 hours  docusate sodium 100 milliGRAM(s) Oral two times a day  enoxaparin Injectable 80 milliGRAM(s) SubCutaneous two times a day  gabapentin 300 milliGRAM(s) Oral three times a day  insulin lispro (HumaLOG) corrective regimen sliding scale   SubCutaneous Before meals and at bedtime  multivitamin 1 Tablet(s) Oral daily  senna 2 Tablet(s) Oral at bedtime  simvastatin 20 milliGRAM(s) Oral at bedtime  tamsulosin 0.4 milliGRAM(s) Oral at bedtime    MEDICATIONS  (PRN):  acetaminophen   Tablet .. 650 milliGRAM(s) Oral every 6 hours PRN Temp greater or equal to 38C (100.4F), Mild Pain (1 - 3)  ondansetron Injectable 4 milliGRAM(s) IV Push once PRN Nausea and/or Vomiting  oxyCODONE    IR 5 milliGRAM(s) Oral every 4 hours PRN Moderate Pain (4 - 6)  oxyCODONE    IR 10 milliGRAM(s) Oral every 4 hours PRN Severe Pain (7 - 10)      Vital Signs Last 24 Hrs  T(C): 37.6 (28 Jul 2019 23:14), Max: 37.6 (28 Jul 2019 23:14)  T(F): 99.7 (28 Jul 2019 23:14), Max: 99.7 (28 Jul 2019 23:14)  HR: 92 (28 Jul 2019 23:14) (82 - 96)  BP: 127/72 (28 Jul 2019 23:14) (111/68 - 127/72)  BP(mean): --  RR: 18 (28 Jul 2019 23:14) (18 - 18)  SpO2: 98% (28 Jul 2019 23:14) (98% - 100%)    PE  Gen: a/ox3  Pulm: non labored. equal chest rise  CV: rrr  Abd: soft non tender   : villegas in place  Ext: wound vac c/d/i. good suction. no leak.      I&O's Detail    27 Jul 2019 07:01  -  28 Jul 2019 07:00  --------------------------------------------------------  IN:    Oral Fluid: 550 mL  Total IN: 550 mL    OUT:    Indwelling Catheter - Urethral: 2350 mL  Total OUT: 2350 mL    Total NET: -1800 mL      28 Jul 2019 07:01  -  29 Jul 2019 02:12  --------------------------------------------------------  IN:  Total IN: 0 mL    OUT:    Voided: 1650 mL  Total OUT: 1650 mL    Total NET: -1650 mL          LABS:                RADIOLOGY & ADDITIONAL STUDIES:

## 2019-07-29 NOTE — PHYSICAL THERAPY INITIAL EVALUATION ADULT - DID THE PATIENT HAVE SURGERY?
s/p Debridement of RLE lateral wound w/ placement of integra and wound vac/yes s/p Debridement of right LE lateral wound w/ placement of integra and wound vac/yes

## 2019-07-29 NOTE — PROGRESS NOTE ADULT - SUBJECTIVE AND OBJECTIVE BOX
SUBJECTIVE:  No acute events. Afebrile, vitals stable. Patient is tolerating diet, voiding via villegas, , pain well controlled. Denies nausea/vomiting/sob.     MEDICATIONS  (STANDING):  amLODIPine   Tablet 10 milliGRAM(s) Oral daily  aspirin enteric coated 81 milliGRAM(s) Oral daily  cefepime   IVPB 2000 milliGRAM(s) IV Intermittent every 8 hours  docusate sodium 100 milliGRAM(s) Oral two times a day  enoxaparin Injectable 80 milliGRAM(s) SubCutaneous two times a day  gabapentin 300 milliGRAM(s) Oral three times a day  insulin lispro (HumaLOG) corrective regimen sliding scale   SubCutaneous Before meals and at bedtime  multivitamin 1 Tablet(s) Oral daily  senna 2 Tablet(s) Oral at bedtime  simvastatin 20 milliGRAM(s) Oral at bedtime  tamsulosin 0.4 milliGRAM(s) Oral at bedtime    MEDICATIONS  (PRN):  acetaminophen   Tablet .. 650 milliGRAM(s) Oral every 6 hours PRN Temp greater or equal to 38C (100.4F), Mild Pain (1 - 3)  ondansetron Injectable 4 milliGRAM(s) IV Push once PRN Nausea and/or Vomiting  oxyCODONE    IR 5 milliGRAM(s) Oral every 4 hours PRN Moderate Pain (4 - 6)  oxyCODONE    IR 10 milliGRAM(s) Oral every 4 hours PRN Severe Pain (7 - 10)      Vital Signs Last 24 Hrs  T(C): 37.6 (28 Jul 2019 23:14), Max: 37.6 (28 Jul 2019 23:14)  T(F): 99.7 (28 Jul 2019 23:14), Max: 99.7 (28 Jul 2019 23:14)  HR: 92 (28 Jul 2019 23:14) (82 - 96)  BP: 127/72 (28 Jul 2019 23:14) (111/68 - 127/72)  BP(mean): --  RR: 18 (28 Jul 2019 23:14) (18 - 18)  SpO2: 98% (28 Jul 2019 23:14) (98% - 100%)    PE  Constitutional: NAD, well-groomed, well-developed  HEENT: PERRLA, EOMI, no drainage or redness  Respiratory: Breath Sounds equal & clear to percussion & auscultation, no accessory muscle use  Cardiovascular: Regular rate & rhythm, normal S1, S2; no murmurs, gallops or rubs; no S3, S4  Gastrointestinal: Soft, non-tender, no hepatosplenomegaly, normal bowel sounds  Extremities: No peripheral edema, No cyanosis, clubbing. LLE with extension brace in place.  RLE with posterior flexion brace in place  Vascular: Equal and normal pulses: 2+ peripheral pulses throughout  Neurological: GCS:15 A&O x 3; no sensory, motor or coordination deficits, normal reflexes  Psychiatric: Normal mood, normal affect  Musculoskeletal: No joint pain, swelling or deformity; no limitation of movement  Skin: No rashes      I&O's Detail    27 Jul 2019 07:01  -  28 Jul 2019 07:00  --------------------------------------------------------  IN:    Oral Fluid: 550 mL  Total IN: 550 mL    OUT:    Indwelling Catheter - Urethral: 2350 mL  Total OUT: 2350 mL    Total NET: -1800 mL      28 Jul 2019 07:01  -  29 Jul 2019 01:43  --------------------------------------------------------  IN:  Total IN: 0 mL    OUT:    Voided: 1650 mL  Total OUT: 1650 mL    Total NET: -1650 mL          LABS:                RADIOLOGY & ADDITIONAL STUDIES:

## 2019-07-29 NOTE — PHYSICAL THERAPY INITIAL EVALUATION ADULT - PERTINENT HX OF CURRENT PROBLEM, REHAB EVAL
67yo male s/p crush/impalement to right lower extremity by forklift in work related accident. Patient w/ pulseless RLE and in need of immediate revascularization as well as washout/possible closure of RLE laceration. Patient remains at high risk for loss of function and limb loss given patient already has reduced motor function and sensation in RLE. Patient also found to have possible Tibial plateau fracture.now s/p left tibial ORIF,right LE fasciotomy,nerve graft,skin graft,and arterial bypass.
68yMale BIBEMS on 06-25-19 by ground  activated as code B trauma. Patient was at work when he was reportedly crushed/impaled by forklift. pt with left tibial plateau fx s/p ORIF, right arterial injury s/p popliteal bypass and 4 comartment fasciotomy)
admitted on 6/25 2*2 forklift crush injury to his BLE at work. RLE right popliteal arterial injury and is s/p emergent bypass and 4 compartment fasciotomy with washout and packing on 6/25. + LLE tibial plateau fx, now s/p ORIF on 6/26, Peroneal nerve damage s/p repair/graft from tibial nerve on 7/3.
68m with forklift injury s/p excision and grafting of tibial/peroneal nerve injuries, s/p R knee washout after septic joint with GNR pseudomonas No evidence of active wound infection of RLE however; now s/p RLE debridement and VAC placement.

## 2019-07-29 NOTE — PHYSICAL THERAPY INITIAL EVALUATION ADULT - LEVEL OF INDEPENDENCE: BED TO CHAIR, REHAB EVAL
Per RN, pt was transferred prior to PT arrival via Pop over transfer, pt unable to maintain any weight on RLE
due to weakness, pain. Attempted squat pivot transfer but pt. unable to assist and unsafe at this time. Recommend vanesa for OOB at this time./unable to perform
pt declined transfer assessment at this time due to pain.
unable to perform/not assessed due to safety concerns at this time

## 2019-07-29 NOTE — PHYSICAL THERAPY INITIAL EVALUATION ADULT - CRITERIA FOR SKILLED THERAPEUTIC INTERVENTIONS
anticipated discharge recommendation/impairments found/rehab potential/functional limitations in following categories
functional limitations in following categories/impairments found/risk reduction/prevention/therapy frequency/anticipated equipment needs at discharge/anticipated discharge recommendation/rehab potential
therapy frequency/functional limitations in following categories/impairments found/risk reduction/prevention/anticipated discharge recommendation/rehab potential/anticipated equipment needs at discharge
impairments found/anticipated discharge recommendation/Acute vs CAT

## 2019-07-29 NOTE — PHYSICAL THERAPY INITIAL EVALUATION ADULT - FOLLOWS COMMANDS/ANSWERS QUESTIONS, REHAB EVAL
able to follow single-step instructions/100% of the time
100% of the time/able to follow single-step instructions
100% of the time/able to follow single-step instructions
100% of the time

## 2019-07-29 NOTE — PHYSICAL THERAPY INITIAL EVALUATION ADULT - PLANNED THERAPY INTERVENTIONS, PT EVAL
bed mobility training/transfer training/balance training/ROM/strengthening
balance training/neuromuscular re-education/ROM/transfer training/bed mobility training/strengthening
balance training/bed mobility training/gait training/ROM/transfer training
balance training/strengthening/transfer training/ROM/bed mobility training

## 2019-07-29 NOTE — PHYSICAL THERAPY INITIAL EVALUATION ADULT - ADDITIONAL COMMENTS
As per previous PT eval: pt states he lives with his wife and daughter in a 2-story house with 2 steps to enter (no rail) then ~6 to living area(right rail ascending). pt, wife,and daughter all work. no DME. pt was independent prior to admit.
pt states he lives with his wife and daughter in a 2-story house with 2 steps to enter (no rail) then ~6 to living area(right rail ascending). pt, wife,and daughter all work. no DME. pt was independent prior to admit.
pt states he lives with his wife and daughter in a 2-story house with 2 steps to enter (no rail) then ~6 to living area(right rail ascending). pt, wife,and daughter all work. no DME. pt was independent prior to admit.
Pt lives in a private home with his spouse and children. 2 steps to enter with handrails, 5 steps inside with handrails. Pt was independent PTA without assist device. Pt owns no DME

## 2019-07-29 NOTE — PHYSICAL THERAPY INITIAL EVALUATION ADULT - GROSSLY INTACT, SENSORY
(*UEs-see OT eval). left LE testing limited by joanne brace. pt reports +sensation left foot when provided with light touch. right LE testing limited by ace wrap. pt reports absent sensation right foot when provided with light touch.
see above
Grossly Intact/Left UE/Right UE/LE sensation testing limited by ace wraps hindering access to skin. further testing may be needed. see vascular notes for sensory status.

## 2019-07-29 NOTE — PHYSICAL THERAPY INITIAL EVALUATION ADULT - LEVEL OF INDEPENDENCE: SUPINE/SIT, REHAB EVAL
maximum assist (25% patients effort)/pt able to get ~ half way to sitting at edge of bed. limited by LE pain.
maximum assist (25% patients effort)
maximum assist (25% patients effort)

## 2019-07-29 NOTE — PROGRESS NOTE ADULT - ASSESSMENT
69yo male s/p Forklift crush injury   1) Displaced L Tibial Plateau fx s/p ORIF 6/26/19  2) Posterior Knee Laceration with transection of Popliteal Artery s/p L Greater Saphenous Vein Super to Inferior Fem-Pop Bypass and Four Compartment Fasciotomy 6/25/19  3) Common Peroneal and Tibial Nerve injuries s/p Nerve Repair and Grafting 7/3/19  4) SERENITY 2/2 obstructive uropathy/rhabdomyolysis now resolved  5) Urinary Retention (Min in place, urology following)  6) RLE DVT on Therapeutic Lovenox  7) Hyponatremia (resolved)  Subsequently developed 8) R Knee Septic Arthritis s/o Athrocentesis 7/12/19 and 7/13/19 and subsequent R knee debridement and arthroscopic irrigation on 7/14/19, 8 week course of cefepime (7/15-8/12/19) appreciate ID recommendations and involvement    R knee wound s/p debridement, washout and integra graft placement 7/23/19, plastic surgery team managing site, appreciate involvement    Clinically stable and doing well.    Continue Therapeutic Lovenox, ASA  Diabetic Diet with Glucerna as tolerated  FSBG, ISS  Cefepime abx therapy via PICC line  OOB with PT, NWB LLE, WBAT RLE  Min catheter to remain in place until mobile as per urology    Dispo to [facility], pending wound vac therapy of graft site. Reassessment Monday 7/29 and Tuesday 7/30 by plastics surgery and further recommendations to follow.

## 2019-07-29 NOTE — PHYSICAL THERAPY INITIAL EVALUATION ADULT - CAPILLARY REFILL, RLE
unable to assess pedal pulse due to ace wraps(nurse, demian, made aware); capillary refill 3 seconds
less than/equal to 3 secs/+pedal pulse
less than/equal to 3 secs

## 2019-07-29 NOTE — PHYSICAL THERAPY INITIAL EVALUATION ADULT - FUNCTIONAL LIMITATIONS, PT EVAL
home management/work/community/leisure/self-care
self-care/home management/work/community/leisure
home management/work/community/leisure/self-care

## 2019-07-29 NOTE — PROGRESS NOTE ADULT - SUBJECTIVE AND OBJECTIVE BOX
Pt is doing well at this point s/p BLE crush injury.    AVSS    VAC in place to right knee wound.  Integra adherent with good take, no cellulitis.    Foot warm, edema improving    A/P:  Ideally pt will continue the wound VAC for approx 3 more weeks until wound is ready for skin grafting  OK to d/c to rehab at anytime with VAC in place  Pt will need outpt f/u for surgical planning

## 2019-07-29 NOTE — PHYSICAL THERAPY INITIAL EVALUATION ADULT - PATELLAR REFLEX
unable to assess reflexes at this time due to ace wraps/dressings, pain with movement. and left hinged knee brace
N/A

## 2019-07-29 NOTE — PHYSICAL THERAPY INITIAL EVALUATION ADULT - RANGE OF MOTION EXAMINATION, REHAB EVAL
*UEs-see OT eval
[facility]
bilateral upper extremity ROM was WFL (within functional limits)/bilateral hips and left ankle WFL, bilateral knees not tested 2*2 bracing, Right ankle no Active ROM, Pt in PRAFO did not test PROM

## 2019-07-29 NOTE — PHYSICAL THERAPY INITIAL EVALUATION ADULT - LEVEL OF INDEPENDENCE: GAIT, REHAB EVAL
unable to perform
not assessed due to safety concerns at this time. pt unable to sit at edge of bed/unable to perform
unable to perform

## 2019-07-29 NOTE — PHYSICAL THERAPY INITIAL EVALUATION ADULT - NS ASR WT BEARING DETAIL RLE
weight-bearing as tolerated/with brace (PRAFO)
weight-bearing as tolerated
weight-bearing as tolerated

## 2019-07-29 NOTE — PHYSICAL THERAPY INITIAL EVALUATION ADULT - DIAGNOSIS, PT EVAL
decreased functional mobility
mobility dysfunction due to decreased strength and motor function in LEs
gait dysfunction due to bilateral LE injuries
functional limitations 2*2 multitrauma

## 2019-07-29 NOTE — PHYSICAL THERAPY INITIAL EVALUATION ADULT - ORIENTATION, REHAB EVAL
oriented to person, place, time and situation

## 2019-07-29 NOTE — PHYSICAL THERAPY INITIAL EVALUATION ADULT - PASSIVE RANGE OF MOTION EXAMINATION, REHAB EVAL
left knee NT due to hinged knee brace in place and c/o pain with movement. left hip flex ~60 degrees during supine to sit, left ankle df/pf WFL...right hip flex ~60 degrees in semi-torres position, knee flex ~40 degrees in semi-torres position, ankle df/pf WFL/bilateral upper extremity Passive ROM was WNL (within normal limits)
bilateral hip flex to 90 degrees in sitting...left knee in full extension, unable to examine passive left knee flexion due to joanne brace, left ankle df/pf WNL...right knee flexion to ~10 degrees(limited by pain), right knee in full extension, ankle df to neutral(limited by pain)
WFL with exception to right ankle and left knee N/A due to braces

## 2019-07-29 NOTE — PHYSICAL THERAPY INITIAL EVALUATION ADULT - MD ORDER
PT eval and treat. NWB left LE.
PT eval and treat. WBAT right LE. NWB left LE.
PT evaluate and treat
PT evaluation and treatment

## 2019-07-29 NOTE — PHYSICAL THERAPY INITIAL EVALUATION ADULT - NEUROVASCULAR ASSESSMENT RLE
sensation absent
exam limited by ace wrap/warm/absent sensation below ankle
no numbness/no tingling
warm/exam limited by ace wraps/dressings/unable to complete sensory exam due to pain after mobility assesment. will need further assessment

## 2019-07-29 NOTE — PHYSICAL THERAPY INITIAL EVALUATION ADULT - REFERRING PHYSICIAN, REHAB EVAL
CESAR stein(ordering); dr. gaytan(attending)
jose juan ren(ordering); dr. gaytan(attending)
Mark Good
Herve Olivas

## 2019-07-29 NOTE — PHYSICAL THERAPY INITIAL EVALUATION ADULT - GENERAL OBSERVATIONS, REHAB EVAL
awake in bed. +vac, + left LE joanne brace, +right LE PRAFO, +villegas. language line [place] creole , cyn(#[id number]) in use via ipad video throughout.
awake in bed. left LE with hinged knee brace in place. +villegas. +iv. Mexican creole  via ipad (jaymie #[id number]) in use throughout.
Pt received supine in bed +LLE joanne, + RLE knee immobilizer, PRAFO and ZAMZAM drain, + villegas. c/o 6/10 pain, pt agreeable to PT
Pt. greeted resting in bed +right LE PRAFO, left LE joanne brace, right LE wound vac, villegas catheter, IV lock, agreeable to PT

## 2019-07-29 NOTE — PHYSICAL THERAPY INITIAL EVALUATION ADULT - MANUAL MUSCLE TESTING RESULTS, REHAB EVAL
UEs WFL, right LE 3+/5 with exception to right ankle N/A in PRAFO, left LE 2-/ 5 linited by pain with exception to knee flex N/A due to joanne at 0 degrees

## 2019-07-30 LAB
GLUCOSE BLDC GLUCOMTR-MCNC: 106 MG/DL — HIGH (ref 70–99)
GLUCOSE BLDC GLUCOMTR-MCNC: 118 MG/DL — HIGH (ref 70–99)
GLUCOSE BLDC GLUCOMTR-MCNC: 169 MG/DL — HIGH (ref 70–99)
GLUCOSE BLDC GLUCOMTR-MCNC: 222 MG/DL — HIGH (ref 70–99)

## 2019-07-30 PROCEDURE — 99232 SBSQ HOSP IP/OBS MODERATE 35: CPT

## 2019-07-30 RX ADMIN — Medication 1: at 21:24

## 2019-07-30 RX ADMIN — Medication 650 MILLIGRAM(S): at 17:16

## 2019-07-30 RX ADMIN — Medication 100 MILLIGRAM(S): at 05:20

## 2019-07-30 RX ADMIN — CEFEPIME 100 MILLIGRAM(S): 1 INJECTION, POWDER, FOR SOLUTION INTRAMUSCULAR; INTRAVENOUS at 21:25

## 2019-07-30 RX ADMIN — GABAPENTIN 300 MILLIGRAM(S): 400 CAPSULE ORAL at 21:24

## 2019-07-30 RX ADMIN — ENOXAPARIN SODIUM 80 MILLIGRAM(S): 100 INJECTION SUBCUTANEOUS at 17:16

## 2019-07-30 RX ADMIN — Medication 1 TABLET(S): at 12:30

## 2019-07-30 RX ADMIN — SENNA PLUS 2 TABLET(S): 8.6 TABLET ORAL at 21:24

## 2019-07-30 RX ADMIN — AMLODIPINE BESYLATE 10 MILLIGRAM(S): 2.5 TABLET ORAL at 05:20

## 2019-07-30 RX ADMIN — Medication 650 MILLIGRAM(S): at 18:16

## 2019-07-30 RX ADMIN — SIMVASTATIN 20 MILLIGRAM(S): 20 TABLET, FILM COATED ORAL at 21:24

## 2019-07-30 RX ADMIN — ENOXAPARIN SODIUM 80 MILLIGRAM(S): 100 INJECTION SUBCUTANEOUS at 05:20

## 2019-07-30 RX ADMIN — Medication 81 MILLIGRAM(S): at 12:30

## 2019-07-30 RX ADMIN — Medication 2: at 12:29

## 2019-07-30 RX ADMIN — CEFEPIME 100 MILLIGRAM(S): 1 INJECTION, POWDER, FOR SOLUTION INTRAMUSCULAR; INTRAVENOUS at 14:57

## 2019-07-30 RX ADMIN — Medication 100 MILLIGRAM(S): at 17:16

## 2019-07-30 RX ADMIN — TAMSULOSIN HYDROCHLORIDE 0.4 MILLIGRAM(S): 0.4 CAPSULE ORAL at 21:24

## 2019-07-30 RX ADMIN — GABAPENTIN 300 MILLIGRAM(S): 400 CAPSULE ORAL at 05:20

## 2019-07-30 RX ADMIN — CEFEPIME 100 MILLIGRAM(S): 1 INJECTION, POWDER, FOR SOLUTION INTRAMUSCULAR; INTRAVENOUS at 05:20

## 2019-07-30 RX ADMIN — GABAPENTIN 300 MILLIGRAM(S): 400 CAPSULE ORAL at 14:57

## 2019-07-30 NOTE — CHART NOTE - NSCHARTNOTEFT_GEN_A_CORE
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 Spoke with pt and family members at bedside. Pt reports good appetite/po intake at all meals, consuming >75%. Family confirms pt eating well, and drinking Glucerna TID.     PO intake:    %  [x ]     Source for PO intake [x ] Patient [x ] family [x ] chart     Current Weight: Could not obtain current wt d/t pt OOB   (7/22) 70kg  (7/4) 78kg         Pertinent Medications: MEDICATIONS  (STANDING):  amLODIPine   Tablet 10 milliGRAM(s) Oral daily  aspirin enteric coated 81 milliGRAM(s) Oral daily  cefepime   IVPB 2000 milliGRAM(s) IV Intermittent every 8 hours  docusate sodium 100 milliGRAM(s) Oral two times a day  enoxaparin Injectable 80 milliGRAM(s) SubCutaneous two times a day  gabapentin 300 milliGRAM(s) Oral three times a day  insulin lispro (HumaLOG) corrective regimen sliding scale   SubCutaneous Before meals and at bedtime  multivitamin 1 Tablet(s) Oral daily  senna 2 Tablet(s) Oral at bedtime  simvastatin 20 milliGRAM(s) Oral at bedtime  tamsulosin 0.4 milliGRAM(s) Oral at bedtime    MEDICATIONS  (PRN):  acetaminophen   Tablet .. 650 milliGRAM(s) Oral every 6 hours PRN Temp greater or equal to 38C (100.4F), Mild Pain (1 - 3)  ondansetron Injectable 4 milliGRAM(s) IV Push once PRN Nausea and/or Vomiting  oxyCODONE    IR 5 milliGRAM(s) Oral every 4 hours PRN Moderate Pain (4 - 6)  oxyCODONE    IR 10 milliGRAM(s) Oral every 4 hours PRN Severe Pain (7 - 10)    Pertinent Labs: CBC Full  -  ( 29 Jul 2019 07:59 )  WBC Count : 6.34 K/uL  RBC Count : 3.32 M/uL  Hemoglobin : 8.5 g/dL  Hematocrit : 28.5 %  Platelet Count - Automated : 398 K/uL  Mean Cell Volume : 85.8 fl  Mean Cell Hemoglobin : 25.6 pg  Mean Cell Hemoglobin Concentration : 29.8 gm/dL  Auto Neutrophil # : 3.42 K/uL  Auto Lymphocyte # : 2.26 K/uL  Auto Monocyte # : 0.50 K/uL  Auto Eosinophil # : 0.07 K/uL  Auto Basophil # : 0.03 K/uL  Auto Neutrophil % : 54.0 %  Auto Lymphocyte % : 35.6 %  Auto Monocyte % : 7.9 %  Auto Eosinophil % : 1.1 %  Auto Basophil % : 0.5 %    Skin: Surgical incision to right leg, left leg, and right lateral thigh per documentation     Nutrition focused physical exam previously conducted - found signs of malnutrition [x]absent [ ]present    Subcutaneous fat loss: [ ] Orbital fat pads region, [ ]Buccal fat region, [ ]Triceps region,  [ ]Ribs region    Muscle wasting: [ ]Temples region, [ ]Clavicle region, [ ]Shoulder region, [ ]Scapula region, [ ]Interosseous region,  [ ]thigh region, [ ]Calf region      Estimated Needs:   [x ] no change since previous assessment      Current Nutrition Diagnosis: Pt remains at nutrition risk secondary to increased nutrient needs related to increased estimated nutrient needs to facilitate healing as evidenced by large laceration in right leg requiring popliteal bypass and repair and left tibial plateau fracture s/p ORIF, now with right knee septic arthritis.      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 Spoke with pt and family members at bedside. Pt reports good appetite/po intake at all meals, consuming >75%. Family confirms pt eating well, and drinking Glucerna TID. Ha1c 5.9 (6/26/19)    PO intake:    %  [x ]     Source for PO intake [x ] Patient [x ] family [x ] chart     Current Weight: Could not obtain current wt d/t pt OOB   (7/22) 70kg  (7/4) 78kg         Pertinent Medications: MEDICATIONS  (STANDING):  amLODIPine   Tablet 10 milliGRAM(s) Oral daily  aspirin enteric coated 81 milliGRAM(s) Oral daily  cefepime   IVPB 2000 milliGRAM(s) IV Intermittent every 8 hours  docusate sodium 100 milliGRAM(s) Oral two times a day  enoxaparin Injectable 80 milliGRAM(s) SubCutaneous two times a day  gabapentin 300 milliGRAM(s) Oral three times a day  insulin lispro (HumaLOG) corrective regimen sliding scale   SubCutaneous Before meals and at bedtime  multivitamin 1 Tablet(s) Oral daily  senna 2 Tablet(s) Oral at bedtime  simvastatin 20 milliGRAM(s) Oral at bedtime  tamsulosin 0.4 milliGRAM(s) Oral at bedtime    MEDICATIONS  (PRN):  acetaminophen   Tablet .. 650 milliGRAM(s) Oral every 6 hours PRN Temp greater or equal to 38C (100.4F), Mild Pain (1 - 3)  ondansetron Injectable 4 milliGRAM(s) IV Push once PRN Nausea and/or Vomiting  oxyCODONE    IR 5 milliGRAM(s) Oral every 4 hours PRN Moderate Pain (4 - 6)  oxyCODONE    IR 10 milliGRAM(s) Oral every 4 hours PRN Severe Pain (7 - 10)    Pertinent Labs: CBC Full  -  ( 29 Jul 2019 07:59 )  WBC Count : 6.34 K/uL  RBC Count : 3.32 M/uL  Hemoglobin : 8.5 g/dL  Hematocrit : 28.5 %  Platelet Count - Automated : 398 K/uL  Mean Cell Volume : 85.8 fl  Mean Cell Hemoglobin : 25.6 pg  Mean Cell Hemoglobin Concentration : 29.8 gm/dL  Auto Neutrophil # : 3.42 K/uL  Auto Lymphocyte # : 2.26 K/uL  Auto Monocyte # : 0.50 K/uL  Auto Eosinophil # : 0.07 K/uL  Auto Basophil # : 0.03 K/uL  Auto Neutrophil % : 54.0 %  Auto Lymphocyte % : 35.6 %  Auto Monocyte % : 7.9 %  Auto Eosinophil % : 1.1 %  Auto Basophil % : 0.5 %        Skin: Surgical incision to right leg, left leg, and right lateral thigh per documentation     Nutrition focused physical exam previously conducted - found signs of malnutrition [x]absent [ ]present    Subcutaneous fat loss: [ ] Orbital fat pads region, [ ]Buccal fat region, [ ]Triceps region,  [ ]Ribs region    Muscle wasting: [ ]Temples region, [ ]Clavicle region, [ ]Shoulder region, [ ]Scapula region, [ ]Interosseous region,  [ ]thigh region, [ ]Calf region      Estimated Needs:   [x ] no change since previous assessment      Current Nutrition Diagnosis: Pt remains at nutrition risk secondary to increased nutrient needs related to increased estimated nutrient needs to facilitate healing as evidenced by large laceration in right leg requiring popliteal bypass and repair and left tibial plateau fracture s/p ORIF, now with right knee septic arthritis.      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 Spoke with pt and family members at bedside. Pt reports good appetite/po intake at all meals, consuming >75%. Family confirms pt eating well, and drinking Glucerna TID. Ha1c 5.9 (6/26/19)    PO intake:    %  [x ]     Source for PO intake [x ] Patient [x ] family [x ] chart     Current Weight: Could not obtain current wt d/t pt OOB   (7/22) 70kg  (7/4) 78kg     ? accuracy of wts. Will continue to monitor.         Pertinent Medications: MEDICATIONS  (STANDING):  amLODIPine   Tablet 10 milliGRAM(s) Oral daily  aspirin enteric coated 81 milliGRAM(s) Oral daily  cefepime   IVPB 2000 milliGRAM(s) IV Intermittent every 8 hours  docusate sodium 100 milliGRAM(s) Oral two times a day  enoxaparin Injectable 80 milliGRAM(s) SubCutaneous two times a day  gabapentin 300 milliGRAM(s) Oral three times a day  insulin lispro (HumaLOG) corrective regimen sliding scale   SubCutaneous Before meals and at bedtime  multivitamin 1 Tablet(s) Oral daily  senna 2 Tablet(s) Oral at bedtime  simvastatin 20 milliGRAM(s) Oral at bedtime  tamsulosin 0.4 milliGRAM(s) Oral at bedtime    MEDICATIONS  (PRN):  acetaminophen   Tablet .. 650 milliGRAM(s) Oral every 6 hours PRN Temp greater or equal to 38C (100.4F), Mild Pain (1 - 3)  ondansetron Injectable 4 milliGRAM(s) IV Push once PRN Nausea and/or Vomiting  oxyCODONE    IR 5 milliGRAM(s) Oral every 4 hours PRN Moderate Pain (4 - 6)  oxyCODONE    IR 10 milliGRAM(s) Oral every 4 hours PRN Severe Pain (7 - 10)    Pertinent Labs: CBC Full  -  ( 29 Jul 2019 07:59 )  WBC Count : 6.34 K/uL  RBC Count : 3.32 M/uL  Hemoglobin : 8.5 g/dL  Hematocrit : 28.5 %  Platelet Count - Automated : 398 K/uL  Mean Cell Volume : 85.8 fl  Mean Cell Hemoglobin : 25.6 pg  Mean Cell Hemoglobin Concentration : 29.8 gm/dL  Auto Neutrophil # : 3.42 K/uL  Auto Lymphocyte # : 2.26 K/uL  Auto Monocyte # : 0.50 K/uL  Auto Eosinophil # : 0.07 K/uL  Auto Basophil # : 0.03 K/uL  Auto Neutrophil % : 54.0 %  Auto Lymphocyte % : 35.6 %  Auto Monocyte % : 7.9 %  Auto Eosinophil % : 1.1 %  Auto Basophil % : 0.5 %        Skin: Surgical incision to right leg, left leg, and right lateral thigh per documentation     Nutrition focused physical exam previously conducted - found signs of malnutrition [x]absent [ ]present    Subcutaneous fat loss: [ ] Orbital fat pads region, [ ]Buccal fat region, [ ]Triceps region,  [ ]Ribs region    Muscle wasting: [ ]Temples region, [ ]Clavicle region, [ ]Shoulder region, [ ]Scapula region, [ ]Interosseous region,  [ ]thigh region, [ ]Calf region      Estimated Needs:   [x ] no change since previous assessment      Current Nutrition Diagnosis: Pt remains at nutrition risk secondary to increased nutrient needs related to increased estimated nutrient needs to facilitate healing as evidenced by large laceration in right leg requiring popliteal bypass and repair and left tibial plateau fracture s/p ORIF, now with right knee septic arthritis.      Recommendations:   Continue Glucerna TID  Please obtain current wt   Monitor Po intake     Monitoring and Evaluation:   [x ] PO intake [ x] Tolerance to diet prescription [X] Weights  [X] Follow up per protocol [X] Labs:

## 2019-07-30 NOTE — PROGRESS NOTE ADULT - SUBJECTIVE AND OBJECTIVE BOX
Patient seen and examined doing well. No acute events tolerating villegas    Vital Signs Last 24 Hrs  T(C): 37.1 (29 Jul 2019 23:09), Max: 38.3 (29 Jul 2019 18:48)  T(F): 98.8 (29 Jul 2019 23:09), Max: 100.9 (29 Jul 2019 18:48)  HR: 98 (30 Jul 2019 05:19) (92 - 98)  BP: 116/67 (30 Jul 2019 05:19) (107/63 - 128/78)  BP(mean): --  RR: 18 (29 Jul 2019 23:09) (18 - 19)  SpO2: 100% (29 Jul 2019 23:09) (99% - 100%)    Gen: NAD  HEENT: NCAT  Abd: Soft  : Villegas draining clear urine                          8.5    6.34  )-----------( 398      ( 29 Jul 2019 07:59 )             28.5     07-29    136  |  104  |  11.0  ----------------------------<  123<H>  4.3   |  25.0  |  0.66    Ca    9.1      29 Jul 2019 07:59  Phos  3.5     07-29  Mg     2.1     07-29

## 2019-07-30 NOTE — PROGRESS NOTE ADULT - SUBJECTIVE AND OBJECTIVE BOX
HPI/OVERNIGHT EVENTS:    MEDICATIONS  (STANDING):  amLODIPine   Tablet 10 milliGRAM(s) Oral daily  aspirin enteric coated 81 milliGRAM(s) Oral daily  cefepime   IVPB 2000 milliGRAM(s) IV Intermittent every 8 hours  docusate sodium 100 milliGRAM(s) Oral two times a day  enoxaparin Injectable 80 milliGRAM(s) SubCutaneous two times a day  gabapentin 300 milliGRAM(s) Oral three times a day  insulin lispro (HumaLOG) corrective regimen sliding scale   SubCutaneous Before meals and at bedtime  multivitamin 1 Tablet(s) Oral daily  senna 2 Tablet(s) Oral at bedtime  simvastatin 20 milliGRAM(s) Oral at bedtime  tamsulosin 0.4 milliGRAM(s) Oral at bedtime    MEDICATIONS  (PRN):  acetaminophen   Tablet .. 650 milliGRAM(s) Oral every 6 hours PRN Temp greater or equal to 38C (100.4F), Mild Pain (1 - 3)  ondansetron Injectable 4 milliGRAM(s) IV Push once PRN Nausea and/or Vomiting  oxyCODONE    IR 5 milliGRAM(s) Oral every 4 hours PRN Moderate Pain (4 - 6)  oxyCODONE    IR 10 milliGRAM(s) Oral every 4 hours PRN Severe Pain (7 - 10)      Vital Signs Last 24 Hrs  T(C): 37.1 (29 Jul 2019 23:09), Max: 38.3 (29 Jul 2019 18:48)  T(F): 98.8 (29 Jul 2019 23:09), Max: 100.9 (29 Jul 2019 18:48)  HR: 93 (29 Jul 2019 23:09) (92 - 104)  BP: 107/63 (29 Jul 2019 23:09) (107/63 - 128/78)  BP(mean): --  RR: 18 (29 Jul 2019 23:09) (18 - 19)  SpO2: 100% (29 Jul 2019 23:09) (98% - 100%)    Constitutional: patient resting comfortably in bed, in no acute distress  HEENT: EOMI / PERRL b/l, no active drainage or redness  Neck: No JVD, full ROM without pain  Respiratory: respirations are unlabored, no accessory muscle use, no conversational dyspnea  Cardiovascular: regular rate & rhythm  Gastrointestinal: Abdomen soft, non-tender, non-distended, no rebound tenderness / guarding  Neurological: GCS: 15 (4/5/6). A&O x 3; no gross sensory / motor / coordination deficits  Psychiatric: Normal mood, normal affect  Musculoskeletal: No joint pain, swelling or deformity; no limitation of movement      I&O's Detail    28 Jul 2019 07:01  -  29 Jul 2019 07:00  --------------------------------------------------------  IN:  Total IN: 0 mL    OUT:    Indwelling Catheter - Urethral: 1900 mL    Voided: 1650 mL  Total OUT: 3550 mL    Total NET: -3550 mL      29 Jul 2019 07:01  -  30 Jul 2019 03:09  --------------------------------------------------------  IN:  Total IN: 0 mL    OUT:    Indwelling Catheter - Urethral: 2100 mL  Total OUT: 2100 mL    Total NET: -2100 mL          LABS:                        8.5    6.34  )-----------( 398      ( 29 Jul 2019 07:59 )             28.5     07-29    136  |  104  |  11.0  ----------------------------<  123<H>  4.3   |  25.0  |  0.66    Ca    9.1      29 Jul 2019 07:59  Phos  3.5     07-29  Mg     2.1     07-29 HPI/OVERNIGHT EVENTS:  Patient febrile overnight, Tmax to 100.9. Otherwise, no acute events. Patient denies headaches, nausea, vomiting chest pain or shortness of breath. VAC in place to right knee wound.      MEDICATIONS  (STANDING):  amLODIPine   Tablet 10 milliGRAM(s) Oral daily  aspirin enteric coated 81 milliGRAM(s) Oral daily  cefepime   IVPB 2000 milliGRAM(s) IV Intermittent every 8 hours  docusate sodium 100 milliGRAM(s) Oral two times a day  enoxaparin Injectable 80 milliGRAM(s) SubCutaneous two times a day  gabapentin 300 milliGRAM(s) Oral three times a day  insulin lispro (HumaLOG) corrective regimen sliding scale   SubCutaneous Before meals and at bedtime  multivitamin 1 Tablet(s) Oral daily  senna 2 Tablet(s) Oral at bedtime  simvastatin 20 milliGRAM(s) Oral at bedtime  tamsulosin 0.4 milliGRAM(s) Oral at bedtime    MEDICATIONS  (PRN):  acetaminophen   Tablet .. 650 milliGRAM(s) Oral every 6 hours PRN Temp greater or equal to 38C (100.4F), Mild Pain (1 - 3)  ondansetron Injectable 4 milliGRAM(s) IV Push once PRN Nausea and/or Vomiting  oxyCODONE    IR 5 milliGRAM(s) Oral every 4 hours PRN Moderate Pain (4 - 6)  oxyCODONE    IR 10 milliGRAM(s) Oral every 4 hours PRN Severe Pain (7 - 10)      Vital Signs Last 24 Hrs  T(C): 37.1 (29 Jul 2019 23:09), Max: 38.3 (29 Jul 2019 18:48)  T(F): 98.8 (29 Jul 2019 23:09), Max: 100.9 (29 Jul 2019 18:48)  HR: 93 (29 Jul 2019 23:09) (92 - 104)  BP: 107/63 (29 Jul 2019 23:09) (107/63 - 128/78)  BP(mean): --  RR: 18 (29 Jul 2019 23:09) (18 - 19)  SpO2: 100% (29 Jul 2019 23:09) (98% - 100%)    Constitutional: patient resting comfortably in bed, in no acute distress  HEENT: EOMI / PERRL b/l, no active drainage or redness  Neck: No JVD, full ROM without pain  Respiratory: respirations are unlabored, no accessory muscle use, no conversational dyspnea  Cardiovascular: regular rate & rhythm  Gastrointestinal: Abdomen soft, non-tender, non-distended, no rebound tenderness / guarding  Neurological: GCS: 15 (4/5/6). A&O x 3; no gross sensory / motor / coordination deficits  Psychiatric: Normal mood, normal affect  Musculoskeletal: No joint pain, swelling or deformity; no limitation of movement.   Ext: VAC at R knee. Integra adherent with good take, no cellulitis. Foot warm, edema improving        I&O's Detail    28 Jul 2019 07:01  -  29 Jul 2019 07:00  --------------------------------------------------------  IN:  Total IN: 0 mL    OUT:    Indwelling Catheter - Urethral: 1900 mL    Voided: 1650 mL  Total OUT: 3550 mL    Total NET: -3550 mL      29 Jul 2019 07:01  -  30 Jul 2019 03:09  --------------------------------------------------------  IN:  Total IN: 0 mL    OUT:    Indwelling Catheter - Urethral: 2100 mL  Total OUT: 2100 mL    Total NET: -2100 mL          LABS:                        8.5    6.34  )-----------( 398      ( 29 Jul 2019 07:59 )             28.5     07-29    136  |  104  |  11.0  ----------------------------<  123<H>  4.3   |  25.0  |  0.66    Ca    9.1      29 Jul 2019 07:59  Phos  3.5     07-29  Mg     2.1     07-29 HPI/OVERNIGHT EVENTS:  Patient with Tmax of 100.9. Otherwise, no acute events. Patient denies headaches, nausea, vomiting chest pain or shortness of breath. VAC in place to right knee wound.  Wound vac removed today and R lateral knee dressed with xeroform, wet to dry gauze and kerlex wrap.    MEDICATIONS  (STANDING):  amLODIPine   Tablet 10 milliGRAM(s) Oral daily  aspirin enteric coated 81 milliGRAM(s) Oral daily  cefepime   IVPB 2000 milliGRAM(s) IV Intermittent every 8 hours  docusate sodium 100 milliGRAM(s) Oral two times a day  enoxaparin Injectable 80 milliGRAM(s) SubCutaneous two times a day  gabapentin 300 milliGRAM(s) Oral three times a day  insulin lispro (HumaLOG) corrective regimen sliding scale   SubCutaneous Before meals and at bedtime  multivitamin 1 Tablet(s) Oral daily  senna 2 Tablet(s) Oral at bedtime  simvastatin 20 milliGRAM(s) Oral at bedtime  tamsulosin 0.4 milliGRAM(s) Oral at bedtime    MEDICATIONS  (PRN):  acetaminophen   Tablet .. 650 milliGRAM(s) Oral every 6 hours PRN Temp greater or equal to 38C (100.4F), Mild Pain (1 - 3)  ondansetron Injectable 4 milliGRAM(s) IV Push once PRN Nausea and/or Vomiting  oxyCODONE    IR 5 milliGRAM(s) Oral every 4 hours PRN Moderate Pain (4 - 6)  oxyCODONE    IR 10 milliGRAM(s) Oral every 4 hours PRN Severe Pain (7 - 10)      Vital Signs Last 24 Hrs  T(C): 37.1 (29 Jul 2019 23:09), Max: 38.3 (29 Jul 2019 18:48)  T(F): 98.8 (29 Jul 2019 23:09), Max: 100.9 (29 Jul 2019 18:48)  HR: 93 (29 Jul 2019 23:09) (92 - 104)  BP: 107/63 (29 Jul 2019 23:09) (107/63 - 128/78)  BP(mean): --  RR: 18 (29 Jul 2019 23:09) (18 - 19)  SpO2: 100% (29 Jul 2019 23:09) (98% - 100%)    Constitutional: patient resting comfortably in bed, in no acute distress  HEENT: EOMI / PERRL b/l, no active drainage or redness  Respiratory: respirations are unlabored, no accessory muscle use, no conversational dyspnea  Cardiovascular: regular rate & rhythm  Gastrointestinal: Abdomen soft, non-tender, non-distended, no rebound tenderness / guarding  Psychiatric: Normal mood, normal affect  Musculoskeletal:   Ext: VAC at R knee removed today. Integra adherent with good take. Staples still in place. good granulation tissue, no cellulitis. Foot warm, edema improving        I&O's Detail    28 Jul 2019 07:01  -  29 Jul 2019 07:00  --------------------------------------------------------  IN:  Total IN: 0 mL    OUT:    Indwelling Catheter - Urethral: 1900 mL    Voided: 1650 mL  Total OUT: 3550 mL    Total NET: -3550 mL      29 Jul 2019 07:01  -  30 Jul 2019 03:09  --------------------------------------------------------  IN:  Total IN: 0 mL    OUT:    Indwelling Catheter - Urethral: 2100 mL  Total OUT: 2100 mL    Total NET: -2100 mL          LABS:                        8.5    6.34  )-----------( 398      ( 29 Jul 2019 07:59 )             28.5     07-29    136  |  104  |  11.0  ----------------------------<  123<H>  4.3   |  25.0  |  0.66    Ca    9.1      29 Jul 2019 07:59  Phos  3.5     07-29  Mg     2.1     07-29

## 2019-07-30 NOTE — PROGRESS NOTE ADULT - ASSESSMENT
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    -Monitor I's and O's  -will maintain villegas/flomax, TOV when able to stand  -Continue cefepime until 8/12  -Diet: Diabetic/ Glucerna  -Last day for wound vac to right knee  -Dvt ppx  -Dispo: Acute rehab when clinically stable

## 2019-07-30 NOTE — PROGRESS NOTE ADULT - ASSESSMENT
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 Febrile overnight.    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 tmax 100.9 overnight but no sign of infection.    - R Lateral knee wound  - Pt cleared by plastics to dc to rehab w/ vac over lateral R knee integra site  - Vac to rehab, until integra incorporated  - Graft site  - well healing to air with good take  - Please continue R lower leg brace on discharge  - please follow up with Dr. Ron in clinic in 1-2 weeks

## 2019-07-30 NOTE — PROGRESS NOTE ADULT - SUBJECTIVE AND OBJECTIVE BOX
HPI/OVERNIGHT EVENTS:    Patient febrile overnight, Tmax was 100.9. Pt denies fever/chills, nausea, vomiting, chest pain, sob. No other complaints at this time.    MEDICATIONS  (STANDING):  amLODIPine   Tablet 10 milliGRAM(s) Oral daily  aspirin enteric coated 81 milliGRAM(s) Oral daily  cefepime   IVPB 2000 milliGRAM(s) IV Intermittent every 8 hours  docusate sodium 100 milliGRAM(s) Oral two times a day  enoxaparin Injectable 80 milliGRAM(s) SubCutaneous two times a day  gabapentin 300 milliGRAM(s) Oral three times a day  insulin lispro (HumaLOG) corrective regimen sliding scale   SubCutaneous Before meals and at bedtime  multivitamin 1 Tablet(s) Oral daily  senna 2 Tablet(s) Oral at bedtime  simvastatin 20 milliGRAM(s) Oral at bedtime  tamsulosin 0.4 milliGRAM(s) Oral at bedtime    MEDICATIONS  (PRN):  acetaminophen   Tablet .. 650 milliGRAM(s) Oral every 6 hours PRN Temp greater or equal to 38C (100.4F), Mild Pain (1 - 3)  ondansetron Injectable 4 milliGRAM(s) IV Push once PRN Nausea and/or Vomiting  oxyCODONE    IR 5 milliGRAM(s) Oral every 4 hours PRN Moderate Pain (4 - 6)  oxyCODONE    IR 10 milliGRAM(s) Oral every 4 hours PRN Severe Pain (7 - 10)      Vital Signs Last 24 Hrs  T(C): 37.1 (29 Jul 2019 23:09), Max: 38.3 (29 Jul 2019 18:48)  T(F): 98.8 (29 Jul 2019 23:09), Max: 100.9 (29 Jul 2019 18:48)  HR: 98 (30 Jul 2019 05:19) (92 - 98)  BP: 116/67 (30 Jul 2019 05:19) (107/63 - 128/78)  BP(mean): --  RR: 18 (29 Jul 2019 23:09) (18 - 19)  SpO2: 100% (29 Jul 2019 23:09) (99% - 100%)    Constitutional: patient resting comfortably in bed, in no acute distress  HEENT: EOMI / PERRL b/l, no active drainage or redness  Neck: No JVD, full ROM without pain  Respiratory: respirations are unlabored, no accessory muscle use, no conversational dyspnea  Cardiovascular: regular rate & rhythm  Gastrointestinal: Abdomen soft, non-tender, non-distended, no rebound tenderness / guarding  Neurological: GCS: 15 (4/5/6). A&O x 3; no gross sensory / motor / coordination deficits  Psychiatric: Normal mood, normal affect  Musculoskeletal: No joint pain, swelling or deformity; no limitation of movement    I&O's Detail    29 Jul 2019 07:01  -  30 Jul 2019 07:00  --------------------------------------------------------  IN:  Total IN: 0 mL    OUT:    Indwelling Catheter - Urethral: 3750 mL  Total OUT: 3750 mL    Total NET: -3750 mL          LABS:                        8.5    6.34  )-----------( 398      ( 29 Jul 2019 07:59 )             28.5     07-29    136  |  104  |  11.0  ----------------------------<  123<H>  4.3   |  25.0  |  0.66    Ca    9.1      29 Jul 2019 07:59  Phos  3.5     07-29  Mg     2.1     07-29

## 2019-07-30 NOTE — PROGRESS NOTE ADULT - PROBLEM SELECTOR PROBLEM 1
Urinary retention
Leg laceration, right, initial encounter
Urinary retention

## 2019-07-30 NOTE — PROGRESS NOTE ADULT - PROBLEM SELECTOR PLAN 1
Maintain villegas continue flomax. Trial of void when ambulating
Maintain villegas, continue flomax
Maintain villegas, continue flomax. Consider trial of void when able to stand/ambulate
Maintain villegas, flomax. If being discharged to rehab, will be discharged with villegas
Maintain villegas. Continue flomax
flomax, maintain villegas. Patient did fail trial of void consider trial of void again when more ambulatory
AFO dispensed, to be used as directed by     Pt. family educated on use of device.  Please call [facility] with any questions [phone]
Maintain Min for now.
Maintain Min for now. Avoid constipation. Voiding trial when able to stand.
Maintain Min for now. Voiding trial when ambulation increases.
continue villegas  flomax  TOV when can ambulate more efficiently  If fails again likely needs a turp in the future
flomax  continue villegas  tov as outpatient  may require turp
flomax  continue villegas untiol can ambulate  will follow
continue with Min catheter  Urology consult

## 2019-07-31 LAB
GLUCOSE BLDC GLUCOMTR-MCNC: 124 MG/DL — HIGH (ref 70–99)
GLUCOSE BLDC GLUCOMTR-MCNC: 135 MG/DL — HIGH (ref 70–99)
GLUCOSE BLDC GLUCOMTR-MCNC: 153 MG/DL — HIGH (ref 70–99)
GLUCOSE BLDC GLUCOMTR-MCNC: 180 MG/DL — HIGH (ref 70–99)

## 2019-07-31 PROCEDURE — 99231 SBSQ HOSP IP/OBS SF/LOW 25: CPT

## 2019-07-31 RX ADMIN — Medication 81 MILLIGRAM(S): at 11:15

## 2019-07-31 RX ADMIN — Medication 100 MILLIGRAM(S): at 05:15

## 2019-07-31 RX ADMIN — AMLODIPINE BESYLATE 10 MILLIGRAM(S): 2.5 TABLET ORAL at 05:15

## 2019-07-31 RX ADMIN — Medication 1 TABLET(S): at 11:15

## 2019-07-31 RX ADMIN — SIMVASTATIN 20 MILLIGRAM(S): 20 TABLET, FILM COATED ORAL at 21:33

## 2019-07-31 RX ADMIN — Medication 100 MILLIGRAM(S): at 17:09

## 2019-07-31 RX ADMIN — Medication 1: at 17:26

## 2019-07-31 RX ADMIN — CEFEPIME 100 MILLIGRAM(S): 1 INJECTION, POWDER, FOR SOLUTION INTRAMUSCULAR; INTRAVENOUS at 21:33

## 2019-07-31 RX ADMIN — GABAPENTIN 300 MILLIGRAM(S): 400 CAPSULE ORAL at 21:33

## 2019-07-31 RX ADMIN — SENNA PLUS 2 TABLET(S): 8.6 TABLET ORAL at 21:33

## 2019-07-31 RX ADMIN — ENOXAPARIN SODIUM 80 MILLIGRAM(S): 100 INJECTION SUBCUTANEOUS at 05:15

## 2019-07-31 RX ADMIN — CEFEPIME 100 MILLIGRAM(S): 1 INJECTION, POWDER, FOR SOLUTION INTRAMUSCULAR; INTRAVENOUS at 13:37

## 2019-07-31 RX ADMIN — GABAPENTIN 300 MILLIGRAM(S): 400 CAPSULE ORAL at 05:15

## 2019-07-31 RX ADMIN — GABAPENTIN 300 MILLIGRAM(S): 400 CAPSULE ORAL at 13:38

## 2019-07-31 RX ADMIN — ENOXAPARIN SODIUM 80 MILLIGRAM(S): 100 INJECTION SUBCUTANEOUS at 17:09

## 2019-07-31 RX ADMIN — TAMSULOSIN HYDROCHLORIDE 0.4 MILLIGRAM(S): 0.4 CAPSULE ORAL at 21:33

## 2019-07-31 RX ADMIN — CEFEPIME 100 MILLIGRAM(S): 1 INJECTION, POWDER, FOR SOLUTION INTRAMUSCULAR; INTRAVENOUS at 05:15

## 2019-07-31 RX ADMIN — Medication 1: at 12:00

## 2019-07-31 NOTE — PROGRESS NOTE ADULT - ASSESSMENT
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 tmax 100.9 overnight but no sign of infection.    - R Lateral knee wound  - Pt cleared by plastics to dc to rehab w/ vac over lateral R knee integra site  - Vac to rehab, until integra incorporated  - Graft site  - well healing to air with good take  - Please continue R lower leg brace on discharge  - please follow up with Dr. Ron in clinic in 1-2 weeks    DISPO: AR tomorrow

## 2019-07-31 NOTE — PROGRESS NOTE ADULT - ASSESSMENT
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    - R Lateral knee wound  - Pt cleared by plastics to dc to rehab w/ vac over lateral R knee integra site  - Vac to rehab, until integra incorporated  - Graft site  - well healing to air with good take  - Please continue R lower leg brace on discharge  - please follow up with Dr. Ron in clinic in 1-2 weeks

## 2019-07-31 NOTE — PROGRESS NOTE ADULT - SUBJECTIVE AND OBJECTIVE BOX
HPI/OVERNIGHT EVENTS:    Pt examined at bedside, no pain no complains. No acute events. Wound Vac ON again for delays on Dc.     MEDICATIONS  (STANDING):  amLODIPine   Tablet 10 milliGRAM(s) Oral daily  aspirin enteric coated 81 milliGRAM(s) Oral daily  cefepime   IVPB 2000 milliGRAM(s) IV Intermittent every 8 hours  docusate sodium 100 milliGRAM(s) Oral two times a day  enoxaparin Injectable 80 milliGRAM(s) SubCutaneous two times a day  gabapentin 300 milliGRAM(s) Oral three times a day  insulin lispro (HumaLOG) corrective regimen sliding scale   SubCutaneous Before meals and at bedtime  multivitamin 1 Tablet(s) Oral daily  senna 2 Tablet(s) Oral at bedtime  simvastatin 20 milliGRAM(s) Oral at bedtime  tamsulosin 0.4 milliGRAM(s) Oral at bedtime    MEDICATIONS  (PRN):  acetaminophen   Tablet .. 650 milliGRAM(s) Oral every 6 hours PRN Temp greater or equal to 38C (100.4F), Mild Pain (1 - 3)  ondansetron Injectable 4 milliGRAM(s) IV Push once PRN Nausea and/or Vomiting  oxyCODONE    IR 5 milliGRAM(s) Oral every 4 hours PRN Moderate Pain (4 - 6)  oxyCODONE    IR 10 milliGRAM(s) Oral every 4 hours PRN Severe Pain (7 - 10)      Vital Signs Last 24 Hrs  T(C): 37.1 (30 Jul 2019 23:20), Max: 37.7 (30 Jul 2019 16:45)  T(F): 98.8 (30 Jul 2019 23:20), Max: 99.9 (30 Jul 2019 16:45)  HR: 90 (30 Jul 2019 23:20) (90 - 104)  BP: 108/71 (30 Jul 2019 23:20) (108/71 - 120/74)  BP(mean): --  RR: 18 (30 Jul 2019 23:20) (17 - 18)  SpO2: 98% (30 Jul 2019 23:20) (98% - 100%)    Constitutional: patient resting comfortably in bed, in no acute distress  HEENT: EOMI / PERRL b/l, no active drainage or redness  Neck: No JVD, full ROM without pain  Respiratory: respirations are unlabored, no accessory muscle use, no conversational dyspnea  Cardiovascular: regular rate & rhythm  Gastrointestinal: Abdomen soft, non-tender, non-distended, no rebound tenderness / guarding  Neurological: GCS: 15 (4/5/6). A&O x 3; no gross sensory / motor / coordination deficits  Psychiatric: Normal mood, normal affect  Musculoskeletal: No joint pain, swelling or deformity; no limitation of movement      I&O's Detail    29 Jul 2019 07:01  -  30 Jul 2019 07:00  --------------------------------------------------------  IN:  Total IN: 0 mL    OUT:    Indwelling Catheter - Urethral: 3750 mL  Total OUT: 3750 mL    Total NET: -3750 mL      30 Jul 2019 07:01  -  31 Jul 2019 01:18  --------------------------------------------------------  IN:  Total IN: 0 mL    OUT:    Indwelling Catheter - Urethral: 750 mL  Total OUT: 750 mL    Total NET: -750 mL          LABS:                        8.5    6.34  )-----------( 398      ( 29 Jul 2019 07:59 )             28.5     07-29    136  |  104  |  11.0  ----------------------------<  123<H>  4.3   |  25.0  |  0.66    Ca    9.1      29 Jul 2019 07:59  Phos  3.5     07-29  Mg     2.1     07-29 HPI/OVERNIGHT EVENTS:    Pt examined at bedside, no pain no complains. No acute events. Wound Vac ON again for delays on Dc.   Wound vac changed today. 7cm x 15cm triangle shaped black foam was used. Vac was appropriately sealed with no leak and good suction at 125mmHg. next vac change due 7/30.      MEDICATIONS  (STANDING):  amLODIPine   Tablet 10 milliGRAM(s) Oral daily  aspirin enteric coated 81 milliGRAM(s) Oral daily  cefepime   IVPB 2000 milliGRAM(s) IV Intermittent every 8 hours  docusate sodium 100 milliGRAM(s) Oral two times a day  enoxaparin Injectable 80 milliGRAM(s) SubCutaneous two times a day  gabapentin 300 milliGRAM(s) Oral three times a day  insulin lispro (HumaLOG) corrective regimen sliding scale   SubCutaneous Before meals and at bedtime  multivitamin 1 Tablet(s) Oral daily  senna 2 Tablet(s) Oral at bedtime  simvastatin 20 milliGRAM(s) Oral at bedtime  tamsulosin 0.4 milliGRAM(s) Oral at bedtime    MEDICATIONS  (PRN):  acetaminophen   Tablet .. 650 milliGRAM(s) Oral every 6 hours PRN Temp greater or equal to 38C (100.4F), Mild Pain (1 - 3)  ondansetron Injectable 4 milliGRAM(s) IV Push once PRN Nausea and/or Vomiting  oxyCODONE    IR 5 milliGRAM(s) Oral every 4 hours PRN Moderate Pain (4 - 6)  oxyCODONE    IR 10 milliGRAM(s) Oral every 4 hours PRN Severe Pain (7 - 10)      Vital Signs Last 24 Hrs  T(C): 37.1 (30 Jul 2019 23:20), Max: 37.7 (30 Jul 2019 16:45)  T(F): 98.8 (30 Jul 2019 23:20), Max: 99.9 (30 Jul 2019 16:45)  HR: 90 (30 Jul 2019 23:20) (90 - 104)  BP: 108/71 (30 Jul 2019 23:20) (108/71 - 120/74)  BP(mean): --  RR: 18 (30 Jul 2019 23:20) (17 - 18)  SpO2: 98% (30 Jul 2019 23:20) (98% - 100%)    Constitutional: patient resting comfortably in bed, in no acute distress  HEENT: EOMI / PERRL b/l, no active drainage or redness  Neck: No JVD, full ROM without pain  Respiratory: respirations are unlabored, no accessory muscle use, no conversational dyspnea  Cardiovascular: regular rate & rhythm  Gastrointestinal: Abdomen soft, non-tender, non-distended, no rebound tenderness / guarding  Neurological: GCS: 15 (4/5/6). A&O x 3; no gross sensory / motor / coordination deficits  Psychiatric: Normal mood, normal affect  Musculoskeletal: No joint pain, swelling or deformity; no limitation of movement      I&O's Detail    29 Jul 2019 07:01  -  30 Jul 2019 07:00  --------------------------------------------------------  IN:  Total IN: 0 mL    OUT:    Indwelling Catheter - Urethral: 3750 mL  Total OUT: 3750 mL    Total NET: -3750 mL      30 Jul 2019 07:01  -  31 Jul 2019 01:18  --------------------------------------------------------  IN:  Total IN: 0 mL    OUT:    Indwelling Catheter - Urethral: 750 mL  Total OUT: 750 mL    Total NET: -750 mL          LABS:                        8.5    6.34  )-----------( 398      ( 29 Jul 2019 07:59 )             28.5     07-29    136  |  104  |  11.0  ----------------------------<  123<H>  4.3   |  25.0  |  0.66    Ca    9.1      29 Jul 2019 07:59  Phos  3.5     07-29  Mg     2.1     07-29

## 2019-07-31 NOTE — PROGRESS NOTE ADULT - SUBJECTIVE AND OBJECTIVE BOX
HPI/OVERNIGHT EVENTS: Patient seen and examined at bedside this AM. no acute events overnight,  Denies fever, chills, nausea, vomitting, chest pain, SOB, dizziness, abd pain or any other concerning symptoms. Wound vac replaced yesterday pending CAT placement, will remove upon discharge with CAT instructions to replace vac at facility.     Vital Signs Last 24 Hrs  T(C): 37.3 (31 Jul 2019 16:29), Max: 37.3 (31 Jul 2019 10:04)  T(F): 99.2 (31 Jul 2019 16:29), Max: 99.2 (31 Jul 2019 10:04)  HR: 92 (31 Jul 2019 16:29) (90 - 97)  BP: 111/65 (31 Jul 2019 16:29) (107/65 - 120/67)  BP(mean): --  RR: 18 (31 Jul 2019 16:29) (17 - 18)  SpO2: 100% (31 Jul 2019 16:29) (98% - 100%)    I&O's Detail    30 Jul 2019 07:01  -  31 Jul 2019 07:00  --------------------------------------------------------  IN:  Total IN: 0 mL    OUT:    Indwelling Catheter - Urethral: 750 mL  Total OUT: 750 mL    Total NET: -750 mL      31 Jul 2019 07:01  -  31 Jul 2019 20:19  --------------------------------------------------------  IN:  Total IN: 0 mL    OUT:    Indwelling Catheter - Urethral: 1000 mL  Total OUT: 1000 mL    Total NET: -1000 mL              Constitutional: patient resting comfortably in bed, in no acute distress  HEENT: EOMI / PERRL b/l   Neck: No JVD, full ROM without pain  Respiratory: CTAB respirations are unlabored, no accessory muscle use, no conversational dyspnea  Cardiovascular: regular rate & rhythm   Gastrointestinal: Abdomen soft, non-tender, non-distended, no rebound tenderness / guarding  Incision/Wound: Clean, dry and intact. Wound vac in place with minimal output. good seal with no leak. at 125mmHg  : villegas in place. graft site open to air with no inflammation drainage, or purulence  Psychiatric: Normal mood, normal affect  Musculoskeletal: in b/l leg braces.          MEDICATIONS  (STANDING):  amLODIPine   Tablet 10 milliGRAM(s) Oral daily  aspirin enteric coated 81 milliGRAM(s) Oral daily  cefepime   IVPB 2000 milliGRAM(s) IV Intermittent every 8 hours  docusate sodium 100 milliGRAM(s) Oral two times a day  enoxaparin Injectable 80 milliGRAM(s) SubCutaneous two times a day  gabapentin 300 milliGRAM(s) Oral three times a day  insulin lispro (HumaLOG) corrective regimen sliding scale   SubCutaneous Before meals and at bedtime  multivitamin 1 Tablet(s) Oral daily  senna 2 Tablet(s) Oral at bedtime  simvastatin 20 milliGRAM(s) Oral at bedtime  tamsulosin 0.4 milliGRAM(s) Oral at bedtime    MEDICATIONS  (PRN):  acetaminophen   Tablet .. 650 milliGRAM(s) Oral every 6 hours PRN Temp greater or equal to 38C (100.4F), Mild Pain (1 - 3)  ondansetron Injectable 4 milliGRAM(s) IV Push once PRN Nausea and/or Vomiting  oxyCODONE    IR 5 milliGRAM(s) Oral every 4 hours PRN Moderate Pain (4 - 6)  oxyCODONE    IR 10 milliGRAM(s) Oral every 4 hours PRN Severe Pain (7 - 10)

## 2019-08-01 PROBLEM — Z00.00 ENCOUNTER FOR PREVENTIVE HEALTH EXAMINATION: Status: ACTIVE | Noted: 2019-08-01

## 2019-08-01 LAB
GLUCOSE BLDC GLUCOMTR-MCNC: 124 MG/DL — HIGH (ref 70–99)
GLUCOSE BLDC GLUCOMTR-MCNC: 138 MG/DL — HIGH (ref 70–99)
GLUCOSE BLDC GLUCOMTR-MCNC: 200 MG/DL — HIGH (ref 70–99)
GLUCOSE BLDC GLUCOMTR-MCNC: 219 MG/DL — HIGH (ref 70–99)

## 2019-08-01 PROCEDURE — 99231 SBSQ HOSP IP/OBS SF/LOW 25: CPT

## 2019-08-01 RX ORDER — AMLODIPINE BESYLATE 2.5 MG/1
1 TABLET ORAL
Qty: 0 | Refills: 0 | DISCHARGE
Start: 2019-08-01

## 2019-08-01 RX ORDER — ENOXAPARIN SODIUM 100 MG/ML
80 INJECTION SUBCUTANEOUS
Qty: 4800 | Refills: 0
Start: 2019-08-01 | End: 2019-08-30

## 2019-08-01 RX ORDER — GABAPENTIN 400 MG/1
1 CAPSULE ORAL
Qty: 0 | Refills: 0 | DISCHARGE
Start: 2019-08-01

## 2019-08-01 RX ORDER — SENNA PLUS 8.6 MG/1
2 TABLET ORAL
Qty: 0 | Refills: 0 | DISCHARGE
Start: 2019-08-01

## 2019-08-01 RX ORDER — DOCUSATE SODIUM 100 MG
1 CAPSULE ORAL
Qty: 0 | Refills: 0 | DISCHARGE
Start: 2019-08-01

## 2019-08-01 RX ORDER — ACETAMINOPHEN 500 MG
2 TABLET ORAL
Qty: 0 | Refills: 0 | DISCHARGE
Start: 2019-08-01

## 2019-08-01 RX ORDER — OXYCODONE HYDROCHLORIDE 5 MG/1
1 TABLET ORAL
Qty: 0 | Refills: 0 | DISCHARGE
Start: 2019-08-01

## 2019-08-01 RX ORDER — CEFEPIME 1 G/1
2 INJECTION, POWDER, FOR SOLUTION INTRAMUSCULAR; INTRAVENOUS
Qty: 0 | Refills: 0 | DISCHARGE
Start: 2019-08-01

## 2019-08-01 RX ORDER — TAMSULOSIN HYDROCHLORIDE 0.4 MG/1
1 CAPSULE ORAL
Qty: 0 | Refills: 0 | DISCHARGE
Start: 2019-08-01

## 2019-08-01 RX ADMIN — Medication 650 MILLIGRAM(S): at 15:40

## 2019-08-01 RX ADMIN — CEFEPIME 100 MILLIGRAM(S): 1 INJECTION, POWDER, FOR SOLUTION INTRAMUSCULAR; INTRAVENOUS at 06:00

## 2019-08-01 RX ADMIN — AMLODIPINE BESYLATE 10 MILLIGRAM(S): 2.5 TABLET ORAL at 06:01

## 2019-08-01 RX ADMIN — Medication 650 MILLIGRAM(S): at 16:30

## 2019-08-01 RX ADMIN — ENOXAPARIN SODIUM 80 MILLIGRAM(S): 100 INJECTION SUBCUTANEOUS at 17:25

## 2019-08-01 RX ADMIN — GABAPENTIN 300 MILLIGRAM(S): 400 CAPSULE ORAL at 06:01

## 2019-08-01 RX ADMIN — SIMVASTATIN 20 MILLIGRAM(S): 20 TABLET, FILM COATED ORAL at 22:45

## 2019-08-01 RX ADMIN — Medication 1: at 22:45

## 2019-08-01 RX ADMIN — TAMSULOSIN HYDROCHLORIDE 0.4 MILLIGRAM(S): 0.4 CAPSULE ORAL at 22:45

## 2019-08-01 RX ADMIN — CEFEPIME 100 MILLIGRAM(S): 1 INJECTION, POWDER, FOR SOLUTION INTRAMUSCULAR; INTRAVENOUS at 22:45

## 2019-08-01 RX ADMIN — Medication 2: at 11:32

## 2019-08-01 RX ADMIN — CEFEPIME 100 MILLIGRAM(S): 1 INJECTION, POWDER, FOR SOLUTION INTRAMUSCULAR; INTRAVENOUS at 14:31

## 2019-08-01 RX ADMIN — Medication 100 MILLIGRAM(S): at 06:01

## 2019-08-01 RX ADMIN — ENOXAPARIN SODIUM 80 MILLIGRAM(S): 100 INJECTION SUBCUTANEOUS at 06:01

## 2019-08-01 RX ADMIN — Medication 1 TABLET(S): at 11:31

## 2019-08-01 RX ADMIN — Medication 100 MILLIGRAM(S): at 17:25

## 2019-08-01 RX ADMIN — SENNA PLUS 2 TABLET(S): 8.6 TABLET ORAL at 22:45

## 2019-08-01 RX ADMIN — GABAPENTIN 300 MILLIGRAM(S): 400 CAPSULE ORAL at 22:45

## 2019-08-01 RX ADMIN — Medication 81 MILLIGRAM(S): at 11:31

## 2019-08-01 RX ADMIN — GABAPENTIN 300 MILLIGRAM(S): 400 CAPSULE ORAL at 14:31

## 2019-08-01 NOTE — PROGRESS NOTE ADULT - SUBJECTIVE AND OBJECTIVE BOX
SUBJECTIVE:   Pt examined at bedside, no pain no complains. No acute events.     MEDICATIONS  (STANDING):  amLODIPine   Tablet 10 milliGRAM(s) Oral daily  aspirin enteric coated 81 milliGRAM(s) Oral daily  cefepime   IVPB 2000 milliGRAM(s) IV Intermittent every 8 hours  docusate sodium 100 milliGRAM(s) Oral two times a day  enoxaparin Injectable 80 milliGRAM(s) SubCutaneous two times a day  gabapentin 300 milliGRAM(s) Oral three times a day  insulin lispro (HumaLOG) corrective regimen sliding scale   SubCutaneous Before meals and at bedtime  multivitamin 1 Tablet(s) Oral daily  senna 2 Tablet(s) Oral at bedtime  simvastatin 20 milliGRAM(s) Oral at bedtime  tamsulosin 0.4 milliGRAM(s) Oral at bedtime    MEDICATIONS  (PRN):  acetaminophen   Tablet .. 650 milliGRAM(s) Oral every 6 hours PRN Temp greater or equal to 38C (100.4F), Mild Pain (1 - 3)  ondansetron Injectable 4 milliGRAM(s) IV Push once PRN Nausea and/or Vomiting  oxyCODONE    IR 5 milliGRAM(s) Oral every 4 hours PRN Moderate Pain (4 - 6)  oxyCODONE    IR 10 milliGRAM(s) Oral every 4 hours PRN Severe Pain (7 - 10)      Vital Signs Last 24 Hrs  T(C): 37.3 (01 Aug 2019 00:24), Max: 37.3 (31 Jul 2019 10:04)  T(F): 99.2 (01 Aug 2019 00:24), Max: 99.2 (31 Jul 2019 10:04)  HR: 89 (01 Aug 2019 00:24) (89 - 97)  BP: 132/81 (01 Aug 2019 00:24) (107/65 - 132/81)  BP(mean): --  RR: 18 (01 Aug 2019 00:24) (17 - 18)  SpO2: 100% (01 Aug 2019 00:24) (100% - 100%)    PE  Constitutional: patient resting comfortably in bed, in no acute distress  HEENT: EOMI / PERRL b/l, no active drainage or redness  Neck: No JVD, full ROM without pain  Respiratory: respirations are unlabored, no accessory muscle use, no conversational dyspnea  Cardiovascular: regular rate & rhythm  Gastrointestinal: Abdomen soft, non-tender, non-distended, no rebound tenderness / guarding  Neurological: GCS: 15 (4/5/6). A&O x 3; no gross sensory / motor / coordination deficits  Psychiatric: Normal mood, normal affect  Musculoskeletal: No joint pain, swelling or deformity; no limitation of movemen    I&O's Detail    30 Jul 2019 07:01  -  31 Jul 2019 07:00  --------------------------------------------------------  IN:  Total IN: 0 mL    OUT:    Indwelling Catheter - Urethral: 750 mL  Total OUT: 750 mL    Total NET: -750 mL      31 Jul 2019 07:01  -  01 Aug 2019 01:20  --------------------------------------------------------  IN:  Total IN: 0 mL    OUT:    Indwelling Catheter - Urethral: 1700 mL  Total OUT: 1700 mL    Total NET: -1700 mL          LABS:                RADIOLOGY & ADDITIONAL STUDIES:

## 2019-08-01 NOTE — PROGRESS NOTE ADULT - SUBJECTIVE AND OBJECTIVE BOX
HPI/OVERNIGHT EVENTS: Patient seen and examined at bedside. Patient dispo is pending [facility] placement.    Denies fever, chills, nausea, vomitting, chest pain, SOB, dizziness, abd pain or any other concerning symptoms. Wound vac stays on until discharge to [facility]. Will remove upon discharge with [facility] instructions to replace vac at facility.       Vital Signs Last 24 Hrs  T(C): 37.3 (01 Aug 2019 00:24), Max: 37.3 (31 Jul 2019 10:04)  T(F): 99.2 (01 Aug 2019 00:24), Max: 99.2 (31 Jul 2019 10:04)  HR: 89 (01 Aug 2019 00:24) (89 - 97)  BP: 132/81 (01 Aug 2019 00:24) (107/65 - 132/81)  BP(mean): --  RR: 18 (01 Aug 2019 00:24) (17 - 18)  SpO2: 100% (01 Aug 2019 00:24) (100% - 100%)    I&O's Detail    30 Jul 2019 07:01  -  31 Jul 2019 07:00  --------------------------------------------------------  IN:  Total IN: 0 mL    OUT:    Indwelling Catheter - Urethral: 750 mL  Total OUT: 750 mL    Total NET: -750 mL      31 Jul 2019 07:01  -  01 Aug 2019 02:17  --------------------------------------------------------  IN:  Total IN: 0 mL    OUT:    Indwelling Catheter - Urethral: 1700 mL  Total OUT: 1700 mL    Total NET: -1700 mL              Constitutional: patient resting comfortably in bed, in no acute distress  HEENT: EOMI / PERRL b/l   Neck: No JVD, full ROM without pain  Respiratory: CTAB respirations are unlabored, no accessory muscle use, no conversational dyspnea  Cardiovascular: regular rate & rhythm   Gastrointestinal: Abdomen soft, non-tender, non-distended, no rebound tenderness / guarding  Incision/Wound: Clean, dry and intact. Wound vac in place with minimal output. good seal with no leak. at 125mmHg  : villegas in place. graft site open to air with no inflammation drainage, or purulence  Psychiatric: Normal mood, normal affect  Musculoskeletal: in b/l leg braces.        MEDICATIONS  (STANDING):  amLODIPine   Tablet 10 milliGRAM(s) Oral daily  aspirin enteric coated 81 milliGRAM(s) Oral daily  cefepime   IVPB 2000 milliGRAM(s) IV Intermittent every 8 hours  docusate sodium 100 milliGRAM(s) Oral two times a day  enoxaparin Injectable 80 milliGRAM(s) SubCutaneous two times a day  gabapentin 300 milliGRAM(s) Oral three times a day  insulin lispro (HumaLOG) corrective regimen sliding scale   SubCutaneous Before meals and at bedtime  multivitamin 1 Tablet(s) Oral daily  senna 2 Tablet(s) Oral at bedtime  simvastatin 20 milliGRAM(s) Oral at bedtime  tamsulosin 0.4 milliGRAM(s) Oral at bedtime    MEDICATIONS  (PRN):  acetaminophen   Tablet .. 650 milliGRAM(s) Oral every 6 hours PRN Temp greater or equal to 38C (100.4F), Mild Pain (1 - 3)  ondansetron Injectable 4 milliGRAM(s) IV Push once PRN Nausea and/or Vomiting  oxyCODONE    IR 5 milliGRAM(s) Oral every 4 hours PRN Moderate Pain (4 - 6)  oxyCODONE    IR 10 milliGRAM(s) Oral every 4 hours PRN Severe Pain (7 - 10)

## 2019-08-01 NOTE — PROGRESS NOTE ADULT - ASSESSMENT
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 Ron in clinic in 1-2 weeks

## 2019-08-02 LAB
GLUCOSE BLDC GLUCOMTR-MCNC: 113 MG/DL — HIGH (ref 70–99)
GLUCOSE BLDC GLUCOMTR-MCNC: 120 MG/DL — HIGH (ref 70–99)
GLUCOSE BLDC GLUCOMTR-MCNC: 139 MG/DL — HIGH (ref 70–99)
GLUCOSE BLDC GLUCOMTR-MCNC: 224 MG/DL — HIGH (ref 70–99)

## 2019-08-02 PROCEDURE — 99231 SBSQ HOSP IP/OBS SF/LOW 25: CPT

## 2019-08-02 RX ADMIN — Medication 100 MILLIGRAM(S): at 06:10

## 2019-08-02 RX ADMIN — CEFEPIME 100 MILLIGRAM(S): 1 INJECTION, POWDER, FOR SOLUTION INTRAMUSCULAR; INTRAVENOUS at 14:33

## 2019-08-02 RX ADMIN — Medication 650 MILLIGRAM(S): at 17:14

## 2019-08-02 RX ADMIN — Medication 81 MILLIGRAM(S): at 11:58

## 2019-08-02 RX ADMIN — ENOXAPARIN SODIUM 80 MILLIGRAM(S): 100 INJECTION SUBCUTANEOUS at 06:09

## 2019-08-02 RX ADMIN — ENOXAPARIN SODIUM 80 MILLIGRAM(S): 100 INJECTION SUBCUTANEOUS at 17:14

## 2019-08-02 RX ADMIN — Medication 2: at 11:58

## 2019-08-02 RX ADMIN — AMLODIPINE BESYLATE 10 MILLIGRAM(S): 2.5 TABLET ORAL at 06:10

## 2019-08-02 RX ADMIN — Medication 100 MILLIGRAM(S): at 17:14

## 2019-08-02 RX ADMIN — SIMVASTATIN 20 MILLIGRAM(S): 20 TABLET, FILM COATED ORAL at 21:50

## 2019-08-02 RX ADMIN — CEFEPIME 100 MILLIGRAM(S): 1 INJECTION, POWDER, FOR SOLUTION INTRAMUSCULAR; INTRAVENOUS at 21:50

## 2019-08-02 RX ADMIN — GABAPENTIN 300 MILLIGRAM(S): 400 CAPSULE ORAL at 14:33

## 2019-08-02 RX ADMIN — GABAPENTIN 300 MILLIGRAM(S): 400 CAPSULE ORAL at 21:50

## 2019-08-02 RX ADMIN — GABAPENTIN 300 MILLIGRAM(S): 400 CAPSULE ORAL at 06:10

## 2019-08-02 RX ADMIN — CEFEPIME 100 MILLIGRAM(S): 1 INJECTION, POWDER, FOR SOLUTION INTRAMUSCULAR; INTRAVENOUS at 06:09

## 2019-08-02 RX ADMIN — TAMSULOSIN HYDROCHLORIDE 0.4 MILLIGRAM(S): 0.4 CAPSULE ORAL at 21:50

## 2019-08-02 RX ADMIN — SENNA PLUS 2 TABLET(S): 8.6 TABLET ORAL at 21:50

## 2019-08-02 RX ADMIN — Medication 1 TABLET(S): at 11:59

## 2019-08-02 NOTE — PROGRESS NOTE ADULT - SUBJECTIVE AND OBJECTIVE BOX
INTERVAL HPI/OVERNIGHT EVENTS: No acute events overnight. No fevers overnight    SUBJECTIVE: Feeling well this AM. No fevers/chills, no N/V.       MEDICATIONS  (STANDING):  amLODIPine   Tablet 10 milliGRAM(s) Oral daily  aspirin enteric coated 81 milliGRAM(s) Oral daily  cefepime   IVPB 2000 milliGRAM(s) IV Intermittent every 8 hours  docusate sodium 100 milliGRAM(s) Oral two times a day  enoxaparin Injectable 80 milliGRAM(s) SubCutaneous two times a day  gabapentin 300 milliGRAM(s) Oral three times a day  insulin lispro (HumaLOG) corrective regimen sliding scale   SubCutaneous Before meals and at bedtime  multivitamin 1 Tablet(s) Oral daily  senna 2 Tablet(s) Oral at bedtime  simvastatin 20 milliGRAM(s) Oral at bedtime  tamsulosin 0.4 milliGRAM(s) Oral at bedtime    MEDICATIONS  (PRN):  acetaminophen   Tablet .. 650 milliGRAM(s) Oral every 6 hours PRN Temp greater or equal to 38C (100.4F), Mild Pain (1 - 3)  ondansetron Injectable 4 milliGRAM(s) IV Push once PRN Nausea and/or Vomiting  oxyCODONE    IR 5 milliGRAM(s) Oral every 4 hours PRN Moderate Pain (4 - 6)  oxyCODONE    IR 10 milliGRAM(s) Oral every 4 hours PRN Severe Pain (7 - 10)      Vital Signs Last 24 Hrs  T(C): 37.1 (02 Aug 2019 07:45), Max: 37.6 (01 Aug 2019 17:00)  T(F): 98.7 (02 Aug 2019 07:45), Max: 99.7 (01 Aug 2019 17:00)  HR: 87 (02 Aug 2019 07:45) (85 - 93)  BP: 124/77 (02 Aug 2019 07:45) (111/65 - 134/74)  BP(mean): --  RR: 18 (02 Aug 2019 07:45) (18 - 18)  SpO2: 100% (02 Aug 2019 07:45) (99% - 100%)    PE  Constitutional: patient resting comfortably in bed, in no acute distress  HEENT: EOMI / PERRL b/l, no active drainage or redness  Neck: No JVD, full ROM without pain  Respiratory: respirations are unlabored, no accessory muscle use, no conversational dyspnea  Cardiovascular: regular rate & rhythm  Gastrointestinal: Abdomen soft, non-tender, non-distended, no rebound tenderness / guarding  Neurological: GCS: 15 (4/5/6). A&O x 3; no gross sensory / motor / coordination deficits  Psychiatric: Normal mood, normal affect  Musculoskeletal: No joint pain, swelling or deformity; no limitation of movement      I&O's Detail    01 Aug 2019 07:01  -  02 Aug 2019 07:00  --------------------------------------------------------  IN:  Total IN: 0 mL    OUT:    Indwelling Catheter - Urethral: 1450 mL  Total OUT: 1450 mL    Total NET: -1450 mL          LABS:                RADIOLOGY & ADDITIONAL STUDIES:

## 2019-08-02 NOTE — PROGRESS NOTE ADULT - ASSESSMENT
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     - R Lateral knee wound  - Pt cleared by plastics to dc to rehab w/ vac over lateral R knee integra site  - Vac to rehab, until integra incorporated  -DC today

## 2019-08-02 NOTE — PROGRESS NOTE ADULT - SUBJECTIVE AND OBJECTIVE BOX
HPI/OVERNIGHT EVENTS:  No acute overnight events. Patient dispo is pending CAT placement. Denies fever, chills, nausea, vomiting, chest pain, SOB, dizziness, abd pain or any other concerning symptoms. Wound vac currently off as hehad placement with impending discharge. Will place back on if CAT placement no longer available for a discharge this morning. Will then remove upon discharge with CAT instructions to replace vac at facility.     MEDICATIONS  (STANDING):  amLODIPine   Tablet 10 milliGRAM(s) Oral daily  aspirin enteric coated 81 milliGRAM(s) Oral daily  cefepime   IVPB 2000 milliGRAM(s) IV Intermittent every 8 hours  docusate sodium 100 milliGRAM(s) Oral two times a day  enoxaparin Injectable 80 milliGRAM(s) SubCutaneous two times a day  gabapentin 300 milliGRAM(s) Oral three times a day  insulin lispro (HumaLOG) corrective regimen sliding scale   SubCutaneous Before meals and at bedtime  multivitamin 1 Tablet(s) Oral daily  senna 2 Tablet(s) Oral at bedtime  simvastatin 20 milliGRAM(s) Oral at bedtime  tamsulosin 0.4 milliGRAM(s) Oral at bedtime    MEDICATIONS  (PRN):  acetaminophen   Tablet .. 650 milliGRAM(s) Oral every 6 hours PRN Temp greater or equal to 38C (100.4F), Mild Pain (1 - 3)  ondansetron Injectable 4 milliGRAM(s) IV Push once PRN Nausea and/or Vomiting  oxyCODONE    IR 5 milliGRAM(s) Oral every 4 hours PRN Moderate Pain (4 - 6)  oxyCODONE    IR 10 milliGRAM(s) Oral every 4 hours PRN Severe Pain (7 - 10)      Vital Signs Last 24 Hrs  T(C): 37.3 (01 Aug 2019 23:50), Max: 37.6 (01 Aug 2019 17:00)  T(F): 99.2 (01 Aug 2019 23:50), Max: 99.7 (01 Aug 2019 17:00)  HR: 90 (01 Aug 2019 23:50) (80 - 93)  BP: 134/74 (01 Aug 2019 23:50) (109/70 - 134/74)  BP(mean): --  RR: 18 (01 Aug 2019 23:50) (18 - 18)  SpO2: 99% (01 Aug 2019 23:50) (98% - 100%)    Constitutional: patient resting comfortably in bed, in no acute distress  HEENT: EOMI / PERRL b/l   Neck: No JVD, full ROM without pain  Respiratory: CTAB respirations are unlabored, no accessory muscle use, no conversational dyspnea  Cardiovascular: regular rate & rhythm   Gastrointestinal: Abdomen soft, non-tender, non-distended, no rebound tenderness / guarding  Incision/Wound: Clean, dry and intact. Wound vac currently off. Gauze and dry dressing covering Left leg  : villegas in place. graft site open to air with no inflammation drainage, or purulence  Psychiatric: Normal mood, normal affect  Musculoskeletal: in b/l leg braces.      I&O's Detail    31 Jul 2019 07:01  -  01 Aug 2019 07:00  --------------------------------------------------------  IN:  Total IN: 0 mL    OUT:    Indwelling Catheter - Urethral: 1700 mL    Voided: 1550 mL  Total OUT: 3250 mL    Total NET: -3250 mL      01 Aug 2019 07:01  -  02 Aug 2019 04:51  --------------------------------------------------------  IN:  Total IN: 0 mL    OUT:    Indwelling Catheter - Urethral: 1450 mL  Total OUT: 1450 mL    Total NET: -1450 mL          LABS:

## 2019-08-02 NOTE — PROGRESS NOTE ADULT - NSHPATTENDINGPLANDISCUSS_GEN_ALL_CORE
surgical team
surgical team
Patient, ACS team, all questions answered
Patient, ACS team, all questions answered
Patient, family, acs team, all questions answered
patient, family, ACs team, all questions answered
residents on rounds.
Patient, ACS team, all questions answered
patient, ACS team all questions answered

## 2019-08-02 NOTE — PROGRESS NOTE ADULT - ASSESSMENT
68m s/p excision and grafting of titbial/peroneal nerve injuries, s/p R knee washout after septic joint with GNR pseudomonas No evidence of active wound infection of RLE however; now s/p RLE debridement and VAC placement    - Pt cleared by plastics to dc to rehab w/ vac over lateral R knee integra site  - Vac to rehab, until integra incorporated  - Graft site well healing to air with good take  - Please continue R lower leg brace on discharge  - Please follow up with Dr. Ron in clinic in 1-2 weeks

## 2019-08-03 ENCOUNTER — INBOUND DOCUMENT (OUTPATIENT)
Age: 69
End: 2019-08-03

## 2019-08-03 ENCOUNTER — TRANSCRIPTION ENCOUNTER (OUTPATIENT)
Age: 69
End: 2019-08-03

## 2019-08-03 VITALS
OXYGEN SATURATION: 100 % | RESPIRATION RATE: 18 BRPM | TEMPERATURE: 99 F | SYSTOLIC BLOOD PRESSURE: 127 MMHG | DIASTOLIC BLOOD PRESSURE: 77 MMHG | HEART RATE: 93 BPM

## 2019-08-03 LAB — GLUCOSE BLDC GLUCOMTR-MCNC: 118 MG/DL — HIGH (ref 70–99)

## 2019-08-03 PROCEDURE — 84134 ASSAY OF PREALBUMIN: CPT

## 2019-08-03 PROCEDURE — 82947 ASSAY GLUCOSE BLOOD QUANT: CPT

## 2019-08-03 PROCEDURE — 87040 BLOOD CULTURE FOR BACTERIA: CPT

## 2019-08-03 PROCEDURE — 36430 TRANSFUSION BLD/BLD COMPNT: CPT

## 2019-08-03 PROCEDURE — 85014 HEMATOCRIT: CPT

## 2019-08-03 PROCEDURE — 84132 ASSAY OF SERUM POTASSIUM: CPT

## 2019-08-03 PROCEDURE — 97542 WHEELCHAIR MNGMENT TRAINING: CPT

## 2019-08-03 PROCEDURE — 93306 TTE W/DOPPLER COMPLETE: CPT

## 2019-08-03 PROCEDURE — 86140 C-REACTIVE PROTEIN: CPT

## 2019-08-03 PROCEDURE — 84295 ASSAY OF SERUM SODIUM: CPT

## 2019-08-03 PROCEDURE — 83605 ASSAY OF LACTIC ACID: CPT

## 2019-08-03 PROCEDURE — 80076 HEPATIC FUNCTION PANEL: CPT

## 2019-08-03 PROCEDURE — 87205 SMEAR GRAM STAIN: CPT

## 2019-08-03 PROCEDURE — 73701 CT LOWER EXTREMITY W/DYE: CPT

## 2019-08-03 PROCEDURE — 96376 TX/PRO/DX INJ SAME DRUG ADON: CPT

## 2019-08-03 PROCEDURE — C1762: CPT

## 2019-08-03 PROCEDURE — 73721 MRI JNT OF LWR EXTRE W/O DYE: CPT

## 2019-08-03 PROCEDURE — 86900 BLOOD TYPING SEROLOGIC ABO: CPT

## 2019-08-03 PROCEDURE — 36415 COLL VENOUS BLD VENIPUNCTURE: CPT

## 2019-08-03 PROCEDURE — 97112 NEUROMUSCULAR REEDUCATION: CPT

## 2019-08-03 PROCEDURE — 82330 ASSAY OF CALCIUM: CPT

## 2019-08-03 PROCEDURE — 87186 SC STD MICRODIL/AGAR DIL: CPT

## 2019-08-03 PROCEDURE — 97110 THERAPEUTIC EXERCISES: CPT

## 2019-08-03 PROCEDURE — 93970 EXTREMITY STUDY: CPT

## 2019-08-03 PROCEDURE — 75635 CT ANGIO ABDOMINAL ARTERIES: CPT

## 2019-08-03 PROCEDURE — 99261: CPT

## 2019-08-03 PROCEDURE — 82550 ASSAY OF CK (CPK): CPT

## 2019-08-03 PROCEDURE — P9016: CPT

## 2019-08-03 PROCEDURE — 80048 BASIC METABOLIC PNL TOTAL CA: CPT

## 2019-08-03 PROCEDURE — 86923 COMPATIBILITY TEST ELECTRIC: CPT

## 2019-08-03 PROCEDURE — 83874 ASSAY OF MYOGLOBIN: CPT

## 2019-08-03 PROCEDURE — 84300 ASSAY OF URINE SODIUM: CPT

## 2019-08-03 PROCEDURE — 83935 ASSAY OF URINE OSMOLALITY: CPT

## 2019-08-03 PROCEDURE — 97163 PT EVAL HIGH COMPLEX 45 MIN: CPT

## 2019-08-03 PROCEDURE — 80053 COMPREHEN METABOLIC PANEL: CPT

## 2019-08-03 PROCEDURE — 83690 ASSAY OF LIPASE: CPT

## 2019-08-03 PROCEDURE — 86850 RBC ANTIBODY SCREEN: CPT

## 2019-08-03 PROCEDURE — 87075 CULTR BACTERIA EXCEPT BLOOD: CPT

## 2019-08-03 PROCEDURE — 81001 URINALYSIS AUTO W/SCOPE: CPT

## 2019-08-03 PROCEDURE — 82436 ASSAY OF URINE CHLORIDE: CPT

## 2019-08-03 PROCEDURE — 76000 FLUOROSCOPY <1 HR PHYS/QHP: CPT

## 2019-08-03 PROCEDURE — 97116 GAIT TRAINING THERAPY: CPT

## 2019-08-03 PROCEDURE — C1713: CPT

## 2019-08-03 PROCEDURE — 82962 GLUCOSE BLOOD TEST: CPT

## 2019-08-03 PROCEDURE — 89051 BODY FLUID CELL COUNT: CPT

## 2019-08-03 PROCEDURE — 82570 ASSAY OF URINE CREATININE: CPT

## 2019-08-03 PROCEDURE — 83735 ASSAY OF MAGNESIUM: CPT

## 2019-08-03 PROCEDURE — 72125 CT NECK SPINE W/O DYE: CPT

## 2019-08-03 PROCEDURE — 97167 OT EVAL HIGH COMPLEX 60 MIN: CPT

## 2019-08-03 PROCEDURE — 85610 PROTHROMBIN TIME: CPT

## 2019-08-03 PROCEDURE — 86803 HEPATITIS C AB TEST: CPT

## 2019-08-03 PROCEDURE — 85652 RBC SED RATE AUTOMATED: CPT

## 2019-08-03 PROCEDURE — 71260 CT THORAX DX C+: CPT

## 2019-08-03 PROCEDURE — 93005 ELECTROCARDIOGRAM TRACING: CPT

## 2019-08-03 PROCEDURE — 70450 CT HEAD/BRAIN W/O DYE: CPT

## 2019-08-03 PROCEDURE — 73562 X-RAY EXAM OF KNEE 3: CPT

## 2019-08-03 PROCEDURE — 73560 X-RAY EXAM OF KNEE 1 OR 2: CPT

## 2019-08-03 PROCEDURE — 85730 THROMBOPLASTIN TIME PARTIAL: CPT

## 2019-08-03 PROCEDURE — G0390: CPT

## 2019-08-03 PROCEDURE — 99291 CRITICAL CARE FIRST HOUR: CPT | Mod: 25

## 2019-08-03 PROCEDURE — 87070 CULTURE OTHR SPECIMN AEROBIC: CPT

## 2019-08-03 PROCEDURE — C1889: CPT

## 2019-08-03 PROCEDURE — 86901 BLOOD TYPING SEROLOGIC RH(D): CPT

## 2019-08-03 PROCEDURE — 82435 ASSAY OF BLOOD CHLORIDE: CPT

## 2019-08-03 PROCEDURE — 74177 CT ABD & PELVIS W/CONTRAST: CPT

## 2019-08-03 PROCEDURE — 85027 COMPLETE CBC AUTOMATED: CPT

## 2019-08-03 PROCEDURE — 88304 TISSUE EXAM BY PATHOLOGIST: CPT

## 2019-08-03 PROCEDURE — 82553 CREATINE MB FRACTION: CPT

## 2019-08-03 PROCEDURE — 83036 HEMOGLOBIN GLYCOSYLATED A1C: CPT

## 2019-08-03 PROCEDURE — 80307 DRUG TEST PRSMV CHEM ANLYZR: CPT

## 2019-08-03 PROCEDURE — 71045 X-RAY EXAM CHEST 1 VIEW: CPT

## 2019-08-03 PROCEDURE — 76775 US EXAM ABDO BACK WALL LIM: CPT

## 2019-08-03 PROCEDURE — 97530 THERAPEUTIC ACTIVITIES: CPT

## 2019-08-03 PROCEDURE — 99231 SBSQ HOSP IP/OBS SF/LOW 25: CPT

## 2019-08-03 PROCEDURE — 96374 THER/PROPH/DIAG INJ IV PUSH: CPT

## 2019-08-03 PROCEDURE — 84100 ASSAY OF PHOSPHORUS: CPT

## 2019-08-03 PROCEDURE — 82803 BLOOD GASES ANY COMBINATION: CPT

## 2019-08-03 RX ADMIN — ENOXAPARIN SODIUM 80 MILLIGRAM(S): 100 INJECTION SUBCUTANEOUS at 05:23

## 2019-08-03 RX ADMIN — Medication 81 MILLIGRAM(S): at 11:11

## 2019-08-03 RX ADMIN — AMLODIPINE BESYLATE 10 MILLIGRAM(S): 2.5 TABLET ORAL at 05:23

## 2019-08-03 RX ADMIN — Medication 100 MILLIGRAM(S): at 05:23

## 2019-08-03 RX ADMIN — CEFEPIME 100 MILLIGRAM(S): 1 INJECTION, POWDER, FOR SOLUTION INTRAMUSCULAR; INTRAVENOUS at 05:23

## 2019-08-03 RX ADMIN — GABAPENTIN 300 MILLIGRAM(S): 400 CAPSULE ORAL at 05:23

## 2019-08-03 NOTE — PROGRESS NOTE ADULT - ASSESSMENT
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 Patient to discharge today    - Pt cleared by plastics to dc to rehab w/ vac over lateral R knee integra site  - Vac to rehab, until integra incorporated  - Graft site well healing to air with good take  - Please continue R lower leg brace on discharge  - Please follow up with Dr. Ron in clinic in 1-2 weeks

## 2019-08-03 NOTE — PROGRESS NOTE ADULT - SUBJECTIVE AND OBJECTIVE BOX
HPI/OVERNIGHT EVENTS:     No acute events overnight. Feeling well this AM. No fevers/chills, no N/V. Denies fever, chills, nausea, vomitting, chest pain, SOB, dizziness, abd pain or any other concerning symptoms. Wound vac removed today and wrapped with xeroform abd and kerlex wrap.     Vital Signs Last 24 Hrs  T(C): 37 (03 Aug 2019 07:50), Max: 37.2 (02 Aug 2019 16:17)  T(F): 98.6 (03 Aug 2019 07:50), Max: 98.9 (02 Aug 2019 16:17)  HR: 93 (03 Aug 2019 07:50) (63 - 94)  BP: 127/77 (03 Aug 2019 07:50) (120/79 - 132/76)  BP(mean): --  RR: 18 (03 Aug 2019 07:50) (17 - 18)  SpO2: 100% (03 Aug 2019 07:50) (97% - 100%)    I&O's Detail    02 Aug 2019 07:01  -  03 Aug 2019 07:00  --------------------------------------------------------  IN:  Total IN: 0 mL    OUT:    Indwelling Catheter - Urethral: 2700 mL  Total OUT: 2700 mL    Total NET: -2700 mL      Constitutional: patient resting comfortably in bed, in no acute distress  HEENT: EOMI / PERRL b/l   Neck: No JVD, full ROM without pain  Respiratory: CTAB respirations are unlabored, no accessory muscle use, no conversational dyspnea  Cardiovascular: regular rate & rhythm   Gastrointestinal: Abdomen soft, non-tender, non-distended, no rebound tenderness / guarding  Incision/Wound: Clean, dry and intact. Wound vac currently off. Gauze and dry dressing covering Left leg  : villegas in place. graft site open to air with no inflammation drainage, or purulence  Psychiatric: Normal mood, normal affect  Musculoskeletal: in b/l leg braces.      MEDICATIONS  (STANDING):  amLODIPine   Tablet 10 milliGRAM(s) Oral daily  aspirin enteric coated 81 milliGRAM(s) Oral daily  cefepime   IVPB 2000 milliGRAM(s) IV Intermittent every 8 hours  docusate sodium 100 milliGRAM(s) Oral two times a day  enoxaparin Injectable 80 milliGRAM(s) SubCutaneous two times a day  gabapentin 300 milliGRAM(s) Oral three times a day  insulin lispro (HumaLOG) corrective regimen sliding scale   SubCutaneous Before meals and at bedtime  multivitamin 1 Tablet(s) Oral daily  senna 2 Tablet(s) Oral at bedtime  simvastatin 20 milliGRAM(s) Oral at bedtime  tamsulosin 0.4 milliGRAM(s) Oral at bedtime    MEDICATIONS  (PRN):  acetaminophen   Tablet .. 650 milliGRAM(s) Oral every 6 hours PRN Temp greater or equal to 38C (100.4F), Mild Pain (1 - 3)  ondansetron Injectable 4 milliGRAM(s) IV Push once PRN Nausea and/or Vomiting  oxyCODONE    IR 5 milliGRAM(s) Oral every 4 hours PRN Moderate Pain (4 - 6)  oxyCODONE    IR 10 milliGRAM(s) Oral every 4 hours PRN Severe Pain (7 - 10)

## 2019-08-03 NOTE — PROGRESS NOTE ADULT - SUBJECTIVE AND OBJECTIVE BOX
SUBJECTIVE: No acute events overnight. Feeling well this AM. No fevers/chills, no N/V.         MEDICATIONS  (STANDING):  amLODIPine   Tablet 10 milliGRAM(s) Oral daily  aspirin enteric coated 81 milliGRAM(s) Oral daily  cefepime   IVPB 2000 milliGRAM(s) IV Intermittent every 8 hours  docusate sodium 100 milliGRAM(s) Oral two times a day  enoxaparin Injectable 80 milliGRAM(s) SubCutaneous two times a day  gabapentin 300 milliGRAM(s) Oral three times a day  insulin lispro (HumaLOG) corrective regimen sliding scale   SubCutaneous Before meals and at bedtime  multivitamin 1 Tablet(s) Oral daily  senna 2 Tablet(s) Oral at bedtime  simvastatin 20 milliGRAM(s) Oral at bedtime  tamsulosin 0.4 milliGRAM(s) Oral at bedtime    MEDICATIONS  (PRN):  acetaminophen   Tablet .. 650 milliGRAM(s) Oral every 6 hours PRN Temp greater or equal to 38C (100.4F), Mild Pain (1 - 3)  ondansetron Injectable 4 milliGRAM(s) IV Push once PRN Nausea and/or Vomiting  oxyCODONE    IR 5 milliGRAM(s) Oral every 4 hours PRN Moderate Pain (4 - 6)  oxyCODONE    IR 10 milliGRAM(s) Oral every 4 hours PRN Severe Pain (7 - 10)      Vital Signs Last 24 Hrs  T(C): 37.1 (02 Aug 2019 23:47), Max: 37.2 (02 Aug 2019 16:17)  T(F): 98.7 (02 Aug 2019 23:47), Max: 98.9 (02 Aug 2019 16:17)  HR: 94 (02 Aug 2019 23:47) (63 - 94)  BP: 132/76 (02 Aug 2019 23:47) (120/79 - 132/76)  BP(mean): --  RR: 18 (02 Aug 2019 23:47) (18 - 18)  SpO2: 99% (02 Aug 2019 23:47) (97% - 100%)    PE  Constitutional: patient resting comfortably in bed, in no acute distress  HEENT: EOMI / PERRL b/l, no active drainage or redness  Neck: No JVD, full ROM without pain  Respiratory: respirations are unlabored, no accessory muscle use, no conversational dyspnea  Cardiovascular: regular rate & rhythm  Gastrointestinal: Abdomen soft, non-tender, non-distended, no rebound tenderness / guarding  Neurological: GCS: 15 (4/5/6). A&O x 3; no gross sensory / motor / coordination deficits  Psychiatric: Normal mood, normal affect  Musculoskeletal: No joint pain, swelling or deformity; no limitation of movement    I&O's Detail    01 Aug 2019 07:01  -  02 Aug 2019 07:00  --------------------------------------------------------  IN:  Total IN: 0 mL    OUT:    Indwelling Catheter - Urethral: 1450 mL  Total OUT: 1450 mL    Total NET: -1450 mL      02 Aug 2019 07:01  -  03 Aug 2019 00:42  --------------------------------------------------------  IN:  Total IN: 0 mL    OUT:    Indwelling Catheter - Urethral: 1400 mL  Total OUT: 1400 mL    Total NET: -1400 mL          LABS:                RADIOLOGY & ADDITIONAL STUDIES:

## 2019-08-03 NOTE — PROGRESS NOTE ADULT - ATTENDING COMMENTS
Seen and examined.     NAD  AAOx4, weakness with foot and toe movement on right.    RRR, no M/R/G, +s1/s2  non labored resp, CTA BL  soft abd  RLE perfused, palpable pulse, soft compartments    Successful bypass for traumatic popiteal artery injury.  ASA.  Acute blood loss anemia seems stabilized.  Will follow.  L lateral depressed tibial plateau fx.  Ortho for ORIF.  History with no exertional dyspnea or angina.  Does not perform strenuous exercise but can walk up 3-4 flights of stairs w/o symptoms.  ECG NSR, non specific ST changes, slight L axis deviation.  Will ask cardiology for periop risk stratification given abnormal ECG, non emergent nature of repair.  Right pneumarthrosis.  Washed out by vasc surg.    24 hrs abx.  Likely sciatic nerve injury to RLE.  Dr. Ron has evaluated pt.  Plan for exploration and possible repair next week.      Remove villegas.  ok for transfer to floor.
Dispo planning
Monitor fever curve. Plan to culture if repeat fevers.  Continue AC for DVT. wound care per plastics - seropurulent output noted per resident. Needs aggressive PT. villegas per urology for retention
Patient seen and examined.    Overnight events noted with fever, started on lovenox for RLE DVT.   No particular complaints this morning.  Denies SOB or difficulty breathing  Afebrile this morning, vitals stable, on room air  Not in acute distress  Abdomen is soft, nondistended, nontender  LLE splinted, palpable DP pulse  RLE graft site unveiled with good take, no movement of right toes, palpable DP    normal WBC  hyponatremia  hyperglycemia    A/P 68M RLE crush injury s/p fempop bypass, fasciotomy, L tib plateau fx, RLE DVT, hyponatremia, urinary retention  - continue full dose lovenox for DVT  - LLE splint to be maintained  - RLE graft site - wound care per plastics  - maintain villegas for urinary retention  - hyponatremia - monitor  - hyperglycemia - improving fingersticks  - PT/OT
Patient seen and examined.   pain under control.  asa  SERENITY on post obstructive polyuria now, follow PM electrolytes  Keep Min  appreciate nephrology input.
Patient seen and examined.  Maggie worsen, cont IVF avoid nephrotoxic drugs  CPK trending down  ASA/plavix
Patient seen and examined.  no new clinical issues  clinically stable for rehab at this time
Plan for OR tomorrow with PRS. continue abx per ID. lovenox for PE. on going PT/OT.
afebrile for past 24 hours. No complaints. Dressings  clean and intact of lower extremities. Need to complete course of abx. villegas for retention. needs PT.
avss   s/p washout of right knee with ortho yesterday  cx of knee : PA  ID following started rocephin  will need picc line  if clinically improves then will set up rehab this week.
avss  doing well clinically  cont with current care.
avss  rle wound no cellulites healing well, muscle viable  plan  PT/OT  f/u vascular  f/u plastics : planning for exploration next week for possible nerve injury.
complains of pain at operative site. afebrile. wound vac in place.   pain control  Lovenox for PE  follow up PRS plan  continue abx for septic joint
doing well clinically  avss  cont with current care.  f/u with consultants
doing well clinically  tm 100.3F  cont with current care   need to f/u with consults  on theraputic lovenox for rle dvt  f/u knee aspiration cx.
patient seen and examined.   no new issues  discharge planning
Agree with above assessment.  The patient was seen and examined by me.  The patient is without any complaints.  He had fever to 101.3 overnight.  The patient is without shortness of breath or cough.  The patient has a soft nontender abdomen, Right leg incisions are clean without any evidence of infection, the immobilizer remains in place.  The left leg is with a strong DP and PT pulses, brace is in place.  The patient is to continue with pain control, PT/OT, for B/L LE doppler today.
OR today with PRS
Patient seen and examined.  no new issues, polyuric, cr improved.  cont current care  OR with plastics tomorrow.
Patient working with PT. Eating well. Feels well overall. Low grade temp but overall fever curve trending down but still persistent. Abdomen is soft, ND, NT. RLE wound noted to have some purulent output per plastics note.   Concerns for knee swelling.  Plan for imaging studies today toevaluate for fluid collections and possible source of fevers  continue AC for DVT  On going PT
Pt is pOD 6 s/p excision and grafting of titbial/peroneal nerve injuries.    STSG with good take to lateral calf.  Staples removed.  Anterior knee skin flap is fluctuent with epidermolysis.  ZAMZAM clotted and removed.  Able to express 10-20cc seropurulent fluid, skin decompressed.  No pus.  Integra removed.  Open wound with viable tissue.  Anterior pocket irrigated and packed.    Recommend daily packing and wet to dry with Dakins to open wounds  Continue compression wrap, elevation, abx  D/c knee immobilizer  Conitnue supportive care
avss  doing well clinically  pending placement.
avss  on regular diet  left leg in knee immobilzor good sensation , palpable pulse  right leg: fasciotomy wound , muscle viable , soft compartments, good palpable pulses  labs: Cr rising despite decreasing cpk and with iv hydration, need to r/o intrisic and obstructive causes. place villegas , need b/l renal u/s, U/S eval for eosinophils , consult renal   plastic following planning wound exploration this week.  vascular following.
doing well clinically  cont with current care   f/u all consultants  will need rehab when cleared.
no clinical change. OR with PRS next week. continue abx per ID. PT.
Patient seen and examined.   low grade fever overnight.  Leukocyotis on the rise.  RLE calf edematous n, staples with  no erythema no drainage, pain idem.  cont to follow off antibiotics.  CXR today, UA cont with polyuria  lovenox.  PT
Patient seen and examined.  collection at knee on CT  plastic ans ortho to comment, possible OR
Patient seen  and examined  fever curve improved  had retention with mild increase in creatinine , follow repeat labs  PT  appreciate plastic and vascular
fever noted yesterday which has defervesced. No leukocytosis. no SOB.   NAD, AAOx3  abdomen soft, ND, NT  LLE splinted  RLE with good graft site    continue lovenox for DVT  monitor temperature curve  villegas per urology for retention  needs PT

## 2019-08-03 NOTE — DISCHARGE NOTE NURSING/CASE MANAGEMENT/SOCIAL WORK - NSDCDPATPORTLINK_GEN_ALL_CORE
You can access the [facility], offered by [facility], by registering with the following website: [email or web address]

## 2019-08-03 NOTE — PROGRESS NOTE ADULT - REASON FOR ADMISSION
Forklift crush to RLE

## 2019-08-06 PROBLEM — K21.9 GASTRO-ESOPHAGEAL REFLUX DISEASE WITHOUT ESOPHAGITIS: Chronic | Status: ACTIVE | Noted: 2019-06-25

## 2019-08-06 PROBLEM — E78.5 HYPERLIPIDEMIA, UNSPECIFIED: Chronic | Status: ACTIVE | Noted: 2019-06-25

## 2019-08-06 PROBLEM — Z00.00 ENCOUNTER FOR PREVENTIVE HEALTH EXAMINATION: Status: ACTIVE | Noted: 2019-08-06

## 2019-08-08 ENCOUNTER — APPOINTMENT (OUTPATIENT)
Dept: ORTHOPEDIC SURGERY | Facility: CLINIC | Age: 69
End: 2019-08-08
Payer: OTHER MISCELLANEOUS

## 2019-08-15 ENCOUNTER — APPOINTMENT (OUTPATIENT)
Dept: ORTHOPEDIC SURGERY | Facility: CLINIC | Age: 69
End: 2019-08-15
Payer: OTHER MISCELLANEOUS

## 2019-08-15 PROCEDURE — 99024 POSTOP FOLLOW-UP VISIT: CPT

## 2019-08-15 PROCEDURE — 73560 X-RAY EXAM OF KNEE 1 OR 2: CPT | Mod: LT

## 2019-08-15 NOTE — HISTORY OF PRESENT ILLNESS
[___ Weeks Post Op] : [unfilled] weeks post op [Xray (Date:___)] : [unfilled] Xray -  [Healed] : healed [Neuro Intact] : an unremarkable neurological exam [Vascular Intact] : ~T peripheral vascular exam normal [Hardware in Good Position] : hardware in good position [Doing Well] : is doing well [No Sign of Infection] : is showing no signs of infection [Adequate Pain Control] : has adequate pain control [Chills] : no chills [Fever] : no fever [Erythema] : not erythematous [Discharge] : absent of discharge [Swelling] : not swollen [Dehiscence] : not dehisced [de-identified] :  ORIF of left lateral tibial plateau fracture. 06/26/2019\par  Left knee arthrotomy with removal of loose bodies.\par  [de-identified] : this is the first postop visit for this 69-year-old male Status post open reduction internal fixation of the left lateral tibial plateau fracture on June 26, 2019. Patient has been in a rehabilitation facility and comes today with an aide  and his wife.  he is presently in a wheelchair since he has been nonweightbearing on his left lower extremity and is wearing a [place] Brace. He has been seen by Dr. Ron earlier today for skin graft to his right lower extremity.  Dr. Comer also saw patient in the office for his right [place] [id number]. He denies pain at this time. [de-identified] : left LE:   [de-identified] : xr right knee 2 views \par X-rays were reviewed by Dr. Dela Cruz [de-identified] : Patient should continue physical therapy, dvance to weight bear as tolerated left lower extremity, and start to wean out a [place] brace.  he should return to office in one month for repeat x-raysof his left knee

## 2019-09-16 ENCOUNTER — APPOINTMENT (OUTPATIENT)
Dept: ORTHOPEDIC SURGERY | Facility: CLINIC | Age: 69
End: 2019-09-16
Payer: OTHER MISCELLANEOUS

## 2019-09-16 VITALS
SYSTOLIC BLOOD PRESSURE: 113 MMHG | HEART RATE: 105 BPM | DIASTOLIC BLOOD PRESSURE: 69 MMHG | HEIGHT: 68 IN | WEIGHT: 145 LBS | BODY MASS INDEX: 21.98 KG/M2

## 2019-09-16 DIAGNOSIS — Z87.39 PERSONAL HISTORY OF OTHER DISEASES OF THE MUSCULOSKELETAL SYSTEM AND CONNECTIVE TISSUE: ICD-10-CM

## 2019-09-16 DIAGNOSIS — R27.9 UNSPECIFIED LACK OF COORDINATION: ICD-10-CM

## 2019-09-16 DIAGNOSIS — Z86.39 PERSONAL HISTORY OF OTHER ENDOCRINE, NUTRITIONAL AND METABOLIC DISEASE: ICD-10-CM

## 2019-09-16 DIAGNOSIS — Z87.898 PERSONAL HISTORY OF OTHER SPECIFIED CONDITIONS: ICD-10-CM

## 2019-09-16 PROCEDURE — 99024 POSTOP FOLLOW-UP VISIT: CPT

## 2019-09-16 PROCEDURE — 73560 X-RAY EXAM OF KNEE 1 OR 2: CPT | Mod: LT

## 2019-09-17 PROBLEM — Z87.39 HISTORY OF ARTHRITIS: Status: RESOLVED | Noted: 2019-09-16 | Resolved: 2019-09-17

## 2019-09-17 PROBLEM — Z86.39 HISTORY OF HYPERLIPIDEMIA: Status: RESOLVED | Noted: 2019-09-16 | Resolved: 2019-09-17

## 2019-09-17 PROBLEM — Z87.898 HISTORY OF GAIT DISORDER: Status: RESOLVED | Noted: 2019-09-16 | Resolved: 2019-09-17

## 2019-09-17 PROBLEM — R27.9 LACK OF COORDINATION: Status: RESOLVED | Noted: 2019-09-16 | Resolved: 2019-09-17

## 2019-09-17 RX ORDER — CEFEPIME HYDROCHLORIDE 2 G/1
2 INJECTION, POWDER, FOR SOLUTION INTRAMUSCULAR; INTRAVENOUS
Refills: 0 | Status: ACTIVE | COMMUNITY

## 2019-09-17 RX ORDER — LINAGLIPTIN AND METFORMIN HYDROCHLORIDE 2.5; 1 MG/1; MG/1
2.5-1 TABLET, FILM COATED, EXTENDED RELEASE ORAL
Refills: 0 | Status: ACTIVE | COMMUNITY

## 2019-09-17 RX ORDER — AMLODIPINE BESYLATE AND OLMESARTAN MEDOXOMIL 10; 40 MG/1; MG/1
10-40 TABLET, FILM COATED ORAL
Refills: 0 | Status: ACTIVE | COMMUNITY

## 2019-09-17 RX ORDER — DOCUSATE SODIUM 100 MG
100 TABLET ORAL
Refills: 0 | Status: ACTIVE | COMMUNITY

## 2019-09-17 RX ORDER — MULTIVITAMIN
TABLET ORAL
Refills: 0 | Status: ACTIVE | COMMUNITY

## 2019-09-17 RX ORDER — OXYCODONE HYDROCHLORIDE 5 MG/1
5 CAPSULE ORAL
Refills: 0 | Status: ACTIVE | COMMUNITY

## 2019-09-17 RX ORDER — ENOXAPARIN SODIUM 100 MG/ML
80 INJECTION SUBCUTANEOUS
Refills: 0 | Status: ACTIVE | COMMUNITY

## 2019-09-17 RX ORDER — OXYCODONE 10 MG/1
10 TABLET ORAL
Refills: 0 | Status: ACTIVE | COMMUNITY

## 2019-09-17 RX ORDER — GABAPENTIN 300 MG/1
300 CAPSULE ORAL
Refills: 0 | Status: ACTIVE | COMMUNITY

## 2019-09-17 RX ORDER — TAMSULOSIN HCL 0.4 MG
0.4 CAPSULE ORAL
Refills: 0 | Status: ACTIVE | COMMUNITY

## 2019-09-17 RX ORDER — ACETAMINOPHEN 325 MG/1
325 TABLET ORAL
Refills: 0 | Status: ACTIVE | COMMUNITY

## 2019-09-17 NOTE — HISTORY OF PRESENT ILLNESS
[Xray (Date:___)] : [unfilled] Xray -  [___ Months Post Op] : [unfilled] months post op [Healed] : healed [Clean/Dry/Intact] : clean, dry and intact [Discharge] : absent of discharge [Erythema] : not erythematous [Swelling] : not swollen [Dehiscence] : not dehisced [Neuro Intact] : an unremarkable neurological exam [Vascular Intact] : ~T peripheral vascular exam normal [Hardware in Good Position] : hardware in good position [Good Overall Alignment] : good overall alignment [Doing Well] : is doing well [No Sign of Infection] : is showing no signs of infection [Adequate Pain Control] : has adequate pain control [de-identified] : 69-year-old male Status post open reduction internal fixation of the left lateral tibial plateau fracture on June 26, 2019. Patient has been in a rehabilitation facility and comes today with his wife. he is presently in a wheelchair since he has been nonweightbearing on his right lower extremity. He has been seen by Dr. Ron for skin graft to his right lower extremity. Dr. Comer also saw patient in the office for his right knee I&D that was performed on 7/14/19. He denies pain at this time. Current symptoms include no chills and no fever.  [de-identified] : ORIF of left lateral tibial plateau fracture. 06/26/2019\par  Left knee arthrotomy with removal of loose bodies. [de-identified] : Physical Exam:\par General: Well appearing, no acute distress, A&O\par Neurologic: A&Ox3, No focal deficits\par Head: NCAT without abrasions, lacerations, or ecchymosis to head, face, or scalp\par Respiratory: Equal chest wall expansion bilaterally, no accessory muscle use\par Lymphatic: No lymphadenopathy palpated\par Skin: Warm and dry\par Psychiatric: Normal mood and affect\par \par Left lower extremity:\par Knee range of motion ° [de-identified] : 2 views of the left knee [de-identified] : The left lower extremity is doing quite well. The fracture is healing well. Previous MCL insufficiency appears to have healed as well. He can discontinue the [place] brace. He is allowed to weight-bear as tolerated on left lower extremity. The right lower extremity follow up with plastic surgery as well as vascular surgery for possible discussion of future care. For the left lower extremity, he may followup p.r.n.\par \par The patient was given the opportunity to ask questions and all questions were answered to their satisfaction.\par \par Maxwell Dela Cruz MD\par Orthopaedic Trauma Surgeon\par [facility]\par [facility] [place]\par \par \par \par

## 2019-09-26 ENCOUNTER — INPATIENT (INPATIENT)
Facility: HOSPITAL | Age: 69
LOS: 11 days | Discharge: HOME | DRG: 682 | End: 2019-10-08
Attending: INTERNAL MEDICINE | Admitting: STUDENT IN AN ORGANIZED HEALTH CARE EDUCATION/TRAINING PROGRAM
Payer: COMMERCIAL

## 2019-09-26 VITALS
HEIGHT: 69 IN | SYSTOLIC BLOOD PRESSURE: 120 MMHG | OXYGEN SATURATION: 99 % | RESPIRATION RATE: 18 BRPM | DIASTOLIC BLOOD PRESSURE: 66 MMHG | HEART RATE: 78 BPM | WEIGHT: 154.98 LBS | TEMPERATURE: 98 F

## 2019-09-26 LAB
APTT BLD: 68.3 SEC — HIGH (ref 27.5–36.3)
BASOPHILS # BLD AUTO: 0.02 K/UL — SIGNIFICANT CHANGE UP (ref 0–0.2)
BASOPHILS NFR BLD AUTO: 0.3 % — SIGNIFICANT CHANGE UP (ref 0–2)
BLD GP AB SCN SERPL QL: SIGNIFICANT CHANGE UP
EOSINOPHIL # BLD AUTO: 0.04 K/UL — SIGNIFICANT CHANGE UP (ref 0–0.5)
EOSINOPHIL NFR BLD AUTO: 0.6 % — SIGNIFICANT CHANGE UP (ref 0–6)
HCT VFR BLD CALC: 23.6 % — LOW (ref 39–50)
HGB BLD-MCNC: 7.2 G/DL — LOW (ref 13–17)
IMM GRANULOCYTES NFR BLD AUTO: 0.3 % — SIGNIFICANT CHANGE UP (ref 0–1.5)
INR BLD: 1.14 RATIO — SIGNIFICANT CHANGE UP (ref 0.88–1.16)
LYMPHOCYTES # BLD AUTO: 1.76 K/UL — SIGNIFICANT CHANGE UP (ref 1–3.3)
LYMPHOCYTES # BLD AUTO: 25.5 % — SIGNIFICANT CHANGE UP (ref 13–44)
MCHC RBC-ENTMCNC: 25 PG — LOW (ref 27–34)
MCHC RBC-ENTMCNC: 30.5 GM/DL — LOW (ref 32–36)
MCV RBC AUTO: 81.9 FL — SIGNIFICANT CHANGE UP (ref 80–100)
MONOCYTES # BLD AUTO: 0.55 K/UL — SIGNIFICANT CHANGE UP (ref 0–0.9)
MONOCYTES NFR BLD AUTO: 8 % — SIGNIFICANT CHANGE UP (ref 2–14)
NEUTROPHILS # BLD AUTO: 4.52 K/UL — SIGNIFICANT CHANGE UP (ref 1.8–7.4)
NEUTROPHILS NFR BLD AUTO: 65.3 % — SIGNIFICANT CHANGE UP (ref 43–77)
PLATELET # BLD AUTO: 520 K/UL — HIGH (ref 150–400)
PROTHROM AB SERPL-ACNC: 13.2 SEC — HIGH (ref 10–12.9)
RBC # BLD: 2.88 M/UL — LOW (ref 4.2–5.8)
RBC # FLD: 15.8 % — HIGH (ref 10.3–14.5)
WBC # BLD: 6.91 K/UL — SIGNIFICANT CHANGE UP (ref 3.8–10.5)
WBC # FLD AUTO: 6.91 K/UL — SIGNIFICANT CHANGE UP (ref 3.8–10.5)

## 2019-09-26 PROCEDURE — 93970 EXTREMITY STUDY: CPT | Mod: 26

## 2019-09-26 PROCEDURE — 99284 EMERGENCY DEPT VISIT MOD MDM: CPT | Mod: 25

## 2019-09-26 PROCEDURE — 93010 ELECTROCARDIOGRAM REPORT: CPT

## 2019-09-26 RX ORDER — CALCIUM CHLORIDE
1000 POWDER (GRAM) MISCELLANEOUS ONCE
Refills: 0 | Status: DISCONTINUED | OUTPATIENT
Start: 2019-09-26 | End: 2019-09-27

## 2019-09-26 RX ORDER — ACETAMINOPHEN 500 MG
650 TABLET ORAL ONCE
Refills: 0 | Status: COMPLETED | OUTPATIENT
Start: 2019-09-26 | End: 2019-09-26

## 2019-09-26 RX ADMIN — Medication 650 MILLIGRAM(S): at 23:43

## 2019-09-26 NOTE — ED ADULT NURSE NOTE - OBJECTIVE STATEMENT
Assumed pt care at 1815.  Pt c/o RLE swelling, immobility and pain to touch.  Pt states he has not had sensation in RLE since June 25th s/p surgery.  Pt also reporting abdominal pain with eating, no acute s/s of respiratory distress noted or reported at this time, will continue to monitor

## 2019-09-26 NOTE — CONSULT NOTE ADULT - ASSESSMENT
68yo male w/ hx DVT RLE and complicated RLE traumatic injury requiring popliteal bypass, nerve graft, and washout of right knee presenting w/ complaint of worsening pain and swelling in RLE. Patient's vascular and neurological exam findings of RLE unchanged compared to discharge 2 months ago. However, pt RLE swelling is concerning for hematoma moe given pt taking therapeutic Lovenox and ASA. Plan CT RLE to evaluate for collection. Furthermore, pt w/ complaint of abd pain, likely combination of constipation and pain from multiple injection site hematomas, however will obtain imaging out of an abundance of caution.     - f/u CBC/CMP/Mg/Phos  - f/u CTAP w/ RLE runoff  - final recs pending completion of workup of pt sx 68yo male w/ hx DVT RLE and complicated RLE traumatic injury requiring popliteal bypass, nerve graft, and washout of right knee presenting w/ complaint of worsening pain and swelling in RLE. Patient's vascular and neurological exam findings of RLE unchanged compared to discharge 2 months ago. However, pt RLE swelling is concerning for hematoma moe given pt taking therapeutic Lovenox and ASA. Plan CT RLE to evaluate for collection. Furthermore, pt w/ complaint of abd pain, likely combination of constipation and pain from multiple injection site hematomas, however will obtain imaging out of an abundance of caution.     - f/u CBC/CMP/Mg/Phos  - f/u CTAP w/ RLE runoff  - final recs pending completion of workup of pt sx     Addendum:   pt w/ Acute renal failure, hyperkalemia, and metabolic acidosis of unknown etiology. Very UNlikely that he has mesenteric ischemia as the patient.  does not exhibit pain out of proportion, bloody stools, or pneumatosis on CTAP and furthermore is on therapeutic Lovenox. Continue to reccomend CT abdomen and pelvis with IV contrast.  Regarding the patients RLE, CT noncon was unrevealing. Patient in need of CT w/ IV contrast of RLE to assess for drainable collection or hematoma.     vasc to continue to follow.

## 2019-09-26 NOTE — ED ADULT TRIAGE NOTE - CHIEF COMPLAINT QUOTE
"I had a blood clot in my leg and its getting more swollen now" BIBA from [facility] c/o RLE DVT per EMS with +swelling. Denies pain, takes blood thinner. Denies cp and sob

## 2019-09-26 NOTE — ED PROVIDER NOTE - OBJECTIVE STATEMENT
68 yo bm pmh s/p forklift injury to rt lower extremity with complicated course  sent in from [facility] for evaluation of increased swelling ol rt lower extremity; 68 yo bm pmh s/p forklift injury to rt lower extremity with complicated course  sent in from NH for evaluation of increased swelling ol rt lower extremity with outpatient doppler noted for DVT. pt also noted abdominal pain with decreased appetite; denies fever , nausea or vomiting; pt presently on lovenox ;

## 2019-09-26 NOTE — ED PROVIDER NOTE - NEUROLOGICAL MOTOR PLANTAR RIGHT
Noted on CT of the head/neck.  IV unasyn ordered.  Dr. Rubio, ENT, consulted for drainage.   WBC 11.3   Admit to medical and home when okay with Dr. Rubio on oral augmentin.    0/5 NO MUSCLE MOVEMENT.

## 2019-09-26 NOTE — ED PROVIDER NOTE - PROGRESS NOTE DETAILS
Ava URIARTE  0002  lab called with elevated Potassium; pt to have repeat  labs; ekg noted with peak T waves; will tx with calcium and place on monitor;

## 2019-09-26 NOTE — ED ADULT NURSE NOTE - CHIEF COMPLAINT QUOTE
"I had a blood clot in my leg and its getting more swollen now" BIBA from [facility] c/o RLE DVT per EMS with +swelling. Denies pain, takes blood thinner. Denies cp and sob

## 2019-09-26 NOTE — CONSULT NOTE ADULT - SUBJECTIVE AND OBJECTIVE BOX
Vascular Surgery Consult Note    HPI:   69yMale w/ PMH RLE DVT and injury to RLE requiring right above to below knee popliteal bypass, tibial nerve graft, stsg of fasciotomy site, and washout of septic joint presents on 09-26-19 w/ complaint of worsening RLE pain and swelling. 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 Pt stay complicated by septic right knee as well as RLE DVT. Pt was discharged on 8/1, at which time he was still w/o sensation or motor function distal to knee in RLE. Pt has continued to be in nursing home since that time. He presents to ED today w/ 5 day history of worsening swelling and pain in his RLE. Pt denies any associated trauma or falls. He describes his pain as an aching/burning sensation in his leg. Patient states he has not followed up w/ his vascular surgeon. Patient     PMH:  GERD (gastroesophageal reflux disease) [Active]  HLD (hyperlipidemia) [Active]  RLE DVT      PSH:  RLE above to below knee pop bypass w/ Left GSV interposition graft  RLE fasciotomy  RLE STSG  RLE tibial nerve graft  Washout right knee  ORIF LLE Tibial plateau fracture    Home Medications:  acetaminophen 325 mg oral tablet: 2 tab(s) orally every 6 hours, As needed, Temp greater or equal to 38C (100.4F), Mild Pain (1 - 3)  amLODIPine 10 mg oral tablet: 1 tab(s) orally once a day  aspirin 81 mg oral tablet, chewable: 1 tab(s) orally once a day  Luis Fernando 10 mg-40 mg oral tablet: 1 tab(s) orally once a day  cefepime 2 g intravenous injection: 2 gram(s) intravenous every 8 hours until 8/12  docusate sodium 100 mg oral capsule: 1 cap(s) orally 2 times a day  enoxaparin 40 mg/0.4 mL injectable solution: 80 milligram(s) subcutaneously 2 times a day   gabapentin 300 mg oral capsule: 1 cap(s) orally 3 times a day  Jentadueto 2.5 mg-1000 mg oral tablet: 1 tab(s) orally 2 times a day  Multiple Vitamins oral tablet: 1 tab(s) orally once a day  oxyCODONE 10 mg oral tablet: 1 tab(s) orally every 4 hours, As needed, Severe Pain (7 - 10)  oxyCODONE 5 mg oral tablet: 1 tab(s) orally every 4 hours, As needed, Moderate Pain (4 - 6)  senna oral tablet: 2 tab(s) orally once a day (at bedtime)  simvastatin 20 mg oral tablet: 1 tab(s) orally once a day (at bedtime)  tamsulosin 0.4 mg oral capsule: 1 cap(s) orally once a day (at bedtime)    ALL:  NKDA    FMH:  Denies family history of gastrointestinal malignancy     SOC Hx:   Denies etoh, tobacco, or drug use       Physical Exam:   Gen: well appearing male, NAD  Neuro: AOx3, EOMI, PERRLA, no gross deficit on exam  HEENT: No oral/mucosal lesions, no neck masses or lymphadenopathy  RESP: CTABL, nonlabored breathing  CVS: RRR,   GI: abd soft, Bilateral lower quadrant tenderness w/ multiple hematomas in abd wall,  mild distension, no rebound/guarding.   Extremities: RLE w/ swelling of anterior compartment, STSG over fasciotomy site near completely healed w/ only  small areas of ulceration, 2+ femoral pulse, Triphasic signals in RLE DP and PT. Continues to be w/o sensation below knee, unable to dorsiflex or plantarflex foot.

## 2019-09-26 NOTE — ED PROVIDER NOTE - CARE PLAN
Principal Discharge DX:	Leg swelling Principal Discharge DX:	Acute renal failure  Secondary Diagnosis:	Leg pain

## 2019-09-26 NOTE — ED ADULT NURSE NOTE - NSFALLRSKUNASSIST_ED_ALL_ED
Chief Complaint   Patient presents with   • Cough     Acute   • Sore Throat       Subjective   Eliz Eisenberg is a 31 y.o. female.       History of Present Illness     Pt woke up 3 days ago with vaginal yeast infection, has been treating it with monistat and it is improving.     Woke up 2 days ago with fever and cough. Tmax was 100.7 yesterday, was 100 this morning. Taking tylenol prn. Left ear feels clogged. No sick contacts. Takes zyrtec everyday.        Current Outpatient Medications:   •  albuterol (PROVENTIL HFA;VENTOLIN HFA) 108 (90 Base) MCG/ACT inhaler, Inhale 2 puffs Every 4 (Four) Hours As Needed for Wheezing., Disp: 1 inhaler, Rfl: 5  •  BREO ELLIPTA 100-25 MCG/INH aerosol powder , INHALE 1 PUFF ONCE DAILY, Disp: 60 each, Rfl: 0  •  cetirizine (zyrTEC) 5 MG tablet, Take 1 tablet by mouth Daily., Disp: , Rfl:   •  metFORMIN (GLUCOPHAGE) 500 MG tablet, Take 2 tablets by mouth in the morning and 1 in the evening with meals, Disp: 270 tablet, Rfl: 3  •  montelukast (SINGULAIR) 10 MG tablet, TAKE 1 TABLET BY MOUTH ONCE DAILY IN THE EVENING, Disp: 30 tablet, Rfl: 5  •  Multiple Vitamins-Minerals (MULTIVITAMIN PO), Take  by mouth., Disp: , Rfl:   •  nystatin-triamcinolone (MYCOLOG) 704798-8.1 UNIT/GM-% ointment, Apply  topically to the appropriate area as directed 2 (Two) Times a Day., Disp: 30 g, Rfl: 0  •  ondansetron (ZOFRAN) 4 MG tablet, , Disp: , Rfl:   •  ondansetron ODT (ZOFRAN-ODT) 4 MG disintegrating tablet, Take 1 tablet by mouth 4 (Four) Times a Day As Needed for Nausea., Disp: 16 tablet, Rfl: 0  •  Oxymetazoline HCl (NASAL SPRAY NA), into the nostril(s) as directed by provider., Disp: , Rfl:   •  Prenatal Vit-Fe Fumarate-FA (PRENATAL PO), Take  by mouth., Disp: , Rfl:   •  promethazine (PHENERGAN) 25 MG tablet, Take 1 tablet by mouth Every 4 (Four) Hours As Needed for nausea., Disp: 10 tablet, Rfl: 0  •  raNITIdine HCl (ZANTAC PO), Take  by mouth., Disp: , Rfl:   •  vitamin D (ERGOCALCIFEROL) 24374  units capsule capsule, Take 1 capsule by mouth 1 (One) Time Per Week., Disp: 12 capsule, Rfl: 3  •  fluconazole (DIFLUCAN) 150 MG tablet, Take 1 tablet by mouth Every 3 (Three) Days., Disp: 2 tablet, Rfl: 0     PMFSH  The following portions of the patient's history were reviewed and updated as appropriate: allergies, current medications, past family history, past medical history, past social history, past surgical history and problem list.    Review of Systems   Constitutional: Positive for fatigue. Negative for activity change and unexpected weight change.   HENT: Positive for congestion, postnasal drip and sore throat. Negative for ear pain.    Eyes: Negative for pain and discharge.   Respiratory: Positive for cough. Negative for chest tightness, shortness of breath and wheezing.    Cardiovascular: Negative for chest pain and palpitations.   Gastrointestinal: Negative for abdominal pain, diarrhea and vomiting.   Endocrine: Negative.    Genitourinary: Negative.    Musculoskeletal: Negative for joint swelling.   Skin: Negative for color change, rash and wound.   Allergic/Immunologic: Negative.    Neurological: Negative for seizures and syncope.   Psychiatric/Behavioral: Negative.        Objective   /90   Pulse 78   Temp 98.3 °F (36.8 °C)   Wt (!) 142 kg (313 lb 9.6 oz)   SpO2 99%   BMI 50.62 kg/m²     Physical Exam   Constitutional: She is oriented to person, place, and time. She appears well-developed and well-nourished.  Non-toxic appearance. No distress.   HENT:   Head: Normocephalic and atraumatic. Hair is normal.   Right Ear: External ear normal. No drainage, swelling or tenderness. Tympanic membrane is retracted.   Left Ear: External ear normal. No drainage, swelling or tenderness. Tympanic membrane is retracted.   Nose: Mucosal edema present. No epistaxis.   Mouth/Throat: Uvula is midline and mucous membranes are normal. No oral lesions. No uvula swelling. Posterior oropharyngeal erythema present.  No oropharyngeal exudate.   Eyes: Conjunctivae and EOM are normal. Pupils are equal, round, and reactive to light. Right eye exhibits no discharge. Left eye exhibits no discharge. No scleral icterus.   Neck: Normal range of motion. Neck supple.   Cardiovascular: Normal rate, regular rhythm and normal heart sounds. Exam reveals no gallop.   No murmur heard.  Pulmonary/Chest: Breath sounds normal. No stridor. No respiratory distress. She has no wheezes. She has no rales. She exhibits no tenderness.   Abdominal: Soft. There is no tenderness.   Lymphadenopathy:     She has cervical adenopathy.   Neurological: She is alert and oriented to person, place, and time. She exhibits normal muscle tone.   Skin: Skin is warm and dry. No rash noted. She is not diaphoretic.   Psychiatric: She has a normal mood and affect. Her behavior is normal. Judgment and thought content normal.   Nursing note and vitals reviewed.        ASSESSMENT/PLAN    Problem List Items Addressed This Visit     None      Visit Diagnoses     Acute URI    -  Primary    Use otc symptomatic care. Increase rest and fluids. Call or rtc if worsening or no improvement.    Relevant Orders    POCT Influenza A/B (Completed)    Vaginal yeast infection        Take diflucan as directed.    Relevant Medications    fluconazole (DIFLUCAN) 150 MG tablet               Return if symptoms worsen or fail to improve.   no

## 2019-09-27 DIAGNOSIS — N17.9 ACUTE KIDNEY FAILURE, UNSPECIFIED: ICD-10-CM

## 2019-09-27 DIAGNOSIS — I80.229 PHLEBITIS AND THROMBOPHLEBITIS OF UNSPECIFIED POPLITEAL VEIN: ICD-10-CM

## 2019-09-27 LAB
ALBUMIN SERPL ELPH-MCNC: 2.8 G/DL — LOW (ref 3.3–5.2)
ALBUMIN SERPL ELPH-MCNC: 2.9 G/DL — LOW (ref 3.3–5.2)
ALP SERPL-CCNC: 63 U/L — SIGNIFICANT CHANGE UP (ref 40–120)
ALP SERPL-CCNC: 69 U/L — SIGNIFICANT CHANGE UP (ref 40–120)
ALT FLD-CCNC: 13 U/L — SIGNIFICANT CHANGE UP
ALT FLD-CCNC: 9 U/L — SIGNIFICANT CHANGE UP
ANION GAP SERPL CALC-SCNC: 10 MMOL/L — SIGNIFICANT CHANGE UP (ref 5–17)
ANION GAP SERPL CALC-SCNC: 11 MMOL/L — SIGNIFICANT CHANGE UP (ref 5–17)
ANION GAP SERPL CALC-SCNC: 17 MMOL/L — SIGNIFICANT CHANGE UP (ref 5–17)
APPEARANCE UR: CLEAR — SIGNIFICANT CHANGE UP
APTT BLD: 41.7 SEC — HIGH (ref 27.5–36.3)
APTT BLD: 44.1 SEC — HIGH (ref 27.5–36.3)
AST SERPL-CCNC: 20 U/L — SIGNIFICANT CHANGE UP
AST SERPL-CCNC: 26 U/L — SIGNIFICANT CHANGE UP
BACTERIA # UR AUTO: ABNORMAL
BASOPHILS # BLD AUTO: 0.01 K/UL — SIGNIFICANT CHANGE UP (ref 0–0.2)
BASOPHILS # BLD AUTO: 0.01 K/UL — SIGNIFICANT CHANGE UP (ref 0–0.2)
BASOPHILS NFR BLD AUTO: 0.2 % — SIGNIFICANT CHANGE UP (ref 0–2)
BASOPHILS NFR BLD AUTO: 0.2 % — SIGNIFICANT CHANGE UP (ref 0–2)
BILIRUB SERPL-MCNC: 0.2 MG/DL — LOW (ref 0.4–2)
BILIRUB SERPL-MCNC: 0.3 MG/DL — LOW (ref 0.4–2)
BILIRUB UR-MCNC: NEGATIVE — SIGNIFICANT CHANGE UP
BLD GP AB SCN SERPL QL: SIGNIFICANT CHANGE UP
BUN SERPL-MCNC: 25 MG/DL — HIGH (ref 8–20)
BUN SERPL-MCNC: 61 MG/DL — HIGH (ref 8–20)
BUN SERPL-MCNC: 65 MG/DL — HIGH (ref 8–20)
CALCIUM SERPL-MCNC: 8 MG/DL — LOW (ref 8.6–10.2)
CALCIUM SERPL-MCNC: 8.2 MG/DL — LOW (ref 8.6–10.2)
CALCIUM SERPL-MCNC: 8.7 MG/DL — SIGNIFICANT CHANGE UP (ref 8.6–10.2)
CHLORIDE SERPL-SCNC: 102 MMOL/L — SIGNIFICANT CHANGE UP (ref 98–107)
CHLORIDE SERPL-SCNC: 94 MMOL/L — LOW (ref 98–107)
CHLORIDE SERPL-SCNC: 98 MMOL/L — SIGNIFICANT CHANGE UP (ref 98–107)
CO2 SERPL-SCNC: 18 MMOL/L — LOW (ref 22–29)
CO2 SERPL-SCNC: 20 MMOL/L — LOW (ref 22–29)
CO2 SERPL-SCNC: 27 MMOL/L — SIGNIFICANT CHANGE UP (ref 22–29)
COLOR SPEC: YELLOW — SIGNIFICANT CHANGE UP
CREAT SERPL-MCNC: 3.83 MG/DL — HIGH (ref 0.5–1.3)
CREAT SERPL-MCNC: 8.18 MG/DL — HIGH (ref 0.5–1.3)
CREAT SERPL-MCNC: 8.85 MG/DL — HIGH (ref 0.5–1.3)
DIFF PNL FLD: ABNORMAL
EOSINOPHIL # BLD AUTO: 0.11 K/UL — SIGNIFICANT CHANGE UP (ref 0–0.5)
EOSINOPHIL # BLD AUTO: 0.18 K/UL — SIGNIFICANT CHANGE UP (ref 0–0.5)
EOSINOPHIL NFR BLD AUTO: 2.3 % — SIGNIFICANT CHANGE UP (ref 0–6)
EOSINOPHIL NFR BLD AUTO: 3.3 % — SIGNIFICANT CHANGE UP (ref 0–6)
EPI CELLS # UR: SIGNIFICANT CHANGE UP
FIBRINOGEN PPP-MCNC: 610 MG/DL — HIGH (ref 350–510)
GAS PNL BLDA: SIGNIFICANT CHANGE UP
GLUCOSE BLDC GLUCOMTR-MCNC: 109 MG/DL — HIGH (ref 70–99)
GLUCOSE BLDC GLUCOMTR-MCNC: 151 MG/DL — HIGH (ref 70–99)
GLUCOSE BLDC GLUCOMTR-MCNC: 60 MG/DL — LOW (ref 70–99)
GLUCOSE BLDC GLUCOMTR-MCNC: 62 MG/DL — LOW (ref 70–99)
GLUCOSE BLDC GLUCOMTR-MCNC: 86 MG/DL — SIGNIFICANT CHANGE UP (ref 70–99)
GLUCOSE BLDC GLUCOMTR-MCNC: 91 MG/DL — SIGNIFICANT CHANGE UP (ref 70–99)
GLUCOSE BLDC GLUCOMTR-MCNC: 94 MG/DL — SIGNIFICANT CHANGE UP (ref 70–99)
GLUCOSE SERPL-MCNC: 39 MG/DL — CRITICAL LOW (ref 70–115)
GLUCOSE SERPL-MCNC: 80 MG/DL — SIGNIFICANT CHANGE UP (ref 70–115)
GLUCOSE SERPL-MCNC: 86 MG/DL — SIGNIFICANT CHANGE UP (ref 70–115)
GLUCOSE UR QL: 50 MG/DL
HAV IGM SER-ACNC: SIGNIFICANT CHANGE UP
HBV CORE AB SER-ACNC: REACTIVE
HBV CORE IGM SER-ACNC: SIGNIFICANT CHANGE UP
HBV SURFACE AB SER-ACNC: 96.6 MIU/ML — SIGNIFICANT CHANGE UP
HBV SURFACE AB SER-ACNC: REACTIVE
HBV SURFACE AG SER-ACNC: SIGNIFICANT CHANGE UP
HBV SURFACE AG SER-ACNC: SIGNIFICANT CHANGE UP
HCT VFR BLD CALC: 18.7 % — CRITICAL LOW (ref 39–50)
HCT VFR BLD CALC: 25.9 % — LOW (ref 39–50)
HCT VFR BLD CALC: 26.1 % — LOW (ref 39–50)
HCV AB S/CO SERPL IA: 0.19 S/CO — SIGNIFICANT CHANGE UP (ref 0–0.99)
HCV AB S/CO SERPL IA: 0.2 S/CO — SIGNIFICANT CHANGE UP (ref 0–0.99)
HCV AB SERPL-IMP: SIGNIFICANT CHANGE UP
HCV AB SERPL-IMP: SIGNIFICANT CHANGE UP
HGB BLD-MCNC: 5.7 G/DL — CRITICAL LOW (ref 13–17)
HGB BLD-MCNC: 8.3 G/DL — LOW (ref 13–17)
HGB BLD-MCNC: 8.5 G/DL — LOW (ref 13–17)
IMM GRANULOCYTES NFR BLD AUTO: 0.2 % — SIGNIFICANT CHANGE UP (ref 0–1.5)
IMM GRANULOCYTES NFR BLD AUTO: 0.4 % — SIGNIFICANT CHANGE UP (ref 0–1.5)
INR BLD: 1.03 RATIO — SIGNIFICANT CHANGE UP (ref 0.88–1.16)
INR BLD: 1.03 RATIO — SIGNIFICANT CHANGE UP (ref 0.88–1.16)
KETONES UR-MCNC: NEGATIVE — SIGNIFICANT CHANGE UP
LACTATE BLDV-MCNC: 4.3 MMOL/L — CRITICAL HIGH (ref 0.5–2)
LEUKOCYTE ESTERASE UR-ACNC: NEGATIVE — SIGNIFICANT CHANGE UP
LYMPHOCYTES # BLD AUTO: 0.79 K/UL — LOW (ref 1–3.3)
LYMPHOCYTES # BLD AUTO: 1.09 K/UL — SIGNIFICANT CHANGE UP (ref 1–3.3)
LYMPHOCYTES # BLD AUTO: 16.8 % — SIGNIFICANT CHANGE UP (ref 13–44)
LYMPHOCYTES # BLD AUTO: 20 % — SIGNIFICANT CHANGE UP (ref 13–44)
MAGNESIUM SERPL-MCNC: 2 MG/DL — SIGNIFICANT CHANGE UP (ref 1.6–2.6)
MAGNESIUM SERPL-MCNC: 2.5 MG/DL — SIGNIFICANT CHANGE UP (ref 1.6–2.6)
MCHC RBC-ENTMCNC: 24.8 PG — LOW (ref 27–34)
MCHC RBC-ENTMCNC: 26.5 PG — LOW (ref 27–34)
MCHC RBC-ENTMCNC: 26.9 PG — LOW (ref 27–34)
MCHC RBC-ENTMCNC: 30.5 GM/DL — LOW (ref 32–36)
MCHC RBC-ENTMCNC: 32 GM/DL — SIGNIFICANT CHANGE UP (ref 32–36)
MCHC RBC-ENTMCNC: 32.6 GM/DL — SIGNIFICANT CHANGE UP (ref 32–36)
MCV RBC AUTO: 81.3 FL — SIGNIFICANT CHANGE UP (ref 80–100)
MCV RBC AUTO: 82.6 FL — SIGNIFICANT CHANGE UP (ref 80–100)
MCV RBC AUTO: 82.7 FL — SIGNIFICANT CHANGE UP (ref 80–100)
MONOCYTES # BLD AUTO: 0.58 K/UL — SIGNIFICANT CHANGE UP (ref 0–0.9)
MONOCYTES # BLD AUTO: 0.6 K/UL — SIGNIFICANT CHANGE UP (ref 0–0.9)
MONOCYTES NFR BLD AUTO: 10.6 % — SIGNIFICANT CHANGE UP (ref 2–14)
MONOCYTES NFR BLD AUTO: 12.8 % — SIGNIFICANT CHANGE UP (ref 2–14)
NEUTROPHILS # BLD AUTO: 3.16 K/UL — SIGNIFICANT CHANGE UP (ref 1.8–7.4)
NEUTROPHILS # BLD AUTO: 3.58 K/UL — SIGNIFICANT CHANGE UP (ref 1.8–7.4)
NEUTROPHILS NFR BLD AUTO: 65.7 % — SIGNIFICANT CHANGE UP (ref 43–77)
NEUTROPHILS NFR BLD AUTO: 67.5 % — SIGNIFICANT CHANGE UP (ref 43–77)
NITRITE UR-MCNC: NEGATIVE — SIGNIFICANT CHANGE UP
PH UR: 8 — SIGNIFICANT CHANGE UP (ref 5–8)
PHOSPHATE SERPL-MCNC: 2.6 MG/DL — SIGNIFICANT CHANGE UP (ref 2.4–4.7)
PHOSPHATE SERPL-MCNC: 4.2 MG/DL — SIGNIFICANT CHANGE UP (ref 2.4–4.7)
PLATELET # BLD AUTO: 361 K/UL — SIGNIFICANT CHANGE UP (ref 150–400)
PLATELET # BLD AUTO: 375 K/UL — SIGNIFICANT CHANGE UP (ref 150–400)
PLATELET # BLD AUTO: 402 K/UL — HIGH (ref 150–400)
POTASSIUM SERPL-MCNC: 4.5 MMOL/L — SIGNIFICANT CHANGE UP (ref 3.5–5.3)
POTASSIUM SERPL-MCNC: 7.5 MMOL/L — CRITICAL HIGH (ref 3.5–5.3)
POTASSIUM SERPL-MCNC: 8.1 MMOL/L — CRITICAL HIGH (ref 3.5–5.3)
POTASSIUM SERPL-SCNC: 4.5 MMOL/L — SIGNIFICANT CHANGE UP (ref 3.5–5.3)
POTASSIUM SERPL-SCNC: 7.5 MMOL/L — CRITICAL HIGH (ref 3.5–5.3)
POTASSIUM SERPL-SCNC: 8.1 MMOL/L — CRITICAL HIGH (ref 3.5–5.3)
PROT SERPL-MCNC: 6.2 G/DL — LOW (ref 6.6–8.7)
PROT SERPL-MCNC: 6.5 G/DL — LOW (ref 6.6–8.7)
PROT UR-MCNC: 30 MG/DL
PROTHROM AB SERPL-ACNC: 11.9 SEC — SIGNIFICANT CHANGE UP (ref 10–12.9)
PROTHROM AB SERPL-ACNC: 11.9 SEC — SIGNIFICANT CHANGE UP (ref 10–12.9)
RBC # BLD: 2.3 M/UL — LOW (ref 4.2–5.8)
RBC # BLD: 3.13 M/UL — LOW (ref 4.2–5.8)
RBC # BLD: 3.16 M/UL — LOW (ref 4.2–5.8)
RBC # FLD: 15.9 % — HIGH (ref 10.3–14.5)
RBC # FLD: 16.2 % — HIGH (ref 10.3–14.5)
RBC # FLD: 16.4 % — HIGH (ref 10.3–14.5)
RBC CASTS # UR COMP ASSIST: ABNORMAL /HPF (ref 0–4)
SODIUM SERPL-SCNC: 129 MMOL/L — LOW (ref 135–145)
SODIUM SERPL-SCNC: 133 MMOL/L — LOW (ref 135–145)
SODIUM SERPL-SCNC: 135 MMOL/L — SIGNIFICANT CHANGE UP (ref 135–145)
SP GR SPEC: 1.01 — SIGNIFICANT CHANGE UP (ref 1.01–1.02)
UROBILINOGEN FLD QL: NEGATIVE MG/DL — SIGNIFICANT CHANGE UP
VIT B12 SERPL-MCNC: 382 PG/ML — SIGNIFICANT CHANGE UP (ref 232–1245)
WBC # BLD: 4.69 K/UL — SIGNIFICANT CHANGE UP (ref 3.8–10.5)
WBC # BLD: 5.45 K/UL — SIGNIFICANT CHANGE UP (ref 3.8–10.5)
WBC # BLD: 5.45 K/UL — SIGNIFICANT CHANGE UP (ref 3.8–10.5)
WBC # FLD AUTO: 4.69 K/UL — SIGNIFICANT CHANGE UP (ref 3.8–10.5)
WBC # FLD AUTO: 5.45 K/UL — SIGNIFICANT CHANGE UP (ref 3.8–10.5)
WBC # FLD AUTO: 5.45 K/UL — SIGNIFICANT CHANGE UP (ref 3.8–10.5)
WBC UR QL: SIGNIFICANT CHANGE UP

## 2019-09-27 PROCEDURE — 73700 CT LOWER EXTREMITY W/O DYE: CPT | Mod: 26,RT

## 2019-09-27 PROCEDURE — 75635 CT ANGIO ABDOMINAL ARTERIES: CPT | Mod: 26

## 2019-09-27 PROCEDURE — 74176 CT ABD & PELVIS W/O CONTRAST: CPT | Mod: 26

## 2019-09-27 PROCEDURE — 93010 ELECTROCARDIOGRAM REPORT: CPT

## 2019-09-27 PROCEDURE — 99223 1ST HOSP IP/OBS HIGH 75: CPT

## 2019-09-27 PROCEDURE — 71045 X-RAY EXAM CHEST 1 VIEW: CPT | Mod: 26,76

## 2019-09-27 PROCEDURE — 99255 IP/OBS CONSLTJ NEW/EST HI 80: CPT | Mod: 25

## 2019-09-27 PROCEDURE — 90937 HEMODIALYSIS REPEATED EVAL: CPT

## 2019-09-27 RX ORDER — SODIUM CHLORIDE 9 MG/ML
1000 INJECTION, SOLUTION INTRAVENOUS
Refills: 0 | Status: DISCONTINUED | OUTPATIENT
Start: 2019-09-27 | End: 2019-09-27

## 2019-09-27 RX ORDER — SODIUM POLYSTYRENE SULFONATE 4.1 MEQ/G
30 POWDER, FOR SUSPENSION ORAL ONCE
Refills: 0 | Status: COMPLETED | OUTPATIENT
Start: 2019-09-27 | End: 2019-09-27

## 2019-09-27 RX ORDER — SODIUM BICARBONATE 1 MEQ/ML
44.8 SYRINGE (ML) INTRAVENOUS ONCE
Refills: 0 | Status: COMPLETED | OUTPATIENT
Start: 2019-09-27 | End: 2019-09-27

## 2019-09-27 RX ORDER — DEXTROSE 50 % IN WATER 50 %
50 SYRINGE (ML) INTRAVENOUS ONCE
Refills: 0 | Status: COMPLETED | OUTPATIENT
Start: 2019-09-27 | End: 2019-09-27

## 2019-09-27 RX ORDER — TAMSULOSIN HYDROCHLORIDE 0.4 MG/1
0.4 CAPSULE ORAL AT BEDTIME
Refills: 0 | Status: DISCONTINUED | OUTPATIENT
Start: 2019-09-27 | End: 2019-09-28

## 2019-09-27 RX ORDER — SODIUM BICARBONATE 1 MEQ/ML
0.53 SYRINGE (ML) INTRAVENOUS
Qty: 150 | Refills: 0 | Status: DISCONTINUED | OUTPATIENT
Start: 2019-09-27 | End: 2019-09-27

## 2019-09-27 RX ORDER — SODIUM CHLORIDE 9 MG/ML
1000 INJECTION INTRAMUSCULAR; INTRAVENOUS; SUBCUTANEOUS
Refills: 0 | Status: DISCONTINUED | OUTPATIENT
Start: 2019-09-27 | End: 2019-09-27

## 2019-09-27 RX ORDER — HEPARIN SODIUM 5000 [USP'U]/ML
INJECTION INTRAVENOUS; SUBCUTANEOUS
Qty: 25000 | Refills: 0 | Status: DISCONTINUED | OUTPATIENT
Start: 2019-09-27 | End: 2019-09-28

## 2019-09-27 RX ORDER — LABETALOL HCL 100 MG
10 TABLET ORAL ONCE
Refills: 0 | Status: DISCONTINUED | OUTPATIENT
Start: 2019-09-27 | End: 2019-09-27

## 2019-09-27 RX ORDER — AMLODIPINE BESYLATE 2.5 MG/1
10 TABLET ORAL DAILY
Refills: 0 | Status: DISCONTINUED | OUTPATIENT
Start: 2019-09-27 | End: 2019-10-08

## 2019-09-27 RX ORDER — SODIUM CHLORIDE 9 MG/ML
1000 INJECTION INTRAMUSCULAR; INTRAVENOUS; SUBCUTANEOUS ONCE
Refills: 0 | Status: COMPLETED | OUTPATIENT
Start: 2019-09-27 | End: 2019-09-27

## 2019-09-27 RX ORDER — SODIUM CHLORIDE 9 MG/ML
2000 INJECTION INTRAMUSCULAR; INTRAVENOUS; SUBCUTANEOUS ONCE
Refills: 0 | Status: COMPLETED | OUTPATIENT
Start: 2019-09-27 | End: 2019-09-27

## 2019-09-27 RX ORDER — CALCIUM GLUCONATE 100 MG/ML
1 VIAL (ML) INTRAVENOUS ONCE
Refills: 0 | Status: COMPLETED | OUTPATIENT
Start: 2019-09-27 | End: 2019-09-27

## 2019-09-27 RX ORDER — DEXTROSE 50 % IN WATER 50 %
25 SYRINGE (ML) INTRAVENOUS ONCE
Refills: 0 | Status: COMPLETED | OUTPATIENT
Start: 2019-09-27 | End: 2019-09-27

## 2019-09-27 RX ORDER — HYDROMORPHONE HYDROCHLORIDE 2 MG/ML
0.5 INJECTION INTRAMUSCULAR; INTRAVENOUS; SUBCUTANEOUS ONCE
Refills: 0 | Status: DISCONTINUED | OUTPATIENT
Start: 2019-09-27 | End: 2019-09-27

## 2019-09-27 RX ORDER — INSULIN HUMAN 100 [IU]/ML
10 INJECTION, SOLUTION SUBCUTANEOUS ONCE
Refills: 0 | Status: DISCONTINUED | OUTPATIENT
Start: 2019-09-27 | End: 2019-09-27

## 2019-09-27 RX ORDER — INSULIN HUMAN 100 [IU]/ML
10 INJECTION, SOLUTION SUBCUTANEOUS ONCE
Refills: 0 | Status: COMPLETED | OUTPATIENT
Start: 2019-09-27 | End: 2019-09-27

## 2019-09-27 RX ORDER — DEXTROSE 50 % IN WATER 50 %
15 SYRINGE (ML) INTRAVENOUS ONCE
Refills: 0 | Status: COMPLETED | OUTPATIENT
Start: 2019-09-27 | End: 2019-09-27

## 2019-09-27 RX ORDER — SODIUM CHLORIDE 9 MG/ML
1000 INJECTION, SOLUTION INTRAVENOUS
Refills: 0 | Status: DISCONTINUED | OUTPATIENT
Start: 2019-09-27 | End: 2019-10-01

## 2019-09-27 RX ADMIN — SODIUM POLYSTYRENE SULFONATE 30 GRAM(S): 4.1 POWDER, FOR SUSPENSION ORAL at 01:09

## 2019-09-27 RX ADMIN — SODIUM CHLORIDE 75 MILLILITER(S): 9 INJECTION INTRAMUSCULAR; INTRAVENOUS; SUBCUTANEOUS at 07:39

## 2019-09-27 RX ADMIN — Medication 1 GRAM(S): at 01:15

## 2019-09-27 RX ADMIN — AMLODIPINE BESYLATE 10 MILLIGRAM(S): 2.5 TABLET ORAL at 18:16

## 2019-09-27 RX ADMIN — Medication 44.8 MILLIEQUIVALENT(S): at 00:56

## 2019-09-27 RX ADMIN — SODIUM CHLORIDE 75 MILLILITER(S): 9 INJECTION, SOLUTION INTRAVENOUS at 07:39

## 2019-09-27 RX ADMIN — Medication 650 MILLIGRAM(S): at 00:42

## 2019-09-27 RX ADMIN — Medication 50 MILLILITER(S): at 04:39

## 2019-09-27 RX ADMIN — HYDROMORPHONE HYDROCHLORIDE 0.5 MILLIGRAM(S): 2 INJECTION INTRAMUSCULAR; INTRAVENOUS; SUBCUTANEOUS at 04:42

## 2019-09-27 RX ADMIN — SODIUM CHLORIDE 1000 MILLILITER(S): 9 INJECTION INTRAMUSCULAR; INTRAVENOUS; SUBCUTANEOUS at 00:09

## 2019-09-27 RX ADMIN — Medication 15 GRAM(S): at 04:36

## 2019-09-27 RX ADMIN — SODIUM CHLORIDE 2000 MILLILITER(S): 9 INJECTION INTRAMUSCULAR; INTRAVENOUS; SUBCUTANEOUS at 05:57

## 2019-09-27 RX ADMIN — Medication 44.8 MILLIEQUIVALENT(S): at 04:44

## 2019-09-27 RX ADMIN — Medication 200 GRAM(S): at 04:45

## 2019-09-27 RX ADMIN — Medication 25 MILLILITER(S): at 02:29

## 2019-09-27 RX ADMIN — Medication 50 MILLILITER(S): at 00:56

## 2019-09-27 RX ADMIN — SODIUM CHLORIDE 1000 MILLILITER(S): 9 INJECTION INTRAMUSCULAR; INTRAVENOUS; SUBCUTANEOUS at 00:42

## 2019-09-27 RX ADMIN — HEPARIN SODIUM 1200 UNIT(S)/HR: 5000 INJECTION INTRAVENOUS; SUBCUTANEOUS at 18:15

## 2019-09-27 RX ADMIN — HYDROMORPHONE HYDROCHLORIDE 0.5 MILLIGRAM(S): 2 INJECTION INTRAMUSCULAR; INTRAVENOUS; SUBCUTANEOUS at 04:57

## 2019-09-27 RX ADMIN — TAMSULOSIN HYDROCHLORIDE 0.4 MILLIGRAM(S): 0.4 CAPSULE ORAL at 21:23

## 2019-09-27 RX ADMIN — Medication 200 GRAM(S): at 00:45

## 2019-09-27 RX ADMIN — SODIUM CHLORIDE 150 MILLILITER(S): 9 INJECTION, SOLUTION INTRAVENOUS at 03:13

## 2019-09-27 RX ADMIN — INSULIN HUMAN 10 UNIT(S): 100 INJECTION, SOLUTION SUBCUTANEOUS at 01:00

## 2019-09-27 NOTE — CHART NOTE - NSCHARTNOTEFT_GEN_A_CORE
CTA reviewed by attending. No evidence of mesenteric ischemia. No acute vascular changes. No surgical intervention warranted at present.    vascular team to sign off    please contact if further questions or concerns    Thank you for allowing us to participate in your patient's care.

## 2019-09-27 NOTE — PROGRESS NOTE ADULT - SUBJECTIVE AND OBJECTIVE BOX
ICU Vital Signs Last 24 Hrs,    T(C): 36.3 (27 Sep 2019 08:00), Max: 36.8 (27 Sep 2019 00:12)  T(F): 97.3 (27 Sep 2019 08:00), Max: 98.3 (27 Sep 2019 00:12)  HR: 87 (27 Sep 2019 07:00) (71 - 105)  BP: 122/63 (27 Sep 2019 07:00) (120/66 - 158/67)  BP(mean): 85 (27 Sep 2019 07:00) (84 - 98)  ABP: 145/54 (27 Sep 2019 07:00) (0/0 - 183/58)  ABP(mean): 79 (27 Sep 2019 07:00) (0 - 97)  RR: 11 (27 Sep 2019 07:00) (11 - 59)  SpO2: 100% (27 Sep 2019 07:00) (93% - 100%)    133<L>  |  102    |  61.0<H>  ----------------------------<  80     Ca:8.2<L> (27 Sep 2019 07:37)  7.5<HH>   |  20.0<L>  |  8.18<H>      eGFR if Non : 6 <L>  eGFR if : 7 <L>    TPro  6.2<L>  /  Alb  2.8<L>  /  TBili  0.2<L>  /  DBili  x      /  AST  20     /  ALT  9      /  AlkPhos  63     27 Sep 2019 07:37                         5.7<LL>  5.45  )-----------( 402<H>    ( 27 Sep 2019 07:37 )             18.7<LL>    Phos:5.3 mg/dL<H> M.9 mg/dL<H>    Urinalysis Basic - ( 27 Sep 2019 03:46 )  Color: Yellow / Appearance: Clear / S.010 / pH: x  Gluc: x / Ketone: Negative  / Bili: Negative / Urobili: Negative mg/dL   Blood: x / Protein: 30 mg/dL<!> / Nitrite: Negative   Leuk Esterase: Negative / RBC: 11-25 /HPF<!> / WBC 0-2   Sq Epi: x / Non Sq Epi: Occasional / Bacteria: Occasional<!>    D/W CESAR Flores  - SICU )    Needs PRBC Transfusion,     Acute HD ~ 9 AM, HD RNs Aware,    Dr. Hooks will see patient this AM, Consent to be obtained,

## 2019-09-27 NOTE — PATIENT PROFILE ADULT - NSPROGENDIFFINTUB_GEN_A_NUR
Post Sx today, already extubated Post Sx today, already extubated/previously intubated - no problems

## 2019-09-27 NOTE — H&P ADULT - ASSESSMENT
70yo male w/ hx DVT RLE and complicated RLE traumatic injury requiring popliteal bypass, nerve graft, and washout of right knee presenting w/ complaint of worsening pain and swelling in RLE. Pt found to have hyperkalemia, SERENITY and metabolic acidosis of unclear etiology. Hisabd pain seems to be focal to His abdominal wall hematomas.  Very UNlikely that he has mesenteric ischemia as the patient does not exhibit pain out of proportion, bloody stools, or pneumatosis on CTAP and furthermore is on therapeutic Lovenox.     - Plan shiley placement and HD today (spoke w/ illamathy)  - f/u urine lytes and UA  - Pt to get stat CTA of abd/pelvis to r/o ischemia, request CT w/ IV of RLE to evaluated collections.   - LEWIS's  - NPO w/ IVF  -  Admit to SICU under Dr. Benavides 68yo male w/ hx DVT RLE and complicated RLE traumatic injury requiring popliteal bypass, nerve graft, and washout of right knee presenting w/ complaint of worsening pain and swelling in RLE. Pt found to have hyperkalemia, SERENITY and metabolic acidosis of unclear etiology. Hisabd pain seems to be focal to His abdominal wall hematomas.  Very UNlikely that he has mesenteric ischemia as the patient does not exhibit pain out of proportion, bloody stools, or pneumatosis on CTAP and furthermore is on therapeutic Lovenox.     - Plan shiley placement and HD today (spoke w/ illamathy)  - f/u urine lytes and UA  - Hep GTT  - Pt to get stat CTA of abd/pelvis to r/o ischemia, request CT w/ IV of RLE to evaluated collections.   - LEWIS's  - NPO w/ IVF  -  Admit to SICU under Dr. Benavides

## 2019-09-27 NOTE — H&P ADULT - HISTORY OF PRESENT ILLNESS
Trauma and Acute Care Surgery Admit Note    HPI:   69yMale w/ PMH RLE DVT and injury to RLE requiring right above to below knee popliteal bypass, tibial nerve graft, stsg of fasciotomy site, and washout of septic joint presents on 09-26-19 w/ complaint of worsening RLE pain and swelling. 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 Pt stay complicated by septic right knee as well as RLE DVT. Pt was discharged on 8/1, at which time he was still w/o sensation or motor function distal to knee in RLE. Pt has continued to be in nursing home since that time. He presents to ED today w/ 5 day history of worsening swelling and pain in his RLE. Pt denies any associated trauma or falls. He describes his pain as an aching/burning sensation in his leg. Patient states he has not followed up w/ his vascular surgeon. Patient also has some abdominal pain, it appears to be associated w/ small hematomas formed by his Lovenox injections. He denies any postprandial pain.       PMH:  GERD (gastroesophageal reflux disease) [Active]  HLD (hyperlipidemia) [Active]  RLE DVT      PSH:  RLE above to below knee pop bypass w/ Left GSV interposition graft  RLE fasciotomy  RLE STSG  RLE tibial nerve graft  Washout right knee  ORIF LLE Tibial plateau fracture    Home Medications:  acetaminophen 325 mg oral tablet: 2 tab(s) orally every 6 hours, As needed, Temp greater or equal to 38C (100.4F), Mild Pain (1 - 3)  amLODIPine 10 mg oral tablet: 1 tab(s) orally once a day  aspirin 81 mg oral tablet, chewable: 1 tab(s) orally once a day  Luis Fernando 10 mg-40 mg oral tablet: 1 tab(s) orally once a day  cefepime 2 g intravenous injection: 2 gram(s) intravenous every 8 hours until 8/12  docusate sodium 100 mg oral capsule: 1 cap(s) orally 2 times a day  enoxaparin 40 mg/0.4 mL injectable solution: 80 milligram(s) subcutaneously 2 times a day   gabapentin 300 mg oral capsule: 1 cap(s) orally 3 times a day  Jentadueto 2.5 mg-1000 mg oral tablet: 1 tab(s) orally 2 times a day  Multiple Vitamins oral tablet: 1 tab(s) orally once a day  oxyCODONE 10 mg oral tablet: 1 tab(s) orally every 4 hours, As needed, Severe Pain (7 - 10)  oxyCODONE 5 mg oral tablet: 1 tab(s) orally every 4 hours, As needed, Moderate Pain (4 - 6)  senna oral tablet: 2 tab(s) orally once a day (at bedtime)  simvastatin 20 mg oral tablet: 1 tab(s) orally once a day (at bedtime)  tamsulosin 0.4 mg oral capsule: 1 cap(s) orally once a day (at bedtime)    ALL:  NKDA    FMH:  Denies family history of gastrointestinal malignancy     SOC Hx:   Denies etoh, tobacco, or drug use       Physical Exam:   Gen: well appearing male, NAD  Neuro: AOx3, EOMI, PERRLA, no gross deficit on exam  HEENT: No oral/mucosal lesions, no neck masses or lymphadenopathy  RESP: CTABL, nonlabored breathing  CVS: RRR,   GI: abd soft, Bilateral lower quadrant tenderness w/ multiple hematomas in abd wall,  mild distension, no rebound/guarding.   Extremities: RLE w/ swelling of anterior compartment, STSG over fasciotomy site near completely healed w/ only  small areas of ulceration, 2+ femoral pulse, Triphasic signals in RLE DP and PT. Continues to be w/o sensation below knee, unable to dorsiflex or plantarflex foot.     PMH:  GERD (gastroesophageal reflux disease) [Active]  HLD (hyperlipidemia) [Active]    ***    PSH:  No significant past surgical history    ***    Home Medications:  acetaminophen 325 mg oral tablet: 2 tab(s) orally every 6 hours, As needed, Temp greater or equal to 38C (100.4F), Mild Pain (1 - 3)  amLODIPine 10 mg oral tablet: 1 tab(s) orally once a day  aspirin 81 mg oral tablet, chewable: 1 tab(s) orally once a day  Luis Fernando 10 mg-40 mg oral tablet: 1 tab(s) orally once a day  cefepime 2 g intravenous injection: 2 gram(s) intravenous every 8 hours until 8/12  docusate sodium 100 mg oral capsule: 1 cap(s) orally 2 times a day  enoxaparin 40 mg/0.4 mL injectable solution: 80 milligram(s) subcutaneously 2 times a day   gabapentin 300 mg oral capsule: 1 cap(s) orally 3 times a day  Jentadueto 2.5 mg-1000 mg oral tablet: 1 tab(s) orally 2 times a day  Multiple Vitamins oral tablet: 1 tab(s) orally once a day  oxyCODONE 10 mg oral tablet: 1 tab(s) orally every 4 hours, As needed, Severe Pain (7 - 10)  oxyCODONE 5 mg oral tablet: 1 tab(s) orally every 4 hours, As needed, Moderate Pain (4 - 6)  senna oral tablet: 2 tab(s) orally once a day (at bedtime)  simvastatin 20 mg oral tablet: 1 tab(s) orally once a day (at bedtime)  tamsulosin 0.4 mg oral capsule: 1 cap(s) orally once a day (at bedtime)    ***    ALL:      FMH:  Denies family history of gastrointestinal malignancy   ***    SOC Hx:   Denies etoh, tobacco, or drug use   ***    Physical Exam:   Gen: well appearing male, NAD  Neuro: AOx3, EOMI, PERRLA, no gross deficit on exam  HEENT: No oral/mucosal lesions, no neck masses or lymphadenopathy  RESP: CTABL, nonlabored breathing  CVS: RRR,   GI: abd soft, NT, ND, no rebound/guarding  Extremities: 2+ peripheral pulses

## 2019-09-27 NOTE — H&P ADULT - ATTENDING COMMENTS
pt states 3 days prior of not feeling well , complaints in leg pain right, and developed abd pain.  is on therapeutic lovenox for rle dvt.  afebrile  abd soft , no rebound mild general ttp, no pain out of proportion . non peritoneal.  labs reviewed. :serenity , acidosis, elevated K+  ct reviewed, no free air, no dilated bowel, no pneumatosis, but done without iv contrast.   plan  SERENITY of unknown cause.  pt needs icu level of care for serenity , needs villegas, treat elevated k+, need renal for possible hd (acidosis and k+).  repeat labs  serial abd exams  vascular following for rle ( previous work accident ,with transection of pop a, vein and nerve.)

## 2019-09-27 NOTE — PROCEDURE NOTE - NSINDICATIONS_GEN_A_CORE
critical illness/emergency venous access/venous access/volume resuscitation
dialysis/CRRT/critical illness
monitoring purposes/arterial puncture to obtain ABG's

## 2019-09-27 NOTE — ED ADULT NURSE REASSESSMENT NOTE - NS ED NURSE REASSESS COMMENT FT1
US guided IV placed by MD rodriguez right AC.  dressing c/d/i blood return noted flushes w/o difficutly
alicia arce number Mary Darshana [phone]
temp sensing villegas placed by 2 RN aseptic technique maintained, 800 ml of clear yellow urine output.
Pt A&Ox4 c/o pain to lower leg at this time. Pt resting comfortably, VSS, Labs drawn and sent unable to obtain access, SICU CESAR SEGUNDO at bedside to transport pt to SICU 20, for emergent HD.

## 2019-09-27 NOTE — PROCEDURE NOTE - NSPROCDETAILS_GEN_ALL_CORE
sterile dressing applied/guidewire recovered/sterile technique, catheter placed/lumen(s) aspirated and flushed/ultrasound guidance
sutured in place/hemostasis with direct pressure, dressing applied/Seldinger technique/location identified, draped/prepped, sterile technique used, needle inserted/introduced/connected to a pressurized flush line/positive blood return obtained via catheter/all materials/supplies accounted for at end of procedure/ultrasound guidance
guidewire recovered/lumen(s) aspirated and flushed/sterile dressing applied/sterile technique, catheter placed

## 2019-09-27 NOTE — PROCEDURE NOTE - NSPOSTPRCRAD_GEN_A_CORE
no pneumothorax/post-procedure radiography performed/central line located in the superior vena cava
no pneumothorax/post-procedure radiography performed/central line located in the superior vena cava

## 2019-09-27 NOTE — PROCEDURE NOTE - NSICDXPROCEDURE_GEN_ALL_CORE_FT
PROCEDURES:  Insertion of central venous catheter without tunnel for patient age 5 years or older 27-Sep-2019 07:23:06  Mark Good
PROCEDURES:  Insertion, catheter, dialysis, temporary 27-Sep-2019 07:58:02  Mark Good  Placement of arterial line with imaging guidance 27-Sep-2019 07:53:51  Mark Good  Insertion of central venous catheter without tunnel for patient age 5 years or older 27-Sep-2019 07:23:06  Mark Good
PROCEDURES:  Placement of arterial line with imaging guidance 27-Sep-2019 07:53:51  Mark Good  Insertion of central venous catheter without tunnel for patient age 5 years or older 27-Sep-2019 07:23:06  Mark Good

## 2019-09-27 NOTE — CHART NOTE - NSCHARTNOTEFT_GEN_A_CORE
Pt will receive IV contrast for evaluation for possible bowel ischemia.  Dialysis catheter has been placed to R IJ and confirmed with chest XR.  Pt will receive emergent dialysis post CT in SICU by Dr. Queen.

## 2019-09-27 NOTE — PATIENT PROFILE ADULT - VISION (WITH CORRECTIVE LENSES IF THE PATIENT USUALLY WEARS THEM):
Prescription Glasses/Normal vision: sees adequately in most situations; can see medication labels, newsprint

## 2019-09-27 NOTE — CONSULT NOTE ADULT - SUBJECTIVE AND OBJECTIVE BOX
[facility] DIVISION OF KIDNEY DISEASES AND HYPERTENSION -- INITIAL CONSULT NOTE  --------------------------------------------------------------------------------  HPI:  69y Male w/ PMH RLE DVT and injury to RLE requiring right above to below knee popliteal bypass, tibial nerve graft, stsg of fasciotomy site, and washout of septic joint presents on 09-26-19 w/ complaint of worsening RLE pain and swelling. 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 Pt stay complicated by septic right knee as well as RLE DVT. Pt was discharged on 8/1, at which time he was still w/o sensation or motor function distal to knee in RLE. Pt has continued to be in nursing home since that time. He presents to ED today w/ 5 day history of worsening swelling and pain in his RLE. Pt denies any associated trauma or falls. He describes his pain as an aching/burning sensation in his leg. Patient states he has not followed up w/ his vascular surgeon. Patient also has some abdominal pain, it appears to be associated w/ small hematomas formed by his Lovenox injections. He denies any postprandial pain.   Seen / examined in SICU this AM ; on dialysis; consented for HD; villegas in place draining urine; pt reports feeling somewhat better. Was hyponatremic; severely dry on exam.      PAST HISTORY  --------------------------------------------------------------------------------  PAST MEDICAL & SURGICAL HISTORY:  GERD (gastroesophageal reflux disease)  HLD (hyperlipidemia)  No significant past surgical history    FAMILY HISTORY:    PAST SOCIAL HISTORY:    ALLERGIES & MEDICATIONS  --------------------------------------------------------------------------------  Allergies    No Known Allergies    Intolerances      Standing Inpatient Medications  dextrose 5% 1000 milliLiter(s) IV Continuous <Continuous>    PRN Inpatient Medications      REVIEW OF SYSTEMS  --------------------------------------------------------------------------------  Gen: + weakness; fatigue  Skin: No rashes  Head/Eyes/Ears/Mouth: No headache; Normal hearing; Normal vision w/o blurriness; No sinus pain/discomfort, sore throat  Respiratory: No dyspnea, cough, wheezing, hemoptysis  CV: No chest pain, PND, orthopnea  GI: + abd pain mild  : No increased frequency, dysuria, hematuria, nocturia  MSK: No joint pain/swelling; no back pain; no edema  Neuro: No dizziness/lightheadedness, weakness, seizures, numbness, tingling  Heme: No easy bruising or bleeding  Endo: No heat/cold intolerance  Psych: No significant nervousness, anxiety, stress, depression    All other systems were reviewed and are negative, except as noted.    VITALS/PHYSICAL EXAM  --------------------------------------------------------------------------------  T(C): 36.5 (09-27-19 @ 12:55), Max: 36.8 (09-27-19 @ 00:12)  HR: 107 (09-27-19 @ 13:00) (71 - 107)  BP: 146/74 (09-27-19 @ 13:00) (120/66 - 158/67)  RR: 16 (09-27-19 @ 13:00) (11 - 59)  SpO2: 99% (09-27-19 @ 13:00) (93% - 100%)  Wt(kg): --  Height (cm): 175 (09-27-19 @ 08:00)  Weight (kg): 70 (09-27-19 @ 08:00)  BMI (kg/m2): 22.9 (09-27-19 @ 08:00)  BSA (m2): 1.85 (09-27-19 @ 08:00)      09-26-19 @ 07:01  -  09-27-19 @ 07:00  --------------------------------------------------------  IN: 1400 mL / OUT: 1425 mL / NET: -25 mL    09-27-19 @ 07:01  -  09-27-19 @ 14:04  --------------------------------------------------------  IN: 1172 mL / OUT: 1240 mL / NET: -68 mL      Physical Exam:  Gen: well appearing male, NAD  Neuro: AOx3, EOMI, PERRLA, no gross deficit on exam  HEENT: No oral/mucosal lesions, no neck masses or lymphadenopathy  RESP: CTABL, nonlabored breathing  CVS: RRR,   GI: abd soft, Bilateral lower quadrant tenderness w/ multiple hematomas in abd wall,  mild distension, no rebound/guarding.   Extremities: RLE w/ swelling of anterior compartment, STSG over fasciotomy site near completely healed w/ only  small areas of ulceration, 2+ femoral pulse, Triphasic signals in RLE DP and PT. Continues to be w/o sensation below knee, unable to dorsiflex or plantarflex foot.   : Pako montoya urine    LABS/STUDIES  --------------------------------------------------------------------------------              5.7    5.45  >-----------<  402      [09-27-19 @ 07:37]              18.7     133  |  102  |  61.0  ----------------------------<  80      [09-27-19 @ 07:37]  7.5   |  20.0  |  8.18        Ca     8.2     [09-27-19 @ 07:37]      Mg     2.5     [09-27-19 @ 07:37]      Phos  4.2     [09-27-19 @ 07:37]    TPro  6.2  /  Alb  2.8  /  TBili  0.2  /  DBili  x   /  AST  20  /  ALT  9   /  AlkPhos  63  [09-27-19 @ 07:37]    PT/INR: PT 11.9 , INR 1.03       [09-27-19 @ 11:41]  PTT: 44.1       [09-27-19 @ 11:41]          [09-26-19 @ 23:42]    Creatinine Trend:  SCr 8.18 [09-27 @ 07:37]  SCr 8.85 [09-27 @ 03:36]  SCr 9.09 [09-26 @ 23:42]    Urinalysis - [09-27-19 @ 03:46]      Color Yellow / Appearance Clear / SG 1.010 / pH 8.0      Gluc 50 / Ketone Negative  / Bili Negative / Urobili Negative       Blood Large / Protein 30 / Leuk Est Negative / Nitrite Negative      RBC 11-25 / WBC 0-2 / Hyaline  / Gran  / Sq Epi  / Non Sq Epi Occasional / Bacteria Occasional      HbA1c 5.9      [06-26-19 @ 05:14]    HCV 0.12, Nonreact      [06-26-19 @ 20:00]

## 2019-09-27 NOTE — PATIENT PROFILE ADULT - FALL HARM RISK
bones(Osteoporosis,prev fx,steroid use,metastatic bone ca)/surgery/coagulation(Bleeding disorder R/T clinical cond/anti-coags)

## 2019-09-27 NOTE — CONSULT NOTE ADULT - ASSESSMENT
1) ATN  2) Acidosis  3) Hyperkalemia  4) Anemia    Transfuse PRBC on HD  Venofer 100mg daily x 5 doses if no bacterial infection suspected;  Hyperkalemia in the setting of decreased distal delivery of Na; severely dry; hyponatremic and hypovolemic;  Would start 1/2 NS with 75meq of NaHCO3 after HD @ 75cc/hr  Trend BMP  Trend urine output; maintain villegas;    d/w Dr Orozco on rounds 1) ATN  2) Acidosis  3) Hyperkalemia  4) Anemia    Transfuse PRBC on HD  Venofer 100mg daily x 5 doses if no bacterial infection suspected;  Hyperkalemia in the setting of decreased distal delivery of Na; severely dry; hyponatremic and hypovolemic;  Would start 1/2 NS with 75meq of NaHCO3 after HD @ 75cc/hr  Trend BMP  Trend urine output; maintain villegas;  Was here in July with acute urinary retention; obstructive uropathy  Double up on flomax 0.8mg qhs  Start finasteride 5mg daily  Consider urology evaluation;    d/w Dr Orozco on rounds

## 2019-09-27 NOTE — PROCEDURE NOTE - NSSITEPREP_SKIN_A_CORE
Adherence to aseptic technique: hand hygiene prior to donning barriers (gown, gloves), don cap and mask, sterile drape over patient/chlorhexidine

## 2019-09-27 NOTE — PROGRESS NOTE ADULT - SUBJECTIVE AND OBJECTIVE BOX
INTERVAL HPI/OVERNIGHT EVENTS/SUBJECTIVE:  69yMale w/ PMH RLE DVT and injury to RLE requiring right above to below knee popliteal bypass, tibial nerve graft, stsg of fasciotomy site, and washout of septic joint presents on 19 w/ complaint of worsening RLE pain and swelling. 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 Pt stay complicated by septic right knee as well as RLE DVT. Pt was discharged on , at which time he was still w/o sensation or motor function distal to knee in RLE.   Presented to ED with RLE and abdominal pain  Awaiting CTA of a/p and RLE    ICU Vital Signs Last 24 Hrs  T(C): 36.5 (27 Sep 2019 09:35), Max: 36.8 (27 Sep 2019 00:12)  T(F): 97.7 (27 Sep 2019 09:35), Max: 98.3 (27 Sep 2019 00:12)  HR: 98 (27 Sep 2019 09:45) (71 - 105)  BP: 142/76 (27 Sep 2019 09:45) (120/66 - 158/67)  BP(mean): 97 (27 Sep 2019 09:00) (84 - 100)  ABP: 142/60 (27 Sep 2019 09:45) (0/0 - 183/58)  ABP(mean): 90 (27 Sep 2019 09:45) (0 - 101)  RR: 20 (27 Sep 2019 09:45) (11 - 59)  SpO2: 98% (27 Sep 2019 09:45) (93% - 100%)      I&O's Detail    26 Sep 2019 07:01  -  27 Sep 2019 07:00  --------------------------------------------------------  IN:    dextrose 5%: 75 mL    sodium chloride 0.45%: 150 mL    sodium chloride 0.9%: 75 mL    Solution: 100 mL    Solution: 1000 mL  Total IN: 1400 mL    OUT:    Indwelling Catheter - Urethral: 1425 mL  Total OUT: 1425 mL    Total NET: -25 mL      27 Sep 2019 07:01  -  27 Sep 2019 11:43  --------------------------------------------------------  IN:    dextrose 5%: 225 mL    sodium chloride 0.9%: 225 mL  Total IN: 450 mL    OUT:    Indwelling Catheter - Urethral: 625 mL  Total OUT: 625 mL    Total NET: -175 mL    ABG - ( 27 Sep 2019 06:44 )  pH, Arterial: 7.30  pH, Blood: x     /  pCO2: 43    /  pO2: 91    / HCO3: 20    / Base Excess: -4.6  /  SaO2: 97        MEDICATIONS  (STANDING):  dextrose 5% 1000 milliLiter(s) (75 mL/Hr) IV Continuous <Continuous>    MEDICATIONS  (PRN):      MISC:     PHYSICAL EXAM:  Gen: well appearing male, NAD  Neuro: AOx3,no gross deficit on exam  RESP: CTABL, nonlabored breathing  CVS: RRR,   GI: abd soft, Bilateral lower quadrant tenderness w/ multiple hematomas in abd wall,  mild distension, no rebound/guarding.   Extremities: RLE w/ swelling of anterior compartment, STSG over fasciotomy site near completely healed w/ only  small areas of ulceration, 2+ femoral pulse, Triphasic signals in RLE DP and PT. Continues to be w/o sensation below knee, unable to dorsiflex or plantarflex foot.     LABS:  CBC Full  -  ( 27 Sep 2019 07:37 )  WBC Count : 5.45 K/uL  RBC Count : 2.30 M/uL  Hemoglobin : 5.7 g/dL  Hematocrit : 18.7 %  Platelet Count - Automated : 402 K/uL  Mean Cell Volume : 81.3 fl  Mean Cell Hemoglobin : 24.8 pg  Mean Cell Hemoglobin Concentration : 30.5 gm/dL  Auto Neutrophil # : x  Auto Lymphocyte # : x  Auto Monocyte # : x  Auto Eosinophil # : x  Auto Basophil # : x  Auto Neutrophil % : x  Auto Lymphocyte % : x  Auto Monocyte % : x  Auto Eosinophil % : x  Auto Basophil % : x        133<L>  |  102  |  61.0<H>  ----------------------------<  80  7.5<HH>   |  20.0<L>  |  8.18<H>    Ca    8.2<L>      27 Sep 2019 07:37  Phos  4.2       Mg     2.5         TPro  6.2<L>  /  Alb  2.8<L>  /  TBili  0.2<L>  /  DBili  x   /  AST  20  /  ALT  9   /  AlkPhos  63      PT/INR - ( 26 Sep 2019 22:47 )   PT: 13.2 sec;   INR: 1.14 ratio         PTT - ( 26 Sep 2019 22:47 )  PTT:68.3 sec  Urinalysis Basic - ( 27 Sep 2019 03:46 )    Color: Yellow / Appearance: Clear / S.010 / pH: x  Gluc: x / Ketone: Negative  / Bili: Negative / Urobili: Negative mg/dL   Blood: x / Protein: 30 mg/dL / Nitrite: Negative   Leuk Esterase: Negative / RBC: 11-25 /HPF / WBC 0-2   Sq Epi: x / Non Sq Epi: Occasional / Bacteria: Occasional    LIVER FUNCTIONS - ( 27 Sep 2019 07:37 )  Alb: 2.8 g/dL / Pro: 6.2 g/dL / ALK PHOS: 63 U/L / ALT: 9 U/L / AST: 20 U/L / GGT: x           CARDIAC MARKERS ( 26 Sep 2019 23:42 )  x     / x     / 283 U/L / x     / 4.9 ng/mL    ASSESSMENT/PLAN:  69yMale presenting with: pt w/ Acute renal failure, hyperkalemia, and metabolic acidosis of unknown etiology.   -f/u CTA of a/p RLE runoff  -care per primary team  -[facility] to follow

## 2019-09-28 LAB
ALBUMIN SERPL ELPH-MCNC: 2.8 G/DL — LOW (ref 3.3–5.2)
ALP SERPL-CCNC: 72 U/L — SIGNIFICANT CHANGE UP (ref 40–120)
ALT FLD-CCNC: 15 U/L — SIGNIFICANT CHANGE UP
ANION GAP SERPL CALC-SCNC: 12 MMOL/L — SIGNIFICANT CHANGE UP (ref 5–17)
ANION GAP SERPL CALC-SCNC: 13 MMOL/L — SIGNIFICANT CHANGE UP (ref 5–17)
ANION GAP SERPL CALC-SCNC: 14 MMOL/L — SIGNIFICANT CHANGE UP (ref 5–17)
APPEARANCE UR: CLEAR — SIGNIFICANT CHANGE UP
APTT BLD: 101.4 SEC — HIGH (ref 27.5–36.3)
APTT BLD: 123.3 SEC — CRITICAL HIGH (ref 27.5–36.3)
APTT BLD: 89 SEC — HIGH (ref 27.5–36.3)
APTT BLD: 99.8 SEC — HIGH (ref 27.5–36.3)
AST SERPL-CCNC: 30 U/L — SIGNIFICANT CHANGE UP
BACTERIA # UR AUTO: NEGATIVE — SIGNIFICANT CHANGE UP
BASOPHILS # BLD AUTO: 0.02 K/UL — SIGNIFICANT CHANGE UP (ref 0–0.2)
BASOPHILS NFR BLD AUTO: 0.3 % — SIGNIFICANT CHANGE UP (ref 0–2)
BILIRUB SERPL-MCNC: 0.3 MG/DL — LOW (ref 0.4–2)
BILIRUB UR-MCNC: NEGATIVE — SIGNIFICANT CHANGE UP
BUN SERPL-MCNC: 26 MG/DL — HIGH (ref 8–20)
CALCIUM SERPL-MCNC: 7.8 MG/DL — LOW (ref 8.6–10.2)
CALCIUM SERPL-MCNC: 8 MG/DL — LOW (ref 8.6–10.2)
CALCIUM SERPL-MCNC: 8.1 MG/DL — LOW (ref 8.6–10.2)
CHLORIDE SERPL-SCNC: 101 MMOL/L — SIGNIFICANT CHANGE UP (ref 98–107)
CHLORIDE SERPL-SCNC: 101 MMOL/L — SIGNIFICANT CHANGE UP (ref 98–107)
CHLORIDE SERPL-SCNC: 99 MMOL/L — SIGNIFICANT CHANGE UP (ref 98–107)
CO2 SERPL-SCNC: 25 MMOL/L — SIGNIFICANT CHANGE UP (ref 22–29)
CO2 SERPL-SCNC: 26 MMOL/L — SIGNIFICANT CHANGE UP (ref 22–29)
CO2 SERPL-SCNC: 28 MMOL/L — SIGNIFICANT CHANGE UP (ref 22–29)
COLOR SPEC: YELLOW — SIGNIFICANT CHANGE UP
CREAT SERPL-MCNC: 4.54 MG/DL — HIGH (ref 0.5–1.3)
CREAT SERPL-MCNC: 4.69 MG/DL — HIGH (ref 0.5–1.3)
CREAT SERPL-MCNC: 4.72 MG/DL — HIGH (ref 0.5–1.3)
CULTURE RESULTS: NO GROWTH — SIGNIFICANT CHANGE UP
DIFF PNL FLD: ABNORMAL
EOSINOPHIL # BLD AUTO: 0.28 K/UL — SIGNIFICANT CHANGE UP (ref 0–0.5)
EOSINOPHIL NFR BLD AUTO: 4.2 % — SIGNIFICANT CHANGE UP (ref 0–6)
EPI CELLS # UR: SIGNIFICANT CHANGE UP
GLUCOSE BLDC GLUCOMTR-MCNC: 130 MG/DL — HIGH (ref 70–99)
GLUCOSE BLDC GLUCOMTR-MCNC: 145 MG/DL — HIGH (ref 70–99)
GLUCOSE BLDC GLUCOMTR-MCNC: 147 MG/DL — HIGH (ref 70–99)
GLUCOSE BLDC GLUCOMTR-MCNC: 159 MG/DL — HIGH (ref 70–99)
GLUCOSE BLDC GLUCOMTR-MCNC: 94 MG/DL — SIGNIFICANT CHANGE UP (ref 70–99)
GLUCOSE SERPL-MCNC: 100 MG/DL — SIGNIFICANT CHANGE UP (ref 70–115)
GLUCOSE SERPL-MCNC: 147 MG/DL — HIGH (ref 70–115)
GLUCOSE SERPL-MCNC: 92 MG/DL — SIGNIFICANT CHANGE UP (ref 70–115)
GLUCOSE UR QL: 100 MG/DL
HCT VFR BLD CALC: 27.7 % — LOW (ref 39–50)
HCT VFR BLD CALC: 28.2 % — LOW (ref 39–50)
HGB BLD-MCNC: 8.8 G/DL — LOW (ref 13–17)
HGB BLD-MCNC: 9.1 G/DL — LOW (ref 13–17)
IMM GRANULOCYTES NFR BLD AUTO: 0.3 % — SIGNIFICANT CHANGE UP (ref 0–1.5)
INR BLD: 1.11 RATIO — SIGNIFICANT CHANGE UP (ref 0.88–1.16)
INR BLD: 1.12 RATIO — SIGNIFICANT CHANGE UP (ref 0.88–1.16)
KETONES UR-MCNC: NEGATIVE — SIGNIFICANT CHANGE UP
LACTATE SERPL-SCNC: 0.7 MMOL/L — SIGNIFICANT CHANGE UP (ref 0.5–2)
LEUKOCYTE ESTERASE UR-ACNC: NEGATIVE — SIGNIFICANT CHANGE UP
LYMPHOCYTES # BLD AUTO: 1.86 K/UL — SIGNIFICANT CHANGE UP (ref 1–3.3)
LYMPHOCYTES # BLD AUTO: 27.8 % — SIGNIFICANT CHANGE UP (ref 13–44)
MAGNESIUM SERPL-MCNC: 2.1 MG/DL — SIGNIFICANT CHANGE UP (ref 1.6–2.6)
MCHC RBC-ENTMCNC: 26.4 PG — LOW (ref 27–34)
MCHC RBC-ENTMCNC: 26.8 PG — LOW (ref 27–34)
MCHC RBC-ENTMCNC: 31.8 GM/DL — LOW (ref 32–36)
MCHC RBC-ENTMCNC: 32.3 GM/DL — SIGNIFICANT CHANGE UP (ref 32–36)
MCV RBC AUTO: 82.9 FL — SIGNIFICANT CHANGE UP (ref 80–100)
MCV RBC AUTO: 83.2 FL — SIGNIFICANT CHANGE UP (ref 80–100)
MONOCYTES # BLD AUTO: 0.64 K/UL — SIGNIFICANT CHANGE UP (ref 0–0.9)
MONOCYTES NFR BLD AUTO: 9.6 % — SIGNIFICANT CHANGE UP (ref 2–14)
NEUTROPHILS # BLD AUTO: 3.88 K/UL — SIGNIFICANT CHANGE UP (ref 1.8–7.4)
NEUTROPHILS NFR BLD AUTO: 57.8 % — SIGNIFICANT CHANGE UP (ref 43–77)
NITRITE UR-MCNC: NEGATIVE — SIGNIFICANT CHANGE UP
OSMOLALITY UR: 263 MOSM/KG — LOW (ref 300–1000)
PH UR: 8 — SIGNIFICANT CHANGE UP (ref 5–8)
PHOSPHATE SERPL-MCNC: 3.4 MG/DL — SIGNIFICANT CHANGE UP (ref 2.4–4.7)
PLATELET # BLD AUTO: 397 K/UL — SIGNIFICANT CHANGE UP (ref 150–400)
PLATELET # BLD AUTO: 434 K/UL — HIGH (ref 150–400)
POTASSIUM SERPL-MCNC: 4.3 MMOL/L — SIGNIFICANT CHANGE UP (ref 3.5–5.3)
POTASSIUM SERPL-MCNC: 4.5 MMOL/L — SIGNIFICANT CHANGE UP (ref 3.5–5.3)
POTASSIUM SERPL-MCNC: 4.9 MMOL/L — SIGNIFICANT CHANGE UP (ref 3.5–5.3)
POTASSIUM SERPL-SCNC: 4.3 MMOL/L — SIGNIFICANT CHANGE UP (ref 3.5–5.3)
POTASSIUM SERPL-SCNC: 4.5 MMOL/L — SIGNIFICANT CHANGE UP (ref 3.5–5.3)
POTASSIUM SERPL-SCNC: 4.9 MMOL/L — SIGNIFICANT CHANGE UP (ref 3.5–5.3)
PROT SERPL-MCNC: 6.6 G/DL — SIGNIFICANT CHANGE UP (ref 6.6–8.7)
PROT UR-MCNC: 15 MG/DL
PROTHROM AB SERPL-ACNC: 12.8 SEC — SIGNIFICANT CHANGE UP (ref 10–12.9)
PROTHROM AB SERPL-ACNC: 12.9 SEC — SIGNIFICANT CHANGE UP (ref 10–12.9)
RBC # BLD: 3.33 M/UL — LOW (ref 4.2–5.8)
RBC # BLD: 3.4 M/UL — LOW (ref 4.2–5.8)
RBC # FLD: 16.1 % — HIGH (ref 10.3–14.5)
RBC # FLD: 16.3 % — HIGH (ref 10.3–14.5)
RBC CASTS # UR COMP ASSIST: ABNORMAL /HPF (ref 0–4)
SODIUM SERPL-SCNC: 138 MMOL/L — SIGNIFICANT CHANGE UP (ref 135–145)
SODIUM SERPL-SCNC: 140 MMOL/L — SIGNIFICANT CHANGE UP (ref 135–145)
SODIUM SERPL-SCNC: 141 MMOL/L — SIGNIFICANT CHANGE UP (ref 135–145)
SP GR SPEC: 1.01 — SIGNIFICANT CHANGE UP (ref 1.01–1.02)
SPECIMEN SOURCE: SIGNIFICANT CHANGE UP
UROBILINOGEN FLD QL: NEGATIVE MG/DL — SIGNIFICANT CHANGE UP
WBC # BLD: 6.38 K/UL — SIGNIFICANT CHANGE UP (ref 3.8–10.5)
WBC # BLD: 6.7 K/UL — SIGNIFICANT CHANGE UP (ref 3.8–10.5)
WBC # FLD AUTO: 6.38 K/UL — SIGNIFICANT CHANGE UP (ref 3.8–10.5)
WBC # FLD AUTO: 6.7 K/UL — SIGNIFICANT CHANGE UP (ref 3.8–10.5)
WBC UR QL: SIGNIFICANT CHANGE UP

## 2019-09-28 PROCEDURE — 99233 SBSQ HOSP IP/OBS HIGH 50: CPT

## 2019-09-28 RX ORDER — DEXTROSE 50 % IN WATER 50 %
15 SYRINGE (ML) INTRAVENOUS ONCE
Refills: 0 | Status: DISCONTINUED | OUTPATIENT
Start: 2019-09-28 | End: 2019-09-30

## 2019-09-28 RX ORDER — SODIUM CHLORIDE 9 MG/ML
1000 INJECTION, SOLUTION INTRAVENOUS
Refills: 0 | Status: DISCONTINUED | OUTPATIENT
Start: 2019-09-28 | End: 2019-09-30

## 2019-09-28 RX ORDER — DEXTROSE 50 % IN WATER 50 %
25 SYRINGE (ML) INTRAVENOUS ONCE
Refills: 0 | Status: DISCONTINUED | OUTPATIENT
Start: 2019-09-28 | End: 2019-09-30

## 2019-09-28 RX ORDER — FINASTERIDE 5 MG/1
5 TABLET, FILM COATED ORAL DAILY
Refills: 0 | Status: DISCONTINUED | OUTPATIENT
Start: 2019-09-28 | End: 2019-10-08

## 2019-09-28 RX ORDER — HEPARIN SODIUM 5000 [USP'U]/ML
800 INJECTION INTRAVENOUS; SUBCUTANEOUS
Qty: 25000 | Refills: 0 | Status: DISCONTINUED | OUTPATIENT
Start: 2019-09-28 | End: 2019-10-03

## 2019-09-28 RX ORDER — SODIUM CHLORIDE 9 MG/ML
1000 INJECTION, SOLUTION INTRAVENOUS ONCE
Refills: 0 | Status: COMPLETED | OUTPATIENT
Start: 2019-09-28 | End: 2019-09-28

## 2019-09-28 RX ORDER — INSULIN LISPRO 100/ML
VIAL (ML) SUBCUTANEOUS
Refills: 0 | Status: DISCONTINUED | OUTPATIENT
Start: 2019-09-28 | End: 2019-09-30

## 2019-09-28 RX ORDER — GLUCAGON INJECTION, SOLUTION 0.5 MG/.1ML
1 INJECTION, SOLUTION SUBCUTANEOUS ONCE
Refills: 0 | Status: DISCONTINUED | OUTPATIENT
Start: 2019-09-28 | End: 2019-09-30

## 2019-09-28 RX ORDER — INFLUENZA VIRUS VACCINE 15; 15; 15; 15 UG/.5ML; UG/.5ML; UG/.5ML; UG/.5ML
0.5 SUSPENSION INTRAMUSCULAR ONCE
Refills: 0 | Status: COMPLETED | OUTPATIENT
Start: 2019-09-28 | End: 2019-10-08

## 2019-09-28 RX ORDER — DEXTROSE 50 % IN WATER 50 %
12.5 SYRINGE (ML) INTRAVENOUS ONCE
Refills: 0 | Status: DISCONTINUED | OUTPATIENT
Start: 2019-09-28 | End: 2019-09-30

## 2019-09-28 RX ORDER — TAMSULOSIN HYDROCHLORIDE 0.4 MG/1
0.8 CAPSULE ORAL AT BEDTIME
Refills: 0 | Status: DISCONTINUED | OUTPATIENT
Start: 2019-09-28 | End: 2019-10-08

## 2019-09-28 RX ORDER — PANTOPRAZOLE SODIUM 20 MG/1
40 TABLET, DELAYED RELEASE ORAL
Refills: 0 | Status: DISCONTINUED | OUTPATIENT
Start: 2019-09-28 | End: 2019-10-08

## 2019-09-28 RX ADMIN — HEPARIN SODIUM 1100 UNIT(S)/HR: 5000 INJECTION INTRAVENOUS; SUBCUTANEOUS at 00:19

## 2019-09-28 RX ADMIN — Medication 0: at 22:32

## 2019-09-28 RX ADMIN — HEPARIN SODIUM 900 UNIT(S)/HR: 5000 INJECTION INTRAVENOUS; SUBCUTANEOUS at 19:57

## 2019-09-28 RX ADMIN — HEPARIN SODIUM 1000 UNIT(S)/HR: 5000 INJECTION INTRAVENOUS; SUBCUTANEOUS at 06:36

## 2019-09-28 RX ADMIN — SODIUM CHLORIDE 2000 MILLILITER(S): 9 INJECTION, SOLUTION INTRAVENOUS at 22:08

## 2019-09-28 RX ADMIN — PANTOPRAZOLE SODIUM 40 MILLIGRAM(S): 20 TABLET, DELAYED RELEASE ORAL at 12:41

## 2019-09-28 RX ADMIN — FINASTERIDE 5 MILLIGRAM(S): 5 TABLET, FILM COATED ORAL at 12:41

## 2019-09-28 RX ADMIN — AMLODIPINE BESYLATE 10 MILLIGRAM(S): 2.5 TABLET ORAL at 05:47

## 2019-09-28 RX ADMIN — SODIUM CHLORIDE 100 MILLILITER(S): 9 INJECTION, SOLUTION INTRAVENOUS at 05:47

## 2019-09-28 RX ADMIN — TAMSULOSIN HYDROCHLORIDE 0.8 MILLIGRAM(S): 0.4 CAPSULE ORAL at 22:09

## 2019-09-28 RX ADMIN — HEPARIN SODIUM 900 UNIT(S)/HR: 5000 INJECTION INTRAVENOUS; SUBCUTANEOUS at 13:27

## 2019-09-28 NOTE — PROGRESS NOTE ADULT - SUBJECTIVE AND OBJECTIVE BOX
[facility] DIVISION OF KIDNEY DISEASES AND HYPERTENSION -- FOLLOW UP NOTE  --------------------------------------------------------------------------------  Chief Complaint:  SERENITY ; retention  24 hour events/subjective:  Seen/examined  Making urine; villegas in place;  SCr 4.72; on IVF;      PAST HISTORY  --------------------------------------------------------------------------------  No significant changes to PMH, PSH, FHx, SHx, unless otherwise noted    ALLERGIES & MEDICATIONS  --------------------------------------------------------------------------------  Allergies    No Known Allergies    Intolerances      Standing Inpatient Medications  amLODIPine   Tablet 10 milliGRAM(s) Oral daily  finasteride 5 milliGRAM(s) Oral daily  heparin  Infusion.  Unit(s)/Hr IV Continuous <Continuous>  multiple electrolytes Injection Type 1 1000 milliLiter(s) IV Continuous <Continuous>  pantoprazole    Tablet 40 milliGRAM(s) Oral before breakfast  tamsulosin 0.8 milliGRAM(s) Oral at bedtime    PRN Inpatient Medications      REVIEW OF SYSTEMS  --------------------------------------------------------------------------------  Gen: No weight changes, fatigue, fevers/chills, weakness  Skin: No rashes  Head/Eyes/Ears/Mouth: No headache; Normal hearing; Normal vision w/o blurriness; No sinus pain/discomfort, sore throat  Respiratory: No dyspnea, cough, wheezing, hemoptysis  CV: No chest pain, PND, orthopnea  GI: No abdominal pain, diarrhea, constipation, nausea, vomiting, melena, hematochezia  : No increased frequency, dysuria, hematuria, nocturia  MSK: No joint pain/swelling; no back pain; no edema  Neuro: No dizziness/lightheadedness, weakness, seizures, numbness, tingling  Heme: No easy bruising or bleeding  Endo: No heat/cold intolerance  Psych: No significant nervousness, anxiety, stress, depression    All other systems were reviewed and are negative, except as noted.    VITALS/PHYSICAL EXAM  --------------------------------------------------------------------------------  T(C): 37 (09-28-19 @ 12:00), Max: 37.3 (09-27-19 @ 23:00)  HR: 104 (09-28-19 @ 13:00) (88 - 104)  BP: 131/72 (09-28-19 @ 13:00) (108/59 - 159/87)  RR: 17 (09-28-19 @ 13:00) (12 - 23)  SpO2: 99% (09-28-19 @ 13:00) (95% - 100%)  Wt(kg): --  Height (cm): 175 (09-27-19 @ 08:00)  Weight (kg): 70 (09-27-19 @ 08:00)  BMI (kg/m2): 22.9 (09-27-19 @ 08:00)  BSA (m2): 1.85 (09-27-19 @ 08:00)      09-27-19 @ 07:01  -  09-28-19 @ 07:00  --------------------------------------------------------  IN: 3190 mL / OUT: 5515 mL / NET: -2325 mL    09-28-19 @ 07:01  -  09-28-19 @ 13:17  --------------------------------------------------------  IN: 770 mL / OUT: 1000 mL / NET: -230 mL      Physical Exam:  	Gen: NAD, well-appearing  	HEENT: PERRL, supple neck, clear oropharynx  	Pulm: CTA B/L  	CV: RRR, S1S2; no rub  	Back: No spinal or CVA tenderness; no sacral edema  	Abd: +BS, soft, nontender/nondistended  	: villegas  	UE: Warm, FROM, no clubbing, intact strength; no edema; no asterixis  	LE: Warm, FROM, no clubbing, intact strength; no edema  	Neuro: No focal deficits, intact gait  	Psych: Normal affect and mood  	Skin: Warm, without rashes  	Vascular access: RIJ nontunneled CVC    LABS/STUDIES  --------------------------------------------------------------------------------              8.8    6.38  >-----------<  434      [09-28-19 @ 12:44]              27.7     141  |  101  |  26.0  ----------------------------<  147      [09-28-19 @ 12:44]  4.3   |  28.0  |  4.69        Ca     8.0     [09-28-19 @ 12:44]      Mg     2.1     [09-28-19 @ 05:49]      Phos  3.4     [09-28-19 @ 05:49]    TPro  6.6  /  Alb  2.8  /  TBili  0.3  /  DBili  x   /  AST  30  /  ALT  15  /  AlkPhos  72  [09-28-19 @ 05:49]    PT/INR: PT 12.9 , INR 1.12       [09-28-19 @ 05:49]  PTT: 101.4      [09-28-19 @ 12:44]          [09-26-19 @ 23:42]    Creatinine Trend:  SCr 4.69 [09-28 @ 12:44]  SCr 4.72 [09-28 @ 05:49]  SCr 4.54 [09-27 @ 23:51]  SCr 3.83 [09-27 @ 14:55]  SCr 8.18 [09-27 @ 07:37]    Urinalysis - [09-28-19 @ 12:43]      Color Yellow / Appearance Clear / SG 1.010 / pH 8.0      Gluc 100 / Ketone Negative  / Bili Negative / Urobili Negative       Blood Moderate / Protein 15 / Leuk Est Negative / Nitrite Negative      RBC 6-10 / WBC 0-2 / Hyaline  / Gran  / Sq Epi  / Non Sq Epi Occasional / Bacteria Negative    Urine Osmolality 263      [09-28-19 @ 12:44]    HbA1c 5.9      [06-26-19 @ 05:14]    HBsAb 96.6      [09-27-19 @ 16:33]  HBsAb Reactive      [09-27-19 @ 16:33]  HBsAg Nonreact      [09-27-19 @ 16:37]  HBcAb Reactive      [09-27-19 @ 16:33]  HCV 0.20, Nonreact      [09-27-19 @ 16:37]

## 2019-09-28 NOTE — PROGRESS NOTE ADULT - ASSESSMENT
1) ATN  2) Acidosis  3) Hyperkalemia  4) Anemia    Holding off on HD today  On IVF; continue  Trend urine output; maintain villegas;  Was here in July with acute urinary retention; obstructive uropathy  Double up on flomax 0.8mg qhs  Start finasteride 5mg daily  Consider urology evaluation;    d/w SICU PA

## 2019-09-28 NOTE — PROGRESS NOTE ADULT - SUBJECTIVE AND OBJECTIVE BOX
24h Events:  HD performed without complication. Lactic acidosis resolved and K normalized. No evidence of mesenteric ischemia or occlusion of bypass graft on CT.     Subjective:  Pt states he is tired but offers no acute complaints. Denies abdominal pain, chest pain, shortness of breath, nausea, vomiting, diarrhea, headache.     ICU Vital Signs Last 24 Hrs  T(C): 37.3 (27 Sep 2019 23:00), Max: 37.3 (27 Sep 2019 23:00)  T(F): 99.1 (27 Sep 2019 23:00), Max: 99.1 (27 Sep 2019 23:00)  HR: 98 (28 Sep 2019 02:00) (87 - 107)  BP: 140/79 (28 Sep 2019 02:00) (122/63 - 159/87)  BP(mean): 103 (28 Sep 2019 02:00) (84 - 115)  ABP: 160/67 (28 Sep 2019 02:00) (0/0 - 205/80)  ABP(mean): 96 (28 Sep 2019 02:00) (0 - 118)  RR: 17 (28 Sep 2019 02:00) (11 - 59)  SpO2: 100% (28 Sep 2019 02:00) (93% - 100%)      I&O's Detail    26 Sep 2019 07:01  -  27 Sep 2019 07:00  --------------------------------------------------------  IN:    dextrose 5%: 75 mL    sodium chloride 0.45%: 150 mL    sodium chloride 0.9%: 75 mL    Solution: 100 mL    Solution: 1000 mL  Total IN: 1400 mL    OUT:    Indwelling Catheter - Urethral: 1425 mL  Total OUT: 1425 mL    Total NET: -25 mL      27 Sep 2019 07:01  -  28 Sep 2019 03:39  --------------------------------------------------------  IN:    dextrose 5%: 525 mL    heparin  Infusion.: 105 mL    multiple electrolytes Injection Type 1: 1200 mL    Oral Fluid: 120 mL    Packed Red Blood Cells: 572 mL    sodium chloride 0.9%: 225 mL  Total IN: 2747 mL    OUT:    Indwelling Catheter - Urethral: 4615 mL  Total OUT: 4615 mL    Total NET: -1868 mL          ABG - ( 27 Sep 2019 14:58 )  pH, Arterial: 7.45  pH, Blood: x     /  pCO2: 41    /  pO2: 98    / HCO3: 28    / Base Excess: 4.3   /  SaO2: 98                  MEDICATIONS  (STANDING):  amLODIPine   Tablet 10 milliGRAM(s) Oral daily  heparin  Infusion.  Unit(s)/Hr (12 mL/Hr) IV Continuous <Continuous>  multiple electrolytes Injection Type 1 1000 milliLiter(s) (100 mL/Hr) IV Continuous <Continuous>  tamsulosin 0.4 milliGRAM(s) Oral at bedtime    MEDICATIONS  (PRN):          Physical Exam:    Gen: Resting comfortably in bed    HEENT: PERRL, EOMI    Neurological: Alert and oriented x3 without focal deficit    Neck: Trachea midline, no evidence of JVD, FROM without pain, neck symmetric    Pulmonary: CTAB with decreased breath sounds at the bases    Cardiovascular: S1S2    Gastrointestinal: Soft, non-tender, non-distended    : Min in place draining yellow urine    Extremities: Without c/c/e    Skin: Mild sanguinous oozing around left SC TLC, insertion site of LSC TLC and RIJ HD catheter without evidence of infection    Musculoskeletal: FROM without pain, no deformity or areas of tenderness    LABS:  CBC Full  -  ( 27 Sep 2019 23:51 )  WBC Count : 5.45 K/uL  RBC Count : 3.16 M/uL  Hemoglobin : 8.5 g/dL  Hematocrit : 26.1 %  Platelet Count - Automated : 361 K/uL  Mean Cell Volume : 82.6 fl  Mean Cell Hemoglobin : 26.9 pg  Mean Cell Hemoglobin Concentration : 32.6 gm/dL  Auto Neutrophil # : 3.58 K/uL  Auto Lymphocyte # : 1.09 K/uL  Auto Monocyte # : 0.58 K/uL  Auto Eosinophil # : 0.18 K/uL  Auto Basophil # : 0.01 K/uL  Auto Neutrophil % : 65.7 %  Auto Lymphocyte % : 20.0 %  Auto Monocyte % : 10.6 %  Auto Eosinophil % : 3.3 %  Auto Basophil % : 0.2 %        138  |  99  |  26.0<H>  ----------------------------<  92  4.9   |  26.0  |  4.54<H>    Ca    7.8<L>      27 Sep 2019 23:51  Phos  2.6       Mg     2.0         TPro  6.5<L>  /  Alb  2.9<L>  /  TBili  0.3<L>  /  DBili  x   /  AST  26  /  ALT  13  /  AlkPhos  69      PT/INR - ( 27 Sep 2019 23:51 )   PT: 12.8 sec;   INR: 1.11 ratio         PTT - ( 27 Sep 2019 23:51 )  PTT:99.8 sec  Urinalysis Basic - ( 27 Sep 2019 03:46 )    Color: Yellow / Appearance: Clear / S.010 / pH: x  Gluc: x / Ketone: Negative  / Bili: Negative / Urobili: Negative mg/dL   Blood: x / Protein: 30 mg/dL / Nitrite: Negative   Leuk Esterase: Negative / RBC: 11-25 /HPF / WBC 0-2   Sq Epi: x / Non Sq Epi: Occasional / Bacteria: Occasional        LIVER FUNCTIONS - ( 27 Sep 2019 14:55 )  Alb: 2.9 g/dL / Pro: 6.5 g/dL / ALK PHOS: 69 U/L / ALT: 13 U/L / AST: 26 U/L / GGT: x           CARDIAC MARKERS ( 26 Sep 2019 23:42 )  x     / x     / 283 U/L / x     / 4.9 ng/mL          ASSESSMENT/PLAN:  69y Male admitted with acute renal failure and hyponatremia, possibly secondary to hypovolemia. Now seemingly euvolemic. Urine output increased over the past few hours, possibly diuretic phase of ATN    Neuro: Pain control, delirium precautions, sleep hygiene     CV: Hemodynamic monitoring via a-line. No evidence of acute bleeding on CT or physical exam     Pulm: Encourage incentive spirometry, OOB as tolerated     GI/Nutrition: Renal diet    /Renal: Min for strict I&O, monitor volume status as may have a pathologic diuresis. f/u am labs    ID: No active issues     Endo: No active issues     Skin: Repositioning for DTI prevention while in bed    Heme/DVT Prophylaxis: Heparin drip as per normogram

## 2019-09-29 LAB
ANION GAP SERPL CALC-SCNC: 12 MMOL/L — SIGNIFICANT CHANGE UP (ref 5–17)
ANION GAP SERPL CALC-SCNC: 12 MMOL/L — SIGNIFICANT CHANGE UP (ref 5–17)
APTT BLD: 69.6 SEC — HIGH (ref 27.5–36.3)
APTT BLD: 72.9 SEC — HIGH (ref 27.5–36.3)
BUN SERPL-MCNC: 23 MG/DL — HIGH (ref 8–20)
BUN SERPL-MCNC: 26 MG/DL — HIGH (ref 8–20)
CALCIUM SERPL-MCNC: 7.7 MG/DL — LOW (ref 8.6–10.2)
CALCIUM SERPL-MCNC: 7.9 MG/DL — LOW (ref 8.6–10.2)
CHLORIDE SERPL-SCNC: 101 MMOL/L — SIGNIFICANT CHANGE UP (ref 98–107)
CHLORIDE SERPL-SCNC: 102 MMOL/L — SIGNIFICANT CHANGE UP (ref 98–107)
CO2 SERPL-SCNC: 27 MMOL/L — SIGNIFICANT CHANGE UP (ref 22–29)
CO2 SERPL-SCNC: 28 MMOL/L — SIGNIFICANT CHANGE UP (ref 22–29)
CREAT SERPL-MCNC: 4.15 MG/DL — HIGH (ref 0.5–1.3)
CREAT SERPL-MCNC: 4.83 MG/DL — HIGH (ref 0.5–1.3)
GLUCOSE BLDC GLUCOMTR-MCNC: 118 MG/DL — HIGH (ref 70–99)
GLUCOSE BLDC GLUCOMTR-MCNC: 126 MG/DL — HIGH (ref 70–99)
GLUCOSE BLDC GLUCOMTR-MCNC: 137 MG/DL — HIGH (ref 70–99)
GLUCOSE BLDC GLUCOMTR-MCNC: 98 MG/DL — SIGNIFICANT CHANGE UP (ref 70–99)
GLUCOSE SERPL-MCNC: 141 MG/DL — HIGH (ref 70–115)
GLUCOSE SERPL-MCNC: 96 MG/DL — SIGNIFICANT CHANGE UP (ref 70–115)
HBA1C BLD-MCNC: 5.8 % — HIGH (ref 4–5.6)
HCT VFR BLD CALC: 24.2 % — LOW (ref 39–50)
HGB BLD-MCNC: 7.7 G/DL — LOW (ref 13–17)
MAGNESIUM SERPL-MCNC: 2 MG/DL — SIGNIFICANT CHANGE UP (ref 1.6–2.6)
MCHC RBC-ENTMCNC: 26.7 PG — LOW (ref 27–34)
MCHC RBC-ENTMCNC: 31.8 GM/DL — LOW (ref 32–36)
MCV RBC AUTO: 84 FL — SIGNIFICANT CHANGE UP (ref 80–100)
PHOSPHATE SERPL-MCNC: 4.4 MG/DL — SIGNIFICANT CHANGE UP (ref 2.4–4.7)
PLATELET # BLD AUTO: 404 K/UL — HIGH (ref 150–400)
POTASSIUM SERPL-MCNC: 3.6 MMOL/L — SIGNIFICANT CHANGE UP (ref 3.5–5.3)
POTASSIUM SERPL-MCNC: 3.9 MMOL/L — SIGNIFICANT CHANGE UP (ref 3.5–5.3)
POTASSIUM SERPL-SCNC: 3.6 MMOL/L — SIGNIFICANT CHANGE UP (ref 3.5–5.3)
POTASSIUM SERPL-SCNC: 3.9 MMOL/L — SIGNIFICANT CHANGE UP (ref 3.5–5.3)
RBC # BLD: 2.88 M/UL — LOW (ref 4.2–5.8)
RBC # FLD: 16.4 % — HIGH (ref 10.3–14.5)
SODIUM SERPL-SCNC: 140 MMOL/L — SIGNIFICANT CHANGE UP (ref 135–145)
SODIUM SERPL-SCNC: 142 MMOL/L — SIGNIFICANT CHANGE UP (ref 135–145)
WBC # BLD: 7.23 K/UL — SIGNIFICANT CHANGE UP (ref 3.8–10.5)
WBC # FLD AUTO: 7.23 K/UL — SIGNIFICANT CHANGE UP (ref 3.8–10.5)

## 2019-09-29 PROCEDURE — 99233 SBSQ HOSP IP/OBS HIGH 50: CPT

## 2019-09-29 RX ORDER — SODIUM CHLORIDE 9 MG/ML
2000 INJECTION, SOLUTION INTRAVENOUS ONCE
Refills: 0 | Status: COMPLETED | OUTPATIENT
Start: 2019-09-29 | End: 2019-09-29

## 2019-09-29 RX ORDER — SODIUM CHLORIDE 9 MG/ML
1000 INJECTION, SOLUTION INTRAVENOUS ONCE
Refills: 0 | Status: DISCONTINUED | OUTPATIENT
Start: 2019-09-29 | End: 2019-09-30

## 2019-09-29 RX ORDER — SODIUM CHLORIDE 9 MG/ML
1000 INJECTION, SOLUTION INTRAVENOUS ONCE
Refills: 0 | Status: COMPLETED | OUTPATIENT
Start: 2019-09-29 | End: 2019-09-29

## 2019-09-29 RX ADMIN — HEPARIN SODIUM 8 UNIT(S)/HR: 5000 INJECTION INTRAVENOUS; SUBCUTANEOUS at 02:44

## 2019-09-29 RX ADMIN — AMLODIPINE BESYLATE 10 MILLIGRAM(S): 2.5 TABLET ORAL at 06:37

## 2019-09-29 RX ADMIN — TAMSULOSIN HYDROCHLORIDE 0.8 MILLIGRAM(S): 0.4 CAPSULE ORAL at 21:32

## 2019-09-29 RX ADMIN — PANTOPRAZOLE SODIUM 40 MILLIGRAM(S): 20 TABLET, DELAYED RELEASE ORAL at 06:37

## 2019-09-29 RX ADMIN — SODIUM CHLORIDE 2000 MILLILITER(S): 9 INJECTION, SOLUTION INTRAVENOUS at 12:18

## 2019-09-29 RX ADMIN — SODIUM CHLORIDE 200 MILLILITER(S): 9 INJECTION, SOLUTION INTRAVENOUS at 12:07

## 2019-09-29 RX ADMIN — Medication 0: at 06:38

## 2019-09-29 RX ADMIN — FINASTERIDE 5 MILLIGRAM(S): 5 TABLET, FILM COATED ORAL at 11:48

## 2019-09-29 RX ADMIN — SODIUM CHLORIDE 4000 MILLILITER(S): 9 INJECTION, SOLUTION INTRAVENOUS at 06:38

## 2019-09-29 NOTE — DIETITIAN INITIAL EVALUATION ADULT. - OTHER INFO
69y Male admitted with acute renal failure and hyponatremia, possibly secondary to hypovolemia. Now seemingly euvolemic. Urine output increased over the past few hours, possibly diuretic phase of ATN.  Pt reports poor appetite and declining weight. Pt is unsure of amount, but pt was here in June of 2019 and according to RD evaluation weight was 173lbs  reflective of a 19lb weight loss.     physical signs of malnutrition [ ]absent [ x]present. [ ]Mild [ ]Moderate [x ]Severe Muscle wasting present at [x ]clavicle [ ]interosseos [x ]calf. [ ]Mild [ ]Moderate [ x]Severe subcutaneous fat loss presents at [ ]ribs []orbital region [ ]triceps [x ]buccal area.

## 2019-09-29 NOTE — PROGRESS NOTE ADULT - ASSESSMENT
69y Male admitted with acute renal failure hyperkalemia and hyponatremia requiring emergent dialysis. Min decompressed bladder from post obstructive renal failure.   Now with post obstructive diuresis, converting to hypovolemia and prerenal requiring massive resuscitation     Neuro: Pain control, delirium precautions, sleep hygiene     CV: Hemodynamic monitoring via a-line.     Pulm: Encourage incentive spirometry, OOB as tolerated     GI/Nutrition: Renal diet, likely will switch if continues to improve    /Renal: Min for strict I&O, monitor volume status and keep up with patients loses, goal to keep net neutral.     ID: No active issues     Endo: No active issues     Skin: Repositioning for DTI prevention while in bed    Heme/DVT Prophylaxis: Heparin drip as per normogram 60-90    Dispo: SICU for volume resuscitation

## 2019-09-29 NOTE — PROGRESS NOTE ADULT - ASSESSMENT
1) ATN  2) Acidosis  3) Hyperkalemia  4) Anemia    Now with post obstructive diuresis --> prerenal; not enough IVF given his massive urine output  Can continue isotonic fluids @ 200cc/hr for now;   Would consider bolus of 500cc to 1L   Watch carefully for overload however given his massive diuresis I believe we are still behind; this accounts for his lack of SCr improvement over past 24-48 hours;  Was here in July with acute urinary retention; obstructive uropathy  Appropriately doubled up on flomax to 0.8mg qhs yesterday  Started finasteride 5mg daily       d/w SICU PA

## 2019-09-29 NOTE — CHART NOTE - NSCHARTNOTEFT_GEN_A_CORE
Upon Nutritional Assessment by the Registered Dietitian your patient was determined to meet criteria / has evidence of the following diagnosis/diagnoses:          [x ] Severe Protein Calorie Malnutrition      Findings as based on:  •  Comprehensive nutrition assessment and consultation        Treatment:    The following diet has been recommended:    1)ensure TID  2) DASH/ TLC diet  PROVIDER Section:     By signing this assessment you are acknowledging and agree with the diagnosis/diagnoses assigned by the Registered Dietitian    Comments:

## 2019-09-29 NOTE — PROGRESS NOTE ADULT - SUBJECTIVE AND OBJECTIVE BOX
24 HOUR EVENTS: No acute events overnight.  Patient continues to diuresis 350cc/hr of urine.  Patient increased maintenance fluid to match outputs.      SUBJECTIVE/ROS:  [ ] A ten-point review of systems was otherwise negative except as noted.  [ ] Due to altered mental status/intubation, subjective information were not able to be obtained from the patient. History was obtained, to the extent possible, from review of the chart and collateral sources of information.      NEURO  RASS:     GCS:     CAM ICU:  Exam:   Meds:   [x] Adequacy of sedation and pain control has been assessed and adjusted      RESPIRATORY  RR: 19 (09-29-19 @ 13:00) (13 - 39)  SpO2: 99% (09-29-19 @ 13:00) (96% - 100%)  Wt(kg): --  Exam: unlabored, clear to auscultation bilaterally  Mechanical Ventilation:   ABG - ( 27 Sep 2019 23:50 )  pH: x     /  pCO2: x     /  pO2: x     / HCO3: x     / Base Excess: x     /  SaO2: x       Lactate: 0.7              [ ] Extubation Readiness Assessed  Meds:       CARDIOVASCULAR  HR: 88 (09-29-19 @ 13:00) (80 - 103)  BP: 127/60 (09-29-19 @ 13:00) (110/63 - 159/79)  BP(mean): 86 (09-29-19 @ 13:00) (81 - 114)  ABP: 170/67 (09-29-19 @ 13:00) (124/61 - 187/73)  ABP(mean): 97 (09-29-19 @ 13:00) (77 - 106)  Wt(kg): --  CVP(cm H2O): --      Exam:  Cardiac Rhythm:  Perfusion     [ ]Adequate   [ ]Inadequate  Mentation   [ ]Normal       [ ]Reduced  Extremities  [ ]Warm         [ ]Cool  Volume Status [ ]Hypervolemic [ ]Euvolemic [ ]Hypovolemic  Meds: amLODIPine   Tablet 10 milliGRAM(s) Oral daily  tamsulosin 0.8 milliGRAM(s) Oral at bedtime        GI/NUTRITION  Exam:  Diet:  Meds: pantoprazole    Tablet 40 milliGRAM(s) Oral before breakfast      GENITOURINARY  I&O's Detail    09-28 @ 07:01  -  09-29 @ 07:00  --------------------------------------------------------  IN:    heparin  Infusion.: 122 mL    heparin Infusion: 88 mL    multiple electrolytes Injection Type 1: 2400 mL    Oral Fluid: 120 mL    Solution: 3000 mL  Total IN: 5730 mL    OUT:    Indwelling Catheter - Urethral: 6540 mL  Total OUT: 6540 mL    Total NET: -810 mL      09-29 @ 07:01 - 09-29 @ 13:40  --------------------------------------------------------  IN:    heparin Infusion: 48 mL    multiple electrolytes Injection Type 1: 900 mL    Solution: 2000 mL  Total IN: 2948 mL    OUT:    Indwelling Catheter - Urethral: 2550 mL  Total OUT: 2550 mL    Total NET: 398 mL          09-29    142  |  102  |  26.0<H>  ----------------------------<  96  3.6   |  28.0  |  4.83<H>    Ca    7.9<L>      29 Sep 2019 05:18  Phos  4.4     09-29  Mg     2.0     09-29    TPro  6.6  /  Alb  2.8<L>  /  TBili  0.3<L>  /  DBili  x   /  AST  30  /  ALT  15  /  AlkPhos  72  09-28    [ ] Min catheter, indication: N/A  Meds: dextrose 5%. 1000 milliLiter(s) IV Continuous <Continuous>  multiple electrolytes Injection Type 1 1000 milliLiter(s) IV Continuous <Continuous>        HEMATOLOGIC  Meds: heparin  Infusion 800 Unit(s)/Hr IV Continuous <Continuous>    [x] VTE Prophylaxis                        7.7    7.23  )-----------( 404      ( 29 Sep 2019 05:18 )             24.2     PT/INR - ( 28 Sep 2019 05:49 )   PT: 12.9 sec;   INR: 1.12 ratio         PTT - ( 29 Sep 2019 09:04 )  PTT:72.9 sec  Transfusion     [ ] PRBC   [ ] Platelets   [ ] FFP   [ ] Cryoprecipitate      INFECTIOUS DISEASES  T(C): 36.8 (09-29-19 @ 12:00), Max: 37.3 (09-29-19 @ 00:00)  Wt(kg): --  WBC Count: 7.23 K/uL (09-29 @ 05:18)    Recent Cultures:  Specimen Source: .Blood, 09-27 @ 06:23; Results   No growth at 48 hours; Gram Stain: --; Organism: --  Specimen Source: .Urine, 09-27 @ 03:46; Results   No growth; Gram Stain: --; Organism: --    Meds: influenza   Vaccine 0.5 milliLiter(s) IntraMuscular once        ENDOCRINE  Capillary Blood Glucose    Meds: dextrose 40% Gel 15 Gram(s) Oral once PRN  dextrose 50% Injectable 12.5 Gram(s) IV Push once  dextrose 50% Injectable 25 Gram(s) IV Push once  dextrose 50% Injectable 25 Gram(s) IV Push once  finasteride 5 milliGRAM(s) Oral daily  glucagon  Injectable 1 milliGRAM(s) IntraMuscular once PRN  insulin lispro (HumaLOG) corrective regimen sliding scale   SubCutaneous three times a day before meals        ACCESS DEVICES:  [ ] Peripheral IV  [ ] Central Venous Line	[ ] R	[ ] L	[ ] IJ	[ ] Fem	[ ] SC	Placed:   [ ] Arterial Line		[ ] R	[ ] L	[ ] Fem	[ ] Rad	[ ] Ax	Placed:   [ ] PICC:					[ ] Mediport  [ ] Urinary Catheter, Date Placed:   [ ] Necessity of urinary, arterial, and venous catheters discussed    OTHER MEDICATIONS:      CODE STATUS:     IMAGING: 24 HOUR EVENTS: No acute events overnight.  Patient continues to diuresis 350cc/hr of urine.  Patient increased maintenance fluid to match outputs.  Patient denies any pain at this time. Tolerating a renal diet and having +BM.     SUBJECTIVE/ROS:  [x ] A ten-point review of systems was otherwise negative except as noted.  [ ] Due to altered mental status/intubation, subjective information were not able to be obtained from the patient. History was obtained, to the extent possible, from review of the chart and collateral sources of information.      NEURO  RASS:  0   GCS:  15   CAM ICU: neg  Exam: no focal neuro deficits   Meds:   [x] Adequacy of sedation and pain control has been assessed and adjusted      RESPIRATORY  RR: 19 (09-29-19 @ 13:00) (13 - 39)  SpO2: 99% (09-29-19 @ 13:00) (96% - 100%)  Wt(kg): --  Exam: unlabored, clear to auscultation bilaterally  Mechanical Ventilation:   ABG - ( 27 Sep 2019 23:50 )  pH: x     /  pCO2: x     /  pO2: x     / HCO3: x     / Base Excess: x     /  SaO2: x       Lactate: 0.7              [ ] Extubation Readiness Assessed  Meds:       CARDIOVASCULAR  HR: 88 (09-29-19 @ 13:00) (80 - 103)  BP: 127/60 (09-29-19 @ 13:00) (110/63 - 159/79)  BP(mean): 86 (09-29-19 @ 13:00) (81 - 114)  ABP: 170/67 (09-29-19 @ 13:00) (124/61 - 187/73)  ABP(mean): 97 (09-29-19 @ 13:00) (77 - 106)  Wt(kg): --  CVP(cm H2O): --      Exam: NSR   Cardiac Rhythm: RRR  Perfusion     [x ]Adequate   [ ]Inadequate  Mentation   [x ]Normal       [ ]Reduced  Extremities  [ x]Warm         [ ]Cool  Volume Status [ ]Hypervolemic [x ]Euvolemic [ ]Hypovolemic  Meds: amLODIPine   Tablet 10 milliGRAM(s) Oral daily  tamsulosin 0.8 milliGRAM(s) Oral at bedtime        GI/NUTRITION  Exam: soft, nttp, nd  Diet:  Renal diet   Meds: pantoprazole    Tablet 40 milliGRAM(s) Oral before breakfast      GENITOURINARY  I&O's Detail    09-28 @ 07:01 - 09-29 @ 07:00  --------------------------------------------------------  IN:    heparin  Infusion.: 122 mL    heparin Infusion: 88 mL    multiple electrolytes Injection Type 1: 2400 mL    Oral Fluid: 120 mL    Solution: 3000 mL  Total IN: 5730 mL    OUT:    Indwelling Catheter - Urethral: 6540 mL  Total OUT: 6540 mL    Total NET: -810 mL      09-29 @ 07:01 - 09-29 @ 13:40  --------------------------------------------------------  IN:    heparin Infusion: 48 mL    multiple electrolytes Injection Type 1: 900 mL    Solution: 2000 mL  Total IN: 2948 mL    OUT:    Indwelling Catheter - Urethral: 2550 mL  Total OUT: 2550 mL    Total NET: 398 mL          09-29    142  |  102  |  26.0<H>  ----------------------------<  96  3.6   |  28.0  |  4.83<H>    Ca    7.9<L>      29 Sep 2019 05:18  Phos  4.4     09-29  Mg     2.0     09-29    TPro  6.6  /  Alb  2.8<L>  /  TBili  0.3<L>  /  DBili  x   /  AST  30  /  ALT  15  /  AlkPhos  72  09-28    [ x] Min catheter, indication: critically ill  Meds: dextrose 5%. 1000 milliLiter(s) IV Continuous <Continuous>  multiple electrolytes Injection Type 1 1000 milliLiter(s) IV Continuous <Continuous>    Physical Exam:    Gen: Resting comfortably in bed    HEENT: PERRL, EOMI    Neurological: Alert and oriented x3 without focal deficit    Neck: Trachea midline, no evidence of JVD, FROM without pain, neck symmetric    Pulmonary: CTAB with decreased breath sounds at the bases    Cardiovascular: S1S2    Gastrointestinal: Soft, non-tender, non-distended    : Min in place draining clear yellow urine    Extremities: Without c/c/e    Skin: no skin breakdown    Musculoskeletal: FROM without pain, no deformity or areas of tenderness        HEMATOLOGIC  Meds: heparin  Infusion 800 Unit(s)/Hr IV Continuous <Continuous>    [x] VTE Prophylaxis                        7.7    7.23  )-----------( 404      ( 29 Sep 2019 05:18 )             24.2     PT/INR - ( 28 Sep 2019 05:49 )   PT: 12.9 sec;   INR: 1.12 ratio         PTT - ( 29 Sep 2019 09:04 )  PTT:72.9 sec  Transfusion     [ ] PRBC   [ ] Platelets   [ ] FFP   [ ] Cryoprecipitate      INFECTIOUS DISEASES  T(C): 36.8 (09-29-19 @ 12:00), Max: 37.3 (09-29-19 @ 00:00)  Wt(kg): --  WBC Count: 7.23 K/uL (09-29 @ 05:18)    Recent Cultures:  Specimen Source: .Blood, 09-27 @ 06:23; Results   No growth at 48 hours; Gram Stain: --; Organism: --  Specimen Source: .Urine, 09-27 @ 03:46; Results   No growth; Gram Stain: --; Organism: --    Meds: influenza   Vaccine 0.5 milliLiter(s) IntraMuscular once        ENDOCRINE  Capillary Blood Glucose    Meds: dextrose 40% Gel 15 Gram(s) Oral once PRN  dextrose 50% Injectable 12.5 Gram(s) IV Push once  dextrose 50% Injectable 25 Gram(s) IV Push once  dextrose 50% Injectable 25 Gram(s) IV Push once  finasteride 5 milliGRAM(s) Oral daily  glucagon  Injectable 1 milliGRAM(s) IntraMuscular once PRN  insulin lispro (HumaLOG) corrective regimen sliding scale   SubCutaneous three times a day before meals        ACCESS DEVICES:  [ ] Peripheral IV  [ ] Central Venous Line	[ ] R	[ ] L	[ ] IJ	[ ] Fem	[ ] SC	Placed:   [ ] Arterial Line		[ ] R	[ ] L	[ ] Fem	[ ] Rad	[ ] Ax	Placed:   [ ] PICC:					[ ] Mediport  [ ] Urinary Catheter, Date Placed:   [ ] Necessity of urinary, arterial, and venous catheters discussed    OTHER MEDICATIONS:      CODE STATUS:     IMAGING:

## 2019-09-29 NOTE — PROGRESS NOTE ADULT - SUBJECTIVE AND OBJECTIVE BOX
[facility] DIVISION OF KIDNEY DISEASES AND HYPERTENSION -- FOLLOW UP NOTE  --------------------------------------------------------------------------------  Chief Complaint: SERENITY    24 hour events/subjective:  Seen/examined  Post obstructive diuresis phase; made 450cc/hr this AM;  On NS @ 100cc/hr increased to 200cc/hr      PAST HISTORY  --------------------------------------------------------------------------------  No significant changes to PMH, PSH, FHx, SHx, unless otherwise noted    ALLERGIES & MEDICATIONS  --------------------------------------------------------------------------------  Allergies    No Known Allergies    Intolerances      Standing Inpatient Medications  amLODIPine   Tablet 10 milliGRAM(s) Oral daily  dextrose 5%. 1000 milliLiter(s) IV Continuous <Continuous>  dextrose 50% Injectable 12.5 Gram(s) IV Push once  dextrose 50% Injectable 25 Gram(s) IV Push once  dextrose 50% Injectable 25 Gram(s) IV Push once  finasteride 5 milliGRAM(s) Oral daily  heparin  Infusion 800 Unit(s)/Hr IV Continuous <Continuous>  influenza   Vaccine 0.5 milliLiter(s) IntraMuscular once  insulin lispro (HumaLOG) corrective regimen sliding scale   SubCutaneous three times a day before meals  multiple electrolytes Injection Type 1 1000 milliLiter(s) IV Continuous <Continuous>  pantoprazole    Tablet 40 milliGRAM(s) Oral before breakfast  tamsulosin 0.8 milliGRAM(s) Oral at bedtime    PRN Inpatient Medications  dextrose 40% Gel 15 Gram(s) Oral once PRN  glucagon  Injectable 1 milliGRAM(s) IntraMuscular once PRN      REVIEW OF SYSTEMS  --------------------------------------------------------------------------------  Gen: No weight changes, fatigue, fevers/chills, weakness  Skin: No rashes  Head/Eyes/Ears/Mouth: No headache; Normal hearing; Normal vision w/o blurriness; No sinus pain/discomfort, sore throat  Respiratory: No dyspnea, cough, wheezing, hemoptysis  CV: No chest pain, PND, orthopnea  GI: No abdominal pain, diarrhea, constipation, nausea, vomiting, melena, hematochezia  : No increased frequency, dysuria, hematuria, nocturia  MSK: No joint pain/swelling; no back pain; no edema  Neuro: No dizziness/lightheadedness, weakness, seizures, numbness, tingling  Heme: No easy bruising or bleeding  Endo: No heat/cold intolerance  Psych: No significant nervousness, anxiety, stress, depression    All other systems were reviewed and are negative, except as noted.    VITALS/PHYSICAL EXAM  --------------------------------------------------------------------------------  T(C): 36.8 (09-29-19 @ 12:00), Max: 37.3 (09-29-19 @ 00:00)  HR: 88 (09-29-19 @ 13:00) (80 - 103)  BP: 127/60 (09-29-19 @ 13:00) (110/63 - 159/79)  RR: 19 (09-29-19 @ 13:00) (13 - 39)  SpO2: 99% (09-29-19 @ 13:00) (96% - 100%)  Wt(kg): --        09-28-19 @ 07:01  -  09-29-19 @ 07:00  --------------------------------------------------------  IN: 5730 mL / OUT: 6540 mL / NET: -810 mL    09-29-19 @ 07:01  -  09-29-19 @ 13:15  --------------------------------------------------------  IN: 2948 mL / OUT: 2550 mL / NET: 398 mL      Physical Exam:  	Gen: NAD, well-appearing  	HEENT: PERRL, supple neck, clear oropharynx  	Pulm: CTA B/L  	CV: RRR, S1S2; no rub  	Back: No spinal or CVA tenderness; no sacral edema  	Abd: +BS, soft, nontender/nondistended  	: bakari  	UE: Warm, FROM, no clubbing, intact strength; no edema; no asterixis  	LE: Warm, FROM, no clubbing, intact strength; no edema  	Neuro: No focal deficits, intact gait  	Psych: Normal affect and mood  	Skin: Warm, without rashes  	Vascular access:    LABS/STUDIES  --------------------------------------------------------------------------------              7.7    7.23  >-----------<  404      [09-29-19 @ 05:18]              24.2     142  |  102  |  26.0  ----------------------------<  96      [09-29-19 @ 05:18]  3.6   |  28.0  |  4.83        Ca     7.9     [09-29-19 @ 05:18]      Mg     2.0     [09-29-19 @ 05:18]      Phos  4.4     [09-29-19 @ 05:18]    TPro  6.6  /  Alb  2.8  /  TBili  0.3  /  DBili  x   /  AST  30  /  ALT  15  /  AlkPhos  72  [09-28-19 @ 05:49]    PT/INR: PT 12.9 , INR 1.12       [09-28-19 @ 05:49]  PTT: 72.9       [09-29-19 @ 09:04]      Creatinine Trend:  SCr 4.83 [09-29 @ 05:18]  SCr 4.69 [09-28 @ 12:44]  SCr 4.72 [09-28 @ 05:49]  SCr 4.54 [09-27 @ 23:51]  SCr 3.83 [09-27 @ 14:55]    Urinalysis - [09-28-19 @ 12:43]      Color Yellow / Appearance Clear / SG 1.010 / pH 8.0      Gluc 100 / Ketone Negative  / Bili Negative / Urobili Negative       Blood Moderate / Protein 15 / Leuk Est Negative / Nitrite Negative      RBC 6-10 / WBC 0-2 / Hyaline  / Gran  / Sq Epi  / Non Sq Epi Occasional / Bacteria Negative    Urine Osmolality 263      [09-28-19 @ 12:44]    HbA1c 5.8      [09-29-19 @ 05:18]    HBsAb 96.6      [09-27-19 @ 16:33]  HBsAb Reactive      [09-27-19 @ 16:33]  HBsAg Nonreact      [09-27-19 @ 16:37]  HBcAb Reactive      [09-27-19 @ 16:33]  HCV 0.20, Nonreact      [09-27-19 @ 16:37]

## 2019-09-30 LAB
ANION GAP SERPL CALC-SCNC: 16 MMOL/L — SIGNIFICANT CHANGE UP (ref 5–17)
APTT BLD: 78 SEC — HIGH (ref 27.5–36.3)
BASOPHILS # BLD AUTO: 0.01 K/UL — SIGNIFICANT CHANGE UP (ref 0–0.2)
BASOPHILS NFR BLD AUTO: 0.1 % — SIGNIFICANT CHANGE UP (ref 0–2)
BLD GP AB SCN SERPL QL: SIGNIFICANT CHANGE UP
BUN SERPL-MCNC: 20 MG/DL — SIGNIFICANT CHANGE UP (ref 8–20)
CALCIUM SERPL-MCNC: 7.9 MG/DL — LOW (ref 8.6–10.2)
CHLORIDE SERPL-SCNC: 103 MMOL/L — SIGNIFICANT CHANGE UP (ref 98–107)
CO2 SERPL-SCNC: 26 MMOL/L — SIGNIFICANT CHANGE UP (ref 22–29)
CREAT SERPL-MCNC: 3.54 MG/DL — HIGH (ref 0.5–1.3)
EOSINOPHIL # BLD AUTO: 0.49 K/UL — SIGNIFICANT CHANGE UP (ref 0–0.5)
EOSINOPHIL NFR BLD AUTO: 6.9 % — HIGH (ref 0–6)
FOLATE SERPL-MCNC: 4.6 NG/ML — LOW
GLUCOSE BLDC GLUCOMTR-MCNC: 119 MG/DL — HIGH (ref 70–99)
GLUCOSE BLDC GLUCOMTR-MCNC: 129 MG/DL — HIGH (ref 70–99)
GLUCOSE BLDC GLUCOMTR-MCNC: 205 MG/DL — HIGH (ref 70–99)
GLUCOSE BLDC GLUCOMTR-MCNC: 215 MG/DL — HIGH (ref 70–99)
GLUCOSE BLDC GLUCOMTR-MCNC: 94 MG/DL — SIGNIFICANT CHANGE UP (ref 70–99)
GLUCOSE SERPL-MCNC: 95 MG/DL — SIGNIFICANT CHANGE UP (ref 70–115)
HCT VFR BLD CALC: 22.2 % — LOW (ref 39–50)
HGB BLD-MCNC: 7.1 G/DL — LOW (ref 13–17)
IMM GRANULOCYTES NFR BLD AUTO: 1.3 % — SIGNIFICANT CHANGE UP (ref 0–1.5)
IRON SATN MFR SERPL: 28 % — SIGNIFICANT CHANGE UP (ref 16–55)
IRON SATN MFR SERPL: 59 UG/DL — SIGNIFICANT CHANGE UP (ref 59–158)
LYMPHOCYTES # BLD AUTO: 1.31 K/UL — SIGNIFICANT CHANGE UP (ref 1–3.3)
LYMPHOCYTES # BLD AUTO: 18.5 % — SIGNIFICANT CHANGE UP (ref 13–44)
MAGNESIUM SERPL-MCNC: 1.9 MG/DL — SIGNIFICANT CHANGE UP (ref 1.6–2.6)
MCHC RBC-ENTMCNC: 26.8 PG — LOW (ref 27–34)
MCHC RBC-ENTMCNC: 32 GM/DL — SIGNIFICANT CHANGE UP (ref 32–36)
MCV RBC AUTO: 83.8 FL — SIGNIFICANT CHANGE UP (ref 80–100)
MONOCYTES # BLD AUTO: 0.56 K/UL — SIGNIFICANT CHANGE UP (ref 0–0.9)
MONOCYTES NFR BLD AUTO: 7.9 % — SIGNIFICANT CHANGE UP (ref 2–14)
NEUTROPHILS # BLD AUTO: 4.62 K/UL — SIGNIFICANT CHANGE UP (ref 1.8–7.4)
NEUTROPHILS NFR BLD AUTO: 65.3 % — SIGNIFICANT CHANGE UP (ref 43–77)
PHOSPHATE SERPL-MCNC: 4.1 MG/DL — SIGNIFICANT CHANGE UP (ref 2.4–4.7)
PLATELET # BLD AUTO: 374 K/UL — SIGNIFICANT CHANGE UP (ref 150–400)
POTASSIUM SERPL-MCNC: 3 MMOL/L — LOW (ref 3.5–5.3)
POTASSIUM SERPL-SCNC: 3 MMOL/L — LOW (ref 3.5–5.3)
RBC # BLD: 2.65 M/UL — LOW (ref 4.2–5.8)
RBC # FLD: 16.2 % — HIGH (ref 10.3–14.5)
SODIUM SERPL-SCNC: 145 MMOL/L — SIGNIFICANT CHANGE UP (ref 135–145)
TIBC SERPL-MCNC: 212 UG/DL — LOW (ref 220–430)
TRANSFERRIN SERPL-MCNC: 148 MG/DL — LOW (ref 180–329)
VIT B12 SERPL-MCNC: 519 PG/ML — SIGNIFICANT CHANGE UP (ref 232–1245)
WBC # BLD: 7.08 K/UL — SIGNIFICANT CHANGE UP (ref 3.8–10.5)
WBC # FLD AUTO: 7.08 K/UL — SIGNIFICANT CHANGE UP (ref 3.8–10.5)

## 2019-09-30 PROCEDURE — 99232 SBSQ HOSP IP/OBS MODERATE 35: CPT

## 2019-09-30 PROCEDURE — 99233 SBSQ HOSP IP/OBS HIGH 50: CPT

## 2019-09-30 RX ORDER — POTASSIUM CHLORIDE 20 MEQ
20 PACKET (EA) ORAL
Refills: 0 | Status: COMPLETED | OUTPATIENT
Start: 2019-09-30 | End: 2019-09-30

## 2019-09-30 RX ORDER — CHLORHEXIDINE GLUCONATE 213 G/1000ML
1 SOLUTION TOPICAL DAILY
Refills: 0 | Status: DISCONTINUED | OUTPATIENT
Start: 2019-09-30 | End: 2019-10-08

## 2019-09-30 RX ORDER — INSULIN LISPRO 100/ML
VIAL (ML) SUBCUTANEOUS
Refills: 0 | Status: DISCONTINUED | OUTPATIENT
Start: 2019-09-30 | End: 2019-10-08

## 2019-09-30 RX ORDER — SODIUM CHLORIDE 9 MG/ML
1000 INJECTION, SOLUTION INTRAVENOUS ONCE
Refills: 0 | Status: COMPLETED | OUTPATIENT
Start: 2019-09-30 | End: 2019-09-30

## 2019-09-30 RX ORDER — MAGNESIUM SULFATE 500 MG/ML
2 VIAL (ML) INJECTION ONCE
Refills: 0 | Status: COMPLETED | OUTPATIENT
Start: 2019-09-30 | End: 2019-09-30

## 2019-09-30 RX ORDER — LANOLIN ALCOHOL/MO/W.PET/CERES
3 CREAM (GRAM) TOPICAL AT BEDTIME
Refills: 0 | Status: DISCONTINUED | OUTPATIENT
Start: 2019-09-30 | End: 2019-10-01

## 2019-09-30 RX ADMIN — AMLODIPINE BESYLATE 10 MILLIGRAM(S): 2.5 TABLET ORAL at 05:20

## 2019-09-30 RX ADMIN — SODIUM CHLORIDE 1000 MILLILITER(S): 9 INJECTION, SOLUTION INTRAVENOUS at 08:41

## 2019-09-30 RX ADMIN — CHLORHEXIDINE GLUCONATE 1 APPLICATION(S): 213 SOLUTION TOPICAL at 11:17

## 2019-09-30 RX ADMIN — Medication 100 MILLIEQUIVALENT(S): at 08:22

## 2019-09-30 RX ADMIN — PANTOPRAZOLE SODIUM 40 MILLIGRAM(S): 20 TABLET, DELAYED RELEASE ORAL at 05:20

## 2019-09-30 RX ADMIN — Medication 50 GRAM(S): at 06:46

## 2019-09-30 RX ADMIN — TAMSULOSIN HYDROCHLORIDE 0.8 MILLIGRAM(S): 0.4 CAPSULE ORAL at 21:40

## 2019-09-30 RX ADMIN — Medication 100 MILLIEQUIVALENT(S): at 10:34

## 2019-09-30 RX ADMIN — Medication 4: at 11:49

## 2019-09-30 RX ADMIN — Medication 3 MILLIGRAM(S): at 21:40

## 2019-09-30 RX ADMIN — Medication 100 MILLIEQUIVALENT(S): at 09:46

## 2019-09-30 RX ADMIN — FINASTERIDE 5 MILLIGRAM(S): 5 TABLET, FILM COATED ORAL at 11:17

## 2019-09-30 RX ADMIN — SODIUM CHLORIDE 75 MILLILITER(S): 9 INJECTION, SOLUTION INTRAVENOUS at 10:50

## 2019-09-30 NOTE — PROGRESS NOTE ADULT - ASSESSMENT
1) ATN  2) Acidosis  3) Hyperkalemia  4) Anemia    Now with post obstructive diuresis    Can continue isotonic fluids   Would consider bolus of 500cc to 1L   Watch carefully for overload however given his massive diuresis I believe we are still behind; this accounts for his lack of SCr improvement over past 24-48 hours;  Was here in July with acute urinary retention; obstructive uropathy  Appropriately doubled up on flomax to 0.8mg qhs yesterday  cw finasteride 5mg daily  Signing off  Please recall if needed    d/w SICU PA and Dr Orozco

## 2019-09-30 NOTE — PROGRESS NOTE ADULT - SUBJECTIVE AND OBJECTIVE BOX
INTERVAL HPI/OVERNIGHT EVENTS/SUBJECTIVE:  No complaints, high uop.       ICU Vital Signs Last 24 Hrs  T(C): 37.1 (30 Sep 2019 10:00), Max: 37.5 (30 Sep 2019 00:35)  T(F): 98.8 (30 Sep 2019 10:00), Max: 99.5 (30 Sep 2019 00:35)  HR: 86 (30 Sep 2019 10:00) (64 - 90)  BP: 117/66 (30 Sep 2019 08:00) (107/53 - 140/66)  BP(mean): 87 (30 Sep 2019 08:00) (76 - 101)  ABP: 157/61 (30 Sep 2019 10:00) (128/45 - 170/67)  ABP(mean): 91 (30 Sep 2019 10:00) (70 - 98)  RR: 22 (30 Sep 2019 10:00) (15 - 30)  SpO2: 99% (30 Sep 2019 10:00) (96% - 100%)      I&O's Detail    29 Sep 2019 07:01  -  30 Sep 2019 07:00  --------------------------------------------------------  IN:    heparin Infusion: 144 mL    multiple electrolytes Injection Type 1: 4700 mL    Solution: 2000 mL  Total IN: 6844 mL    OUT:    Indwelling Catheter - Urethral: 6680 mL  Total OUT: 6680 mL    Total NET: 164 mL      30 Sep 2019 07:01  -  30 Sep 2019 10:27  --------------------------------------------------------  IN:    heparin Infusion: 16 mL    Lactated Ringers IV Bolus: 1000 mL    multiple electrolytes Injection Type 1: 400 mL    Oral Fluid: 120 mL    Solution: 50 mL    Solution: 200 mL  Total IN: 1786 mL    OUT:    Indwelling Catheter - Urethral: 1200 mL  Total OUT: 1200 mL    Total NET: 586 mL                MEDICATIONS  (STANDING):  amLODIPine   Tablet 10 milliGRAM(s) Oral daily  chlorhexidine 2% Cloths 1 Application(s) Topical daily  finasteride 5 milliGRAM(s) Oral daily  heparin  Infusion 800 Unit(s)/Hr (8 mL/Hr) IV Continuous <Continuous>  influenza   Vaccine 0.5 milliLiter(s) IntraMuscular once  insulin lispro (HumaLOG) corrective regimen sliding scale   SubCutaneous Before meals and at bedtime  multiple electrolytes Injection Type 1 1000 milliLiter(s) (200 mL/Hr) IV Continuous <Continuous>  pantoprazole    Tablet 40 milliGRAM(s) Oral before breakfast  potassium chloride  20 mEq/100 mL IVPB 20 milliEquivalent(s) IV Intermittent every 1 hour  tamsulosin 0.8 milliGRAM(s) Oral at bedtime    MEDICATIONS  (PRN):      PHYSICAL EXAM:    Gen: NAD    Eyes: PERRLA    Neurological: AAOx3    Neck: Trachea midline    Pulmonary: Non labored    Cardiovascular: RRR    Gastrointestinal: Soft, NT    Genitourinary: villegas, clear yellow urine.        LABS:  CBC Full  -  ( 30 Sep 2019 05:04 )  WBC Count : 7.08 K/uL  RBC Count : 2.65 M/uL  Hemoglobin : 7.1 g/dL  Hematocrit : 22.2 %  Platelet Count - Automated : 374 K/uL  Mean Cell Volume : 83.8 fl  Mean Cell Hemoglobin : 26.8 pg  Mean Cell Hemoglobin Concentration : 32.0 gm/dL  Auto Neutrophil # : 4.62 K/uL  Auto Lymphocyte # : 1.31 K/uL  Auto Monocyte # : 0.56 K/uL  Auto Eosinophil # : 0.49 K/uL  Auto Basophil # : 0.01 K/uL  Auto Neutrophil % : 65.3 %  Auto Lymphocyte % : 18.5 %  Auto Monocyte % : 7.9 %  Auto Eosinophil % : 6.9 %  Auto Basophil % : 0.1 %        145  |  103  |  20.0  ----------------------------<  95  3.0<L>   |  26.0  |  3.54<H>    Ca    7.9<L>      30 Sep 2019 05:04  Phos  4.1     -  Mg     1.9     -      PTT - ( 30 Sep 2019 05:04 )  PTT:78.0 sec  Urinalysis Basic - ( 28 Sep 2019 12:43 )    Color: Yellow / Appearance: Clear / S.010 / pH: x  Gluc: x / Ketone: Negative  / Bili: Negative / Urobili: Negative mg/dL   Blood: x / Protein: 15 mg/dL / Nitrite: Negative   Leuk Esterase: Negative / RBC: 6-10 /HPF / WBC 0-2   Sq Epi: x / Non Sq Epi: Occasional / Bacteria: Negative      RECENT CULTURES:   .Blood XXXX XXXX   No growth at 48 hours     .Blood XXXX XXXX   No growth at 48 hours     .Urine XXXX XXXX   No growth              CAPILLARY BLOOD GLUCOSE      RADIOLOGY & ADDITIONAL STUDIES:    ASSESSMENT/PLAN:  69yMale presenting with: Hyperkalemia and acute renal failure with ATN from hypovolemia and acute urinary retention       Neuro: No pain, avoid delirium.  sleep wake cycle maintenance.        CV:  IVC ultrasound with no resp varariation and appears plump.  Will decrease IV fluids.      Pulm: Pulm hygiene.      GI/Nutrition: Diet.      /Renal:  UOP remains quite high.  Likely diuretic phase of ATN.  Will monitor volume status closely. Maintain villegas.  Has not needed HD in 3 days.  Will discuss removal of IJ catheter with renal.    Heme: Anemia - likely of chronic disease.  Check iron studies.  Known DVTs, cont heparin.  Awaiting final determination of new baseline renal function to determine best medication and dose to transition to.      Proph: Heparin.      Dispo: ICU.

## 2019-09-30 NOTE — PROGRESS NOTE ADULT - SUBJECTIVE AND OBJECTIVE BOX
[facility] DIVISION OF KIDNEY DISEASES AND HYPERTENSION -- FOLLOW UP NOTE  --------------------------------------------------------------------------------  Chief Complaint: SERENITY    24 hour events/subjective:  Seen/examined this AM  Improving SCr with more IVF;  Still with significant post obstructive diuresis;      PAST HISTORY  --------------------------------------------------------------------------------  No significant changes to PMH, PSH, FHx, SHx, unless otherwise noted    ALLERGIES & MEDICATIONS  --------------------------------------------------------------------------------  Allergies    No Known Allergies    Intolerances      Standing Inpatient Medications  amLODIPine   Tablet 10 milliGRAM(s) Oral daily  chlorhexidine 2% Cloths 1 Application(s) Topical daily  finasteride 5 milliGRAM(s) Oral daily  heparin  Infusion 800 Unit(s)/Hr IV Continuous <Continuous>  influenza   Vaccine 0.5 milliLiter(s) IntraMuscular once  insulin lispro (HumaLOG) corrective regimen sliding scale   SubCutaneous Before meals and at bedtime  melatonin 3 milliGRAM(s) Oral at bedtime  multiple electrolytes Injection Type 1 1000 milliLiter(s) IV Continuous <Continuous>  pantoprazole    Tablet 40 milliGRAM(s) Oral before breakfast  tamsulosin 0.8 milliGRAM(s) Oral at bedtime    PRN Inpatient Medications      REVIEW OF SYSTEMS  --------------------------------------------------------------------------------  Gen: No weight changes, fatigue, fevers/chills, weakness  Skin: No rashes  Head/Eyes/Ears/Mouth: No headache; Normal hearing; Normal vision w/o blurriness; No sinus pain/discomfort, sore throat  Respiratory: No dyspnea, cough, wheezing, hemoptysis  CV: No chest pain, PND, orthopnea  GI: No abdominal pain, diarrhea, constipation, nausea, vomiting, melena, hematochezia  : No increased frequency, dysuria, hematuria, nocturia  MSK: No joint pain/swelling; no back pain; no edema  Neuro: No dizziness/lightheadedness, weakness, seizures, numbness, tingling  Heme: No easy bruising or bleeding  Endo: No heat/cold intolerance  Psych: No significant nervousness, anxiety, stress, depression    All other systems were reviewed and are negative, except as noted.    VITALS/PHYSICAL EXAM  --------------------------------------------------------------------------------  T(C): 37.1 (09-30-19 @ 11:00), Max: 37.5 (09-30-19 @ 00:35)  HR: 88 (09-30-19 @ 12:00) (64 - 95)  BP: 133/91 (09-30-19 @ 12:00) (107/53 - 140/66)  RR: 23 (09-30-19 @ 12:00) (15 - 30)  SpO2: 100% (09-30-19 @ 12:00) (98% - 100%)  Wt(kg): --        09-29-19 @ 07:01  -  09-30-19 @ 07:00  --------------------------------------------------------  IN: 6844 mL / OUT: 6680 mL / NET: 164 mL    09-30-19 @ 07:01  -  09-30-19 @ 12:49  --------------------------------------------------------  IN: 2570 mL / OUT: 2290 mL / NET: 280 mL      Physical Exam:  	Gen: NAD, well-appearing  	HEENT: PERRL, supple neck, clear oropharynx  	Pulm: CTA B/L  	CV: RRR, S1S2; no rub  	Back: No spinal or CVA tenderness; no sacral edema  	Abd: +BS, soft, nontender/nondistended  	: villegas  	UE: Warm, FROM, no clubbing, intact strength; no edema; no asterixis  	LE: Warm, FROM, no clubbing, intact strength; no edema  	Neuro: No focal deficits, intact gait  	Psych: Normal affect and mood  	Skin: Warm, without rashes  	Vascular access:    LABS/STUDIES  --------------------------------------------------------------------------------              7.1    7.08  >-----------<  374      [09-30-19 @ 05:04]              22.2     145  |  103  |  20.0  ----------------------------<  95      [09-30-19 @ 05:04]  3.0   |  26.0  |  3.54        Ca     7.9     [09-30-19 @ 05:04]      Mg     1.9     [09-30-19 @ 05:04]      Phos  4.1     [09-30-19 @ 05:04]        PTT: 78.0       [09-30-19 @ 05:04]      Creatinine Trend:  SCr 3.54 [09-30 @ 05:04]  SCr 4.15 [09-29 @ 15:18]  SCr 4.83 [09-29 @ 05:18]  SCr 4.69 [09-28 @ 12:44]  SCr 4.72 [09-28 @ 05:49]    Urinalysis - [09-28-19 @ 12:43]      Color Yellow / Appearance Clear / SG 1.010 / pH 8.0      Gluc 100 / Ketone Negative  / Bili Negative / Urobili Negative       Blood Moderate / Protein 15 / Leuk Est Negative / Nitrite Negative      RBC 6-10 / WBC 0-2 / Hyaline  / Gran  / Sq Epi  / Non Sq Epi Occasional / Bacteria Negative    Urine Osmolality 263      [09-28-19 @ 12:44]    Iron 59, TIBC 212, %sat 28      [09-30-19 @ 10:53]  HbA1c 5.8      [09-29-19 @ 05:18]    HBsAb 96.6      [09-27-19 @ 16:33]  HBsAb Reactive      [09-27-19 @ 16:33]  HBsAg Nonreact      [09-27-19 @ 16:37]  HBcAb Reactive      [09-27-19 @ 16:33]  HCV 0.20, Nonreact      [09-27-19 @ 16:37]

## 2019-10-01 LAB
ANION GAP SERPL CALC-SCNC: 11 MMOL/L — SIGNIFICANT CHANGE UP (ref 5–17)
ANION GAP SERPL CALC-SCNC: 12 MMOL/L — SIGNIFICANT CHANGE UP (ref 5–17)
APTT BLD: 73.6 SEC — HIGH (ref 27.5–36.3)
BASOPHILS # BLD AUTO: 0.01 K/UL — SIGNIFICANT CHANGE UP (ref 0–0.2)
BASOPHILS NFR BLD AUTO: 0.2 % — SIGNIFICANT CHANGE UP (ref 0–2)
BUN SERPL-MCNC: 17 MG/DL — SIGNIFICANT CHANGE UP (ref 8–20)
BUN SERPL-MCNC: 21 MG/DL — HIGH (ref 8–20)
CALCIUM SERPL-MCNC: 8 MG/DL — LOW (ref 8.6–10.2)
CALCIUM SERPL-MCNC: 8.1 MG/DL — LOW (ref 8.6–10.2)
CHLORIDE SERPL-SCNC: 104 MMOL/L — SIGNIFICANT CHANGE UP (ref 98–107)
CHLORIDE SERPL-SCNC: 106 MMOL/L — SIGNIFICANT CHANGE UP (ref 98–107)
CO2 SERPL-SCNC: 25 MMOL/L — SIGNIFICANT CHANGE UP (ref 22–29)
CO2 SERPL-SCNC: 27 MMOL/L — SIGNIFICANT CHANGE UP (ref 22–29)
CREAT SERPL-MCNC: 2.3 MG/DL — HIGH (ref 0.5–1.3)
CREAT SERPL-MCNC: 2.69 MG/DL — HIGH (ref 0.5–1.3)
EOSINOPHIL # BLD AUTO: 0.28 K/UL — SIGNIFICANT CHANGE UP (ref 0–0.5)
EOSINOPHIL NFR BLD AUTO: 4.3 % — SIGNIFICANT CHANGE UP (ref 0–6)
FERRITIN SERPL-MCNC: 176 NG/ML — SIGNIFICANT CHANGE UP (ref 30–400)
GLUCOSE BLDC GLUCOMTR-MCNC: 127 MG/DL — HIGH (ref 70–99)
GLUCOSE BLDC GLUCOMTR-MCNC: 134 MG/DL — HIGH (ref 70–99)
GLUCOSE BLDC GLUCOMTR-MCNC: 140 MG/DL — HIGH (ref 70–99)
GLUCOSE BLDC GLUCOMTR-MCNC: 155 MG/DL — HIGH (ref 70–99)
GLUCOSE BLDC GLUCOMTR-MCNC: 98 MG/DL — SIGNIFICANT CHANGE UP (ref 70–99)
GLUCOSE SERPL-MCNC: 168 MG/DL — HIGH (ref 70–115)
GLUCOSE SERPL-MCNC: 94 MG/DL — SIGNIFICANT CHANGE UP (ref 70–115)
HCT VFR BLD CALC: 22.4 % — LOW (ref 39–50)
HGB BLD-MCNC: 7 G/DL — CRITICAL LOW (ref 13–17)
IMM GRANULOCYTES NFR BLD AUTO: 0.9 % — SIGNIFICANT CHANGE UP (ref 0–1.5)
INR BLD: 1.09 RATIO — SIGNIFICANT CHANGE UP (ref 0.88–1.16)
LYMPHOCYTES # BLD AUTO: 1.76 K/UL — SIGNIFICANT CHANGE UP (ref 1–3.3)
LYMPHOCYTES # BLD AUTO: 27.1 % — SIGNIFICANT CHANGE UP (ref 13–44)
MAGNESIUM SERPL-MCNC: 1.8 MG/DL — SIGNIFICANT CHANGE UP (ref 1.6–2.6)
MAGNESIUM SERPL-MCNC: 1.8 MG/DL — SIGNIFICANT CHANGE UP (ref 1.6–2.6)
MCHC RBC-ENTMCNC: 26.5 PG — LOW (ref 27–34)
MCHC RBC-ENTMCNC: 31.3 GM/DL — LOW (ref 32–36)
MCV RBC AUTO: 84.8 FL — SIGNIFICANT CHANGE UP (ref 80–100)
MONOCYTES # BLD AUTO: 0.6 K/UL — SIGNIFICANT CHANGE UP (ref 0–0.9)
MONOCYTES NFR BLD AUTO: 9.2 % — SIGNIFICANT CHANGE UP (ref 2–14)
NEUTROPHILS # BLD AUTO: 3.79 K/UL — SIGNIFICANT CHANGE UP (ref 1.8–7.4)
NEUTROPHILS NFR BLD AUTO: 58.3 % — SIGNIFICANT CHANGE UP (ref 43–77)
PHOSPHATE SERPL-MCNC: 2.8 MG/DL — SIGNIFICANT CHANGE UP (ref 2.4–4.7)
PHOSPHATE SERPL-MCNC: 3.8 MG/DL — SIGNIFICANT CHANGE UP (ref 2.4–4.7)
PLATELET # BLD AUTO: 360 K/UL — SIGNIFICANT CHANGE UP (ref 150–400)
POTASSIUM SERPL-MCNC: 2.8 MMOL/L — CRITICAL LOW (ref 3.5–5.3)
POTASSIUM SERPL-MCNC: 3.9 MMOL/L — SIGNIFICANT CHANGE UP (ref 3.5–5.3)
POTASSIUM SERPL-SCNC: 2.8 MMOL/L — CRITICAL LOW (ref 3.5–5.3)
POTASSIUM SERPL-SCNC: 3.9 MMOL/L — SIGNIFICANT CHANGE UP (ref 3.5–5.3)
PROTHROM AB SERPL-ACNC: 12.6 SEC — SIGNIFICANT CHANGE UP (ref 10–12.9)
RBC # BLD: 2.64 M/UL — LOW (ref 4.2–5.8)
RBC # BLD: 2.64 M/UL — LOW (ref 4.2–5.8)
RBC # FLD: 16.4 % — HIGH (ref 10.3–14.5)
RETICS #: 23.2 K/UL — LOW (ref 25–125)
RETICS/RBC NFR: 0.9 % — SIGNIFICANT CHANGE UP (ref 0.5–2.5)
SODIUM SERPL-SCNC: 141 MMOL/L — SIGNIFICANT CHANGE UP (ref 135–145)
SODIUM SERPL-SCNC: 144 MMOL/L — SIGNIFICANT CHANGE UP (ref 135–145)
TRANSFERRIN SERPL-MCNC: 137 MG/DL — LOW (ref 180–329)
WBC # BLD: 6.5 K/UL — SIGNIFICANT CHANGE UP (ref 3.8–10.5)
WBC # FLD AUTO: 6.5 K/UL — SIGNIFICANT CHANGE UP (ref 3.8–10.5)

## 2019-10-01 PROCEDURE — 99223 1ST HOSP IP/OBS HIGH 75: CPT | Mod: GC

## 2019-10-01 PROCEDURE — 99232 SBSQ HOSP IP/OBS MODERATE 35: CPT

## 2019-10-01 RX ORDER — SODIUM CHLORIDE 9 MG/ML
1000 INJECTION, SOLUTION INTRAVENOUS ONCE
Refills: 0 | Status: DISCONTINUED | OUTPATIENT
Start: 2019-10-01 | End: 2019-10-01

## 2019-10-01 RX ORDER — FOLIC ACID 0.8 MG
1 TABLET ORAL DAILY
Refills: 0 | Status: DISCONTINUED | OUTPATIENT
Start: 2019-10-01 | End: 2019-10-08

## 2019-10-01 RX ORDER — ACETAMINOPHEN 500 MG
650 TABLET ORAL EVERY 6 HOURS
Refills: 0 | Status: DISCONTINUED | OUTPATIENT
Start: 2019-10-01 | End: 2019-10-08

## 2019-10-01 RX ORDER — POTASSIUM CHLORIDE 20 MEQ
40 PACKET (EA) ORAL EVERY 4 HOURS
Refills: 0 | Status: COMPLETED | OUTPATIENT
Start: 2019-10-01 | End: 2019-10-01

## 2019-10-01 RX ORDER — FERROUS SULFATE 325(65) MG
325 TABLET ORAL DAILY
Refills: 0 | Status: DISCONTINUED | OUTPATIENT
Start: 2019-10-01 | End: 2019-10-08

## 2019-10-01 RX ORDER — MAGNESIUM SULFATE 500 MG/ML
2 VIAL (ML) INJECTION ONCE
Refills: 0 | Status: COMPLETED | OUTPATIENT
Start: 2019-10-01 | End: 2019-10-02

## 2019-10-01 RX ORDER — POTASSIUM CHLORIDE 20 MEQ
10 PACKET (EA) ORAL
Refills: 0 | Status: COMPLETED | OUTPATIENT
Start: 2019-10-01 | End: 2019-10-01

## 2019-10-01 RX ORDER — SODIUM,POTASSIUM PHOSPHATES 278-250MG
1 POWDER IN PACKET (EA) ORAL ONCE
Refills: 0 | Status: COMPLETED | OUTPATIENT
Start: 2019-10-01 | End: 2019-10-01

## 2019-10-01 RX ORDER — SENNA PLUS 8.6 MG/1
2 TABLET ORAL AT BEDTIME
Refills: 0 | Status: DISCONTINUED | OUTPATIENT
Start: 2019-10-01 | End: 2019-10-08

## 2019-10-01 RX ORDER — DOCUSATE SODIUM 100 MG
100 CAPSULE ORAL THREE TIMES A DAY
Refills: 0 | Status: DISCONTINUED | OUTPATIENT
Start: 2019-10-01 | End: 2019-10-08

## 2019-10-01 RX ORDER — MULTIVIT WITH MIN/MFOLATE/K2 340-15/3 G
1 POWDER (GRAM) ORAL ONCE
Refills: 0 | Status: COMPLETED | OUTPATIENT
Start: 2019-10-01 | End: 2019-10-01

## 2019-10-01 RX ADMIN — Medication 1 TABLET(S): at 11:22

## 2019-10-01 RX ADMIN — Medication 100 MILLIEQUIVALENT(S): at 08:04

## 2019-10-01 RX ADMIN — Medication 325 MILLIGRAM(S): at 11:22

## 2019-10-01 RX ADMIN — Medication 100 MILLIEQUIVALENT(S): at 09:19

## 2019-10-01 RX ADMIN — Medication 40 MILLIEQUIVALENT(S): at 08:04

## 2019-10-01 RX ADMIN — Medication 40 MILLIEQUIVALENT(S): at 11:21

## 2019-10-01 RX ADMIN — Medication 100 MILLIEQUIVALENT(S): at 08:45

## 2019-10-01 RX ADMIN — CHLORHEXIDINE GLUCONATE 1 APPLICATION(S): 213 SOLUTION TOPICAL at 11:31

## 2019-10-01 RX ADMIN — Medication 2: at 15:41

## 2019-10-01 RX ADMIN — Medication 1 BOTTLE: at 13:45

## 2019-10-01 RX ADMIN — TAMSULOSIN HYDROCHLORIDE 0.8 MILLIGRAM(S): 0.4 CAPSULE ORAL at 21:34

## 2019-10-01 RX ADMIN — FINASTERIDE 5 MILLIGRAM(S): 5 TABLET, FILM COATED ORAL at 11:22

## 2019-10-01 RX ADMIN — Medication 1 MILLIGRAM(S): at 11:22

## 2019-10-01 RX ADMIN — Medication 650 MILLIGRAM(S): at 12:04

## 2019-10-01 RX ADMIN — PANTOPRAZOLE SODIUM 40 MILLIGRAM(S): 20 TABLET, DELAYED RELEASE ORAL at 05:41

## 2019-10-01 RX ADMIN — AMLODIPINE BESYLATE 10 MILLIGRAM(S): 2.5 TABLET ORAL at 05:41

## 2019-10-01 RX ADMIN — Medication 650 MILLIGRAM(S): at 11:04

## 2019-10-01 RX ADMIN — Medication 1 TABLET(S): at 11:24

## 2019-10-01 NOTE — CONSULT NOTE ADULT - SUBJECTIVE AND OBJECTIVE BOX
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 Pt stay complicated by septic right knee as well as RLE DVT presents on 09-26-19 w/ complaint of worsening RLE pain and swelling. . Pt was discharged on 8/1, at which time he was still w/o sensation or motor function distal to knee in RLE. Pt has continued to be in nursing home since that time. He presents to ED w/ 5 day history of worsening swelling and pain in his RLE. Pt denies any associated trauma or falls. He describes his pain as an aching/burning sensation in his leg. Patient states he has not followed up w/ his vascular surgeon. Patient also has some abdominal pain, it appears to be associated w/ small hematomas formed by his Lovenox injections. He denies any postprandial pain.     Hospital course c/b SERENITY, metabolic acidosis and hyperkalemia requiring R IJ catheter and emergent HD, ATN and urinary retention, managed with fluid resuscitation and strict I/O with villegas. CT A/P ruled out mesenteric ischemia. R IJ catheter removed 9/30.       Imaging showed:  CAP CT - Mild nonspecific prominence of small bowel loops in the left hemiabdomen, without a transition point findings may represent nonspecific enteritis, ileus, versus partial small bowel obstruction.    CT RLE - Interval increase of calcific densities around the knee likely a combination of posttreatment change, foreign body or heterotopic ossification. Known small joint effusion with diffuse subcutaneous and deep soft tissue edema without focal fluid collection. Interval healing of soft tissue wound in the posterolateral knee.    CTA A/P w runoff - ABDOMEN/PELVIS  1. No evidence of mesenteric ischemia.  2. Moderate to large amount retained fecal material in the colon.  3. Distended stomach of uncertain etiology.  4. Right lower lobe pulmonary nodule measuring 7 mm with surrounding groundglass attenuation; follow-up is recommended.  Lower extremities:  1. Patent popliteal artery bypass graft.  2. No extravasation of contrast in the distal thigh through foot.  3. Edematous low-attenuation musculature in the right calf and thigh with the differential including myositis, sequela of DVT or less likely compartment syndrome; clinical correlation is recommended  4. Deep venous thrombosis in the right popliteal vein as seen on the   prior venous Doppler.      REVIEW OF SYSTEMS  Constitutional - No fever, No weight loss, No fatigue  HEENT - No eye pain, No visual disturbances, No difficulty hearing, No tinnitus, No vertigo, No neck pain  Respiratory - No cough, No wheezing, No shortness of breath  Cardiovascular - No chest pain, No palpitations  Gastrointestinal - +abdominal pain, No nausea, No vomiting, No diarrhea, No constipation  Genitourinary - No dysuria, No frequency, No hematuria, No incontinence  Neurological - No headaches, No memory loss, No loss of strength, No numbness, No tremors  Skin - No itching, No rashes, No lesions   Endocrine - No temperature intolerance  Musculoskeletal - +RLE pain  Psychiatric - No depression, No anxiety    VITALS  T(C): 37.2 (10-01-19 @ 07:00), Max: 37.4 (09-30-19 @ 21:00)  HR: 50 (10-01-19 @ 06:00) (50 - 95)  BP: 134/63 (10-01-19 @ 06:00) (117/66 - 147/76)  RR: 15 (10-01-19 @ 06:00) (15 - 38)  SpO2: 98% (10-01-19 @ 06:00) (98% - 100%)  Wt(kg): --    PAST MEDICAL & SURGICAL HISTORY  GERD (gastroesophageal reflux disease)  HLD (hyperlipidemia)  No significant past surgical history      SOCIAL HISTORY  Smoking - Denied  EtOH - Denied   Drugs - Denied    FUNCTIONAL HISTORY  Lives   Independent    CURRENT FUNCTIONAL STATUS      FAMILY HISTORY   Denies family history of gastrointestinal malignancy     RECENT LABS/IMAGING  CBC Full  -  ( 01 Oct 2019 05:04 )  WBC Count : 6.50 K/uL  RBC Count : 2.64 M/uL  Hemoglobin : 7.0 g/dL  Hematocrit : 22.4 %  Platelet Count - Automated : 360 K/uL  Mean Cell Volume : 84.8 fl  Mean Cell Hemoglobin : 26.5 pg  Mean Cell Hemoglobin Concentration : 31.3 gm/dL  Auto Neutrophil # : 3.79 K/uL  Auto Lymphocyte # : 1.76 K/uL  Auto Monocyte # : 0.60 K/uL  Auto Eosinophil # : 0.28 K/uL  Auto Basophil # : 0.01 K/uL  Auto Neutrophil % : 58.3 %  Auto Lymphocyte % : 27.1 %  Auto Monocyte % : 9.2 %  Auto Eosinophil % : 4.3 %  Auto Basophil % : 0.2 %    10-01    144  |  106  |  17.0  ----------------------------<  94  2.8<LL>   |  27.0  |  2.69<H>    Ca    8.1<L>      01 Oct 2019 05:04  Phos  3.8     10-01  Mg     1.8     10-01          ALLERGIES  No Known Allergies      MEDICATIONS   amLODIPine   Tablet 10 milliGRAM(s) Oral daily  chlorhexidine 2% Cloths 1 Application(s) Topical daily  finasteride 5 milliGRAM(s) Oral daily  heparin  Infusion 800 Unit(s)/Hr IV Continuous <Continuous>  influenza   Vaccine 0.5 milliLiter(s) IntraMuscular once  insulin lispro (HumaLOG) corrective regimen sliding scale   SubCutaneous Before meals and at bedtime  multiple electrolytes Injection Type 1 1000 milliLiter(s) IV Continuous <Continuous>  multiple electrolytes Injection Type 1 Bolus 1000 milliLiter(s) IV Bolus once  pantoprazole    Tablet 40 milliGRAM(s) Oral before breakfast  potassium chloride    Tablet ER 40 milliEquivalent(s) Oral every 4 hours  potassium chloride  10 mEq/100 mL IVPB 10 milliEquivalent(s) IV Intermittent every 1 hour  tamsulosin 0.8 milliGRAM(s) Oral at bedtime 69yM was admitted on 09-27    In ED, GCS=15    Patient is a 69y old  Male who presents with a chief complaint of RLE pain (30 Sep 2019 12:48)      HPI:   69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 Pt stay complicated by septic right knee as well as RLE DVT presents on 09-26-19 w/ complaint of worsening RLE pain and swelling. . Pt was discharged on 8/1, at which time he was still w/o sensation or motor function distal to knee in RLE. Pt has continued to be in nursing home since that time. He presents to ED w/ 5 day history of worsening swelling and pain in his RLE. Pt denies any associated trauma or falls. He describes his pain as an aching/burning sensation in his leg. Patient states he has not followed up w/ his vascular surgeon. Patient also has some abdominal pain, it appears to be associated w/ small hematomas formed by his Lovenox injections. He denies any postprandial pain.     Hospital course c/b SERENITY, metabolic acidosis and hyperkalemia requiring R IJ catheter and emergent HD, ATN and urinary retention, managed with fluid resuscitation and strict I/O with villegas. CT A/P ruled out mesenteric ischemia. R IJ catheter removed 9/30.       Imaging showed:  CAP CT - Mild nonspecific prominence of small bowel loops in the left hemiabdomen, without a transition point findings may represent nonspecific enteritis, ileus, versus partial small bowel obstruction.    CT RLE - Interval increase of calcific densities around the knee likely a combination of posttreatment change, foreign body or heterotopic ossification. Known small joint effusion with diffuse subcutaneous and deep soft tissue edema without focal fluid collection. Interval healing of soft tissue wound in the posterolateral knee.    CTA A/P w runoff - ABDOMEN/PELVIS  1. No evidence of mesenteric ischemia.  2. Moderate to large amount retained fecal material in the colon.  3. Distended stomach of uncertain etiology.  4. Right lower lobe pulmonary nodule measuring 7 mm with surrounding groundglass attenuation; follow-up is recommended.  Lower extremities:  1. Patent popliteal artery bypass graft.  2. No extravasation of contrast in the distal thigh through foot.  3. Edematous low-attenuation musculature in the right calf and thigh with the differential including myositis, sequela of DVT or less likely compartment syndrome; clinical correlation is recommended  4. Deep venous thrombosis in the right popliteal vein as seen on the   prior venous Doppler.      REVIEW OF SYSTEMS  Constitutional - No fever, No weight loss, +fatigue  HEENT - No eye pain, No visual disturbances, No difficulty hearing, No tinnitus, No vertigo, No neck pain  Respiratory - No cough, No wheezing, No shortness of breath  Cardiovascular - No chest pain, No palpitations  Gastrointestinal - No abdominal pain, No nausea, No vomiting, No diarrhea, No constipation  Genitourinary - No dysuria, No frequency, No hematuria, No incontinence  Neurological - No headaches, No memory loss, +loss of strength, No numbness, No tremors  Skin - No itching, No rashes, No lesions   Endocrine - No temperature intolerance  Musculoskeletal - +RLE pain  Psychiatric - No depression, No anxiety    VITALS  T(C): 37.2 (10-01-19 @ 07:00), Max: 37.4 (09-30-19 @ 21:00)  HR: 50 (10-01-19 @ 06:00) (50 - 95)  BP: 134/63 (10-01-19 @ 06:00) (117/66 - 147/76)  RR: 15 (10-01-19 @ 06:00) (15 - 38)  SpO2: 98% (10-01-19 @ 06:00) (98% - 100%)  Wt(kg): --    PAST MEDICAL & SURGICAL HISTORY  GERD (gastroesophageal reflux disease)  HLD (hyperlipidemia)  No significant past surgical history      SOCIAL HISTORY  Smoking - Denied  EtOH - Denied   Drugs - Denied    FUNCTIONAL HISTORY  Lives in 1 story PH with wife and daughter, 2STE, 0HR. 4-6 steps inside to main living area.   Previously Independent in ADLs. Has been at [facility] since 8/1/2019. Per pt, has mainly been self-propelling at  level. States he cannot ambulate due to RLE injury. States that he requires assistance from staff for transfers.     CURRENT FUNCTIONAL STATUS  pending PT/OT eval.     FAMILY HISTORY   Denies family history of gastrointestinal malignancy     RECENT LABS/IMAGING  CBC Full  -  ( 01 Oct 2019 05:04 )  WBC Count : 6.50 K/uL  RBC Count : 2.64 M/uL  Hemoglobin : 7.0 g/dL  Hematocrit : 22.4 %  Platelet Count - Automated : 360 K/uL  Mean Cell Volume : 84.8 fl  Mean Cell Hemoglobin : 26.5 pg  Mean Cell Hemoglobin Concentration : 31.3 gm/dL  Auto Neutrophil # : 3.79 K/uL  Auto Lymphocyte # : 1.76 K/uL  Auto Monocyte # : 0.60 K/uL  Auto Eosinophil # : 0.28 K/uL  Auto Basophil # : 0.01 K/uL  Auto Neutrophil % : 58.3 %  Auto Lymphocyte % : 27.1 %  Auto Monocyte % : 9.2 %  Auto Eosinophil % : 4.3 %  Auto Basophil % : 0.2 %    10-01    144  |  106  |  17.0  ----------------------------<  94  2.8<LL>   |  27.0  |  2.69<H>    Ca    8.1<L>      01 Oct 2019 05:04  Phos  3.8     10-01  Mg     1.8     10-01          ALLERGIES  No Known Allergies      MEDICATIONS   amLODIPine   Tablet 10 milliGRAM(s) Oral daily  chlorhexidine 2% Cloths 1 Application(s) Topical daily  finasteride 5 milliGRAM(s) Oral daily  heparin  Infusion 800 Unit(s)/Hr IV Continuous <Continuous>  influenza   Vaccine 0.5 milliLiter(s) IntraMuscular once  insulin lispro (HumaLOG) corrective regimen sliding scale   SubCutaneous Before meals and at bedtime  multiple electrolytes Injection Type 1 1000 milliLiter(s) IV Continuous <Continuous>  multiple electrolytes Injection Type 1 Bolus 1000 milliLiter(s) IV Bolus once  pantoprazole    Tablet 40 milliGRAM(s) Oral before breakfast  potassium chloride    Tablet ER 40 milliEquivalent(s) Oral every 4 hours  potassium chloride  10 mEq/100 mL IVPB 10 milliEquivalent(s) IV Intermittent every 1 hour  tamsulosin 0.8 milliGRAM(s) Oral at bedtime      ----------------------------------------------------------------------------------------  PHYSICAL EXAM  Constitutional - NAD, Comfortable  HEENT - NCAT, EOMI  Neck - Supple, No limited ROM  Chest - Breathing comfortably, No wheezing  Cardiovascular - S1S2   Abdomen - Soft   Extremities - RLE calf edema, nonerythematous, nontender to palpation.   Neurologic Exam -                    Cognitive - Awake, Alert, AAO to self, place, date, year, situation     Communication - Fluent, No dysarthria     Cranial Nerves - CN 2-12 intact     Motor -                     LEFT    UE - ShAB 5/5, EF 5/5, EE 5/5, WE 5/5,  5/5                    RIGHT UE - ShAB 5/5, EF 5/5, EE 5/5, WE 5/5,  5/5                    LEFT    LE - HF 4/5, KE 5/5, DF 5/5, PF 5/5                    RIGHT LE - HF 1/5, KE 0/5, DF 0/5, PF 0/5        Sensory - reduced sensation to LT at RLE L4, L5, S1     Reflexes - DTR Intact, No primitive reflexive     Coordination - FTN intact     OculoVestibular - No saccades, No nystagmus, VOR         Balance - did not assess  Psychiatric - Mood stable, Affect WNL  ----------------------------------------------------------------------------------------  ASSESSMENT/PLAN  69y Male with functional deficits after ARF    ARF, hyperkalemia, metabolic acidosis - s/p emergent HD. Now hypokalemic, repleting, Overall SCr improving.   RLE - motor and sensory at baseline since discharge. Denies pain, but pt states he is unable to ambulate due to injury.   HTN - amlodipine  Urinary retention - flomax, finasteride  GERD - protonix  DVT PPX - heparin  Rehab - Patient admitted from [facility], where he has been since discharge 8/1/2019. Patient states that he is able to self-propel in WC, but has not made progress in ambulation. Denies pain in RLE, but states that he cannot walk due to his injury. Recommend home vs. [facility], pending WC set up at home. Patient currently defers all questions regarding dispo to his daughter. Patient DOES NOT meet acute inpatient rehabilitation criteria.     Will sign off, please reconsult if needed for rehab dispo recommendations. 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 Pt stay complicated by septic right knee as well as RLE DVT presents on 09-26-19 w/ complaint of worsening RLE pain and swelling. . Pt was discharged on 8/1, at which time he was still w/o sensation or motor function distal to knee in RLE. Pt has continued to be in nursing home since that time. He presents to ED w/ 5 day history of worsening swelling and pain in his RLE. Pt denies any associated trauma or falls. He describes his pain as an aching/burning sensation in his leg. Patient states he has not followed up w/ his vascular surgeon. Patient also has some abdominal pain, it appears to be associated w/ small hematomas formed by his Lovenox injections. He denies any postprandial pain.     Hospital course c/b SERENITY, metabolic acidosis and hyperkalemia requiring R IJ catheter and emergent HD, ATN and urinary retention, managed with fluid resuscitation and strict I/O with villegas. CT A/P ruled out mesenteric ischemia. R IJ catheter removed 9/30.       Imaging showed:  CAP CT - Mild nonspecific prominence of small bowel loops in the left hemiabdomen, without a transition point findings may represent nonspecific enteritis, ileus, versus partial small bowel obstruction.    CT RLE - Interval increase of calcific densities around the knee likely a combination of posttreatment change, foreign body or heterotopic ossification. Known small joint effusion with diffuse subcutaneous and deep soft tissue edema without focal fluid collection. Interval healing of soft tissue wound in the posterolateral knee.    CTA A/P w runoff - ABDOMEN/PELVIS  1. No evidence of mesenteric ischemia.  2. Moderate to large amount retained fecal material in the colon.  3. Distended stomach of uncertain etiology.  4. Right lower lobe pulmonary nodule measuring 7 mm with surrounding groundglass attenuation; follow-up is recommended.  Lower extremities:  1. Patent popliteal artery bypass graft.  2. No extravasation of contrast in the distal thigh through foot.  3. Edematous low-attenuation musculature in the right calf and thigh with the differential including myositis, sequela of DVT or less likely compartment syndrome; clinical correlation is recommended  4. Deep venous thrombosis in the right popliteal vein as seen on the   prior venous Doppler.      REVIEW OF SYSTEMS  Constitutional - No fever, No weight loss, +fatigue  HEENT - No eye pain, No visual disturbances, No difficulty hearing, No tinnitus, No vertigo, No neck pain  Respiratory - No cough, No wheezing, No shortness of breath  Cardiovascular - No chest pain, No palpitations  Gastrointestinal - No abdominal pain, No nausea, No vomiting, No diarrhea, No constipation  Genitourinary - No dysuria, No frequency, No hematuria, No incontinence  Neurological - No headaches, No memory loss, +loss of strength, No numbness, No tremors  Skin - No itching, No rashes, No lesions   Endocrine - No temperature intolerance  Musculoskeletal - +RLE pain  Psychiatric - No depression, No anxiety    VITALS  T(C): 37.2 (10-01-19 @ 07:00), Max: 37.4 (09-30-19 @ 21:00)  HR: 50 (10-01-19 @ 06:00) (50 - 95)  BP: 134/63 (10-01-19 @ 06:00) (117/66 - 147/76)  RR: 15 (10-01-19 @ 06:00) (15 - 38)  SpO2: 98% (10-01-19 @ 06:00) (98% - 100%)  Wt(kg): --    PAST MEDICAL & SURGICAL HISTORY  GERD (gastroesophageal reflux disease)  HLD (hyperlipidemia)  No significant past surgical history      SOCIAL HISTORY  Smoking - Denied  EtOH - Denied   Drugs - Denied    FUNCTIONAL HISTORY  Lives in 1 story PH with wife and daughter, 2STE, 0HR. 4-6 steps inside to main living area.   Previously Independent in ADLs. Has been at [facility] since 8/1/2019. Per pt, has mainly been self-propelling at  level. States he cannot ambulate due to RLE injury. States that he requires assistance from staff for transfers.     CURRENT FUNCTIONAL STATUS  10/1  Transfer: Sit to Stand:     · Level of [place]	moderate assist (50% patients effort)	  · Physical Assist/Nonphysical Assist	1 person assist; verbal cues	  · Weight-Bearing Restrictions	full weight-bearing	  · Assistive Device	rolling walker	    Transfer: Stand to Sit:     · Level of [place]	minimum assist (75% patients effort)	  · Physical Assist/Nonphysical Assist	1 person assist; verbal cues	  · Weight-Bearing Restrictions	full weight-bearing	  · Assistive Device	rolling walker	    Sit/Stand Transfer Safety Analysis:     · Impairments Contributing to Impaired Transfers	decreased strength; decreased ROM	    Gait Skills:     · Level of [place]	marching in place, pt able to lift right LE off of floor, unable to clear left LE. Also unable to fully extend right knee and weightbearing through right forefoot, only.	  	    FAMILY HISTORY   Denies family history of gastrointestinal malignancy     RECENT LABS/IMAGING  CBC Full  -  ( 01 Oct 2019 05:04 )  WBC Count : 6.50 K/uL  RBC Count : 2.64 M/uL  Hemoglobin : 7.0 g/dL  Hematocrit : 22.4 %  Platelet Count - Automated : 360 K/uL  Mean Cell Volume : 84.8 fl  Mean Cell Hemoglobin : 26.5 pg  Mean Cell Hemoglobin Concentration : 31.3 gm/dL  Auto Neutrophil # : 3.79 K/uL  Auto Lymphocyte # : 1.76 K/uL  Auto Monocyte # : 0.60 K/uL  Auto Eosinophil # : 0.28 K/uL  Auto Basophil # : 0.01 K/uL  Auto Neutrophil % : 58.3 %  Auto Lymphocyte % : 27.1 %  Auto Monocyte % : 9.2 %  Auto Eosinophil % : 4.3 %  Auto Basophil % : 0.2 %    10-01    144  |  106  |  17.0  ----------------------------<  94  2.8<LL>   |  27.0  |  2.69<H>    Ca    8.1<L>      01 Oct 2019 05:04  Phos  3.8     10-01  Mg     1.8     10-01          ALLERGIES  No Known Allergies      MEDICATIONS   amLODIPine   Tablet 10 milliGRAM(s) Oral daily  chlorhexidine 2% Cloths 1 Application(s) Topical daily  finasteride 5 milliGRAM(s) Oral daily  heparin  Infusion 800 Unit(s)/Hr IV Continuous <Continuous>  influenza   Vaccine 0.5 milliLiter(s) IntraMuscular once  insulin lispro (HumaLOG) corrective regimen sliding scale   SubCutaneous Before meals and at bedtime  multiple electrolytes Injection Type 1 1000 milliLiter(s) IV Continuous <Continuous>  multiple electrolytes Injection Type 1 Bolus 1000 milliLiter(s) IV Bolus once  pantoprazole    Tablet 40 milliGRAM(s) Oral before breakfast  potassium chloride    Tablet ER 40 milliEquivalent(s) Oral every 4 hours  potassium chloride  10 mEq/100 mL IVPB 10 milliEquivalent(s) IV Intermittent every 1 hour  tamsulosin 0.8 milliGRAM(s) Oral at bedtime      ----------------------------------------------------------------------------------------  PHYSICAL EXAM  Constitutional - NAD, Comfortable  HEENT - NCAT, EOMI  Neck - Supple, No limited ROM  Chest - Breathing comfortably, No wheezing  Cardiovascular - S1S2   Abdomen - Soft   Extremities - RLE calf edema, nonerythematous, nontender to palpation.   Neurologic Exam -                    Cognitive - Awake, Alert, AAO to self, place, date, year, situation     Communication - Fluent, No dysarthria     Cranial Nerves - CN 2-12 intact     Motor -                     LEFT    UE - ShAB 5/5, EF 5/5, EE 5/5, WE 5/5,  5/5                    RIGHT UE - ShAB 5/5, EF 5/5, EE 5/5, WE 5/5,  5/5                    LEFT    LE - HF 4/5, KE 5/5, DF 5/5, PF 5/5                    RIGHT LE - HF 1/5, KE 0/5, DF 0/5, PF 0/5        Sensory - reduced sensation to LT at RLE L4, L5, S1     Reflexes - DTR Intact, No primitive reflexive     Coordination - FTN intact     OculoVestibular - No saccades, No nystagmus, VOR         Balance - did not assess  Psychiatric - Mood stable, Affect WNL  ----------------------------------------------------------------------------------------  ASSESSMENT/PLAN  69y Male with functional deficits after ARF    ARF, hyperkalemia, metabolic acidosis - s/p emergent HD. Now hypokalemic, repleting, Overall SCr improving.   RLE - motor and sensory at baseline since discharge. Denies pain, but pt states he is unable to ambulate due to injury.   HTN - amlodipine  Urinary retention - flomax, finasteride  GERD - protonix  DVT PPX - heparin  Rehab - Patient admitted from [facility], where he has been since discharge 8/1/2019. Patient states that he is able to self-propel in WC, but has not made progress in ambulation. Denies pain in RLE, but states that he cannot walk due to his injury. Recommend home vs. [facility], pending WC set up at home. Patient currently defers all questions regarding dispo to his daughter. Patient DOES NOT meet acute inpatient rehabilitation criteria.     Will sign off, please reconsult if needed for rehab dispo recommendations.

## 2019-10-01 NOTE — PHYSICAL THERAPY INITIAL EVALUATION ADULT - ACTIVE RANGE OF MOTION EXAMINATION, REHAB EVAL
LLE Active ROM was WNL (within normal limits)/right knee extension limited by approx 20 degrees, pt guarding due to anticipated pain/yulissa. upper extremity Active ROM was WNL (within normal limits)

## 2019-10-01 NOTE — CONSULT NOTE ADULT - ATTENDING COMMENTS
No significant abscess of active extravasation noted on CT angio images, which was reviewed today    No clear source of acidosis and worsening renal failure from the lower extremity    No vascular intervention at this time    Please recall as needed
Patient seen and examined and cased discussed with resident. Agree with recommendations.     Patient has been at [facility] for 2-3 months without significant improvement and is being carried from the bed to chair. Patient without significant use of the right leg, unclear of cause for proximal weakness - appears limited by effort. Despite injury over a few months ago, home DC has not been established for WC access with ramps etc. Will not achieve dispo for home in a short course of time.     Recommend [facility] (patient wants a different one). Does not meet criteria for AR.    Will sign off, please reconsult for additional rehab dispo needs if functional status changes.

## 2019-10-01 NOTE — PROGRESS NOTE ADULT - SUBJECTIVE AND OBJECTIVE BOX
SICU Progress Note    Last 24hrs: No acute overnight events - received 1L bolus overnight for fluid resuscitation per PA; creatinine continues to downtrend; patient without complaints of pain    ICU Vital Signs Last 24 Hrs  T(C): 37.2 (01 Oct 2019 07:00), Max: 37.4 (30 Sep 2019 21:00)  T(F): 99 (01 Oct 2019 07:00), Max: 99.3 (30 Sep 2019 21:00)  HR: 50 (01 Oct 2019 06:00) (50 - 95)  BP: 134/63 (01 Oct 2019 06:00) (117/66 - 147/76)  BP(mean): 90 (01 Oct 2019 06:00) (82 - 108)  ABP: 137/64 (30 Sep 2019 11:00) (137/64 - 159/52)  ABP(mean): 92 (30 Sep 2019 11:00) (78 - 92)  RR: 15 (01 Oct 2019 06:00) (15 - 38)  SpO2: 98% (01 Oct 2019 06:00) (98% - 100%)      I&O's Detail    30 Sep 2019 07:01  -  01 Oct 2019 07:00  --------------------------------------------------------  IN:    heparin Infusion: 184 mL    Lactated Ringers IV Bolus: 1000 mL    multiple electrolytes Injection Type 1: 2100 mL    multiple electrolytes Injection Type 1 Bolus: 1000 mL    Oral Fluid: 430 mL    Solution: 50 mL    Solution: 300 mL  Total IN: 5064 mL    OUT:    Indwelling Catheter - Urethral: 5405 mL  Total OUT: 5405 mL    Total NET: -341 mL      MEDICATIONS  (STANDING):  amLODIPine   Tablet 10 milliGRAM(s) Oral daily  chlorhexidine 2% Cloths 1 Application(s) Topical daily  finasteride 5 milliGRAM(s) Oral daily  heparin  Infusion 800 Unit(s)/Hr (8 mL/Hr) IV Continuous <Continuous>  influenza   Vaccine 0.5 milliLiter(s) IntraMuscular once  insulin lispro (HumaLOG) corrective regimen sliding scale   SubCutaneous Before meals and at bedtime  multiple electrolytes Injection Type 1 1000 milliLiter(s) (75 mL/Hr) IV Continuous <Continuous>  multiple electrolytes Injection Type 1 Bolus 1000 milliLiter(s) IV Bolus once  pantoprazole    Tablet 40 milliGRAM(s) Oral before breakfast  potassium chloride    Tablet ER 40 milliEquivalent(s) Oral every 4 hours  potassium chloride  10 mEq/100 mL IVPB 10 milliEquivalent(s) IV Intermittent every 1 hour  tamsulosin 0.8 milliGRAM(s) Oral at bedtime      PHYSICAL EXAM:    Gen: NAD, sitting up in bed    Eyes: PERRLA    Neurological: AAOx3    Neck: Trachea midline    Pulmonary: Non labored breathing on 2L NC oxygen    Cardiovascular: RRR    Gastrointestinal: Soft, NT    Genitourinary: Min in place draining clear yellow urine.        LABS:  CBC Full  -  ( 01 Oct 2019 05:04 )  WBC Count : 6.50 K/uL  RBC Count : 2.64 M/uL  Hemoglobin : 7.0 g/dL  Hematocrit : 22.4 %  Platelet Count - Automated : 360 K/uL  Mean Cell Volume : 84.8 fl  Mean Cell Hemoglobin : 26.5 pg  Mean Cell Hemoglobin Concentration : 31.3 gm/dL  Auto Neutrophil # : 3.79 K/uL  Auto Lymphocyte # : 1.76 K/uL  Auto Monocyte # : 0.60 K/uL  Auto Eosinophil # : 0.28 K/uL  Auto Basophil # : 0.01 K/uL  Auto Neutrophil % : 58.3 %  Auto Lymphocyte % : 27.1 %  Auto Monocyte % : 9.2 %  Auto Eosinophil % : 4.3 %  Auto Basophil % : 0.2 %    10-01    144  |  106  |  17.0  ----------------------------<  94  2.8<LL>   |  27.0  |  2.69<H>    Ca    8.1<L>      01 Oct 2019 05:04  Phos  3.8     10-01  Mg     1.8     10-01      PT/INR - ( 01 Oct 2019 05:04 )   PT: 12.6 sec;   INR: 1.09 ratio         PTT - ( 01 Oct 2019 05:04 )  PTT:73.6 sec    RECENT CULTURES:  09-27 .Blood XXXX XXXX   No growth at 48 hours    09-27 .Blood XXXX XXXX   No growth at 48 hours    09-27 .Urine XXXX XXXX   No growth

## 2019-10-01 NOTE — PHYSICAL THERAPY INITIAL EVALUATION ADULT - LEVEL OF INDEPENDENCE: GAIT, REHAB EVAL
marching in place, pt able to lift right LE off of floor, unable to clear left LE. Also unable to fully extend right knee and weightbearing through right forefoot, only.

## 2019-10-01 NOTE — PHYSICAL THERAPY INITIAL EVALUATION ADULT - GENERAL OBSERVATIONS, REHAB EVAL
Pt received sitting in bedside chair, (+) central line, (+) cardiac monitor, (+) villegas, NAD. Agreeable to PT evaluation.

## 2019-10-01 NOTE — PHYSICAL THERAPY INITIAL EVALUATION ADULT - PERTINENT HX OF CURRENT PROBLEM, REHAB EVAL
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 Pt had right popliteal bypass, tibial nerve graft, fasciotomy/washout. c/b sepsis. Pt without motor function or sensation distal to right knee.  Was discharged to [facility] beginning of August 2019.  Returns with hyperkalemia and SERENITY. hypovolemic, urinary retention. r/o mesenteric ischemia.

## 2019-10-01 NOTE — PROGRESS NOTE ADULT - ASSESSMENT
69y Male presenting with: Hyperkalemia and acute renal failure with ATN from hypovolemia and acute urinary retention. K now 2.8; requiring repletions, Phos 3.8.      Neuro: No complaints of pain - will continue steps to avoid delirium.  Sleep wake cycle maintenance.   Sleep hygiene.     CV:  IVC ultrasound with no resp variation and appears plump. IVF now @ 75; patient received a 1 L bolus overnight     Pulm: Pulm hygiene.      GI/Nutrition: Diet, renal - tolerating    /Renal:  Renal consulted; agree with diagnosis of ATN. UOP remains high but decreased from the previous 24 hours - currently 5.4L in 24 (previous 24 hours was 6.7L).  Likely diuretic phase of ATN.  Will monitor volume status closely. Maintain villegas.  Has not needed HD in 3 days.  Will discuss removal of IJ catheter with renal.    Heme: Anemia - likely of chronic disease.  Check iron studies.  Known DVTs, cont heparin.  Awaiting final determination of new baseline renal function to determine best medication and dose to transition to.      Proph: Heparin gtt 60-90    Dispo: SICU, clinical status improving

## 2019-10-01 NOTE — PHYSICAL THERAPY INITIAL EVALUATION ADULT - CRITERIA FOR SKILLED THERAPEUTIC INTERVENTIONS
therapy frequency/risk reduction/prevention/rehab potential/impairments found/functional limitations in following categories

## 2019-10-01 NOTE — PHYSICAL THERAPY INITIAL EVALUATION ADULT - ADDITIONAL COMMENTS
Prior to incident: pt lives with spouse and daughter in a private house with 2 steps to enter, no rail.  6 steps to main living area. Spouse and daughter work. Pt independent prior to initial incident, no DME  Pt reports that he has been working with PT at [facility] and describes transfers and weight shifting in parallel bars with assist of 1 person.

## 2019-10-01 NOTE — OCCUPATIONAL THERAPY INITIAL EVALUATION ADULT - PERTINENT HX OF CURRENT PROBLEM, REHAB EVAL
s/p forklift injury (June 2019) requiring emergent right above to below knee popliteal bypass, tibial nerve graft, fasciotomy/washout. Septic right knee and right DVT. Pt returned hyperkalemia, SERENITY and metabolic acidosis

## 2019-10-01 NOTE — PHYSICAL THERAPY INITIAL EVALUATION ADULT - MANUAL MUSCLE TESTING RESULTS, REHAB EVAL
bilateral UE's and left LE 5/5.  Right knee extension 1/5, dorsiflexion 1/5, hip flexion 2/5, knee flexion 4/5

## 2019-10-02 LAB
ANION GAP SERPL CALC-SCNC: 11 MMOL/L — SIGNIFICANT CHANGE UP (ref 5–17)
ANION GAP SERPL CALC-SCNC: 12 MMOL/L — SIGNIFICANT CHANGE UP (ref 5–17)
APTT BLD: 68.9 SEC — HIGH (ref 27.5–36.3)
BASOPHILS # BLD AUTO: 0.02 K/UL — SIGNIFICANT CHANGE UP (ref 0–0.2)
BASOPHILS NFR BLD AUTO: 0.3 % — SIGNIFICANT CHANGE UP (ref 0–2)
BLD GP AB SCN SERPL QL: SIGNIFICANT CHANGE UP
BUN SERPL-MCNC: 18 MG/DL — SIGNIFICANT CHANGE UP (ref 8–20)
BUN SERPL-MCNC: 19 MG/DL — SIGNIFICANT CHANGE UP (ref 8–20)
CALCIUM SERPL-MCNC: 8.2 MG/DL — LOW (ref 8.6–10.2)
CALCIUM SERPL-MCNC: 8.6 MG/DL — SIGNIFICANT CHANGE UP (ref 8.6–10.2)
CHLORIDE SERPL-SCNC: 104 MMOL/L — SIGNIFICANT CHANGE UP (ref 98–107)
CHLORIDE SERPL-SCNC: 105 MMOL/L — SIGNIFICANT CHANGE UP (ref 98–107)
CO2 SERPL-SCNC: 24 MMOL/L — SIGNIFICANT CHANGE UP (ref 22–29)
CO2 SERPL-SCNC: 25 MMOL/L — SIGNIFICANT CHANGE UP (ref 22–29)
CREAT SERPL-MCNC: 1.9 MG/DL — HIGH (ref 0.5–1.3)
CREAT SERPL-MCNC: 2.09 MG/DL — HIGH (ref 0.5–1.3)
CULTURE RESULTS: SIGNIFICANT CHANGE UP
CULTURE RESULTS: SIGNIFICANT CHANGE UP
EOSINOPHIL # BLD AUTO: 0.21 K/UL — SIGNIFICANT CHANGE UP (ref 0–0.5)
EOSINOPHIL NFR BLD AUTO: 3.2 % — SIGNIFICANT CHANGE UP (ref 0–6)
GLUCOSE BLDC GLUCOMTR-MCNC: 101 MG/DL — HIGH (ref 70–99)
GLUCOSE BLDC GLUCOMTR-MCNC: 114 MG/DL — HIGH (ref 70–99)
GLUCOSE BLDC GLUCOMTR-MCNC: 118 MG/DL — HIGH (ref 70–99)
GLUCOSE BLDC GLUCOMTR-MCNC: 118 MG/DL — HIGH (ref 70–99)
GLUCOSE SERPL-MCNC: 107 MG/DL — SIGNIFICANT CHANGE UP (ref 70–115)
GLUCOSE SERPL-MCNC: 120 MG/DL — HIGH (ref 70–115)
HCT VFR BLD CALC: 22.8 % — LOW (ref 39–50)
HCT VFR BLD CALC: 30.1 % — LOW (ref 39–50)
HGB BLD-MCNC: 7.2 G/DL — LOW (ref 13–17)
HGB BLD-MCNC: 9.6 G/DL — LOW (ref 13–17)
IMM GRANULOCYTES NFR BLD AUTO: 1.9 % — HIGH (ref 0–1.5)
LYMPHOCYTES # BLD AUTO: 1.91 K/UL — SIGNIFICANT CHANGE UP (ref 1–3.3)
LYMPHOCYTES # BLD AUTO: 29.5 % — SIGNIFICANT CHANGE UP (ref 13–44)
MAGNESIUM SERPL-MCNC: 2.4 MG/DL — SIGNIFICANT CHANGE UP (ref 1.6–2.6)
MCHC RBC-ENTMCNC: 26.7 PG — LOW (ref 27–34)
MCHC RBC-ENTMCNC: 26.7 PG — LOW (ref 27–34)
MCHC RBC-ENTMCNC: 31.6 GM/DL — LOW (ref 32–36)
MCHC RBC-ENTMCNC: 31.9 GM/DL — LOW (ref 32–36)
MCV RBC AUTO: 83.8 FL — SIGNIFICANT CHANGE UP (ref 80–100)
MCV RBC AUTO: 84.4 FL — SIGNIFICANT CHANGE UP (ref 80–100)
MONOCYTES # BLD AUTO: 0.57 K/UL — SIGNIFICANT CHANGE UP (ref 0–0.9)
MONOCYTES NFR BLD AUTO: 8.8 % — SIGNIFICANT CHANGE UP (ref 2–14)
NEUTROPHILS # BLD AUTO: 3.64 K/UL — SIGNIFICANT CHANGE UP (ref 1.8–7.4)
NEUTROPHILS NFR BLD AUTO: 56.3 % — SIGNIFICANT CHANGE UP (ref 43–77)
PHOSPHATE SERPL-MCNC: 2.9 MG/DL — SIGNIFICANT CHANGE UP (ref 2.4–4.7)
PLATELET # BLD AUTO: 342 K/UL — SIGNIFICANT CHANGE UP (ref 150–400)
PLATELET # BLD AUTO: 410 K/UL — HIGH (ref 150–400)
POTASSIUM SERPL-MCNC: 3.2 MMOL/L — LOW (ref 3.5–5.3)
POTASSIUM SERPL-MCNC: 4.6 MMOL/L — SIGNIFICANT CHANGE UP (ref 3.5–5.3)
POTASSIUM SERPL-SCNC: 3.2 MMOL/L — LOW (ref 3.5–5.3)
POTASSIUM SERPL-SCNC: 4.6 MMOL/L — SIGNIFICANT CHANGE UP (ref 3.5–5.3)
RBC # BLD: 2.7 M/UL — LOW (ref 4.2–5.8)
RBC # BLD: 3.59 M/UL — LOW (ref 4.2–5.8)
RBC # FLD: 16.2 % — HIGH (ref 10.3–14.5)
RBC # FLD: 16.3 % — HIGH (ref 10.3–14.5)
SODIUM SERPL-SCNC: 140 MMOL/L — SIGNIFICANT CHANGE UP (ref 135–145)
SODIUM SERPL-SCNC: 141 MMOL/L — SIGNIFICANT CHANGE UP (ref 135–145)
SPECIMEN SOURCE: SIGNIFICANT CHANGE UP
SPECIMEN SOURCE: SIGNIFICANT CHANGE UP
WBC # BLD: 16.29 K/UL — HIGH (ref 3.8–10.5)
WBC # BLD: 6.47 K/UL — SIGNIFICANT CHANGE UP (ref 3.8–10.5)
WBC # FLD AUTO: 16.29 K/UL — HIGH (ref 3.8–10.5)
WBC # FLD AUTO: 6.47 K/UL — SIGNIFICANT CHANGE UP (ref 3.8–10.5)

## 2019-10-02 PROCEDURE — 70450 CT HEAD/BRAIN W/O DYE: CPT | Mod: 26,59

## 2019-10-02 PROCEDURE — 99231 SBSQ HOSP IP/OBS SF/LOW 25: CPT

## 2019-10-02 PROCEDURE — 93010 ELECTROCARDIOGRAM REPORT: CPT

## 2019-10-02 PROCEDURE — 70498 CT ANGIOGRAPHY NECK: CPT | Mod: 26

## 2019-10-02 PROCEDURE — 99291 CRITICAL CARE FIRST HOUR: CPT

## 2019-10-02 PROCEDURE — 70496 CT ANGIOGRAPHY HEAD: CPT | Mod: 26

## 2019-10-02 RX ORDER — POTASSIUM CHLORIDE 20 MEQ
10 PACKET (EA) ORAL ONCE
Refills: 0 | Status: COMPLETED | OUTPATIENT
Start: 2019-10-02 | End: 2019-10-02

## 2019-10-02 RX ORDER — SODIUM CHLORIDE 9 MG/ML
1000 INJECTION, SOLUTION INTRAVENOUS
Refills: 0 | Status: DISCONTINUED | OUTPATIENT
Start: 2019-10-02 | End: 2019-10-05

## 2019-10-02 RX ORDER — LANOLIN ALCOHOL/MO/W.PET/CERES
3 CREAM (GRAM) TOPICAL AT BEDTIME
Refills: 0 | Status: DISCONTINUED | OUTPATIENT
Start: 2019-10-02 | End: 2019-10-08

## 2019-10-02 RX ORDER — SODIUM CHLORIDE 9 MG/ML
1000 INJECTION, SOLUTION INTRAVENOUS
Refills: 0 | Status: DISCONTINUED | OUTPATIENT
Start: 2019-10-02 | End: 2019-10-02

## 2019-10-02 RX ORDER — POTASSIUM CHLORIDE 20 MEQ
40 PACKET (EA) ORAL EVERY 4 HOURS
Refills: 0 | Status: COMPLETED | OUTPATIENT
Start: 2019-10-02 | End: 2019-10-02

## 2019-10-02 RX ADMIN — HEPARIN SODIUM 8 UNIT(S)/HR: 5000 INJECTION INTRAVENOUS; SUBCUTANEOUS at 19:33

## 2019-10-02 RX ADMIN — SODIUM CHLORIDE 42 MILLILITER(S): 9 INJECTION, SOLUTION INTRAVENOUS at 16:27

## 2019-10-02 RX ADMIN — AMLODIPINE BESYLATE 10 MILLIGRAM(S): 2.5 TABLET ORAL at 05:32

## 2019-10-02 RX ADMIN — TAMSULOSIN HYDROCHLORIDE 0.8 MILLIGRAM(S): 0.4 CAPSULE ORAL at 22:29

## 2019-10-02 RX ADMIN — Medication 40 MILLIEQUIVALENT(S): at 05:50

## 2019-10-02 RX ADMIN — Medication 40 MILLIEQUIVALENT(S): at 10:46

## 2019-10-02 RX ADMIN — Medication 100 MILLIGRAM(S): at 05:32

## 2019-10-02 RX ADMIN — Medication 650 MILLIGRAM(S): at 19:35

## 2019-10-02 RX ADMIN — Medication 50 GRAM(S): at 00:27

## 2019-10-02 RX ADMIN — Medication 3 MILLIGRAM(S): at 22:35

## 2019-10-02 RX ADMIN — SODIUM CHLORIDE 42 MILLILITER(S): 9 INJECTION, SOLUTION INTRAVENOUS at 17:10

## 2019-10-02 RX ADMIN — PANTOPRAZOLE SODIUM 40 MILLIGRAM(S): 20 TABLET, DELAYED RELEASE ORAL at 05:32

## 2019-10-02 RX ADMIN — CHLORHEXIDINE GLUCONATE 1 APPLICATION(S): 213 SOLUTION TOPICAL at 12:12

## 2019-10-02 RX ADMIN — Medication 100 MILLIGRAM(S): at 22:29

## 2019-10-02 RX ADMIN — SENNA PLUS 2 TABLET(S): 8.6 TABLET ORAL at 22:29

## 2019-10-02 RX ADMIN — Medication 650 MILLIGRAM(S): at 20:25

## 2019-10-02 RX ADMIN — Medication 100 MILLIEQUIVALENT(S): at 05:50

## 2019-10-02 NOTE — PROGRESS NOTE ADULT - SUBJECTIVE AND OBJECTIVE BOX
INTERVAL HPI/OVERNIGHT EVENTS/SUBJECTIVE:    ICU Vital Signs Last 24 Hrs  T(C): 37.5 (02 Oct 2019 04:00), Max: 37.6 (01 Oct 2019 20:00)  T(F): 99.5 (02 Oct 2019 04:00), Max: 99.7 (01 Oct 2019 20:00)  HR: 64 (02 Oct 2019 07:00) (54 - 100)  BP: 151/74 (02 Oct 2019 07:00) (120/56 - 173/78)  BP(mean): 106 (02 Oct 2019 07:00) (81 - 116)  ABP: --  ABP(mean): --  RR: 22 (02 Oct 2019 07:00) (16 - 41)  SpO2: 100% (02 Oct 2019 07:00) (98% - 100%)      I&O's Detail    01 Oct 2019 07:01  -  02 Oct 2019 07:00  --------------------------------------------------------  IN:    heparin Infusion: 184 mL    multiple electrolytes Injection Type 1multiple electrolytes Injection Type 1: 225 mL    Oral Fluid: 996 mL    Solution: 50 mL    Solution: 300 mL  Total IN: 1755 mL    OUT:    Indwelling Catheter - Urethral: 4275 mL  Total OUT: 4275 mL    Total NET: -2520 mL      02 Oct 2019 07:01  -  02 Oct 2019 07:47  --------------------------------------------------------  IN:  Total IN: 0 mL    OUT:    Indwelling Catheter - Urethral: 120 mL    Voided: 360 mL  Total OUT: 480 mL    Total NET: -480 mL                MEDICATIONS  (STANDING):  amLODIPine   Tablet 10 milliGRAM(s) Oral daily  chlorhexidine 2% Cloths 1 Application(s) Topical daily  docusate sodium 100 milliGRAM(s) Oral three times a day  ferrous    sulfate 325 milliGRAM(s) Oral daily  finasteride 5 milliGRAM(s) Oral daily  folic acid 1 milliGRAM(s) Oral daily  heparin  Infusion 800 Unit(s)/Hr (8 mL/Hr) IV Continuous <Continuous>  influenza   Vaccine 0.5 milliLiter(s) IntraMuscular once  insulin lispro (HumaLOG) corrective regimen sliding scale   SubCutaneous Before meals and at bedtime  multivitamin 1 Tablet(s) Oral daily  pantoprazole    Tablet 40 milliGRAM(s) Oral before breakfast  potassium chloride    Tablet ER 40 milliEquivalent(s) Oral every 4 hours  senna 2 Tablet(s) Oral at bedtime  tamsulosin 0.8 milliGRAM(s) Oral at bedtime    MEDICATIONS  (PRN):  acetaminophen   Tablet .. 650 milliGRAM(s) Oral every 6 hours PRN Mild Pain (1 - 3), Moderate Pain (4 - 6), Severe Pain (7 - 10)      NUTRITION/IVF:     CENTRAL LINE:  LOCATION:   DATE INSERTED:  CVP:  SCVO2:    [place]:   DATE INSERTED:    A-LINE:    LOCATION:   DATE INSERTED:   SVV:  CO/CI:     CHEST TUBE:  LOCATION:  DATE INSERTED: OUTPUT/24 HRS:  SUCTION/WATER SEAL:     NG/OG TUBE:  DATE INSERTED:  OUTPUT/24 HRS:    MISC:     PHYSICAL EXAM:    Gen:    Eyes:    Neurological:    ENMT:    Neck:    Pulmonary:    Cardiovascular:    Gastrointestinal:    Genitourinary:    Back:    Extremities:    Skin:    Musculoskeletal:          LABS:  CBC Full  -  ( 02 Oct 2019 04:54 )  WBC Count : 6.47 K/uL  RBC Count : 2.70 M/uL  Hemoglobin : 7.2 g/dL  Hematocrit : 22.8 %  Platelet Count - Automated : 342 K/uL  Mean Cell Volume : 84.4 fl  Mean Cell Hemoglobin : 26.7 pg  Mean Cell Hemoglobin Concentration : 31.6 gm/dL  Auto Neutrophil # : 3.64 K/uL  Auto Lymphocyte # : 1.91 K/uL  Auto Monocyte # : 0.57 K/uL  Auto Eosinophil # : 0.21 K/uL  Auto Basophil # : 0.02 K/uL  Auto Neutrophil % : 56.3 %  Auto Lymphocyte % : 29.5 %  Auto Monocyte % : 8.8 %  Auto Eosinophil % : 3.2 %  Auto Basophil % : 0.3 %    10-02    141  |  104  |  18.0  ----------------------------<  107  3.2<L>   |  25.0  |  2.09<H>    Ca    8.2<L>      02 Oct 2019 04:54  Phos  2.9     10-02  Mg     2.4     10-02      PT/INR - ( 01 Oct 2019 05:04 )   PT: 12.6 sec;   INR: 1.09 ratio         PTT - ( 02 Oct 2019 04:54 )  PTT:68.9 sec    RECENT CULTURES:  09-27 .Blood XXXX XXXX   No growth at 48 hours    09-27 .Blood XXXX XXXX   No growth at 5 days.    09-27 .Urine XXXX XXXX   No growth              CAPILLARY BLOOD GLUCOSE      RADIOLOGY & ADDITIONAL STUDIES:    ASSESSMENT/PLAN:  69yMale presenting with:    Neuro:    CV:    Pulm:    GI/Nutrition:    /Renal:    ID:    Lines/Tubes:    Endo:    Skin:    Proph:    Dispo:      CRITICAL CARE TIME SPENT: INTERVAL HPI/OVERNIGHT EVENTS/SUBJECTIVE: no acute events over night    ICU Vital Signs Last 24 Hrs  T(C): 37.5 (02 Oct 2019 04:00), Max: 37.6 (01 Oct 2019 20:00)  T(F): 99.5 (02 Oct 2019 04:00), Max: 99.7 (01 Oct 2019 20:00)  HR: 64 (02 Oct 2019 07:00) (54 - 100)  BP: 151/74 (02 Oct 2019 07:00) (120/56 - 173/78)  BP(mean): 106 (02 Oct 2019 07:00) (81 - 116)  ABP: --  ABP(mean): --  RR: 22 (02 Oct 2019 07:00) (16 - 41)  SpO2: 100% (02 Oct 2019 07:00) (98% - 100%)      I&O's Detail    01 Oct 2019 07:01  -  02 Oct 2019 07:00  --------------------------------------------------------  IN:    heparin Infusion: 184 mL    multiple electrolytes Injection Type 1multiple electrolytes Injection Type 1: 225 mL    Oral Fluid: 996 mL    Solution: 50 mL    Solution: 300 mL  Total IN: 1755 mL    OUT:    Indwelling Catheter - Urethral: 4275 mL  Total OUT: 4275 mL    Total NET: -2520 mL      02 Oct 2019 07:01  -  02 Oct 2019 07:47  --------------------------------------------------------  IN:  Total IN: 0 mL    OUT:    Indwelling Catheter - Urethral: 120 mL    Voided: 360 mL  Total OUT: 480 mL    Total NET: -480 mL                MEDICATIONS  (STANDING):  amLODIPine   Tablet 10 milliGRAM(s) Oral daily  chlorhexidine 2% Cloths 1 Application(s) Topical daily  docusate sodium 100 milliGRAM(s) Oral three times a day  ferrous    sulfate 325 milliGRAM(s) Oral daily  finasteride 5 milliGRAM(s) Oral daily  folic acid 1 milliGRAM(s) Oral daily  heparin  Infusion 800 Unit(s)/Hr (8 mL/Hr) IV Continuous <Continuous>  influenza   Vaccine 0.5 milliLiter(s) IntraMuscular once  insulin lispro (HumaLOG) corrective regimen sliding scale   SubCutaneous Before meals and at bedtime  multivitamin 1 Tablet(s) Oral daily  pantoprazole    Tablet 40 milliGRAM(s) Oral before breakfast  potassium chloride    Tablet ER 40 milliEquivalent(s) Oral every 4 hours  senna 2 Tablet(s) Oral at bedtime  tamsulosin 0.8 milliGRAM(s) Oral at bedtime    MEDICATIONS  (PRN):  acetaminophen   Tablet .. 650 milliGRAM(s) Oral every 6 hours PRN Mild Pain (1 - 3), Moderate Pain (4 - 6), Severe Pain (7 - 10)      NUTRITION/IVF:     CENTRAL LINE:  LOCATION:   DATE INSERTED:  CVP:  SCVO2:    VILLEGAS:   DATE INSERTED:    A-LINE:    LOCATION:   DATE INSERTED:   SVV:  CO/CI:     CHEST TUBE:  LOCATION:  DATE INSERTED: OUTPUT/24 HRS:  SUCTION/WATER SEAL:     NG/OG TUBE:  DATE INSERTED:  OUTPUT/24 HRS:    MISC:     PHYSICAL EXAM:    Gen: NAD, lying in bed listening to a POD cast on phone    Eyes: PERRLA, anicteric, conjunctiva pink, no discharge    Neurological: AAOx4    ENMT: trachea midline, thyroid not palpable, no LAD    Pulmonary: CTAB, no wrr    Cardiovascular: NSR, S1/S2 present, no mrg, cap refill <2    Gastrointestinal: abdomen soft, NT/ND    Genitourinary: Villegas in place draining clear yellow urine    Extremities: no edema, RLE slightly larger than LLE, no signs of infection    Skin: warm, dry, no lesions noted        LABS:  CBC Full  -  ( 02 Oct 2019 04:54 )  WBC Count : 6.47 K/uL  RBC Count : 2.70 M/uL  Hemoglobin : 7.2 g/dL  Hematocrit : 22.8 %  Platelet Count - Automated : 342 K/uL  Mean Cell Volume : 84.4 fl  Mean Cell Hemoglobin : 26.7 pg  Mean Cell Hemoglobin Concentration : 31.6 gm/dL  Auto Neutrophil # : 3.64 K/uL  Auto Lymphocyte # : 1.91 K/uL  Auto Monocyte # : 0.57 K/uL  Auto Eosinophil # : 0.21 K/uL  Auto Basophil # : 0.02 K/uL  Auto Neutrophil % : 56.3 %  Auto Lymphocyte % : 29.5 %  Auto Monocyte % : 8.8 %  Auto Eosinophil % : 3.2 %  Auto Basophil % : 0.3 %    10-02    141  |  104  |  18.0  ----------------------------<  107  3.2<L>   |  25.0  |  2.09<H>    Ca    8.2<L>      02 Oct 2019 04:54  Phos  2.9     10-02  Mg     2.4     10-02      PT/INR - ( 01 Oct 2019 05:04 )   PT: 12.6 sec;   INR: 1.09 ratio         PTT - ( 02 Oct 2019 04:54 )  PTT:68.9 sec    RECENT CULTURES:  09-27 .Blood XXXX XXXX   No growth at 48 hours    09-27 .Blood XXXX XXXX   No growth at 5 days.    09-27 .Urine XXXX XXXX   No growth              CAPILLARY BLOOD GLUCOSE      RADIOLOGY & ADDITIONAL STUDIES:    ASSESSMENT/PLAN:  69yMale presenting with Hyperkalemia and acute renal failure with ATN from hypovolemia and acute urinary retention    Neuro: no issues, will continue to monitor for changes in behavior, maintain sleep wake cycle    CV: no issues, IVC plump with minimal respiratory variation    Pulm: continue pulm hygiene    GI/Nutrition: toleration oral, on renal diet, need to monitor oral intake    /Renal: maintain villegas, UOP @4.3L down from 5.4L    ID: none    Lines/Tubes: villegas    Endo: ISS    Skin: none    Proph: heprin gtt    Dispo: SICU,       CRITICAL CARE TIME SPENT: INTERVAL HPI/OVERNIGHT EVENTS/SUBJECTIVE: no acute events over night    ICU Vital Signs Last 24 Hrs  T(C): 37.5 (02 Oct 2019 04:00), Max: 37.6 (01 Oct 2019 20:00)  T(F): 99.5 (02 Oct 2019 04:00), Max: 99.7 (01 Oct 2019 20:00)  HR: 64 (02 Oct 2019 07:00) (54 - 100)  BP: 151/74 (02 Oct 2019 07:00) (120/56 - 173/78)  BP(mean): 106 (02 Oct 2019 07:00) (81 - 116)  ABP: --  ABP(mean): --  RR: 22 (02 Oct 2019 07:00) (16 - 41)  SpO2: 100% (02 Oct 2019 07:00) (98% - 100%)      I&O's Detail    01 Oct 2019 07:01  -  02 Oct 2019 07:00  --------------------------------------------------------  IN:    heparin Infusion: 184 mL    multiple electrolytes Injection Type 1multiple electrolytes Injection Type 1: 225 mL    Oral Fluid: 996 mL    Solution: 50 mL    Solution: 300 mL  Total IN: 1755 mL    OUT:    Indwelling Catheter - Urethral: 4275 mL  Total OUT: 4275 mL    Total NET: -2520 mL      02 Oct 2019 07:01  -  02 Oct 2019 07:47  --------------------------------------------------------  IN:  Total IN: 0 mL    OUT:    Indwelling Catheter - Urethral: 120 mL    Voided: 360 mL  Total OUT: 480 mL    Total NET: -480 mL                MEDICATIONS  (STANDING):  amLODIPine   Tablet 10 milliGRAM(s) Oral daily  chlorhexidine 2% Cloths 1 Application(s) Topical daily  docusate sodium 100 milliGRAM(s) Oral three times a day  ferrous    sulfate 325 milliGRAM(s) Oral daily  finasteride 5 milliGRAM(s) Oral daily  folic acid 1 milliGRAM(s) Oral daily  heparin  Infusion 800 Unit(s)/Hr (8 mL/Hr) IV Continuous <Continuous>  influenza   Vaccine 0.5 milliLiter(s) IntraMuscular once  insulin lispro (HumaLOG) corrective regimen sliding scale   SubCutaneous Before meals and at bedtime  multivitamin 1 Tablet(s) Oral daily  pantoprazole    Tablet 40 milliGRAM(s) Oral before breakfast  potassium chloride    Tablet ER 40 milliEquivalent(s) Oral every 4 hours  senna 2 Tablet(s) Oral at bedtime  tamsulosin 0.8 milliGRAM(s) Oral at bedtime    MEDICATIONS  (PRN):  acetaminophen   Tablet .. 650 milliGRAM(s) Oral every 6 hours PRN Mild Pain (1 - 3), Moderate Pain (4 - 6), Severe Pain (7 - 10)          PHYSICAL EXAM:    Gen: NAD, lying in bed listening to a POD cast on phone    Eyes: PERRLA, anicteric, conjunctiva pink, no discharge    Neurological: AAOx4    ENMT: trachea midline, thyroid not palpable, no LAD    Pulmonary: CTAB, no wrr    Cardiovascular: NSR, S1/S2 present, no mrg, cap refill <2    Gastrointestinal: abdomen soft, NT/ND    Genitourinary: Villegas in place draining clear yellow urine    Extremities: no edema, RLE slightly larger than LLE, no signs of infection    Skin: warm, dry, no lesions noted        LABS:  CBC Full  -  ( 02 Oct 2019 04:54 )  WBC Count : 6.47 K/uL  RBC Count : 2.70 M/uL  Hemoglobin : 7.2 g/dL  Hematocrit : 22.8 %  Platelet Count - Automated : 342 K/uL  Mean Cell Volume : 84.4 fl  Mean Cell Hemoglobin : 26.7 pg  Mean Cell Hemoglobin Concentration : 31.6 gm/dL  Auto Neutrophil # : 3.64 K/uL  Auto Lymphocyte # : 1.91 K/uL  Auto Monocyte # : 0.57 K/uL  Auto Eosinophil # : 0.21 K/uL  Auto Basophil # : 0.02 K/uL  Auto Neutrophil % : 56.3 %  Auto Lymphocyte % : 29.5 %  Auto Monocyte % : 8.8 %  Auto Eosinophil % : 3.2 %  Auto Basophil % : 0.3 %    10-02    141  |  104  |  18.0  ----------------------------<  107  3.2<L>   |  25.0  |  2.09<H>    Ca    8.2<L>      02 Oct 2019 04:54  Phos  2.9     10-02  Mg     2.4     10-02      PT/INR - ( 01 Oct 2019 05:04 )   PT: 12.6 sec;   INR: 1.09 ratio         PTT - ( 02 Oct 2019 04:54 )  PTT:68.9 sec    RECENT CULTURES:  09-27 .Blood XXXX XXXX   No growth at 48 hours    09-27 .Blood XXXX XXXX   No growth at 5 days.    09-27 .Urine XXXX XXXX   No growth              CAPILLARY BLOOD GLUCOSE      RADIOLOGY & ADDITIONAL STUDIES:    ASSESSMENT/PLAN:  69yMale presenting with Hyperkalemia and acute renal failure with ATN from hypovolemia and acute urinary retention    Neuro: no issues, will continue to monitor for changes in behavior, maintain sleep wake cycle    CV: no issues, IVC plump with minimal respiratory variation    Pulm: continue pulm hygiene    GI/Nutrition: toleration oral, on renal diet, need to monitor oral intake    /Renal: maintain villegas, UOP @4.3L down from 5.4L    ID: none    Lines/Tubes: villegas    Endo: ISS    Skin: none    Proph: heprin gtt    Dispo: SICU,       CRITICAL CARE TIME SPENT:

## 2019-10-02 NOTE — CHART NOTE - NSCHARTNOTEFT_GEN_A_CORE
Patient oob to chair, nursing alerted staff that patient was aphasic.  Went to bed side. Patient aphasic with L hemineglect. R lateral gaze. Not able to move LUE/LLE, no response to noxious stimuli.  Patient perseverating initially. VSS. Code stroke was called.  PERRL, not able to perform EOM to L side. Following commands on left.   Neurology at bedside.  Stat CT head perforn, ct anigography of head and neck ordered despite resolving SERENITY and hx of CKD due to possible acute stroke. At 12:00 - nurse alerted team that patient became acutely aphasic after getting oob to chair.  Went to bed side. Patient aphasic with L hemineglect. R lateral gaze. Not able to move LUE/LLE, no response to noxious stimuli.  Patient perseverating initially. Able to follow commands on the RUE. VSS. Code stroke was called.      Cons: NAD, R lateral gaze, siting upright with eyes open  Eyes: PERRL, not able to perform EOM to L side.   Neuro:  Eyes open, tracking to right side. Following commands right.  Flaccid paralysis on L side. No response to noxious stimuli. Able to raise b/l eyebrows.  Sensation intact on the right.   Card: RRR  Pulm: Sating well, nonlabored  Ext : see above      A/p: 69yM admitted for acute renal failure secondary to post obstructive uropathy requiring emergent dialysis.  Recovering renal failure continued IVF resuscitation for hypovolemia.  Now code stroke for acute L side flaccid paralysis and aphasia. Neurology at bedside.    -Stat CT head, include ct angiography of head and neck despite resolving SERENITY and hx of CKD due to possible CVA.

## 2019-10-02 NOTE — CHART NOTE - NSCHARTNOTEFT_GEN_A_CORE
Source: Patient [x]  Family [ ]   other [x] EMR    Current Diet: Diet, Renal Restrictions:   For patients receiving Renal Replacement - No Protein Restr, No Conc K, No Conc Phos, Low Sodium (09-27-19 @ 17:42)      Patient reports [ ] nausea  [ ] vomiting [ ] diarrhea [ ] constipation  [ ]chewing problems [ ] swallowing issues  [ ] other:     PO intake:  < 50% [ ]   50-75%  [ ]   %  [x]  other :    Source for PO intake [x] Patient [ ] family [x] chart [ ] staff [x] other: visual observation    Current Weight:   (10/2) 160.4 lbs  (10/1) 167.3 lbs  (9/30) 167.7 lbs  (9/29) 158 lbs  ? accuracy of weights, noted with 2+ right leg and right foot edema, continue to trend and maintain strict Is&Os     Pertinent Medications: MEDICATIONS  (STANDING):  amLODIPine   Tablet 10 milliGRAM(s) Oral daily  chlorhexidine 2% Cloths 1 Application(s) Topical daily  docusate sodium 100 milliGRAM(s) Oral three times a day  ferrous    sulfate 325 milliGRAM(s) Oral daily  finasteride 5 milliGRAM(s) Oral daily  folic acid 1 milliGRAM(s) Oral daily  heparin  Infusion 800 Unit(s)/Hr (8 mL/Hr) IV Continuous <Continuous>  influenza   Vaccine 0.5 milliLiter(s) IntraMuscular once  insulin lispro (HumaLOG) corrective regimen sliding scale   SubCutaneous Before meals and at bedtime  multivitamin 1 Tablet(s) Oral daily  pantoprazole    Tablet 40 milliGRAM(s) Oral before breakfast  potassium chloride    Tablet ER 40 milliEquivalent(s) Oral every 4 hours  senna 2 Tablet(s) Oral at bedtime  tamsulosin 0.8 milliGRAM(s) Oral at bedtime    MEDICATIONS  (PRN):  acetaminophen   Tablet .. 650 milliGRAM(s) Oral every 6 hours PRN Mild Pain (1 - 3), Moderate Pain (4 - 6), Severe Pain (7 - 10)    Pertinent Labs: CBC Full  -  ( 02 Oct 2019 04:54 )  WBC Count : 6.47 K/uL  RBC Count : 2.70 M/uL  Hemoglobin : 7.2 g/dL  Hematocrit : 22.8 %  Platelet Count - Automated : 342 K/uL  Mean Cell Volume : 84.4 fl  Mean Cell Hemoglobin : 26.7 pg  Mean Cell Hemoglobin Concentration : 31.6 gm/dL  Auto Neutrophil # : 3.64 K/uL  Auto Lymphocyte # : 1.91 K/uL  Auto Monocyte # : 0.57 K/uL  Auto Eosinophil # : 0.21 K/uL  Auto Basophil # : 0.02 K/uL  Auto Neutrophil % : 56.3 %  Auto Lymphocyte % : 29.5 %  Auto Monocyte % : 8.8 %  Auto Eosinophil % : 3.2 %  Auto Basophil % : 0.3 %    10-02 Na141 mmol/L Glu 107 mg/dL K+ 3.2 mmol/L<L> Cr  2.09 mg/dL<H> BUN 18.0 mg/dL Phos 2.9 mg/dL Alb n/a   PAB n/a       Skin: Surgical incision to b/l thighs    Nutrition focused physical exam previously conducted - found signs of malnutrition [ ]absent [x]present    Subcutaneous fat loss: [ ] Orbital fat pads region, [x]Buccal fat region, [ ]Triceps region,  [ ]Ribs region    Muscle wasting: [ ]Temples region, [x]Clavicle region, [ ]Shoulder region, [ ]Scapula region, [ ]Interosseous region,  [ ]thigh region, [x]Calf region    Estimated Needs:   [x] no change since previous assessment  [ ] recalculated:     Current Nutrition Diagnosis: Pt remains at high nutrition risk secondary to malnutrition (severe, chronic) related to inability to meet sufficient protein-energy as evidenced by meeting <75% nutrient needs >1 month, 11% wt loss, severe muscle loss of clavicles and calf region, severe fat loss of buccal pads, and 2+ edema. Pt with improving appetite/po intake. Breakfast tray observed at bedside 100% consumed per plate waste.     Recommendations:    1) Change diet to DASH/TLC.  2) Add Ensure Enlive TID to optimize po intake and provide an additional 350 kcal, 20g protein per serving (will monitor renal fxn).  3) Encourage po intake at all meals.  4) Obtain daily weights to monitor trends.     Monitoring and Evaluation:   [x] PO intake [x] Tolerance to diet prescription [X] Weights  [X] Follow up per protocol [X] Labs

## 2019-10-02 NOTE — CONSULT NOTE ADULT - SUBJECTIVE AND OBJECTIVE BOX
Incomplete    LKW 1200  acute onset decreased speech and left flaccid weakness with neglect  ?neglect vs HH  NIH SS:   Date:   Time:   1a) Level of consciousness (0-3): 0  1b) Questions (0-2): 0  1c) Commands (0-2): 0  2  ) Gaze (0-2): 1  3  ) Visual field (0-3): 3  4  ) Facial palsy (0-3): 2  Motor  5a) Left arm (0-4): 4  5b) Right arm (0-4): 0  6a) Left leg (0-4): 3  6b) Right leg (0-4): 0  7  ) Ataxia (0-2): 0  Sensory  8  ) Sensory (0-2): 1  Speech  9  ) Language (0-3): 1  10) Dysarthria (0-2): 1  Extinction  11) Extinction/inattention (0-2): 2    Total score: 18 Incomplete    LKW 1200  acute onset decreased speech and left flaccid weakness with neglect  ?neglect vs HH  NIH SS:   Date:   Time:   1a) Level of consciousness (0-3): 0  1b) Questions (0-2): 0  1c) Commands (0-2): 0  2  ) Gaze (0-2): 1  3  ) Visual field (0-3): 3  4  ) Facial palsy (0-3): 2  Motor  5a) Left arm (0-4): 4  5b) Right arm (0-4): 0  6a) Left leg (0-4): 3  6b) Right leg (0-4): 0  7  ) Ataxia (0-2): 0  Sensory  8  ) Sensory (0-2): 1  Speech  9  ) Language (0-3): 1  10) Dysarthria (0-2): 1  Extinction  11) Extinction/inattention (0-2): 2    Total score: 18        < from: TTE Echo Complete w/Doppler (06.26.19 @ 12:17) >    Summary:   1. Left ventricular ejection fraction, by visual estimation, is 70 to   75%.   2. Hyperdynamic global left ventricular systolic function.   3. Moderately increased LV wall thickness.   4. Spectral Doppler shows impaired relaxation pattern of left   ventricular myocardial filling (Grade I diastolic dysfunction).   5. Normal right ventricular size and function.   6. There is no evidence of pericardial effusion.    < end of copied text > [facility]                                     Ne[facility] [place]                                 Tatyana Sheldon, & Gagandeep                                  [address]                                        [place], [place], [address]                                             [phone]    CC: acute stroke code  HPI: The patient is a 69y Male who presented with acute onset of difficulty speaking and left sided hemiplegia starting at 1200 10/2.  Code stroke was called and I attended at bedside at about 1215.  After brief exam he was taken to CT scan.  He was on heparin sdo he was not a candidates for alteplase, but could have been a candidate for intervention, so a CTA with contrast was done to look for LVO despite renal insufficiency given the severity of his deficits.  After CT his speech improved a bit, but he remains with left face/arm weakness.  Neurology was called for code stroke and subsequent management    PAST MEDICAL & SURGICAL HISTORY:  GERD (gastroesophageal reflux disease)  HLD (hyperlipidemia)        MEDICATIONS  (STANDING):  amLODIPine   Tablet 10 milliGRAM(s) Oral daily  chlorhexidine 2% Cloths 1 Application(s) Topical daily  docusate sodium 100 milliGRAM(s) Oral three times a day  ferrous    sulfate 325 milliGRAM(s) Oral daily  finasteride 5 milliGRAM(s) Oral daily  folic acid 1 milliGRAM(s) Oral daily  heparin  Infusion 800 Unit(s)/Hr (8 mL/Hr) IV Continuous <Continuous>  influenza   Vaccine 0.5 milliLiter(s) IntraMuscular once  insulin lispro (HumaLOG) corrective regimen sliding scale   SubCutaneous Before meals and at bedtime  multiple electrolytes Injection Type 1 1000 milliLiter(s) (42 mL/Hr) IV Continuous <Continuous>  multivitamin 1 Tablet(s) Oral daily  pantoprazole    Tablet 40 milliGRAM(s) Oral before breakfast  senna 2 Tablet(s) Oral at bedtime  tamsulosin 0.8 milliGRAM(s) Oral at bedtime    MEDICATIONS  (PRN):  acetaminophen   Tablet .. 650 milliGRAM(s) Oral every 6 hours PRN Mild Pain (1 - 3), Moderate Pain (4 - 6), Severe Pain (7 - 10)      Allergies    No Known Allergies    Intolerances        SOCIAL HISTORY:  no tob,   no alcohol   no drugs    FAMILY HISTORY:  n/c      ROS: 14 point ROS negative other than what is present in HPI or below  as above left sided  weakness with neglect    Vital Signs Last 24 Hrs  T(C): 37.1 (02 Oct 2019 16:00), Max: 37.6 (01 Oct 2019 20:00)  T(F): 98.7 (02 Oct 2019 16:00), Max: 99.7 (01 Oct 2019 20:00)  HR: 99 (02 Oct 2019 17:00) (54 - 100)  BP: 150/85 (02 Oct 2019 17:00) (120/56 - 173/78)  BP(mean): 112 (02 Oct 2019 17:00) (81 - 116)  RR: 25 (02 Oct 2019 17:00) (12 - 41)  SpO2: 93% (02 Oct 2019 17:00) (87% - 100%)      General: NAD    Detailed Neurologic Exam:    Mental status: The patient is awake and alert and has diminished attention span.  The patient is oriented to hospital, 2019, self.  The patient is able to name objects, follow commands, repeat sentences. (+) dysarthria    Cranial nerves: Pupils equal and react symmetrically to light. There is no visual field deficit to confrontation, but significant steffany-neglect. Extraocular motion is full with no nystagmus. There is no ptosis. Facial sensation is intact. Facial musculature is asymmetric with central left weakness. Palate and tongue unable to assess.    Motor: There is normal bulk and tone.  There is no tremor.  Strength is 5/5 in the right arm and leg.   Strength is 0/5 in the left arm and leg.    Sensation: Diminished on left with severe neglect    Reflexes: 1+ throughout and plantar responses are equivocal.    Cerebellar: Unable to assess dysmetria on finger to nose testing.    Gait : deferred      [facility] SS:   Date: 10/2/19  Time: 1225  1a) Level of consciousness (0-3): 0  1b) Questions (0-2): 0  1c) Commands (0-2): 0  2  ) Gaze (0-2): 1  3  ) Visual field (0-3): 3  4  ) Facial palsy (0-3): 2  Motor  5a) Left arm (0-4): 4  5b) Right arm (0-4): 0  6a) Left leg (0-4): 3  6b) Right leg (0-4): 0  7  ) Ataxia (0-2): 0  Sensory  8  ) Sensory (0-2): 1  Speech  9  ) Language (0-3): 1  10) Dysarthria (0-2): 1  Extinction  11) Extinction/inattention (0-2): 2    Total score: 18    LABS:                         7.2    6.47  )-----------( 342      ( 02 Oct 2019 04:54 )             22.8       10-02    141  |  104  |  18.0  ----------------------------<  107  3.2<L>   |  25.0  |  2.09<H>    Ca    8.2<L>      02 Oct 2019 04:54  Phos  2.9     10-02  Mg     2.4     10-02        PT/INR - ( 01 Oct 2019 05:04 )   PT: 12.6 sec;   INR: 1.09 ratio         PTT - ( 02 Oct 2019 04:54 )  PTT:68.9 sec    RADIOLOGY & ADDITIONAL STUDIES (independently reviewed unless otherwise noted):  Head CT no acute CVA, mass or blood (+) SVID/atrophy  CTA head: no aneurysm, AVM, LVO or sig stenosis in COW  CTA neck: no sig carotid or vertebral stenosis	    < from: TTE Echo Complete w/Doppler (06.26.19 @ 12:17) >    Summary:   1. Left ventricular ejection fraction, by visual estimation, is 70 to   75%.   2. Hyperdynamic global left ventricular systolic function.   3. Moderately increased LV wall thickness.   4. Spectral Doppler shows impaired relaxation pattern of left   ventricular myocardial filling (Grade I diastolic dysfunction).   5. Normal right ventricular size and function.   6. There is no evidence of pericardial effusion.

## 2019-10-02 NOTE — PROVIDER CONTACT NOTE (OTHER) - SITUATION
Pt is s/p code stroke 10/2, PT held.  Pt will require new orders to resume PT services. Please re-consult when appropriate.

## 2019-10-02 NOTE — CONSULT NOTE ADULT - ASSESSMENT
The patient is a 69y Male who is followed by neurology because of acute onset left weakness\\    Stroke/TIA  on heparin drip- not alteplase candidate  no LVO on CTA- not candidate for intervention  suggest   - MRI brain w/o nazanin  - echo done 6/26/19  - fasting lipid panel  continue heparin  monitor renal function closely after contrast for CTA  PT/OT evals  NPO pending swallow eval  on heparin- no additional DVT ppx necessary The patient is a 69y Male who is followed by neurology because of acute onset left weakness    Stroke/TIA  on heparin drip- not alteplase candidate  no LVO on CTA- not candidate for intervention  suggest   - MRI brain w/o nazanin  - echo done 6/26/19  - fasting lipid panel  continue heparin  monitor renal function closely after contrast for CTA  PT/OT evals  NPO pending swallow eval  on heparin- no additional DVT ppx necessary    will follow with you    Yahir Joe MD PhD   [id number]

## 2019-10-02 NOTE — STROKE CODE NOTE - NIH STROKE SCALE: 9. BEST LANGUAGE, QM
(1) Mild-to-moderate aphasia; some obvious loss of fluency or facility of comprehension, without significant limitation on ideas expressed or form of expression. Reduction of speech and/or comprehension, however, makes conversation about provided material difficult or impossible. For example, in conversation about provided materials, examiner can identify picture or naming card content from patient's response. (2) Severe aphasia; all communication is through fragmentary expression; great need for inference, questioning, and guessing by the listener. Range of information that can be exchanged is limited; listener carries burden of communication. Examiner cannot identify materials provided from patient response.

## 2019-10-03 DIAGNOSIS — E87.5 HYPERKALEMIA: ICD-10-CM

## 2019-10-03 DIAGNOSIS — G45.9 TRANSIENT CEREBRAL ISCHEMIC ATTACK, UNSPECIFIED: ICD-10-CM

## 2019-10-03 DIAGNOSIS — N17.9 ACUTE KIDNEY FAILURE, UNSPECIFIED: ICD-10-CM

## 2019-10-03 DIAGNOSIS — E87.1 HYPO-OSMOLALITY AND HYPONATREMIA: ICD-10-CM

## 2019-10-03 LAB
ANION GAP SERPL CALC-SCNC: 12 MMOL/L — SIGNIFICANT CHANGE UP (ref 5–17)
APTT BLD: 56.9 SEC — HIGH (ref 27.5–36.3)
APTT BLD: 57.2 SEC — HIGH (ref 27.5–36.3)
BASOPHILS # BLD AUTO: 0.05 K/UL — SIGNIFICANT CHANGE UP (ref 0–0.2)
BASOPHILS NFR BLD AUTO: 0.5 % — SIGNIFICANT CHANGE UP (ref 0–2)
BUN SERPL-MCNC: 18 MG/DL — SIGNIFICANT CHANGE UP (ref 8–20)
CALCIUM SERPL-MCNC: 9 MG/DL — SIGNIFICANT CHANGE UP (ref 8.6–10.2)
CHLORIDE SERPL-SCNC: 103 MMOL/L — SIGNIFICANT CHANGE UP (ref 98–107)
CHOLEST SERPL-MCNC: 191 MG/DL — SIGNIFICANT CHANGE UP (ref 110–199)
CO2 SERPL-SCNC: 23 MMOL/L — SIGNIFICANT CHANGE UP (ref 22–29)
CREAT SERPL-MCNC: 1.71 MG/DL — HIGH (ref 0.5–1.3)
EOSINOPHIL # BLD AUTO: 0.31 K/UL — SIGNIFICANT CHANGE UP (ref 0–0.5)
EOSINOPHIL NFR BLD AUTO: 2.8 % — SIGNIFICANT CHANGE UP (ref 0–6)
GLUCOSE BLDC GLUCOMTR-MCNC: 103 MG/DL — HIGH (ref 70–99)
GLUCOSE BLDC GLUCOMTR-MCNC: 180 MG/DL — HIGH (ref 70–99)
GLUCOSE BLDC GLUCOMTR-MCNC: 203 MG/DL — HIGH (ref 70–99)
GLUCOSE BLDC GLUCOMTR-MCNC: 99 MG/DL — SIGNIFICANT CHANGE UP (ref 70–99)
GLUCOSE SERPL-MCNC: 188 MG/DL — HIGH (ref 70–115)
HCT VFR BLD CALC: 31.5 % — LOW (ref 39–50)
HDLC SERPL-MCNC: 55 MG/DL — SIGNIFICANT CHANGE UP
HGB BLD-MCNC: 9.8 G/DL — LOW (ref 13–17)
IMM GRANULOCYTES NFR BLD AUTO: 1.1 % — SIGNIFICANT CHANGE UP (ref 0–1.5)
LIPID PNL WITH DIRECT LDL SERPL: 111 MG/DL — SIGNIFICANT CHANGE UP
LYMPHOCYTES # BLD AUTO: 2.5 K/UL — SIGNIFICANT CHANGE UP (ref 1–3.3)
LYMPHOCYTES # BLD AUTO: 22.6 % — SIGNIFICANT CHANGE UP (ref 13–44)
MAGNESIUM SERPL-MCNC: 2 MG/DL — SIGNIFICANT CHANGE UP (ref 1.6–2.6)
MCHC RBC-ENTMCNC: 26.6 PG — LOW (ref 27–34)
MCHC RBC-ENTMCNC: 31.1 GM/DL — LOW (ref 32–36)
MCV RBC AUTO: 85.6 FL — SIGNIFICANT CHANGE UP (ref 80–100)
MONOCYTES # BLD AUTO: 0.6 K/UL — SIGNIFICANT CHANGE UP (ref 0–0.9)
MONOCYTES NFR BLD AUTO: 5.4 % — SIGNIFICANT CHANGE UP (ref 2–14)
NEUTROPHILS # BLD AUTO: 7.46 K/UL — HIGH (ref 1.8–7.4)
NEUTROPHILS NFR BLD AUTO: 67.6 % — SIGNIFICANT CHANGE UP (ref 43–77)
PHOSPHATE SERPL-MCNC: 3.5 MG/DL — SIGNIFICANT CHANGE UP (ref 2.4–4.7)
PLATELET # BLD AUTO: 439 K/UL — HIGH (ref 150–400)
POTASSIUM SERPL-MCNC: 4.4 MMOL/L — SIGNIFICANT CHANGE UP (ref 3.5–5.3)
POTASSIUM SERPL-SCNC: 4.4 MMOL/L — SIGNIFICANT CHANGE UP (ref 3.5–5.3)
RBC # BLD: 3.68 M/UL — LOW (ref 4.2–5.8)
RBC # FLD: 16.5 % — HIGH (ref 10.3–14.5)
SODIUM SERPL-SCNC: 138 MMOL/L — SIGNIFICANT CHANGE UP (ref 135–145)
TOTAL CHOLESTEROL/HDL RATIO MEASUREMENT: 3 RATIO — LOW (ref 3.4–9.6)
TRIGL SERPL-MCNC: 125 MG/DL — SIGNIFICANT CHANGE UP (ref 10–200)
WBC # BLD: 11.04 K/UL — HIGH (ref 3.8–10.5)
WBC # FLD AUTO: 11.04 K/UL — HIGH (ref 3.8–10.5)

## 2019-10-03 PROCEDURE — 99232 SBSQ HOSP IP/OBS MODERATE 35: CPT

## 2019-10-03 PROCEDURE — 70551 MRI BRAIN STEM W/O DYE: CPT | Mod: 26

## 2019-10-03 PROCEDURE — 99231 SBSQ HOSP IP/OBS SF/LOW 25: CPT

## 2019-10-03 PROCEDURE — 99233 SBSQ HOSP IP/OBS HIGH 50: CPT

## 2019-10-03 RX ORDER — HEPARIN SODIUM 5000 [USP'U]/ML
1100 INJECTION INTRAVENOUS; SUBCUTANEOUS
Qty: 25000 | Refills: 0 | Status: DISCONTINUED | OUTPATIENT
Start: 2019-10-03 | End: 2019-10-06

## 2019-10-03 RX ORDER — SODIUM CHLORIDE 9 MG/ML
500 INJECTION, SOLUTION INTRAVENOUS ONCE
Refills: 0 | Status: COMPLETED | OUTPATIENT
Start: 2019-10-03 | End: 2019-10-03

## 2019-10-03 RX ORDER — ATORVASTATIN CALCIUM 80 MG/1
40 TABLET, FILM COATED ORAL AT BEDTIME
Refills: 0 | Status: DISCONTINUED | OUTPATIENT
Start: 2019-10-03 | End: 2019-10-08

## 2019-10-03 RX ADMIN — Medication 100 MILLIGRAM(S): at 13:47

## 2019-10-03 RX ADMIN — Medication 3 MILLIGRAM(S): at 21:39

## 2019-10-03 RX ADMIN — FINASTERIDE 5 MILLIGRAM(S): 5 TABLET, FILM COATED ORAL at 11:29

## 2019-10-03 RX ADMIN — AMLODIPINE BESYLATE 10 MILLIGRAM(S): 2.5 TABLET ORAL at 05:39

## 2019-10-03 RX ADMIN — Medication 100 MILLIGRAM(S): at 05:39

## 2019-10-03 RX ADMIN — SENNA PLUS 2 TABLET(S): 8.6 TABLET ORAL at 21:39

## 2019-10-03 RX ADMIN — HEPARIN SODIUM 11 UNIT(S)/HR: 5000 INJECTION INTRAVENOUS; SUBCUTANEOUS at 19:00

## 2019-10-03 RX ADMIN — Medication 325 MILLIGRAM(S): at 11:29

## 2019-10-03 RX ADMIN — TAMSULOSIN HYDROCHLORIDE 0.8 MILLIGRAM(S): 0.4 CAPSULE ORAL at 21:56

## 2019-10-03 RX ADMIN — PANTOPRAZOLE SODIUM 40 MILLIGRAM(S): 20 TABLET, DELAYED RELEASE ORAL at 08:27

## 2019-10-03 RX ADMIN — Medication 4: at 11:36

## 2019-10-03 RX ADMIN — Medication 100 MILLIGRAM(S): at 21:39

## 2019-10-03 RX ADMIN — Medication 2: at 21:59

## 2019-10-03 RX ADMIN — Medication 1 TABLET(S): at 11:29

## 2019-10-03 RX ADMIN — SODIUM CHLORIDE 500 MILLILITER(S): 9 INJECTION, SOLUTION INTRAVENOUS at 10:37

## 2019-10-03 RX ADMIN — Medication 1 MILLIGRAM(S): at 11:29

## 2019-10-03 RX ADMIN — ATORVASTATIN CALCIUM 40 MILLIGRAM(S): 80 TABLET, FILM COATED ORAL at 21:39

## 2019-10-03 RX ADMIN — CHLORHEXIDINE GLUCONATE 1 APPLICATION(S): 213 SOLUTION TOPICAL at 10:00

## 2019-10-03 NOTE — SWALLOW BEDSIDE ASSESSMENT ADULT - ORAL PHASE
mildly increased mastication time observed with scrambled eggs, WFL for bagel Within functional limits

## 2019-10-03 NOTE — PROGRESS NOTE ADULT - ASSESSMENT
The patient is a 69y Male who is followed by neurology because of acute onset left weakness    Stroke/TIA  improve, likely TIA  no seizure activity noted  remains on heparin drip- was not alteplase candidate  no LVO on CTA- was not not candidate for intervention  await MRI brain w/o nazanin  echo done 6/26/19  fasting lipid panel [id number]  continue heparin  monitor renal function closely after contrast for CTA- Cr stable today  PT/OT evals  on heparin- no additional DVT ppx necessary    Lipids  check fasting lipid panel [id number]  goal LDL is under 70  --suggest Lipitor 40 mg daily    will follow with you    Yahir Joe MD PhD   [id number]  will follow with you    Yahir Joe MD PhD   [id number]

## 2019-10-03 NOTE — OCCUPATIONAL THERAPY INITIAL EVALUATION ADULT - LEVEL OF INDEPENDENCE, OT EVAL
Recommend shower chair/moderate assist (50% patients effort)
sponge/moderate assist (50% patients effort)

## 2019-10-03 NOTE — OCCUPATIONAL THERAPY INITIAL EVALUATION ADULT - MANUAL MUSCLE TESTING RESULTS, REHAB EVAL
bilateral shoulders grossly assessed with AROM against gravity 3/5, bilateral elbows grossly assessed with AROM against gravity 3/5, right gross grasp 5/5, left gross grasp 4/5

## 2019-10-03 NOTE — OCCUPATIONAL THERAPY INITIAL EVALUATION ADULT - LEVEL OF INDEPENDENCE: DRESS LOWER BODY, OT EVAL
maximum assist (25% patients effort)/to don/doff socks
socks/dependent (less than 25% patients effort)

## 2019-10-03 NOTE — OCCUPATIONAL THERAPY INITIAL EVALUATION ADULT - OTHER, REHAB EVAL
pt with decreased left UE awareness with cues for hand placement with transfers, positioning in space in sitting and general attention to arm (i.e. pt bleeding from IV and pt with decreased awareness until prompted multiple times by OT and RN)

## 2019-10-03 NOTE — PROGRESS NOTE ADULT - SUBJECTIVE AND OBJECTIVE BOX
AIMEE SOLER  ----------------------------------------  The patient was seen and evaluated for acute kidney injury.  The patient is in no acute distress.  Denied any chest pain, palpitations, dyspnea, or abdominal pain.  Reports persistent right lower extremity weakness.    Vital Signs Last 24 Hrs  T(C): 37.1 (03 Oct 2019 12:00), Max: 37.3 (03 Oct 2019 04:00)  T(F): 98.8 (03 Oct 2019 12:00), Max: 99.2 (03 Oct 2019 04:00)  HR: 98 (03 Oct 2019 15:00) (66 - 119)  BP: 133/75 (03 Oct 2019 15:00) (119/63 - 159/84)  BP(mean): 99 (03 Oct 2019 15:00) (84 - 113)  RR: 28 (03 Oct 2019 15:00) (18 - 41)  SpO2: 100% (03 Oct 2019 15:00) (93% - 100%)    CAPILLARY BLOOD GLUCOSE  POCT Blood Glucose.: 203 mg/dL (03 Oct 2019 11:17)  POCT Blood Glucose.: 103 mg/dL (03 Oct 2019 08:05)  POCT Blood Glucose.: 118 mg/dL (02 Oct 2019 23:28)  POCT Blood Glucose.: 114 mg/dL (02 Oct 2019 17:01)    PHYSICAL EXAMINATION:  ----------------------------------------  General appearance: No acute distress, Awake, Alert  HEENT: Normocephalic, Atraumatic, Conjunctiva clear, EOMI  Neck: Supple, No JVD, No tenderness  Lungs: Breath sound equal bilaterally, No wheezes, No rales  Cardiovascular: S1S2, Regular rhythm  Abdomen: Soft, Nontender, Nondistended, No guarding/rebound, Positive bowel sounds  Extremities: No clubbing, No cyanosis, No calf tenderness, Right lower extremity edema and post operative changes  Neuro: No tremors, Right lower extremity weakness  Psychiatric: Appropriate mood, Normal affect    LABORATORY STUDIES:  ----------------------------------------             9.8    11.04 )-----------( 439      ( 03 Oct 2019 10:04 )             31.5     10-03    138  |  103  |  18.0  ----------------------------<  188<H>  4.4   |  23.0  |  1.71<H>    Ca    9.0      03 Oct 2019 10:03  Phos  3.5     10-03  Mg     2.0     10-03    PTT - ( 03 Oct 2019 10:05 )  PTT:57.2 sec    MEDICATIONS  (STANDING):  amLODIPine   Tablet 10 milliGRAM(s) Oral daily  chlorhexidine 2% Cloths 1 Application(s) Topical daily  docusate sodium 100 milliGRAM(s) Oral three times a day  ferrous    sulfate 325 milliGRAM(s) Oral daily  finasteride 5 milliGRAM(s) Oral daily  folic acid 1 milliGRAM(s) Oral daily  heparin  Infusion 800 Unit(s)/Hr (9 mL/Hr) IV Continuous <Continuous>  influenza   Vaccine 0.5 milliLiter(s) IntraMuscular once  insulin lispro (HumaLOG) corrective regimen sliding scale   SubCutaneous Before meals and at bedtime  melatonin 3 milliGRAM(s) Oral at bedtime  multiple electrolytes Injection Type 1 1000 milliLiter(s) (42 mL/Hr) IV Continuous <Continuous>  multivitamin 1 Tablet(s) Oral daily  pantoprazole    Tablet 40 milliGRAM(s) Oral before breakfast  senna 2 Tablet(s) Oral at bedtime  tamsulosin 0.8 milliGRAM(s) Oral at bedtime    MEDICATIONS  (PRN):  acetaminophen   Tablet .. 650 milliGRAM(s) Oral every 6 hours PRN Mild Pain (1 - 3), Moderate Pain (4 - 6), Severe Pain (7 - 10)      ASSESSMENT / PLAN:  ----------------------------------------  69M with a prior admission for a forklift injury to the right lower extremity requiring multiple surgeries who presented from the rehabilitation facility with right lower extremity swelling and pain found to have acute kidney injury and hyperkalemia. Hemodialysis was performed for one session and the patient continued on a bicarbonate infusion for hypovolemia and metabolic acidosis. Packed red blood cells were also transfused for anemia. The patient had eventual improvement in the renal function with further fluid resuscitation and Min catheter drainage. The patient then developed an episode of left sided weakness and aphasia which resolved spontaneously.    Acute kidney injury / ATN - Renal function improved from admission. Monitoring urine output. Min catheter removed by SICU, to monitor for any obstructive symptoms. On tamsulosin and finasteride. Repeat laboratory studies ordered to monitor. Noted to have had CT angiogram yesterday.    Left sided weakness and aphasia - Neurology consultation noted. CT and CT angiogram of the head was without acute intracranial pathology. MRI of the brain pending. On     Anemia - Suspect chronic anemia. Prior transfusion of packed red blood cells noted. CBC to be followed. On ferrous sulfate.    RLE DVT - On heparin infusion for anticoagulation.    GERD - On pantoprazole. Diet as tolerated. AIMEE SOLER  ----------------------------------------  The patient was seen and evaluated for acute kidney injury.  The patient is in no acute distress.  Denied any chest pain, palpitations, dyspnea, or abdominal pain.  Reports persistent right lower extremity weakness.    Vital Signs Last 24 Hrs  T(C): 37.1 (03 Oct 2019 12:00), Max: 37.3 (03 Oct 2019 04:00)  T(F): 98.8 (03 Oct 2019 12:00), Max: 99.2 (03 Oct 2019 04:00)  HR: 98 (03 Oct 2019 15:00) (66 - 119)  BP: 133/75 (03 Oct 2019 15:00) (119/63 - 159/84)  BP(mean): 99 (03 Oct 2019 15:00) (84 - 113)  RR: 28 (03 Oct 2019 15:00) (18 - 41)  SpO2: 100% (03 Oct 2019 15:00) (93% - 100%)    CAPILLARY BLOOD GLUCOSE  POCT Blood Glucose.: 203 mg/dL (03 Oct 2019 11:17)  POCT Blood Glucose.: 103 mg/dL (03 Oct 2019 08:05)  POCT Blood Glucose.: 118 mg/dL (02 Oct 2019 23:28)  POCT Blood Glucose.: 114 mg/dL (02 Oct 2019 17:01)    PHYSICAL EXAMINATION:  ----------------------------------------  General appearance: No acute distress, Awake, Alert  HEENT: Normocephalic, Atraumatic, Conjunctiva clear, EOMI  Neck: Supple, No JVD, No tenderness  Lungs: Breath sound equal bilaterally, No wheezes, No rales  Cardiovascular: S1S2, Regular rhythm  Abdomen: Soft, Nontender, Nondistended, No guarding/rebound, Positive bowel sounds  Extremities: No clubbing, No cyanosis, No calf tenderness, Right lower extremity edema and post operative changes  Neuro: No tremors, Right lower extremity weakness  Psychiatric: Appropriate mood, Normal affect    LABORATORY STUDIES:  ----------------------------------------             9.8    11.04 )-----------( 439      ( 03 Oct 2019 10:04 )             31.5     10-03    138  |  103  |  18.0  ----------------------------<  188<H>  4.4   |  23.0  |  1.71<H>    Ca    9.0      03 Oct 2019 10:03  Phos  3.5     10-03  Mg     2.0     10-03    PTT - ( 03 Oct 2019 10:05 )  PTT:57.2 sec    MEDICATIONS  (STANDING):  amLODIPine   Tablet 10 milliGRAM(s) Oral daily  chlorhexidine 2% Cloths 1 Application(s) Topical daily  docusate sodium 100 milliGRAM(s) Oral three times a day  ferrous    sulfate 325 milliGRAM(s) Oral daily  finasteride 5 milliGRAM(s) Oral daily  folic acid 1 milliGRAM(s) Oral daily  heparin  Infusion 800 Unit(s)/Hr (9 mL/Hr) IV Continuous <Continuous>  influenza   Vaccine 0.5 milliLiter(s) IntraMuscular once  insulin lispro (HumaLOG) corrective regimen sliding scale   SubCutaneous Before meals and at bedtime  melatonin 3 milliGRAM(s) Oral at bedtime  multiple electrolytes Injection Type 1 1000 milliLiter(s) (42 mL/Hr) IV Continuous <Continuous>  multivitamin 1 Tablet(s) Oral daily  pantoprazole    Tablet 40 milliGRAM(s) Oral before breakfast  senna 2 Tablet(s) Oral at bedtime  tamsulosin 0.8 milliGRAM(s) Oral at bedtime    MEDICATIONS  (PRN):  acetaminophen   Tablet .. 650 milliGRAM(s) Oral every 6 hours PRN Mild Pain (1 - 3), Moderate Pain (4 - 6), Severe Pain (7 - 10)      ASSESSMENT / PLAN:  ----------------------------------------  69M with a prior admission for a forklift injury to the right lower extremity requiring multiple surgeries who presented from the rehabilitation facility with right lower extremity swelling and pain found to have acute kidney injury and hyperkalemia. Hemodialysis was performed for one session and the patient continued on a bicarbonate infusion for hypovolemia and metabolic acidosis. Packed red blood cells were also transfused for anemia. The patient had eventual improvement in the renal function with further fluid resuscitation and Min catheter drainage. The patient then developed an episode of left sided weakness and aphasia which resolved spontaneously.    Acute kidney injury / ATN - Renal function improved from admission. Monitoring urine output. Min catheter removed by SICU, to monitor for any obstructive symptoms. On tamsulosin and finasteride. Repeat laboratory studies ordered to monitor. Noted to have had CT angiogram yesterday.    Left sided weakness and aphasia - Neurology consultation noted. CT and CT angiogram of the head was without acute intracranial pathology. MRI of the brain pending. On     Anemia - Suspect chronic anemia. Prior transfusion of packed red blood cells noted. CBC to be followed. On ferrous sulfate.    RLE DVT - On heparin infusion for anticoagulation.    Diabetes - Insulin coverage, close monitoring of blood glucose levels.    GERD - On pantoprazole. Diet as tolerated. AIMEE SOLER  ----------------------------------------  The patient was seen and evaluated for acute kidney injury.  The patient is in no acute distress.  Denied any chest pain, palpitations, dyspnea, or abdominal pain.  Reports persistent right lower extremity weakness.    Vital Signs Last 24 Hrs  T(C): 37.1 (03 Oct 2019 12:00), Max: 37.3 (03 Oct 2019 04:00)  T(F): 98.8 (03 Oct 2019 12:00), Max: 99.2 (03 Oct 2019 04:00)  HR: 98 (03 Oct 2019 15:00) (66 - 119)  BP: 133/75 (03 Oct 2019 15:00) (119/63 - 159/84)  BP(mean): 99 (03 Oct 2019 15:00) (84 - 113)  RR: 28 (03 Oct 2019 15:00) (18 - 41)  SpO2: 100% (03 Oct 2019 15:00) (93% - 100%)    CAPILLARY BLOOD GLUCOSE  POCT Blood Glucose.: 203 mg/dL (03 Oct 2019 11:17)  POCT Blood Glucose.: 103 mg/dL (03 Oct 2019 08:05)  POCT Blood Glucose.: 118 mg/dL (02 Oct 2019 23:28)  POCT Blood Glucose.: 114 mg/dL (02 Oct 2019 17:01)    PHYSICAL EXAMINATION:  ----------------------------------------  General appearance: No acute distress, Awake, Alert  HEENT: Normocephalic, Atraumatic, Conjunctiva clear, EOMI  Neck: Supple, No JVD, No tenderness  Lungs: Breath sound equal bilaterally, No wheezes, No rales  Cardiovascular: S1S2, Regular rhythm  Abdomen: Soft, Nontender, Nondistended, No guarding/rebound, Positive bowel sounds  Extremities: No clubbing, No cyanosis, No calf tenderness, Right lower extremity edema and post operative changes  Neuro: No tremors, Right lower extremity weakness  Psychiatric: Appropriate mood, Normal affect    LABORATORY STUDIES:  ----------------------------------------             9.8    11.04 )-----------( 439      ( 03 Oct 2019 10:04 )             31.5     10-03    138  |  103  |  18.0  ----------------------------<  188<H>  4.4   |  23.0  |  1.71<H>    Ca    9.0      03 Oct 2019 10:03  Phos  3.5     10-03  Mg     2.0     10-03    PTT - ( 03 Oct 2019 10:05 )  PTT:57.2 sec    MEDICATIONS  (STANDING):  amLODIPine   Tablet 10 milliGRAM(s) Oral daily  chlorhexidine 2% Cloths 1 Application(s) Topical daily  docusate sodium 100 milliGRAM(s) Oral three times a day  ferrous    sulfate 325 milliGRAM(s) Oral daily  finasteride 5 milliGRAM(s) Oral daily  folic acid 1 milliGRAM(s) Oral daily  heparin  Infusion 800 Unit(s)/Hr (9 mL/Hr) IV Continuous <Continuous>  influenza   Vaccine 0.5 milliLiter(s) IntraMuscular once  insulin lispro (HumaLOG) corrective regimen sliding scale   SubCutaneous Before meals and at bedtime  melatonin 3 milliGRAM(s) Oral at bedtime  multiple electrolytes Injection Type 1 1000 milliLiter(s) (42 mL/Hr) IV Continuous <Continuous>  multivitamin 1 Tablet(s) Oral daily  pantoprazole    Tablet 40 milliGRAM(s) Oral before breakfast  senna 2 Tablet(s) Oral at bedtime  tamsulosin 0.8 milliGRAM(s) Oral at bedtime    MEDICATIONS  (PRN):  acetaminophen   Tablet .. 650 milliGRAM(s) Oral every 6 hours PRN Mild Pain (1 - 3), Moderate Pain (4 - 6), Severe Pain (7 - 10)      ASSESSMENT / PLAN:  ----------------------------------------  69M with a prior admission for a forklift injury to the right lower extremity requiring multiple surgeries who presented from the rehabilitation facility with right lower extremity swelling and pain found to have acute kidney injury and hyperkalemia. Hemodialysis was performed for one session and the patient continued on a bicarbonate infusion for hypovolemia and metabolic acidosis. Packed red blood cells were also transfused for anemia. The patient had eventual improvement in the renal function with further fluid resuscitation and Min catheter drainage. The patient then developed an episode of left sided weakness and aphasia which resolved spontaneously.    Acute kidney injury / ATN - Renal function improved from admission. Monitoring urine output. Min catheter removed by SICU, to monitor for any obstructive symptoms. On tamsulosin and finasteride. Repeat laboratory studies ordered to monitor. Noted to have had CT angiogram yesterday.    Left sided weakness and aphasia - Neurology consultation noted. CT and CT angiogram of the head was without acute intracranial pathology. MRI of the brain pending. On atorvastatin. Speech Pathology evaluation noted, diet as tolerated.    Anemia - Suspect chronic anemia. Prior transfusion of packed red blood cells noted. CBC to be followed. On ferrous sulfate.    RLE DVT - On heparin infusion for anticoagulation.    Diabetes - Insulin coverage, close monitoring of blood glucose levels.    GERD - On pantoprazole. Diet as tolerated.

## 2019-10-03 NOTE — OCCUPATIONAL THERAPY INITIAL EVALUATION ADULT - PLANNED THERAPY INTERVENTIONS, OT EVAL
ADL retraining/balance training/ROM/parent/caregiver training.../strengthening/bed mobility training/neuromuscular re-education/transfer training
toilet/transfer training/neuromuscular re-education/ADL retraining/balance training/bed mobility training/fine motor coordination training/motor coordination training/strengthening

## 2019-10-03 NOTE — OCCUPATIONAL THERAPY INITIAL EVALUATION ADULT - NS ASR OT EQUIP NEEDS DISCH
bathing/rolling walker (5 inch wheels)/ongoing assessment
will continue to assess to determine appropriate DME for discharge

## 2019-10-03 NOTE — OCCUPATIONAL THERAPY INITIAL EVALUATION ADULT - GENERAL OBSERVATIONS, REHAB EVAL
Received pt seated in bedside chair, +IV (leaking, RN aware and into address), +telemetry; pt agrees to OT.

## 2019-10-03 NOTE — OCCUPATIONAL THERAPY INITIAL EVALUATION ADULT - LIVES WITH, PROFILE
Pt lives in private home with wife and 2 kids (son/daughter). 2STE with no hand rail. 4 steps inside the home to bedroom/bathroom with 1 hand rail. Pts wife works nights and will be home during the day to assist as needed./spouse/children
spouse

## 2019-10-03 NOTE — PROGRESS NOTE ADULT - SUBJECTIVE AND OBJECTIVE BOX
[facility]                                     Neurology at [place]                                 Tatyana Sheldon, & Gagandeep                                  [address]                                        [place], [place], [address]                                             [phone]    CC: acute stroke code  HPI: The patient is a 69y Male who presented with acute onset of difficulty speaking and left sided hemiplegia starting at 1200 10/2.  Code stroke was called and I attended at bedside at about 1215.  After brief exam he was taken to CT scan.  He was on heparin sdo he was not a candidates for alteplase, but could have been a candidate for intervention, so a CTA with contrast was done to look for LVO despite renal insufficiency given the severity of his deficits.  After CT his speech improved a bit, but he remains with left face/arm weakness.  Neurology was called for code stroke and subsequent management    Interval history: moving left side much better today, more alert    ROS neurology: Denies headache or dizziness. Denies weakness/numbness.  Denies speech/language deficits. Denies diplopia/blurred vision.  Denies confusion    MEDICATIONS  (STANDING):  amLODIPine   Tablet 10 milliGRAM(s) Oral daily  chlorhexidine 2% Cloths 1 Application(s) Topical daily  docusate sodium 100 milliGRAM(s) Oral three times a day  ferrous    sulfate 325 milliGRAM(s) Oral daily  finasteride 5 milliGRAM(s) Oral daily  folic acid 1 milliGRAM(s) Oral daily  heparin  Infusion 800 Unit(s)/Hr (9 mL/Hr) IV Continuous <Continuous>  influenza   Vaccine 0.5 milliLiter(s) IntraMuscular once  insulin lispro (HumaLOG) corrective regimen sliding scale   SubCutaneous Before meals and at bedtime  melatonin 3 milliGRAM(s) Oral at bedtime  multiple electrolytes Injection Type 1 1000 milliLiter(s) (42 mL/Hr) IV Continuous <Continuous>  multivitamin 1 Tablet(s) Oral daily  pantoprazole    Tablet 40 milliGRAM(s) Oral before breakfast  senna 2 Tablet(s) Oral at bedtime  tamsulosin 0.8 milliGRAM(s) Oral at bedtime    MEDICATIONS  (PRN):  acetaminophen   Tablet .. 650 milliGRAM(s) Oral every 6 hours PRN Mild Pain (1 - 3), Moderate Pain (4 - 6), Severe Pain (7 - 10)      Vital Signs Last 24 Hrs  T(C): 37.1 (03 Oct 2019 12:00), Max: 37.3 (03 Oct 2019 04:00)  T(F): 98.8 (03 Oct 2019 12:00), Max: 99.2 (03 Oct 2019 04:00)  HR: 101 (03 Oct 2019 13:00) (66 - 119)  BP: 120/58 (03 Oct 2019 13:00) (119/63 - 153/71)  BP(mean): 84 (03 Oct 2019 13:00) (84 - 112)  RR: 21 (03 Oct 2019 13:00) (18 - 41)  SpO2: 99% (03 Oct 2019 13:00) (93% - 100%)    Detailed Neurologic Exam:    Mental status: The patient is awake and alert and has normalattention span.  The patient is oriented to hospital, 2019, self.  The patient is able to name objects, follow commands, repeat sentences. no dysarthria    Cranial nerves: Pupils equal and react symmetrically to light. There is no visual field deficit to confrontation, but significant steffany-neglect. Extraocular motion is full with no nystagmus. There is no ptosis. Facial sensation is intact. Facial musculature is asymmetric with very mild central left weakness. Palate and tongue unable to assess.    Motor: There is normal bulk and tone.  There is no tremor.  Strength is 5/5 in the right arm and 3/5 leg.   Strength is 4+/5 in the left arm and leg.    Sensation: Intact pin/FT x 4 ext    Reflexes: 1+ throughout and plantar responses are equivocal.    Cerebellar: no dysmetria on finger to nose testing.    Gait : deferred    Labs                        9.8    11.04 )-----------( 439      ( 03 Oct 2019 10:04 )             31.5     10-03    138  |  103  |  18.0  ----------------------------<  188<H>  4.4   |  23.0  |  1.71<H>    Ca    9.0      03 Oct 2019 10:03  Phos  3.5     10-03  Mg     2.0     10-03        PTT - ( 03 Oct 2019 10:05 )  PTT:57.2 sec    Lipid Profile in AM (10.03.19 @ 10:03)    Total Cholesterol/HDL Ratio Measurement: 3.0 Ratio    Cholesterol, Serum: 191 mg/dL    Triglycerides, Serum: 125 mg/dL    HDL Cholesterol, Serum: 55: HDL Levels >/= 60 mg/dL are considered beneficial and a "negative" risk  factor.  Effective 08/15/2018: New reference range and interpretive comment. mg/dL    Direct LDL: 111:           RADIOLOGY & ADDITIONAL STUDIES (independently reviewed unless otherwise noted):  Head CT no acute CVA, mass or blood (+) SVID/atrophy  CTA head: no aneurysm, AVM, LVO or sig stenosis in COW  CTA neck: no sig carotid or vertebral stenosis	    < from: TTE Echo Complete w/Doppler (06.26.19 @ 12:17) >    Summary:   1. Left ventricular ejection fraction, by visual estimation, is 70 to   75%.   2. Hyperdynamic global left ventricular systolic function.   3. Moderately increased LV wall thickness.  4.  ventricular myocardial filling (Grade I diastolic dysfunction).   5. Normal right ventricular size and function.   6. There is no evidence of pericardial effusion.

## 2019-10-03 NOTE — SWALLOW BEDSIDE ASSESSMENT ADULT - ASR SWALLOW ASPIRATION MONITOR
fever/throat clearing/upper respiratory infection/pneumonia/oral hygiene/position upright (90Y)/gurgly voice/change of breathing pattern/cough

## 2019-10-03 NOTE — SWALLOW BEDSIDE ASSESSMENT ADULT - COMMENTS
As per MD note:   "69yM admitted for acute renal failure secondary to post obstructive uropathy requiring emergent dialysis.  Recovering renal failure continued IVF resuscitation for hypovolemia.  Now code stroke for acute L side flaccid paralysis and aphasia."  10/2/19 CT head: no acute intracranial hemorrhage or mass effect, as per radiologist

## 2019-10-03 NOTE — OCCUPATIONAL THERAPY INITIAL EVALUATION ADULT - ADDITIONAL COMMENTS
Pt has shower stall with doors and no grab bars  Pt does not own any DME   Pt is right handed and drives  Pts wife primarily handled IADLs
Prior to injury, pt lives in house with 2 SHAVON and 5 steps inside up to bedroom and bathroom. Bathroom has bathtub with doors. Pt does not own any DME. Pt is right handed. Pt drives. Pt's wife is a CNA at [employer]; wife drives.  Pt admitted from rehab were he reports 1 person was assisting him with ADLs and transfers in/out of wheelchair. Pt reports he was primarily wheelchair bound except working on standing during therapy.

## 2019-10-03 NOTE — OCCUPATIONAL THERAPY INITIAL EVALUATION ADULT - LEVEL OF INDEPENDENCE:TOILET, OT EVAL
Pt currently has catheter, supervision for pericare for thoroughness/dependent (less than 25% patients effort)/supervision
dependent (less than 25% patients effort)/pt with +urination/bowel movement on bedpan during session; RN Nehemiah) assisted with hygiene while OT maintained pt positioning

## 2019-10-03 NOTE — PROGRESS NOTE ADULT - SUBJECTIVE AND OBJECTIVE BOX
INTERVAL HPI/OVERNIGHT EVENTS/SUBJECTIVE:    ICU Vital Signs Last 24 Hrs  T(C): 37.3 (03 Oct 2019 04:00), Max: 37.3 (03 Oct 2019 04:00)  T(F): 99.2 (03 Oct 2019 04:00), Max: 99.2 (03 Oct 2019 04:00)  HR: 69 (03 Oct 2019 04:00) (54 - 100)  BP: 143/68 (03 Oct 2019 03:00) (119/63 - 173/78)  BP(mean): 98 (03 Oct 2019 03:00) (84 - 112)  ABP: --  ABP(mean): --  RR: 18 (03 Oct 2019 04:00) (12 - 41)  SpO2: 96% (03 Oct 2019 04:00) (87% - 100%)      I&O's Detail    01 Oct 2019 07:01  -  02 Oct 2019 07:00  --------------------------------------------------------  IN:    heparin Infusion: 184 mL    multiple electrolytes Injection Type 1multiple electrolytes Injection Type 1: 225 mL    Oral Fluid: 996 mL    Solution: 50 mL    Solution: 300 mL  Total IN: 1755 mL    OUT:    Indwelling Catheter - Urethral: 4275 mL  Total OUT: 4275 mL    Total NET: -2520 mL      02 Oct 2019 07:01  -  03 Oct 2019 04:17  --------------------------------------------------------  IN:    heparin Infusion: 168 mL    multiple electrolytes Injection Type 1: 504 mL  Total IN: 672 mL    OUT:    Indwelling Catheter - Urethral: 2650 mL  Total OUT: 2650 mL    Total NET: -1978 mL                MEDICATIONS  (STANDING):  amLODIPine   Tablet 10 milliGRAM(s) Oral daily  chlorhexidine 2% Cloths 1 Application(s) Topical daily  docusate sodium 100 milliGRAM(s) Oral three times a day  ferrous    sulfate 325 milliGRAM(s) Oral daily  finasteride 5 milliGRAM(s) Oral daily  folic acid 1 milliGRAM(s) Oral daily  heparin  Infusion 800 Unit(s)/Hr (8 mL/Hr) IV Continuous <Continuous>  influenza   Vaccine 0.5 milliLiter(s) IntraMuscular once  insulin lispro (HumaLOG) corrective regimen sliding scale   SubCutaneous Before meals and at bedtime  melatonin 3 milliGRAM(s) Oral at bedtime  multiple electrolytes Injection Type 1 1000 milliLiter(s) (42 mL/Hr) IV Continuous <Continuous>  multivitamin 1 Tablet(s) Oral daily  pantoprazole    Tablet 40 milliGRAM(s) Oral before breakfast  senna 2 Tablet(s) Oral at bedtime  tamsulosin 0.8 milliGRAM(s) Oral at bedtime    MEDICATIONS  (PRN):  acetaminophen   Tablet .. 650 milliGRAM(s) Oral every 6 hours PRN Mild Pain (1 - 3), Moderate Pain (4 - 6), Severe Pain (7 - 10)      NUTRITION/IVF:     CENTRAL LINE:  LOCATION:   DATE INSERTED:  CVP:  SCVO2:    MAYFIELD:   DATE INSERTED:    A-LINE:    LOCATION:   DATE INSERTED:   SVV:  CO/CI:     CHEST TUBE:  LOCATION:  DATE INSERTED: OUTPUT/24 HRS:  SUCTION/WATER SEAL:     NG/OG TUBE:  DATE INSERTED:  OUTPUT/24 HRS:    MISC:     PHYSICAL EXAM:     Gen:NAD, Well appearing, No cyanosis, Pallor.    Eyes: PERRL ~ 3mm, EOMI,     Neurological: A&Ox3, GCS 15, No focal defficit.     ENMT: Clear canals, clear throat.      Neck: Supple. NT AT, FROM no pain.  No JVD. No meningeal signs    Pulmonary: NAD, CTA, = BL .      Cardiovascular: RRR, S1, S2, No Murmurs, rubs or gallops noted.    Gastrointestinal:ND, Soft, NT.    Genitourinary:     Back:     Extremities: NT, AT, no edema, erythema or palpable cord noted.  FROM, = 2+ pulses throughout.    Skin:     Musculoskeletal:          LABS:  CBC Full  -  ( 02 Oct 2019 21:18 )  WBC Count : 16.29 K/uL  RBC Count : 3.59 M/uL  Hemoglobin : 9.6 g/dL  Hematocrit : 30.1 %  Platelet Count - Automated : 410 K/uL  Mean Cell Volume : 83.8 fl  Mean Cell Hemoglobin : 26.7 pg  Mean Cell Hemoglobin Concentration : 31.9 gm/dL  Auto Neutrophil # : x  Auto Lymphocyte # : x  Auto Monocyte # : x  Auto Eosinophil # : x  Auto Basophil # : x  Auto Neutrophil % : x  Auto Lymphocyte % : x  Auto Monocyte % : x  Auto Eosinophil % : x  Auto Basophil % : x    10-02    140  |  105  |  19.0  ----------------------------<  120<H>  4.6   |  24.0  |  1.90<H>    Ca    8.6      02 Oct 2019 23:01  Phos  2.9     10-02  Mg     2.4     10-02      PT/INR - ( 01 Oct 2019 05:04 )   PT: 12.6 sec;   INR: 1.09 ratio         PTT - ( 02 Oct 2019 04:54 )  PTT:68.9 sec    RECENT CULTURES:  09-27 .Blood XXXX XXXX   No growth at 5 days.    09-27 .Blood XXXX XXXX   No growth at 5 days.    09-27 .Urine XXXX XXXX   No growth              CAPILLARY BLOOD GLUCOSE      RADIOLOGY & ADDITIONAL STUDIES:    ASSESSMENT/PLAN:  69yMale presenting with:        Neurological:    ENMT:    Neck:    Pulmonary:    Cardiovascular:    Gastrointestinal:    Genitourinary:    Heme:    ID:    Skin:    Lines/ Tubes:    Dispo:            CRITICAL CARE TIME SPENT: INTERVAL HPI/OVERNIGHT EVENTS/SUBJECTIVE: TLC removed.  Had Left hemiparesis and aphasia yesterday.  Head CT non con as well as CTA negative. SX seemed to have resolved completely shortly after returning from CT and he has persisted to be asymptomatic throughout the night and currently. Has no CO.  Denies new weakness, numbness, HA, dizziness, NV, Ab pain or other CO.  Passed bedside swallow eval by nurses yesterday.     ICU Vital Signs Last 24 Hrs  T(C): 37.3 (03 Oct 2019 04:00), Max: 37.3 (03 Oct 2019 04:00)  T(F): 99.2 (03 Oct 2019 04:00), Max: 99.2 (03 Oct 2019 04:00)  HR: 69 (03 Oct 2019 04:00) (54 - 100)  BP: 143/68 (03 Oct 2019 03:00) (119/63 - 173/78)  BP(mean): 98 (03 Oct 2019 03:00) (84 - 112)  ABP: --  ABP(mean): --  RR: 18 (03 Oct 2019 04:00) (12 - 41)  SpO2: 96% (03 Oct 2019 04:00) (87% - 100%)      I&O's Detail    01 Oct 2019 07:01  -  02 Oct 2019 07:00  --------------------------------------------------------  IN:    heparin Infusion: 184 mL    multiple electrolytes Injection Type 1multiple electrolytes Injection Type 1: 225 mL    Oral Fluid: 996 mL    Solution: 50 mL    Solution: 300 mL  Total IN: 1755 mL    OUT:    Indwelling Catheter - Urethral: 4275 mL  Total OUT: 4275 mL    Total NET: -2520 mL      02 Oct 2019 07:01  -  03 Oct 2019 04:17  --------------------------------------------------------  IN:    heparin Infusion: 168 mL    multiple electrolytes Injection Type 1: 504 mL  Total IN: 672 mL    OUT:    Indwelling Catheter - Urethral: 2650 mL  Total OUT: 2650 mL    Total NET: -1978 mL                MEDICATIONS  (STANDING):  amLODIPine   Tablet 10 milliGRAM(s) Oral daily  chlorhexidine 2% Cloths 1 Application(s) Topical daily  docusate sodium 100 milliGRAM(s) Oral three times a day  ferrous    sulfate 325 milliGRAM(s) Oral daily  finasteride 5 milliGRAM(s) Oral daily  folic acid 1 milliGRAM(s) Oral daily  heparin  Infusion 800 Unit(s)/Hr (8 mL/Hr) IV Continuous <Continuous>  influenza   Vaccine 0.5 milliLiter(s) IntraMuscular once  insulin lispro (HumaLOG) corrective regimen sliding scale   SubCutaneous Before meals and at bedtime  melatonin 3 milliGRAM(s) Oral at bedtime  multiple electrolytes Injection Type 1 1000 milliLiter(s) (42 mL/Hr) IV Continuous <Continuous>  multivitamin 1 Tablet(s) Oral daily  pantoprazole    Tablet 40 milliGRAM(s) Oral before breakfast  senna 2 Tablet(s) Oral at bedtime  tamsulosin 0.8 milliGRAM(s) Oral at bedtime    MEDICATIONS  (PRN):  acetaminophen   Tablet .. 650 milliGRAM(s) Oral every 6 hours PRN Mild Pain (1 - 3), Moderate Pain (4 - 6), Severe Pain (7 - 10)      NUTRITION/IVF: Reg diet ordered / P-lyte @ 42.    MAYFIELD:   DATE INSERTED:    PHYSICAL EXAM:     Gen: NAD, Well appearing, No cyanosis, Pallor.    Eyes: PERRL ~ 3mm, EOMI,     Neurological: A&Ox3, GCS 15, Has baseline RLE weakness.  No other focal def noted.    Neck: Supple. NT AT, FROM no pain.  No JVD. No meningeal signs    Pulmonary: NAD, CTA, = BL .      Cardiovascular: RRR, S1, S2, No Murmurs, rubs or gallops noted.    Gastrointestinal: ND, Soft, NT.    Extremities: NT, AT, no edema, erythema or palpable cord noted.  FROM, = 2+ pulses throughout.      LABS:  CBC Full  -  ( 02 Oct 2019 21:18 )  WBC Count : 16.29 K/uL  RBC Count : 3.59 M/uL  Hemoglobin : 9.6 g/dL  Hematocrit : 30.1 %  Platelet Count - Automated : 410 K/uL  Mean Cell Volume : 83.8 fl  Mean Cell Hemoglobin : 26.7 pg  Mean Cell Hemoglobin Concentration : 31.9 gm/dL  Auto Neutrophil # : x  Auto Lymphocyte # : x  Auto Monocyte # : x  Auto Eosinophil # : x  Auto Basophil # : x  Auto Neutrophil % : x  Auto Lymphocyte % : x  Auto Monocyte % : x  Auto Eosinophil % : x  Auto Basophil % : x    10-02    140  |  105  |  19.0  ----------------------------<  120<H>  4.6   |  24.0  |  1.90<H>    Ca    8.6      02 Oct 2019 23:01  Phos  2.9     10-02  Mg     2.4     10-02      PT/INR - ( 01 Oct 2019 05:04 )   PT: 12.6 sec;   INR: 1.09 ratio         PTT - ( 02 Oct 2019 04:54 )  PTT:68.9 sec    RECENT CULTURES:  09-27 .Blood XXXX XXXX   No growth at 5 days.    09-27 .Blood XXXX XXXX   No growth at 5 days.    09-27 .Urine XXXX XXXX   No growth              CAPILLARY BLOOD GLUCOSE      RADIOLOGY & ADDITIONAL STUDIES:    ASSESSMENT/PLAN:  69yMale presenting with: Acute renal failure probably from obstructive uropathy.  Severe hyperkalemia resolved.  Likely TIA yesterday.     Neurological: Neuro recs appreciated.  Will CW Neuro checks and NIH Q 4.  Will obtain MRI. Will have OT, PT re-see pt for code stroke.    Pulmonary: Urge IS    Cardiovascular: Norvasc for HTN    Gastrointestinal: Passed bedside eval for dysphasia.  I have added ensure to diet.      Genitourinary: Pt Cr still elevated.  I expect some increase from Contrast yesterday.  Will keep [place] today for this reason.  If Cr improving tomorrow than would consider TOV at that Time    Heme: CW Hep drip for DVT.    ID: None    Lines/ Tubes: Plan as above.    Dispo: Pt potentially can move to [facility] for stroke monitoring today. Will discuss with AM Attending.      CARE TIME SPENT: 37 minutes.

## 2019-10-03 NOTE — CHART NOTE - NSCHARTNOTEFT_GEN_A_CORE
SICU TRANSFER NOTE  -----------------------------  ICU Admission Date: 9/27  Transfer Date: 10-03-19 @ 23:11    Admission Diagnosis: Acute renal failure, obstructive uropathy    Active Problems/injuries: Acute renal failure, obstructive uropathy    Procedures: NONE    Consultants:  [ ] Cardiology  [ ] Endocrine  [ ] Infectious Disease  [ ] Medicine  [ ]Neurosurgery  [x] Neurology  [ ] Ortho       [ ] Weight Bearing Status:  [ ] Palliative       [ ] Advanced Directives:    [ ] Physical Medicine and Rehab       [ ] Disposition :   [ ] Plastics  [ ] Pulmonary    Medications  acetaminophen   Tablet .. 650 milliGRAM(s) Oral every 6 hours PRN  amLODIPine   Tablet 10 milliGRAM(s) Oral daily  atorvastatin 40 milliGRAM(s) Oral at bedtime  chlorhexidine 2% Cloths 1 Application(s) Topical daily  docusate sodium 100 milliGRAM(s) Oral three times a day  ferrous    sulfate 325 milliGRAM(s) Oral daily  finasteride 5 milliGRAM(s) Oral daily  folic acid 1 milliGRAM(s) Oral daily  heparin  Infusion 1100 Unit(s)/Hr IV Continuous <Continuous>  influenza   Vaccine 0.5 milliLiter(s) IntraMuscular once  insulin lispro (HumaLOG) corrective regimen sliding scale   SubCutaneous Before meals and at bedtime  melatonin 3 milliGRAM(s) Oral at bedtime  multiple electrolytes Injection Type 1 1000 milliLiter(s) IV Continuous <Continuous>  multivitamin 1 Tablet(s) Oral daily  pantoprazole    Tablet 40 milliGRAM(s) Oral before breakfast  senna 2 Tablet(s) Oral at bedtime  tamsulosin 0.8 milliGRAM(s) Oral at bedtime    [x ] I attest I have reviewed and reconciled all medications prior to transfer    IV Fluids  sodium chloride 0.9% Bolus:   1000 milliLiter(s), IV Bolus, once, infuse over 60 Minute(s), Stop After 1 Doses  Provider's Contact #: [phone]  sodium chloride 0.9%.: Solution, 1000 milliLiter(s) infuse at 75 mL/Hr  Provider's Contact #: [phone]  sodium chloride 0.9% Bolus:   2000 milliLiter(s), IV Bolus, once, infuse over 1 Hour(s), Stop After 1 Doses  Provider's Contact #: [phone]  lactated ringers Bolus:   1000 milliLiter(s), IV Bolus, once, infuse over 1 Hour(s), Stop After 1 Doses  Provider's Contact #: [phone]    Antibiotics: NONE    A PA during the day shift on 10/3 discussed this case with Dr. MARC Evans  upon transfer and all questions regarding ICU course were answered.  The following items are to be followed up:    - Hospitalist Dr. Evans aware of acute kidney injury / ATN - Renal function & improved from admission. Monitoring urine output. Min catheter removed earlier today, passed TOV - team on medical floor to monitor for any obstructive symptoms. On tamsulosin and finasteride.

## 2019-10-03 NOTE — SWALLOW BEDSIDE ASSESSMENT ADULT - SLP GENERAL OBSERVATIONS
Pt received & seen seated upright in bed, +awake/alert, +oriented, +self-feeding breakfast, 0/10 pain

## 2019-10-03 NOTE — OCCUPATIONAL THERAPY INITIAL EVALUATION ADULT - NS ASR FOLLOW COMMAND OT EVAL
100% of the time/able to follow single-step instructions/pt requires increased time and repetition to process commands of tasks; pt requires cues to sequence and problem solve tasks/mobility
100% of the time/able to follow multistep instructions

## 2019-10-03 NOTE — OCCUPATIONAL THERAPY INITIAL EVALUATION ADULT - PERTINENT HX OF CURRENT PROBLEM, REHAB EVAL
Pt s/p trauma due to crush injury with forklift with injury to bilateral LEs in July 2019. Pt discharged from hospital and sent to rehab in August 2019. Pt now presents to ED from Nursing Home for evaluation of right LE and to r/o mesenteric ischemia. On 10/2/19 pt was a code stroke in the ICU.

## 2019-10-04 LAB
ANION GAP SERPL CALC-SCNC: 12 MMOL/L — SIGNIFICANT CHANGE UP (ref 5–17)
APTT BLD: 69.9 SEC — HIGH (ref 27.5–36.3)
APTT BLD: 74.6 SEC — HIGH (ref 27.5–36.3)
APTT BLD: 89.5 SEC — HIGH (ref 27.5–36.3)
APTT BLD: 91.5 SEC — HIGH (ref 27.5–36.3)
BASOPHILS # BLD AUTO: 0.05 K/UL — SIGNIFICANT CHANGE UP (ref 0–0.2)
BASOPHILS NFR BLD AUTO: 0.5 % — SIGNIFICANT CHANGE UP (ref 0–2)
BUN SERPL-MCNC: 21 MG/DL — HIGH (ref 8–20)
CALCIUM SERPL-MCNC: 8.8 MG/DL — SIGNIFICANT CHANGE UP (ref 8.6–10.2)
CHLORIDE SERPL-SCNC: 104 MMOL/L — SIGNIFICANT CHANGE UP (ref 98–107)
CO2 SERPL-SCNC: 25 MMOL/L — SIGNIFICANT CHANGE UP (ref 22–29)
CREAT SERPL-MCNC: 1.55 MG/DL — HIGH (ref 0.5–1.3)
EOSINOPHIL # BLD AUTO: 0.37 K/UL — SIGNIFICANT CHANGE UP (ref 0–0.5)
EOSINOPHIL NFR BLD AUTO: 4.1 % — SIGNIFICANT CHANGE UP (ref 0–6)
GLUCOSE BLDC GLUCOMTR-MCNC: 104 MG/DL — HIGH (ref 70–99)
GLUCOSE BLDC GLUCOMTR-MCNC: 112 MG/DL — HIGH (ref 70–99)
GLUCOSE BLDC GLUCOMTR-MCNC: 113 MG/DL — HIGH (ref 70–99)
GLUCOSE BLDC GLUCOMTR-MCNC: 186 MG/DL — HIGH (ref 70–99)
GLUCOSE SERPL-MCNC: 100 MG/DL — SIGNIFICANT CHANGE UP (ref 70–115)
HCT VFR BLD CALC: 25.9 % — LOW (ref 39–50)
HCT VFR BLD CALC: 26 % — LOW (ref 39–50)
HGB BLD-MCNC: 8.2 G/DL — LOW (ref 13–17)
HGB BLD-MCNC: 8.3 G/DL — LOW (ref 13–17)
IMM GRANULOCYTES NFR BLD AUTO: 2 % — HIGH (ref 0–1.5)
LYMPHOCYTES # BLD AUTO: 2.09 K/UL — SIGNIFICANT CHANGE UP (ref 1–3.3)
LYMPHOCYTES # BLD AUTO: 22.9 % — SIGNIFICANT CHANGE UP (ref 13–44)
MAGNESIUM SERPL-MCNC: 1.8 MG/DL — SIGNIFICANT CHANGE UP (ref 1.6–2.6)
MCHC RBC-ENTMCNC: 26.7 PG — LOW (ref 27–34)
MCHC RBC-ENTMCNC: 27.4 PG — SIGNIFICANT CHANGE UP (ref 27–34)
MCHC RBC-ENTMCNC: 31.5 GM/DL — LOW (ref 32–36)
MCHC RBC-ENTMCNC: 32 GM/DL — SIGNIFICANT CHANGE UP (ref 32–36)
MCV RBC AUTO: 84.7 FL — SIGNIFICANT CHANGE UP (ref 80–100)
MCV RBC AUTO: 85.5 FL — SIGNIFICANT CHANGE UP (ref 80–100)
MONOCYTES # BLD AUTO: 0.82 K/UL — SIGNIFICANT CHANGE UP (ref 0–0.9)
MONOCYTES NFR BLD AUTO: 9 % — SIGNIFICANT CHANGE UP (ref 2–14)
MRSA PCR RESULT.: SIGNIFICANT CHANGE UP
NEUTROPHILS # BLD AUTO: 5.6 K/UL — SIGNIFICANT CHANGE UP (ref 1.8–7.4)
NEUTROPHILS NFR BLD AUTO: 61.5 % — SIGNIFICANT CHANGE UP (ref 43–77)
PHOSPHATE SERPL-MCNC: 3.5 MG/DL — SIGNIFICANT CHANGE UP (ref 2.4–4.7)
PLATELET # BLD AUTO: 347 K/UL — SIGNIFICANT CHANGE UP (ref 150–400)
PLATELET # BLD AUTO: 361 K/UL — SIGNIFICANT CHANGE UP (ref 150–400)
POTASSIUM SERPL-MCNC: 3.3 MMOL/L — LOW (ref 3.5–5.3)
POTASSIUM SERPL-SCNC: 3.3 MMOL/L — LOW (ref 3.5–5.3)
RBC # BLD: 3.03 M/UL — LOW (ref 4.2–5.8)
RBC # BLD: 3.07 M/UL — LOW (ref 4.2–5.8)
RBC # FLD: 16.8 % — HIGH (ref 10.3–14.5)
RBC # FLD: 17 % — HIGH (ref 10.3–14.5)
S AUREUS DNA NOSE QL NAA+PROBE: DETECTED
SODIUM SERPL-SCNC: 141 MMOL/L — SIGNIFICANT CHANGE UP (ref 135–145)
WBC # BLD: 9.11 K/UL — SIGNIFICANT CHANGE UP (ref 3.8–10.5)
WBC # BLD: 9.94 K/UL — SIGNIFICANT CHANGE UP (ref 3.8–10.5)
WBC # FLD AUTO: 9.11 K/UL — SIGNIFICANT CHANGE UP (ref 3.8–10.5)
WBC # FLD AUTO: 9.94 K/UL — SIGNIFICANT CHANGE UP (ref 3.8–10.5)

## 2019-10-04 PROCEDURE — 99232 SBSQ HOSP IP/OBS MODERATE 35: CPT

## 2019-10-04 PROCEDURE — 99233 SBSQ HOSP IP/OBS HIGH 50: CPT

## 2019-10-04 RX ORDER — POTASSIUM CHLORIDE 20 MEQ
40 PACKET (EA) ORAL ONCE
Refills: 0 | Status: COMPLETED | OUTPATIENT
Start: 2019-10-04 | End: 2019-10-04

## 2019-10-04 RX ORDER — ASPIRIN/CALCIUM CARB/MAGNESIUM 324 MG
81 TABLET ORAL DAILY
Refills: 0 | Status: DISCONTINUED | OUTPATIENT
Start: 2019-10-04 | End: 2019-10-08

## 2019-10-04 RX ADMIN — HEPARIN SODIUM 11 UNIT(S)/HR: 5000 INJECTION INTRAVENOUS; SUBCUTANEOUS at 01:15

## 2019-10-04 RX ADMIN — ATORVASTATIN CALCIUM 40 MILLIGRAM(S): 80 TABLET, FILM COATED ORAL at 21:56

## 2019-10-04 RX ADMIN — HEPARIN SODIUM 11 UNIT(S)/HR: 5000 INJECTION INTRAVENOUS; SUBCUTANEOUS at 14:39

## 2019-10-04 RX ADMIN — Medication 325 MILLIGRAM(S): at 12:00

## 2019-10-04 RX ADMIN — Medication 100 MILLIGRAM(S): at 11:59

## 2019-10-04 RX ADMIN — Medication 100 MILLIGRAM(S): at 21:56

## 2019-10-04 RX ADMIN — AMLODIPINE BESYLATE 10 MILLIGRAM(S): 2.5 TABLET ORAL at 05:36

## 2019-10-04 RX ADMIN — Medication 1 TABLET(S): at 11:59

## 2019-10-04 RX ADMIN — FINASTERIDE 5 MILLIGRAM(S): 5 TABLET, FILM COATED ORAL at 12:00

## 2019-10-04 RX ADMIN — Medication 2: at 12:00

## 2019-10-04 RX ADMIN — Medication 3 MILLIGRAM(S): at 21:56

## 2019-10-04 RX ADMIN — Medication 1 MILLIGRAM(S): at 11:59

## 2019-10-04 RX ADMIN — Medication 40 MILLIEQUIVALENT(S): at 12:00

## 2019-10-04 RX ADMIN — TAMSULOSIN HYDROCHLORIDE 0.8 MILLIGRAM(S): 0.4 CAPSULE ORAL at 21:57

## 2019-10-04 RX ADMIN — Medication 81 MILLIGRAM(S): at 15:41

## 2019-10-04 RX ADMIN — PANTOPRAZOLE SODIUM 40 MILLIGRAM(S): 20 TABLET, DELAYED RELEASE ORAL at 08:38

## 2019-10-04 RX ADMIN — Medication 100 MILLIGRAM(S): at 05:36

## 2019-10-04 RX ADMIN — SENNA PLUS 2 TABLET(S): 8.6 TABLET ORAL at 21:56

## 2019-10-04 RX ADMIN — CHLORHEXIDINE GLUCONATE 1 APPLICATION(S): 213 SOLUTION TOPICAL at 11:59

## 2019-10-04 NOTE — PROGRESS NOTE ADULT - ATTENDING COMMENTS
Estimate date of discharge undetermined
Estimate date of discharge undetermined.
I have seen and examined the patient.  acidosis and electrolytes improved after HD  Appreciate renal input,
Seen and examined.    NAD  AAOx4  RRR  non labored resp  soft abd    UOP has decreased over past 24 hrs but currently too high to safely remove fole or transfer out of ICU.  Ultrasound of IVC suggests that pt is not hypovolemic so will stop IV fluids and monitor response.  Only replete with IV fluids if signs of hypovolemia.  Anemia - chronic disease most likely.  Iron, folate. MVI.  Replete K+ for hypokalemia.
Seen and examined.      NAD  AAOx4,  RRR  non labored resp    Urine output improving, remains too high to remove villegas.  IVC with minimal resp variation on bedside ultrasound.  Cont on no fluids.  Tolerating diet.  Ok for floor.
Seen and examined,.    NAD  AAOx4, non focal  in chair, mild regular tachycardia  non labored resp.      Code stroke yesterday.  No findings on CT or CTA.  symptoms all resolved.  Likely TIA. MRI today.  Orthostatic tachycardia now.  IV fluid bolus likely hypovolemic.  UOP returning to normal.  Voiding trial.  On increased flomax and finasteride for retention.  Close monitoring with bladder scans.  If retains replace villegas and consult urology.  Transfer to floor.

## 2019-10-04 NOTE — PROGRESS NOTE ADULT - SUBJECTIVE AND OBJECTIVE BOX
[facility]                                     Neurology at [facility]                                 Taytana Sheldon, & Gagandeep                                  [address]                                        [place], [place], [address]                                             [phone]    CC: acute stroke code  HPI: The patient is a 69y Male who presented with acute onset of difficulty speaking and left sided hemiplegia starting at 1200 10/2.  Code stroke was called and I attended at bedside at about 1215.  After brief exam he was taken to CT scan.  He was on heparin sdo he was not a candidates for alteplase, but could have been a candidate for intervention, so a CTA with contrast was done to look for LVO despite renal insufficiency given the severity of his deficits.  After CT his speech improved a bit, but he remains with left face/arm weakness.  Neurology was called for code stroke and subsequent management    Interval history: moving left side without weakness more alert    ROS neurology: Denies headache or dizziness. Denies weakness/numbness.  Denies speech/language deficits. Denies diplopia/blurred vision.  Denies confusion    MEDICATIONS  (STANDING):  amLODIPine   Tablet 10 milliGRAM(s) Oral daily  aspirin  chewable 81 milliGRAM(s) Oral daily  atorvastatin 40 milliGRAM(s) Oral at bedtime  chlorhexidine 2% Cloths 1 Application(s) Topical daily  docusate sodium 100 milliGRAM(s) Oral three times a day  ferrous    sulfate 325 milliGRAM(s) Oral daily  finasteride 5 milliGRAM(s) Oral daily  folic acid 1 milliGRAM(s) Oral daily  heparin  Infusion 1100 Unit(s)/Hr (11 mL/Hr) IV Continuous <Continuous>  influenza   Vaccine 0.5 milliLiter(s) IntraMuscular once  insulin lispro (HumaLOG) corrective regimen sliding scale   SubCutaneous Before meals and at bedtime  melatonin 3 milliGRAM(s) Oral at bedtime  multiple electrolytes Injection Type 1 1000 milliLiter(s) (42 mL/Hr) IV Continuous <Continuous>  multivitamin 1 Tablet(s) Oral daily  pantoprazole    Tablet 40 milliGRAM(s) Oral before breakfast  senna 2 Tablet(s) Oral at bedtime  tamsulosin 0.8 milliGRAM(s) Oral at bedtime    MEDICATIONS  (PRN):  acetaminophen   Tablet .. 650 milliGRAM(s) Oral every 6 hours PRN Mild Pain (1 - 3), Moderate Pain (4 - 6), Severe Pain (7 - 10)      Vital Signs Last 24 Hrs  T(C): 36.7 (04 Oct 2019 08:02), Max: 37.2 (03 Oct 2019 22:21)  T(F): 98 (04 Oct 2019 08:02), Max: 98.9 (03 Oct 2019 22:21)  HR: 58 (04 Oct 2019 08:02) (58 - 100)  BP: 101/63 (04 Oct 2019 08:02) (101/63 - 155/82)  BP(mean): 100 (03 Oct 2019 21:00) (91 - 112)  RR: 18 (04 Oct 2019 08:02) (18 - 27)  SpO2: 98% (04 Oct 2019 08:02) (97% - 100%)    Detailed Neurologic Exam:    Mental status: The patient is awake and alert and has normal attention span.  The patient is oriented to hospital, 2019, self.  The patient is able to name objects, follow commands, repeat sentences. no dysarthria    Cranial nerves: Pupils equal and react symmetrically to light. There is no visual field deficit to confrontation, but significant steffany-neglect. Extraocular motion is full with no nystagmus. There is no ptosis. Facial sensation is intact. Facial musculature is asymmetric with very mild central left weakness. Palate and tongue unable to assess.    Motor: There is normal bulk and tone.  There is no tremor.  Strength is 5/5 in the right arm and 3/5 leg (baseline.   Strength is 5/5 in the left arm and leg.    Sensation: Intact pin/FT x 4 ext    Reflexes: 1+ throughout and plantar responses are equivocal.    Cerebellar: no dysmetria on finger to nose testing.    Gait : deferred    Labs                                   8.2    9.11  )-----------( 361      ( 04 Oct 2019 07:27 )             26.0     10-04    141  |  104  |  21.0<H>  ----------------------------<  100  3.3<L>   |  25.0  |  1.55<H>    Ca    8.8      04 Oct 2019 07:27  Phos  3.5     10-04  Mg     1.8     10-04        PTT - ( 04 Oct 2019 13:50 )  PTT:74.6 sec    Lipid Profile in AM (10.03.19 @ 10:03)    Total Cholesterol/HDL Ratio Measurement: 3.0 Ratio    Cholesterol, Serum: 191 mg/dL    Triglycerides, Serum: 125 mg/dL    HDL Cholesterol, Serum: 55: HDL Levels >/= 60 mg/dL are considered beneficial and a "negative" risk  factor.  Effective 08/15/2018: New reference range and interpretive comment. mg/dL    Direct LDL: 111:           RADIOLOGY & ADDITIONAL STUDIES (independently reviewed unless otherwise noted):  MRI brain: no acute CVA, mass or blood (+)mild SVID/atrophy  Head CT no acute CVA, mass or blood (+) SVID/atrophy  CTA head: no aneurysm, AVM, LVO or sig stenosis in COW  CTA neck: no sig carotid or vertebral stenosis	    < from: TTE Echo Complete w/Doppler (06.26.19 @ 12:17) >    Summary:   1. Left ventricular ejection fraction, by visual estimation, is 70 to   75%.   2. Hyperdynamic global left ventricular systolic function.   3. Moderately increased LV wall thickness.  4.  ventricular myocardial filling (Grade I diastolic dysfunction).   5. Normal right ventricular size and function.   6. There is no evidence of pericardial effusion.

## 2019-10-04 NOTE — PROGRESS NOTE ADULT - SUBJECTIVE AND OBJECTIVE BOX
AIMEE SOLER  ----------------------------------------  The patient was seen and evaluated for acute kidney injury.  The patient is in no acute distress.  Denied any chest pain, palpitations, dyspnea, or abdominal pain.  Offers no complaints.    Vital Signs Last 24 Hrs  T(C): 36.7 (04 Oct 2019 08:02), Max: 37.2 (03 Oct 2019 16:00)  T(F): 98 (04 Oct 2019 08:02), Max: 99 (03 Oct 2019 16:00)  HR: 58 (04 Oct 2019 08:02) (58 - 105)  BP: 101/63 (04 Oct 2019 08:02) (101/63 - 159/84)  BP(mean): 100 (03 Oct 2019 21:00) (84 - 113)  RR: 18 (04 Oct 2019 08:02) (18 - 28)  SpO2: 98% (04 Oct 2019 08:02) (97% - 100%)    CAPILLARY BLOOD GLUCOSE  POCT Blood Glucose.: 104 mg/dL (04 Oct 2019 08:04)  POCT Blood Glucose.: 180 mg/dL (03 Oct 2019 21:52)  POCT Blood Glucose.: 99 mg/dL (03 Oct 2019 16:16)  POCT Blood Glucose.: 203 mg/dL (03 Oct 2019 11:17)    PHYSICAL EXAMINATION:  ----------------------------------------  General appearance: No acute distress, Awake, Alert  HEENT: Normocephalic, Atraumatic, Conjunctiva clear, EOMI  Neck: Supple, No JVD, No tenderness  Lungs: Breath sound equal bilaterally, No wheezes, No rales  Cardiovascular: S1S2, Regular rhythm  Abdomen: Soft, Nontender, Nondistended, No guarding/rebound, Positive bowel sounds  Extremities: No clubbing, No cyanosis, No calf tenderness, Right lower extremity edema and post operative changes  Neuro: No tremors, Right lower extremity weakness  Psychiatric: Appropriate mood, Normal affect    LABORATORY STUDIES:  ----------------------------------------             8.2    9.11  )-----------( 361      ( 04 Oct 2019 07:27 )             26.0     10-04    141  |  104  |  21.0<H>  ----------------------------<  100  3.3<L>   |  25.0  |  1.55<H>    Ca    8.8      04 Oct 2019 07:27  Phos  3.5     10-04  Mg     1.8     10-04    PTT - ( 04 Oct 2019 07:27 )  PTT:91.5 sec    MEDICATIONS  (STANDING):  amLODIPine   Tablet 10 milliGRAM(s) Oral daily  atorvastatin 40 milliGRAM(s) Oral at bedtime  chlorhexidine 2% Cloths 1 Application(s) Topical daily  docusate sodium 100 milliGRAM(s) Oral three times a day  ferrous    sulfate 325 milliGRAM(s) Oral daily  finasteride 5 milliGRAM(s) Oral daily  folic acid 1 milliGRAM(s) Oral daily  heparin  Infusion 1100 Unit(s)/Hr (11 mL/Hr) IV Continuous <Continuous>  influenza   Vaccine 0.5 milliLiter(s) IntraMuscular once  insulin lispro (HumaLOG) corrective regimen sliding scale   SubCutaneous Before meals and at bedtime  melatonin 3 milliGRAM(s) Oral at bedtime  multiple electrolytes Injection Type 1 1000 milliLiter(s) (42 mL/Hr) IV Continuous <Continuous>  multivitamin 1 Tablet(s) Oral daily  pantoprazole    Tablet 40 milliGRAM(s) Oral before breakfast  potassium chloride    Tablet ER 40 milliEquivalent(s) Oral once  senna 2 Tablet(s) Oral at bedtime  tamsulosin 0.8 milliGRAM(s) Oral at bedtime    MEDICATIONS  (PRN):  acetaminophen   Tablet .. 650 milliGRAM(s) Oral every 6 hours PRN Mild Pain (1 - 3), Moderate Pain (4 - 6), Severe Pain (7 - 10)      ASSESSMENT / PLAN:  ----------------------------------------  69M with a prior admission for a forklift injury to the right lower extremity requiring multiple surgeries who presented from the rehabilitation facility with right lower extremity swelling and pain found to have acute kidney injury and hyperkalemia. Hemodialysis was performed for one session and the patient continued on a bicarbonate infusion for hypovolemia and metabolic acidosis. Packed red blood cells were also transfused for anemia. The patient had eventual improvement in the renal function with further fluid resuscitation and Min catheter drainage. The patient then developed an episode of left sided weakness and aphasia which resolved spontaneously.    Acute kidney injury / ATN - Prior history of urinary retention noted and recent CT angiogram. Monitoring renal function. Min catheter discontinued yesterday in the surgical intensive care unit. Monitoring for any signs of obstruction. On tamsulosin and finasteride. Repeat laboratory studies ordered to monitor.     Left sided weakness and aphasia - CT and CT angiogram of the head was without acute intracranial pathology. MRI of the brain was without acute stroke. Symptoms resolved. Suspect TIA. Neurology consultation noted. On atorvastatin and aspirin.    Anemia - Suspect chronic anemia. Prior transfusion of packed red blood cells noted. CBC to be followed. On ferrous sulfate supplementation.    RLE DVT - On heparin infusion for anticoagulation. To resume enoxaparin if renal function improves/stabilizes.    Diabetes - Insulin coverage, close monitoring of blood glucose levels.    GERD - On pantoprazole. Diet as tolerated.

## 2019-10-04 NOTE — PROGRESS NOTE ADULT - ASSESSMENT
The patient is a 69y Male who is followed by neurology because of acute onset left weakness    Stroke/TIA  improved, likely TIA  MRI brain negative for CVA, mass   no seizure activity noted  remains on heparin drip- was not alteplase candidate  no LVO on CTA- was not not candidate for intervention  echo done 6/26/19  fasting lipid panel [id number]  continue heparin  monitor renal function closely after contrast for CTA- Cr stable/improved today  on heparin- no additional DVT ppx necessary    Lipids  check fasting lipid panel [id number]  goal LDL is under 70  continue Lipitor 40 mg daily    no further stroke workup needed    Thank you for allowing me to participate in the care of your patient    Yahir Joe MD, PhD   [id number] [id number]

## 2019-10-05 LAB
ANION GAP SERPL CALC-SCNC: 10 MMOL/L — SIGNIFICANT CHANGE UP (ref 5–17)
APTT BLD: 58.1 SEC — HIGH (ref 27.5–36.3)
APTT BLD: 65.5 SEC — HIGH (ref 27.5–36.3)
APTT BLD: 83.8 SEC — HIGH (ref 27.5–36.3)
APTT BLD: 85.7 SEC — HIGH (ref 27.5–36.3)
BUN SERPL-MCNC: 17 MG/DL — SIGNIFICANT CHANGE UP (ref 8–20)
CALCIUM SERPL-MCNC: 9.7 MG/DL — SIGNIFICANT CHANGE UP (ref 8.6–10.2)
CHLORIDE SERPL-SCNC: 107 MMOL/L — SIGNIFICANT CHANGE UP (ref 98–107)
CO2 SERPL-SCNC: 26 MMOL/L — SIGNIFICANT CHANGE UP (ref 22–29)
CREAT SERPL-MCNC: 1.47 MG/DL — HIGH (ref 0.5–1.3)
GLUCOSE BLDC GLUCOMTR-MCNC: 104 MG/DL — HIGH (ref 70–99)
GLUCOSE BLDC GLUCOMTR-MCNC: 132 MG/DL — HIGH (ref 70–99)
GLUCOSE BLDC GLUCOMTR-MCNC: 134 MG/DL — HIGH (ref 70–99)
GLUCOSE BLDC GLUCOMTR-MCNC: 93 MG/DL — SIGNIFICANT CHANGE UP (ref 70–99)
GLUCOSE SERPL-MCNC: 94 MG/DL — SIGNIFICANT CHANGE UP (ref 70–115)
HCT VFR BLD CALC: 25.6 % — LOW (ref 39–50)
HGB BLD-MCNC: 8.1 G/DL — LOW (ref 13–17)
MAGNESIUM SERPL-MCNC: 1.7 MG/DL — SIGNIFICANT CHANGE UP (ref 1.6–2.6)
MCHC RBC-ENTMCNC: 26.9 PG — LOW (ref 27–34)
MCHC RBC-ENTMCNC: 31.6 GM/DL — LOW (ref 32–36)
MCV RBC AUTO: 85 FL — SIGNIFICANT CHANGE UP (ref 80–100)
PLATELET # BLD AUTO: 359 K/UL — SIGNIFICANT CHANGE UP (ref 150–400)
POTASSIUM SERPL-MCNC: 3.3 MMOL/L — LOW (ref 3.5–5.3)
POTASSIUM SERPL-SCNC: 3.3 MMOL/L — LOW (ref 3.5–5.3)
RBC # BLD: 3.01 M/UL — LOW (ref 4.2–5.8)
RBC # FLD: 17.1 % — HIGH (ref 10.3–14.5)
SODIUM SERPL-SCNC: 143 MMOL/L — SIGNIFICANT CHANGE UP (ref 135–145)
WBC # BLD: 7.64 K/UL — SIGNIFICANT CHANGE UP (ref 3.8–10.5)
WBC # FLD AUTO: 7.64 K/UL — SIGNIFICANT CHANGE UP (ref 3.8–10.5)

## 2019-10-05 PROCEDURE — 99233 SBSQ HOSP IP/OBS HIGH 50: CPT

## 2019-10-05 RX ORDER — POTASSIUM CHLORIDE 20 MEQ
40 PACKET (EA) ORAL EVERY 4 HOURS
Refills: 0 | Status: COMPLETED | OUTPATIENT
Start: 2019-10-05 | End: 2019-10-05

## 2019-10-05 RX ADMIN — CHLORHEXIDINE GLUCONATE 1 APPLICATION(S): 213 SOLUTION TOPICAL at 12:35

## 2019-10-05 RX ADMIN — ATORVASTATIN CALCIUM 40 MILLIGRAM(S): 80 TABLET, FILM COATED ORAL at 21:11

## 2019-10-05 RX ADMIN — Medication 100 MILLIGRAM(S): at 05:20

## 2019-10-05 RX ADMIN — AMLODIPINE BESYLATE 10 MILLIGRAM(S): 2.5 TABLET ORAL at 05:20

## 2019-10-05 RX ADMIN — HEPARIN SODIUM 11 UNIT(S)/HR: 5000 INJECTION INTRAVENOUS; SUBCUTANEOUS at 08:55

## 2019-10-05 RX ADMIN — TAMSULOSIN HYDROCHLORIDE 0.8 MILLIGRAM(S): 0.4 CAPSULE ORAL at 21:11

## 2019-10-05 RX ADMIN — Medication 1 TABLET(S): at 12:36

## 2019-10-05 RX ADMIN — Medication 81 MILLIGRAM(S): at 12:35

## 2019-10-05 RX ADMIN — HEPARIN SODIUM 11 UNIT(S)/HR: 5000 INJECTION INTRAVENOUS; SUBCUTANEOUS at 21:12

## 2019-10-05 RX ADMIN — Medication 100 MILLIGRAM(S): at 21:11

## 2019-10-05 RX ADMIN — Medication 40 MILLIEQUIVALENT(S): at 21:10

## 2019-10-05 RX ADMIN — Medication 40 MILLIEQUIVALENT(S): at 18:17

## 2019-10-05 RX ADMIN — PANTOPRAZOLE SODIUM 40 MILLIGRAM(S): 20 TABLET, DELAYED RELEASE ORAL at 12:35

## 2019-10-05 RX ADMIN — Medication 1 MILLIGRAM(S): at 12:36

## 2019-10-05 RX ADMIN — Medication 325 MILLIGRAM(S): at 12:35

## 2019-10-05 RX ADMIN — FINASTERIDE 5 MILLIGRAM(S): 5 TABLET, FILM COATED ORAL at 12:35

## 2019-10-05 RX ADMIN — HEPARIN SODIUM 11 UNIT(S)/HR: 5000 INJECTION INTRAVENOUS; SUBCUTANEOUS at 01:59

## 2019-10-05 RX ADMIN — Medication 3 MILLIGRAM(S): at 21:10

## 2019-10-05 RX ADMIN — HEPARIN SODIUM 11 UNIT(S)/HR: 5000 INJECTION INTRAVENOUS; SUBCUTANEOUS at 14:38

## 2019-10-05 NOTE — PROGRESS NOTE ADULT - ASSESSMENT
69M with a prior admission for a forklift injury to the right lower extremity requiring multiple surgeries who presented from the rehabilitation facility with right lower extremity swelling and pain found to have acute kidney injury and hyperkalemia. Hemodialysis was performed for one session and the patient continued on a bicarbonate infusion for hypovolemia and metabolic acidosis. Packed red blood cells were also transfused for anemia. The patient had eventual improvement in the renal function with further fluid resuscitation and Min catheter drainage. The patient then developed an episode of left sided weakness and aphasia which resolved spontaneously.    Acute kidney injury / ATN - Resolving well. Prior history of urinary retention noted and recent CT angiogram. Monitoring renal function.  Monitoring for any signs of obstruction. On tamsulosin and finasteride.     s/p Left sided weakness and aphasia/ TIA - now resolved. CT and CT angiogram of the head was without acute intracranial pathology. MRI of the brain was without acute stroke. Symptoms resolved. Suspect TIA. Neurology consultation noted. On atorvastatin and aspirin. rt LE weakness at baseline 3/5 now worsened sec to DVT.    Anemia - Suspect chronic anemia. Prior transfusion of packed red blood cells noted. CBC to be followed. On ferrous sulfate supplementation.    RLE DVT - On heparin infusion for anticoagulation. To resume enoxaparin if renal function improves/stabilizes.    Diabetes - Insulin coverage, close monitoring of blood glucose levels.    GERD - On pantoprazole. Diet as tolerated.    Heparin GTT 69M with a prior admission for a forklift injury to the right lower extremity requiring multiple surgeries who presented from the rehabilitation facility with right lower extremity swelling and pain found to have acute kidney injury and hyperkalemia. Hemodialysis was performed for one session and the patient continued on a bicarbonate infusion for hypovolemia and metabolic acidosis. Packed red blood cells were also transfused for anemia. The patient had eventual improvement in the renal function with further fluid resuscitation and Min catheter drainage. The patient then developed an episode of left sided weakness and aphasia which resolved spontaneously.    Acute kidney injury / ATN - Resolving well. Prior history of urinary retention noted and recent CT angiogram. Monitoring renal function.  Monitoring for any signs of obstruction. On tamsulosin and finasteride.     s/p Left sided weakness and aphasia/ TIA - now resolved. CT and CT angiogram of the head was without acute intracranial pathology. MRI of the brain was without acute stroke. Symptoms resolved. Suspect TIA. Neurology consultation noted. On atorvastatin and aspirin. rt LE weakness at baseline 3/5 now worsened sec to DVT.    Anemia - Suspect chronic anemia. Prior transfusion of packed red blood cells noted. CBC to be followed. On ferrous sulfate supplementation.    RLE DVT and injury to RLE requiring right above to below knee popliteal bypass, tibial nerve graft - On heparin infusion for anticoagulation. To resume enoxaparin if renal function improves/stabilizes.    Diabetes - Insulin coverage, close monitoring of blood glucose levels.    GERD - On pantoprazole. Diet as tolerated.    Heparin GTT

## 2019-10-05 NOTE — PROGRESS NOTE ADULT - SUBJECTIVE AND OBJECTIVE BOX
HEALTH ISSUES - PROBLEM Dx:    Maggie/ ATN, TIA    INTERVAL HPI/ OVERNIGHT EVENTS:    pt lying in bed  no complaints  was dialyzed x 1 only  states he cannot move his rt LE and this is not new today      REVIEW OF SYSTEMS:    cp- new focal deficits -    Vital Signs Last 24 Hrs  T(C): 37.3 (05 Oct 2019 08:47), Max: 37.3 (05 Oct 2019 00:00)  T(F): 99.2 (05 Oct 2019 08:47), Max: 99.2 (05 Oct 2019 00:00)  HR: 76 (05 Oct 2019 08:47) (65 - 76)  BP: 149/75 (05 Oct 2019 08:47) (149/75 - 158/74)  BP(mean): --  RR: 20 (05 Oct 2019 08:47) (18 - 20)  SpO2: 98% (05 Oct 2019 08:47) (95% - 98%)    PHYSICAL EXAM-  General appearance: No acute distress, Awake, Alert  HEENT: Normocephalic, Atraumatic, Conjunctiva clear, EOMI  Neck: Supple, No JVD, No tenderness  Lungs: Breath sound equal bilaterally, No wheezes, No rales  Cardiovascular: S1S2, Regular rhythm  Abdomen: Soft, Nontender, Nondistended, No guarding/rebound, Positive bowel sounds  Extremities: No clubbing, No cyanosis, No calf tenderness, Right lower extremity edema and post operative changes  Neuro: No tremors, Right lower extremity weakness 1/5  Psychiatric: Appropriate mood, Normal affect    MEDICATIONS  (STANDING):  amLODIPine   Tablet 10 milliGRAM(s) Oral daily  aspirin  chewable 81 milliGRAM(s) Oral daily  atorvastatin 40 milliGRAM(s) Oral at bedtime  chlorhexidine 2% Cloths 1 Application(s) Topical daily  docusate sodium 100 milliGRAM(s) Oral three times a day  ferrous    sulfate 325 milliGRAM(s) Oral daily  finasteride 5 milliGRAM(s) Oral daily  folic acid 1 milliGRAM(s) Oral daily  heparin  Infusion 1100 Unit(s)/Hr (11 mL/Hr) IV Continuous <Continuous>  influenza   Vaccine 0.5 milliLiter(s) IntraMuscular once  insulin lispro (HumaLOG) corrective regimen sliding scale   SubCutaneous Before meals and at bedtime  melatonin 3 milliGRAM(s) Oral at bedtime  multivitamin 1 Tablet(s) Oral daily  pantoprazole    Tablet 40 milliGRAM(s) Oral before breakfast  senna 2 Tablet(s) Oral at bedtime  tamsulosin 0.8 milliGRAM(s) Oral at bedtime    MEDICATIONS  (PRN):  acetaminophen   Tablet .. 650 milliGRAM(s) Oral every 6 hours PRN Mild Pain (1 - 3), Moderate Pain (4 - 6), Severe Pain (7 - 10)      LABS:                        8.1    7.64  )-----------( 359      ( 05 Oct 2019 07:20 )             25.6     10-05    143  |  107  |  17.0  ----------------------------<  94  3.3<L>   |  26.0  |  1.47<H>    Ca    9.7      05 Oct 2019 07:21  Phos  3.5     10-04  Mg     1.7     10-05      PTT - ( 05 Oct 2019 13:55 )  PTT:58.1 sec

## 2019-10-06 LAB
ANION GAP SERPL CALC-SCNC: 13 MMOL/L — SIGNIFICANT CHANGE UP (ref 5–17)
APTT BLD: 101.3 SEC — HIGH (ref 27.5–36.3)
APTT BLD: 78.5 SEC — HIGH (ref 27.5–36.3)
BUN SERPL-MCNC: 18 MG/DL — SIGNIFICANT CHANGE UP (ref 8–20)
CALCIUM SERPL-MCNC: 8.9 MG/DL — SIGNIFICANT CHANGE UP (ref 8.6–10.2)
CHLORIDE SERPL-SCNC: 105 MMOL/L — SIGNIFICANT CHANGE UP (ref 98–107)
CO2 SERPL-SCNC: 21 MMOL/L — LOW (ref 22–29)
CREAT SERPL-MCNC: 1.43 MG/DL — HIGH (ref 0.5–1.3)
GLUCOSE BLDC GLUCOMTR-MCNC: 101 MG/DL — HIGH (ref 70–99)
GLUCOSE BLDC GLUCOMTR-MCNC: 117 MG/DL — HIGH (ref 70–99)
GLUCOSE BLDC GLUCOMTR-MCNC: 83 MG/DL — SIGNIFICANT CHANGE UP (ref 70–99)
GLUCOSE BLDC GLUCOMTR-MCNC: 98 MG/DL — SIGNIFICANT CHANGE UP (ref 70–99)
GLUCOSE SERPL-MCNC: 95 MG/DL — SIGNIFICANT CHANGE UP (ref 70–115)
POTASSIUM SERPL-MCNC: 3.6 MMOL/L — SIGNIFICANT CHANGE UP (ref 3.5–5.3)
POTASSIUM SERPL-SCNC: 3.6 MMOL/L — SIGNIFICANT CHANGE UP (ref 3.5–5.3)
SODIUM SERPL-SCNC: 139 MMOL/L — SIGNIFICANT CHANGE UP (ref 135–145)

## 2019-10-06 PROCEDURE — 99232 SBSQ HOSP IP/OBS MODERATE 35: CPT

## 2019-10-06 RX ORDER — ENOXAPARIN SODIUM 100 MG/ML
70 INJECTION SUBCUTANEOUS
Refills: 0 | Status: DISCONTINUED | OUTPATIENT
Start: 2019-10-06 | End: 2019-10-08

## 2019-10-06 RX ADMIN — ATORVASTATIN CALCIUM 40 MILLIGRAM(S): 80 TABLET, FILM COATED ORAL at 21:36

## 2019-10-06 RX ADMIN — Medication 81 MILLIGRAM(S): at 12:55

## 2019-10-06 RX ADMIN — AMLODIPINE BESYLATE 10 MILLIGRAM(S): 2.5 TABLET ORAL at 05:01

## 2019-10-06 RX ADMIN — TAMSULOSIN HYDROCHLORIDE 0.8 MILLIGRAM(S): 0.4 CAPSULE ORAL at 21:36

## 2019-10-06 RX ADMIN — ENOXAPARIN SODIUM 70 MILLIGRAM(S): 100 INJECTION SUBCUTANEOUS at 17:31

## 2019-10-06 RX ADMIN — Medication 325 MILLIGRAM(S): at 12:55

## 2019-10-06 RX ADMIN — Medication 3 MILLIGRAM(S): at 21:36

## 2019-10-06 RX ADMIN — Medication 1 MILLIGRAM(S): at 12:55

## 2019-10-06 RX ADMIN — FINASTERIDE 5 MILLIGRAM(S): 5 TABLET, FILM COATED ORAL at 12:55

## 2019-10-06 RX ADMIN — PANTOPRAZOLE SODIUM 40 MILLIGRAM(S): 20 TABLET, DELAYED RELEASE ORAL at 08:40

## 2019-10-06 RX ADMIN — HEPARIN SODIUM 11 UNIT(S)/HR: 5000 INJECTION INTRAVENOUS; SUBCUTANEOUS at 04:32

## 2019-10-06 RX ADMIN — CHLORHEXIDINE GLUCONATE 1 APPLICATION(S): 213 SOLUTION TOPICAL at 12:57

## 2019-10-06 RX ADMIN — Medication 1 TABLET(S): at 12:55

## 2019-10-06 RX ADMIN — Medication 100 MILLIGRAM(S): at 05:01

## 2019-10-06 RX ADMIN — Medication 100 MILLIGRAM(S): at 21:36

## 2019-10-06 NOTE — PROVIDER CONTACT NOTE (MEDICATION) - SITUATION
Heparin infusion currently running at 11 ml/hr, apTT = 101.3, as per MD order, follow patient specific nomogram, goal aptt = 60-90

## 2019-10-06 NOTE — PROGRESS NOTE ADULT - ASSESSMENT
69M with a prior admission for a forklift injury to the right lower extremity requiring multiple surgeries who presented from the rehabilitation facility with right lower extremity swelling and pain found to have acute kidney injury and hyperkalemia. Hemodialysis was performed for one session and the patient continued on a bicarbonate infusion for hypovolemia and metabolic acidosis. Packed red blood cells were also transfused for anemia. The patient had eventual improvement in the renal function with further fluid resuscitation and Min catheter drainage. The patient then developed an episode of left sided weakness and aphasia which resolved spontaneously.    Acute kidney injury / ATN - Resolving well. Prior history of urinary retention noted and recent CT angiogram. Monitoring renal function.  Monitoring for any signs of obstruction. On tamsulosin and finasteride.     s/p Left sided weakness and aphasia/ TIA - now resolved. CT and CT angiogram of the head was without acute intracranial pathology. MRI of the brain was without acute stroke. Symptoms resolved. Suspect TIA. Neurology consultation noted. On atorvastatin and aspirin. rt LE weakness at baseline 3/5 now worsened sec to DVT.    Anemia - Suspect chronic anemia. Prior transfusion of packed red blood cells noted. CBC to be followed. On ferrous sulfate supplementation.    RLE DVT and injury to RLE requiring right above to below knee popliteal bypass, tibial nerve graft - switching from heparin gtt to lovenox full dose 10/6    Diabetes - Insulin coverage, close monitoring of blood glucose levels.    GERD - On pantoprazole. Diet as tolerated.    lovenox

## 2019-10-06 NOTE — PROGRESS NOTE ADULT - SUBJECTIVE AND OBJECTIVE BOX
HEALTH ISSUES - PROBLEM Dx:    Maggie/ ATN, TIA    INTERVAL HPI/ OVERNIGHT EVENTS:    pt offers no complaints is comfortable  has no questions      REVIEW OF SYSTEMS:    cp- new focal deficits -    ICU Vital Signs Last 24 Hrs  T(C): 37.1 (06 Oct 2019 08:57), Max: 37.3 (06 Oct 2019 00:00)  T(F): 98.7 (06 Oct 2019 08:57), Max: 99.1 (06 Oct 2019 00:00)  HR: 79 (06 Oct 2019 08:57) (69 - 79)  BP: 148/88 (06 Oct 2019 08:57) (136/75 - 153/82)  BP(mean): --  ABP: --  ABP(mean): --  RR: 20 (06 Oct 2019 08:57) (18 - 20)  SpO2: 98% (06 Oct 2019 08:57) (97% - 98%)    PHYSICAL EXAM-  General appearance: No acute distress, Awake, Alert  HEENT: Normocephalic, Atraumatic, Conjunctiva clear, EOMI  Neck: Supple, No JVD, No tenderness  Lungs: Breath sound equal bilaterally, No wheezes, No rales  Cardiovascular: S1S2, Regular rhythm  Abdomen: Soft, Nontender, Nondistended, No guarding/rebound, Positive bowel sounds  Extremities: No clubbing, No cyanosis, No calf tenderness, Right lower extremity edema and post operative changes  Neuro: No tremors, Right lower extremity weakness 1/5  Psychiatric: Appropriate mood, Normal affect    MEDICATIONS  (STANDING):  amLODIPine   Tablet 10 milliGRAM(s) Oral daily  aspirin  chewable 81 milliGRAM(s) Oral daily  atorvastatin 40 milliGRAM(s) Oral at bedtime  chlorhexidine 2% Cloths 1 Application(s) Topical daily  docusate sodium 100 milliGRAM(s) Oral three times a day  ferrous    sulfate 325 milliGRAM(s) Oral daily  finasteride 5 milliGRAM(s) Oral daily  folic acid 1 milliGRAM(s) Oral daily  heparin  Infusion 1100 Unit(s)/Hr (11 mL/Hr) IV Continuous <Continuous>  influenza   Vaccine 0.5 milliLiter(s) IntraMuscular once  insulin lispro (HumaLOG) corrective regimen sliding scale   SubCutaneous Before meals and at bedtime  melatonin 3 milliGRAM(s) Oral at bedtime  multivitamin 1 Tablet(s) Oral daily  pantoprazole    Tablet 40 milliGRAM(s) Oral before breakfast  senna 2 Tablet(s) Oral at bedtime  tamsulosin 0.8 milliGRAM(s) Oral at bedtime    MEDICATIONS  (PRN):  acetaminophen   Tablet .. 650 milliGRAM(s) Oral every 6 hours PRN Mild Pain (1 - 3), Moderate Pain (4 - 6), Severe Pain (7 - 10)      LABS:                        8.1    7.64  )-----------( 359      ( 05 Oct 2019 07:20 )             25.6     10-05    143  |  107  |  17.0  ----------------------------<  94  3.3<L>   |  26.0  |  1.47<H>    Ca    9.7      05 Oct 2019 07:21  Phos  3.5     10-04  Mg     1.7     10-05      PTT - ( 05 Oct 2019 13:55 )  PTT:58.1 sec

## 2019-10-06 NOTE — PROVIDER CONTACT NOTE (MEDICATION) - RECOMMENDATIONS
Notified PA Suellen, as per PA, decrease heparin infusion by 1, infuse at 10 ml/hr, continue to monitor patient. Next apTT will be drawn in 6 hours.

## 2019-10-06 NOTE — CHART NOTE - NSCHARTNOTEFT_GEN_A_CORE
Pt on pt specific heparin nomogram. Goal PTT goal 60-90. PTT now 101.3. Heparin infusion decreased by 1 ml. Repeat PTT in 6 hrs

## 2019-10-07 LAB
ANION GAP SERPL CALC-SCNC: 12 MMOL/L — SIGNIFICANT CHANGE UP (ref 5–17)
BLD GP AB SCN SERPL QL: SIGNIFICANT CHANGE UP
BUN SERPL-MCNC: 19 MG/DL — SIGNIFICANT CHANGE UP (ref 8–20)
CALCIUM SERPL-MCNC: 9 MG/DL — SIGNIFICANT CHANGE UP (ref 8.6–10.2)
CHLORIDE SERPL-SCNC: 105 MMOL/L — SIGNIFICANT CHANGE UP (ref 98–107)
CO2 SERPL-SCNC: 25 MMOL/L — SIGNIFICANT CHANGE UP (ref 22–29)
CREAT SERPL-MCNC: 1.45 MG/DL — HIGH (ref 0.5–1.3)
GLUCOSE BLDC GLUCOMTR-MCNC: 103 MG/DL — HIGH (ref 70–99)
GLUCOSE BLDC GLUCOMTR-MCNC: 145 MG/DL — HIGH (ref 70–99)
GLUCOSE BLDC GLUCOMTR-MCNC: 196 MG/DL — HIGH (ref 70–99)
GLUCOSE BLDC GLUCOMTR-MCNC: 92 MG/DL — SIGNIFICANT CHANGE UP (ref 70–99)
GLUCOSE SERPL-MCNC: 97 MG/DL — SIGNIFICANT CHANGE UP (ref 70–115)
HCT VFR BLD CALC: 24.2 % — LOW (ref 39–50)
HGB BLD-MCNC: 7.6 G/DL — LOW (ref 13–17)
MCHC RBC-ENTMCNC: 27 PG — SIGNIFICANT CHANGE UP (ref 27–34)
MCHC RBC-ENTMCNC: 31.4 GM/DL — LOW (ref 32–36)
MCV RBC AUTO: 86.1 FL — SIGNIFICANT CHANGE UP (ref 80–100)
PLATELET # BLD AUTO: 337 K/UL — SIGNIFICANT CHANGE UP (ref 150–400)
POTASSIUM SERPL-MCNC: 3.3 MMOL/L — LOW (ref 3.5–5.3)
POTASSIUM SERPL-SCNC: 3.3 MMOL/L — LOW (ref 3.5–5.3)
RBC # BLD: 2.81 M/UL — LOW (ref 4.2–5.8)
RBC # FLD: 17.2 % — HIGH (ref 10.3–14.5)
SODIUM SERPL-SCNC: 142 MMOL/L — SIGNIFICANT CHANGE UP (ref 135–145)
WBC # BLD: 8.66 K/UL — SIGNIFICANT CHANGE UP (ref 3.8–10.5)
WBC # FLD AUTO: 8.66 K/UL — SIGNIFICANT CHANGE UP (ref 3.8–10.5)

## 2019-10-07 PROCEDURE — 99232 SBSQ HOSP IP/OBS MODERATE 35: CPT

## 2019-10-07 RX ADMIN — ATORVASTATIN CALCIUM 40 MILLIGRAM(S): 80 TABLET, FILM COATED ORAL at 22:23

## 2019-10-07 RX ADMIN — ENOXAPARIN SODIUM 70 MILLIGRAM(S): 100 INJECTION SUBCUTANEOUS at 05:47

## 2019-10-07 RX ADMIN — Medication 100 MILLIGRAM(S): at 05:47

## 2019-10-07 RX ADMIN — Medication 1 MILLIGRAM(S): at 12:17

## 2019-10-07 RX ADMIN — Medication 1 TABLET(S): at 12:17

## 2019-10-07 RX ADMIN — Medication 81 MILLIGRAM(S): at 12:17

## 2019-10-07 RX ADMIN — Medication 3 MILLIGRAM(S): at 22:23

## 2019-10-07 RX ADMIN — Medication 325 MILLIGRAM(S): at 12:17

## 2019-10-07 RX ADMIN — CHLORHEXIDINE GLUCONATE 1 APPLICATION(S): 213 SOLUTION TOPICAL at 12:17

## 2019-10-07 RX ADMIN — ENOXAPARIN SODIUM 70 MILLIGRAM(S): 100 INJECTION SUBCUTANEOUS at 17:34

## 2019-10-07 RX ADMIN — PANTOPRAZOLE SODIUM 40 MILLIGRAM(S): 20 TABLET, DELAYED RELEASE ORAL at 08:43

## 2019-10-07 RX ADMIN — Medication 2: at 12:17

## 2019-10-07 RX ADMIN — AMLODIPINE BESYLATE 10 MILLIGRAM(S): 2.5 TABLET ORAL at 05:47

## 2019-10-07 RX ADMIN — TAMSULOSIN HYDROCHLORIDE 0.8 MILLIGRAM(S): 0.4 CAPSULE ORAL at 22:23

## 2019-10-07 RX ADMIN — FINASTERIDE 5 MILLIGRAM(S): 5 TABLET, FILM COATED ORAL at 12:17

## 2019-10-07 NOTE — PROGRESS NOTE ADULT - PROVIDER SPECIALTY LIST ADULT
Hospitalist
Hospitalist
Internal Medicine
Nephrology
Neurology
Neurology
SICU
Vascular Surgery

## 2019-10-07 NOTE — PROGRESS NOTE ADULT - ASSESSMENT
69M with a prior admission for a forklift injury to the right lower extremity requiring multiple surgeries who presented from the rehabilitation facility with right lower extremity swelling and pain found to have acute kidney injury and hyperkalemia. Hemodialysis was performed for one session and the patient continued on a bicarbonate infusion for hypovolemia and metabolic acidosis. Packed red blood cells were also transfused for anemia. The patient had eventual improvement in the renal function with further fluid resuscitation and Min catheter drainage. The patient then developed an episode of left sided weakness and aphasia which resolved spontaneously.    Acute kidney injury / ATN - Resolving well. Prior history of urinary retention noted and recent CT angiogram. Monitoring renal function.  Monitoring for any signs of obstruction. On tamsulosin and finasteride.     s/p Left sided weakness and aphasia/ TIA - now resolved. CT and CT angiogram of the head was without acute intracranial pathology. MRI of the brain was without acute stroke. Symptoms resolved. Suspect TIA. Neurology consultation noted. On atorvastatin and aspirin. rt LE weakness at baseline 3/5 now worsened sec to DVT.    Anemia acute on chronic anemia of chronic disease- Suspect chronic anemia. Prior transfusion of packed red blood cells in this admission noted. On ferrous sulfate supplementation. hgb borderline around 7. given pt will be on full dose A/C will transfuse 1 PRBC today 10/7 and discharge to Rehab in AM    RLE DVT and injury to RLE requiring right above to below knee popliteal bypass, tibial nerve graft - switching from heparin gtt to lovenox full dose 10/6    Diabetes - Insulin coverage, close monitoring of blood glucose levels.    GERD - On pantoprazole. Diet as tolerated.    lovenox

## 2019-10-07 NOTE — PROGRESS NOTE ADULT - SUBJECTIVE AND OBJECTIVE BOX
HEALTH ISSUES - PROBLEM Dx:    SERENITY/ ATN, TIA    INTERVAL HPI/ OVERNIGHT EVENTS:    pt offers no complaints is comfortable  has no questions      REVIEW OF SYSTEMS:    cp- new focal deficits -    ICU Vital Signs Last 24 Hrs  T(C): 37.1 (06 Oct 2019 08:57), Max: 37.3 (06 Oct 2019 00:00)  T(F): 98.7 (06 Oct 2019 08:57), Max: 99.1 (06 Oct 2019 00:00)  HR: 79 (06 Oct 2019 08:57) (69 - 79)  BP: 148/88 (06 Oct 2019 08:57) (136/75 - 153/82)  BP(mean): --  ABP: --  ABP(mean): --  RR: 20 (06 Oct 2019 08:57) (18 - 20)  SpO2: 98% (06 Oct 2019 08:57) (97% - 98%)    PHYSICAL EXAM-  General appearance: No acute distress, Awake, Alert  HEENT: Normocephalic, Atraumatic, Conjunctiva clear, EOMI  Neck: Supple, No JVD, No tenderness  Lungs: Breath sound equal bilaterally, No wheezes, No rales  Cardiovascular: S1S2, Regular rhythm  Abdomen: Soft, Nontender, Nondistended, No guarding/rebound, Positive bowel sounds  Extremities: No clubbing, No cyanosis, No calf tenderness, Right lower extremity edema and post operative changes  Neuro: No tremors, Right lower extremity weakness 1/5  Psychiatric: Appropriate mood, Normal affect    MEDICATIONS  (STANDING):  amLODIPine   Tablet 10 milliGRAM(s) Oral daily  aspirin  chewable 81 milliGRAM(s) Oral daily  atorvastatin 40 milliGRAM(s) Oral at bedtime  chlorhexidine 2% Cloths 1 Application(s) Topical daily  docusate sodium 100 milliGRAM(s) Oral three times a day  ferrous    sulfate 325 milliGRAM(s) Oral daily  finasteride 5 milliGRAM(s) Oral daily  folic acid 1 milliGRAM(s) Oral daily  heparin  Infusion 1100 Unit(s)/Hr (11 mL/Hr) IV Continuous <Continuous>  influenza   Vaccine 0.5 milliLiter(s) IntraMuscular once  insulin lispro (HumaLOG) corrective regimen sliding scale   SubCutaneous Before meals and at bedtime  melatonin 3 milliGRAM(s) Oral at bedtime  multivitamin 1 Tablet(s) Oral daily  pantoprazole    Tablet 40 milliGRAM(s) Oral before breakfast  senna 2 Tablet(s) Oral at bedtime  tamsulosin 0.8 milliGRAM(s) Oral at bedtime    MEDICATIONS  (PRN):  acetaminophen   Tablet .. 650 milliGRAM(s) Oral every 6 hours PRN Mild Pain (1 - 3), Moderate Pain (4 - 6), Severe Pain (7 - 10)      LABS:                        8.1    7.64  )-----------( 359      ( 05 Oct 2019 07:20 )             25.6     10-05    143  |  107  |  17.0  ----------------------------<  94  3.3<L>   |  26.0  |  1.47<H>    Ca    9.7      05 Oct 2019 07:21  Phos  3.5     10-04  Mg     1.7     10-05      PTT - ( 05 Oct 2019 13:55 )  PTT:58.1 sec

## 2019-10-07 NOTE — PROGRESS NOTE ADULT - REASON FOR ADMISSION
r/o mesenteric ischemia

## 2019-10-08 ENCOUNTER — TRANSCRIPTION ENCOUNTER (OUTPATIENT)
Age: 69
End: 2019-10-08

## 2019-10-08 VITALS
OXYGEN SATURATION: 99 % | DIASTOLIC BLOOD PRESSURE: 83 MMHG | HEART RATE: 84 BPM | SYSTOLIC BLOOD PRESSURE: 131 MMHG | RESPIRATION RATE: 18 BRPM | TEMPERATURE: 98 F

## 2019-10-08 LAB
GLUCOSE BLDC GLUCOMTR-MCNC: 136 MG/DL — HIGH (ref 70–99)
GLUCOSE BLDC GLUCOMTR-MCNC: 92 MG/DL — SIGNIFICANT CHANGE UP (ref 70–99)
HCT VFR BLD CALC: 29.1 % — LOW (ref 39–50)
HGB BLD-MCNC: 9.3 G/DL — LOW (ref 13–17)
MCHC RBC-ENTMCNC: 26.9 PG — LOW (ref 27–34)
MCHC RBC-ENTMCNC: 32 GM/DL — SIGNIFICANT CHANGE UP (ref 32–36)
MCV RBC AUTO: 84.1 FL — SIGNIFICANT CHANGE UP (ref 80–100)
PLATELET # BLD AUTO: 372 K/UL — SIGNIFICANT CHANGE UP (ref 150–400)
RBC # BLD: 3.46 M/UL — LOW (ref 4.2–5.8)
RBC # FLD: 17.4 % — HIGH (ref 10.3–14.5)
WBC # BLD: 9.25 K/UL — SIGNIFICANT CHANGE UP (ref 3.8–10.5)
WBC # FLD AUTO: 9.25 K/UL — SIGNIFICANT CHANGE UP (ref 3.8–10.5)

## 2019-10-08 PROCEDURE — 86900 BLOOD TYPING SEROLOGIC ABO: CPT

## 2019-10-08 PROCEDURE — 82947 ASSAY GLUCOSE BLOOD QUANT: CPT

## 2019-10-08 PROCEDURE — 36430 TRANSFUSION BLD/BLD COMPNT: CPT

## 2019-10-08 PROCEDURE — 99261: CPT

## 2019-10-08 PROCEDURE — 80053 COMPREHEN METABOLIC PANEL: CPT

## 2019-10-08 PROCEDURE — 83935 ASSAY OF URINE OSMOLALITY: CPT

## 2019-10-08 PROCEDURE — 84466 ASSAY OF TRANSFERRIN: CPT

## 2019-10-08 PROCEDURE — 80048 BASIC METABOLIC PNL TOTAL CA: CPT

## 2019-10-08 PROCEDURE — 97110 THERAPEUTIC EXERCISES: CPT

## 2019-10-08 PROCEDURE — 80074 ACUTE HEPATITIS PANEL: CPT

## 2019-10-08 PROCEDURE — P9016: CPT

## 2019-10-08 PROCEDURE — 96376 TX/PRO/DX INJ SAME DRUG ADON: CPT | Mod: XU

## 2019-10-08 PROCEDURE — 90686 IIV4 VACC NO PRSV 0.5 ML IM: CPT

## 2019-10-08 PROCEDURE — C1889: CPT

## 2019-10-08 PROCEDURE — 82550 ASSAY OF CK (CPK): CPT

## 2019-10-08 PROCEDURE — 96365 THER/PROPH/DIAG IV INF INIT: CPT | Mod: XU

## 2019-10-08 PROCEDURE — 93970 EXTREMITY STUDY: CPT

## 2019-10-08 PROCEDURE — 36000 PLACE NEEDLE IN VEIN: CPT | Mod: RT,XU

## 2019-10-08 PROCEDURE — 85610 PROTHROMBIN TIME: CPT

## 2019-10-08 PROCEDURE — 86706 HEP B SURFACE ANTIBODY: CPT

## 2019-10-08 PROCEDURE — 92610 EVALUATE SWALLOWING FUNCTION: CPT

## 2019-10-08 PROCEDURE — 80061 LIPID PANEL: CPT

## 2019-10-08 PROCEDURE — 83540 ASSAY OF IRON: CPT

## 2019-10-08 PROCEDURE — 86850 RBC ANTIBODY SCREEN: CPT

## 2019-10-08 PROCEDURE — 96375 TX/PRO/DX INJ NEW DRUG ADDON: CPT | Mod: XU

## 2019-10-08 PROCEDURE — 84132 ASSAY OF SERUM POTASSIUM: CPT

## 2019-10-08 PROCEDURE — 82330 ASSAY OF CALCIUM: CPT

## 2019-10-08 PROCEDURE — 99285 EMERGENCY DEPT VISIT HI MDM: CPT | Mod: 25

## 2019-10-08 PROCEDURE — 87340 HEPATITIS B SURFACE AG IA: CPT

## 2019-10-08 PROCEDURE — 36415 COLL VENOUS BLD VENIPUNCTURE: CPT

## 2019-10-08 PROCEDURE — 82553 CREATINE MB FRACTION: CPT

## 2019-10-08 PROCEDURE — 83550 IRON BINDING TEST: CPT

## 2019-10-08 PROCEDURE — 82607 VITAMIN B-12: CPT

## 2019-10-08 PROCEDURE — 86901 BLOOD TYPING SEROLOGIC RH(D): CPT

## 2019-10-08 PROCEDURE — 86704 HEP B CORE ANTIBODY TOTAL: CPT

## 2019-10-08 PROCEDURE — 82728 ASSAY OF FERRITIN: CPT

## 2019-10-08 PROCEDURE — 85027 COMPLETE CBC AUTOMATED: CPT

## 2019-10-08 PROCEDURE — 82962 GLUCOSE BLOOD TEST: CPT

## 2019-10-08 PROCEDURE — 97116 GAIT TRAINING THERAPY: CPT

## 2019-10-08 PROCEDURE — 84295 ASSAY OF SERUM SODIUM: CPT

## 2019-10-08 PROCEDURE — 87086 URINE CULTURE/COLONY COUNT: CPT

## 2019-10-08 PROCEDURE — 99239 HOSP IP/OBS DSCHRG MGMT >30: CPT

## 2019-10-08 PROCEDURE — 70551 MRI BRAIN STEM W/O DYE: CPT

## 2019-10-08 PROCEDURE — 86923 COMPATIBILITY TEST ELECTRIC: CPT

## 2019-10-08 PROCEDURE — 85384 FIBRINOGEN ACTIVITY: CPT

## 2019-10-08 PROCEDURE — 93005 ELECTROCARDIOGRAM TRACING: CPT

## 2019-10-08 PROCEDURE — 71045 X-RAY EXAM CHEST 1 VIEW: CPT

## 2019-10-08 PROCEDURE — 86803 HEPATITIS C AB TEST: CPT

## 2019-10-08 PROCEDURE — 97163 PT EVAL HIGH COMPLEX 45 MIN: CPT

## 2019-10-08 PROCEDURE — 82435 ASSAY OF BLOOD CHLORIDE: CPT

## 2019-10-08 PROCEDURE — 83605 ASSAY OF LACTIC ACID: CPT

## 2019-10-08 PROCEDURE — 81001 URINALYSIS AUTO W/SCOPE: CPT

## 2019-10-08 PROCEDURE — 70498 CT ANGIOGRAPHY NECK: CPT

## 2019-10-08 PROCEDURE — 93306 TTE W/DOPPLER COMPLETE: CPT

## 2019-10-08 PROCEDURE — 83036 HEMOGLOBIN GLYCOSYLATED A1C: CPT

## 2019-10-08 PROCEDURE — 85045 AUTOMATED RETICULOCYTE COUNT: CPT

## 2019-10-08 PROCEDURE — 70496 CT ANGIOGRAPHY HEAD: CPT

## 2019-10-08 PROCEDURE — 73700 CT LOWER EXTREMITY W/O DYE: CPT

## 2019-10-08 PROCEDURE — 87040 BLOOD CULTURE FOR BACTERIA: CPT

## 2019-10-08 PROCEDURE — 97167 OT EVAL HIGH COMPLEX 60 MIN: CPT

## 2019-10-08 PROCEDURE — 70450 CT HEAD/BRAIN W/O DYE: CPT

## 2019-10-08 PROCEDURE — 82746 ASSAY OF FOLIC ACID SERUM: CPT

## 2019-10-08 PROCEDURE — 83735 ASSAY OF MAGNESIUM: CPT

## 2019-10-08 PROCEDURE — C1751: CPT

## 2019-10-08 PROCEDURE — 51702 INSERT TEMP BLADDER CATH: CPT

## 2019-10-08 PROCEDURE — 75635 CT ANGIO ABDOMINAL ARTERIES: CPT

## 2019-10-08 PROCEDURE — 85730 THROMBOPLASTIN TIME PARTIAL: CPT

## 2019-10-08 PROCEDURE — 87640 STAPH A DNA AMP PROBE: CPT

## 2019-10-08 PROCEDURE — 85014 HEMATOCRIT: CPT

## 2019-10-08 PROCEDURE — 96361 HYDRATE IV INFUSION ADD-ON: CPT

## 2019-10-08 PROCEDURE — 82803 BLOOD GASES ANY COMBINATION: CPT

## 2019-10-08 PROCEDURE — 84100 ASSAY OF PHOSPHORUS: CPT

## 2019-10-08 PROCEDURE — 87641 MR-STAPH DNA AMP PROBE: CPT

## 2019-10-08 PROCEDURE — 74176 CT ABD & PELVIS W/O CONTRAST: CPT

## 2019-10-08 RX ORDER — FOLIC ACID 0.8 MG
1 TABLET ORAL
Qty: 0 | Refills: 0 | DISCHARGE
Start: 2019-10-08

## 2019-10-08 RX ORDER — LANOLIN ALCOHOL/MO/W.PET/CERES
1 CREAM (GRAM) TOPICAL
Qty: 0 | Refills: 0 | DISCHARGE
Start: 2019-10-08

## 2019-10-08 RX ORDER — INSULIN LISPRO 100/ML
0 VIAL (ML) SUBCUTANEOUS
Qty: 0 | Refills: 0 | DISCHARGE
Start: 2019-10-08

## 2019-10-08 RX ORDER — FERROUS SULFATE 325(65) MG
1 TABLET ORAL
Qty: 0 | Refills: 0 | DISCHARGE
Start: 2019-10-08

## 2019-10-08 RX ORDER — FINASTERIDE 5 MG/1
1 TABLET, FILM COATED ORAL
Qty: 0 | Refills: 0 | DISCHARGE
Start: 2019-10-08

## 2019-10-08 RX ORDER — LINAGLIPTIN AND METFORMIN HYDROCHLORIDE 2.5; 85 MG/1; MG/1
1 TABLET, FILM COATED ORAL
Qty: 0 | Refills: 0 | DISCHARGE

## 2019-10-08 RX ORDER — ENOXAPARIN SODIUM 100 MG/ML
70 INJECTION SUBCUTANEOUS
Qty: 0 | Refills: 0 | DISCHARGE
Start: 2019-10-08

## 2019-10-08 RX ADMIN — Medication 81 MILLIGRAM(S): at 12:11

## 2019-10-08 RX ADMIN — Medication 1 MILLIGRAM(S): at 12:11

## 2019-10-08 RX ADMIN — Medication 325 MILLIGRAM(S): at 12:11

## 2019-10-08 RX ADMIN — FINASTERIDE 5 MILLIGRAM(S): 5 TABLET, FILM COATED ORAL at 12:12

## 2019-10-08 RX ADMIN — ENOXAPARIN SODIUM 70 MILLIGRAM(S): 100 INJECTION SUBCUTANEOUS at 05:34

## 2019-10-08 RX ADMIN — INFLUENZA VIRUS VACCINE 0.5 MILLILITER(S): 15; 15; 15; 15 SUSPENSION INTRAMUSCULAR at 12:16

## 2019-10-08 RX ADMIN — Medication 1 TABLET(S): at 12:12

## 2019-10-08 RX ADMIN — AMLODIPINE BESYLATE 10 MILLIGRAM(S): 2.5 TABLET ORAL at 05:34

## 2019-10-08 RX ADMIN — CHLORHEXIDINE GLUCONATE 1 APPLICATION(S): 213 SOLUTION TOPICAL at 12:11

## 2019-10-08 RX ADMIN — PANTOPRAZOLE SODIUM 40 MILLIGRAM(S): 20 TABLET, DELAYED RELEASE ORAL at 05:34

## 2019-10-08 NOTE — DISCHARGE NOTE PROVIDER - CARE PROVIDERS DIRECT ADDRESSES
,[email or web address],DirectAddress_Unknown

## 2019-10-08 NOTE — DISCHARGE NOTE PROVIDER - PROVIDER TOKENS
PROVIDER:[TOKEN:[[id number]]],PROVIDER:[TOKEN:[[id number]]],PROVIDER:[TOKEN:[[id number]]],FREE:[LAST:[PMD],PHONE:[(   )    -],FAX:[(   )    -]]

## 2019-10-08 NOTE — DISCHARGE NOTE PROVIDER - HOSPITAL COURSE
69M with a prior admission for a forklift injury to the right lower extremity requiring multiple surgeries who presented from the rehabilitation facility with right lower extremity swelling and pain found to have acute kidney injury and hyperkalemia. Hemodialysis was performed for one session and the patient continued on a bicarbonate infusion for hypovolemia and metabolic acidosis. Packed red blood cells were also transfused for anemia. The patient had eventual improvement in the renal function with further fluid resuscitation and Min catheter drainage. The patient then developed an episode of left sided weakness and aphasia which resolved spontaneously.        now at baseline crt of 1.4 CKD stg 3. lovenox full dose for DVT adjusted to renal fxn. received      PRBC on 10/7 for chronic anemia        Medically stable and agreeable with discharge and follow up plan. Patient was advised to return to ED if any symptoms occur or worsen.        TIme 43 mins

## 2019-10-08 NOTE — DISCHARGE NOTE PROVIDER - CARE PROVIDER_API CALL
Stuart Hooks (DO)  Internal Medicine  [address], [address] [place] [address]  Phone: [phone]  Fax: [phone]  Follow Up Time:     ManvarSingh, Pallavi B (MD)  Vascular Surgery  [address], [address] [place] [address]  Phone: [phone]  Fax: [phone]  Follow Up Time:     Yahir Joe; PhD)  Neurology; Vascular Neurology  [address], [address]  [place] [address]  Phone: [phone]  Fax: [phone]  Follow Up Time:     PMD,   Phone: (   )    -  Fax: (   )    -  Follow Up Time:

## 2019-10-08 NOTE — DISCHARGE NOTE PROVIDER - NSDCCPCAREPLAN_GEN_ALL_CORE_FT
PRINCIPAL DISCHARGE DIAGNOSIS  Diagnosis: Acute renal failure  Assessment and Plan of Treatment:       SECONDARY DISCHARGE DIAGNOSES  Diagnosis: TIA (transient ischemic attack)  Assessment and Plan of Treatment: TIA (transient ischemic attack)    Diagnosis: Leg pain  Assessment and Plan of Treatment:

## 2019-10-08 NOTE — DISCHARGE NOTE NURSING/CASE MANAGEMENT/SOCIAL WORK - PATIENT PORTAL LINK FT
You can access the FollowMyHealth Patient Portal offered by [facility] by registering at the following website: [email or web address]. By joining [facility]’s FollowMyHealth portal, you will also be able to view your health information using other applications (apps) compatible with our system.

## 2019-10-08 NOTE — DISCHARGE NOTE NURSING/CASE MANAGEMENT/SOCIAL WORK - NSDCPEPTSTRK_GEN_ALL_CORE
Prescribed medications/Stroke support groups for patients, families, and friends/Risk factors for stroke/Stroke warning signs and symptoms/Signs and symptoms of stroke/Call 911 for stroke/Stroke education booklet/Need for follow up after discharge

## 2019-10-08 NOTE — DISCHARGE NOTE PROVIDER - NSDCPNSUBOBJ_GEN_ALL_CORE
HEALTH ISSUES - PROBLEM Dx:        SERENITY/ ATN, TIA        INTERVAL HPI/ OVERNIGHT EVENTS:        pt offers no complaints is comfortable    has no questions        REVIEW OF SYSTEMS:        cp- new focal deficits -        ICU Vital Signs Last 24 Hrs    T(C): 36.7 (08 Oct 2019 08:45), Max: 37.2 (07 Oct 2019 15:45)    T(F): 98 (08 Oct 2019 08:45), Max: 99 (07 Oct 2019 15:45)    HR: 65 (08 Oct 2019 08:45) (65 - 86)    BP: 170/71 (08 Oct 2019 08:45) (137/84 - 170/71)    BP(mean): --    ABP: --    ABP(mean): --    RR: 18 (08 Oct 2019 08:45) (18 - 18)    SpO2: 98% (08 Oct 2019 08:45) (97% - 98%)            PHYSICAL EXAM-    General appearance: No acute distress, Awake, Alert    HEENT: Normocephalic, Atraumatic, Conjunctiva clear, EOMI    Neck: Supple, No JVD, No tenderness    Lungs: Breath sound equal bilaterally, No wheezes, No rales    Cardiovascular: S1S2, Regular rhythm    Abdomen: Soft, Nontender, Nondistended, No guarding/rebound, Positive bowel sounds    Extremities: No clubbing, No cyanosis, No calf tenderness, Right lower extremity edema and post operative changes    Neuro: No tremors, Right lower extremity weakness 1/5    Psychiatric: Appropriate mood, Normal affect        MEDICATIONS  (STANDING):    amLODIPine   Tablet 10 milliGRAM(s) Oral daily    aspirin  chewable 81 milliGRAM(s) Oral daily    atorvastatin 40 milliGRAM(s) Oral at bedtime    chlorhexidine 2% Cloths 1 Application(s) Topical daily    docusate sodium 100 milliGRAM(s) Oral three times a day    ferrous    sulfate 325 milliGRAM(s) Oral daily    finasteride 5 milliGRAM(s) Oral daily    folic acid 1 milliGRAM(s) Oral daily    heparin  Infusion 1100 Unit(s)/Hr (11 mL/Hr) IV Continuous <Continuous>    influenza   Vaccine 0.5 milliLiter(s) IntraMuscular once    insulin lispro (HumaLOG) corrective regimen sliding scale   SubCutaneous Before meals and at bedtime    melatonin 3 milliGRAM(s) Oral at bedtime    multivitamin 1 Tablet(s) Oral daily    pantoprazole    Tablet 40 milliGRAM(s) Oral before breakfast    senna 2 Tablet(s) Oral at bedtime    tamsulosin 0.8 milliGRAM(s) Oral at bedtime        MEDICATIONS  (PRN):    acetaminophen   Tablet .. 650 milliGRAM(s) Oral every 6 hours PRN Mild Pain (1 - 3), Moderate Pain (4 - 6), Severe Pain (7 - 10)            LABS:                            8.1      7.64  )-----------( 359      ( 05 Oct 2019 07:20 )               25.6         10-05        143  |  107  |  17.0    ----------------------------<  94    3.3<L>   |  26.0  |  1.47<H>        Ca    9.7      05 Oct 2019 07:21    Phos  3.5     10-04    Mg     1.7     10-05            PTT - ( 05 Oct 2019 13:55 )  PTT:58.1 sec                Assessment and Plan:     · Assessment	    69M with a prior admission for a forklift injury to the right lower extremity requiring multiple surgeries who presented from the rehabilitation facility with right lower extremity swelling and pain found to have acute kidney injury and hyperkalemia. Hemodialysis was performed for one session and the patient continued on a bicarbonate infusion for hypovolemia and metabolic acidosis. Packed red blood cells were also transfused for anemia. The patient had eventual improvement in the renal function with further fluid resuscitation and Min catheter drainage. The patient then developed an episode of left sided weakness and aphasia which resolved spontaneously.        Acute kidney injury / ATN - Resolving well. Prior history of urinary retention noted and recent CT angiogram. Monitoring renal function.  Monitoring for any signs of obstruction. On tamsulosin and finasteride.         s/p Left sided weakness and aphasia/ TIA - now resolved. CT and CT angiogram of the head was without acute intracranial pathology. MRI of the brain was without acute stroke. Symptoms resolved. Suspect TIA. Neurology consultation noted. On atorvastatin and aspirin. rt LE weakness at baseline 3/5 now worsened sec to DVT.        Anemia acute on chronic anemia of chronic disease- Suspect chronic anemia. Prior transfusion of packed red blood cells in this admission noted. On ferrous sulfate supplementation. hgb borderline around 7. given pt will be on full dose A/C will transfuse 1 PRBC today 10/7 and discharge to Rehab in AM        RLE DVT and injury to RLE requiring right above to below knee popliteal bypass, tibial nerve graft - switching from heparin gtt to lovenox full dose 10/6        Diabetes - Insulin coverage, close monitoring of blood glucose levels.        GERD - On pantoprazole. Diet as tolerated.        lovenox        to CAT

## 2019-10-29 ENCOUNTER — APPOINTMENT (OUTPATIENT)
Dept: VASCULAR SURGERY | Facility: CLINIC | Age: 69
End: 2019-10-29
Payer: COMMERCIAL

## 2019-10-29 VITALS
TEMPERATURE: 98.7 F | HEART RATE: 78 BPM | HEIGHT: 68 IN | SYSTOLIC BLOOD PRESSURE: 138 MMHG | BODY MASS INDEX: 21.98 KG/M2 | DIASTOLIC BLOOD PRESSURE: 80 MMHG | WEIGHT: 145 LBS | OXYGEN SATURATION: 100 %

## 2019-10-29 DIAGNOSIS — Z78.9 OTHER SPECIFIED HEALTH STATUS: ICD-10-CM

## 2019-10-29 PROCEDURE — 93971 EXTREMITY STUDY: CPT

## 2019-10-29 PROCEDURE — 29580 STRAPPING UNNA BOOT: CPT | Mod: RT

## 2019-10-29 PROCEDURE — 93926 LOWER EXTREMITY STUDY: CPT

## 2019-10-29 PROCEDURE — 99214 OFFICE O/P EST MOD 30 MIN: CPT | Mod: 25

## 2019-11-03 PROBLEM — Z78.9 NON-SMOKER: Status: ACTIVE | Noted: 2019-11-03

## 2019-11-03 PROBLEM — Z78.9 NO PERTINENT PAST SURGICAL HISTORY: Status: ACTIVE | Noted: 2019-09-16

## 2019-11-04 ENCOUNTER — APPOINTMENT (OUTPATIENT)
Dept: VASCULAR SURGERY | Facility: CLINIC | Age: 69
End: 2019-11-04
Payer: COMMERCIAL

## 2019-11-04 VITALS
OXYGEN SATURATION: 100 % | HEART RATE: 81 BPM | TEMPERATURE: 98.5 F | DIASTOLIC BLOOD PRESSURE: 76 MMHG | SYSTOLIC BLOOD PRESSURE: 117 MMHG

## 2019-11-04 PROCEDURE — 29580 STRAPPING UNNA BOOT: CPT | Mod: RT

## 2019-11-12 ENCOUNTER — APPOINTMENT (OUTPATIENT)
Dept: VASCULAR SURGERY | Facility: CLINIC | Age: 69
End: 2019-11-12
Payer: COMMERCIAL

## 2019-11-12 VITALS
HEART RATE: 91 BPM | DIASTOLIC BLOOD PRESSURE: 74 MMHG | BODY MASS INDEX: 21.98 KG/M2 | HEIGHT: 68 IN | SYSTOLIC BLOOD PRESSURE: 124 MMHG | WEIGHT: 145 LBS | TEMPERATURE: 98.7 F | OXYGEN SATURATION: 100 %

## 2019-11-12 DIAGNOSIS — I82.409 ACUTE EMBOLISM AND THROMBOSIS OF UNSPECIFIED DEEP VEINS OF UNSPECIFIED LOWER EXTREMITY: ICD-10-CM

## 2019-11-12 PROCEDURE — 99214 OFFICE O/P EST MOD 30 MIN: CPT

## 2019-12-03 ENCOUNTER — APPOINTMENT (OUTPATIENT)
Dept: NEUROLOGY | Facility: CLINIC | Age: 69
End: 2019-12-03
Payer: COMMERCIAL

## 2019-12-03 VITALS
WEIGHT: 148 LBS | SYSTOLIC BLOOD PRESSURE: 130 MMHG | BODY MASS INDEX: 23.23 KG/M2 | DIASTOLIC BLOOD PRESSURE: 70 MMHG | HEIGHT: 67 IN

## 2019-12-03 PROCEDURE — 99213 OFFICE O/P EST LOW 20 MIN: CPT

## 2019-12-03 NOTE — HISTORY OF PRESENT ILLNESS
[FreeTextEntry1] : Hospital followup December 3, 2019:\par This is a 69-year-old man who is seen about 2 months ago with a TIA. Transient weakness which has since resolved. He had significant vascular issues. Currently in subacute rehabilitation. He is taking aspirin, Xarelto, blood pressure medicines and simvastatin. He is here today for neurologic followup.

## 2019-12-03 NOTE — CONSULT LETTER
[Dear  ___] : Dear  [unfilled], [Consult Letter:] : I had the pleasure of evaluating your patient, [unfilled]. [Consult Closing:] : Thank you very much for allowing me to participate in the care of this patient.  If you have any questions, please do not hesitate to contact me. [Please see my note below.] : Please see my note below. [FreeTextEntry3] : Yahir Joe M.D., Ph.D. DPN-N\par [facility]\par Neurology at [place]\par Medical Director of Stroke Services\par [facility]\par  [Sincerely,] : Sincerely,

## 2019-12-03 NOTE — PHYSICAL EXAM
[Person] : oriented to person [Place] : oriented to place [Time] : oriented to time [Remote Intact] : remote memory intact [Registration Intact] : recent registration memory intact [Span Intact] : the attention span was normal [Visual Intact] : visual attention was ~T not ~L decreased [Concentration Intact] : normal concentrating ability [Naming Objects] : no difficulty naming common objects [Repeating Phrases] : no difficulty repeating a phrase [Fluency] : fluency intact [Comprehension] : comprehension intact [Past History] : adequate knowledge of personal past history [Current Events] : adequate knowledge of current events [Cranial Nerves Optic (II)] : visual acuity intact bilaterally,  visual fields full to confrontation, pupils equal round and reactive to light [Cranial Nerves Oculomotor (III)] : extraocular motion intact [Cranial Nerves Trigeminal (V)] : facial sensation intact symmetrically [Cranial Nerves Facial (VII)] : face symmetrical [Cranial Nerves Accessory (XI - Cranial And Spinal)] : head turning and shoulder shrug symmetric [Cranial Nerves Glossopharyngeal (IX)] : tongue and palate midline [Cranial Nerves Vestibulocochlear (VIII)] : hearing was intact bilaterally [Cranial Nerves Hypoglossal (XII)] : there was no tongue deviation with protrusion [Motor Tone] : muscle tone was normal in all four extremities [Motor Strength Lower Extremities Right] : there was weakness of the right lower extremity [Involuntary Movements] : no involuntary movements were seen [No Muscle Atrophy] : normal bulk in all four extremities [Proprioception] : proprioception was intact [Sensation Pain / Temperature Decrease] : pain and temperature was intact [Sensation Tactile Decrease] : light touch was intact [Sensation Vibration Decrease] : vibration was intact [Tremor] : no tremor present [Coordination - Dysmetria Impaired Finger-to-Nose Bilateral] : present bilaterally [2+] : Brachioradialis right 2+ [1+] : Patella right 1+ [FreeTextEntry6] : Very mild right-sided lower short he weakness [FreeTextEntry8] : In wheelchair for transport [Sclera] : the sclera and conjunctiva were normal [Extraocular Movements] : extraocular movements were intact [PERRL With Normal Accommodation] : pupils were equal in size, round, reactive to light, with normal accommodation [No APD] : no afferent pupillary defect [Full Visual Field] : full visual field [No BEAR] : no internuclear ophthalmoplegia

## 2019-12-03 NOTE — ASSESSMENT
[FreeTextEntry1] : This is a 69-year-old man seen in the hospital about 2 months ago with a TIA. He is now doing well from a neurologic point of view. He should continue to keep his blood pressure normal. He should continue on aspirin as well as simvastatin. His goal LDL is under 70. He is still taking anticoagulation from a vascular point of view. I will see him back in the office in 6 months for reevaluation.

## 2019-12-23 NOTE — PROCEDURE NOTE - NSNUMATTEMPT_GEN_A_CORE
PHYSICAL THERAPY DISCHARGE SUMMARY 
[id number] Precautions at discharge: Other (comment); Swallowing/aspiration(Fall Risk) Problem List:   
Decreased strength B LE  [x] Decreased strength trunk/core  [x] Decreased AROM   [] Decreased PROM  [] Decreased balance sitting  [] Decreased balance standing  [x] Decreased endurance  [] Pain  [] Functional Limitations:  
Decreased independence with bed mobility  [] Decreased independence with functional transfers  [x] Decreased independence with ambulation  [x] Decreased independence with stair negotiation  [x] Outcome Measures: Vital Signs: /81   Pulse 87   Temp 97.9 °F (36.6 °C)   Resp 12   Wt 68.6 kg (151 lb 4.5 oz)   SpO2 96%   BMI 23.69 kg/m² Pain level: no c/o pain Patient education: reviewed stair negiotiation Interdisciplinary Communication: collaborated w/ OT regarding pt's readiness for d/c Cognition: Pt. Pleasant & works well with therapy. Follows one step commands consistently, although is [place]. Pt. Exhibits some expressive aphasia, decreased problem solving, & decreased short term recall, so will require 24/7 supervision @ home upon d/c. MMT Initial Asssessment Right Lower Extremity Left Lower Extremity Hip Flexion 3+ 3+ Knee Extension 4+ 4 Knee Flexion 4 4 Ankle Dorsiflexion 4 4 MMT Discharge Assessment Right Lower Extremity Left Lower Extremity Hip Flexion 4 4-  
Knee Extension 5 5 Knee Flexion 5 5 Ankle Dorsiflexion 5 5  
0/5 No palpable muscle contraction 1/5 Palpable muscle contraction, no joint movement 2-/5 Less than full range of motion in gravity eliminated position 2/5 Able to complete full range of motion in gravity eliminated position 2+/5 Able to initiate movement against gravity 3-/5 More than half but not full range of motion against gravity 3/5 Able to complete full range of motion against gravity 3+/5 Completes full range of motion against gravity with minimal resistance 4-/5 Completes full range of motion against gravity with minimal-moderate resistance 4/5 Completes full range of motion against gravity with moderate resistance 4+/5 Completes full range of motion against gravity with moderate-maximum resistance 5/5 Completes full range of motion against gravity with maximum resistance [facility] PRIMARY MODE OF LOCOMOTION: ambulation Please see IRC Interdisciplinary Eval: Coordination/Balance Section for details regarding FIM score description. BED/CHAIR/WHEELCHAIR TRANSFERS Initial Assessment Discharge Assessment Rolling Right 0 (Not tested) 6 (Modified independent) Rolling Left 0 (Not tested) 6 (Modified independent) Supine to Sit 0 (Not tested) 6 (Modified independent) Sit to Stand Moderate assistance Supervision Sit to Supine 0 (Not tested) 6 (Modified independent) Transfer Type SPT without device SPT with walker Comments pt. w/ poor postural control w/ heavy posterior lean, made worse duringt he pivot part of transfer, requiring mod A x1 & min A x1 for balance assist Supervision for occasional cues for hand placement Car Transfer Not tested Supervision Car Type rehab car rehab car [facility] MOBILITY/MANAGEMENT Initial Assessment Discharge Assessment Able to Propel 0 feet 0 feet Functional Level   NT  
Curbs/ramps assistance required 0 (Not tested) 0 (Not tested) Wheelchair set up assistance required   NT Wheelchair management   NT  
 
 
[facility] ([facility] INDEPENDENCE Initial Assessment Discharge Assessment Assistive device Walker, rolling Walker, rolling Ambulation assistance - level surface 1 (Dependent/total assistance)(mod A x1 & SBA x2 to follow closely w/ w/c) 5 (Supervision) Distance 30 Feet (ft) 200 Feet (ft)(x2)  
Comments flexed posture w/ step to gait pattern R LE, foot flat initial contact & no toe off @ terminal stance flexed posture, decreased lex, decvreased active plantarflexion @ terminal stance phase of gait Ambulation assistance - unlevel surface 0 (Not tested) 5 (Supervision/setup) STEPS/STAIRS Initial Assessment Discharge Assessment Steps/Stairs ambulated 0 4 Rail Use Both Both Functional Level   supervision Comments step over gait pattern ascending & step to gait pattern descending step over gait pattern ascending & step to gait pattern descending Curbs/Ramps 0 (Not tested) 5 (Supervision/setup)(using RW) QUALITY INDICATOR ASSIST COMMENTS Roll right (&return to back) 6: Independent Roll left (& return to back) 6: Independent Supine to sit 6: Independent Sit to stand 5: S/U or clean-up assist   
Chair/bed-to-chair transfer 4: Supervision or touching A Walk 10 feet 4: Supervision or touching A Walk 50 feet with 2 turns 4: Supervision or touching A Walk 150 feet 4: Supervision or touching A Walk 10 feet on uneven  4: Supervision or touching A   
1 step/curb 4: Supervision or touching A   
4 steps 4: Supervision or touching A   
12 steps Not Tested: Not applicable secondary to pt not completing activity previously  object 4: Supervision or touching A Using reacher Wheel 48' w/2 turns Not Tested: Not applicable secondary to pt not completing activity previously Wheel 150' Not Tested: Not applicable secondary to pt not completing activity previously Car Transfer 5: S/U or clean-up assist   
   
Pt. Left up in w/c in NAD, call bell in reach, grandson @ bedside. PHYSICAL THERAPY PLAN OF CARE 
 
LTGs: Pt. Has met all LTGs Pt would benefit from continued skilled physical therapy in order to improve independent functional mobility within the home with use of least restrictive device.  Interventions may include range of motion (AROM, PROM B LE/trunk), motor function (B LE/trunk strengthening/coordination), activity tolerance (vitals, oxygen saturation levels), bed mobility training, balance activities, gait training (progressive ambulation program), and functional transfer training. HEP handout: 
Issued to wife @ family training Pt to be discharged home with 24/7 supervision provided by pt' family. Therapy Recommendations upon discharge: Vene [address] PT Equipment needs at discharge:   RW 
 
Please see IRC; Interdisciplinary Eval, Care Plan, and Patient Education for further information regarding physical therapy discharge summary and plan of care. Seymour Benjamin, PT 
12/23/2019 1

## 2020-02-10 ENCOUNTER — APPOINTMENT (OUTPATIENT)
Dept: VASCULAR SURGERY | Facility: CLINIC | Age: 70
End: 2020-02-10
Payer: COMMERCIAL

## 2020-02-10 VITALS
DIASTOLIC BLOOD PRESSURE: 83 MMHG | SYSTOLIC BLOOD PRESSURE: 141 MMHG | HEIGHT: 67 IN | HEART RATE: 85 BPM | TEMPERATURE: 98.2 F | OXYGEN SATURATION: 100 %

## 2020-02-10 PROCEDURE — 99214 OFFICE O/P EST MOD 30 MIN: CPT

## 2020-02-10 PROCEDURE — 93926 LOWER EXTREMITY STUDY: CPT

## 2020-02-18 ENCOUNTER — APPOINTMENT (OUTPATIENT)
Dept: NEUROLOGY | Facility: CLINIC | Age: 70
End: 2020-02-18

## 2020-02-24 DIAGNOSIS — M25.562 PAIN IN LEFT KNEE: ICD-10-CM

## 2020-02-25 ENCOUNTER — APPOINTMENT (OUTPATIENT)
Age: 70
End: 2020-02-25
Payer: OTHER MISCELLANEOUS

## 2020-02-25 VITALS
HEART RATE: 91 BPM | SYSTOLIC BLOOD PRESSURE: 136 MMHG | TEMPERATURE: 98.1 F | DIASTOLIC BLOOD PRESSURE: 79 MMHG | WEIGHT: 148 LBS | HEIGHT: 67 IN | BODY MASS INDEX: 23.23 KG/M2

## 2020-02-25 PROCEDURE — 99214 OFFICE O/P EST MOD 30 MIN: CPT

## 2020-02-25 PROCEDURE — 73560 X-RAY EXAM OF KNEE 1 OR 2: CPT | Mod: LT

## 2020-02-25 NOTE — HISTORY OF PRESENT ILLNESS
[0] : a current pain level of 0/10 [de-identified] : 69-year-old male Status post open reduction internal fixation of the left lateral tibial plateau fracture on June 26, 2019. Patient comes today with his son. he is presently in a wheelchair since he has limited use of his right lower extremity. He has been seen by Dr. Ron for skin graft to his right lower extremity. Dr. Comer also saw patient in the office for his right knee I&D that was performed on 7/14/19. He denies pain at this time. Current symptoms include no chills and no fever.  [Bending] : worsened by bending [Recumbency] : relieved by recumbency [Lifting] : worsened by lifting [de-identified] : left knee [Rest] : relieved by rest

## 2020-02-25 NOTE — DISCUSSION/SUMMARY
[de-identified] : The left lower extremity is doing quite well. The fracture is healing well. Previous MCL insufficiency appears to have healed as well.  He is allowed to weight-bear as tolerated on left lower extremity. The right lower extremity follow up with plastic surgery as well as vascular surgery for possible discussion of future care. For the left lower extremity, he may followup p.r.n.\par \par The patient was given the opportunity to ask questions and all questions were answered to their satisfaction.\par \par Maxwell Dela Cruz MD\par Orthopaedic Trauma Surgeon\par [facility]\par [facility] [place]\par \par

## 2020-02-25 NOTE — PHYSICAL EXAM
[de-identified] : Physical Exam:\par General: Well appearing, no acute distress, A&O\par Neurologic: A&Ox3, No focal deficits\par Head: NCAT without abrasions, lacerations, or ecchymosis to head, face, or scalp\par Respiratory: Equal chest wall expansion bilaterally, no accessory muscle use\par Lymphatic: No lymphadenopathy palpated\par Skin: Warm and dry\par Psychiatric: Normal mood and affect\par \par Left lower extremity:\par Knee range of motion °. The surgical incision site(s) was clean, dry and intact, healed, not erythematous, absent of discharge, not swollen and not dehisced. Additional findings included an unremarkable neurological exam and peripheral vascular exam normal. \par SILT s/s/sp/dp/t\par Fires EHL/FHL/GS/TA\par 2+ DP/PT pulse\par brisk capillary refill [de-identified] : 2 views of the left knee obtained today -  hardware in good position and good overall alignment. \par \par

## 2020-02-25 NOTE — REASON FOR VISIT
[Follow-Up Visit] : a follow-up visit for [Family Member] : family member [FreeTextEntry2] : Left knee pain

## 2020-04-20 ENCOUNTER — APPOINTMENT (OUTPATIENT)
Dept: NEUROLOGY | Facility: CLINIC | Age: 70
End: 2020-04-20
Payer: OTHER MISCELLANEOUS

## 2020-04-20 PROCEDURE — 99213 OFFICE O/P EST LOW 20 MIN: CPT | Mod: 95

## 2020-04-20 NOTE — PHYSICAL EXAM
[FreeTextEntry1] : Neurologic examination was limited due to the video call.\par \par Mental status: Awake and alert fully oriented to person place and time. There is no aphasia.\par \par Cranial nerves:  Full extraocular movements. There is no nystagmus. Normal facial sensation and motor function. Tongue was in the midline.\par \par Motor: no pronator drift bilaterally.\par \par Sensation: Light touch was intact\par \par Coordination: Deferred\par \par Gait: Appeared normal\par

## 2020-04-20 NOTE — HISTORY OF PRESENT ILLNESS
[Home] : at home, [unfilled] , at the time of the visit. [Medical Office: ([facility])___] : at the medical office located in  [Other:____] : [unfilled] [Patient] : the patient [FreeTextEntry1] : Hospital followup December 3, 2019:\par This is a 69-year-old man who is seen about 2 months ago with a TIA. Transient weakness which has since resolved. He had significant vascular issues. Currently in subacute rehabilitation. He is taking aspirin, Xarelto, blood pressure medicines and simvastatin. He is here today for neurologic followup.\par \par Followup April 20, 2020:\par This is a 69-year-old man of presents today for followup of TIA. He is being seen via video of this it secondary to the corona virus outbreak. Verbal consent was obtained at the time of call. He has been doing well since his last visit. His daughter states that he recently had blood work obtained and his cholesterol was well. He is not any neurologic symptoms. He understands what he needs to do to prevent stroke in the future. I did reeducate his family as to the signs of stroke giving him instructions of the BE FAST signs of stroke.

## 2020-04-20 NOTE — ASSESSMENT
[FreeTextEntry1] : This is a 69-year-old man with history of TIA. He is doing well. I have instructed the family and the signs of recurrent stroke and informed them to bring him to the hospital if they notice this. He will continue to control his lipids controlled continue antiplatelet agents and control his blood pressure blood sugar. I will see him back in the office in 6 months, sooner should the need arise.

## 2020-05-07 NOTE — PROGRESS NOTE ADULT - ASSESSMENT
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 No evidence of active wound infection of RLE however; now s/p RLE debridement and VAC placement.   - Pain control as needed  - DVT ppx  - Continue abx  - Monitor for fevers sore throat/PAIN

## 2020-08-03 ENCOUNTER — TRANSCRIPTION ENCOUNTER (OUTPATIENT)
Age: 70
End: 2020-08-03

## 2020-08-03 ENCOUNTER — APPOINTMENT (OUTPATIENT)
Dept: RADIOLOGY | Facility: CLINIC | Age: 70
End: 2020-08-03
Payer: COMMERCIAL

## 2020-08-03 ENCOUNTER — OUTPATIENT (OUTPATIENT)
Dept: OUTPATIENT SERVICES | Facility: HOSPITAL | Age: 70
LOS: 1 days | End: 2020-08-03
Payer: COMMERCIAL

## 2020-08-03 DIAGNOSIS — Z00.8 ENCOUNTER FOR OTHER GENERAL EXAMINATION: ICD-10-CM

## 2020-08-03 PROCEDURE — 72050 X-RAY EXAM NECK SPINE 4/5VWS: CPT

## 2020-08-03 PROCEDURE — 72050 X-RAY EXAM NECK SPINE 4/5VWS: CPT | Mod: 26

## 2020-08-03 PROCEDURE — 73030 X-RAY EXAM OF SHOULDER: CPT

## 2020-08-03 PROCEDURE — 73030 X-RAY EXAM OF SHOULDER: CPT | Mod: 26,RT

## 2020-10-19 ENCOUNTER — APPOINTMENT (OUTPATIENT)
Dept: NEUROLOGY | Facility: CLINIC | Age: 70
End: 2020-10-19
Payer: COMMERCIAL

## 2020-10-19 DIAGNOSIS — G45.9 TRANSIENT CEREBRAL ISCHEMIC ATTACK, UNSPECIFIED: ICD-10-CM

## 2020-10-19 PROCEDURE — 99213 OFFICE O/P EST LOW 20 MIN: CPT | Mod: 95

## 2020-10-19 RX ORDER — ASPIRIN 81 MG/1
81 TABLET, CHEWABLE ORAL
Refills: 0 | Status: ACTIVE | COMMUNITY

## 2020-10-19 RX ORDER — SIMVASTATIN 20 MG/1
20 TABLET, FILM COATED ORAL
Refills: 0 | Status: ACTIVE | COMMUNITY

## 2020-10-19 NOTE — PHYSICAL EXAM
[FreeTextEntry1] : Neurologic examination was limited due to the video call.\par \par Mental status: Awake and alert fully oriented to person place and time. There is no aphasia.\par \par Cranial nerves:  Full extraocular movements. There is no nystagmus. Normal facial sensation and motor function. Tongue was in the midline.\par \par Motor: no pronator drift  bilaterally.\par \par Sensation: Light touch was intact \par \par Coordination: Deferred\par \par Gait: visualized, uses walker\par

## 2020-10-19 NOTE — HISTORY OF PRESENT ILLNESS
[Home] : at home, [unfilled] , at the time of the visit. [Medical Office: ([facility])___] : at the medical office located in  [Other:____] : [unfilled] [Verbal consent obtained from patient] : the patient, [unfilled] [FreeTextEntry1] : Hospital followup December 3, 2019:\par This is a 69-year-old man who is seen about 2 months ago with a TIA. Transient weakness which has since resolved. He had significant vascular issues. Currently in subacute rehabilitation. He is taking aspirin, Xarelto, blood pressure medicines and simvastatin. He is here today for neurologic followup.\par \par Followup April 20, 2020:\par This is a 69-year-old man of presents today for followup of TIA. He is being seen via video of this it secondary to the corona virus outbreak. Verbal consent was obtained at the time of call. He has been doing well since his last visit. His daughter states that he recently had blood work obtained and his cholesterol was well. He is not any neurologic symptoms. He understands what he needs to do to prevent stroke in the future. I did reeducate his family as to the signs of stroke giving him instructions of the BE FAST signs of stroke.\par \par Followup October 19, 2020:\par This is a 70-year-old man who presents today for neurologic followup of TIA. This is done via video visit during the corona virus outbreak. Verbal consent was obtained at the beginning of his visit. He's overall doing well without any new medical problems. He states that as far as he knows his blood pressure and cholesterol under control. He continues to take antihypertensives as well as statin medication. I asked about aspirin he states that he hasn't been taking it. He is otherwise doing well without problems. He is seen today by video visit for routine followup.

## 2020-10-19 NOTE — ASSESSMENT
[FreeTextEntry1] : This is a 70-year-old man with history of TIA. At this time I reeducated him as far as how to reduce the secondary stroke risk factors. This includes continue with antihypertensive to keep his blood pressure control, statin to keep his cholesterol under control. And for him to take a daily baby aspirin. I will see him back in the office in 6 months, sooner should the need arise.

## 2020-10-19 NOTE — CONSULT LETTER
[Dear  ___] : Dear  [unfilled], [Courtesy Letter:] : I had the pleasure of seeing your patient, [unfilled], in my office today. [Please see my note below.] : Please see my note below. [Consult Closing:] : Thank you very much for allowing me to participate in the care of this patient.  If you have any questions, please do not hesitate to contact me. [Sincerely,] : Sincerely, [FreeTextEntry3] : Yahir Joe M.D., Ph.D. DPN-N\par [facility]\par [facility] [place]\par Medical Director of Stroke Services\par [facility]\par

## 2020-10-29 ENCOUNTER — INPATIENT (INPATIENT)
Facility: HOSPITAL | Age: 70
LOS: 8 days | Discharge: HOME | DRG: 378 | End: 2020-11-07
Attending: INTERNAL MEDICINE | Admitting: INTERNAL MEDICINE
Payer: COMMERCIAL

## 2020-10-29 ENCOUNTER — TRANSCRIPTION ENCOUNTER (OUTPATIENT)
Age: 70
End: 2020-10-29

## 2020-10-29 VITALS
HEIGHT: 67 IN | OXYGEN SATURATION: 98 % | HEART RATE: 97 BPM | WEIGHT: 154.98 LBS | TEMPERATURE: 99 F | RESPIRATION RATE: 18 BRPM | DIASTOLIC BLOOD PRESSURE: 77 MMHG | SYSTOLIC BLOOD PRESSURE: 136 MMHG

## 2020-10-29 DIAGNOSIS — D64.9 ANEMIA, UNSPECIFIED: ICD-10-CM

## 2020-10-29 DIAGNOSIS — E78.5 HYPERLIPIDEMIA, UNSPECIFIED: ICD-10-CM

## 2020-10-29 DIAGNOSIS — N40.0 BENIGN PROSTATIC HYPERPLASIA WITHOUT LOWER URINARY TRACT SYMPTOMS: ICD-10-CM

## 2020-10-29 DIAGNOSIS — Z86.718 PERSONAL HISTORY OF OTHER VENOUS THROMBOSIS AND EMBOLISM: ICD-10-CM

## 2020-10-29 DIAGNOSIS — Z29.9 ENCOUNTER FOR PROPHYLACTIC MEASURES, UNSPECIFIED: ICD-10-CM

## 2020-10-29 DIAGNOSIS — K92.2 GASTROINTESTINAL HEMORRHAGE, UNSPECIFIED: ICD-10-CM

## 2020-10-29 DIAGNOSIS — G62.9 POLYNEUROPATHY, UNSPECIFIED: ICD-10-CM

## 2020-10-29 DIAGNOSIS — I10 ESSENTIAL (PRIMARY) HYPERTENSION: ICD-10-CM

## 2020-10-29 DIAGNOSIS — Z98.890 OTHER SPECIFIED POSTPROCEDURAL STATES: Chronic | ICD-10-CM

## 2020-10-29 DIAGNOSIS — Z86.73 PERSONAL HISTORY OF TRANSIENT ISCHEMIC ATTACK (TIA), AND CEREBRAL INFARCTION WITHOUT RESIDUAL DEFICITS: ICD-10-CM

## 2020-10-29 LAB
ALBUMIN SERPL ELPH-MCNC: 4.1 G/DL — SIGNIFICANT CHANGE UP (ref 3.3–5)
ALP SERPL-CCNC: 92 U/L — SIGNIFICANT CHANGE UP (ref 40–120)
ALT FLD-CCNC: 17 U/L — SIGNIFICANT CHANGE UP (ref 12–78)
ANION GAP SERPL CALC-SCNC: 6 MMOL/L — SIGNIFICANT CHANGE UP (ref 5–17)
APPEARANCE UR: CLEAR — SIGNIFICANT CHANGE UP
APTT BLD: 36.3 SEC — HIGH (ref 27.5–35.5)
AST SERPL-CCNC: 21 U/L — SIGNIFICANT CHANGE UP (ref 15–37)
BASOPHILS # BLD AUTO: 0.03 K/UL — SIGNIFICANT CHANGE UP (ref 0–0.2)
BASOPHILS NFR BLD AUTO: 0.3 % — SIGNIFICANT CHANGE UP (ref 0–2)
BILIRUB SERPL-MCNC: 0.7 MG/DL — SIGNIFICANT CHANGE UP (ref 0.2–1.2)
BILIRUB UR-MCNC: NEGATIVE — SIGNIFICANT CHANGE UP
BUN SERPL-MCNC: 22 MG/DL — SIGNIFICANT CHANGE UP (ref 7–23)
CALCIUM SERPL-MCNC: 9.1 MG/DL — SIGNIFICANT CHANGE UP (ref 8.5–10.1)
CHLORIDE SERPL-SCNC: 108 MMOL/L — SIGNIFICANT CHANGE UP (ref 96–108)
CK MB CFR SERPL CALC: 1.2 NG/ML — SIGNIFICANT CHANGE UP (ref 0–3.6)
CO2 SERPL-SCNC: 26 MMOL/L — SIGNIFICANT CHANGE UP (ref 22–31)
COLOR SPEC: SIGNIFICANT CHANGE UP
CREAT SERPL-MCNC: 1.3 MG/DL — SIGNIFICANT CHANGE UP (ref 0.5–1.3)
DIFF PNL FLD: NEGATIVE — SIGNIFICANT CHANGE UP
EOSINOPHIL # BLD AUTO: 0.01 K/UL — SIGNIFICANT CHANGE UP (ref 0–0.5)
EOSINOPHIL NFR BLD AUTO: 0.1 % — SIGNIFICANT CHANGE UP (ref 0–6)
GLUCOSE SERPL-MCNC: 122 MG/DL — HIGH (ref 70–99)
GLUCOSE UR QL: NEGATIVE — SIGNIFICANT CHANGE UP
HCT VFR BLD CALC: 26.7 % — LOW (ref 39–50)
HCT VFR BLD CALC: 28.5 % — LOW (ref 39–50)
HGB BLD-MCNC: 9.1 G/DL — LOW (ref 13–17)
HGB BLD-MCNC: 9.5 G/DL — LOW (ref 13–17)
IMM GRANULOCYTES NFR BLD AUTO: 0.9 % — SIGNIFICANT CHANGE UP (ref 0–1.5)
INR BLD: 1.24 RATIO — HIGH (ref 0.88–1.16)
KETONES UR-MCNC: NEGATIVE — SIGNIFICANT CHANGE UP
LEUKOCYTE ESTERASE UR-ACNC: NEGATIVE — SIGNIFICANT CHANGE UP
LYMPHOCYTES # BLD AUTO: 1.62 K/UL — SIGNIFICANT CHANGE UP (ref 1–3.3)
LYMPHOCYTES # BLD AUTO: 18.5 % — SIGNIFICANT CHANGE UP (ref 13–44)
MCHC RBC-ENTMCNC: 29 PG — SIGNIFICANT CHANGE UP (ref 27–34)
MCHC RBC-ENTMCNC: 33.3 GM/DL — SIGNIFICANT CHANGE UP (ref 32–36)
MCV RBC AUTO: 86.9 FL — SIGNIFICANT CHANGE UP (ref 80–100)
MONOCYTES # BLD AUTO: 0.46 K/UL — SIGNIFICANT CHANGE UP (ref 0–0.9)
MONOCYTES NFR BLD AUTO: 5.3 % — SIGNIFICANT CHANGE UP (ref 2–14)
NEUTROPHILS # BLD AUTO: 6.56 K/UL — SIGNIFICANT CHANGE UP (ref 1.8–7.4)
NEUTROPHILS NFR BLD AUTO: 74.9 % — SIGNIFICANT CHANGE UP (ref 43–77)
NITRITE UR-MCNC: NEGATIVE — SIGNIFICANT CHANGE UP
NRBC # BLD: 0 /100 WBCS — SIGNIFICANT CHANGE UP (ref 0–0)
OB PNL STL: POSITIVE
PH UR: 6 — SIGNIFICANT CHANGE UP (ref 5–8)
PLATELET # BLD AUTO: 301 K/UL — SIGNIFICANT CHANGE UP (ref 150–400)
POTASSIUM SERPL-MCNC: 4.2 MMOL/L — SIGNIFICANT CHANGE UP (ref 3.5–5.3)
POTASSIUM SERPL-SCNC: 4.2 MMOL/L — SIGNIFICANT CHANGE UP (ref 3.5–5.3)
PROT SERPL-MCNC: 8.2 G/DL — SIGNIFICANT CHANGE UP (ref 6–8.3)
PROT UR-MCNC: NEGATIVE — SIGNIFICANT CHANGE UP
PROTHROM AB SERPL-ACNC: 14.4 SEC — HIGH (ref 10.6–13.6)
RBC # BLD: 3.28 M/UL — LOW (ref 4.2–5.8)
RBC # FLD: 13.1 % — SIGNIFICANT CHANGE UP (ref 10.3–14.5)
SARS-COV-2 IGG SERPL QL IA: NEGATIVE — SIGNIFICANT CHANGE UP
SARS-COV-2 IGM SERPL IA-ACNC: 0.09 INDEX — SIGNIFICANT CHANGE UP
SARS-COV-2 RNA SPEC QL NAA+PROBE: SIGNIFICANT CHANGE UP
SODIUM SERPL-SCNC: 140 MMOL/L — SIGNIFICANT CHANGE UP (ref 135–145)
SP GR SPEC: 1.01 — SIGNIFICANT CHANGE UP (ref 1.01–1.02)
UROBILINOGEN FLD QL: NEGATIVE — SIGNIFICANT CHANGE UP
WBC # BLD: 8.76 K/UL — SIGNIFICANT CHANGE UP (ref 3.8–10.5)
WBC # FLD AUTO: 8.76 K/UL — SIGNIFICANT CHANGE UP (ref 3.8–10.5)

## 2020-10-29 PROCEDURE — 71045 X-RAY EXAM CHEST 1 VIEW: CPT | Mod: 26

## 2020-10-29 PROCEDURE — 99285 EMERGENCY DEPT VISIT HI MDM: CPT

## 2020-10-29 PROCEDURE — 99223 1ST HOSP IP/OBS HIGH 75: CPT

## 2020-10-29 PROCEDURE — 99223 1ST HOSP IP/OBS HIGH 75: CPT | Mod: GC

## 2020-10-29 PROCEDURE — 93010 ELECTROCARDIOGRAM REPORT: CPT

## 2020-10-29 RX ORDER — PANTOPRAZOLE SODIUM 20 MG/1
40 TABLET, DELAYED RELEASE ORAL ONCE
Refills: 0 | Status: COMPLETED | OUTPATIENT
Start: 2020-10-29 | End: 2020-10-29

## 2020-10-29 RX ORDER — GABAPENTIN 400 MG/1
300 CAPSULE ORAL
Refills: 0 | Status: DISCONTINUED | OUTPATIENT
Start: 2020-10-29 | End: 2020-11-07

## 2020-10-29 RX ORDER — LOSARTAN POTASSIUM 100 MG/1
100 TABLET, FILM COATED ORAL DAILY
Refills: 0 | Status: DISCONTINUED | OUTPATIENT
Start: 2020-10-29 | End: 2020-11-02

## 2020-10-29 RX ORDER — PANTOPRAZOLE SODIUM 20 MG/1
8 TABLET, DELAYED RELEASE ORAL
Qty: 80 | Refills: 0 | Status: DISCONTINUED | OUTPATIENT
Start: 2020-10-29 | End: 2020-10-30

## 2020-10-29 RX ORDER — FOLIC ACID 0.8 MG
1 TABLET ORAL DAILY
Refills: 0 | Status: DISCONTINUED | OUTPATIENT
Start: 2020-10-29 | End: 2020-11-07

## 2020-10-29 RX ORDER — SIMVASTATIN 20 MG/1
20 TABLET, FILM COATED ORAL AT BEDTIME
Refills: 0 | Status: DISCONTINUED | OUTPATIENT
Start: 2020-10-29 | End: 2020-11-03

## 2020-10-29 RX ORDER — TAMSULOSIN HYDROCHLORIDE 0.4 MG/1
0.4 CAPSULE ORAL AT BEDTIME
Refills: 0 | Status: DISCONTINUED | OUTPATIENT
Start: 2020-10-29 | End: 2020-11-07

## 2020-10-29 RX ORDER — FERROUS SULFATE 325(65) MG
325 TABLET ORAL DAILY
Refills: 0 | Status: DISCONTINUED | OUTPATIENT
Start: 2020-10-29 | End: 2020-11-06

## 2020-10-29 RX ORDER — INFLUENZA VIRUS VACCINE 15; 15; 15; 15 UG/.5ML; UG/.5ML; UG/.5ML; UG/.5ML
0.5 SUSPENSION INTRAMUSCULAR ONCE
Refills: 0 | Status: COMPLETED | OUTPATIENT
Start: 2020-10-29 | End: 2020-11-07

## 2020-10-29 RX ORDER — AMLODIPINE BESYLATE 2.5 MG/1
10 TABLET ORAL DAILY
Refills: 0 | Status: DISCONTINUED | OUTPATIENT
Start: 2020-10-29 | End: 2020-11-07

## 2020-10-29 RX ADMIN — PANTOPRAZOLE SODIUM 10 MG/HR: 20 TABLET, DELAYED RELEASE ORAL at 17:28

## 2020-10-29 RX ADMIN — SIMVASTATIN 20 MILLIGRAM(S): 20 TABLET, FILM COATED ORAL at 21:28

## 2020-10-29 RX ADMIN — GABAPENTIN 300 MILLIGRAM(S): 400 CAPSULE ORAL at 17:01

## 2020-10-29 RX ADMIN — PANTOPRAZOLE SODIUM 40 MILLIGRAM(S): 20 TABLET, DELAYED RELEASE ORAL at 13:54

## 2020-10-29 RX ADMIN — TAMSULOSIN HYDROCHLORIDE 0.4 MILLIGRAM(S): 0.4 CAPSULE ORAL at 21:28

## 2020-10-29 NOTE — H&P ADULT - PROBLEM SELECTOR PLAN 4
Chronic, stable  -Continue home simvastatin  -Monitor CMP Chronic, stable  -Patient takes amlodipine 10mg 2x daily, confirmed with pharmacy  -BP currently stable, continue amlodipine 10mg once daily and continue to monitor  -Continue home losartan Hx of TIA without residual weakness, on Xarelto  -Hold in setting of GIB Hx of TIA without residual weakness.   -Hold ASA in setting of GIB  -continue statin

## 2020-10-29 NOTE — CONSULT NOTE ADULT - ASSESSMENT
69 yo male PMH RLE DVT on Xarelto, HTN, HLD, BPH, GERD, neuropathy presents to ED with palpitations, fatigue, melena, admitted for GI bleed. Cardio consulted for cardiac clearance for EGD.     - plan for EGD tomorrow AM  - Hold A/C in setting of GIB  - No clear evidence of acute ischemia, trops negative x 1. EKG without acute ischemic changes.  - No evidence of volume overload. BNP negative.  - EKG shows NSR, LAD, no acute ischemic changes  - Echo 10/2/2019: 1. Left ventricular ejection fraction, by visual estimation, is 60 to 65%. 2. Normal global left ventricular systolic function. 3. Normal left ventricular internal cavity size. 4. Spectral Doppler shows impaired relaxation pattern of left ventricular myocardial filling (Grade I diastolic dysfunction). 5. There is no evidence of pericardial effusion.    - BP well controlled, continue home BP meds, monitor routine hemodynamics  - monitor and replete lytes, keep K>4, Mg>2  - Pt has no history of MI, active CAD, ADHF or severe valvular disease and in the setting of low to moderate risk procedure, patient is optimized from cardiovascular standpoint to proceed with planned procedure with routine hemodynamic monitoring.   - Other cardiovascular workup will depend on clinical course.  - All other workup per primary team  - Will follow 69 yo male PMH RLE DVT on Xarelto, HTN, HLD, BPH, GERD, neuropathy presents to ED with palpitations, fatigue, melena, admitted for GI bleed. Cardio consulted for cardiac clearance for EGD.     - plan for EGD tomorrow AM  - Pt takes xarelto for hx of RLE DVT. Hold A/C in setting of GIB. Would consider LE doppler U/S to evaluate resolution of DVT and need for further long term anticoagulation.  - No clear evidence of acute ischemia, trops negative x 1. EKG without acute ischemic changes.  - No evidence of volume overload. BNP negative.  - EKG shows NSR, LAD, no acute ischemic changes  - Echo 10/2/2019: 1. Left ventricular ejection fraction, by visual estimation, is 60 to 65%. 2. Normal global left ventricular systolic function. 3. Normal left ventricular internal cavity size. 4. Spectral Doppler shows impaired relaxation pattern of left ventricular myocardial filling (Grade I diastolic dysfunction). 5. There is no evidence of pericardial effusion.    - BP well controlled, continue home BP meds, monitor routine hemodynamics  - monitor and replete lytes, keep K>4, Mg>2  - Pt has no history of MI, active CAD, ADHF or severe valvular disease and in the setting of low to moderate risk procedure, patient is optimized from cardiovascular standpoint to proceed with planned procedure with routine hemodynamic monitoring.   - Other cardiovascular workup will depend on clinical course.  - All other workup per primary team  - Will follow 71 yo male PMH RLE DVT on Xarelto, HTN, HLD, BPH, GERD, neuropathy presents to ED with palpitations, fatigue, melena, admitted for GI bleed. Cardio consulted for cardiac clearance for EGD.     - Pt takes xarelto for hx of RLE DVT. Hold A/C in setting of GIB. Would consider LE doppler U/S to evaluate resolution of DVT and need for further long term anticoagulation.  - No clear evidence of acute ischemia, trops negative x 1. EKG without acute ischemic changes.  - No evidence of volume overload. BNP negative.  - EKG shows NSR, LAD, no acute ischemic changes  - Echo 10/2/2019: 1. Left ventricular ejection fraction, by visual estimation, is 60 to 65%. 2. Normal global left ventricular systolic function. 3. Normal left ventricular internal cavity size. 4. Spectral Doppler shows impaired relaxation pattern of left ventricular myocardial filling (Grade I diastolic dysfunction).    - BP well controlled, continue home BP meds, monitor routine hemodynamics  - monitor and replete lytes, keep K>4, Mg>2  - Pt has no history of MI, active CAD, ADHF or severe valvular disease and in the setting of low to moderate risk procedure, patient is optimized from cardiovascular standpoint to proceed with planned procedure with routine hemodynamic monitoring.   - Other cardiovascular workup will depend on clinical course.  - All other workup per primary team  - Will follow

## 2020-10-29 NOTE — H&P ADULT - PROBLEM SELECTOR PLAN 8
SCDs in setting of GIB  IMPROVE VTE Individual Risk Assessment       RISK                                                          Points  [  ] Previous VTE                                                3  [  ] Thrombophilia                                             2  [  ] Lower limb paralysis                                   2        (unable to hold up >15 seconds)    [  ] Current Cancer                                             2         (within 6 months)  [  ] Immobilization > 24 hrs                              1  [  ] ICU/CCU stay > 24 hours                             1  [ 1 ] Age > 60                                                         1    IMPROVE VTE Score:         [       1  ] Chronic, stable  -continue home gabapentin

## 2020-10-29 NOTE — H&P ADULT - PROBLEM SELECTOR PLAN 3
Chronic, stable  -Patient takes amlodipine 10mg 2x daily, confirmed with pharmacy  -BP currently stable, continue amlodipine 10mg once daily and continue to monitor  -Continue home losartan Hx of RLE DVT, on Xarelto  -Hold in setting of GIB  -SCDs for PPx Hx of RLE DVT, on Xarelto    -SCDs for PPx

## 2020-10-29 NOTE — H&P ADULT - NSHPSOCIALHISTORY_GEN_ALL_CORE
Tobacco: none  EtOH:  none  Recreational drug use: none  Lives with: at home by himself  Ambulates: walker  ADLs: independent Tobacco: none  EtOH:  none  Recreational drug use: none  Lives with: at home by himself  Ambulates: walker  ADLs: independent    Family hx: unable to obtain from patient

## 2020-10-29 NOTE — H&P ADULT - PROBLEM SELECTOR PLAN 5
Chronic, stable  -Continue home Tamsulosin Chronic, stable  -Continue home simvastatin  -Monitor CMP Chronic, stable  -Patient takes amlodipine 10mg 2x daily, confirmed with pharmacy  -BP currently stable, continue amlodipine 10mg once daily and continue to monitor  -Continue home losartan

## 2020-10-29 NOTE — ED PROVIDER NOTE - OBJECTIVE STATEMENT
70 male sent to ER by ambulance from PMD office with report of dark stools, shortness of breath with exertion, on xarelto. Patient states he has been having dark stools for the past 5 days, feeling generalized weakness, shortness of breath and palpitations with walking/exertion.

## 2020-10-29 NOTE — ED PROVIDER NOTE - NS ED MD DISPO ADMITTING SERVICE
Continue to MONITOR CLOSELY to determine the need for TREATMENT and INCREASE/DECREASE in length of time till next follow up visit. MED

## 2020-10-29 NOTE — H&P ADULT - HISTORY OF PRESENT ILLNESS
69 yo male PMH HTN, HLD, BPH, GERD, neuropathy presents to ED with palpitations, fatigue, melena. Onset 5 days ago. Patient states he noticed black stools 5 days ago but denies any bright red blood in his stools. Patient also c/o tinnitus and nausea. Denies abdominal pain, HA, vomiting, diarrhea, CP, SOB. Patient states he went to his PMD today and was told to come to ED to be evaluated for his symptoms. Patient states he started taking aspirin a few days ago  because he was told it is good for his heart. Patient on Xarelto for hx of leg surgery in 2019 s/p accident? Patient cannot recall if it was due to a clot/DVT. Denies having an arrhythmia.  Patient is an extremely poor historian, does not remember his PMD name although he was there today.    In ED:   Vitals: T 98.7, HR 97, /77, RR 18, SpO2 98%  Labs significant for: +FOBT, RBC 3.28, Hgb 9.5, Hct 28.5, PT 14.4, INR 1.24, PTT 36.3, glucose 122  CXR: unremarkable  EKG: normal sinus rhythm, rate 98, left axis deviation  Given: Protonix 40mg IVP x1 71 yo male PMH RLE DVT on Xarelto, HTN, HLD, BPH, GERD, neuropathy presents to ED with palpitations, fatigue, melena. Onset 5 days ago. Patient states he noticed black stools 5 days ago but denies any bright red blood in his stools. Patient also c/o tinnitus and nausea. Denies abdominal pain, HA, vomiting, diarrhea, CP, SOB. Patient states he went to his PMD today and was told to come to ED to be evaluated for his symptoms. Patient states he started taking aspirin a few days ago  because he was told it is good for his heart.  Patient is an extremely poor historian, does not remember his PMD name although he was there today.    In ED:   Vitals: T 98.7, HR 97, /77, RR 18, SpO2 98%  Labs significant for: +FOBT, RBC 3.28, Hgb 9.5, Hct 28.5, PT 14.4, INR 1.24, PTT 36.3, glucose 122  CXR: unremarkable  EKG: normal sinus rhythm, rate 98, left axis deviation  Given: Protonix 40mg IVP x1 71 yo male PMH RLE DVT on Xarelto, TIA, HTN, HLD, BPH, GERD, neuropathy presents to ED with palpitations, fatigue, melena. Onset 5 days ago. Patient states he noticed black stools 5 days ago but denies any bright red blood in his stools. Patient also c/o tinnitus and nausea. Denies abdominal pain, HA, vomiting, diarrhea, CP, SOB. Patient states he went to his PMD today and was told to come to ED to be evaluated for his symptoms. Patient states he started taking aspirin a few days ago  because he was told it is good for his heart.  Patient is an extremely poor historian, does not remember his PMD name although he was there today.    In ED:   Vitals: T 98.7, HR 97, /77, RR 18, SpO2 98%  Labs significant for: +FOBT, RBC 3.28, Hgb 9.5, Hct 28.5, PT 14.4, INR 1.24, PTT 36.3, glucose 122  CXR: unremarkable  EKG: normal sinus rhythm, rate 98, left axis deviation  Given: Protonix 40mg IVP x1 69 yo male PMH RLE DVT on Xarelto, TIA, HTN, HLD, SERENITY with 1 episode of dialysis, anemia of chronic disease, DM, BPH, GERD, neuropathy,  presents to ED with palpitations, fatigue, melena. Onset 5 days ago. Patient states he noticed black stools 5 days ago but denies any bright red blood in his stools. Patient also c/o tinnitus and nausea. Denies abdominal pain, HA, vomiting, diarrhea, CP, SOB. Patient states he went to his PMD today and was told to come to ED to be evaluated for his symptoms. Patient states he started taking aspirin a few days ago  because he was told it is good for his heart.  Patient is an extremely poor historian, does not remember his PMD name although he was there today.    In ED:   Vitals: T 98.7, HR 97, /77, RR 18, SpO2 98%  Labs significant for: +FOBT, RBC 3.28, Hgb 9.5, Hct 28.5, PT 14.4, INR 1.24, PTT 36.3, glucose 122  CXR: unremarkable  EKG: normal sinus rhythm, rate 98, left axis deviation  Given: Protonix 40mg IVP x1 69 yo male PMH RLE DVT on Xarelto, TIA, HTN, HLD, SERENITY with 1 episode of dialysis in the past, anemia of chronic disease, DM, BPH, GERD, neuropathy,  presents to ED with palpitations, fatigue, melena. Onset 5 days ago. Patient states he noticed black stools 5 days ago but denies any bright red blood in his stools. Patient also c/o tinnitus and nausea. Denies abdominal pain, HA, vomiting, diarrhea, CP, SOB. Patient states he went to his PMD today and was told to come to ED to be evaluated for his symptoms. Patient states he started taking aspirin a few days ago  because he was told it is good for his heart. Does not take any other NSAID's as per patient.   Patient is an extremely poor historian, does not remember his PMD name although he was there today.  Previous records available from [facility].     In ED:   Vitals: T 98.7, HR 97, /77, RR 18, SpO2 98%  Labs significant for: +FOBT, RBC 3.28, Hgb 9.5, Hct 28.5, PT 14.4, INR 1.24, PTT 36.3, glucose 122  CXR: unremarkable  EKG: normal sinus rhythm, rate 98, left axis deviation  Given: Protonix 40mg IVP x1

## 2020-10-29 NOTE — ED ADULT NURSE NOTE - OBJECTIVE STATEMENT
Received pt in bed alert and oriented x4.  C/O black tarry stools x 4-5 days.  Pt also states he has some SOB and heart palpitations.  Pt denies chest, n/v/d.  Pt on monitor. Ongoing nursing care and safety maintained.

## 2020-10-29 NOTE — CONSULT NOTE ADULT - SUBJECTIVE AND OBJECTIVE BOX
[facility]         Sandie Valentine, Laurie, Mina, Lilo, Bruce, Tanya        [phone] (office)    Reason for Consult:    Interval HPI: Patient seen and examined at bedside. No acute events overnight.     HPI:  69 yo male PMH HTN, HLD, BPH, GERD, neuropathy presents to ED with palpitations, fatigue, melena. Onset 5 days ago. Patient states he noticed black stools 5 days ago but denies any bright red blood in his stools. Patient also c/o tinnitus and nausea. Denies abdominal pain, HA, vomiting, diarrhea, CP, SOB. Patient states he went to his PMD today and was told to come to ED to be evaluated for his symptoms. Patient states he started taking aspirin a few days ago  because he was told it is good for his heart. Patient on Xarelto for hx of leg surgery in 2019 s/p accident? Patient cannot recall if it was due to a clot/DVT. Denies having an arrhythmia.  Patient is an extremely poor historian, does not remember his PMD name although he was there today.    In ED:   Vitals: T 98.7, HR 97, /77, RR 18, SpO2 98%  Labs significant for: +FOBT, RBC 3.28, Hgb 9.5, Hct 28.5, PT 14.4, INR 1.24, PTT 36.3, glucose 122  CXR: unremarkable  EKG: normal sinus rhythm, rate 98, left axis deviation  Given: Protonix 40mg IVP x1 (29 Oct 2020 15:34)        Interval History:     Cardiac Summary:   Echo 10/2/2019: 1. Left ventricular ejection fraction, by visual estimation, is 60 to 65%. 2. Normal global left ventricular systolic function. 3. Normal left ventricular internal cavity size. 4. Spectral Doppler shows impaired relaxation pattern of left ventricular myocardial filling (Grade I diastolic dysfunction). 5. There is no evidence of pericardial effusion.      PAST MEDICAL & SURGICAL HISTORY:  Neuropathy    BPH (benign prostatic hyperplasia)    HLD (hyperlipidemia)    HTN (hypertension)    No significant past surgical history        SOCIAL HISTORY: No active tobacco, alcohol or illicit drug use    FAMILY HISTORY:  No pertinent family history in first degree relatives        Home Medications:  amLODIPine 10 mg oral tablet: 1 tab(s) orally 2 times a day (29 Oct 2020 15:46)  aspirin 81 mg oral tablet: 1 tab(s) orally once a day (29 Oct 2020 15:46)  ferrous sulfate 325 mg (65 mg elemental iron) oral tablet: orally once a day (29 Oct 2020 15:46)  folic acid:  (29 Oct 2020 15:46)  gabapentin 300 mg oral capsule: orally 2 times a day (29 Oct 2020 15:46)  losartan 100 mg oral tablet: 1 tab(s) orally once a day (29 Oct 2020 15:46)  pantoprazole 40 mg oral delayed release tablet: 1 tab(s) orally once a day (29 Oct 2020 15:46)  simvastatin 20 mg oral tablet: 1 tab(s) orally once a day (at bedtime) (29 Oct 2020 15:46)  tamsulosin 0.4 mg oral capsule: 1 cap(s) orally once a day (29 Oct 2020 15:46)  Xarelto 20 mg oral tablet: 1 tab(s) orally once a day (in the evening) (29 Oct 2020 15:46)      MEDICATIONS  (STANDING):  amLODIPine   Tablet 10 milliGRAM(s) Oral daily  ferrous    sulfate 325 milliGRAM(s) Oral daily  folic acid 1 milliGRAM(s) Oral daily  gabapentin 300 milliGRAM(s) Oral two times a day  losartan 100 milliGRAM(s) Oral daily  simvastatin 20 milliGRAM(s) Oral at bedtime  tamsulosin 0.4 milliGRAM(s) Oral at bedtime    MEDICATIONS  (PRN):      Allergies    Allergy Status Unknown    Intolerances        REVIEW OF SYSTEMS: Negative except as per HPI.    VITAL SIGNS:   Vital Signs Last 24 Hrs  T(C): 37.1 (29 Oct 2020 12:38), Max: 37.1 (29 Oct 2020 12:38)  T(F): 98.7 (29 Oct 2020 12:38), Max: 98.7 (29 Oct 2020 12:38)  HR: 97 (29 Oct 2020 12:38) (97 - 97)  BP: 136/77 (29 Oct 2020 12:38) (136/77 - 136/77)  BP(mean): --  RR: 18 (29 Oct 2020 12:38) (18 - 18)  SpO2: 98% (29 Oct 2020 12:38) (98% - 98%)    I&O's Summary      PHYSICAL EXAM:  Constitutional: NAD, well-developed  HEENT NC/AT, moist mucous membranes  Pulmonary: Non-labored, breath sounds are clear bilaterally, no wheezing, rales or rhonchi  Cardiovascular: +S1, S2, RRR, no murmur  Gastrointestinal: Soft, nontender, nondistended, normoactive bowel sounds  Extremities: No peripheral edema   Neurological: Alert, strength and sensitivity are grossly intact  Skin: No obvious lesions/rashes  Psych: Mood & affect appropriate    LABS: All Labs Reviewed:                        9.5    8.76  )-----------( 301      ( 29 Oct 2020 13:45 )             28.5     29 Oct 2020 13:45    140    |  108    |  22     ----------------------------<  122    4.2     |  26     |  1.30     Ca    9.1        29 Oct 2020 13:45  Mg     2.4       29 Oct 2020 13:45    TPro  8.2    /  Alb  4.1    /  TBili  0.7    /  DBili  x      /  AST  21     /  ALT  17     /  AlkPhos  92     29 Oct 2020 13:45    PT/INR - ( 29 Oct 2020 15:08 )   PT: 14.4 sec;   INR: 1.24 ratio         PTT - ( 29 Oct 2020 15:08 )  PTT:36.3 sec  CARDIAC MARKERS ( 29 Oct 2020 13:45 )  <.015 ng/mL / x     / x     / x     / 1.2 ng/mL      Blood Culture:   10-29 @ 13:45  Pro Bnp 8        EKG:    RADIOLOGY:    CXR:   [facility]         Sandie Valentine, Laurie, Mina, Lilo, Bruce, Tanya        [phone] (office)    Reason for Consult:    Interval HPI: Patient seen and examined at bedside. No acute events overnight.     HPI:  69 yo male PMH RLE DVT on Xarelto, TIA, HTN, HLD, BPH, GERD, neuropathy presents to ED with palpitations, fatigue, melena. Onset 5 days ago. Patient states he noticed black stools 5 days ago but denies any bright red blood in his stools. Patient also c/o tinnitus and nausea. Denies abdominal pain, HA, vomiting, diarrhea, CP, SOB. Patient states he went to his PMD today and was told to come to ED to be evaluated for his symptoms. Patient states he started taking aspirin a few days ago  because he was told it is good for his heart.  Patient is an extremely poor historian, does not remember his PMD name although he was there today.    In ED:   Vitals: T 98.7, HR 97, /77, RR 18, SpO2 98%  Labs significant for: +FOBT, RBC 3.28, Hgb 9.5, Hct 28.5, PT 14.4, INR 1.24, PTT 36.3, glucose 122  CXR: unremarkable  EKG: normal sinus rhythm, rate 98, left axis deviation  Given: Protonix 40mg IVP x1 (29 Oct 2020 15:34)        Interval History:  Patient seen and examined at bedside. Patient w/ hx of 4-5 days of black stool, palpitations, tinnitus. Patient had an accident involving [employer] last year in which he sustained injuries to right leg requiring surgery. He is not very active since accident. He ambulates with walker and wheelchair. Denies any dyspnea on exertion, CP, leg swelling, PND, orthopnea.     Cardiac Summary:   Echo 10/2/2019: 1. Left ventricular ejection fraction, by visual estimation, is 60 to 65%. 2. Normal global left ventricular systolic function. 3. Normal left ventricular internal cavity size. 4. Spectral Doppler shows impaired relaxation pattern of left ventricular myocardial filling (Grade I diastolic dysfunction). 5. There is no evidence of pericardial effusion.      PAST MEDICAL & SURGICAL HISTORY:  Neuropathy    BPH (benign prostatic hyperplasia)    HLD (hyperlipidemia)    HTN (hypertension)    No significant past surgical history        SOCIAL HISTORY: No active tobacco, alcohol or illicit drug use    FAMILY HISTORY:  No pertinent family history in first degree relatives        Home Medications:  amLODIPine 10 mg oral tablet: 1 tab(s) orally 2 times a day (29 Oct 2020 15:46)  aspirin 81 mg oral tablet: 1 tab(s) orally once a day (29 Oct 2020 15:46)  ferrous sulfate 325 mg (65 mg elemental iron) oral tablet: orally once a day (29 Oct 2020 15:46)  folic acid:  (29 Oct 2020 15:46)  gabapentin 300 mg oral capsule: orally 2 times a day (29 Oct 2020 15:46)  losartan 100 mg oral tablet: 1 tab(s) orally once a day (29 Oct 2020 15:46)  pantoprazole 40 mg oral delayed release tablet: 1 tab(s) orally once a day (29 Oct 2020 15:46)  simvastatin 20 mg oral tablet: 1 tab(s) orally once a day (at bedtime) (29 Oct 2020 15:46)  tamsulosin 0.4 mg oral capsule: 1 cap(s) orally once a day (29 Oct 2020 15:46)  Xarelto 20 mg oral tablet: 1 tab(s) orally once a day (in the evening) (29 Oct 2020 15:46)      MEDICATIONS  (STANDING):  amLODIPine   Tablet 10 milliGRAM(s) Oral daily  ferrous    sulfate 325 milliGRAM(s) Oral daily  folic acid 1 milliGRAM(s) Oral daily  gabapentin 300 milliGRAM(s) Oral two times a day  losartan 100 milliGRAM(s) Oral daily  simvastatin 20 milliGRAM(s) Oral at bedtime  tamsulosin 0.4 milliGRAM(s) Oral at bedtime    MEDICATIONS  (PRN):      Allergies    Allergy Status Unknown    Intolerances        REVIEW OF SYSTEMS: Negative except as per HPI.    VITAL SIGNS:   Vital Signs Last 24 Hrs  T(C): 37.1 (29 Oct 2020 12:38), Max: 37.1 (29 Oct 2020 12:38)  T(F): 98.7 (29 Oct 2020 12:38), Max: 98.7 (29 Oct 2020 12:38)  HR: 97 (29 Oct 2020 12:38) (97 - 97)  BP: 136/77 (29 Oct 2020 12:38) (136/77 - 136/77)  BP(mean): --  RR: 18 (29 Oct 2020 12:38) (18 - 18)  SpO2: 98% (29 Oct 2020 12:38) (98% - 98%)    I&O's Summary      PHYSICAL EXAM:  Constitutional: NAD, well-developed  HEENT NC/AT, moist mucous membranes  Pulmonary: Non-labored, breath sounds are clear bilaterally, no wheezing, rales or rhonchi  Cardiovascular: +S1, S2, RRR, no murmur  Gastrointestinal: Soft, nontender, nondistended, normoactive bowel sounds  Extremities: No peripheral edema   Neurological: Alert, strength and sensitivity are grossly intact  Skin: No obvious lesions/rashes  Psych: Mood & affect appropriate    LABS: All Labs Reviewed:                        9.5    8.76  )-----------( 301      ( 29 Oct 2020 13:45 )             28.5     29 Oct 2020 13:45    140    |  108    |  22     ----------------------------<  122    4.2     |  26     |  1.30     Ca    9.1        29 Oct 2020 13:45  Mg     2.4       29 Oct 2020 13:45    TPro  8.2    /  Alb  4.1    /  TBili  0.7    /  DBili  x      /  AST  21     /  ALT  17     /  AlkPhos  92     29 Oct 2020 13:45    PT/INR - ( 29 Oct 2020 15:08 )   PT: 14.4 sec;   INR: 1.24 ratio         PTT - ( 29 Oct 2020 15:08 )  PTT:36.3 sec  CARDIAC MARKERS ( 29 Oct 2020 13:45 )  <.015 ng/mL / x     / x     / x     / 1.2 ng/mL      Blood Culture:   10-29 @ 13:45  Pro Bnp 8        EKG: NSR, LAD    RADIOLOGY:    < from: Xray Chest 1 View- PORTABLE-Urgent (Xray Chest 1 View- PORTABLE-Urgent .) (10.29.20 @ 15:02) >    EXAM:  XR CHEST PORTABLE URGENT 1V                            PROCEDURE DATE:  10/29/2020          INTERPRETATION:  AP semierect chest on October 29, 2020 at 2:53 PM. Patient has black stools.    COMPARISON: None available.    Heart size is within normal limits.    The lung fields and pleural surfaces are unremarkable.    IMPRESSION: Negative chest.            DOMINGUEZ PARKS M.D., ATTENDING RADIOLOGIST  This document has been electronically signed. Oct 29 2020  3:05PM    < end of copied text >     [facility]         Sandie Valentine, Laurie, Mina, Lilo, Bruce, Tanya        [phone] (office)    Reason for Consult:      HPI:  69 yo male PMH RLE DVT on Xarelto, TIA, HTN, HLD, BPH, GERD, neuropathy presents to ED with palpitations, fatigue, melena. Onset 5 days ago. Patient states he noticed black stools 5 days ago but denies any bright red blood in his stools. Patient also c/o tinnitus and nausea. Denies abdominal pain, HA, vomiting, diarrhea, CP, SOB. Patient states he went to his PMD today and was told to come to ED to be evaluated for his symptoms. Patient states he started taking aspirin a few days ago  because he was told it is good for his heart.  Patient is an extremely poor historian, does not remember his PMD name although he was there today.    In ED:   Vitals: T 98.7, HR 97, /77, RR 18, SpO2 98%  Labs significant for: +FOBT, RBC 3.28, Hgb 9.5, Hct 28.5, PT 14.4, INR 1.24, PTT 36.3, glucose 122  CXR: unremarkable  EKG: normal sinus rhythm, rate 98, left axis deviation  Given: Protonix 40mg IVP x1 (29 Oct 2020 15:34)        Interval History:  Patient seen and examined at bedside. Patient w/ hx of 4-5 days of black stool, palpitations, tinnitus. 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 He is not very active since accident. He ambulates with walker and wheelchair. Denies any dyspnea on exertion, CP, leg swelling, PND, orthopnea.     Cardiac Summary:   Echo 10/2/2019: 1. Left ventricular ejection fraction, by visual estimation, is 60 to 65%. 2. Normal global left ventricular systolic function. 3. Normal left ventricular internal cavity size. 4. Spectral Doppler shows impaired relaxation pattern of left ventricular myocardial filling (Grade I diastolic dysfunction). 5. There is no evidence of pericardial effusion.      PAST MEDICAL & SURGICAL HISTORY:  Neuropathy    BPH (benign prostatic hyperplasia)    HLD (hyperlipidemia)    HTN (hypertension)    No significant past surgical history        SOCIAL HISTORY: nonsmoker, denies alcohol/illicit drug use. Lives at home with wife and son. Ambulates with walker/wheelchair    FAMILY HISTORY:  denies family history of stroke, DM, MI        Home Medications:  amLODIPine 10 mg oral tablet: 1 tab(s) orally 2 times a day (29 Oct 2020 15:46)  aspirin 81 mg oral tablet: 1 tab(s) orally once a day (29 Oct 2020 15:46)  ferrous sulfate 325 mg (65 mg elemental iron) oral tablet: orally once a day (29 Oct 2020 15:46)  folic acid:  (29 Oct 2020 15:46)  gabapentin 300 mg oral capsule: orally 2 times a day (29 Oct 2020 15:46)  losartan 100 mg oral tablet: 1 tab(s) orally once a day (29 Oct 2020 15:46)  pantoprazole 40 mg oral delayed release tablet: 1 tab(s) orally once a day (29 Oct 2020 15:46)  simvastatin 20 mg oral tablet: 1 tab(s) orally once a day (at bedtime) (29 Oct 2020 15:46)  tamsulosin 0.4 mg oral capsule: 1 cap(s) orally once a day (29 Oct 2020 15:46)  Xarelto 20 mg oral tablet: 1 tab(s) orally once a day (in the evening) (29 Oct 2020 15:46)      MEDICATIONS  (STANDING):  amLODIPine   Tablet 10 milliGRAM(s) Oral daily  ferrous    sulfate 325 milliGRAM(s) Oral daily  folic acid 1 milliGRAM(s) Oral daily  gabapentin 300 milliGRAM(s) Oral two times a day  losartan 100 milliGRAM(s) Oral daily  simvastatin 20 milliGRAM(s) Oral at bedtime  tamsulosin 0.4 milliGRAM(s) Oral at bedtime    MEDICATIONS  (PRN):      Allergies    Allergy Status Unknown    Intolerances        REVIEW OF SYSTEMS: Negative except as per HPI.    VITAL SIGNS:   Vital Signs Last 24 Hrs  T(C): 37.1 (29 Oct 2020 12:38), Max: 37.1 (29 Oct 2020 12:38)  T(F): 98.7 (29 Oct 2020 12:38), Max: 98.7 (29 Oct 2020 12:38)  HR: 97 (29 Oct 2020 12:38) (97 - 97)  BP: 136/77 (29 Oct 2020 12:38) (136/77 - 136/77)  BP(mean): --  RR: 18 (29 Oct 2020 12:38) (18 - 18)  SpO2: 98% (29 Oct 2020 12:38) (98% - 98%)    I&O's Summary      PHYSICAL EXAM:  Constitutional: NAD, well-developed  HEENT NC/AT, moist mucous membranes  Pulmonary: Non-labored, breath sounds are clear bilaterally, no wheezing, rales or rhonchi  Cardiovascular: +S1, S2, RRR, no murmur  Gastrointestinal: Soft, nontender, nondistended, normoactive bowel sounds  Extremities: No peripheral edema   Neurological: Alert, strength and sensitivity are grossly intact  Skin: No obvious lesions/rashes  Psych: Mood & affect appropriate    LABS: All Labs Reviewed:                        9.5    8.76  )-----------( 301      ( 29 Oct 2020 13:45 )             28.5     29 Oct 2020 13:45    140    |  108    |  22     ----------------------------<  122    4.2     |  26     |  1.30     Ca    9.1        29 Oct 2020 13:45  Mg     2.4       29 Oct 2020 13:45    TPro  8.2    /  Alb  4.1    /  TBili  0.7    /  DBili  x      /  AST  21     /  ALT  17     /  AlkPhos  92     29 Oct 2020 13:45    PT/INR - ( 29 Oct 2020 15:08 )   PT: 14.4 sec;   INR: 1.24 ratio         PTT - ( 29 Oct 2020 15:08 )  PTT:36.3 sec  CARDIAC MARKERS ( 29 Oct 2020 13:45 )  <.015 ng/mL / x     / x     / x     / 1.2 ng/mL      Blood Culture:   10-29 @ 13:45  Pro Bnp 8        EKG: NSR, LAD    RADIOLOGY:    < from: Xray Chest 1 View- PORTABLE-Urgent (Xray Chest 1 View- PORTABLE-Urgent .) (10.29.20 @ 15:02) >    EXAM:  XR CHEST PORTABLE URGENT 1V                            PROCEDURE DATE:  10/29/2020          INTERPRETATION:  AP semierect chest on October 29, 2020 at 2:53 PM. Patient has black stools.    COMPARISON: None available.    Heart size is within normal limits.    The lung fields and pleural surfaces are unremarkable.    IMPRESSION: Negative chest.            DOMINGUEZ PARKS M.D., ATTENDING RADIOLOGIST  This document has been electronically signed. Oct 29 2020  3:05PM    < end of copied text >

## 2020-10-29 NOTE — H&P ADULT - NSICDXPASTMEDICALHX_GEN_ALL_CORE_FT
PAST MEDICAL HISTORY:  BPH (benign prostatic hyperplasia)     HLD (hyperlipidemia)     HTN (hypertension)     Neuropathy      PAST MEDICAL HISTORY:  BPH (benign prostatic hyperplasia)     Deep vein thrombosis (DVT)     HLD (hyperlipidemia)     HTN (hypertension)     Neuropathy

## 2020-10-29 NOTE — H&P ADULT - NSHPREVIEWOFSYSTEMS_GEN_ALL_CORE
REVIEW OF SYSTEMS:    CONSTITUTIONAL: +weakness/fatigue, no fevers or chills  HEENT:  +tinnitus, No headache, no visual changes  RESPIRATORY: No wheezing, shortness of breath  CARDIOVASCULAR: + palpitations, no chest pain   GASTROINTESTINAL: +nausea +melena, no abdominal pain, vomiting, or hematemesis; No diarrhea   NEUROLOGICAL: No focal weakness or dizziness   MSK: No myalgias  SKIN: +chronic L lower leg scar, No rashes

## 2020-10-29 NOTE — H&P ADULT - PROBLEM SELECTOR PLAN 9
SCDs in setting of GIB  IMPROVE VTE Individual Risk Assessment       RISK                                                          Points  [  ] Previous VTE                                                3  [  ] Thrombophilia                                             2  [  ] Lower limb paralysis                                   2        (unable to hold up >15 seconds)    [  ] Current Cancer                                             2         (within 6 months)  [  ] Immobilization > 24 hrs                              1  [  ] ICU/CCU stay > 24 hours                             1  [ 1 ] Age > 60                                                         1    IMPROVE VTE Score:         [       1  ]

## 2020-10-29 NOTE — H&P ADULT - NSICDXPASTSURGICALHX_GEN_ALL_CORE_FT
No significant past surgical history PAST SURGICAL HISTORY:  No significant past surgical history      PAST SURGICAL HISTORY:  History of femoropopliteal bypass

## 2020-10-29 NOTE — H&P ADULT - PROBLEM SELECTOR PLAN 6
Chronic, stable  -continue home gabapentin Chronic, stable  -Continue home Tamsulosin Chronic, stable  -Continue home simvastatin  -Monitor CMP

## 2020-10-29 NOTE — H&P ADULT - PROBLEM SELECTOR PLAN 2
-H/H 9.5/28.5 in ED, +FOBT, 2/2 GIB  -Continue home folic acid, iron supplementation  -monitor CBC, transfuse Hgb<8 -H/H 9.5/28.5 in ED, +FOBT, 2/2 GIB  -no signs of profuse active bleeding, H/H stable, f/u CBC in AM, transfuse Hgb<8  -Continue home folic acid, iron supplementation -H/H 9.5/28.5 in ED, +FOBT, 2/2 GIB  -baseline appears to be 7-9 per outpatient chart review  -f/u H/H 8pm, transfuse Hgb<8  -Continue home folic acid, iron supplementation -H/H 9.5/28.5 in ED, +FOBT, 2/2 GIB  -baseline appears to be 7-9 per outpatient chart review  -f/u H/H 8pm, transfuse Hgb<7  -Continue home folic acid, iron supplementation

## 2020-10-29 NOTE — CONSULT NOTE ADULT - ATTENDING COMMENTS
Seen/examined. agree with above.  on xarelto for dvt in past  ekg unremarkable and no worrisome symptoms  hold xarelto  no cardiac contraindication to proceeding with egd

## 2020-10-29 NOTE — H&P ADULT - PROBLEM SELECTOR PLAN 7
Hold in setting of GIB  IMPROVE VTE Individual Risk Assessment       RISK                                                          Points  [  ] Previous VTE                                                3  [  ] Thrombophilia                                             2  [  ] Lower limb paralysis                                   2        (unable to hold up >15 seconds)    [  ] Current Cancer                                             2         (within 6 months)  [  ] Immobilization > 24 hrs                              1  [  ] ICU/CCU stay > 24 hours                             1  [ 1 ] Age > 60                                                         1    IMPROVE VTE Score:         [       1  ] Chronic, stable  -continue home gabapentin Chronic, stable  -Continue home Tamsulosin

## 2020-10-29 NOTE — CONSULT NOTE ADULT - SUBJECTIVE AND OBJECTIVE BOX
Chief Complaint:  Patient is a 70y old  Male who presents with a chief complaint of melena and palpitations.  He is on AC.          Allergy Status Unknown            FAMILY HISTORY:        Review of Systems:    General:  No wt loss, fevers, chills, night sweats,fatigue,   Eyes:  Good vision, no reported pain  ENT:  No sore throat, pain, runny nose, dysphagia  CV:  No pain, palpitatioins, hypo/hypertension  Resp:  No dyspnea, cough, tachypnea, wheezing  :  No pain, bleeding, incontinence, nocturia  Muscle:  No pain, weakness  Neuro:  No weakness, tingling, memory problems  Psych:  No fatigue, insomnia, mood problems, depression  Endocrine:  No polyuria, polydypsia, cold/heat intolerance  Heme:  No petechiae, ecchymosis, easy bruisability  Skin:  No rash, tattoos, scars, edema    Relevant Family History:       Relevant Social History:       Physical Exam:    Vital Signs:  Vital Signs Last 24 Hrs  T(C): 37.1 (29 Oct 2020 12:38), Max: 37.1 (29 Oct 2020 12:38)  T(F): 98.7 (29 Oct 2020 12:38), Max: 98.7 (29 Oct 2020 12:38)  HR: 97 (29 Oct 2020 12:38) (97 - 97)  BP: 136/77 (29 Oct 2020 12:38) (136/77 - 136/77)  BP(mean): --  RR: 18 (29 Oct 2020 12:38) (18 - 18)  SpO2: 98% (29 Oct 2020 12:38) (98% - 98%)  Daily Height in cm: 170.18 (29 Oct 2020 12:38)    Daily     General:  Appears stated age, well-groomed, well-nourished, no distress  HEENT:  NC/AT,  conjunctivae clear and pink, no thyromegaly, nodules, adenopathy, no JVD  Chest:  Full & symmetric excursion, no increased effort, breath sounds clear  Cardiovascular:  Regular rhythm, S1, S2, no murmur/rub/S3/S4, no abdominal bruit, no edema  Abdomen:  Soft, non-tender, non-distended, normoactive bowel sounds,  no masses ,no hepatosplenomeagaly, no signs of chronic liver disease  Extremities:  no cyanosis,clubbing or edema  Skin:  No rash/erythema/ecchymoses/petechiae/wounds/abscess/warm/dry  Neuro/Psych:  Alert, oriented, no asterixis, no tremor, no encephalopathy    Laboratory:                            9.5    8.76  )-----------( 301      ( 29 Oct 2020 13:45 )             28.5     10-29    140  |  108  |  22  ----------------------------<  122<H>  4.2   |  26  |  1.30    Ca    9.1      29 Oct 2020 13:45  Mg     2.4     10-29    TPro  8.2  /  Alb  4.1  /  TBili  0.7  /  DBili  x   /  AST  21  /  ALT  17  /  AlkPhos  92  10-29    LIVER FUNCTIONS - ( 29 Oct 2020 13:45 )  Alb: 4.1 g/dL / Pro: 8.2 g/dL / ALK PHOS: 92 U/L / ALT: 17 U/L / AST: 21 U/L / GGT: x                 Imaging:

## 2020-10-29 NOTE — H&P ADULT - NSHPPHYSICALEXAM_GEN_ALL_CORE
T(C): 37.1 (10-29-20 @ 12:38), Max: 37.1 (10-29-20 @ 12:38)  HR: 97 (10-29-20 @ 12:38) (97 - 97)  BP: 136/77 (10-29-20 @ 12:38) (136/77 - 136/77)  RR: 18 (10-29-20 @ 12:38) (18 - 18)  SpO2: 98% (10-29-20 @ 12:38) (98% - 98%)    GENERAL: patient appears well, no acute distress, appropriate, pleasant  ENT: TM clear bilat  NECK: supple, soft  LUNGS: good air entry bilaterally, clear to auscultation, symmetric breath sounds, no wheezing or rhonchi appreciated  HEART: soft S1/S2, regular rate and rhythm, no murmurs noted, no lower extremity edema  GASTROINTESTINAL: abdomen is soft, nontender, nondistended, normoactive bowel sounds  INTEGUMENT: good skin turgor, warm skin, appears well perfused  MUSCULOSKELETAL: no clubbing or cyanosis, no obvious deformity  NEUROLOGIC: awake, alert, oriented x3, good muscle tone in 4 extremities, no obvious sensory deficits  PSYCHIATRIC: mood is good, affect is congruent, linear and logical thought process T(C): 37.1 (10-29-20 @ 12:38), Max: 37.1 (10-29-20 @ 12:38)  HR: 97 (10-29-20 @ 12:38) (97 - 97)  BP: 136/77 (10-29-20 @ 12:38) (136/77 - 136/77)  RR: 18 (10-29-20 @ 12:38) (18 - 18)  SpO2: 98% (10-29-20 @ 12:38) (98% - 98%)    GENERAL: patient appears well, no acute distress, appropriate, pleasant  ENT: TM clear bilat, no cerumen  NECK: supple, soft  LUNGS: good air entry bilaterally, clear to auscultation, symmetric breath sounds, no wheezing or rhonchi appreciated  HEART: soft S1/S2, regular rate and rhythm, no murmurs noted, no lower extremity edema  GASTROINTESTINAL: abdomen is soft, nontender, nondistended, normoactive bowel sounds  INTEGUMENT: good skin turgor, warm skin, appears well perfused  MUSCULOSKELETAL: no clubbing or cyanosis, no obvious deformity  NEUROLOGIC: awake, alert, oriented x3, good muscle tone in 4 extremities, no obvious sensory deficits, CN 2-12 grossly intact  PSYCHIATRIC: mood is good, affect is congruent, linear and logical thought process  Skin: R leg scar

## 2020-10-29 NOTE — ED ADULT NURSE NOTE - NSIMPLEMENTINTERV_GEN_ALL_ED
Implemented All Fall Risk Interventions:  [place] to call system. Call bell, personal items and telephone within reach. Instruct patient to call for assistance. Room bathroom lighting operational. Non-slip footwear when patient is off stretcher. Physically safe environment: no spills, clutter or unnecessary equipment. Stretcher in lowest position, wheels locked, appropriate side rails in place. Provide visual cue, wrist band, yellow gown, etc. Monitor gait and stability. Monitor for mental status changes and reorient to person, place, and time. Review medications for side effects contributing to fall risk. Reinforce activity limits and safety measures with patient and family.

## 2020-10-29 NOTE — H&P ADULT - ASSESSMENT
69 yo male PMH HTN, HLD, BPH, GERD, neuropathy presents to ED with 5 days of palpitations, fatigue, melena, admitted for GI bleed. 71 yo male PMH RLE DVT on Xarelto, HTN, HLD, BPH, GERD, neuropathy presents to ED with palpitations, fatigue, melena, admitted for GI bleed. 71 yo male PMH RLE DVT on Xarelto, TIA, HTN, HLD, SERENITY with 1 episode of dialysis, anemia of chronic disease, DM, BPH, GERD, neuropathy,  presents to ED with palpitations, fatigue, melena.  admitted for GI bleed.

## 2020-10-29 NOTE — H&P ADULT - PROBLEM SELECTOR PLAN 1
Patient with 5 days melena, palpitations, fatigue  -FOBT +, H/H 9.5/28.5 in ED  -Hold Xarelto, aspirin in setting of GIB, on Xarelto for unknown reason  -Start PPI drip,   -monitor CBC, transfuse Hgb<8  -NPO except meds after midnight  -EGD tomorrow AM  -Cardio Dr. Martínez consulted for cardiac/procedure clearance  -GI Dr. DAVINA El consulted in ED Patient with 5 days melena, palpitations, fatigue  -FOBT +, H/H 9.5/28.5 in ED  -Hold Xarelto, aspirin in setting of GIB, on Xarelto for unknown reason  -Start PPI drip,   -no signs of profuse active bleeding, H/H stable, f/u CBC in AM, transfuse Hgb<8  -NPO except meds after midnight  -EGD tomorrow AM  -Cardio Dr. Martínez consulted for cardiac/procedure clearance  -GI Dr. DAVINA El consulted in ED Patient with 5 days melena, palpitations, fatigue  -FOBT +, H/H 9.5/28.5 in ED,  -Hold Xarelto, aspirin in setting of GIB, on Xarelto for unknown reason  -Start PPI drip  -f/u repeat H/H 8pm, transfuse Hgb<8  -NPO except meds after midnight  -EGD tomorrow AM  -Cardio Dr. Martínez consulted for cardiac/procedure clearance  -GI Dr. DAVINA El consulted in ED Patient with 5 days melena, palpitations, fatigue  -FOBT +, H/H 9.5/28.5 in ED,  -Hold Xarelto, aspirin in setting of GIB, on Xarelto for hx RLE DVT  -Start PPI drip  -f/u repeat H/H 8pm, transfuse Hgb<7  -blood consent obtained, type and screen ordered  -NPO except meds after midnight  -EGD tomorrow AM  -Cardio Dr. Martínez consulted for cardiac/procedure clearance  -GI Dr. DAVINA El consulted in ED Patient with 5 days melena, palpitations, fatigue  -FOBT +, H/H 9.5/28.5 in ED,  -Hold Xarelto, aspirin in setting of GIB, on Xarelto for hx RLE DVT  -Start PPI drip  -f/u repeat H/H 8pm, transfuse Hgb<7  -blood consent obtained, type and screen ordered  -NPO except meds after midnight  -EGD tomorrow AM  -Cardio Dr. Martínez consulted for cardiac/procedure clearance  -GI Dr. DAVINA El consulted in ED  -patient is medically optimized for EGD pending cardiac clearance

## 2020-10-30 ENCOUNTER — RESULT REVIEW (OUTPATIENT)
Age: 70
End: 2020-10-30

## 2020-10-30 LAB
ALBUMIN SERPL ELPH-MCNC: 3.7 G/DL — SIGNIFICANT CHANGE UP (ref 3.3–5)
ALP SERPL-CCNC: 82 U/L — SIGNIFICANT CHANGE UP (ref 40–120)
ALT FLD-CCNC: 15 U/L — SIGNIFICANT CHANGE UP (ref 12–78)
ANION GAP SERPL CALC-SCNC: 6 MMOL/L — SIGNIFICANT CHANGE UP (ref 5–17)
APTT BLD: 32.3 SEC — SIGNIFICANT CHANGE UP (ref 27.5–35.5)
AST SERPL-CCNC: 15 U/L — SIGNIFICANT CHANGE UP (ref 15–37)
BASOPHILS # BLD AUTO: 0.03 K/UL — SIGNIFICANT CHANGE UP (ref 0–0.2)
BASOPHILS NFR BLD AUTO: 0.5 % — SIGNIFICANT CHANGE UP (ref 0–2)
BILIRUB SERPL-MCNC: 0.8 MG/DL — SIGNIFICANT CHANGE UP (ref 0.2–1.2)
BUN SERPL-MCNC: 23 MG/DL — SIGNIFICANT CHANGE UP (ref 7–23)
CALCIUM SERPL-MCNC: 9.4 MG/DL — SIGNIFICANT CHANGE UP (ref 8.5–10.1)
CHLORIDE SERPL-SCNC: 108 MMOL/L — SIGNIFICANT CHANGE UP (ref 96–108)
CO2 SERPL-SCNC: 29 MMOL/L — SIGNIFICANT CHANGE UP (ref 22–31)
CREAT SERPL-MCNC: 1.2 MG/DL — SIGNIFICANT CHANGE UP (ref 0.5–1.3)
EOSINOPHIL # BLD AUTO: 0.04 K/UL — SIGNIFICANT CHANGE UP (ref 0–0.5)
EOSINOPHIL NFR BLD AUTO: 0.7 % — SIGNIFICANT CHANGE UP (ref 0–6)
GLUCOSE SERPL-MCNC: 114 MG/DL — HIGH (ref 70–99)
HCT VFR BLD CALC: 25.8 % — LOW (ref 39–50)
HCV AB S/CO SERPL IA: 0.2 S/CO — SIGNIFICANT CHANGE UP (ref 0–0.99)
HCV AB SERPL-IMP: SIGNIFICANT CHANGE UP
HGB BLD-MCNC: 8.8 G/DL — LOW (ref 13–17)
IMM GRANULOCYTES NFR BLD AUTO: 0.5 % — SIGNIFICANT CHANGE UP (ref 0–1.5)
INR BLD: 1.14 RATIO — SIGNIFICANT CHANGE UP (ref 0.88–1.16)
LYMPHOCYTES # BLD AUTO: 1.78 K/UL — SIGNIFICANT CHANGE UP (ref 1–3.3)
LYMPHOCYTES # BLD AUTO: 31.4 % — SIGNIFICANT CHANGE UP (ref 13–44)
MCHC RBC-ENTMCNC: 29.5 PG — SIGNIFICANT CHANGE UP (ref 27–34)
MCHC RBC-ENTMCNC: 34.1 GM/DL — SIGNIFICANT CHANGE UP (ref 32–36)
MCV RBC AUTO: 86.6 FL — SIGNIFICANT CHANGE UP (ref 80–100)
MONOCYTES # BLD AUTO: 0.49 K/UL — SIGNIFICANT CHANGE UP (ref 0–0.9)
MONOCYTES NFR BLD AUTO: 8.7 % — SIGNIFICANT CHANGE UP (ref 2–14)
NEUTROPHILS # BLD AUTO: 3.29 K/UL — SIGNIFICANT CHANGE UP (ref 1.8–7.4)
NEUTROPHILS NFR BLD AUTO: 58.2 % — SIGNIFICANT CHANGE UP (ref 43–77)
NRBC # BLD: 0 /100 WBCS — SIGNIFICANT CHANGE UP (ref 0–0)
PLATELET # BLD AUTO: 262 K/UL — SIGNIFICANT CHANGE UP (ref 150–400)
POTASSIUM SERPL-MCNC: 3.9 MMOL/L — SIGNIFICANT CHANGE UP (ref 3.5–5.3)
POTASSIUM SERPL-SCNC: 3.9 MMOL/L — SIGNIFICANT CHANGE UP (ref 3.5–5.3)
PROT SERPL-MCNC: 7.2 G/DL — SIGNIFICANT CHANGE UP (ref 6–8.3)
PROTHROM AB SERPL-ACNC: 13.2 SEC — SIGNIFICANT CHANGE UP (ref 10.6–13.6)
RBC # BLD: 2.98 M/UL — LOW (ref 4.2–5.8)
RBC # FLD: 13.3 % — SIGNIFICANT CHANGE UP (ref 10.3–14.5)
SODIUM SERPL-SCNC: 143 MMOL/L — SIGNIFICANT CHANGE UP (ref 135–145)
WBC # BLD: 5.66 K/UL — SIGNIFICANT CHANGE UP (ref 3.8–10.5)
WBC # FLD AUTO: 5.66 K/UL — SIGNIFICANT CHANGE UP (ref 3.8–10.5)

## 2020-10-30 PROCEDURE — 99232 SBSQ HOSP IP/OBS MODERATE 35: CPT

## 2020-10-30 PROCEDURE — 88312 SPECIAL STAINS GROUP 1: CPT | Mod: 26

## 2020-10-30 PROCEDURE — 88305 TISSUE EXAM BY PATHOLOGIST: CPT | Mod: 26

## 2020-10-30 PROCEDURE — 99233 SBSQ HOSP IP/OBS HIGH 50: CPT | Mod: GC

## 2020-10-30 RX ORDER — PANTOPRAZOLE SODIUM 20 MG/1
40 TABLET, DELAYED RELEASE ORAL
Refills: 0 | Status: DISCONTINUED | OUTPATIENT
Start: 2020-10-30 | End: 2020-11-03

## 2020-10-30 RX ORDER — FOLIC ACID 0.8 MG
0 TABLET ORAL
Qty: 0 | Refills: 0 | DISCHARGE

## 2020-10-30 RX ORDER — SENNA PLUS 8.6 MG/1
1 TABLET ORAL
Qty: 0 | Refills: 0 | DISCHARGE

## 2020-10-30 RX ORDER — AMLODIPINE BESYLATE 2.5 MG/1
1 TABLET ORAL
Qty: 0 | Refills: 0 | DISCHARGE

## 2020-10-30 RX ORDER — CHOLECALCIFEROL (VITAMIN D3) 125 MCG
0 CAPSULE ORAL
Qty: 0 | Refills: 0 | DISCHARGE

## 2020-10-30 RX ORDER — SOD SULF/SODIUM/NAHCO3/KCL/PEG
1 SOLUTION, RECONSTITUTED, ORAL ORAL EVERY 4 HOURS
Refills: 0 | Status: COMPLETED | OUTPATIENT
Start: 2020-11-01 | End: 2020-11-01

## 2020-10-30 RX ORDER — GABAPENTIN 400 MG/1
0 CAPSULE ORAL
Qty: 0 | Refills: 0 | DISCHARGE

## 2020-10-30 RX ORDER — AZTREONAM 2 G
1 VIAL (EA) INJECTION
Qty: 0 | Refills: 0 | DISCHARGE

## 2020-10-30 RX ORDER — ACETAMINOPHEN 500 MG
650 TABLET ORAL ONCE
Refills: 0 | Status: COMPLETED | OUTPATIENT
Start: 2020-10-30 | End: 2020-10-30

## 2020-10-30 RX ORDER — ASCORBIC ACID 60 MG
1 TABLET,CHEWABLE ORAL
Qty: 0 | Refills: 0 | DISCHARGE

## 2020-10-30 RX ORDER — TRAMADOL HYDROCHLORIDE 50 MG/1
1 TABLET ORAL
Qty: 0 | Refills: 0 | DISCHARGE

## 2020-10-30 RX ORDER — ASPIRIN/CALCIUM CARB/MAGNESIUM 324 MG
1 TABLET ORAL
Qty: 0 | Refills: 0 | DISCHARGE

## 2020-10-30 RX ADMIN — GABAPENTIN 300 MILLIGRAM(S): 400 CAPSULE ORAL at 17:08

## 2020-10-30 RX ADMIN — TAMSULOSIN HYDROCHLORIDE 0.4 MILLIGRAM(S): 0.4 CAPSULE ORAL at 21:34

## 2020-10-30 RX ADMIN — Medication 1 MILLIGRAM(S): at 17:08

## 2020-10-30 RX ADMIN — PANTOPRAZOLE SODIUM 10 MG/HR: 20 TABLET, DELAYED RELEASE ORAL at 05:12

## 2020-10-30 RX ADMIN — SIMVASTATIN 20 MILLIGRAM(S): 20 TABLET, FILM COATED ORAL at 21:33

## 2020-10-30 RX ADMIN — Medication 325 MILLIGRAM(S): at 17:08

## 2020-10-30 RX ADMIN — GABAPENTIN 300 MILLIGRAM(S): 400 CAPSULE ORAL at 05:12

## 2020-10-30 RX ADMIN — PANTOPRAZOLE SODIUM 40 MILLIGRAM(S): 20 TABLET, DELAYED RELEASE ORAL at 17:08

## 2020-10-30 RX ADMIN — AMLODIPINE BESYLATE 10 MILLIGRAM(S): 2.5 TABLET ORAL at 05:11

## 2020-10-30 RX ADMIN — LOSARTAN POTASSIUM 100 MILLIGRAM(S): 100 TABLET, FILM COATED ORAL at 05:11

## 2020-10-30 RX ADMIN — Medication 650 MILLIGRAM(S): at 11:41

## 2020-10-30 NOTE — PROGRESS NOTE ADULT - ASSESSMENT
71 yo male PMH RLE DVT on Xarelto, TIA, HTN, HLD, SERENITY with 1 episode of dialysis, anemia of chronic disease, DM, BPH, GERD, neuropathy,  presents to ED with palpitations, fatigue, melena.  admitted for GI bleed.

## 2020-10-30 NOTE — PROGRESS NOTE ADULT - PROBLEM SELECTOR PLAN 3
Hx of RLE DVT, on Xarelto  -Hold in setting of GIB  -SCDs for PPx Hx of RLE DVT, on Xarelto  2019   -Hold in setting of GIB  -SCDs for PPx

## 2020-10-30 NOTE — PROGRESS NOTE ADULT - PROBLEM SELECTOR PLAN 5
Chronic, stable  -Patient takes amlodipine 10mg 2x daily, confirmed with pharmacy  -BP currently stable, continue amlodipine 10mg once daily and continue to monitor  -Continue home losartan

## 2020-10-30 NOTE — PROGRESS NOTE ADULT - PROBLEM SELECTOR PLAN 1
Patient with 5 days melena, palpitations, fatigue  -FOBT +, H/H 9.5/28.5 in ED,  -Hold Xarelto, aspirin in setting of GIB, on Xarelto for hx RLE DVT  -Start PPI drip  -f/u repeat H/H 8pm, transfuse Hgb<7  -blood consent obtained, type and screen ordered  -NPO except meds after midnight  -EGD tomorrow AM  -Cardio Dr. Martínez consulted for cardiac/procedure clearance  -GI Dr. DAVINA El consulted in ED  -patient is medically optimized for EGD pending cardiac clearance Patient with 5 days melena, palpitations, fatigue  -FOBT +, H/H 9.5/28.5 in ED,  -Hb 9.5 --> 9.1 --> 8.8 this AM, transfuse Hgb<7  -Hold Xarelto, aspirin in setting of GIB, on Xarelto for hx RLE DVT  -continue PPI drip  -NPO except meds after midnight  -EGD this morning AM  -Cardio Dr. Martínez consulted for cardiac/procedure clearance  -GI Dr. DAVINA El consulted in ED and on board  -patient is medically optimized for EGD with cardiac clearance Patient with 5 days melena, palpitations, fatigue  -FOBT +, H/H 9.5/28.5 in ED,  -Hb 9.5 --> 9.1 --> 8.8 this AM, transfuse Hgb<7  -Hold Xarelto, aspirin in setting of GIB, on Xarelto for hx RLE DVT  -continue PPI drip  -NPO except meds after midnight  -EGD this morning   -Cardio Dr. Martínez consulted for cardiac/procedure clearance  -GI Dr. DAVINA El consulted in ED and on board  -patient is medically optimized for EGD with cardiac clearance Patient with 5 days melena, palpitations, fatigue  -FOBT +, H/H 9.5/28.5 in ED,  -Hb 9.5 --> 9.1 --> 8.8 this AM, transfuse Hgb<7  -Hold Xarelto, aspirin in setting of GIB, on Xarelto for hx RLE DVT  -PPI drip increased to 40mg BID IV  -Diet advanced as tolerated  -NPO except meds after midnight on Sunday (11/01/20)  -EGD this morning negative.   -Colonoscopy scheduled on Monday (11/02/20)  -Cardio Dr. Martínez consulted for cardiac/procedure clearance  -GI Dr. DAVINA El consulted in ED and on board  -patient is medically optimized for EGD with cardiac clearance Patient with 5 days melena, palpitations, fatigue  -FOBT +, H/H 9.5/28.5 in ED,  -Hb 9.5 --> 9.1 --> 8.8 this AM, transfuse Hgb<7  -Hold Xarelto, aspirin in setting of GIB, on Xarelto for hx RLE DVT  -d/c ppi gtt and start 40mg IVP protonix BID  -Diet advanced as tolerated  -NPO except meds after midnight on Sunday (11/01/20)  -EGD this morning negative.   -Colonoscopy scheduled on Monday (11/02/20)  -Cardio Dr. Martínez consulted for cardiac/procedure clearance  -GI Dr. DAVINA El consulted in ED and on board  -patient is medically optimized for EGD with cardiac clearance

## 2020-10-30 NOTE — PROGRESS NOTE ADULT - SUBJECTIVE AND OBJECTIVE BOX
[facility] -- Sandie Valentine, Mina Sullivan, Bruce Nagy Savella, [place]: Office # [phone]    Follow Up: Cardiac optimization pre/post op       Subjective/Observations: Patient seen and examined. Patient awake, alert, resting in bed. No complaints of chest pain, dyspnea, palpitations or dizziness. No signs of orthopnea or PND. C/o right shoulder pain.     REVIEW OF SYSTEMS: All review of systems is negative for eye, ENT, GI, , allergic, dermatologic, musculoskeletal and neurologic except as described above    PAST MEDICAL & SURGICAL HISTORY:  Deep vein thrombosis (DVT)  Neuropathy  BPH (benign prostatic hyperplasia)  HLD (hyperlipidemia)  HTN (hypertension)  History of femoropopliteal bypass    MEDICATIONS  (STANDING):  amLODIPine   Tablet 10 milliGRAM(s) Oral daily  ferrous    sulfate 325 milliGRAM(s) Oral daily  folic acid 1 milliGRAM(s) Oral daily  gabapentin 300 milliGRAM(s) Oral two times a day  influenza   Vaccine 0.5 milliLiter(s) IntraMuscular once  losartan 100 milliGRAM(s) Oral daily  pantoprazole Infusion 8 mG/Hr (10 mL/Hr) IV Continuous <Continuous>  simvastatin 20 milliGRAM(s) Oral at bedtime  tamsulosin 0.4 milliGRAM(s) Oral at bedtime    MEDICATIONS  (PRN):    Allergies  No Known Allergies    Vital Signs Last 24 Hrs  T(C): 36.9 (30 Oct 2020 07:54), Max: 37.1 (29 Oct 2020 12:38)  T(F): 98.4 (30 Oct 2020 07:54), Max: 98.7 (29 Oct 2020 12:38)  HR: 85 (30 Oct 2020 07:54) (85 - 98)  BP: 116/63 (30 Oct 2020 07:54) (116/63 - 154/78)  BP(mean): --  RR: 18 (30 Oct 2020 07:54) (17 - 18)  SpO2: 97% (30 Oct 2020 07:54) (97% - 100%)  I&O's Summary    29 Oct 2020 07:01  -  30 Oct 2020 07:00  --------------------------------------------------------  IN: 400 mL / OUT: 900 mL / NET: -500 mL    30 Oct 2020 07:01  -  30 Oct 2020 11:12  --------------------------------------------------------  IN: 30 mL / OUT: 300 mL / NET: -270 mL      Weight (kg): 70.3 (10-29 @ 12:38)    TELE:  90s   PHYSICAL EXAM:  Appearance: NAD, no distress, alert, Well developed   HEENT: Moist Mucous Membranes, Anicteric  Cardiovascular: Regular rate and rhythm, Normal S1 S2, No JVD, No murmurs, No rubs, gallops or clicks  Respiratory: Non-labored, Clear to auscultation, No rales, No rhonchi, No wheezing.   Gastrointestinal:  Soft, Non-tender, + BS  Neurologic: Non-focal  Skin: Warm and dry, No visible rashes or ulcers, No ecchymosis, No cyanosis  Musculoskeletal: No clubbing, No cyanosis, No joint swelling/tenderness  Psychiatry: Mood & affect appropriate  Lymph: No peripheral edema.     LABS: All Labs Reviewed:                        8.8    5.66  )-----------( 262      ( 30 Oct 2020 07:03 )             25.8                         9.1    x     )-----------( x        ( 29 Oct 2020 20:51 )             26.7                         9.5    8.76  )-----------( 301      ( 29 Oct 2020 13:45 )             28.5     30 Oct 2020 07:03    143    |  108    |  23     ----------------------------<  114    3.9     |  29     |  1.20   29 Oct 2020 13:45    140    |  108    |  22     ----------------------------<  122    4.2     |  26     |  1.30     Ca    9.4        30 Oct 2020 07:03  Ca    9.1        29 Oct 2020 13:45  Mg     2.4       29 Oct 2020 13:45    TPro  7.2    /  Alb  3.7    /  TBili  0.8    /  DBili  x      /  AST  15     /  ALT  15     /  AlkPhos  82     30 Oct 2020 07:03  TPro  8.2    /  Alb  4.1    /  TBili  0.7    /  DBili  x      /  AST  21     /  ALT  17     /  AlkPhos  92     29 Oct 2020 13:45    PT/INR - ( 30 Oct 2020 07:03 )   PT: 13.2 sec;   INR: 1.14 ratio    PTT - ( 30 Oct 2020 07:03 )  PTT:32.3 sec  Troponin I, Serum: <.015 ng/mL (10-29-20 @ 13:45)    12 Lead ECG:   Ventricular Rate 90 BPM  Atrial Rate 90 BPM  P-R Interval 144 ms  QRS Duration 96 ms  Q-T Interval 362 ms  QTC Calculation(Bazett) 442 ms  P Axis 64 degrees  R Axis -57 degrees  T Axis 74 degrees  Diagnosis Line Normal sinus rhythm  Left axis deviation  Abnormal ECG  No previous ECGs available  Confirmed by anjali Martínez ([id number]) on 10/29/2020 3:51:30 PM (10-29-20 @ 13:55)

## 2020-10-30 NOTE — PROGRESS NOTE ADULT - ATTENDING COMMENTS
pt seen and examine today see above plan - 71 m  hx dvt / pe was on full ac Xarelto  melanotic stool acute blood loss anemia - endoscopy , ppi drip , fu hb / hct    will need colonoscopy also .

## 2020-10-30 NOTE — PROGRESS NOTE ADULT - SUBJECTIVE AND OBJECTIVE BOX
Patient is a 70y old  Male who presents with a chief complaint of GI bleed (30 Oct 2020 11:12)      FROM ADMISSION H+P:   HPI:  69 yo male PMH RLE DVT on Xarelto, TIA, HTN, HLD, SERENITY with 1 episode of dialysis in the past, anemia of chronic disease, DM, BPH, GERD, neuropathy,  presents to ED with palpitations, fatigue, melena. Onset 5 days ago. Patient states he noticed black stools 5 days ago but denies any bright red blood in his stools. Patient also c/o tinnitus and nausea. Denies abdominal pain, HA, vomiting, diarrhea, CP, SOB. Patient states he went to his PMD today and was told to come to ED to be evaluated for his symptoms. Patient states he started taking aspirin a few days ago  because he was told it is good for his heart. Does not take any other NSAID's as per patient.   Patient is an extremely poor historian, does not remember his PMD name although he was there today.  Previous records available from [facility].     In ED:   Vitals: T 98.7, HR 97, /77, RR 18, SpO2 98%  Labs significant for: +FOBT, RBC 3.28, Hgb 9.5, Hct 28.5, PT 14.4, INR 1.24, PTT 36.3, glucose 122  CXR: unremarkable  EKG: normal sinus rhythm, rate 98, left axis deviation  Given: Protonix 40mg IVP x1 (29 Oct 2020 15:34)      ----  INTERVAL HPI/OVERNIGHT EVENTS: Pt seen and evaluated at the bedside. No acute overnight events occurred.     ----  PAST MEDICAL & SURGICAL HISTORY:  Deep vein thrombosis (DVT)    Neuropathy    BPH (benign prostatic hyperplasia)    HLD (hyperlipidemia)    HTN (hypertension)    History of femoropopliteal bypass        FAMILY HISTORY:      Home Medications:  amLODIPine 10 mg oral tablet: 1 tab(s) orally 2 times a day (29 Oct 2020 17:05)  ferrous sulfate 325 mg (65 mg elemental iron) oral tablet: orally once a day (29 Oct 2020 17:05)  folic acid 1 mg oral tablet: 1 tab(s) orally once a day (29 Oct 2020 17:05)  gabapentin 300 mg oral capsule: 1 cap(s) orally 2 times a day (29 Oct 2020 17:05)  losartan 100 mg oral tablet: 1 tab(s) orally once a day (29 Oct 2020 17:05)  pantoprazole 40 mg oral delayed release tablet: 1 tab(s) orally once a day (29 Oct 2020 17:05)  Senna 8.6 mg oral tablet: 2 tab(s) orally once a day (at bedtime) (29 Oct 2020 17:05)  simvastatin 20 mg oral tablet: 1 tab(s) orally once a day (at bedtime) (29 Oct 2020 17:05)  tamsulosin 0.4 mg oral capsule: 1 cap(s) orally once a day (29 Oct 2020 17:05)  Xarelto 20 mg oral tablet: 1 tab(s) orally once a day (in the evening) (29 Oct 2020 17:05)      Allergies    No Known Allergies    Intolerances        ----  MEDICATIONS  (STANDING):  amLODIPine   Tablet 10 milliGRAM(s) Oral daily  ferrous    sulfate 325 milliGRAM(s) Oral daily  folic acid 1 milliGRAM(s) Oral daily  gabapentin 300 milliGRAM(s) Oral two times a day  influenza   Vaccine 0.5 milliLiter(s) IntraMuscular once  losartan 100 milliGRAM(s) Oral daily  pantoprazole Infusion 8 mG/Hr (10 mL/Hr) IV Continuous <Continuous>  simvastatin 20 milliGRAM(s) Oral at bedtime  tamsulosin 0.4 milliGRAM(s) Oral at bedtime    MEDICATIONS  (PRN):      ----  REVIEW OF SYSTEMS:  CONSTITUTIONAL: denies fever, chills, fatigue, weakness  HEENT: denies blurred vision, sore throat  SKIN: denies new lesions, rash  CARDIOVASCULAR: denies chest pain, chest pressure, palpitations  RESPIRATORY: denies shortness of breath, sputum production  GASTROINTESTINAL: denies nausea, vomiting, diarrhea, abdominal pain  GENITOURINARY: denies dysuria, discharge  NEUROLOGICAL: denies numbness, headache, focal weakness  MUSCULOSKELETAL: denies new joint pain, muscle aches  HEMATOLOGIC: denies gross bleeding, bruising  LYMPHATICS: denies enlarged lymph nodes, extremity swelling  PSYCHIATRIC: denies recent changes in anxiety, depression  ENDOCRINOLOGIC: denies sweating, cold or heat intolerance    ----  PHYSICAL EXAM:  GENERAL: patient appears well, no acute distress, appropriately interactive  EYES: sclera clear, no exudates  ENMT: oropharynx clear without erythema, moist mucous membranes  NECK: supple, soft, no thyromegaly noted  LUNGS: good air entry bilaterally, clear to auscultation, symmetric breath sounds, no wheezing or rhonchi appreciated  HEART: soft S1/S2, regular rate and rhythm, no murmurs noted, no noted edema to b/l LE  GASTROINTESTINAL: abdomen is soft, nontender, nondistended, normoactive bowel sounds, no palpable masses  INTEGUMENT: good skin turgor, appropriate for ethnicity, no visualized rashes, no jaundice noted  MUSCULOSKELETAL: no clubbing or cyanosis, no obvious deformity  NEUROLOGIC: awake, alert, oriented x3, good muscle tone in 4 extremities, no obvious sensory deficits  PSYCHIATRIC: mood is good, affect is congruent with mood, linear and logical thought process  HEME/LYMPH: no palpable supraclavicular nodules, no obvious ecchymosis     T(C): 36.9 (10-30-20 @ 07:54), Max: 37.1 (10-29-20 @ 12:38)  HR: 85 (10-30-20 @ 07:54) (85 - 98)  BP: 116/63 (10-30-20 @ 07:54) (116/63 - 154/78)  RR: 18 (10-30-20 @ 07:54) (17 - 18)  SpO2: 97% (10-30-20 @ 07:54) (97% - 100%)  Wt(kg): --    ----  I&O's Summary    29 Oct 2020 07:  -  30 Oct 2020 07:00  --------------------------------------------------------  IN: 400 mL / OUT: 900 mL / NET: -500 mL    30 Oct 2020 07:01  -  30 Oct 2020 11:22  --------------------------------------------------------  IN: 30 mL / OUT: 300 mL / NET: -270 mL        LABS:                        8.8    5.66  )-----------( 262      ( 30 Oct 2020 07:03 )             25.8     10-30    143  |  108  |  23  ----------------------------<  114<H>  3.9   |  29  |  1.20    Ca    9.4      30 Oct 2020 07:03  Mg     2.4     10-29    TPro  7.2  /  Alb  3.7  /  TBili  0.8  /  DBili  x   /  AST  15  /  ALT  15  /  AlkPhos  82  10-30    PT/INR - ( 30 Oct 2020 07:03 )   PT: 13.2 sec;   INR: 1.14 ratio         PTT - ( 30 Oct 2020 07:03 )  PTT:32.3 sec  Urinalysis Basic - ( 29 Oct 2020 21:34 )    Color: Pale Yellow / Appearance: Clear / S.010 / pH: x  Gluc: x / Ketone: Negative  / Bili: Negative / Urobili: Negative   Blood: x / Protein: Negative / Nitrite: Negative   Leuk Esterase: Negative / RBC: x / WBC x   Sq Epi: x / Non Sq Epi: x / Bacteria: x      CAPILLARY BLOOD GLUCOSE                    ----  Personally reviewed:  Vital sign trends: [  ] yes    [  ] no     [  ] n/a  Laboratory results: [  ] yes    [  ] no     [  ] n/a  Radiology results: [  ] yes    [  ] no     [  ] n/a  Culture results: [  ] yes    [  ] no     [  ] n/a  Consultant recommendations: [  ] yes    [  ] no     [  ] n/a         Patient is a 70y old  Male who presents with a chief complaint of GI bleed (30 Oct 2020 11:12)      FROM ADMISSION H+P:   HPI:  69 yo male PMH RLE DVT on Xarelto, TIA, HTN, HLD, SERENITY with 1 episode of dialysis in the past, anemia of chronic disease, DM, BPH, GERD, neuropathy,  presents to ED with palpitations, fatigue, melena. Onset 5 days ago. Patient states he noticed black stools 5 days ago but denies any bright red blood in his stools. Patient also c/o tinnitus and nausea. Denies abdominal pain, HA, vomiting, diarrhea, CP, SOB. Patient states he went to his PMD today and was told to come to ED to be evaluated for his symptoms. Patient states he started taking aspirin a few days ago  because he was told it is good for his heart. Does not take any other NSAID's as per patient.   Patient is an extremely poor historian, does not remember his PMD name although he was there today.  Previous records available from [facility].     In ED:   Vitals: T 98.7, HR 97, /77, RR 18, SpO2 98%  Labs significant for: +FOBT, RBC 3.28, Hgb 9.5, Hct 28.5, PT 14.4, INR 1.24, PTT 36.3, glucose 122  CXR: unremarkable  EKG: normal sinus rhythm, rate 98, left axis deviation  Given: Protonix 40mg IVP x1 (29 Oct 2020 15:34)      ----  INTERVAL HPI/OVERNIGHT EVENTS: Pt seen and evaluated at the bedside. No acute overnight events occurred. This morning, Pt states he has not had a bowel movement since admission. He describes new b/l shoulder pain and persistent b/l tinnitus that started 4-5 days ago. Denies fevers, headache, chest pain, palpitations, dyspnea, abdominal pain, n/v/d/c.     ----  PAST MEDICAL & SURGICAL HISTORY:  Deep vein thrombosis (DVT)    Neuropathy    BPH (benign prostatic hyperplasia)    HLD (hyperlipidemia)    HTN (hypertension)    History of femoropopliteal bypass        FAMILY HISTORY:      Home Medications:  amLODIPine 10 mg oral tablet: 1 tab(s) orally 2 times a day (29 Oct 2020 17:05)  ferrous sulfate 325 mg (65 mg elemental iron) oral tablet: orally once a day (29 Oct 2020 17:05)  folic acid 1 mg oral tablet: 1 tab(s) orally once a day (29 Oct 2020 17:05)  gabapentin 300 mg oral capsule: 1 cap(s) orally 2 times a day (29 Oct 2020 17:05)  losartan 100 mg oral tablet: 1 tab(s) orally once a day (29 Oct 2020 17:05)  pantoprazole 40 mg oral delayed release tablet: 1 tab(s) orally once a day (29 Oct 2020 17:05)  Senna 8.6 mg oral tablet: 2 tab(s) orally once a day (at bedtime) (29 Oct 2020 17:05)  simvastatin 20 mg oral tablet: 1 tab(s) orally once a day (at bedtime) (29 Oct 2020 17:05)  tamsulosin 0.4 mg oral capsule: 1 cap(s) orally once a day (29 Oct 2020 17:05)  Xarelto 20 mg oral tablet: 1 tab(s) orally once a day (in the evening) (29 Oct 2020 17:05)      Allergies    No Known Allergies    Intolerances        ----  MEDICATIONS  (STANDING):  amLODIPine   Tablet 10 milliGRAM(s) Oral daily  ferrous    sulfate 325 milliGRAM(s) Oral daily  folic acid 1 milliGRAM(s) Oral daily  gabapentin 300 milliGRAM(s) Oral two times a day  influenza   Vaccine 0.5 milliLiter(s) IntraMuscular once  losartan 100 milliGRAM(s) Oral daily  pantoprazole Infusion 8 mG/Hr (10 mL/Hr) IV Continuous <Continuous>  simvastatin 20 milliGRAM(s) Oral at bedtime  tamsulosin 0.4 milliGRAM(s) Oral at bedtime    MEDICATIONS  (PRN):      ----  REVIEW OF SYSTEMS:  CONSTITUTIONAL: Admits to 20lbs WG since January due to lack of exercise. Denies fever, fatigue, weakness  CARDIOVASCULAR: denies chest pain, palpitations  RESPIRATORY: denies shortness of breath, wheezing,   GASTROINTESTINAL: denies nausea, vomiting, diarrhea, abdominal pain.  GENITOURINARY: denies dysuria   NEUROLOGICAL: Admits to numbness in Right foot and toes (not acute), denies headache  MUSCULOSKELETAL: Admits to b/l trapezius muscle pain - possibly from uncomfortable sleeping position        ----  PHYSICAL EXAM:  GENERAL: patient appears well, no acute distress, appropriately interactive and pleasant  HEENT: NC/AT, PERRLA, EOMI b/l, moist mucous membranes  NECK: supple, soft, no cervical lymphadenopathy, no thyromegaly noted  LUNGS: CTA, symmetric breath sounds, no wheezing, rales or rhonchi appreciated  HEART: soft S1/S2, regular rate and rhythm, no murmurs noted, no noted edema to b/l LE  GASTROINTESTINAL: abdomen is soft, nontender, nondistended, +BSx4    MUSCULOSKELETAL: no edema, clubbing or cyanosis. Numbness present over Right foot/toes.    SKIN: Right leg scar tender to touch. Skin is warm and well perfused.   NEUROLOGIC: AA&O3  HEME/LYMPH: no palpable supraclavicular nodules    T(C): 36.9 (10-30-20 @ 07:54), Max: 37.1 (10-29-20 @ 12:38)  HR: 85 (10-30-20 @ 07:54) (85 - 98)  BP: 116/63 (10-30-20 @ 07:54) (116/63 - 154/78)  RR: 18 (10-30-20 @ 07:54) (17 - 18)  SpO2: 97% (10-30-20 @ 07:54) (97% - 100%)  Wt(kg): --    ----  I&O's Summary    29 Oct 2020 07:  -  30 Oct 2020 07:00  --------------------------------------------------------  IN: 400 mL / OUT: 900 mL / NET: -500 mL    30 Oct 2020 07:01  -  30 Oct 2020 11:22  --------------------------------------------------------  IN: 30 mL / OUT: 300 mL / NET: -270 mL        LABS:                        8.8    5.66  )-----------( 262      ( 30 Oct 2020 07:03 )             25.8     1030    143  |  108  |  23  ----------------------------<  114<H>  3.9   |  29  |  1.20    Ca    9.4      30 Oct 2020 07:03  Mg     2.4     10-    TPro  7.2  /  Alb  3.7  /  TBili  0.8  /  DBili  x   /  AST  15  /  ALT  15  /  AlkPhos  82  10-30    PT/INR - ( 30 Oct 2020 07:03 )   PT: 13.2 sec;   INR: 1.14 ratio         PTT - ( 30 Oct 2020 07:03 )  PTT:32.3 sec  Urinalysis Basic - ( 29 Oct 2020 21:34 )    Color: Pale Yellow / Appearance: Clear / S.010 / pH: x  Gluc: x / Ketone: Negative  / Bili: Negative / Urobili: Negative   Blood: x / Protein: Negative / Nitrite: Negative   Leuk Esterase: Negative / RBC: x / WBC x   Sq Epi: x / Non Sq Epi: x / Bacteria: x      CAPILLARY BLOOD GLUCOSE                    ----  Personally reviewed:  Vital sign trends: [  ] yes    [  ] no     [  ] n/a  Laboratory results: [  ] yes    [  ] no     [  ] n/a  Radiology results: [  ] yes    [  ] no     [  ] n/a  Culture results: [  ] yes    [  ] no     [  ] n/a  Consultant recommendations: [  ] yes    [  ] no     [  ] n/a         Patient is a 70y old  Male who presents with a chief complaint of GI bleed (30 Oct 2020 11:12)      FROM ADMISSION H+P:   HPI:  69 yo male PMH RLE DVT on Xarelto, TIA, HTN, HLD, SERENITY with 1 episode of dialysis in the past, anemia of chronic disease, DM, BPH, GERD, neuropathy,  presents to ED with palpitations, fatigue, melena. Onset 5 days ago. Patient states he noticed black stools 5 days ago but denies any bright red blood in his stools. Patient also c/o tinnitus and nausea. Denies abdominal pain, HA, vomiting, diarrhea, CP, SOB. Patient states he went to his PMD today and was told to come to ED to be evaluated for his symptoms. Patient states he started taking aspirin a few days ago  because he was told it is good for his heart. Does not take any other NSAID's as per patient.   Patient is an extremely poor historian, does not remember his PMD name although he was there today.  Previous records available from [facility].     In ED:   Vitals: T 98.7, HR 97, /77, RR 18, SpO2 98%  Labs significant for: +FOBT, RBC 3.28, Hgb 9.5, Hct 28.5, PT 14.4, INR 1.24, PTT 36.3, glucose 122  CXR: unremarkable  EKG: normal sinus rhythm, rate 98, left axis deviation  Given: Protonix 40mg IVP x1 (29 Oct 2020 15:34)      ----  INTERVAL HPI/OVERNIGHT EVENTS: Pt seen and evaluated at the bedside. No acute overnight events occurred. This morning, Pt states he has not had a bowel movement since admission. The Pt has been NPO and has a scheduled EGD this morning. He describes new b/l shoulder pain and persistent b/l tinnitus that started 4-5 days ago. Denies fevers, headache, chest pain, palpitations, dyspnea, abdominal pain, n/v/d/c.     ----  PAST MEDICAL & SURGICAL HISTORY:  Deep vein thrombosis (DVT)    Neuropathy    BPH (benign prostatic hyperplasia)    HLD (hyperlipidemia)    HTN (hypertension)    History of femoropopliteal bypass        FAMILY HISTORY:      Home Medications:  amLODIPine 10 mg oral tablet: 1 tab(s) orally 2 times a day (29 Oct 2020 17:05)  ferrous sulfate 325 mg (65 mg elemental iron) oral tablet: orally once a day (29 Oct 2020 17:05)  folic acid 1 mg oral tablet: 1 tab(s) orally once a day (29 Oct 2020 17:05)  gabapentin 300 mg oral capsule: 1 cap(s) orally 2 times a day (29 Oct 2020 17:05)  losartan 100 mg oral tablet: 1 tab(s) orally once a day (29 Oct 2020 17:05)  pantoprazole 40 mg oral delayed release tablet: 1 tab(s) orally once a day (29 Oct 2020 17:05)  Senna 8.6 mg oral tablet: 2 tab(s) orally once a day (at bedtime) (29 Oct 2020 17:05)  simvastatin 20 mg oral tablet: 1 tab(s) orally once a day (at bedtime) (29 Oct 2020 17:05)  tamsulosin 0.4 mg oral capsule: 1 cap(s) orally once a day (29 Oct 2020 17:05)  Xarelto 20 mg oral tablet: 1 tab(s) orally once a day (in the evening) (29 Oct 2020 17:05)      Allergies    No Known Allergies    Intolerances        ----  MEDICATIONS  (STANDING):  amLODIPine   Tablet 10 milliGRAM(s) Oral daily  ferrous    sulfate 325 milliGRAM(s) Oral daily  folic acid 1 milliGRAM(s) Oral daily  gabapentin 300 milliGRAM(s) Oral two times a day  influenza   Vaccine 0.5 milliLiter(s) IntraMuscular once  losartan 100 milliGRAM(s) Oral daily  pantoprazole Infusion 8 mG/Hr (10 mL/Hr) IV Continuous <Continuous>  simvastatin 20 milliGRAM(s) Oral at bedtime  tamsulosin 0.4 milliGRAM(s) Oral at bedtime    MEDICATIONS  (PRN):      ----  REVIEW OF SYSTEMS:  CONSTITUTIONAL: Admits to 20lbs WG since January due to lack of exercise. Denies fever, fatigue, weakness  CARDIOVASCULAR: denies chest pain, palpitations  RESPIRATORY: denies shortness of breath, wheezing,   GASTROINTESTINAL: denies nausea, vomiting, diarrhea, abdominal pain.  GENITOURINARY: denies dysuria   NEUROLOGICAL: Admits to numbness in Right foot and toes (not acute), denies headache  MUSCULOSKELETAL: Admits to b/l trapezius muscle pain - possibly from uncomfortable sleeping position        ----  PHYSICAL EXAM:  GENERAL: patient appears well, NAD, appropriately interactive and pleasant  HEENT: NC/AT, PERRLA, EOMI b/l, moist mucous membranes  NECK: supple, soft, no cervical lymphadenopathy, no thyromegaly noted  LUNGS: CTA, symmetric breath sounds, no wheezing, rales or rhonchi appreciated  HEART: soft S1/S2, regular rate and rhythm, no murmurs noted, no noted edema to b/l LE  GASTROINTESTINAL: abdomen is soft, nontender, nondistended, +BSx4    MUSCULOSKELETAL: no edema, clubbing or cyanosis. Numbness present over Right foot/toes.    SKIN: Right leg scar tender to touch. Skin is warm and well perfused.   NEUROLOGIC: AA&O3  HEME/LYMPH: no palpable supraclavicular nodules    T(C): 36.9 (10-30-20 @ 07:54), Max: 37.1 (10-29-20 @ 12:38)  HR: 85 (10-30-20 @ 07:54) (85 - 98)  BP: 116/63 (10-30-20 @ 07:54) (116/63 - 154/78)  RR: 18 (10-30-20 @ 07:54) (17 - 18)  SpO2: 97% (10-30-20 @ 07:54) (97% - 100%)  Wt(kg): --    ----  I&O's Summary    29 Oct 2020 07:  -  30 Oct 2020 07:00  --------------------------------------------------------  IN: 400 mL / OUT: 900 mL / NET: -500 mL    30 Oct 2020 07:01  -  30 Oct 2020 11:22  --------------------------------------------------------  IN: 30 mL / OUT: 300 mL / NET: -270 mL        LABS:                        8.8    5.66  )-----------( 262      ( 30 Oct 2020 07:03 )             25.8     10-30    143  |  108  |  23  ----------------------------<  114<H>  3.9   |  29  |  1.20    Ca    9.4      30 Oct 2020 07:03  Mg     2.4     10-29    TPro  7.2  /  Alb  3.7  /  TBili  0.8  /  DBili  x   /  AST  15  /  ALT  15  /  AlkPhos  82  10-30    PT/INR - ( 30 Oct 2020 07:03 )   PT: 13.2 sec;   INR: 1.14 ratio         PTT - ( 30 Oct 2020 07:03 )  PTT:32.3 sec  Urinalysis Basic - ( 29 Oct 2020 21:34 )    Color: Pale Yellow / Appearance: Clear / S.010 / pH: x  Gluc: x / Ketone: Negative  / Bili: Negative / Urobili: Negative   Blood: x / Protein: Negative / Nitrite: Negative   Leuk Esterase: Negative / RBC: x / WBC x   Sq Epi: x / Non Sq Epi: x / Bacteria: x      CAPILLARY BLOOD GLUCOSE      Hepatitis C Antibody Test (10.30.20 @ 09:08)   Hepatitis C Virus S/CO Ratio: 0.20 S/CO   Hepatitis C Virus Interpretation: Nonreact: Hepatitis C AB       ----  Personally reviewed:  Vital sign trends: [  ] yes    [  ] no     [  ] n/a  Laboratory results: [  ] yes    [  ] no     [  ] n/a  Radiology results: [  ] yes    [  ] no     [  ] n/a  Culture results: [  ] yes    [  ] no     [  ] n/a  Consultant recommendations: [  ] yes    [  ] no     [  ] n/a         Patient is a 70y old  Male who presents with a chief complaint of GI bleed (30 Oct 2020 11:12)      FROM ADMISSION H+P:   HPI:  71 yo male PMH RLE DVT on Xarelto, TIA, HTN, HLD, SERENITY with 1 episode of dialysis in the past, anemia of chronic disease, DM, BPH, GERD, neuropathy,  presents to ED with palpitations, fatigue, melena. Onset 5 days ago. Patient states he noticed black stools 5 days ago but denies any bright red blood in his stools. Patient also c/o tinnitus and nausea. Denies abdominal pain, HA, vomiting, diarrhea, CP, SOB. Patient states he went to his PMD today and was told to come to ED to be evaluated for his symptoms. Patient states he started taking aspirin a few days ago  because he was told it is good for his heart. Does not take any other NSAID's as per patient.   Patient is an extremely poor historian, does not remember his PMD name although he was there today.  Previous records available from [facility].       .  admitted for GI bleed going for endoscopy  than colonoscopy   ----  INTERVAL HPI/OVERNIGHT EVENTS: Pt seen and evaluated at the bedside. No acute overnight events occurred. This morning, Pt states he has not had a bowel movement since admission.   The Pt has been NPO and has a scheduled EGD this morning. He describes new b/l shoulder pain and persistent b/l tinnitus that started 4-5 days ago. Denies fevers, headache, chest pain, palpitations, dyspnea, abdominal pain, n/v/d/c.           ----  REVIEW OF SYSTEMS:  CONSTITUTIONAL: Admits to 20lbs WG since January due to lack of exercise. Denies fever, fatigue, weakness  CARDIOVASCULAR: denies chest pain, palpitations  RESPIRATORY: denies shortness of breath, wheezing,   GASTROINTESTINAL: denies nausea, vomiting, diarrhea, abdominal pain.  GENITOURINARY: denies dysuria   NEUROLOGICAL: Admits to numbness in Right foot and toes (not acute), denies headache  MUSCULOSKELETAL: muscle pain - possibly from uncomfortable sleeping position  skin no rash , no wound       ----  PHYSICAL EXAM:  GENERAL: patient appears well, NAD  HEENT: NC/AT, PERRLA, EOMI b/l, moist mucous membranes  NECK: supple, soft,   LUNGS: CTA, symmetric breath sounds, no wheezing, rales or rhonchi   HEART: soft S1/S2, regular rate and rhythm, no murmurs ,  no noted edema to b/l LE  GASTROINTESTINAL: abdomen is soft, nontender, nondistended, +BSx4    MUSCULOSKELETAL: no edema, clubbing or cyanosis. Numbness present over Right foot/toes.    SKIN: Right leg scar tender to touch. Skin is warm and well perfused.   NEUROLOGIC: AA&O3 , no focal deficit   gu intact     T(C): 36.9 (10-30-20 @ 07:54), Max: 37.1 (10-29-20 @ 12:38)  HR: 85 (10-30-20 @ 07:54) (85 - 98)  BP: 116/63 (10-30-20 @ 07:54) (116/63 - 154/78)  RR: 18 (10-30-20 @ 07:54) (17 - 18)  SpO2: 97% (10-30-20 @ 07:54) (97% - 100%)  Wt(kg): --    ----  I&O's Summary    29 Oct 2020 07:01  -  30 Oct 2020 07:00  --------------------------------------------------------  IN: 400 mL / OUT: 900 mL / NET: -500 mL    30 Oct 2020 07:01  -  30 Oct 2020 11:22  --------------------------------------------------------  IN: 30 mL / OUT: 300 mL / NET: -270 mL        LABS:                        8.8    5.66  )-----------( 262      ( 30 Oct 2020 07:03 )             25.8     1030    143  |  108  |  23  ----------------------------<  114<H>  3.9   |  29  |  1.20    Ca    9.4      30 Oct 2020 07:03  Mg     2.4     10-29    TPro  7.2  /  Alb  3.7  /  TBili  0.8  /  DBili  x   /  AST  15  /  ALT  15  /  AlkPhos  82  10-    PT/INR - ( 30 Oct 2020 07:03 )   PT: 13.2 sec;   INR: 1.14 ratio         PTT - ( 30 Oct 2020 07:03 )  PTT:32.3 sec  Urinalysis Basic - ( 29 Oct 2020 21:34 )    Color: Pale Yellow / Appearance: Clear / S.010 / pH: x  Gluc: x / Ketone: Negative  / Bili: Negative / Urobili: Negative   Blood: x / Protein: Negative / Nitrite: Negative   Leuk Esterase: Negative / RBC: x / WBC x   Sq Epi: x / Non Sq Epi: x / Bacteria: x      CAPILLARY BLOOD GLUCOSE      Hepatitis C Antibody Test (10.30.20 @ 09:08)   Hepatitis C Virus S/CO Ratio: 0.20 S/CO   Hepatitis C Virus Interpretation: Nonreact: Hepatitis C AB       ----  Personally reviewed:  Vital sign trends: [  ] yes    [  ] no     [  ] n/a  Laboratory results: [  ] yes    [  ] no     [  ] n/a  Radiology results: [  ] yes    [  ] no     [  ] n/a  Culture results: [  ] yes    [  ] no     [  ] n/a  Consultant recommendations: [  ] yes    [  ] no     [  ] n/a         Patient is a 70y old  Male who presents with a chief complaint of GI bleed (30 Oct 2020 11:12)      FROM ADMISSION H+P:   HPI:  71 yo male PMH RLE DVT on Xarelto, TIA, HTN, HLD, SERENITY with 1 episode of dialysis in the past, anemia of chronic disease, DM, BPH, GERD, neuropathy,  presents to ED with palpitations, fatigue, melena. Onset 5 days ago. Patient states he noticed black stools 5 days ago but denies any bright red blood in his stools. Patient also c/o tinnitus and nausea. Denies abdominal pain, HA, vomiting, diarrhea, CP, SOB. Patient states he went to his PMD today and was told to come to ED to be evaluated for his symptoms. Patient states he started taking aspirin a few days ago  because he was told it is good for his heart. Does not take any other NSAID's as per patient.   Patient is an extremely poor historian, does not remember his PMD name although he was there today.  Previous records available from [facility].       .  admitted for GI bleed going for endoscopy  than colonoscopy   ----  INTERVAL HPI/OVERNIGHT EVENTS: Pt seen and evaluated at the bedside. No acute overnight events occurred. This morning, Pt states he has not had a bowel movement since admission.   The Pt has been NPO and has a scheduled EGD this morning. He describes new b/l shoulder pain and persistent b/l tinnitus that started 4-5 days ago. Denies fevers, headache, chest pain, palpitations, dyspnea, abdominal pain, n/v/d/c.           ----  REVIEW OF SYSTEMS:  CONSTITUTIONAL: Admits to 20lbs WG since January due to lack of exercise. Denies fever, fatigue, weakness  CARDIOVASCULAR: denies chest pain, palpitations  RESPIRATORY: denies shortness of breath, wheezing,   GASTROINTESTINAL: denies nausea, vomiting, diarrhea, abdominal pain.  GENITOURINARY: denies dysuria   NEUROLOGICAL: Admits to numbness in Right foot and toes (not acute), denies headache  MUSCULOSKELETAL: muscle pain - possibly from uncomfortable sleeping position  skin no rash , no wound       ----  PHYSICAL EXAM:  GENERAL: patient appears well, NAD  HEENT: NC/AT, PERRLA, EOMI b/l, moist mucous membranes  NECK: supple, soft, no palpable cervical lymphadenopathy  LUNGS: CTA, symmetric breath sounds, no wheezing, rales or rhonchi   HEART: soft S1/S2, regular rate and rhythm, no murmurs ,  no noted edema to b/l LE  GASTROINTESTINAL: abdomen is soft, nontender, nondistended, +BSx4    MUSCULOSKELETAL: no edema, clubbing or cyanosis. Numbness present over Right foot/toes.    SKIN: Right leg scar tender to touch. Skin is warm and well perfused.   NEUROLOGIC: AA&O3 , no focal deficit   gu intact     T(C): 36.9 (10-30-20 @ 07:54), Max: 37.1 (10-29-20 @ 12:38)  HR: 85 (10-30-20 @ 07:54) (85 - 98)  BP: 116/63 (10-30-20 @ 07:54) (116/63 - 154/78)  RR: 18 (10-30-20 @ 07:54) (17 - 18)  SpO2: 97% (10-30-20 @ 07:54) (97% - 100%)  Wt(kg): --    ----  I&O's Summary    29 Oct 2020 07:01  -  30 Oct 2020 07:00  --------------------------------------------------------  IN: 400 mL / OUT: 900 mL / NET: -500 mL    30 Oct 2020 07:01  -  30 Oct 2020 11:22  --------------------------------------------------------  IN: 30 mL / OUT: 300 mL / NET: -270 mL        LABS:                        8.8    5.66  )-----------( 262      ( 30 Oct 2020 07:03 )             25.8     10    143  |  108  |  23  ----------------------------<  114<H>  3.9   |  29  |  1.20    Ca    9.4      30 Oct 2020 07:03  Mg     2.4     10-29    TPro  7.2  /  Alb  3.7  /  TBili  0.8  /  DBili  x   /  AST  15  /  ALT  15  /  AlkPhos  82  10-30    PT/INR - ( 30 Oct 2020 07:03 )   PT: 13.2 sec;   INR: 1.14 ratio         PTT - ( 30 Oct 2020 07:03 )  PTT:32.3 sec  Urinalysis Basic - ( 29 Oct 2020 21:34 )    Color: Pale Yellow / Appearance: Clear / S.010 / pH: x  Gluc: x / Ketone: Negative  / Bili: Negative / Urobili: Negative   Blood: x / Protein: Negative / Nitrite: Negative   Leuk Esterase: Negative / RBC: x / WBC x   Sq Epi: x / Non Sq Epi: x / Bacteria: x      CAPILLARY BLOOD GLUCOSE      Hepatitis C Antibody Test (10.30.20 @ 09:08)   Hepatitis C Virus S/CO Ratio: 0.20 S/CO   Hepatitis C Virus Interpretation: Nonreact: Hepatitis C AB       ----  Personally reviewed:  Vital sign trends: [  ] yes    [  ] no     [  ] n/a  Laboratory results: [  ] yes    [  ] no     [  ] n/a  Radiology results: [  ] yes    [  ] no     [  ] n/a  Culture results: [  ] yes    [  ] no     [  ] n/a  Consultant recommendations: [  ] yes    [  ] no     [  ] n/a

## 2020-10-30 NOTE — PROGRESS NOTE ADULT - PROBLEM SELECTOR PLAN 2
-H/H 9.5/28.5 in ED, +FOBT, 2/2 GIB  -baseline appears to be 7-9 per outpatient chart review  -f/u H/H 8pm, transfuse Hgb<7  -Continue home folic acid, iron supplementation -H/H 9.5/28.5 in ED, +FOBT, 2/2 GIB  -baseline appears to be 7-9 per outpatient chart review  -Hb 9.5 --> 9.1 --> 8.8 this AM, transfuse Hgb<7,   -trend H/H  -Continue home folic acid, iron supplementation

## 2020-10-30 NOTE — PROGRESS NOTE ADULT - ASSESSMENT
69 yo male PMH RLE DVT on Xarelto, HTN, HLD, BPH, GERD, neuropathy presents to ED with palpitations, fatigue, melena, admitted for GI bleed. Cardio consulted for cardiac clearance for EGD.     Hypertension   - BP well controlled BP: 116/63 (10-30-20 @ 07:54) (116/63 - 154/78)  - Continue Norvasc 10, losartan 100  - Monitor routine hemodynamics    Hyperlipidemia  - Continue Zocor 20 mg PO QHS     DVT  - Pt takes Xarelto for hx of RLE DVT. Hold A/C in setting of GIB. Would consider LE doppler U/S to evaluate resolution of DVT and need for further long term anticoagulation.    Upper GI bleed  - + FOBT  - Hemoglobin <--8.8, <--9.1, <--9.5  - Hematocrit <--25.8, <--26.7, <--28.5  - Plan for EGD  - No clear evidence of acute ischemia, trops negative x 1.   - No evidence of volume overload. BNP negative.  - EKG shows NSR, LAD, no acute ischemic changes  - Echo 10/2/2019: 1. Left ventricular ejection fraction, by visual estimation, is 60 to 65%. 2. Normal global left ventricular systolic function. 3. Normal left ventricular internal cavity size. 4. Spectral Doppler shows impaired relaxation pattern of left ventricular myocardial filling (Grade I diastolic dysfunction).  - Pt has no history of MI, active CAD, ADHF or severe valvular disease and in the setting of low to moderate risk procedure, patient is optimized from cardiovascular standpoint to proceed with planned procedure with routine hemodynamic monitoring.     - Monitor and replete lytes, keep K>4, Mg>2.  - All other medical needs as per primary team.  - Other cardiovascular workup will depend on clinical course.  - Will continue to follow.    Marie Vasquez, MS FNP, [facility]  Nurse Practitioner- Cardiology   Spectra #[phone]

## 2020-10-31 LAB
ANION GAP SERPL CALC-SCNC: 7 MMOL/L — SIGNIFICANT CHANGE UP (ref 5–17)
BASOPHILS # BLD AUTO: 0.02 K/UL — SIGNIFICANT CHANGE UP (ref 0–0.2)
BASOPHILS NFR BLD AUTO: 0.3 % — SIGNIFICANT CHANGE UP (ref 0–2)
BUN SERPL-MCNC: 17 MG/DL — SIGNIFICANT CHANGE UP (ref 7–23)
CALCIUM SERPL-MCNC: 9.1 MG/DL — SIGNIFICANT CHANGE UP (ref 8.5–10.1)
CHLORIDE SERPL-SCNC: 106 MMOL/L — SIGNIFICANT CHANGE UP (ref 96–108)
CO2 SERPL-SCNC: 28 MMOL/L — SIGNIFICANT CHANGE UP (ref 22–31)
CREAT SERPL-MCNC: 1.2 MG/DL — SIGNIFICANT CHANGE UP (ref 0.5–1.3)
EOSINOPHIL # BLD AUTO: 0.05 K/UL — SIGNIFICANT CHANGE UP (ref 0–0.5)
EOSINOPHIL NFR BLD AUTO: 0.7 % — SIGNIFICANT CHANGE UP (ref 0–6)
GLUCOSE SERPL-MCNC: 117 MG/DL — HIGH (ref 70–99)
HCT VFR BLD CALC: 24.9 % — LOW (ref 39–50)
HGB BLD-MCNC: 8.4 G/DL — LOW (ref 13–17)
IMM GRANULOCYTES NFR BLD AUTO: 0.9 % — SIGNIFICANT CHANGE UP (ref 0–1.5)
LYMPHOCYTES # BLD AUTO: 1.8 K/UL — SIGNIFICANT CHANGE UP (ref 1–3.3)
LYMPHOCYTES # BLD AUTO: 26.4 % — SIGNIFICANT CHANGE UP (ref 13–44)
MCHC RBC-ENTMCNC: 29.3 PG — SIGNIFICANT CHANGE UP (ref 27–34)
MCHC RBC-ENTMCNC: 33.7 GM/DL — SIGNIFICANT CHANGE UP (ref 32–36)
MCV RBC AUTO: 86.8 FL — SIGNIFICANT CHANGE UP (ref 80–100)
MONOCYTES # BLD AUTO: 0.45 K/UL — SIGNIFICANT CHANGE UP (ref 0–0.9)
MONOCYTES NFR BLD AUTO: 6.6 % — SIGNIFICANT CHANGE UP (ref 2–14)
NEUTROPHILS # BLD AUTO: 4.43 K/UL — SIGNIFICANT CHANGE UP (ref 1.8–7.4)
NEUTROPHILS NFR BLD AUTO: 65.1 % — SIGNIFICANT CHANGE UP (ref 43–77)
NRBC # BLD: 0 /100 WBCS — SIGNIFICANT CHANGE UP (ref 0–0)
PLATELET # BLD AUTO: 265 K/UL — SIGNIFICANT CHANGE UP (ref 150–400)
POTASSIUM SERPL-MCNC: 4 MMOL/L — SIGNIFICANT CHANGE UP (ref 3.5–5.3)
POTASSIUM SERPL-SCNC: 4 MMOL/L — SIGNIFICANT CHANGE UP (ref 3.5–5.3)
RBC # BLD: 2.87 M/UL — LOW (ref 4.2–5.8)
RBC # FLD: 13.5 % — SIGNIFICANT CHANGE UP (ref 10.3–14.5)
SODIUM SERPL-SCNC: 141 MMOL/L — SIGNIFICANT CHANGE UP (ref 135–145)
WBC # BLD: 6.81 K/UL — SIGNIFICANT CHANGE UP (ref 3.8–10.5)
WBC # FLD AUTO: 6.81 K/UL — SIGNIFICANT CHANGE UP (ref 3.8–10.5)

## 2020-10-31 PROCEDURE — 99232 SBSQ HOSP IP/OBS MODERATE 35: CPT

## 2020-10-31 PROCEDURE — 99232 SBSQ HOSP IP/OBS MODERATE 35: CPT | Mod: GC

## 2020-10-31 PROCEDURE — 93971 EXTREMITY STUDY: CPT | Mod: 26,RT

## 2020-10-31 RX ADMIN — Medication 1 MILLIGRAM(S): at 11:09

## 2020-10-31 RX ADMIN — PANTOPRAZOLE SODIUM 40 MILLIGRAM(S): 20 TABLET, DELAYED RELEASE ORAL at 05:13

## 2020-10-31 RX ADMIN — SIMVASTATIN 20 MILLIGRAM(S): 20 TABLET, FILM COATED ORAL at 21:02

## 2020-10-31 RX ADMIN — TAMSULOSIN HYDROCHLORIDE 0.4 MILLIGRAM(S): 0.4 CAPSULE ORAL at 21:02

## 2020-10-31 RX ADMIN — GABAPENTIN 300 MILLIGRAM(S): 400 CAPSULE ORAL at 05:13

## 2020-10-31 RX ADMIN — PANTOPRAZOLE SODIUM 40 MILLIGRAM(S): 20 TABLET, DELAYED RELEASE ORAL at 17:12

## 2020-10-31 RX ADMIN — Medication 325 MILLIGRAM(S): at 11:09

## 2020-10-31 RX ADMIN — GABAPENTIN 300 MILLIGRAM(S): 400 CAPSULE ORAL at 17:12

## 2020-10-31 RX ADMIN — LOSARTAN POTASSIUM 100 MILLIGRAM(S): 100 TABLET, FILM COATED ORAL at 05:13

## 2020-10-31 RX ADMIN — AMLODIPINE BESYLATE 10 MILLIGRAM(S): 2.5 TABLET ORAL at 05:13

## 2020-10-31 NOTE — PROGRESS NOTE ADULT - ATTENDING COMMENTS
Patient was seen and examined by myself. Case was discussed with house staff in details. I have reviewed and agree with the plan as outlined above with edits where appropriate.    Vital Signs Last 24 Hrs  T(C): 36.9 (31 Oct 2020 13:03), Max: 36.9 (31 Oct 2020 13:03)  T(F): 98.5 (31 Oct 2020 13:03), Max: 98.5 (31 Oct 2020 13:03)  HR: 88 (31 Oct 2020 13:03) (77 - 88)  BP: 110/67 (31 Oct 2020 13:03) (110/67 - 122/77)  RR: 18 (31 Oct 2020 13:03) (18 - 20)  SpO2: 97% (31 Oct 2020 13:03) (94% - 97%)                            8.4    6.81  )-----------( 265      ( 31 Oct 2020 08:31 )             24.9       A/P: 69 y/o M who presented with melena hospitalized for   1. Anemia- acute blood loss  2. Melena presumed due to GI bleed; EGD unremarkable; await colonoscopy  3. H/O DVT  4. essential HTN- stable BP  5. BPH- stable on Flomax    - plan for colonoscopy on Monday  - NPO past midnight Sunday night  - Continue PPI  - OOB to chair  - Fall precautions  other plan as outlined above

## 2020-10-31 NOTE — PROGRESS NOTE ADULT - SUBJECTIVE AND OBJECTIVE BOX
[facility] -- Sandie Valentine, Mina Sullivan, Bruce Nagy Savella, [place]  Office # [phone]    Follow Up:  Palpitations, Preop/Postop Optimization    Subjective/Observations: No c/o palpitations at rest.  Denies dizziness or lightheadedness.  Denies CP or SOB, KEE.  No BM overnight    REVIEW OF SYSTEMS: All other review of systems is negative unless indicated above  PAST MEDICAL & SURGICAL HISTORY:  Deep vein thrombosis (DVT)    Neuropathy    BPH (benign prostatic hyperplasia)    HLD (hyperlipidemia)    HTN (hypertension)    GERD (gastroesophageal reflux disease)    HLD (hyperlipidemia)    History of femoropopliteal bypass    No significant past surgical history    MEDICATIONS  (STANDING):  amLODIPine   Tablet 10 milliGRAM(s) Oral daily  ferrous    sulfate 325 milliGRAM(s) Oral daily  folic acid 1 milliGRAM(s) Oral daily  gabapentin 300 milliGRAM(s) Oral two times a day  influenza   Vaccine 0.5 milliLiter(s) IntraMuscular once  losartan 100 milliGRAM(s) Oral daily  pantoprazole  Injectable 40 milliGRAM(s) IV Push two times a day  simvastatin 20 milliGRAM(s) Oral at bedtime  tamsulosin 0.4 milliGRAM(s) Oral at bedtime    MEDICATIONS  (PRN):    Allergies    No Known Drug Allergies  Prefers chocolate ensure drinks (Unknown)    Intolerances    Vital Signs Last 24 Hrs  T(C): 36.7 (31 Oct 2020 07:17), Max: 37 (30 Oct 2020 13:33)  T(F): 98.1 (31 Oct 2020 07:17), Max: 98.6 (30 Oct 2020 13:33)  HR: 88 (31 Oct 2020 07:17) (77 - 93)  BP: 117/65 (31 Oct 2020 07:17) (104/86 - 131/70)  BP(mean): --  RR: 19 (31 Oct 2020 07:17) (18 - 20)  SpO2: 96% (31 Oct 2020 07:17) (94% - 99%)  I&O's Summary    30 Oct 2020 07:01  -  31 Oct 2020 07:00  --------------------------------------------------------  IN: 650 mL / OUT: 1975 mL / NET: -1325 mL      PHYSICAL EXAM:  TELE: NSR  Constitutional: NAD, awake and alert, well-developed  HEENT: Moist Mucous Membranes, Anicteric  Pulmonary: Non-labored, breath sounds are clear bilaterally, No wheezing, rales or rhonchi  Cardiovascular: Regular, S1 and S2, No murmurs, rubs, gallops or clicks  Gastrointestinal: Bowel Sounds present, soft, nontender.   Lymph: LLE edema. No lymphadenopathy.  Skin: No visible rashes or ulcers.  Psych:  Mood & affect appropriate  LABS: All Labs Reviewed:                        8.8    5.66  )-----------( 262      ( 30 Oct 2020 07:03 )             25.8                         9.1    x     )-----------( x        ( 29 Oct 2020 20:51 )             26.7                         9.5    8.76  )-----------( 301      ( 29 Oct 2020 13:45 )             28.5     30 Oct 2020 07:03    143    |  108    |  23     ----------------------------<  114    3.9     |  29     |  1.20   29 Oct 2020 13:45    140    |  108    |  22     ----------------------------<  122    4.2     |  26     |  1.30     Ca    9.4        30 Oct 2020 07:03  Ca    9.1        29 Oct 2020 13:45  Mg     2.4       29 Oct 2020 13:45    TPro  7.2    /  Alb  3.7    /  TBili  0.8    /  DBili  x      /  AST  15     /  ALT  15     /  AlkPhos  82     30 Oct 2020 07:03  TPro  8.2    /  Alb  4.1    /  TBili  0.7    /  DBili  x      /  AST  21     /  ALT  17     /  AlkPhos  92     29 Oct 2020 13:45    PT/INR - ( 30 Oct 2020 07:03 )   PT: 13.2 sec;   INR: 1.14 ratio      PTT - ( 30 Oct 2020 07:03 )  PTT:32.3 sec  CARDIAC MARKERS ( 29 Oct 2020 13:45 )  <.015 ng/mL / x     / x     / x     / 1.2 ng/mL      EXAM:  ECHO TRANSTHORACIC COMP W DOPP      PROCEDURE DATE:  Oct  2 2019   .      INTERPRETATION:  REPORT:    TRANSTHORACIC ECHOCARDIOGRAM REPORT         Patient Name:   AIMEE SOLER Patient Location: [facility] Rec #:  [id number]         Accession #:      [id number]  Account #:                       Height:           68.9 in 175.0 cm  YOB: 1950        Weight:           154.3 lb 70.00 kg  Patient Age:    69 years         BSA:              1.85 m²  Patient Gender: M        BP:               127/75 mmHg       Date of Exam:        10/2/2019 1:35:11 PM  Sonographer:         Luiza Andujar  Referring Physician: Maxwell Benavides    Procedure:   Follow up or Limited Echocardiogram.  Indications: Dyspnea, unspecified -R06.00  Diagnosis:   Dyspnea, unspecified - R06.00         2D AND M-MODE MEASUREMENTS (normal ranges within parentheses):  Left Ventricle:                  Normal   Aorta/Left Atrium:            Normal  IVSd (2D):              1.22 cm (0.7-1.1) Aortic Root (2D):  2.61 cm   (2.4-3.7)  LVPWd (2D):             1.22 cm (0.7-1.1) Left Atrium (2D):  2.68 cm   (1.9-4.0)  LVIDd (2D):             3.69 cm (3.4-5.7)  LVIDs (2D):             2.84 cm  LV FS (2D):             23.2 %   (>25%)  Relative Wall Thickness  0.66    (<0.42)    LV DIASTOLIC FUNCTION:  MV Peak E: 0.82 m/s e', MV Annika: 0.06 m/s  MV Peak A: 0.96 m/s E/e' Ratio: 13.09  E/A Ratio: 0.86    SPECTRAL DOPPLER ANALYSIS (where applicable):  Aortic Valve: AoV Max Cornelius: 1.75 m/s AoV Peak P.3mmHg AoV Mean P.0 mmHg    LVOT Vmax: 1.21 m/s LVOT VTI: 0.235 m LVOT Diameter: 2.02 cm    AoV Area, Vmax: 2.21 cm² AoV Area, VTI: 2.39 cm² AoV Area, Vmn: 2.28 cm²  Ao VTI: 0.316  Tricuspid Valve and PA/RV Systolic Pressure: TR Max Velocity: 2.30 m/s RA   Pressure: 3 mmHg RVSP/PASP: 24.2 mmHg       PHYSICIAN INTERPRETATION:  Left Ventricle: The left ventricular internal cavity size is normal. Left   ventricular wall thickness is normal.  Global LV systolic function was normal. Left ventricular ejection   fraction, by visual estimation, is 60 to 65%. Spectral Doppler shows   impaired relaxation pattern of left ventricular myocardial filling (Grade   I diastolic dysfunction).  Right Ventricle: Normal right ventricular size and function.  Left Atrium: The left atrium is normal in size.  Right Atrium: The right atrium is normal in size.  Pericardium: There is no evidence of pericardial effusion.  Mitral Valve: Structurally normal mitral valve, with normal leaflet   excursion. Trace mitral valve regurgitation is seen.  Tricuspid Valve: Structurally normal tricuspid valve, with normal leaflet   excursion. Mild tricuspid regurgitation is visualized.  Aortic Valve: Normal trileaflet aortic valve with normal opening. No   evidence of aortic valve regurgitation is seen.  Pulmonic Valve: Structurally normal pulmonic valve, with normal leaflet   excursion. Trace pulmonic valve regurgitation.  Aorta: The aortic root is normal in size and structure.  Pulmonary Artery: The main pulmonary artery is normal in size.  Venous: The inferior vena cava is normal. The inferior vena cava was   normal sized, with respiratory size variation greater than 50%. The   inferior vena cava and the hepatic vein show a normal flow pattern.    Summary:   1. Left ventricular ejection fraction, by visual estimation, is 60 to   65%.   2. Normal global left ventricular systolic function.   3. Normal left ventricular internal cavity size.   4. Spectral Doppler shows impaired relaxation pattern of left   ventricular myocardial filling (Grade I diastolic dysfunction).   5. There is no evidence of pericardial effusion.    MD Shad Electronically signed on 10/2/2019 at 3:45:53 PM     *** Final ***    JING CANTRELL M.D.  This document has been electronically signed. Oct  2 2019  1:35PM     EXAM:  XR CHEST PORTABLE URGENT 1V                          PROCEDURE DATE:  10/29/2020      INTERPRETATION:  AP semierect chest on 2020 at 2:53 PM. Patient has black stools.    COMPARISON: None available.    Heart size is within normal limits.    The lung fields and pleural surfaces are unremarkable.    IMPRESSION: Negative chest.    ANJALI PARKS M.D., ATTENDING RADIOLOGIST  This document has been electronically signed. Oct 29 2020  3:05PM      Ventricular Rate 90 BPM    Atrial Rate 90 BPM    P-R Interval 144 ms    QRS Duration 96 ms    Q-T Interval 362 ms    QTC Calculation(Bazett) 442 ms    P Axis 64 degrees    R Axis -57 degrees    T Axis 74 degrees    Diagnosis Line Normal sinus rhythm  Left axis deviation  Abnormal ECG  No previous ECGs available  Confirmed by anjali Martínez ([id number]) on 10/29/2020 3:51:30 PM

## 2020-10-31 NOTE — PROGRESS NOTE ADULT - PROBLEM SELECTOR PLAN 2
-H/H 9.5/28.5 in ED, +FOBT, 2/2 GIB  -baseline appears to be 7-9 per outpatient chart review  -Hb 9.5 --> 9.1 --> 8.8 --> 8.4 this AM, transfuse Hgb<8   -trend H/H  -Continue home folic acid, iron supplementation -H/H 9.5/28.5 in ED, +FOBT, 2/2 GIB  -baseline appears to be 7-9 per outpatient chart review  -Hb 9.5 --> 9.1 --> 8.8 --> 8.4 this AM, transfuse Hgb<8   -trend H/H  -Continue home folic acid, iron supplementation  -check iron studies with AM labs, f/u results

## 2020-10-31 NOTE — PROGRESS NOTE ADULT - ASSESSMENT
69 yo male PMH RLE DVT on Xarelto, TIA, HTN, HLD, SERENITY with 1 episode of dialysis, anemia of chronic disease, DM, BPH, GERD, neuropathy,  presents to ED with palpitations, fatigue, melena.  admitted for GI bleed.

## 2020-10-31 NOTE — PROGRESS NOTE ADULT - PROBLEM SELECTOR PLAN 1
Patient with 5 days melena, palpitations, fatigue  -FOBT +, H/H 9.5/28.5 in ED,  -Hb 9.5 --> 9.1 --> 8.8 --> 8.4 this AM, transfuse Hgb<8 due to scheduled upcoming colonoscopy  -Hold Xarelto, aspirin in setting of GIB, on Xarelto for hx RLE DVT  -continue 40mg IVP protonix BID  -Diet advanced as tolerated  -NPO except meds after midnight on Sunday (11/01/20), bowel prep as ordered by GI  -EGD with no active bleeding, f/u biopsy results  -Colonoscopy scheduled on Monday (11/02/20)  -Cardio Dr. Martínez consulted for cardiac/procedure clearance  -GI Dr. DAVINA El consulted in ED and on board  -patient is medically optimized for colonoscopy with cardiac clearance

## 2020-10-31 NOTE — PROGRESS NOTE ADULT - SUBJECTIVE AND OBJECTIVE BOX
Patient is a 70y old  Male who presents with a chief complaint of GI bleed (31 Oct 2020 08:23)      FROM ADMISSION H+P:   HPI:  71 yo male PMH RLE DVT on Xarelto, TIA, HTN, HLD, SERENITY with 1 episode of dialysis in the past, anemia of chronic disease, DM, BPH, GERD, neuropathy,  presents to ED with palpitations, fatigue, melena. Onset 5 days ago. Patient states he noticed black stools 5 days ago but denies any bright red blood in his stools. Patient also c/o tinnitus and nausea. Denies abdominal pain, HA, vomiting, diarrhea, CP, SOB. Patient states he went to his PMD today and was told to come to ED to be evaluated for his symptoms. Patient states he started taking aspirin a few days ago  because he was told it is good for his heart. Does not take any other NSAID's as per patient.   Patient is an extremely poor historian, does not remember his PMD name although he was there today.  Previous records available from [facility].     In ED:   Vitals: T 98.7, HR 97, /77, RR 18, SpO2 98%  Labs significant for: +FOBT, RBC 3.28, Hgb 9.5, Hct 28.5, PT 14.4, INR 1.24, PTT 36.3, glucose 122  CXR: unremarkable  EKG: normal sinus rhythm, rate 98, left axis deviation  Given: Protonix 40mg IVP x1 (29 Oct 2020 15:34)      ----  INTERVAL HPI/OVERNIGHT EVENTS: Pt seen and evaluated at the bedside. No acute overnight events occurred. Has not had a BM yet since admission. Scheduled for colonoscopy . Eating full liquid diet this morning. Still complaining of b/l mild intermittent shoulder pain with no inciting factors. Otherwise, no acute complaints.    ----  PAST MEDICAL & SURGICAL HISTORY:  Deep vein thrombosis (DVT)    Neuropathy    BPH (benign prostatic hyperplasia)    HLD (hyperlipidemia)    HTN (hypertension)    GERD (gastroesophageal reflux disease)    HLD (hyperlipidemia)    History of femoropopliteal bypass    No significant past surgical history        FAMILY HISTORY:      Allergies    No Known Drug Allergies  Prefers chocolate ensure drinks (Unknown)    Intolerances        ----  REVIEW OF SYSTEMS:  CONSTITUTIONAL: denies fever, chills  SKIN: denies new lesions, rash  CARDIOVASCULAR: denies chest pain, chest pressure, palpitations  RESPIRATORY: denies shortness of breath, sputum production  GASTROINTESTINAL: denies nausea, vomiting, diarrhea, abdominal pain  GENITOURINARY: denies dysuria, discharge  HEMATOLOGIC: denies gross bleeding, bruising  LYMPHATICS: denies enlarged lymph nodes, extremity swelling      ----  PHYSICAL EXAM:  GENERAL: patient appears well, no acute distress, appropriately interactive  EYES: sclera clear, no exudates  NECK: supple, soft, no thyromegaly noted  LUNGS: good air entry bilaterally, clear to auscultation, symmetric breath sounds, no wheezing or rhonchi appreciated  HEART: soft S1/S2, regular rate and rhythm, no murmurs noted, no noted edema to b/l LE  GASTROINTESTINAL: abdomen is soft, nontender, nondistended, hypoactive bowel sounds  INTEGUMENT: good skin turgor, appropriate for ethnicity, appears well perfused, no jaundice noted  NEUROLOGIC: awake, alert, oriented x3, no obvious sensory deficits    T(C): 36.7 (10-31-20 @ 07:17), Max: 37 (10-30-20 @ 13:33)  HR: 88 (10-31-20 @ 07:17) (77 - 93)  BP: 117/65 (10-31-20 @ 07:17) (104/86 - 131/70)  RR: 19 (10-31-20 @ 07:17) (18 - 20)  SpO2: 96% (10-31-20 @ 07:17) (94% - 99%)  Wt(kg): --    ----  I&O's Summary    30 Oct 2020 07:01  -  31 Oct 2020 07:00  --------------------------------------------------------  IN: 650 mL / OUT: 1975 mL / NET: -1325 mL        LABS:                        8.4    6.81  )-----------( 265      ( 31 Oct 2020 08:31 )             24.9     10-31    141  |  106  |  17  ----------------------------<  117<H>  4.0   |  28  |  1.20    Ca    9.1      31 Oct 2020 08:31  Mg     2.4     10-29    TPro  7.2  /  Alb  3.7  /  TBili  0.8  /  DBili  x   /  AST  15  /  ALT  15  /  AlkPhos  82  10-30    PT/INR - ( 30 Oct 2020 07:03 )   PT: 13.2 sec;   INR: 1.14 ratio         PTT - ( 30 Oct 2020 07:03 )  PTT:32.3 sec  Urinalysis Basic - ( 29 Oct 2020 21:34 )    Color: Pale Yellow / Appearance: Clear / S.010 / pH: x  Gluc: x / Ketone: Negative  / Bili: Negative / Urobili: Negative   Blood: x / Protein: Negative / Nitrite: Negative   Leuk Esterase: Negative / RBC: x / WBC x   Sq Epi: x / Non Sq Epi: x / Bacteria: x      CAPILLARY BLOOD GLUCOSE                    ----  Personally reviewed:  Vital sign trends: [ x ] yes    [  ] no     [  ] n/a  Laboratory results: [ x ] yes    [  ] no     [  ] n/a  Radiology results: [ x ] yes    [  ] no     [  ] n/a  Culture results: [  ] yes    [  ] no     [ x ] n/a  Consultant recommendations: [ x ] yes    [  ] no     [  ] n/a         Patient is a 70y old  Male who presents with a chief complaint of GI bleed (31 Oct 2020 08:23)      FROM ADMISSION H+P:   HPI:  71 yo male PMH RLE DVT on Xarelto, TIA, HTN, HLD, SERENITY with 1 episode of dialysis in the past, anemia of chronic disease, DM, BPH, GERD, neuropathy,  presents to ED with palpitations, fatigue, melena. Onset 5 days ago. Patient states he noticed black stools 5 days ago but denies any bright red blood in his stools. Patient also c/o tinnitus and nausea. Denies abdominal pain, HA, vomiting, diarrhea, CP, SOB. Patient states he went to his PMD today and was told to come to ED to be evaluated for his symptoms. Patient states he started taking aspirin a few days ago  because he was told it is good for his heart. Does not take any other NSAID's as per patient.   Patient is an extremely poor historian, does not remember his PMD name although he was there today.  Previous records available from [facility].     In ED:   Vitals: T 98.7, HR 97, /77, RR 18, SpO2 98%  Labs significant for: +FOBT, RBC 3.28, Hgb 9.5, Hct 28.5, PT 14.4, INR 1.24, PTT 36.3, glucose 122  CXR: unremarkable  EKG: normal sinus rhythm, rate 98, left axis deviation  Given: Protonix 40mg IVP x1 (29 Oct 2020 15:34)      ----  INTERVAL HPI/OVERNIGHT EVENTS: Pt seen and evaluated at the bedside. No acute overnight events occurred. Has not had a BM yet since admission. Scheduled for colonoscopy . Eating full liquid diet this morning. Still complaining of b/l mild intermittent shoulder pain with no inciting factors. Otherwise, no acute complaints.    ----  PAST MEDICAL & SURGICAL HISTORY:  Deep vein thrombosis (DVT)    Neuropathy    BPH (benign prostatic hyperplasia)    HLD (hyperlipidemia)    HTN (hypertension)    GERD (gastroesophageal reflux disease)    HLD (hyperlipidemia)    History of femoropopliteal bypass    No significant past surgical history    MEDICATIONS  (STANDING):  amLODIPine   Tablet 10 milliGRAM(s) Oral daily  ferrous    sulfate 325 milliGRAM(s) Oral daily  folic acid 1 milliGRAM(s) Oral daily  gabapentin 300 milliGRAM(s) Oral two times a day  influenza   Vaccine 0.5 milliLiter(s) IntraMuscular once  losartan 100 milliGRAM(s) Oral daily  pantoprazole  Injectable 40 milliGRAM(s) IV Push two times a day  simvastatin 20 milliGRAM(s) Oral at bedtime  tamsulosin 0.4 milliGRAM(s) Oral at bedtime    MEDICATIONS  (PRN):          Allergies    No Known Drug Allergies  Prefers chocolate ensure drinks (Unknown)    Intolerances        ----  REVIEW OF SYSTEMS:  CONSTITUTIONAL: denies fever, chills  SKIN: denies new lesions, rash  CARDIOVASCULAR: denies chest pain, chest pressure, palpitations  RESPIRATORY: denies shortness of breath  GASTROINTESTINAL: denies nausea, vomiting, diarrhea, abdominal pain  GENITOURINARY: denies dysuria, discharge  HEMATOLOGIC: denies gross bleeding, bruising      ----  PHYSICAL EXAM:  GENERAL: patient appears well, no acute distress, appropriately interactive  EYES: sclera clear, no exudates  NECK: supple,   LUNGS: good air entry bilaterally, clear to auscultation, symmetric breath sounds, no wheezing or rhonchi appreciated  HEART: soft S1/S2, regular rate and rhythm, no murmurs noted, no edema to b/l LE  GASTROINTESTINAL: abdomen is soft, nontender, nondistended, hypoactive bowel sounds  INTEGUMENT: good skin turgor, appropriate for ethnicity, appears well perfused, no jaundice noted  NEUROLOGIC: awake, alert, oriented x3,    T(C): 36.7 (10-31-20 @ 07:17), Max: 37 (10-30-20 @ 13:33)  HR: 88 (10-31-20 @ 07:17) (77 - 93)  BP: 117/65 (10-31-20 @ 07:17) (104/86 - 131/70)  RR: 19 (10-31-20 @ 07:17) (18 - 20)  SpO2: 96% (10-31-20 @ 07:17) (94% - 99%)    ----  I&O's Summary    30 Oct 2020 07:01  -  31 Oct 2020 07:00  --------------------------------------------------------  IN: 650 mL / OUT: 1975 mL / NET: -1325 mL        LABS:                        8.4    6.81  )-----------( 265      ( 31 Oct 2020 08:31 )             24.9     10-31    141  |  106  |  17  ----------------------------<  117<H>  4.0   |  28  |  1.20    Ca    9.1      31 Oct 2020 08:31  Mg     2.4     10-29    TPro  7.2  /  Alb  3.7  /  TBili  0.8  /  DBili  x   /  AST  15  /  ALT  15  /  AlkPhos  82  10-30    PT/INR - ( 30 Oct 2020 07:03 )   PT: 13.2 sec;   INR: 1.14 ratio         PTT - ( 30 Oct 2020 07:03 )  PTT:32.3 sec  Urinalysis Basic - ( 29 Oct 2020 21:34 )    Color: Pale Yellow / Appearance: Clear / S.010 / pH: x  Gluc: x / Ketone: Negative  / Bili: Negative / Urobili: Negative   Blood: x / Protein: Negative / Nitrite: Negative   Leuk Esterase: Negative / RBC: x / WBC x   Sq Epi: x / Non Sq Epi: x / Bacteria: x      CAPILLARY BLOOD GLUCOSE                    ----  Personally reviewed:  Vital sign trends: [ x ] yes    [  ] no     [  ] n/a  Laboratory results: [ x ] yes    [  ] no     [  ] n/a  Radiology results: [ x ] yes    [  ] no     [  ] n/a  Culture results: [  ] yes    [  ] no     [ x ] n/a  Consultant recommendations: [ x ] yes    [  ] no     [  ] n/a

## 2020-10-31 NOTE — PROGRESS NOTE ADULT - ASSESSMENT
71 yo male PMH RLE DVT on Xarelto, HTN, HLD, BPH, GERD, neuropathy presents to ED with palpitations, fatigue, melena, admitted for GI bleed. Cardio consulted for cardiac clearance for EGD.       GI bleed  - + FOBT s/p EGD, found to have gastritis with no bleeding  - Planned for colonoscopy on Monday  - H/H continues to trend down (8.8 <--9.1 <--9.5)  - Transfuse per Primary  - Xarelto on hold.  Please resume as per GI  - Pt has no history of MI, active CAD, ADHF or severe valvular disease. Patient remains optimized from cardiovascular standpoint to proceed with planned non-cardiac low risk procedure with routine hemodynamic monitoring.     HTN  - Echo 10/2/2019: EF 60 to 65% with Grade I diastolic dysfunction  - BP stable and controlled  - Continue Norvasc and Losartan  - Monitor and replete lytes, keep K>4, Mg>2.    HLD  - Continue statin    DVT  - Pt takes Xarelto for hx of RLE DVT.  continue to hold in the setting of GIB.  Would consider LE doppler U/S to evaluate resolution of DVT and need for further long term anticoagulation.    - All other medical needs as per primary team.  - Other cardiovascular workup will depend on clinical course.  - Will continue to follow.    Julia Gibbons DNP, NP-C  Cardiology   [facility][phone]

## 2020-11-01 ENCOUNTER — TRANSCRIPTION ENCOUNTER (OUTPATIENT)
Age: 70
End: 2020-11-01

## 2020-11-01 LAB
ANION GAP SERPL CALC-SCNC: 6 MMOL/L — SIGNIFICANT CHANGE UP (ref 5–17)
BUN SERPL-MCNC: 13 MG/DL — SIGNIFICANT CHANGE UP (ref 7–23)
CALCIUM SERPL-MCNC: 9.3 MG/DL — SIGNIFICANT CHANGE UP (ref 8.5–10.1)
CHLORIDE SERPL-SCNC: 104 MMOL/L — SIGNIFICANT CHANGE UP (ref 96–108)
CO2 SERPL-SCNC: 31 MMOL/L — SIGNIFICANT CHANGE UP (ref 22–31)
CREAT SERPL-MCNC: 1.2 MG/DL — SIGNIFICANT CHANGE UP (ref 0.5–1.3)
FERRITIN SERPL-MCNC: 39 NG/ML — SIGNIFICANT CHANGE UP (ref 30–400)
GLUCOSE SERPL-MCNC: 119 MG/DL — HIGH (ref 70–99)
HCT VFR BLD CALC: 24.7 % — LOW (ref 39–50)
HGB BLD-MCNC: 8.3 G/DL — LOW (ref 13–17)
IRON SATN MFR SERPL: 13 % — LOW (ref 16–55)
IRON SATN MFR SERPL: 32 UG/DL — LOW (ref 45–165)
MCHC RBC-ENTMCNC: 29.4 PG — SIGNIFICANT CHANGE UP (ref 27–34)
MCHC RBC-ENTMCNC: 33.6 GM/DL — SIGNIFICANT CHANGE UP (ref 32–36)
MCV RBC AUTO: 87.6 FL — SIGNIFICANT CHANGE UP (ref 80–100)
NRBC # BLD: 0 /100 WBCS — SIGNIFICANT CHANGE UP (ref 0–0)
PLATELET # BLD AUTO: 318 K/UL — SIGNIFICANT CHANGE UP (ref 150–400)
POTASSIUM SERPL-MCNC: 3.7 MMOL/L — SIGNIFICANT CHANGE UP (ref 3.5–5.3)
POTASSIUM SERPL-SCNC: 3.7 MMOL/L — SIGNIFICANT CHANGE UP (ref 3.5–5.3)
RBC # BLD: 2.82 M/UL — LOW (ref 4.2–5.8)
RBC # FLD: 13.9 % — SIGNIFICANT CHANGE UP (ref 10.3–14.5)
SODIUM SERPL-SCNC: 141 MMOL/L — SIGNIFICANT CHANGE UP (ref 135–145)
TIBC SERPL-MCNC: 259 UG/DL — SIGNIFICANT CHANGE UP (ref 220–430)
TRANSFERRIN SERPL-MCNC: 215 MG/DL — SIGNIFICANT CHANGE UP (ref 200–360)
UIBC SERPL-MCNC: 226 UG/DL — SIGNIFICANT CHANGE UP (ref 110–370)
WBC # BLD: 8.37 K/UL — SIGNIFICANT CHANGE UP (ref 3.8–10.5)
WBC # FLD AUTO: 8.37 K/UL — SIGNIFICANT CHANGE UP (ref 3.8–10.5)

## 2020-11-01 PROCEDURE — 99232 SBSQ HOSP IP/OBS MODERATE 35: CPT

## 2020-11-01 PROCEDURE — 99232 SBSQ HOSP IP/OBS MODERATE 35: CPT | Mod: GC

## 2020-11-01 RX ORDER — POLYETHYLENE GLYCOL 3350 17 G/17G
17 POWDER, FOR SOLUTION ORAL DAILY
Refills: 0 | Status: DISCONTINUED | OUTPATIENT
Start: 2020-11-01 | End: 2020-11-05

## 2020-11-01 RX ADMIN — SIMVASTATIN 20 MILLIGRAM(S): 20 TABLET, FILM COATED ORAL at 21:10

## 2020-11-01 RX ADMIN — GABAPENTIN 300 MILLIGRAM(S): 400 CAPSULE ORAL at 17:11

## 2020-11-01 RX ADMIN — Medication 1 MILLIGRAM(S): at 11:05

## 2020-11-01 RX ADMIN — TAMSULOSIN HYDROCHLORIDE 0.4 MILLIGRAM(S): 0.4 CAPSULE ORAL at 21:10

## 2020-11-01 RX ADMIN — PANTOPRAZOLE SODIUM 40 MILLIGRAM(S): 20 TABLET, DELAYED RELEASE ORAL at 05:08

## 2020-11-01 RX ADMIN — AMLODIPINE BESYLATE 10 MILLIGRAM(S): 2.5 TABLET ORAL at 05:08

## 2020-11-01 RX ADMIN — GABAPENTIN 300 MILLIGRAM(S): 400 CAPSULE ORAL at 05:08

## 2020-11-01 RX ADMIN — Medication 1 LITER(S): at 21:11

## 2020-11-01 RX ADMIN — LOSARTAN POTASSIUM 100 MILLIGRAM(S): 100 TABLET, FILM COATED ORAL at 05:08

## 2020-11-01 RX ADMIN — PANTOPRAZOLE SODIUM 40 MILLIGRAM(S): 20 TABLET, DELAYED RELEASE ORAL at 17:12

## 2020-11-01 RX ADMIN — Medication 1 LITER(S): at 18:08

## 2020-11-01 RX ADMIN — Medication 325 MILLIGRAM(S): at 11:05

## 2020-11-01 RX ADMIN — POLYETHYLENE GLYCOL 3350 17 GRAM(S): 17 POWDER, FOR SOLUTION ORAL at 02:48

## 2020-11-01 RX ADMIN — Medication 10 MILLIGRAM(S): at 16:23

## 2020-11-01 RX ADMIN — Medication 10 MILLIGRAM(S): at 21:10

## 2020-11-01 NOTE — PROGRESS NOTE ADULT - SUBJECTIVE AND OBJECTIVE BOX
[facility] -- Sandie Valentine, Mina Sullivan Pannella, Patel, Savella  Office # [phone]      Follow Up:    htn hld    Subjective/Observations:     No events overnight resting comfortably in bed.  No complaints of chest pain, dyspnea, or palpitations reported. No signs of orthopnea or PND.    REVIEW OF SYSTEMS: All other review of systems is negative unless indicated above    PAST MEDICAL & SURGICAL HISTORY:  Deep vein thrombosis (DVT)    Neuropathy    BPH (benign prostatic hyperplasia)    HLD (hyperlipidemia)    HTN (hypertension)    GERD (gastroesophageal reflux disease)    HLD (hyperlipidemia)    History of femoropopliteal bypass    No significant past surgical history        MEDICATIONS  (STANDING):  amLODIPine   Tablet 10 milliGRAM(s) Oral daily  bisacodyl 10 milliGRAM(s) Oral every 4 hours  ferrous    sulfate 325 milliGRAM(s) Oral daily  folic acid 1 milliGRAM(s) Oral daily  gabapentin 300 milliGRAM(s) Oral two times a day  influenza   Vaccine 0.5 milliLiter(s) IntraMuscular once  losartan 100 milliGRAM(s) Oral daily  pantoprazole  Injectable 40 milliGRAM(s) IV Push two times a day  polyethylene glycol/electrolyte Solution 1 Liter(s) Oral every 4 hours  simvastatin 20 milliGRAM(s) Oral at bedtime  tamsulosin 0.4 milliGRAM(s) Oral at bedtime    MEDICATIONS  (PRN):  polyethylene glycol 3350 17 Gram(s) Oral daily PRN Constipation      Allergies    No Known Drug Allergies  Prefers chocolate ensure drinks (Unknown)    Intolerances        Vital Signs Last 24 Hrs  T(C): 36.9 (01 Nov 2020 04:10), Max: 36.9 (31 Oct 2020 13:03)  T(F): 98.5 (01 Nov 2020 04:10), Max: 98.5 (31 Oct 2020 13:03)  HR: 77 (01 Nov 2020 04:10) (77 - 91)  BP: 123/65 (01 Nov 2020 04:10) (110/67 - 138/79)  BP(mean): --  RR: 18 (01 Nov 2020 04:10) (18 - 19)  SpO2: 98% (01 Nov 2020 04:10) (95% - 98%)    I&O's Summary    31 Oct 2020 08:01  -  01 Nov 2020 07:00  --------------------------------------------------------  IN: 200 mL / OUT: 200 mL / NET: 0 mL          PHYSICAL EXAM:  TELE: nsr  Constitutional: NAD, awake and alert, well-developed  HEENT: Moist Mucous Membranes, Anicteric  Pulmonary: Non-labored, breath sounds are clear bilaterally, No wheezing, crackles or rhonchi  Cardiovascular: Regular, S1 and S2 nl, No murmurs, rubs, gallops or clicks  Gastrointestinal: Bowel Sounds present, soft, nontender.   Lymph: No lymphadenopathy. No peripheral edema.  Skin: No visible rashes or ulcers.  Psych:  Mood & affect appropriate    LABS: All Labs Reviewed:                        8.3    8.37  )-----------( 318      ( 01 Nov 2020 07:08 )             24.7                         8.4    6.81  )-----------( 265      ( 31 Oct 2020 08:31 )             24.9                         8.8    5.66  )-----------( 262      ( 30 Oct 2020 07:03 )             25.8     01 Nov 2020 07:08    141    |  104    |  13     ----------------------------<  119    3.7     |  31     |  1.20   31 Oct 2020 08:31    141    |  106    |  17     ----------------------------<  117    4.0     |  28     |  1.20   30 Oct 2020 07:03    143    |  108    |  23     ----------------------------<  114    3.9     |  29     |  1.20     Ca    9.3        01 Nov 2020 07:08  Ca    9.1        31 Oct 2020 08:31  Ca    9.4        30 Oct 2020 07:03  Mg     2.4       29 Oct 2020 13:45    TPro  7.2    /  Alb  3.7    /  TBili  0.8    /  DBili  x      /  AST  15     /  ALT  15     /  AlkPhos  82     30 Oct 2020 07:03  TPro  8.2    /  Alb  4.1    /  TBili  0.7    /  DBili  x      /  AST  21     /  ALT  17     /  AlkPhos  92     29 Oct 2020 13:45             ECG:  < from: 12 Lead ECG (10.29.20 @ 13:55) >    Ventricular Rate 90 BPM    Atrial Rate 90 BPM    P-R Interval 144 ms    QRS Duration 96 ms    Q-T Interval 362 ms    QTC Calculation(Bazett) 442 ms    P Axis 64 degrees    R Axis -57 degrees    T Axis 74 degrees    Diagnosis Line Normal sinus rhythm  Left axis deviation  Abnormal ECG  No previous ECGs available    < end of copied text >      Echo:    Radiology:

## 2020-11-01 NOTE — PROGRESS NOTE ADULT - PROBLEM SELECTOR PLAN 3
Hx of RLE DVT, on Xarelto 2019   -Hold in setting of GIB  -SCDs for PPx  -RLE US: Mural thickening consistent with sequela of chronic deep vein thrombosis. No evidence of acute deep vein thrombosis.

## 2020-11-01 NOTE — PROGRESS NOTE ADULT - SUBJECTIVE AND OBJECTIVE BOX
Summary:   70y  Male      Subjective:       Objective:    MEDICATIONS  (STANDING):  amLODIPine   Tablet 10 milliGRAM(s) Oral daily  bisacodyl 10 milliGRAM(s) Oral every 4 hours  ferrous    sulfate 325 milliGRAM(s) Oral daily  folic acid 1 milliGRAM(s) Oral daily  gabapentin 300 milliGRAM(s) Oral two times a day  influenza   Vaccine 0.5 milliLiter(s) IntraMuscular once  losartan 100 milliGRAM(s) Oral daily  pantoprazole  Injectable 40 milliGRAM(s) IV Push two times a day  polyethylene glycol/electrolyte Solution 1 Liter(s) Oral every 4 hours  simvastatin 20 milliGRAM(s) Oral at bedtime  tamsulosin 0.4 milliGRAM(s) Oral at bedtime    MEDICATIONS  (PRN):  polyethylene glycol 3350 17 Gram(s) Oral daily PRN Constipation              Vital Signs Last 24 Hrs  T(C): 36.9 (01 Nov 2020 04:10), Max: 36.9 (31 Oct 2020 13:03)  T(F): 98.5 (01 Nov 2020 04:10), Max: 98.5 (31 Oct 2020 13:03)  HR: 77 (01 Nov 2020 04:10) (77 - 91)  BP: 123/65 (01 Nov 2020 04:10) (110/67 - 138/79)  BP(mean): --  RR: 18 (01 Nov 2020 04:10) (18 - 19)  SpO2: 98% (01 Nov 2020 04:10) (95% - 98%)      General:  Well developed, well nourished, alert and active, no pallor, NAD.  HEENT:    Normal appearance of conjunctiva, ears, nose, lips, oropharynx, and oral mucosa, anicteric.  Neck:  No masses, no asymmetry.  Lymph Nodes:  No lymphadenopathy.   Cardiovascular:  RRR normal S1/S2, no murmur.  Respiratory:  CTA B/L, normal respiratory effort.   Abdominal:   soft, no masses or tenderness, normoactive BS, NT/ND, no HSM.  Extremities:   No clubbing or cyanosis, normal capillary refill, no edema.   Skin:   No rash, jaundice, lesions, eczema.   Musculoskeletal:  No joint swelling, erythema or tenderness.   Neuro: No focal deficits.   Other:       LABS:                        8.3    8.37  )-----------( 318      ( 01 Nov 2020 07:08 )             24.7     11-01    141  |  104  |  13  ----------------------------<  119<H>  3.7   |  31  |  1.20    Ca    9.3      01 Nov 2020 07:08            RADIOLOGY & ADDITIONAL TESTS:

## 2020-11-01 NOTE — PROGRESS NOTE ADULT - PROBLEM SELECTOR PLAN 1
Patient with 5 days melena, palpitations, fatigue on admission; now without BM x2 days  -FOBT +, H/H 9.5/28.5 in ED,  -Hb 9.5 --> 9.1 --> 8.8 --> 8.4 -->8.3 this AM, transfuse Hgb<8 due to scheduled upcoming colonoscopy  -Hold Xarelto, aspirin in setting of GIB, on Xarelto for hx RLE DVT  -continue 40mg IVP protonix BID  -Diet advanced as tolerated  -NPO except meds after midnight for colonoscopy on 11/2; bowel prep as ordered by GI  -EGD with no active bleeding, f/u biopsy results  -Cardio Dr. Martínez consulted for cardiac/procedure clearance: cardiac clearance appreciated   -GI Dr. DAVINA El consulted in ED and on board -- recs appreciated.  -patient is medically optimized for colonoscopy scheduled for 11/2

## 2020-11-01 NOTE — PROGRESS NOTE ADULT - PROBLEM SELECTOR PLAN 2
-H/H 9.5/28.5 in ED, +FOBT, 2/2 GIB  -baseline appears to be 7-9 per outpatient chart review  -Hb 9.5 --> 9.1 --> 8.8 --> 8.4-->8.3 this AM, transfuse Hgb<8   -trend H/H  -Continue home folic acid, iron supplementation  - f/u iron studies

## 2020-11-01 NOTE — PROGRESS NOTE ADULT - ASSESSMENT
70 year old male PMH RLE DVT on Xarelto, HTN, HLD, BPH, GERD, neuropathy presents to ED with palpitations, fatigue, melena, admitted for GI bleed. Cardio consulted for cardiac clearance for EGD.     GI bleed  - + FOBT s/p EGD, found to have gastritis with no bleeding  - Planned for colonoscopy on Monday  - Transfuse per Primary  - Xarelto on hold  - Pt has no history of MI, active CAD, ADHF or severe valvular disease. Patient remains optimized from cardiovascular standpoint to proceed with planned non-cardiac low risk procedure with routine hemodynamic monitoring.     HTN  - Echo 10/2/2019: EF 60 to 65% with Grade I diastolic dysfunction  - BP stable and controlled 123/65  - Continue Norvasc and Losartan  - Monitor and replete lytes, keep K>4, Mg>2.    HLD  - Continue statin    DVT  - Pt takes Xarelto for hx of RLE DVT.  continue to hold in the setting of GIB.    - All other medical needs as per primary team.  - Other cardiovascular workup will depend on clinical course.  - Will continue to follow.    Radha Cline FNP-C  Cardiology NP  [phone]

## 2020-11-01 NOTE — PROGRESS NOTE ADULT - SUBJECTIVE AND OBJECTIVE BOX
Patient is a 70y old  Male who presents with a chief complaint of GI bleed (01 Nov 2020 09:25)      INTERVAL HPI/OVERNIGHT EVENTS: No acute events overnight. Patient seen and examined at bedside. Admits to not having had a bm for 2 days, but otherwise states he feels "okay". Denies cp, sob, fevers, hematuria, melena, hematochezia, n/v, lightheaded/dizziness.    MEDICATIONS  (STANDING):  amLODIPine   Tablet 10 milliGRAM(s) Oral daily  bisacodyl 10 milliGRAM(s) Oral every 4 hours  ferrous    sulfate 325 milliGRAM(s) Oral daily  folic acid 1 milliGRAM(s) Oral daily  gabapentin 300 milliGRAM(s) Oral two times a day  influenza   Vaccine 0.5 milliLiter(s) IntraMuscular once  losartan 100 milliGRAM(s) Oral daily  pantoprazole  Injectable 40 milliGRAM(s) IV Push two times a day  polyethylene glycol/electrolyte Solution 1 Liter(s) Oral every 4 hours  simvastatin 20 milliGRAM(s) Oral at bedtime  tamsulosin 0.4 milliGRAM(s) Oral at bedtime    MEDICATIONS  (PRN):  polyethylene glycol 3350 17 Gram(s) Oral daily PRN Constipation      Allergies    No Known Drug Allergies  Prefers chocolate ensure drinks (Unknown)    Intolerances        REVIEW OF SYSTEMS:  CONSTITUTIONAL: No fever or chills  HEENT:  No headache, no sore throat  RESPIRATORY: No cough, wheezing, or shortness of breath  CARDIOVASCULAR: No chest pain, palpitations  GASTROINTESTINAL: No abd pain, nausea, vomiting, or diarrhea  GENITOURINARY: No dysuria, frequency, or hematuria  NEUROLOGICAL: no focal weakness or dizziness  MUSCULOSKELETAL: no myalgias     Vital Signs Last 24 Hrs  T(C): 37 (01 Nov 2020 08:30), Max: 37 (01 Nov 2020 08:30)  T(F): 98.6 (01 Nov 2020 08:30), Max: 98.6 (01 Nov 2020 08:30)  HR: 90 (01 Nov 2020 08:30) (77 - 91)  BP: 115/66 (01 Nov 2020 08:30) (110/67 - 138/79)  BP(mean): --  RR: 17 (01 Nov 2020 08:30) (17 - 19)  SpO2: 96% (01 Nov 2020 08:30) (95% - 98%)    PHYSICAL EXAM:  GENERAL: NAD  HEENT:  anicteric, moist mucous membranes  CHEST/LUNG:  CTA b/l, no rales, wheezes, or rhonchi  HEART:  RRR, S1, S2  ABDOMEN:  BS+, soft, nontender, nondistended  EXTREMITIES: no edema, cyanosis, or calf tenderness  NERVOUS SYSTEM: answers questions and follows commands appropriately    LABS:                        8.3    8.37  )-----------( 318      ( 01 Nov 2020 07:08 )             24.7     CBC Full  -  ( 01 Nov 2020 07:08 )  WBC Count : 8.37 K/uL  Hemoglobin : 8.3 g/dL  Hematocrit : 24.7 %  Platelet Count - Automated : 318 K/uL  Mean Cell Volume : 87.6 fl  Mean Cell Hemoglobin : 29.4 pg  Mean Cell Hemoglobin Concentration : 33.6 gm/dL  Auto Neutrophil # : x  Auto Lymphocyte # : x  Auto Monocyte # : x  Auto Eosinophil # : x  Auto Basophil # : x  Auto Neutrophil % : x  Auto Lymphocyte % : x  Auto Monocyte % : x  Auto Eosinophil % : x  Auto Basophil % : x    01 Nov 2020 07:08    141    |  104    |  13     ----------------------------<  119    3.7     |  31     |  1.20     Ca    9.3        01 Nov 2020 07:08          CAPILLARY BLOOD GLUCOSE              RADIOLOGY & ADDITIONAL TESTS:    Personally reviewed.     Consultant(s) Notes Reviewed:  [x] YES  [ ] NO

## 2020-11-02 ENCOUNTER — RESULT REVIEW (OUTPATIENT)
Age: 70
End: 2020-11-02

## 2020-11-02 DIAGNOSIS — E87.0 HYPEROSMOLALITY AND HYPERNATREMIA: ICD-10-CM

## 2020-11-02 DIAGNOSIS — K29.70 GASTRITIS, UNSPECIFIED, WITHOUT BLEEDING: ICD-10-CM

## 2020-11-02 LAB
ANION GAP SERPL CALC-SCNC: 8 MMOL/L — SIGNIFICANT CHANGE UP (ref 5–17)
BUN SERPL-MCNC: 15 MG/DL — SIGNIFICANT CHANGE UP (ref 7–23)
CALCIUM SERPL-MCNC: 9 MG/DL — SIGNIFICANT CHANGE UP (ref 8.5–10.1)
CHLORIDE SERPL-SCNC: 109 MMOL/L — HIGH (ref 96–108)
CO2 SERPL-SCNC: 30 MMOL/L — SIGNIFICANT CHANGE UP (ref 22–31)
CREAT SERPL-MCNC: 1.4 MG/DL — HIGH (ref 0.5–1.3)
GLUCOSE SERPL-MCNC: 137 MG/DL — HIGH (ref 70–99)
HCT VFR BLD CALC: 24.3 % — LOW (ref 39–50)
HCT VFR BLD CALC: 26.3 % — LOW (ref 39–50)
HGB BLD-MCNC: 7.9 G/DL — LOW (ref 13–17)
HGB BLD-MCNC: 8.4 G/DL — LOW (ref 13–17)
MCHC RBC-ENTMCNC: 29.1 PG — SIGNIFICANT CHANGE UP (ref 27–34)
MCHC RBC-ENTMCNC: 31.9 GM/DL — LOW (ref 32–36)
MCV RBC AUTO: 91 FL — SIGNIFICANT CHANGE UP (ref 80–100)
NRBC # BLD: 0 /100 WBCS — SIGNIFICANT CHANGE UP (ref 0–0)
PLATELET # BLD AUTO: 348 K/UL — SIGNIFICANT CHANGE UP (ref 150–400)
POTASSIUM SERPL-MCNC: 4 MMOL/L — SIGNIFICANT CHANGE UP (ref 3.5–5.3)
POTASSIUM SERPL-SCNC: 4 MMOL/L — SIGNIFICANT CHANGE UP (ref 3.5–5.3)
RBC # BLD: 2.89 M/UL — LOW (ref 4.2–5.8)
RBC # FLD: 14.4 % — SIGNIFICANT CHANGE UP (ref 10.3–14.5)
SODIUM SERPL-SCNC: 147 MMOL/L — HIGH (ref 135–145)
WBC # BLD: 7.46 K/UL — SIGNIFICANT CHANGE UP (ref 3.8–10.5)
WBC # FLD AUTO: 7.46 K/UL — SIGNIFICANT CHANGE UP (ref 3.8–10.5)

## 2020-11-02 PROCEDURE — 99233 SBSQ HOSP IP/OBS HIGH 50: CPT | Mod: GC

## 2020-11-02 PROCEDURE — 88305 TISSUE EXAM BY PATHOLOGIST: CPT | Mod: 26

## 2020-11-02 PROCEDURE — 99232 SBSQ HOSP IP/OBS MODERATE 35: CPT

## 2020-11-02 RX ORDER — LOSARTAN POTASSIUM 100 MG/1
100 TABLET, FILM COATED ORAL DAILY
Refills: 0 | Status: DISCONTINUED | OUTPATIENT
Start: 2020-11-03 | End: 2020-11-02

## 2020-11-02 RX ORDER — GLUCAGON INJECTION, SOLUTION 0.5 MG/.1ML
1 INJECTION, SOLUTION SUBCUTANEOUS ONCE
Refills: 0 | Status: DISCONTINUED | OUTPATIENT
Start: 2020-11-02 | End: 2020-11-07

## 2020-11-02 RX ORDER — DEXTROSE 50 % IN WATER 50 %
25 SYRINGE (ML) INTRAVENOUS ONCE
Refills: 0 | Status: DISCONTINUED | OUTPATIENT
Start: 2020-11-02 | End: 2020-11-07

## 2020-11-02 RX ORDER — DEXTROSE 50 % IN WATER 50 %
12.5 SYRINGE (ML) INTRAVENOUS ONCE
Refills: 0 | Status: DISCONTINUED | OUTPATIENT
Start: 2020-11-02 | End: 2020-11-07

## 2020-11-02 RX ORDER — SODIUM CHLORIDE 9 MG/ML
1000 INJECTION, SOLUTION INTRAVENOUS
Refills: 0 | Status: DISCONTINUED | OUTPATIENT
Start: 2020-11-02 | End: 2020-11-02

## 2020-11-02 RX ORDER — SODIUM CHLORIDE 9 MG/ML
1000 INJECTION, SOLUTION INTRAVENOUS
Refills: 0 | Status: DISCONTINUED | OUTPATIENT
Start: 2020-11-02 | End: 2020-11-07

## 2020-11-02 RX ORDER — INSULIN LISPRO 100/ML
VIAL (ML) SUBCUTANEOUS
Refills: 0 | Status: DISCONTINUED | OUTPATIENT
Start: 2020-11-02 | End: 2020-11-07

## 2020-11-02 RX ORDER — DEXTROSE 50 % IN WATER 50 %
15 SYRINGE (ML) INTRAVENOUS ONCE
Refills: 0 | Status: DISCONTINUED | OUTPATIENT
Start: 2020-11-02 | End: 2020-11-07

## 2020-11-02 RX ADMIN — GABAPENTIN 300 MILLIGRAM(S): 400 CAPSULE ORAL at 05:01

## 2020-11-02 RX ADMIN — LOSARTAN POTASSIUM 100 MILLIGRAM(S): 100 TABLET, FILM COATED ORAL at 05:01

## 2020-11-02 RX ADMIN — PANTOPRAZOLE SODIUM 40 MILLIGRAM(S): 20 TABLET, DELAYED RELEASE ORAL at 17:19

## 2020-11-02 RX ADMIN — TAMSULOSIN HYDROCHLORIDE 0.4 MILLIGRAM(S): 0.4 CAPSULE ORAL at 21:06

## 2020-11-02 RX ADMIN — AMLODIPINE BESYLATE 10 MILLIGRAM(S): 2.5 TABLET ORAL at 05:01

## 2020-11-02 RX ADMIN — GABAPENTIN 300 MILLIGRAM(S): 400 CAPSULE ORAL at 17:19

## 2020-11-02 RX ADMIN — SIMVASTATIN 20 MILLIGRAM(S): 20 TABLET, FILM COATED ORAL at 21:06

## 2020-11-02 NOTE — PROGRESS NOTE ADULT - PROBLEM SELECTOR PLAN 2
Pt admitted for GI bleed, now s/p EGD. Bx shows Chronic active gastritis involving oxyntic and non-oxyntic mucosa.  -Diff Quik stain for H. pylori is positive.  -continue protonix 40 mg IV BID Pt CMP revealed NA of 147 this am.  -started D5 at 60 cc/hr for 10 hours.  -monitor renal indices, lytes in CMP  -strict I&Os

## 2020-11-02 NOTE — PROGRESS NOTE ADULT - PROBLEM SELECTOR PLAN 3
-H/H 9.5/28.5 in ED, +FOBT, 2/2 GIB  -baseline appears to be 7-9 per outpatient chart review  -Hb 9.5 --> 9.1 --> 8.8 -->-> 8.4 this AM, transfuse Hgb<8   -trend H/H  -Continue home folic acid, iron supplementation  -11/01 Serum iron 32, TIBC 259, Ferritin 39, Transferrin 215. Pt admitted for GI bleed, now s/p EGD. Bx shows Chronic active gastritis involving oxyntic and non-oxyntic mucosa.  -Diff Quik stain for H. pylori is positive.  -continue protonix 40 mg IV BID

## 2020-11-02 NOTE — PROGRESS NOTE ADULT - ASSESSMENT
70 year old male PMH RLE DVT on Xarelto, HTN, HLD, BPH, GERD, neuropathy presents to ED with palpitations, fatigue, melena, admitted for GI bleed. Cardio consulted for cardiac clearance for EGD.     GI bleed  - + FOBT s/p EGD, found to have gastritis with no bleeding  - Planned for colonoscopy today   - Transfuse per Primary  - Xarelto on hold  - Pt has no history of MI, active CAD, ADHF or severe valvular disease. Patient remains optimized from cardiovascular standpoint to proceed with planned non-cardiac low risk procedure with routine hemodynamic monitoring.     HTN  - Echo 10/2/2019: EF 60 to 65% with Grade I diastolic dysfunction  - BP stable and controlled 111/64  - Continue Norvasc and Losartan  - Monitor and replete lytes, keep K>4, Mg>2.    HLD  - Continue statin    DVT  - Pt takes Xarelto for hx of RLE DVT.  continue to hold in the setting of GIB.    - All other medical needs as per primary team.  - Other cardiovascular workup will depend on clinical course.  - Will continue to follow.    Radha Cline FNP-C  Cardiology NP  [id number] [phone]

## 2020-11-02 NOTE — PROVIDER CONTACT NOTE (OTHER) - ASSESSMENT
Pt A&Ox4. No s/s of bleeding. Pt denies CP/CD, SOB, n/v, lightheadedness, dizziness. No s/s of acute distress.

## 2020-11-02 NOTE — PROGRESS NOTE ADULT - PROBLEM SELECTOR PLAN 4
Hx of RLE DVT, on Xarelto 2019   -Hold in setting of GIB  -SCDs for PPx  -RLE US: Mural thickening consistent with sequela of chronic deep vein thrombosis. No evidence of acute deep vein thrombosis. -H/H 9.5/28.5 in ED, +FOBT, 2/2 GIB  -baseline appears to be 7-9 per outpatient chart review  -Hb 9.5 --> 9.1 --> 8.8 -->-> 8.4 this AM, transfuse Hgb<8   -trend H/H  -Continue home folic acid, iron supplementation  -11/01 Serum iron 32, TIBC 259, Ferritin 39, Transferrin 215.

## 2020-11-02 NOTE — PROGRESS NOTE ADULT - PROBLEM SELECTOR PLAN 10
SCDs in setting of GIB  IMPROVE VTE Individual Risk Assessment       RISK                                                          Points  [  ] Previous VTE                                                3  [  ] Thrombophilia                                             2  [  ] Lower limb paralysis                                   2        (unable to hold up >15 seconds)    [  ] Current Cancer                                             2         (within 6 months)  [  ] Immobilization > 24 hrs                              1  [  ] ICU/CCU stay > 24 hours                             1  [ 1 ] Age > 60                                                         1    IMPROVE VTE Score:         [       1  ] 10. SCDs in setting of GIB  11. Dispo: PT consulted to eval/tx for deconditioning. continue dc planning  IMPROVE VTE Individual Risk Assessment       RISK                                                          Points  [  ] Previous VTE                                                3  [  ] Thrombophilia                                             2  [  ] Lower limb paralysis                                   2        (unable to hold up >15 seconds)    [  ] Current Cancer                                             2         (within 6 months)  [  ] Immobilization > 24 hrs                              1  [  ] ICU/CCU stay > 24 hours                             1  [ 1 ] Age > 60                                                         1    IMPROVE VTE Score:         [       1  ] SCDs in setting of GIB    11. Dispo: PT consulted to eval/tx for deconditioning. continue dc planning    IMPROVE VTE Individual Risk Assessment       RISK                                                          Points  [  ] Previous VTE                                                3  [  ] Thrombophilia                                             2  [  ] Lower limb paralysis                                   2        (unable to hold up >15 seconds)    [  ] Current Cancer                                             2         (within 6 months)  [  ] Immobilization > 24 hrs                              1  [  ] ICU/CCU stay > 24 hours                             1  [ 1 ] Age > 60                                                         1    IMPROVE VTE Score:         [       1  ]

## 2020-11-02 NOTE — PROGRESS NOTE ADULT - PROBLEM SELECTOR PLAN 6
Chronic, stable  -Patient takes amlodipine 10mg 2x daily, confirmed with pharmacy  -BP currently stable, continue amlodipine 10mg once daily and continue to monitor  -Continue home losartan Hx of TIA without residual weakness.   -Hold ASA in setting of GIB  -continue statin

## 2020-11-02 NOTE — PROGRESS NOTE ADULT - SUBJECTIVE AND OBJECTIVE BOX
[facility] -- Sandie Valentine, Mina Sullivan, Bruce Nagy Savella  Office # [phone]      Follow Up:    htn hld     Subjective/Observations:   No events overnight resting comfortably in bed.  No complaints of chest pain, dyspnea, or palpitations reported. No signs of orthopnea or PND.      REVIEW OF SYSTEMS: All other review of systems is negative unless indicated above    PAST MEDICAL & SURGICAL HISTORY:  Deep vein thrombosis (DVT)    Neuropathy    BPH (benign prostatic hyperplasia)    HLD (hyperlipidemia)    HTN (hypertension)    GERD (gastroesophageal reflux disease)    HLD (hyperlipidemia)    History of femoropopliteal bypass    No significant past surgical history        MEDICATIONS  (STANDING):  amLODIPine   Tablet 10 milliGRAM(s) Oral daily  dextrose 5%. 1000 milliLiter(s) (60 mL/Hr) IV Continuous <Continuous>  ferrous    sulfate 325 milliGRAM(s) Oral daily  folic acid 1 milliGRAM(s) Oral daily  gabapentin 300 milliGRAM(s) Oral two times a day  influenza   Vaccine 0.5 milliLiter(s) IntraMuscular once  losartan 100 milliGRAM(s) Oral daily  pantoprazole  Injectable 40 milliGRAM(s) IV Push two times a day  simvastatin 20 milliGRAM(s) Oral at bedtime  tamsulosin 0.4 milliGRAM(s) Oral at bedtime    MEDICATIONS  (PRN):  polyethylene glycol 3350 17 Gram(s) Oral daily PRN Constipation      Allergies    No Known Drug Allergies  Prefers chocolate ensure drinks (Unknown)    Intolerances        Vital Signs Last 24 Hrs  T(C): 36.8 (02 Nov 2020 07:13), Max: 37.1 (01 Nov 2020 12:06)  T(F): 98.3 (02 Nov 2020 07:13), Max: 98.7 (01 Nov 2020 12:06)  HR: 97 (02 Nov 2020 07:13) (89 - 109)  BP: 111/64 (02 Nov 2020 07:13) (108/64 - 136/85)  BP(mean): --  RR: 18 (02 Nov 2020 07:13) (17 - 19)  SpO2: 98% (02 Nov 2020 07:13) (93% - 98%)    I&O's Summary    01 Nov 2020 07:01  -  02 Nov 2020 07:00  --------------------------------------------------------  IN: 520 mL / OUT: 1500 mL / NET: -980 mL          PHYSICAL EXAM:  TELE:   Constitutional: NAD, awake and alert, well-developed  HEENT: Moist Mucous Membranes, Anicteric  Pulmonary: Non-labored, breath sounds are clear bilaterally, No wheezing, crackles or rhonchi  Cardiovascular: Regular, S1 and S2 nl, No murmurs, rubs, gallops or clicks  Gastrointestinal: Bowel Sounds present, soft, nontender.   Lymph: No lymphadenopathy. No peripheral edema.  Skin: No visible rashes or ulcers.  Psych:  Mood & affect appropriate    LABS: All Labs Reviewed:                        8.4    7.46  )-----------( 348      ( 02 Nov 2020 07:14 )             26.3                         8.3    8.37  )-----------( 318      ( 01 Nov 2020 07:08 )             24.7                         8.4    6.81  )-----------( 265      ( 31 Oct 2020 08:31 )             24.9     02 Nov 2020 07:14    147    |  109    |  15     ----------------------------<  137    4.0     |  30     |  1.40   01 Nov 2020 07:08    141    |  104    |  13     ----------------------------<  119    3.7     |  31     |  1.20   31 Oct 2020 08:31    141    |  106    |  17     ----------------------------<  117    4.0     |  28     |  1.20     Ca    9.0        02 Nov 2020 07:14  Ca    9.3        01 Nov 2020 07:08  Ca    9.1        31 Oct 2020 08:31               ECG:  < from: 12 Lead ECG (10.29.20 @ 13:55) >    Ventricular Rate 90 BPM    Atrial Rate 90 BPM    P-R Interval 144 ms    QRS Duration 96 ms    Q-T Interval 362 ms    QTC Calculation(Bazett) 442 ms    P Axis 64 degrees    R Axis -57 degrees    T Axis 74 degrees    Diagnosis Line Normal sinus rhythm  Left axis deviation  Abnormal ECG  No previous ECGs available      Echo:  < from: TTE Echo Complete w/Doppler (10.02.19 @ 13:35) >    Summary:   1. Left ventricular ejection fraction, by visual estimation, is 60 to   65%.   2. Normal global left ventricular systolic function.   3. Normal left ventricular internal cavity size.   4. Spectral Doppler shows impaired relaxation pattern of left   ventricular myocardial filling (Grade I diastolic dysfunction).   5. There is no evidence of pericardial effusion.

## 2020-11-02 NOTE — PROGRESS NOTE ADULT - SUBJECTIVE AND OBJECTIVE BOX
Patient is a 70y old  Male who presents with a chief complaint of GI bleed (02 Nov 2020 09:52)      INTERVAL HPI/OVERNIGHT EVENTS: Patient seen and examined at bedside. No overnight events occurred. Patient states that he had a dark bm yesterday, but it was less melanotic than bm on admission Denies fevers, chills, headache, lightheadedness, chest pain, dyspnea, abdominal pain, n/v/d/c.    MEDICATIONS  (STANDING):  amLODIPine   Tablet 10 milliGRAM(s) Oral daily  dextrose 5%. 1000 milliLiter(s) (60 mL/Hr) IV Continuous <Continuous>  ferrous    sulfate 325 milliGRAM(s) Oral daily  folic acid 1 milliGRAM(s) Oral daily  gabapentin 300 milliGRAM(s) Oral two times a day  influenza   Vaccine 0.5 milliLiter(s) IntraMuscular once  losartan 100 milliGRAM(s) Oral daily  pantoprazole  Injectable 40 milliGRAM(s) IV Push two times a day  simvastatin 20 milliGRAM(s) Oral at bedtime  tamsulosin 0.4 milliGRAM(s) Oral at bedtime    MEDICATIONS  (PRN):  polyethylene glycol 3350 17 Gram(s) Oral daily PRN Constipation      Allergies    No Known Drug Allergies  Prefers chocolate ensure drinks (Unknown)    Intolerances        REVIEW OF SYSTEMS:  CONSTITUTIONAL: No fever or chills  HEENT:  No headache, no sore throat  RESPIRATORY: No cough, wheezing, or shortness of breath  CARDIOVASCULAR: No chest pain, palpitations  GASTROINTESTINAL: No abd pain, nausea, vomiting, or diarrhea  GENITOURINARY: No dysuria, frequency, or hematuria  NEUROLOGICAL: no focal weakness or dizziness  MUSCULOSKELETAL: no myalgias     Vital Signs Last 24 Hrs  T(C): 37 (02 Nov 2020 13:04), Max: 37 (02 Nov 2020 12:22)  T(F): 98.6 (02 Nov 2020 13:04), Max: 98.6 (02 Nov 2020 12:22)  HR: 90 (02 Nov 2020 13:04) (78 - 109)  BP: 118/64 (02 Nov 2020 13:04) (108/64 - 136/85)  BP(mean): --  RR: 16 (02 Nov 2020 13:04) (16 - 19)  SpO2: 98% (02 Nov 2020 13:04) (93% - 98%)    PHYSICAL EXAM:  GENERAL: NAD  HEENT:  anicteric, moist mucous membranes  CHEST/LUNG:  CTA b/l, no rales, wheezes, or rhonchi  HEART:  RRR, S1, S2  ABDOMEN:  BS+, soft, nontender, nondistended  EXTREMITIES: no edema, cyanosis, or calf tenderness. RLE healed skin grafts present s/p work related injury  NERVOUS SYSTEM: answers questions and follows commands appropriately    LABS:                        8.4    7.46  )-----------( 348      ( 02 Nov 2020 07:14 )             26.3     CBC Full  -  ( 02 Nov 2020 07:14 )  WBC Count : 7.46 K/uL  Hemoglobin : 8.4 g/dL  Hematocrit : 26.3 %  Platelet Count - Automated : 348 K/uL  Mean Cell Volume : 91.0 fl  Mean Cell Hemoglobin : 29.1 pg  Mean Cell Hemoglobin Concentration : 31.9 gm/dL  Auto Neutrophil # : x  Auto Lymphocyte # : x  Auto Monocyte # : x  Auto Eosinophil # : x  Auto Basophil # : x  Auto Neutrophil % : x  Auto Lymphocyte % : x  Auto Monocyte % : x  Auto Eosinophil % : x  Auto Basophil % : x    02 Nov 2020 07:14    147    |  109    |  15     ----------------------------<  137    4.0     |  30     |  1.40     Ca    9.0        02 Nov 2020 07:14          CAPILLARY BLOOD GLUCOSE              RADIOLOGY & ADDITIONAL TESTS:    Personally reviewed.     Consultant(s) Notes Reviewed:  [x] YES  [ ] NO

## 2020-11-02 NOTE — PROGRESS NOTE ADULT - PROBLEM SELECTOR PLAN 1
Patient with 5 days melena, palpitations, fatigue on admission; now endorsing decreased melena.  -FOBT +, H/H 9.5/28.5 in ED,  -Hb 9.5 --> 9.1 --> 8.8 -->-> 8.4 this AM,   -For colonoscopy today w/ GI. follow up results. Pt is medically optimized, cleared by cardiology, ([facility]).  -Hold Xarelto, aspirin in setting of GIB, on Xarelto for hx RLE DVT  -continue 40mg IVP protonix BID  -NPO except meds after midnight for colonoscopy on 11/2; bowel prep as ordered by GI  -Advance diet as tolerated following colonoscopy  -EGD with no active bleeding, bx consistent with gastritis.  -GI Dr. DAVINA El following -- recs appreciated.

## 2020-11-02 NOTE — PROGRESS NOTE ADULT - PROBLEM SELECTOR PLAN 8
Chronic, stable  -Continue home Tamsulosin Chronic, stable  -Continue home simvastatin  -Monitor CMP

## 2020-11-02 NOTE — PROGRESS NOTE ADULT - ATTENDING COMMENTS
71yo male with hx of DVT (on xarelto at home) anemia 2/2 GI bleed, s/p EGD, plan for colonoscopy today 11/2.  Trend CBC  Continue PPI IV  Hold home med xarelto   Hypernatremia: Start IVF today x 10hrs. F/U AM BMP.  Creatinine now 1.4 (1.2-1.3). Will hold losartan today, not yet SERENITY but since BP also on lower side, will put losartan on hold. Monitor UOP and renal indices.

## 2020-11-02 NOTE — PROGRESS NOTE ADULT - PROBLEM SELECTOR PLAN 7
Chronic, stable  -Continue home simvastatin  -Monitor CMP Chronic, stable  -Patient takes amlodipine 10mg 2x daily, confirmed with pharmacy  -BP currently stable, continue amlodipine 10mg once daily and continue to monitor  -Continue home losartan Chronic, stable  -Patient takes amlodipine 10mg 2x daily, confirmed with pharmacy  -BP currently stable, continue amlodipine 10mg once daily and continue to monitor  -hold home losartan Chronic, stable  -Patient takes amlodipine 10mg 2x daily, confirmed with pharmacy  -BP currently stable, continue amlodipine 10mg once daily and continue to monitor  -hold home losartan in setting of brief hypotension today as well as increasing Cr

## 2020-11-02 NOTE — PROGRESS NOTE ADULT - PROBLEM SELECTOR PLAN 5
Hx of TIA without residual weakness.   -Hold ASA in setting of GIB  -continue statin Hx of RLE DVT, on Xarelto 2019   -Hold in setting of GIB  -SCDs for PPx  -RLE US: Mural thickening consistent with sequela of chronic deep vein thrombosis. No evidence of acute deep vein thrombosis.

## 2020-11-03 ENCOUNTER — TRANSCRIPTION ENCOUNTER (OUTPATIENT)
Age: 70
End: 2020-11-03

## 2020-11-03 LAB
A1C WITH ESTIMATED AVERAGE GLUCOSE RESULT: 6 % — HIGH (ref 4–5.6)
ANION GAP SERPL CALC-SCNC: 5 MMOL/L — SIGNIFICANT CHANGE UP (ref 5–17)
BUN SERPL-MCNC: 15 MG/DL — SIGNIFICANT CHANGE UP (ref 7–23)
CALCIUM SERPL-MCNC: 9 MG/DL — SIGNIFICANT CHANGE UP (ref 8.5–10.1)
CHLORIDE SERPL-SCNC: 107 MMOL/L — SIGNIFICANT CHANGE UP (ref 96–108)
CO2 SERPL-SCNC: 30 MMOL/L — SIGNIFICANT CHANGE UP (ref 22–31)
CREAT SERPL-MCNC: 2 MG/DL — HIGH (ref 0.5–1.3)
ESTIMATED AVERAGE GLUCOSE: 126 MG/DL — HIGH (ref 68–114)
GLUCOSE SERPL-MCNC: 106 MG/DL — HIGH (ref 70–99)
HCT VFR BLD CALC: 22.5 % — LOW (ref 39–50)
HCT VFR BLD CALC: 23.9 % — LOW (ref 39–50)
HGB BLD-MCNC: 7.5 G/DL — LOW (ref 13–17)
HGB BLD-MCNC: 7.8 G/DL — LOW (ref 13–17)
MCHC RBC-ENTMCNC: 29.3 PG — SIGNIFICANT CHANGE UP (ref 27–34)
MCHC RBC-ENTMCNC: 32.6 GM/DL — SIGNIFICANT CHANGE UP (ref 32–36)
MCV RBC AUTO: 89.8 FL — SIGNIFICANT CHANGE UP (ref 80–100)
NRBC # BLD: 0 /100 WBCS — SIGNIFICANT CHANGE UP (ref 0–0)
PLATELET # BLD AUTO: 315 K/UL — SIGNIFICANT CHANGE UP (ref 150–400)
POTASSIUM SERPL-MCNC: 3.6 MMOL/L — SIGNIFICANT CHANGE UP (ref 3.5–5.3)
POTASSIUM SERPL-SCNC: 3.6 MMOL/L — SIGNIFICANT CHANGE UP (ref 3.5–5.3)
RBC # BLD: 2.66 M/UL — LOW (ref 4.2–5.8)
RBC # FLD: 14.3 % — SIGNIFICANT CHANGE UP (ref 10.3–14.5)
SODIUM SERPL-SCNC: 142 MMOL/L — SIGNIFICANT CHANGE UP (ref 135–145)
WBC # BLD: 6.38 K/UL — SIGNIFICANT CHANGE UP (ref 3.8–10.5)
WBC # FLD AUTO: 6.38 K/UL — SIGNIFICANT CHANGE UP (ref 3.8–10.5)

## 2020-11-03 PROCEDURE — 99232 SBSQ HOSP IP/OBS MODERATE 35: CPT

## 2020-11-03 PROCEDURE — 99233 SBSQ HOSP IP/OBS HIGH 50: CPT | Mod: GC

## 2020-11-03 RX ORDER — PANTOPRAZOLE SODIUM 20 MG/1
40 TABLET, DELAYED RELEASE ORAL
Refills: 0 | Status: DISCONTINUED | OUTPATIENT
Start: 2020-11-03 | End: 2020-11-03

## 2020-11-03 RX ORDER — METRONIDAZOLE 500 MG
500 TABLET ORAL THREE TIMES A DAY
Refills: 0 | Status: DISCONTINUED | OUTPATIENT
Start: 2020-11-03 | End: 2020-11-07

## 2020-11-03 RX ORDER — LACTOBACILLUS ACIDOPHILUS 100MM CELL
1 CAPSULE ORAL THREE TIMES A DAY
Refills: 0 | Status: DISCONTINUED | OUTPATIENT
Start: 2020-11-03 | End: 2020-11-07

## 2020-11-03 RX ORDER — SODIUM CHLORIDE 9 MG/ML
1000 INJECTION, SOLUTION INTRAVENOUS
Refills: 0 | Status: DISCONTINUED | OUTPATIENT
Start: 2020-11-03 | End: 2020-11-07

## 2020-11-03 RX ORDER — PANTOPRAZOLE SODIUM 20 MG/1
40 TABLET, DELAYED RELEASE ORAL
Refills: 0 | Status: DISCONTINUED | OUTPATIENT
Start: 2020-11-03 | End: 2020-11-07

## 2020-11-03 RX ORDER — ATORVASTATIN CALCIUM 80 MG/1
10 TABLET, FILM COATED ORAL
Refills: 0 | Status: DISCONTINUED | OUTPATIENT
Start: 2020-11-03 | End: 2020-11-07

## 2020-11-03 RX ORDER — SODIUM CHLORIDE 9 MG/ML
1000 INJECTION INTRAMUSCULAR; INTRAVENOUS; SUBCUTANEOUS ONCE
Refills: 0 | Status: COMPLETED | OUTPATIENT
Start: 2020-11-03 | End: 2020-11-03

## 2020-11-03 RX ORDER — CLARITHROMYCIN 500 MG
500 TABLET ORAL
Refills: 0 | Status: DISCONTINUED | OUTPATIENT
Start: 2020-11-03 | End: 2020-11-07

## 2020-11-03 RX ADMIN — PANTOPRAZOLE SODIUM 40 MILLIGRAM(S): 20 TABLET, DELAYED RELEASE ORAL at 05:40

## 2020-11-03 RX ADMIN — POLYETHYLENE GLYCOL 3350 17 GRAM(S): 17 POWDER, FOR SOLUTION ORAL at 22:28

## 2020-11-03 RX ADMIN — TAMSULOSIN HYDROCHLORIDE 0.4 MILLIGRAM(S): 0.4 CAPSULE ORAL at 22:28

## 2020-11-03 RX ADMIN — SODIUM CHLORIDE 500 MILLILITER(S): 9 INJECTION INTRAMUSCULAR; INTRAVENOUS; SUBCUTANEOUS at 11:45

## 2020-11-03 RX ADMIN — Medication 500 MILLIGRAM(S): at 13:09

## 2020-11-03 RX ADMIN — GABAPENTIN 300 MILLIGRAM(S): 400 CAPSULE ORAL at 17:37

## 2020-11-03 RX ADMIN — Medication 1 TABLET(S): at 13:51

## 2020-11-03 RX ADMIN — AMLODIPINE BESYLATE 10 MILLIGRAM(S): 2.5 TABLET ORAL at 05:40

## 2020-11-03 RX ADMIN — SODIUM CHLORIDE 75 MILLILITER(S): 9 INJECTION, SOLUTION INTRAVENOUS at 13:49

## 2020-11-03 RX ADMIN — Medication 1 TABLET(S): at 22:28

## 2020-11-03 RX ADMIN — Medication 325 MILLIGRAM(S): at 11:40

## 2020-11-03 RX ADMIN — Medication 1 MILLIGRAM(S): at 11:40

## 2020-11-03 RX ADMIN — GABAPENTIN 300 MILLIGRAM(S): 400 CAPSULE ORAL at 05:41

## 2020-11-03 RX ADMIN — Medication 500 MILLIGRAM(S): at 22:28

## 2020-11-03 RX ADMIN — Medication 500 MILLIGRAM(S): at 20:40

## 2020-11-03 RX ADMIN — ATORVASTATIN CALCIUM 10 MILLIGRAM(S): 80 TABLET, FILM COATED ORAL at 22:28

## 2020-11-03 RX ADMIN — PANTOPRAZOLE SODIUM 40 MILLIGRAM(S): 20 TABLET, DELAYED RELEASE ORAL at 17:37

## 2020-11-03 NOTE — PROGRESS NOTE ADULT - SUBJECTIVE AND OBJECTIVE BOX
Patient is a 70y old  Male who presents with a chief complaint of GI bleed (03 Nov 2020 13:35)      INTERVAL HPI/OVERNIGHT EVENTS: Patient seen and examined at bedside. No overnight events occurred. states that he has LUE pain. His IV line was changed to RUE. Denies fevers, chills, headache, lightheadedness, chest pain, dyspnea, abdominal pain, n/v/d/c.    MEDICATIONS  (STANDING):  amLODIPine   Tablet 10 milliGRAM(s) Oral daily  atorvastatin 10 milliGRAM(s) Oral <User Schedule>  clarithromycin 500 milliGRAM(s) Oral two times a day  dextrose 5%. 1000 milliLiter(s) (50 mL/Hr) IV Continuous <Continuous>  dextrose 50% Injectable 12.5 Gram(s) IV Push once  dextrose 50% Injectable 25 Gram(s) IV Push once  dextrose 50% Injectable 25 Gram(s) IV Push once  ferrous    sulfate 325 milliGRAM(s) Oral daily  folic acid 1 milliGRAM(s) Oral daily  gabapentin 300 milliGRAM(s) Oral two times a day  influenza   Vaccine 0.5 milliLiter(s) IntraMuscular once  insulin lispro (ADMELOG) corrective regimen sliding scale   SubCutaneous Before meals and at bedtime  lactobacillus acidophilus 1 Tablet(s) Oral three times a day  metroNIDAZOLE    Tablet 500 milliGRAM(s) Oral three times a day  pantoprazole    Tablet 40 milliGRAM(s) Oral two times a day  sodium chloride 0.45%. 1000 milliLiter(s) (75 mL/Hr) IV Continuous <Continuous>  tamsulosin 0.4 milliGRAM(s) Oral at bedtime    MEDICATIONS  (PRN):  dextrose 40% Gel 15 Gram(s) Oral once PRN Blood Glucose LESS THAN 70 milliGRAM(s)/deciliter  glucagon  Injectable 1 milliGRAM(s) IntraMuscular once PRN Glucose LESS THAN 70 milligrams/deciliter  polyethylene glycol 3350 17 Gram(s) Oral daily PRN Constipation      Allergies    No Known Drug Allergies  Prefers chocolate ensure drinks (Unknown)    Intolerances        REVIEW OF SYSTEMS:  CONSTITUTIONAL: No fever or chills  HEENT:  No headache, no sore throat  RESPIRATORY: No cough, wheezing, or shortness of breath  CARDIOVASCULAR: No chest pain, palpitations  GASTROINTESTINAL: No abd pain, nausea, vomiting, or diarrhea  GENITOURINARY: No dysuria, frequency, or hematuria  NEUROLOGICAL: no focal weakness or dizziness  MUSCULOSKELETAL: LUE weakness 2/2 pain at prior IV site     Vital Signs Last 24 Hrs  T(C): 36.8 (03 Nov 2020 15:17), Max: 36.9 (03 Nov 2020 04:23)  T(F): 98.2 (03 Nov 2020 15:17), Max: 98.4 (03 Nov 2020 04:23)  HR: 83 (03 Nov 2020 15:17) (79 - 93)  BP: 118/70 (03 Nov 2020 15:17) (118/70 - 130/72)  BP(mean): --  RR: 18 (03 Nov 2020 15:17) (18 - 18)  SpO2: 93% (03 Nov 2020 15:17) (93% - 99%)    PHYSICAL EXAM:  GENERAL: NAD  HEENT:  anicteric, moist mucous membranes  CHEST/LUNG:  CTA b/l, no rales, wheezes, or rhonchi  HEART:  RRR, S1, S2  ABDOMEN:  BS+, soft, nontender, nondistended  EXTREMITIES: no edema, cyanosis, or calf tenderness. RLE healed skin grafts present  NERVOUS SYSTEM: answers questions and follows commands appropriately    LABS:                        7.5    x     )-----------( x        ( 03 Nov 2020 14:51 )             22.5     CBC Full  -  ( 03 Nov 2020 14:51 )  WBC Count : x  Hemoglobin : 7.5 g/dL  Hematocrit : 22.5 %  Platelet Count - Automated : x  Mean Cell Volume : x  Mean Cell Hemoglobin : x  Mean Cell Hemoglobin Concentration : x  Auto Neutrophil # : x  Auto Lymphocyte # : x  Auto Monocyte # : x  Auto Eosinophil # : x  Auto Basophil # : x  Auto Neutrophil % : x  Auto Lymphocyte % : x  Auto Monocyte % : x  Auto Eosinophil % : x  Auto Basophil % : x    03 Nov 2020 07:26    142    |  107    |  15     ----------------------------<  106    3.6     |  30     |  2.00     Ca    9.0        03 Nov 2020 07:26          CAPILLARY BLOOD GLUCOSE      POCT Blood Glucose.: 121 mg/dL (03 Nov 2020 16:42)  POCT Blood Glucose.: 113 mg/dL (03 Nov 2020 11:50)  POCT Blood Glucose.: 119 mg/dL (03 Nov 2020 07:36)  POCT Blood Glucose.: 126 mg/dL (02 Nov 2020 21:45)          RADIOLOGY & ADDITIONAL TESTS:    Personally reviewed.     Consultant(s) Notes Reviewed:  [x] YES  [ ] NO

## 2020-11-03 NOTE — DISCHARGE NOTE PROVIDER - NSDCFUADDINST_GEN_ALL_CORE_FT
Follow up with your primary care provider within 1 week from discharge for further monitoring and management.    Follow up the gastroenterologist within 1 from discharge for capsule study.

## 2020-11-03 NOTE — DISCHARGE NOTE NURSING/CASE MANAGEMENT/SOCIAL WORK - NSDCVIVACCINE_GEN_ALL_CORE_FT
Influenza , 2019/10/8 12:16 , Dee Quigley (RN)   Influenza , 2019/10/8 12:16 , Dee Quigley (RN)  Influenza , 2020/11/7 11:22 , Amira Mireles (RN)

## 2020-11-03 NOTE — PHARMACOTHERAPY INTERVENTION NOTE - COMMENTS
Hospitalist team starting triple therapy for H. Pylori (clarithromycin, metronidazole, proton pump inhibitor).  Patient is on simvastatin, which is contraindicated concomitantly with clarithromycin.  Per discussion, hospitalist team would not like to discontinue statin due to patient history including TIA and other risk factors.  Agreed to initiate atorvastatin at low dose at reduced frequency of every 48 hours as AUC is increased but generally not to the degree of simvastatin.

## 2020-11-03 NOTE — PROGRESS NOTE ADULT - SUBJECTIVE AND OBJECTIVE BOX
The patient was interviewed and evaluated  70y Male s/p colonoscopy     T(C): 36.9 (11-03-20 @ 04:23), Max: 37 (11-02-20 @ 12:22)  HR: 90 (11-03-20 @ 04:23) (78 - 93)  BP: 126/77 (11-03-20 @ 04:23) (90/45 - 130/70)  RR: 18 (11-03-20 @ 04:23) (16 - 18)  SpO2: 99% (11-03-20 @ 04:23) (93% - 100%)  Wt(kg): --    Pt seen, doing well, no anesthesia complications or complaints noted or reported.   No additional recommendations.

## 2020-11-03 NOTE — DISCHARGE NOTE PROVIDER - NSDCMRMEDTOKEN_GEN_ALL_CORE_FT
acetaminophen 325 mg oral tablet: 2 tab(s) orally every 6 hours, As needed, Temp greater or equal to 38C (100.4F), Mild Pain (1 - 3)  amLODIPine 10 mg oral tablet: 1 tab(s) orally once a day  amLODIPine 10 mg oral tablet: 1 tab(s) orally 2 times a day  aspirin 81 mg oral tablet, chewable: 1 tab(s) orally once a day  Luis Fernando 10 mg-40 mg oral tablet: 1 tab(s) orally once a day  docusate sodium 100 mg oral capsule: 1 cap(s) orally 2 times a day  enoxaparin: 70 milligram(s) subcutaneous 2 times a day  ferrous sulfate 325 mg (65 mg elemental iron) oral tablet: 1 tab(s) orally once a day with meal  ferrous sulfate 325 mg (65 mg elemental iron) oral tablet: orally once a day  finasteride 5 mg oral tablet: 1 tab(s) orally once a day  folic acid 1 mg oral tablet: 1 tab(s) orally once a day  folic acid 1 mg oral tablet: 1 tab(s) orally once a day  gabapentin 300 mg oral capsule: 1 cap(s) orally 2 times a day  insulin lispro 100 units/mL injectable solution: injectable 3 times a day before meals  2 Unit(s) if Glucose 151 - 200  4 Unit(s) if Glucose 201 - 250  6 Unit(s) if Glucose 251 - 300  8 Unit(s) if Glucose 301 - 350  10 Unit(s) if Glucose 351 - 400  12 Unit(s) if Glucose Greater Than 400  losartan 100 mg oral tablet: 1 tab(s) orally once a day  melatonin 3 mg oral tablet: 1 tab(s) orally once a day (at bedtime)  Multiple Vitamins oral tablet: 1 tab(s) orally once a day  pantoprazole 40 mg oral delayed release tablet: 1 tab(s) orally once a day  Senna 8.6 mg oral tablet: 2 tab(s) orally once a day (at bedtime)  senna oral tablet: 2 tab(s) orally once a day (at bedtime)  simvastatin 20 mg oral tablet: 1 tab(s) orally once a day (at bedtime)  simvastatin 20 mg oral tablet: 1 tab(s) orally once a day (at bedtime)  SITagliptin 25 mg oral tablet: 1 tab(s) orally once a day  tamsulosin 0.4 mg oral capsule: 1 cap(s) orally once a day  tamsulosin 0.4 mg oral capsule: 1 cap(s) orally once a day (at bedtime)  Xarelto 20 mg oral tablet: 1 tab(s) orally once a day (in the evening)   amLODIPine 10 mg oral tablet: 1 tab(s) orally once a day  amLODIPine 10 mg oral tablet: 1 tab(s) orally 2 times a day  aspirin 81 mg oral tablet, chewable: 1 tab(s) orally once a day  Luis Fernando 10 mg-40 mg oral tablet: 1 tab(s) orally once a day  docusate sodium 100 mg oral capsule: 1 cap(s) orally 2 times a day  ferrous sulfate 325 mg (65 mg elemental iron) oral tablet: 1 tab(s) orally once a day with meal  ferrous sulfate 325 mg (65 mg elemental iron) oral tablet: orally once a day  finasteride 5 mg oral tablet: 1 tab(s) orally once a day  folic acid 1 mg oral tablet: 1 tab(s) orally once a day  folic acid 1 mg oral tablet: 1 tab(s) orally once a day  gabapentin 300 mg oral capsule: 1 cap(s) orally 2 times a day  insulin lispro 100 units/mL injectable solution: injectable 3 times a day before meals  2 Unit(s) if Glucose 151 - 200  4 Unit(s) if Glucose 201 - 250  6 Unit(s) if Glucose 251 - 300  8 Unit(s) if Glucose 301 - 350  10 Unit(s) if Glucose 351 - 400  12 Unit(s) if Glucose Greater Than 400  losartan 100 mg oral tablet: 1 tab(s) orally once a day  melatonin 3 mg oral tablet: 1 tab(s) orally once a day (at bedtime)  Multiple Vitamins oral tablet: 1 tab(s) orally once a day  pantoprazole 40 mg oral delayed release tablet: 1 tab(s) orally once a day  Senna 8.6 mg oral tablet: 2 tab(s) orally once a day (at bedtime)  senna oral tablet: 2 tab(s) orally once a day (at bedtime)  simvastatin 20 mg oral tablet: 1 tab(s) orally once a day (at bedtime)  simvastatin 20 mg oral tablet: 1 tab(s) orally once a day (at bedtime)  SITagliptin 25 mg oral tablet: 1 tab(s) orally once a day  tamsulosin 0.4 mg oral capsule: 1 cap(s) orally once a day  tamsulosin 0.4 mg oral capsule: 1 cap(s) orally once a day (at bedtime)  Xarelto 20 mg oral tablet: 1 tab(s) orally once a day (in the evening)   amLODIPine 10 mg oral tablet: 1 tab(s) orally 2 times a day  ferrous sulfate 325 mg (65 mg elemental iron) oral tablet: orally once a day  folic acid 1 mg oral tablet: 1 tab(s) orally once a day  gabapentin 300 mg oral capsule: 1 cap(s) orally 2 times a day  losartan 100 mg oral tablet: 1 tab(s) orally once a day  Multiple Vitamins oral tablet: 1 tab(s) orally once a day  pantoprazole 40 mg oral delayed release tablet: 1 tab(s) orally once a day  Senna 8.6 mg oral tablet: 2 tab(s) orally once a day (at bedtime)  simvastatin 20 mg oral tablet: 1 tab(s) orally once a day (at bedtime)  tamsulosin 0.4 mg oral capsule: 1 cap(s) orally once a day  Xarelto 20 mg oral tablet: 1 tab(s) orally once a day (in the evening)   amLODIPine 10 mg oral tablet: 1 tab(s) orally 2 times a day  atorvastatin 10 mg oral tablet: 1 tab(s) orally every OTHER day (at bedtime), starting tonight 11/7  clarithromycin 500 mg oral tablet: 1 tab(s) orally 2 times a day until 11/17  ferrous sulfate 325 mg (65 mg elemental iron) oral tablet: orally once a day  folic acid 1 mg oral tablet: 1 tab(s) orally once a day  gabapentin 300 mg oral capsule: 1 cap(s) orally 2 times a day  hydrOXYzine hydrochloride 25 mg oral tablet: 1 tab(s) orally every 6 hours, As needed, Itching  lactobacillus acidophilus oral capsule: 2 cap(s) orally 2 times a day   metroNIDAZOLE 500 mg oral tablet: 1 tab(s) orally 3 times a day until 11/17  Multiple Vitamins oral tablet: 1 tab(s) orally once a day  pantoprazole 40 mg oral delayed release tablet: 1 tab(s) orally 2 times a day until 11/17  Senna 8.6 mg oral tablet: 2 tab(s) orally once a day (at bedtime)  tamsulosin 0.4 mg oral capsule: 1 cap(s) orally once a day  Xarelto 20 mg oral tablet: 1 tab(s) orally once a day (in the evening)

## 2020-11-03 NOTE — PROGRESS NOTE ADULT - SUBJECTIVE AND OBJECTIVE BOX
INTERVAL HPI/OVERNIGHT EVENTS:  pt seen and examined   sp colon yesterday  denies n/v/abd pain  alisia clears  no gib per nursing    MEDICATIONS  (STANDING):  amLODIPine   Tablet 10 milliGRAM(s) Oral daily  atorvastatin 10 milliGRAM(s) Oral <User Schedule>  clarithromycin 500 milliGRAM(s) Oral two times a day  dextrose 5%. 1000 milliLiter(s) (50 mL/Hr) IV Continuous <Continuous>  dextrose 50% Injectable 12.5 Gram(s) IV Push once  dextrose 50% Injectable 25 Gram(s) IV Push once  dextrose 50% Injectable 25 Gram(s) IV Push once  ferrous    sulfate 325 milliGRAM(s) Oral daily  folic acid 1 milliGRAM(s) Oral daily  gabapentin 300 milliGRAM(s) Oral two times a day  influenza   Vaccine 0.5 milliLiter(s) IntraMuscular once  insulin lispro (ADMELOG) corrective regimen sliding scale   SubCutaneous Before meals and at bedtime  metroNIDAZOLE    Tablet 500 milliGRAM(s) Oral three times a day  pantoprazole   Suspension 40 milliGRAM(s) Oral two times a day  sodium chloride 0.45%. 1000 milliLiter(s) (75 mL/Hr) IV Continuous <Continuous>  tamsulosin 0.4 milliGRAM(s) Oral at bedtime    MEDICATIONS  (PRN):  dextrose 40% Gel 15 Gram(s) Oral once PRN Blood Glucose LESS THAN 70 milliGRAM(s)/deciliter  glucagon  Injectable 1 milliGRAM(s) IntraMuscular once PRN Glucose LESS THAN 70 milligrams/deciliter  polyethylene glycol 3350 17 Gram(s) Oral daily PRN Constipation      Allergies    No Known Drug Allergies  Prefers chocolate ensure drinks (Unknown)    Intolerances        Review of Systems:    General:  No wt loss, fevers, chills, night sweats, fatigue   Eyes:  Good vision, no reported pain  ENT:  No sore throat, pain, runny nose, dysphagia  CV:  No pain, palpitations, hypo/hypertension  Resp:  No dyspnea, cough, tachypnea, wheezing  GI:  No pain, No nausea, No vomiting, No diarrhea, No constipation, No weight loss, No fever, No pruritis, No rectal bleeding, No melena, No dysphagia  :  No pain, bleeding, incontinence, nocturia  Muscle:  No pain, weakness  Neuro:  No weakness, tingling, memory problems  Psych:  No fatigue, insomnia, mood problems, depression  Endocrine:  No polyuria, polydypsia, cold/heat intolerance  Heme:  No petechiae, ecchymosis, easy bruisability  Skin:  No rash, tattoos, scars, edema      Vital Signs Last 24 Hrs  T(C): 36.8 (03 Nov 2020 07:14), Max: 37 (02 Nov 2020 12:22)  T(F): 98.3 (03 Nov 2020 07:14), Max: 98.6 (02 Nov 2020 12:22)  HR: 82 (03 Nov 2020 09:00) (78 - 93)  BP: 122/69 (03 Nov 2020 07:14) (90/45 - 130/70)  BP(mean): --  RR: 18 (03 Nov 2020 07:14) (16 - 18)  SpO2: 97% (03 Nov 2020 09:00) (93% - 100%)    PHYSICAL EXAM:    Constitutional: lying in bed  HEENT: ncat  Gastrointestinal: soft nt dt  Extremities: No peripheral edema  Vascular: + peripheral pulses  Neurological: Awake alert appropriate      LABS:                        7.8    6.38  )-----------( 315      ( 03 Nov 2020 07:26 )             23.9     11-03    142  |  107  |  15  ----------------------------<  106<H>  3.6   |  30  |  2.00<H>    Ca    9.0      03 Nov 2020 07:26            RADIOLOGY & ADDITIONAL TESTS:

## 2020-11-03 NOTE — PROGRESS NOTE ADULT - SUBJECTIVE AND OBJECTIVE BOX
[facility] -- Sandie Valentine, Mina Sullivan Pannella, Patel, Savella  Office # [phone]      Follow Up:    htn hld    Subjective/Observations:   No events overnight resting comfortably in bed.  No complaints of chest pain, dyspnea, or palpitations reported. No signs of orthopnea or PND.      REVIEW OF SYSTEMS: All other review of systems is negative unless indicated above    PAST MEDICAL & SURGICAL HISTORY:  Deep vein thrombosis (DVT)    Neuropathy    BPH (benign prostatic hyperplasia)    HLD (hyperlipidemia)    HTN (hypertension)    GERD (gastroesophageal reflux disease)    HLD (hyperlipidemia)    History of femoropopliteal bypass    No significant past surgical history        MEDICATIONS  (STANDING):  amLODIPine   Tablet 10 milliGRAM(s) Oral daily  dextrose 5%. 1000 milliLiter(s) (50 mL/Hr) IV Continuous <Continuous>  dextrose 50% Injectable 12.5 Gram(s) IV Push once  dextrose 50% Injectable 25 Gram(s) IV Push once  dextrose 50% Injectable 25 Gram(s) IV Push once  ferrous    sulfate 325 milliGRAM(s) Oral daily  folic acid 1 milliGRAM(s) Oral daily  gabapentin 300 milliGRAM(s) Oral two times a day  influenza   Vaccine 0.5 milliLiter(s) IntraMuscular once  insulin lispro (ADMELOG) corrective regimen sliding scale   SubCutaneous Before meals and at bedtime  pantoprazole  Injectable 40 milliGRAM(s) IV Push two times a day  simvastatin 20 milliGRAM(s) Oral at bedtime  tamsulosin 0.4 milliGRAM(s) Oral at bedtime    MEDICATIONS  (PRN):  dextrose 40% Gel 15 Gram(s) Oral once PRN Blood Glucose LESS THAN 70 milliGRAM(s)/deciliter  glucagon  Injectable 1 milliGRAM(s) IntraMuscular once PRN Glucose LESS THAN 70 milligrams/deciliter  polyethylene glycol 3350 17 Gram(s) Oral daily PRN Constipation      Allergies    No Known Drug Allergies  Prefers chocolate ensure drinks (Unknown)    Intolerances        Vital Signs Last 24 Hrs  T(C): 36.9 (03 Nov 2020 04:23), Max: 37 (02 Nov 2020 12:22)  T(F): 98.4 (03 Nov 2020 04:23), Max: 98.6 (02 Nov 2020 12:22)  HR: 90 (03 Nov 2020 04:23) (78 - 93)  BP: 126/77 (03 Nov 2020 04:23) (90/45 - 130/70)  BP(mean): --  RR: 18 (03 Nov 2020 04:23) (16 - 18)  SpO2: 99% (03 Nov 2020 04:23) (93% - 100%)    I&O's Summary    02 Nov 2020 07:01  -  03 Nov 2020 07:00  --------------------------------------------------------  IN: 120 mL / OUT: 500 mL / NET: -380 mL          PHYSICAL EXAM:  TELE: SR  Constitutional: NAD, awake and alert, well-developed  HEENT: Moist Mucous Membranes, Anicteric  Pulmonary: Non-labored, breath sounds are clear bilaterally, No wheezing, crackles or rhonchi  Cardiovascular: Regular, S1 and S2 nl, No murmurs, rubs, gallops or clicks  Gastrointestinal: Bowel Sounds present, soft, nontender.   Lymph: No lymphadenopathy. No peripheral edema.  Skin: No visible rashes or ulcers.  Psych:  Mood & affect appropriate    LABS: All Labs Reviewed:                        7.8    6.38  )-----------( 315      ( 03 Nov 2020 07:26 )             23.9                         7.9    x     )-----------( x        ( 02 Nov 2020 20:32 )             24.3                         8.4    7.46  )-----------( 348      ( 02 Nov 2020 07:14 )             26.3     03 Nov 2020 07:26    142    |  107    |  15     ----------------------------<  106    3.6     |  30     |  2.00   02 Nov 2020 07:14    147    |  109    |  15     ----------------------------<  137    4.0     |  30     |  1.40   01 Nov 2020 07:08    141    |  104    |  13     ----------------------------<  119    3.7     |  31     |  1.20     Ca    9.0        03 Nov 2020 07:26  Ca    9.0        02 Nov 2020 07:14  Ca    9.3        01 Nov 2020 07:08               ECG:  < from: 12 Lead ECG (10.29.20 @ 13:55) >    Ventricular Rate 90 BPM    Atrial Rate 90 BPM    P-R Interval 144 ms    QRS Duration 96 ms    Q-T Interval 362 ms    QTC Calculation(Bazett) 442 ms    P Axis 64 degrees    R Axis -57 degrees    T Axis 74 degrees    Diagnosis Line Normal sinus rhythm  Left axis deviation  Abnormal ECG  No previous ECGs available    < end of copied text >      Echo:  < from: TTE Echo Complete w/Doppler (10.02.19 @ 13:35) >    Summary:   1. Left ventricular ejection fraction, by visual estimation, is 60 to   65%.   2. Normal global left ventricular systolic function.   3. Normal left ventricular internal cavity size.   4. Spectral Doppler shows impaired relaxation pattern of left   ventricular myocardial filling (Grade I diastolic dysfunction).   5. There is no evidence of pericardial effusion.    < end of copied text >    Radiology:

## 2020-11-03 NOTE — PROGRESS NOTE ADULT - PROBLEM SELECTOR PLAN 4
-H/H 9.5/28.5 in ED, +FOBT, 2/2 GIB  -baseline appears to be 7-9 per outpatient chart review  -Hb 9.5 --> 9.1 --> 8.8 -->-> 7.8 this AM, transfuse Hgb<7.5   -trend H/H  -Continue home folic acid, iron supplementation  -11/01 Serum iron 32, TIBC 259, Ferritin 39, Transferrin 215.

## 2020-11-03 NOTE — DISCHARGE NOTE PROVIDER - CARE PROVIDER_API CALL
Kavon El  GASTROENTEROLOGY  [address] [place] [address]  Phone: [phone]  Fax: [phone]  Follow Up Time: 1 week

## 2020-11-03 NOTE — PROVIDER CONTACT NOTE (OTHER) - SITUATION
repeat H&H as above..  Pt admitted with GI bleed.  Abd soft, non tender to palpatin, no distention.  No signs or symptoms of bleeding

## 2020-11-03 NOTE — PHYSICAL THERAPY INITIAL EVALUATION ADULT - CRITERIA FOR SKILLED THERAPEUTIC INTERVENTIONS
predicted duration of therapy intervention/anticipated equipment needs at discharge/rehab potential/impairments found/anticipated discharge recommendation/therapy frequency/functional limitations in following categories/risk reduction/prevention

## 2020-11-03 NOTE — PROVIDER CONTACT NOTE (OTHER) - ASSESSMENT
Pt is alert and oriented x 4. skin warm and dry.   No discoloration noted. Pt resting quietly in bed.

## 2020-11-03 NOTE — PROVIDER CONTACT NOTE (OTHER) - SITUATION
Pt c/o pain in left hand, left hand noted to be swollen.  Pt stated the pain was on palmar surface near thumb.  fingers all warm and mobile

## 2020-11-03 NOTE — PHYSICAL THERAPY INITIAL EVALUATION ADULT - GENERAL OBSERVATIONS, REHAB EVAL
patient rec'd semisupine in bed +cardiac monitor +SCDs. left hand swollen - ALAN Castellon made aware.

## 2020-11-03 NOTE — PHYSICAL THERAPY INITIAL EVALUATION ADULT - IMPAIRMENTS CONTRIBUTING TO GAIT DEVIATIONS, PT EVAL
decreased strength/decreased ROM/right knee contracture - unable to fully extend R LE to place on ground for ambulation. Pt attempts to hop / slides left foot forward. Pt unable to grasp walker with left hand due to swelling and pain with WB on rolling walker. ALAN Castellon aware. Ambulation limited due to decrease ability to weightbear through L hand and R LE./impaired balance

## 2020-11-03 NOTE — DISCHARGE NOTE NURSING/CASE MANAGEMENT/SOCIAL WORK - PATIENT PORTAL LINK FT
You can access the FollowMyHealth Patient Portal offered by [facility] by registering at the following website: [email or web address]. By joining [facility]’s FollowMyHealth portal, you will also be able to view your health information using other applications (apps) compatible with our system.

## 2020-11-03 NOTE — PROGRESS NOTE ADULT - PROBLEM SELECTOR PLAN 7
Chronic, stable  -Patient takes amlodipine 10mg 2x daily, confirmed with pharmacy  -BP currently stable, continue amlodipine 10mg once daily and continue to monitor  -hold home losartan in setting of brief hypotension today as well as increasing Cr

## 2020-11-03 NOTE — PROGRESS NOTE ADULT - ASSESSMENT
70 year old male PMH RLE DVT on Xarelto, HTN, HLD, BPH, GERD, neuropathy presents to ED with palpitations, fatigue, melena, admitted for GI bleed. Cardio consulted for cardiac clearance for EGD.     GI bleed  - + FOBT s/p EGD, found to have gastritis with no bleeding  - Transfuse per Primary, hgb 7 noted fu recs  -sp colonoscopy yesterday fu gi recs   - Xarelto on hold      HTN  - Echo 10/2/2019: EF 60 to 65% with Grade I diastolic dysfunction  - BP stable and controlled 126/77  - Continue Norvasc  -hold losartan for erik   - Monitor and replete lytes, keep K>4, Mg>2.    HLD  - Continue statin    DVT  - Pt takes Xarelto for hx of RLE DVT.  continue to hold in the setting of GIB.    - All other medical needs as per primary team.  - Other cardiovascular workup will depend on clinical course.  - Will continue to follow.    Radha Cline FNP-C  Cardiology NP  [phone]        70 year old male PMH RLE DVT on Xarelto, HTN, HLD, BPH, GERD, neuropathy presents to ED with palpitations, fatigue, melena, admitted for GI bleed. Cardio consulted for cardiac clearance for EGD.     GI bleed  - + FOBT s/p EGD, found to have gastritis with no bleeding  - Transfuse per Primary, hgb 7 noted fu recs  - sp colonoscopy yesterday fu gi recs   - Xarelto on hold      HTN  - Echo 10/2/2019: EF 60 to 65% with Grade I diastolic dysfunction  - BP stable and controlled 126/77  - Continue Norvasc  -hold losartan for erik   - Monitor and replete lytes, keep K>4, Mg>2.    HLD  - Continue statin    DVT  - Pt takes Xarelto for hx of RLE DVT.  continue to hold in the setting of GIB.    - All other medical needs as per primary team.  - Other cardiovascular workup will depend on clinical course.  - Will continue to follow.    Radha Cline FNP-C  Cardiology NP  [facility] [phone]

## 2020-11-03 NOTE — DISCHARGE NOTE PROVIDER - HOSPITAL COURSE
FROM ADMISSION H+P:   HPI:  71 yo male PMH RLE DVT on Xarelto, TIA, HTN, HLD, SERENITY with 1 episode of dialysis in the past, anemia of chronic disease, DM, BPH, GERD, neuropathy,  presents to ED with palpitations, fatigue, melena. Onset 5 days ago. Patient states he noticed black stools 5 days ago but denies any bright red blood in his stools. Patient also c/o tinnitus and nausea. Denies abdominal pain, HA, vomiting, diarrhea, CP, SOB. Patient states he went to his PMD today and was told to come to ED to be evaluated for his symptoms. Patient states he started taking aspirin a few days ago  because he was told it is good for his heart. Does not take any other NSAID's as per patient.   Patient is an extremely poor historian, does not remember his PMD name although he was there today.  Previous records available from [facility].     In ED:   Vitals: T 98.7, HR 97, /77, RR 18, SpO2 98%  Labs significant for: +FOBT, RBC 3.28, Hgb 9.5, Hct 28.5, PT 14.4, INR 1.24, PTT 36.3, glucose 122  CXR: unremarkable  EKG: normal sinus rhythm, rate 98, left axis deviation  Given: Protonix 40mg IVP x1 (29 Oct 2020 15:34)      ---  HOSPITAL COURSE:   Patient was subsequently admitted to telemetry for further work up and management of upper GI bleed. His   Xarelto and aspirin was held upon admission. PPI drip was started. Blood consent obtained for potential need of transfusion. Patient was made NPO, optimized by cardio Dr. Martínez, and had EGD performed by GI Dr. El which showed no active bleed. Biopsy was taken and showed chronic active gastritis and positive quick stain for H. Pylori. Patient was started on triple therapy with clarithomycin, metronidazole, and PPI continued for 14 days. Simvastatin was changed to atorvastatin every other day due to contraindication with simvastatin and clarithomycin. Colonoscopy was then performed and pathology report showed _______. Kidney function was noted to decline and Losartan was held. Hemoglobin continued to downtrend, and patient was *?transfused for Hb <7.5.* Iron and folic acid continued.    ---  CONSULTANTS:     ---  TIME SPENT:  I, the attending physician, was physically present for the key portions of the evaluation and management (E/M) service provided. The total amount of time spent reviewing the hospital notes, laboratory values, imaging findings, assessing/counseling the patient, discussing with consultant physicians, social work, nursing staff was -- minutes    ---  Primary care provider was made aware of plan for discharge:      [  ] NO     [  ] YES   FROM ADMISSION H+P:   HPI:  69 yo male PMH RLE DVT on Xarelto, TIA, HTN, HLD, SERENITY with 1 episode of dialysis in the past, anemia of chronic disease, DM, BPH, GERD, neuropathy,  presents to ED with palpitations, fatigue, melena. Onset 5 days ago. Patient states he noticed black stools 5 days ago but denies any bright red blood in his stools. Patient also c/o tinnitus and nausea. Denies abdominal pain, HA, vomiting, diarrhea, CP, SOB. Patient states he went to his PMD today and was told to come to ED to be evaluated for his symptoms. Patient states he started taking aspirin a few days ago  because he was told it is good for his heart. Does not take any other NSAID's as per patient.   Patient is an extremely poor historian, does not remember his PMD name although he was there today.  Previous records available from [facility].     In ED:   Vitals: T 98.7, HR 97, /77, RR 18, SpO2 98%  Labs significant for: +FOBT, RBC 3.28, Hgb 9.5, Hct 28.5, PT 14.4, INR 1.24, PTT 36.3, glucose 122  CXR: unremarkable  EKG: normal sinus rhythm, rate 98, left axis deviation  Given: Protonix 40mg IVP x1 (29 Oct 2020 15:34)      ---  HOSPITAL COURSE:   Patient was subsequently admitted to telemetry for further work up and management of upper GI bleed. His   Xarelto and aspirin was held upon admission. PPI drip was started. Blood consent obtained for potential need of transfusion. Patient was made NPO, optimized by cardio Dr. Martínez, and had EGD performed by GI Dr. El which showed no active bleed. Biopsy was taken and showed chronic active gastritis and positive quick stain for H. Pylori. Patient was started on triple therapy with clarithomycin, metronidazole, and PPI continued for 14 days. Simvastatin was changed to atorvastatin every other day due to contraindication with simvastatin and clarithomycin. Colonoscopy was then performed. unremarkable. Pathology report showed colonic mucosa with no pathologic alterations, colitis. Kidney function was noted to decline and Losartan was held. Renal and bladder US revealed no hydronephrosis and enlarged prostate gland. Pt Renal function improved with conservative management.?? Hemoglobin continued to downtrend, and patient was transfused for Hb <7.5. H& H stabilized?? Iron and folic acid continued.    Physical Exam:    ---  CONSULTANTS:   Gastroenterology: Dr. El  Cardiology: Dr. Sullivan      ---  TIME SPENT:  I, the attending physician, was physically present for the key portions of the evaluation and management (E/M) service provided. The total amount of time spent reviewing the hospital notes, laboratory values, imaging findings, assessing/counseling the patient, discussing with consultant physicians, social work, nursing staff was -- minutes    ---  Primary care provider was made aware of plan for discharge:      [  ] NO     [  ] YES   FROM ADMISSION H+P:   HPI:  69 yo male PMH RLE DVT on Xarelto, TIA, HTN, HLD, SERENITY with 1 episode of dialysis in the past, anemia of chronic disease, DM, BPH, GERD, neuropathy,  presents to ED with palpitations, fatigue, melena. Onset 5 days ago. Patient states he noticed black stools 5 days ago but denies any bright red blood in his stools. Patient also c/o tinnitus and nausea. Denies abdominal pain, HA, vomiting, diarrhea, CP, SOB. Patient states he went to his PMD today and was told to come to ED to be evaluated for his symptoms. Patient states he started taking aspirin a few days ago  because he was told it is good for his heart. Does not take any other NSAID's as per patient.   Patient is an extremely poor historian, does not remember his PMD name although he was there today.  Previous records available from [facility].     In ED:   Vitals: T 98.7, HR 97, /77, RR 18, SpO2 98%  Labs significant for: +FOBT, RBC 3.28, Hgb 9.5, Hct 28.5, PT 14.4, INR 1.24, PTT 36.3, glucose 122  CXR: unremarkable  EKG: normal sinus rhythm, rate 98, left axis deviation  Given: Protonix 40mg IVP x1 (29 Oct 2020 15:34)      ---  HOSPITAL COURSE:   Patient was subsequently admitted to telemetry for further work up and management of upper GI bleed. His   Xarelto and aspirin was held upon admission. PPI drip was started. Blood consent obtained for potential need of transfusion. Patient was made NPO, optimized by cardio Dr. Martínez, and had EGD performed by GI Dr. El which showed no active bleed. Biopsy was taken and showed chronic active gastritis and positive quick stain for H. Pylori. Patient was started on triple therapy with clarithomycin, metronidazole, and PPI continued for 14 days. Simvastatin was changed to atorvastatin every other day due to contraindication with simvastatin and clarithomycin. Colonoscopy was then performed. unremarkable. Pathology report showed colonic mucosa with no pathologic alterations. Kidney function was noted to decline and Losartan was held. Renal and bladder US revealed no hydronephrosis and enlarged prostate gland. Pt Renal function improved with conservative management.?? Hemoglobin continued to downtrend, and patient was transfused for Hb <7.5. H& H stabilized. For intermittent RLE pain, pt had doppler US performed x 2 which redemonstrated mural thickening consistent with sequela of chronic deep vein thrombosis with no evidence of acute deep vein thrombosis. Home Xarelto resumed prior to DC?? Iron and folic acid continued.    Physical Exam:    ---  CONSULTANTS:   Gastroenterology: Dr. El  Cardiology: Dr. Sullivan      ---  TIME SPENT:  I, the attending physician, was physically present for the key portions of the evaluation and management (E/M) service provided. The total amount of time spent reviewing the hospital notes, laboratory values, imaging findings, assessing/counseling the patient, discussing with consultant physicians, social work, nursing staff was -- minutes    ---  Primary care provider was made aware of plan for discharge:      [  ] NO     [  ] YES   FROM ADMISSION H+P:   HPI:  71 yo male PMH RLE DVT on Xarelto, TIA, HTN, HLD, SERENITY with 1 episode of dialysis in the past, anemia of chronic disease, DM, BPH, GERD, neuropathy,  presents to ED with palpitations, fatigue, melena. Onset 5 days ago. Patient states he noticed black stools 5 days ago but denies any bright red blood in his stools. Patient also c/o tinnitus and nausea. Denies abdominal pain, HA, vomiting, diarrhea, CP, SOB. Patient states he went to his PMD today and was told to come to ED to be evaluated for his symptoms. Patient states he started taking aspirin a few days ago  because he was told it is good for his heart. Does not take any other NSAID's as per patient.   Patient is an extremely poor historian, does not remember his PMD name although he was there today.  Previous records available from [facility].     In ED:   Vitals: T 98.7, HR 97, /77, RR 18, SpO2 98%  Labs significant for: +FOBT, RBC 3.28, Hgb 9.5, Hct 28.5, PT 14.4, INR 1.24, PTT 36.3, glucose 122  CXR: unremarkable  EKG: normal sinus rhythm, rate 98, left axis deviation  Given: Protonix 40mg IVP x1 (29 Oct 2020 15:34)      ---  HOSPITAL COURSE:   Patient was subsequently admitted to telemetry for further work up and management of upper GI bleed. His   Xarelto and aspirin was held upon admission. PPI drip was started. Blood consent obtained for potential need of transfusion. Patient was made NPO, optimized by cardio Dr. Martínez, and had EGD performed by GI Dr. El which showed no active bleed. Biopsy was taken and showed chronic active gastritis and positive quick stain for H. Pylori. Patient was started on triple therapy with clarithomycin, metronidazole, and PPI continued for 14 days. Simvastatin was changed to atorvastatin every other day due to contraindication with simvastatin and clarithomycin. Colonoscopy was then performed. unremarkable. Pathology report showed colonic mucosa with no pathologic alterations. Kidney function was noted to decline and Losartan was held. Renal and bladder US revealed no hydronephrosis and enlarged prostate gland. Pt Renal function improved with conservative management.?? Hemoglobin continued to downtrend, and patient was transfused for Hb <7.5. H& H stabilized. For intermittent RLE pain, pt had doppler US performed x 2 which redemonstrated mural thickening consistent with sequela of chronic deep vein thrombosis with no evidence of acute deep vein thrombosis. Pt reported further melena wel movements on day 4 s/p colonoscopy. Resolved__? Home Xarelto resumed prior to DC?? Iron and folic acid continued.    Physical Exam:    ---  CONSULTANTS:   Gastroenterology: Dr. El  Cardiology: Dr. Sullivan      ---  TIME SPENT:  I, the attending physician, was physically present for the key portions of the evaluation and management (E/M) service provided. The total amount of time spent reviewing the hospital notes, laboratory values, imaging findings, assessing/counseling the patient, discussing with consultant physicians, social work, nursing staff was -- minutes    ---  Primary care provider was made aware of plan for discharge:      [  ] NO     [  ] YES   FROM ADMISSION H+P:   HPI:  71 yo male PMH RLE DVT on Xarelto, TIA, HTN, HLD, SERENITY with 1 episode of dialysis in the past, anemia of chronic disease, DM, BPH, GERD, neuropathy,  presents to ED with palpitations, fatigue, melena. Onset 5 days ago. Patient states he noticed black stools 5 days ago but denies any bright red blood in his stools. Patient also c/o tinnitus and nausea. Denies abdominal pain, HA, vomiting, diarrhea, CP, SOB. Patient states he went to his PMD today and was told to come to ED to be evaluated for his symptoms. Patient states he started taking aspirin a few days ago  because he was told it is good for his heart. Does not take any other NSAID's as per patient.   Patient is an extremely poor historian, does not remember his PMD name although he was there today.  Previous records available from [facility].     In ED:   Vitals: T 98.7, HR 97, /77, RR 18, SpO2 98%  Labs significant for: +FOBT, RBC 3.28, Hgb 9.5, Hct 28.5, PT 14.4, INR 1.24, PTT 36.3, glucose 122  CXR: unremarkable  EKG: normal sinus rhythm, rate 98, left axis deviation  Given: Protonix 40mg IVP x1 (29 Oct 2020 15:34)      ---  HOSPITAL COURSE:   Patient was subsequently admitted to telemetry for further work up and management of upper GI bleed. His Xarelto and aspirin was held upon admission. PPI drip was started. Blood consent obtained for potential need of transfusion. Patient was made NPO, optimized by cardio Dr. Martínez, and had EGD performed by GI Dr. El which showed no active bleed. Biopsy was taken and showed chronic active gastritis and positive quick stain for H. Pylori. Patient was started on triple therapy with clarithomycin, metronidazole, and PPI continued for 14 days. Simvastatin was changed to atorvastatin every other day due to contraindication with simvastatin and clarithomycin. Colonoscopy was then performed: unremarkable. Pathology report showed colonic mucosa with no pathologic alterations. Kidney function was noted to decline and Losartan was held. Renal and bladder US revealed no hydronephrosis and enlarged prostate gland. Patient was also found to be hypernatremic, and so patient was starting on hypotonic fluids with improvement in sodium level and kidney function. Blood pressure remained controlled only on Amlodipine. Hemoglobin continued to downtrend, and patient was transfused 1 unit of packed RBCs. Hemoglobin stabilized and Xarelto restarted. For intermittent RLE pain, pt had doppler US performed x 2 which redemonstrated mural thickening consistent with sequela of chronic deep vein thrombosis with no evidence of acute deep vein thrombosis. Melanotic stools resolved. Iron and folic acid continued.     Patient was seen and examined on day of discharge:    T(C): 36.7 (11-07-20 @ 05:10), Max: 37.6 (11-06-20 @ 13:25)  HR: 93 (11-07-20 @ 05:10) (86 - 93)  BP: 112/64 (11-07-20 @ 05:10) (112/64 - 116/71)  RR: 17 (11-07-20 @ 05:10) (17 - 17)  SpO2: 97% (11-07-20 @ 05:10) (96% - 97%)    GENERAL: patient appears well, no acute distress, appropriate, pleasant  EYES: sclera clear, no exudates  ENMT: oropharynx clear without erythema, no exudates, moist mucous membranes  NECK: supple, soft, no thyromegaly noted  LUNGS: good air entry bilaterally, clear to auscultation, symmetric breath sounds, no wheezing or rhonchi appreciated  HEART: soft S1/S2, regular rate and rhythm, no murmurs noted, no lower extremity edema  GASTROINTESTINAL: abdomen is soft, nontender, nondistended, normoactive bowel sounds, no palpable masses  INTEGUMENT: good skin turgor, no lesions noted  MUSCULOSKELETAL: no clubbing or cyanosis, no obvious deformity  NEUROLOGIC: awake, alert, oriented x3, good muscle tone in 4 extremities, no obvious sensory deficits  PSYCHIATRIC: mood is good, affect is congruent, linear and logical thought process  HEME/LYMPH: no palpable supraclavicular nodules, no obvious ecchymosis or petechiae     Patient's clinical status improved during admission and is considered stable for dc to home with home PT.    ---  CONSULTANTS:   Gastroenterology: Dr. El  Cardiology: Dr. Sullivan      ---  TIME SPENT:  I, the attending physician, was physically present for the key portions of the evaluation and management (E/M) service provided. The total amount of time spent reviewing the hospital notes, laboratory values, imaging findings, assessing/counseling the patient, discussing with consultant physicians, social work, nursing staff was -- minutes    ---  Primary care provider was made aware of plan for discharge:      [  ] NO     [  ] YES

## 2020-11-03 NOTE — PROGRESS NOTE ADULT - PROBLEM SELECTOR PLAN 3
Pt admitted for GI bleed, now s/p EGD. Bx shows Chronic active gastritis involving oxyntic and non-oxyntic mucosa.  -Diff Quik stain for H. pylori is positive.  -continue protonix 40 mg PO BID, clarithromycin, metronidazole

## 2020-11-03 NOTE — PROVIDER CONTACT NOTE (OTHER) - RECOMMENDATIONS
Dr. Vasquez notified of lab results and vital signs.  Last BP  118/70 with heart rate 83.  Pt denies any c/o lightheadeness or dizzyness.  Pt s/p 1 liter NS fluid bolus, and now getting IVF.  As per

## 2020-11-03 NOTE — PROGRESS NOTE ADULT - PROBLEM SELECTOR PLAN 10
SCDs in setting of GIB    11. Dispo: PT consulted to eval/tx for deconditioning. continue dc planning    IMPROVE VTE Individual Risk Assessment       RISK                                                          Points  [  ] Previous VTE                                                3  [  ] Thrombophilia                                             2  [  ] Lower limb paralysis                                   2        (unable to hold up >15 seconds)    [  ] Current Cancer                                             2         (within 6 months)  [  ] Immobilization > 24 hrs                              1  [  ] ICU/CCU stay > 24 hours                             1  [ 1 ] Age > 60                                                         1    IMPROVE VTE Score:         [       1  ]

## 2020-11-03 NOTE — PROVIDER CONTACT NOTE (OTHER) - ACTION/TREATMENT ORDERED:
Dr. Meredith made aware and said per primary team pt will receive transfusion if Hgb <7.
HL removed from left hand and restarted in right wrist.  Will cont to monitor.
telephone conversation with Dr. vasquez, pt to be transfused if Hg < 7.5.  as per DR. Vasquez, to cont with current plan of care, no new orders received.  To have repeat blood work in am.

## 2020-11-03 NOTE — PROGRESS NOTE ADULT - PROBLEM SELECTOR PLAN 1
Patient with 5 days melena, palpitations, fatigue on admission; now endorsing decreased melena.  -FOBT +, H/H 9.5/28.5 in ED,  -Hb 9.5 --> 9.1 --> 8.8 -->-> 7.8 this AM,   -path from colonoscopy pending  -Hold Xarelto, aspirin in setting of GIB, on Xarelto for hx RLE DVT  -Start oral Protonix 40mg PO BID  -consistent carb diet  -EGD with no active bleeding, bx consistent with gastritis, H pylori.  -start clarithromycin, metronidazole. Oral protonix  -GI Dr. DAVINA El following -- recs appreciated.

## 2020-11-03 NOTE — DISCHARGE NOTE PROVIDER - NSDCHHASSISTDEVIC_GEN_ALL_CORE_FT
Patient already ambulates with walker and occasional wheelchair use at home. Found to have decreased LE range of motion, impaired balance, and decreased strength.

## 2020-11-03 NOTE — PROGRESS NOTE ADULT - PROBLEM SELECTOR PLAN 2
Pt CMP revealed NA of 147  -Resolved  -s/p D5 at 60 cc/hr for 10 hours.  -monitor renal indices, lytes in CMP  -strict I&Os

## 2020-11-03 NOTE — PROGRESS NOTE ADULT - PROBLEM SELECTOR PLAN 1
sp neg egd/colon  +hpylori on path- started on tx regimen (to complete 14d course)  path from colonoscopy pending  no overt gib per nursing  monitor cbc, transfuse prn  cont ppi ppx  advance diet as tolerated  no gi objection to ac w close monitoring of h/h  will need close op gi f/u w capsule study upon dc

## 2020-11-03 NOTE — PROGRESS NOTE ADULT - PROBLEM SELECTOR PLAN 6
Hx of TIA without residual weakness.   -Hold ASA in setting of GIB  -start atorvastatin 10 mg qOD in during tx for H pylori positive gastritis

## 2020-11-03 NOTE — PHYSICAL THERAPY INITIAL EVALUATION ADULT - ADDITIONAL COMMENTS
Pt reports living with his wife and uses a "walker and wheelchair." Pt has a ramp into home and chair lift. Pt noted with right knee contracture (-30 degrees ROM). Pt reported having "surgery a couple years ago and his leg is always like that." Pt reports ambulating with rolling walker with assistance and sliding left leg forward. Pt reports using a wheelchair as well. Spoke to care manager Conchis who will follow up with spouse on social history and assistive device use.

## 2020-11-03 NOTE — PROGRESS NOTE ADULT - ATTENDING COMMENTS
70M, DM2, HTN, HL, GERD, hx DVT/Xarelto, BPH, hx SERENITY requiring dialysis - adm w/palpitations, melena, fatigue - mgmt for GIB  -GIB assoc w/melanotic stools - EGD w/gastritis, +Hpylori - continue protonix; initiate Hpylori triple-drug tx, plan for colonoscopy today per GI  -acute blood loss anemia - hgb 9s->7.8 today - repeat hgb this PM - if continues to downtrend, and <7.5 - initiate supportive transfusions PRBC  -SERENITY - Cr nl ->->2.0 today - likely multifactorial in setting of intravasc vol depletion w/anemia/active bleeding, and med effects - losartan d/c'd, volume resuscitate w/IVFs  -palpitations - resolved - neg cardiac w/u - EKG w/SR, trop neg  -dvt prophy 70M, DM2, HTN, HL, GERD, hx TIA, hx DVT/Xarelto, BPH, hx SERENITY requiring dialysis - adm w/palpitations, melena, fatigue - mgmt for GIB  -GIB assoc w/melanotic stools - EGD w/gastritis, +Hpylori - continue protonix; initiate Hpylori triple-drug tx, plan for colonoscopy today per GI; home regimen of xarelto, asa on hold  -acute blood loss anemia - hgb 9s->7.8 today - repeat hgb this PM - if continues to downtrend, and <7.5 - initiate supportive transfusions PRBC  -SERENITY - Cr nl ->->2.0 today - likely multifactorial in setting of intravasc vol depletion w/anemia/active bleeding, and med effects - losartan d/c'd, volume resuscitate w/IVFs  -palpitations - resolved - neg cardiac w/u - EKG w/SR, trop neg  -dvt prophy

## 2020-11-03 NOTE — DISCHARGE NOTE PROVIDER - NSDCCPCAREPLAN_GEN_ALL_CORE_FT
PRINCIPAL DISCHARGE DIAGNOSIS  Diagnosis: GI bleed  Assessment and Plan of Treatment:       SECONDARY DISCHARGE DIAGNOSES  Diagnosis: SERENITY (acute kidney injury)  Assessment and Plan of Treatment: SERENITY (acute kidney injury)    Diagnosis: Anemia, unspecified type  Assessment and Plan of Treatment:      PRINCIPAL DISCHARGE DIAGNOSIS  Diagnosis: GI bleed  Assessment and Plan of Treatment: Your symptoms of palpitations, fatigue, and dark anay stools were due to a bleed within your intestines. You were started on an antiacid medication and were evaluated by a gastroenterologist. An upper endocscopy was performed, and biopsies taken showed an infection with an organism called H. pylori. You were started on a regimen consisting of three different medications to treat this infection: clarithomycin, metronidazole, and protonix. Continue to takes these medications as prescribed for a total of 14 days (stop on 11/17). If you develop itchiness when taking these medications, you may take your other prescription Hydroxyzine every 6 hours as you need it. A colonoscopy was also performed and did not show any source of the bleed. For your GI bleed, your Xarelto was held as it can thin your blood and further worsen your bleeding. You received one unit of blood, allowing your blood count to rise to a safer level. Your Xarelto was restarted, continue to take this medication. Follow up with the GI specialist Dr. Kavon El (information provided) within 1 week from leaving the hospital to have a capsule study performed. Follow up with your primary care provider within1 week from leaving the hospital for further monitoring and management.      SECONDARY DISCHARGE DIAGNOSES  Diagnosis: Anemia, unspecified type  Assessment and Plan of Treatment: You became anemic while in the hospital as a result of the bleed within your intestines. This means that you have a low number of red blood cells in your blood. Please continue to take iron and folic acid, and follow up with your primary care provider and GI specialist for further management. Return to ED or call your doctor immediatley if you develop beleeding, dizziness, feel feint, palpaitations, chest pain.    Diagnosis: SERENITY (acute kidney injury)  Assessment and Plan of Treatment: While you were in the hospital, your kidney function slightly declined. Your home blood pressure medication Losartan was held as it can negatively affect the kidney. You received intravenous fluids which helped improve your kidney function. Follow up with your primary care provider for further monitoring and management.    Diagnosis: Hypernatremia  Assessment and Plan of Treatment: During your hospital stay, your blood sodium levels were elevated. This resolved with administration of IV dextrose.    Diagnosis: History of DVT of lower extremity  Assessment and Plan of Treatment: You have had blood clots before. Continue to take your Xarelto medication as prescribed and follow up with your primary care provider for further monitoring and management.    Diagnosis: History of TIA (transient ischemic attack)  Assessment and Plan of Treatment: You have had a TIA in the past. Your home cholesterol medication Simvastatin was stopped in the hospital because it interaction with a new medication that was started for the infection currently in your stomach. You were started on a new cholesterol medication called Atorvastatin. Continue to take this medication EVERY OTHER NIGHT, starting on 11/7, while you are taking the Clarithomycin until 11/17. Follow up with your primary care provider for further monitoring of your cholesterol and management of your medication.    Diagnosis: HTN (hypertension)  Assessment and Plan of Treatment: You were previously diagnosed with high blood pressure. Your home medication called Losartan was held while you were in the hospital because your kidney function started to decrease. Your home Amlodipine was continued, and your blood pressure remained stable. Continue with your Amlodipine only as prescribed when you leave the hospital and closely follow up with your PMD for further surveillance and management of your high blood pressure medications.    Diagnosis: BPH (benign prostatic hyperplasia)  Assessment and Plan of Treatment: Continue to take tamsulosin for your enlarged prostate.    Diagnosis: Constipation  Assessment and Plan of Treatment: Continue to take senna for your constipation. If you develop diarrhea, do not take this medication.    Diagnosis: Neuropathy  Assessment and Plan of Treatment: Continue to take Gabapentin as prescribed for your neuropathic pain.

## 2020-11-03 NOTE — PROVIDER CONTACT NOTE (OTHER) - BACKGROUND
Pt s/p endoscopy and colonoscopy.  Presently being treated for H. Pylori with flagyl and clarithromycin

## 2020-11-04 DIAGNOSIS — N17.9 ACUTE KIDNEY FAILURE, UNSPECIFIED: ICD-10-CM

## 2020-11-04 LAB
ANION GAP SERPL CALC-SCNC: 6 MMOL/L — SIGNIFICANT CHANGE UP (ref 5–17)
BUN SERPL-MCNC: 15 MG/DL — SIGNIFICANT CHANGE UP (ref 7–23)
CALCIUM SERPL-MCNC: 8.4 MG/DL — LOW (ref 8.5–10.1)
CHLORIDE SERPL-SCNC: 108 MMOL/L — SIGNIFICANT CHANGE UP (ref 96–108)
CO2 SERPL-SCNC: 29 MMOL/L — SIGNIFICANT CHANGE UP (ref 22–31)
CREAT SERPL-MCNC: 1.7 MG/DL — HIGH (ref 0.5–1.3)
GLUCOSE SERPL-MCNC: 95 MG/DL — SIGNIFICANT CHANGE UP (ref 70–99)
HCT VFR BLD CALC: 22.1 % — LOW (ref 39–50)
HCT VFR BLD CALC: 26.2 % — LOW (ref 39–50)
HGB BLD-MCNC: 7.4 G/DL — LOW (ref 13–17)
HGB BLD-MCNC: 9.1 G/DL — LOW (ref 13–17)
MCHC RBC-ENTMCNC: 29.2 PG — SIGNIFICANT CHANGE UP (ref 27–34)
MCHC RBC-ENTMCNC: 33.5 GM/DL — SIGNIFICANT CHANGE UP (ref 32–36)
MCV RBC AUTO: 87.4 FL — SIGNIFICANT CHANGE UP (ref 80–100)
NRBC # BLD: 0 /100 WBCS — SIGNIFICANT CHANGE UP (ref 0–0)
PLATELET # BLD AUTO: 289 K/UL — SIGNIFICANT CHANGE UP (ref 150–400)
POTASSIUM SERPL-MCNC: 3.8 MMOL/L — SIGNIFICANT CHANGE UP (ref 3.5–5.3)
POTASSIUM SERPL-SCNC: 3.8 MMOL/L — SIGNIFICANT CHANGE UP (ref 3.5–5.3)
RBC # BLD: 2.53 M/UL — LOW (ref 4.2–5.8)
RBC # FLD: 13.9 % — SIGNIFICANT CHANGE UP (ref 10.3–14.5)
SODIUM SERPL-SCNC: 143 MMOL/L — SIGNIFICANT CHANGE UP (ref 135–145)
SURGICAL PATHOLOGY STUDY: SIGNIFICANT CHANGE UP
WBC # BLD: 5.97 K/UL — SIGNIFICANT CHANGE UP (ref 3.8–10.5)
WBC # FLD AUTO: 5.97 K/UL — SIGNIFICANT CHANGE UP (ref 3.8–10.5)

## 2020-11-04 PROCEDURE — 99232 SBSQ HOSP IP/OBS MODERATE 35: CPT

## 2020-11-04 PROCEDURE — 76770 US EXAM ABDO BACK WALL COMP: CPT | Mod: 26

## 2020-11-04 PROCEDURE — 99232 SBSQ HOSP IP/OBS MODERATE 35: CPT | Mod: GC

## 2020-11-04 RX ORDER — SENNA PLUS 8.6 MG/1
1 TABLET ORAL ONCE
Refills: 0 | Status: DISCONTINUED | OUTPATIENT
Start: 2020-11-04 | End: 2020-11-04

## 2020-11-04 RX ORDER — SENNA PLUS 8.6 MG/1
2 TABLET ORAL DAILY
Refills: 0 | Status: DISCONTINUED | OUTPATIENT
Start: 2020-11-04 | End: 2020-11-05

## 2020-11-04 RX ORDER — SENNA PLUS 8.6 MG/1
2 TABLET ORAL ONCE
Refills: 0 | Status: COMPLETED | OUTPATIENT
Start: 2020-11-04 | End: 2020-11-04

## 2020-11-04 RX ADMIN — Medication 500 MILLIGRAM(S): at 13:16

## 2020-11-04 RX ADMIN — Medication 1 TABLET(S): at 05:47

## 2020-11-04 RX ADMIN — PANTOPRAZOLE SODIUM 40 MILLIGRAM(S): 20 TABLET, DELAYED RELEASE ORAL at 18:12

## 2020-11-04 RX ADMIN — GABAPENTIN 300 MILLIGRAM(S): 400 CAPSULE ORAL at 18:12

## 2020-11-04 RX ADMIN — GABAPENTIN 300 MILLIGRAM(S): 400 CAPSULE ORAL at 05:47

## 2020-11-04 RX ADMIN — Medication 1 MILLIGRAM(S): at 12:13

## 2020-11-04 RX ADMIN — PANTOPRAZOLE SODIUM 40 MILLIGRAM(S): 20 TABLET, DELAYED RELEASE ORAL at 05:47

## 2020-11-04 RX ADMIN — Medication 500 MILLIGRAM(S): at 21:09

## 2020-11-04 RX ADMIN — SENNA PLUS 2 TABLET(S): 8.6 TABLET ORAL at 23:56

## 2020-11-04 RX ADMIN — Medication 1 TABLET(S): at 21:10

## 2020-11-04 RX ADMIN — Medication 500 MILLIGRAM(S): at 05:47

## 2020-11-04 RX ADMIN — AMLODIPINE BESYLATE 10 MILLIGRAM(S): 2.5 TABLET ORAL at 05:48

## 2020-11-04 RX ADMIN — Medication 325 MILLIGRAM(S): at 12:13

## 2020-11-04 RX ADMIN — Medication 500 MILLIGRAM(S): at 18:12

## 2020-11-04 RX ADMIN — TAMSULOSIN HYDROCHLORIDE 0.4 MILLIGRAM(S): 0.4 CAPSULE ORAL at 21:10

## 2020-11-04 RX ADMIN — SENNA PLUS 2 TABLET(S): 8.6 TABLET ORAL at 12:13

## 2020-11-04 RX ADMIN — Medication 1 TABLET(S): at 13:16

## 2020-11-04 NOTE — PROGRESS NOTE ADULT - PROBLEM SELECTOR PLAN 7
Chronic, stable  -Patient takes amlodipine 10mg 2x daily, confirmed with pharmacy  -BP currently stable, continue amlodipine 10mg once daily and continue to monitor  -hold home losartan in setting of brief hypotension today as well as increasing Cr Hx of TIA without residual weakness.   -Hold ASA in setting of GIB  -start atorvastatin 10 mg qOD in during tx for H pylori positive gastritis

## 2020-11-04 NOTE — PROGRESS NOTE ADULT - PROBLEM SELECTOR PLAN 2
Pt CMP revealed NA of 147  -Resolved  -s/p D5 at 60 cc/hr for 10 hours.  -monitor renal indices, lytes in CMP  -strict I&Os 2/2 GIB  -baseline appears to be 7-9 per outpatient chart review  -Plan as above  -Continue home folic acid, iron supplementation  -11/01 Serum iron 32, TIBC 259, Ferritin 39, Transferrin 215.

## 2020-11-04 NOTE — PROGRESS NOTE ADULT - ASSESSMENT
70 year old male PMH RLE DVT on Xarelto, HTN, HLD, BPH, GERD, neuropathy presents to ED with palpitations, fatigue, melena, admitted for GI bleed. Cardio consulted for cardiac clearance for EGD.     GI bleed  - + FOBT s/p EGD, found to have gastritis with no bleeding  - Transfuse per Primary,   - sp colonoscopy unremarkable, plans for capsule outpatient   - Xarelto on hold- resume when okay with gi      HTN  - Echo 10/2/2019: EF 60 to 65% with Grade I diastolic dysfunction  - Continue Norvasc  -hold losartan for erik   - Monitor and replete lytes, keep K>4, Mg>2.    HLD  - Continue statin    DVT  - Pt takes Xarelto for hx of RLE DVT.  continue to hold in the setting of GIB.    - All other medical needs as per primary team.  - Other cardiovascular workup will depend on clinical course.  - Will continue to follow.  Radha Cline FNP-C  Cardiology NP  [facility] [phone]            70 year old male PMH RLE DVT on Xarelto, HTN, HLD, BPH, GERD, neuropathy presents to ED with palpitations, fatigue, melena, admitted for GI bleed. Cardio consulted for cardiac clearance for EGD.     GI bleed  - + FOBT s/p EGD, found to have gastritis with no bleeding  - Transfuse per Primary,   - sp colonoscopy unremarkable, plans for capsule outpatient   - Xarelto on hold- resume when okay with gi      HTN  - Echo 10/2/2019: EF 60 to 65% with Grade I diastolic dysfunction  - Continue Norvasc  -hold losartan for erik   - Monitor and replete lytes, keep K>4, Mg>2.    HLD  - Continue statin    DVT  - Pt takes Xarelto for hx of RLE DVT.  resume as per GI    - All other medical needs as per primary team.  - Other cardiovascular workup will depend on clinical course.  - Will continue to follow.  Radha Cline FNP-C  Cardiology NP  [facility] [phone]

## 2020-11-04 NOTE — PROGRESS NOTE ADULT - PROBLEM SELECTOR PLAN 4
-H/H 9.5/28.5 in ED, +FOBT, 2/2 GIB  -baseline appears to be 7-9 per outpatient chart review  -Hb 9.5 --> 9.1 --> 8.8 -->-> 7.8 this AM, transfuse Hgb<7.5   -trend H/H  -Continue home folic acid, iron supplementation  -11/01 Serum iron 32, TIBC 259, Ferritin 39, Transferrin 215. Pt CMP revealed NA of 147  -Resolved  -s/p D5 at 60 cc/hr for 10 hours.   -c/w half NS infusion (12 hours) for High NA; SERENITY vs ATN  -monitor renal indices, lytes in CMP  -strict I&Os

## 2020-11-04 NOTE — PROGRESS NOTE ADULT - SUBJECTIVE AND OBJECTIVE BOX
[facility] -- Sandie Valentine, Laurie, Mina, Bruce Nagy Savella  Office # [phone]      Follow Up:    palpitations    Subjective/Observations:   No events overnight resting comfortably in bed.  No complaints of chest pain, dyspnea, or palpitations reported. No signs of orthopnea or PND.  + constipation     REVIEW OF SYSTEMS: All other review of systems is negative unless indicated above    PAST MEDICAL & SURGICAL HISTORY:  Deep vein thrombosis (DVT)    Neuropathy    BPH (benign prostatic hyperplasia)    HLD (hyperlipidemia)    HTN (hypertension)    GERD (gastroesophageal reflux disease)    HLD (hyperlipidemia)    History of femoropopliteal bypass    No significant past surgical history        MEDICATIONS  (STANDING):  amLODIPine   Tablet 10 milliGRAM(s) Oral daily  atorvastatin 10 milliGRAM(s) Oral <User Schedule>  clarithromycin 500 milliGRAM(s) Oral two times a day  dextrose 5%. 1000 milliLiter(s) (50 mL/Hr) IV Continuous <Continuous>  dextrose 50% Injectable 12.5 Gram(s) IV Push once  dextrose 50% Injectable 25 Gram(s) IV Push once  dextrose 50% Injectable 25 Gram(s) IV Push once  ferrous    sulfate 325 milliGRAM(s) Oral daily  folic acid 1 milliGRAM(s) Oral daily  gabapentin 300 milliGRAM(s) Oral two times a day  influenza   Vaccine 0.5 milliLiter(s) IntraMuscular once  insulin lispro (ADMELOG) corrective regimen sliding scale   SubCutaneous Before meals and at bedtime  lactobacillus acidophilus 1 Tablet(s) Oral three times a day  metroNIDAZOLE    Tablet 500 milliGRAM(s) Oral three times a day  pantoprazole    Tablet 40 milliGRAM(s) Oral two times a day  sodium chloride 0.45%. 1000 milliLiter(s) (75 mL/Hr) IV Continuous <Continuous>  tamsulosin 0.4 milliGRAM(s) Oral at bedtime    MEDICATIONS  (PRN):  dextrose 40% Gel 15 Gram(s) Oral once PRN Blood Glucose LESS THAN 70 milliGRAM(s)/deciliter  glucagon  Injectable 1 milliGRAM(s) IntraMuscular once PRN Glucose LESS THAN 70 milligrams/deciliter  polyethylene glycol 3350 17 Gram(s) Oral daily PRN Constipation      Allergies    No Known Drug Allergies  Prefers chocolate ensure drinks (Unknown)    Intolerances        Vital Signs Last 24 Hrs  T(C): 36.9 (04 Nov 2020 07:26), Max: 37.2 (04 Nov 2020 00:35)  T(F): 98.5 (04 Nov 2020 07:26), Max: 98.9 (04 Nov 2020 00:35)  HR: 84 (04 Nov 2020 07:26) (64 - 98)  BP: 119/56 (04 Nov 2020 07:26) (113/68 - 130/72)  BP(mean): --  RR: 18 (04 Nov 2020 07:26) (18 - 18)  SpO2: 95% (04 Nov 2020 07:26) (93% - 97%)    I&O's Summary    03 Nov 2020 07:01  -  04 Nov 2020 07:00  --------------------------------------------------------  IN: 3315 mL / OUT: 3175 mL / NET: 140 mL          PHYSICAL EXAM:  TELE:   Constitutional: NAD, awake and alert, well-developed  HEENT: Moist Mucous Membranes, Anicteric  Pulmonary: Non-labored, breath sounds are clear bilaterally, No wheezing, crackles or rhonchi  Cardiovascular: Regular, S1 and S2 nl, No murmurs, rubs, gallops or clicks  Gastrointestinal: Bowel Sounds present, soft, nontender.   Lymph: No lymphadenopathy. No peripheral edema.  Skin: No visible rashes or ulcers.  Psych:  Mood & affect appropriate    LABS: All Labs Reviewed:                        7.4    5.97  )-----------( 289      ( 04 Nov 2020 07:35 )             22.1                         7.5    x     )-----------( x        ( 03 Nov 2020 14:51 )             22.5                         7.8    6.38  )-----------( 315      ( 03 Nov 2020 07:26 )             23.9     04 Nov 2020 07:35    143    |  108    |  15     ----------------------------<  95     3.8     |  29     |  1.70   03 Nov 2020 07:26    142    |  107    |  15     ----------------------------<  106    3.6     |  30     |  2.00   02 Nov 2020 07:14    147    |  109    |  15     ----------------------------<  137    4.0     |  30     |  1.40     Ca    8.4        04 Nov 2020 07:35  Ca    9.0        03 Nov 2020 07:26  Ca    9.0        02 Nov 2020 07:14               ECG:  < from: 12 Lead ECG (10.29.20 @ 13:55) >    Ventricular Rate 90 BPM    Atrial Rate 90 BPM    P-R Interval 144 ms    QRS Duration 96 ms    Q-T Interval 362 ms    QTC Calculation(Bazett) 442 ms    P Axis 64 degrees    R Axis -57 degrees    T Axis 74 degrees    Diagnosis Line Normal sinus rhythm  Left axis deviation  Abnormal ECG  No previous ECGs available    < end of copied text >    Echo:    Radiology:

## 2020-11-04 NOTE — PROGRESS NOTE ADULT - SUBJECTIVE AND OBJECTIVE BOX
INTERVAL HPI/OVERNIGHT EVENTS:  pt seen and examined  c/o constipation, no bm since colon  denies n/v/abd pain  alisia diet  no gib per nursing  prbc pending    MEDICATIONS  (STANDING):  amLODIPine   Tablet 10 milliGRAM(s) Oral daily  atorvastatin 10 milliGRAM(s) Oral <User Schedule>  clarithromycin 500 milliGRAM(s) Oral two times a day  dextrose 5%. 1000 milliLiter(s) (50 mL/Hr) IV Continuous <Continuous>  dextrose 50% Injectable 12.5 Gram(s) IV Push once  dextrose 50% Injectable 25 Gram(s) IV Push once  dextrose 50% Injectable 25 Gram(s) IV Push once  ferrous    sulfate 325 milliGRAM(s) Oral daily  folic acid 1 milliGRAM(s) Oral daily  gabapentin 300 milliGRAM(s) Oral two times a day  influenza   Vaccine 0.5 milliLiter(s) IntraMuscular once  insulin lispro (ADMELOG) corrective regimen sliding scale   SubCutaneous Before meals and at bedtime  lactobacillus acidophilus 1 Tablet(s) Oral three times a day  metroNIDAZOLE    Tablet 500 milliGRAM(s) Oral three times a day  pantoprazole    Tablet 40 milliGRAM(s) Oral two times a day  sodium chloride 0.45%. 1000 milliLiter(s) (75 mL/Hr) IV Continuous <Continuous>  tamsulosin 0.4 milliGRAM(s) Oral at bedtime    MEDICATIONS  (PRN):  dextrose 40% Gel 15 Gram(s) Oral once PRN Blood Glucose LESS THAN 70 milliGRAM(s)/deciliter  glucagon  Injectable 1 milliGRAM(s) IntraMuscular once PRN Glucose LESS THAN 70 milligrams/deciliter  polyethylene glycol 3350 17 Gram(s) Oral daily PRN Constipation      Allergies    No Known Drug Allergies  Prefers chocolate ensure drinks (Unknown)    Intolerances        Review of Systems:    General:  No wt loss, fevers, chills, night sweats, fatigue   Eyes:  Good vision, no reported pain  ENT:  No sore throat, pain, runny nose, dysphagia  CV:  No pain, palpitations, hypo/hypertension  Resp:  No dyspnea, cough, tachypnea, wheezing  GI:  see above   :  No pain, bleeding, incontinence, nocturia  Muscle:  No pain, weakness  Neuro:  No weakness, tingling, memory problems  Psych:  No fatigue, insomnia, mood problems, depression  Endocrine:  No polyuria, polydypsia, cold/heat intolerance  Heme:  No petechiae, ecchymosis, easy bruisability  Skin:  No rash, tattoos, scars, edema      Vital Signs Last 24 Hrs  T(C): 36.9 (04 Nov 2020 07:26), Max: 37.2 (04 Nov 2020 00:35)  T(F): 98.5 (04 Nov 2020 07:26), Max: 98.9 (04 Nov 2020 00:35)  HR: 84 (04 Nov 2020 07:26) (64 - 98)  BP: 119/56 (04 Nov 2020 07:26) (113/68 - 130/72)  BP(mean): --  RR: 18 (04 Nov 2020 07:26) (18 - 18)  SpO2: 95% (04 Nov 2020 07:26) (93% - 97%)    PHYSICAL EXAM:      Constitutional: lying in bed  HEENT: ncat  Gastrointestinal: soft nt dt  Extremities: No peripheral edema  Vascular: + peripheral pulses  Neurological: Awake alert appropriate        LABS:                        7.4    5.97  )-----------( 289      ( 04 Nov 2020 07:35 )             22.1     11-04    143  |  108  |  15  ----------------------------<  95  3.8   |  29  |  1.70<H>    Ca    8.4<L>      04 Nov 2020 07:35            RADIOLOGY & ADDITIONAL TESTS:

## 2020-11-04 NOTE — PROGRESS NOTE ADULT - SUBJECTIVE AND OBJECTIVE BOX
Patient is a 70y old  Male who presents with a chief complaint of GI bleed (04 Nov 2020 08:37)      INTERVAL HPI/OVERNIGHT EVENTS: Patient seen and examined at bedside. No overnight events occurred. Patient admits to improvement in LUE pain in prior IV site. He states he has not had B< for 2 days since colonoscopy but is passing flatus. Currently on miralax bowel regimen. Denies fevers, chills, headache, lightheadedness, chest pain, dyspnea, abdominal pain, n/v/d/c.    MEDICATIONS  (STANDING):  amLODIPine   Tablet 10 milliGRAM(s) Oral daily  atorvastatin 10 milliGRAM(s) Oral <User Schedule>  clarithromycin 500 milliGRAM(s) Oral two times a day  dextrose 5%. 1000 milliLiter(s) (50 mL/Hr) IV Continuous <Continuous>  dextrose 50% Injectable 12.5 Gram(s) IV Push once  dextrose 50% Injectable 25 Gram(s) IV Push once  dextrose 50% Injectable 25 Gram(s) IV Push once  ferrous    sulfate 325 milliGRAM(s) Oral daily  folic acid 1 milliGRAM(s) Oral daily  gabapentin 300 milliGRAM(s) Oral two times a day  influenza   Vaccine 0.5 milliLiter(s) IntraMuscular once  insulin lispro (ADMELOG) corrective regimen sliding scale   SubCutaneous Before meals and at bedtime  lactobacillus acidophilus 1 Tablet(s) Oral three times a day  metroNIDAZOLE    Tablet 500 milliGRAM(s) Oral three times a day  pantoprazole    Tablet 40 milliGRAM(s) Oral two times a day  senna 2 Tablet(s) Oral daily  sodium chloride 0.45%. 1000 milliLiter(s) (75 mL/Hr) IV Continuous <Continuous>  tamsulosin 0.4 milliGRAM(s) Oral at bedtime    MEDICATIONS  (PRN):  dextrose 40% Gel 15 Gram(s) Oral once PRN Blood Glucose LESS THAN 70 milliGRAM(s)/deciliter  glucagon  Injectable 1 milliGRAM(s) IntraMuscular once PRN Glucose LESS THAN 70 milligrams/deciliter  polyethylene glycol 3350 17 Gram(s) Oral daily PRN Constipation      Allergies    No Known Drug Allergies  Prefers chocolate ensure drinks (Unknown)    Intolerances        REVIEW OF SYSTEMS:  CONSTITUTIONAL: No fever or chills  HEENT:  No headache, no sore throat  RESPIRATORY: No cough, wheezing, or shortness of breath  CARDIOVASCULAR: No chest pain, palpitations  GASTROINTESTINAL: No abd pain, nausea, vomiting, or diarrhea  GENITOURINARY: No dysuria, frequency, or hematuria  NEUROLOGICAL: no focal weakness or dizziness. Neuropathic pain RLE.  MUSCULOSKELETAL: LUE weakness 2/2 pain at prior IV site     Vital Signs Last 24 Hrs  T(C): 36.8 (04 Nov 2020 09:50), Max: 37.2 (04 Nov 2020 00:35)  T(F): 98.3 (04 Nov 2020 09:50), Max: 98.9 (04 Nov 2020 00:35)  HR: 100 (04 Nov 2020 09:50) (64 - 100)  BP: 110/69 (04 Nov 2020 09:50) (110/69 - 130/72)  BP(mean): --  RR: 18 (04 Nov 2020 09:50) (18 - 18)  SpO2: 95% (04 Nov 2020 09:50) (93% - 96%)    PHYSICAL EXAM:  GENERAL: NAD  HEENT:  anicteric, moist mucous membranes  CHEST/LUNG:  CTA b/l, no rales, wheezes, or rhonchi  HEART:  RRR, S1, S2  ABDOMEN:  BS+, soft, nontender, nondistended  EXTREMITIES: no edema, cyanosis, or calf tenderness. RLE healed skin grafts present  NEURO: A&Ox3, nonfocal, CN II-XII grossly intact, sensation intact, no gait abnormalities.  NERVOUS SYSTEM: answers questions and follows commands appropriately    LABS:                        7.4    5.97  )-----------( 289      ( 04 Nov 2020 07:35 )             22.1     CBC Full  -  ( 04 Nov 2020 07:35 )  WBC Count : 5.97 K/uL  Hemoglobin : 7.4 g/dL  Hematocrit : 22.1 %  Platelet Count - Automated : 289 K/uL  Mean Cell Volume : 87.4 fl  Mean Cell Hemoglobin : 29.2 pg  Mean Cell Hemoglobin Concentration : 33.5 gm/dL  Auto Neutrophil # : x  Auto Lymphocyte # : x  Auto Monocyte # : x  Auto Eosinophil # : x  Auto Basophil # : x  Auto Neutrophil % : x  Auto Lymphocyte % : x  Auto Monocyte % : x  Auto Eosinophil % : x  Auto Basophil % : x    04 Nov 2020 07:35    143    |  108    |  15     ----------------------------<  95     3.8     |  29     |  1.70     Ca    8.4        04 Nov 2020 07:35          CAPILLARY BLOOD GLUCOSE      POCT Blood Glucose.: 104 mg/dL (04 Nov 2020 07:48)  POCT Blood Glucose.: 115 mg/dL (03 Nov 2020 21:11)  POCT Blood Glucose.: 121 mg/dL (03 Nov 2020 16:42)  POCT Blood Glucose.: 113 mg/dL (03 Nov 2020 11:50)          RADIOLOGY & ADDITIONAL TESTS:    Personally reviewed.     Consultant(s) Notes Reviewed:  [x] YES  [ ] NO

## 2020-11-04 NOTE — PROGRESS NOTE ADULT - PROBLEM SELECTOR PLAN 1
Patient with 5 days melena, palpitations, fatigue on admission; now endorsing decreased melena.  -FOBT +, H/H 9.5/28.5 in ED,  -Hb 9.5 --> 9.1 --> 8.8 -->-> 7.8 this AM,   -path from colonoscopy pending  -Hold Xarelto, aspirin in setting of GIB, on Xarelto for hx RLE DVT  -Start oral Protonix 40mg PO BID  -consistent carb diet  -EGD with no active bleeding, bx consistent with gastritis, H pylori.  -start clarithromycin, metronidazole. Oral protonix  -GI Dr. DAVINA El following -- recs appreciated. Patient with 5 days melena, palpitations, fatigue on admission; now endorsing decreased melena.  -FOBT +, H/H 9.5/28.5 in ED,  -Hb trending down over hospital course--> 7.4 this AM. s/p 1 uPRBC this admission  -f/u H+H___  -colonoscopy unremarkable, pathology pending  -Pt to follow up for outpatient capsule study  -Hold Xarelto, aspirin in setting of GIB, on Xarelto for hx RLE DVT  -Start oral Protonix 40mg PO BID  -consistent carb diet  -EGD with no active bleeding, bx consistent with gastritis, H pylori.  -start clarithromycin, metronidazole. Oral protonix (on 11/3) 14 day course  -GI Dr. DAVINA El following - recs appreciated.

## 2020-11-04 NOTE — PROGRESS NOTE ADULT - PROBLEM SELECTOR PLAN 10
SCDs in setting of GIB    11. Dispo: PT consulted to eval/tx for deconditioning. continue dc planning    IMPROVE VTE Individual Risk Assessment       RISK                                                          Points  [  ] Previous VTE                                                3  [  ] Thrombophilia                                             2  [  ] Lower limb paralysis                                   2        (unable to hold up >15 seconds)    [  ] Current Cancer                                             2         (within 6 months)  [  ] Immobilization > 24 hrs                              1  [  ] ICU/CCU stay > 24 hours                             1  [ 1 ] Age > 60                                                         1    IMPROVE VTE Score:         [       1  ] SCDs in setting of GIB    11. Dispo: PT consulted to eval/tx for deconditioning. continue dc planning  12. Home gabapentin for neuropathy (s/p work injury)    IMPROVE VTE Individual Risk Assessment       RISK                                                          Points  [  ] Previous VTE                                                3  [  ] Thrombophilia                                             2  [  ] Lower limb paralysis                                   2        (unable to hold up >15 seconds)    [  ] Current Cancer                                             2         (within 6 months)  [  ] Immobilization > 24 hrs                              1  [  ] ICU/CCU stay > 24 hours                             1  [ 1 ] Age > 60                                                         1    IMPROVE VTE Score:         [       1  ]

## 2020-11-04 NOTE — PROGRESS NOTE ADULT - PROBLEM SELECTOR PLAN 6
Hx of TIA without residual weakness.   -Hold ASA in setting of GIB  -start atorvastatin 10 mg qOD in during tx for H pylori positive gastritis Hx of RLE DVT, on Xarelto 2019   -Hold in setting of GIB  -SCDs for PPx  -RLE US: Mural thickening consistent with sequela of chronic deep vein thrombosis. No evidence of acute deep vein thrombosis.

## 2020-11-04 NOTE — PROGRESS NOTE ADULT - ATTENDING COMMENTS
69 yo male PMH RLE DVT on Xarelto, TIA, HTN, HLD, SERENITY with 1 episode of dialysis, anemia of chronic disease, DM, BPH, GERD, neuropathy,  presents to ED with palpitations, fatigue, melena.  admitted for GI bleed.        Upper GI bleed.  Patient with 5 days melena, palpitations, fatigue on admission; now endorsing decreased melena.  -EGD with no active bleeding, bx consistent with gastritis, H pylori.  -start clarithromycin, metronidazole. Oral protonix (on 11/3) 14 day course  HH 7.4, likely latent   would transfuse 1 unit prbc  once hh stable, will resume xarelto and monitor clinical for signs of bleeding prior to d/c

## 2020-11-04 NOTE — PROGRESS NOTE ADULT - PROBLEM SELECTOR PLAN 3
Pt admitted for GI bleed, now s/p EGD. Bx shows Chronic active gastritis involving oxyntic and non-oxyntic mucosa.  -Diff Quik stain for H. pylori is positive.  -continue protonix 40 mg PO BID, clarithromycin, metronidazole Cr function worsening in setting of GIB, worsening anemia. Baseline Cr ~1.2 s/p ATN and dialysis x 1 in 2019.  -Peaked Cr 2.00 this admission, now trending down  -f/u renal ultrasound  -Avoid nephrotoxic meds. Losartan held  -gentle hydration with half NS infusion. will reasess need for fluids  -repeat BMP in AM  -Strict I&O's  -consider renal consult if renal function worsens

## 2020-11-04 NOTE — PROGRESS NOTE ADULT - PROBLEM SELECTOR PLAN 1
sp neg egd/colon  +hpylori on path- started on tx regimen (to complete 14d course)  path from colonoscopy pending  no overt gib per nursing  monitor cbc, transfuse prn  cont ppi ppx  bowel regimen per pt request  diet as tolerated  no gi objection to ac w close monitoring of h/h  will need close op gi f/u w capsule study upon dc

## 2020-11-04 NOTE — PROGRESS NOTE ADULT - PROBLEM SELECTOR PLAN 8
Chronic, stable  -Continue home simvastatin  -Monitor CMP Chronic, stable  -Patient takes amlodipine 10mg 2x daily, confirmed with pharmacy  -BP currently stable, continue amlodipine 10mg once daily and continue to monitor  -hold home losartan in setting of brief hypotension today as well as increasing Cr

## 2020-11-04 NOTE — PROGRESS NOTE ADULT - PROBLEM SELECTOR PLAN 5
Hx of RLE DVT, on Xarelto 2019   -Hold in setting of GIB  -SCDs for PPx  -RLE US: Mural thickening consistent with sequela of chronic deep vein thrombosis. No evidence of acute deep vein thrombosis. Pt admitted for GI bleed, now s/p EGD. Bx shows Chronic active gastritis involving oxyntic and non-oxyntic mucosa.  -Diff Quik stain for H. pylori is positive.  -continue protonix 40 mg PO BID, clarithromycin, metronidazole 14 day course

## 2020-11-04 NOTE — PROGRESS NOTE ADULT - PROBLEM SELECTOR PLAN 9
Chronic, stable  -continue home gabapentin Chronic, stable  -On home simvastatin 20mg qhs. currently on atorvastatin 10mg qOD while on H pylori Triple therapy to reduce Rx interaction for 14 days  -Monitor CMP

## 2020-11-05 DIAGNOSIS — K59.00 CONSTIPATION, UNSPECIFIED: ICD-10-CM

## 2020-11-05 LAB
ALBUMIN SERPL ELPH-MCNC: 3.5 G/DL — SIGNIFICANT CHANGE UP (ref 3.3–5)
ALP SERPL-CCNC: 109 U/L — SIGNIFICANT CHANGE UP (ref 40–120)
ALT FLD-CCNC: 15 U/L — SIGNIFICANT CHANGE UP (ref 12–78)
ANION GAP SERPL CALC-SCNC: 6 MMOL/L — SIGNIFICANT CHANGE UP (ref 5–17)
AST SERPL-CCNC: 15 U/L — SIGNIFICANT CHANGE UP (ref 15–37)
BILIRUB SERPL-MCNC: 1 MG/DL — SIGNIFICANT CHANGE UP (ref 0.2–1.2)
BUN SERPL-MCNC: 14 MG/DL — SIGNIFICANT CHANGE UP (ref 7–23)
CALCIUM SERPL-MCNC: 8.7 MG/DL — SIGNIFICANT CHANGE UP (ref 8.5–10.1)
CHLORIDE SERPL-SCNC: 106 MMOL/L — SIGNIFICANT CHANGE UP (ref 96–108)
CO2 SERPL-SCNC: 28 MMOL/L — SIGNIFICANT CHANGE UP (ref 22–31)
CREAT SERPL-MCNC: 1.4 MG/DL — HIGH (ref 0.5–1.3)
GLUCOSE SERPL-MCNC: 126 MG/DL — HIGH (ref 70–99)
HCT VFR BLD CALC: 27.3 % — LOW (ref 39–50)
HGB BLD-MCNC: 9.5 G/DL — LOW (ref 13–17)
MCHC RBC-ENTMCNC: 29.4 PG — SIGNIFICANT CHANGE UP (ref 27–34)
MCHC RBC-ENTMCNC: 34.8 GM/DL — SIGNIFICANT CHANGE UP (ref 32–36)
MCV RBC AUTO: 84.5 FL — SIGNIFICANT CHANGE UP (ref 80–100)
NRBC # BLD: 0 /100 WBCS — SIGNIFICANT CHANGE UP (ref 0–0)
PLATELET # BLD AUTO: 338 K/UL — SIGNIFICANT CHANGE UP (ref 150–400)
POTASSIUM SERPL-MCNC: 3.5 MMOL/L — SIGNIFICANT CHANGE UP (ref 3.5–5.3)
POTASSIUM SERPL-SCNC: 3.5 MMOL/L — SIGNIFICANT CHANGE UP (ref 3.5–5.3)
PROT SERPL-MCNC: 7.7 G/DL — SIGNIFICANT CHANGE UP (ref 6–8.3)
RBC # BLD: 3.23 M/UL — LOW (ref 4.2–5.8)
RBC # FLD: 13.8 % — SIGNIFICANT CHANGE UP (ref 10.3–14.5)
SODIUM SERPL-SCNC: 140 MMOL/L — SIGNIFICANT CHANGE UP (ref 135–145)
WBC # BLD: 6.09 K/UL — SIGNIFICANT CHANGE UP (ref 3.8–10.5)
WBC # FLD AUTO: 6.09 K/UL — SIGNIFICANT CHANGE UP (ref 3.8–10.5)

## 2020-11-05 PROCEDURE — 99232 SBSQ HOSP IP/OBS MODERATE 35: CPT

## 2020-11-05 PROCEDURE — 99232 SBSQ HOSP IP/OBS MODERATE 35: CPT | Mod: GC

## 2020-11-05 RX ORDER — POLYETHYLENE GLYCOL 3350 17 G/17G
17 POWDER, FOR SOLUTION ORAL
Refills: 0 | Status: DISCONTINUED | OUTPATIENT
Start: 2020-11-05 | End: 2020-11-07

## 2020-11-05 RX ORDER — SENNA PLUS 8.6 MG/1
2 TABLET ORAL AT BEDTIME
Refills: 0 | Status: DISCONTINUED | OUTPATIENT
Start: 2020-11-05 | End: 2020-11-07

## 2020-11-05 RX ADMIN — TAMSULOSIN HYDROCHLORIDE 0.4 MILLIGRAM(S): 0.4 CAPSULE ORAL at 22:10

## 2020-11-05 RX ADMIN — SENNA PLUS 2 TABLET(S): 8.6 TABLET ORAL at 22:13

## 2020-11-05 RX ADMIN — PANTOPRAZOLE SODIUM 40 MILLIGRAM(S): 20 TABLET, DELAYED RELEASE ORAL at 05:18

## 2020-11-05 RX ADMIN — Medication 500 MILLIGRAM(S): at 22:23

## 2020-11-05 RX ADMIN — Medication 500 MILLIGRAM(S): at 05:18

## 2020-11-05 RX ADMIN — GABAPENTIN 300 MILLIGRAM(S): 400 CAPSULE ORAL at 17:19

## 2020-11-05 RX ADMIN — Medication 325 MILLIGRAM(S): at 11:26

## 2020-11-05 RX ADMIN — PANTOPRAZOLE SODIUM 40 MILLIGRAM(S): 20 TABLET, DELAYED RELEASE ORAL at 17:20

## 2020-11-05 RX ADMIN — Medication 1 TABLET(S): at 22:23

## 2020-11-05 RX ADMIN — ATORVASTATIN CALCIUM 10 MILLIGRAM(S): 80 TABLET, FILM COATED ORAL at 22:13

## 2020-11-05 RX ADMIN — POLYETHYLENE GLYCOL 3350 17 GRAM(S): 17 POWDER, FOR SOLUTION ORAL at 17:19

## 2020-11-05 RX ADMIN — Medication 500 MILLIGRAM(S): at 17:20

## 2020-11-05 RX ADMIN — Medication 1 MILLIGRAM(S): at 11:26

## 2020-11-05 RX ADMIN — AMLODIPINE BESYLATE 10 MILLIGRAM(S): 2.5 TABLET ORAL at 05:18

## 2020-11-05 RX ADMIN — Medication 10 MILLIGRAM(S): at 11:26

## 2020-11-05 RX ADMIN — Medication 1 TABLET(S): at 05:18

## 2020-11-05 RX ADMIN — Medication 500 MILLIGRAM(S): at 13:34

## 2020-11-05 RX ADMIN — Medication 1 TABLET(S): at 13:34

## 2020-11-05 RX ADMIN — GABAPENTIN 300 MILLIGRAM(S): 400 CAPSULE ORAL at 05:17

## 2020-11-05 NOTE — PROGRESS NOTE ADULT - PROBLEM SELECTOR PLAN 8
Chronic, stable  -Patient takes amlodipine 10mg 2x daily, confirmed with pharmacy  -BP currently stable, continue amlodipine 10mg once daily and continue to monitor  -hold home losartan in setting of brief hypotension today as well as increasing Cr Chronic, stable  -Patient takes amlodipine 10mg 2x daily, confirmed with pharmacy  -BP currently stable, continue amlodipine 10mg once daily and continue to monitor  -home losartan on hold. consider resumption upon dc

## 2020-11-05 NOTE — PROGRESS NOTE ADULT - ATTENDING COMMENTS
69 yo male PMH RLE DVT on Xarelto, TIA, HTN, HLD, SERENITY with 1 episode of dialysis, anemia of chronic disease, DM, BPH, GERD, neuropathy,  presents to ED with palpitations, fatigue, melena.  admitted for GI bleed.    Upper GI bleed, likely seconadry to gastritis   -FOBT +, H/H 9.5/28.5 in ED,  -HH stable  will resume xarelto   monitor for signs of bleeding, if HH stable, d/c planning in 24 hours

## 2020-11-05 NOTE — PROGRESS NOTE ADULT - ASSESSMENT
70 year old male PMH RLE DVT on Xarelto, HTN, HLD, BPH, GERD, neuropathy presents to ED with palpitations, fatigue, melena, admitted for GI bleed. Cardio consulted for cardiac clearance for EGD.     GI bleed  - + FOBT s/p EGD, found to have gastritis with no bleeding  - S/p colonoscopy unremarkable, plans for capsule outpatient   - Xarelto on hold- resume when okay with GI/primary team.   - - Hemoglobin <--9.5, <--9.1, <--7.4  - Hematocrit <--27.3, <--26.2, <--22.1  - Transfuse 1 unit PRBC 11/4    HTN  - BP: 132/73 (11-05-20 @ 07:16) (118/67 - 132/73)  - Continue Norvasc  - Hold losartan for SERENITY, resume when SERENITY resolves   - Echo 10/2/2019: EF 60 to 65% with Grade I diastolic dysfunction    HLD  - Continue statin    DVT  - Pt takes Xarelto for hx of RLE DVT.   - Resume as per GI/medicine     Acute Kidney injury  - Creatinine trend:  <--1.40,  <--1.70,  <--2.00,  <--1.40,  <--1.20,  <--1.20  - Continue to monitor renal indices  - Avoid nephrotoxins     - Monitor and replete lytes, keep K>4, Mg>2.  - All other medical needs as per primary team.  - Other cardiovascular workup will depend on clinical course.  - Will continue to follow.    Marie Vasquez, MS FNP, [facility]  Nurse Practitioner- Cardiology   Spectra #[phone] 70 year old male PMH RLE DVT on Xarelto, HTN, HLD, BPH, GERD, neuropathy presents to ED with palpitations, fatigue, melena, admitted for GI bleed. Cardio consulted for cardiac clearance for EGD.     GI bleed  - + FOBT s/p EGD, found to have gastritis with no bleeding  - S/p colonoscopy unremarkable, plans for capsule outpatient   - Xarelto on hold- resume when okay with GI/primary team.   - - Hemoglobin <--9.5, <--9.1, <--7.4  - Hematocrit <--27.3, <--26.2, <--22.1  - Transfuse 1 unit PRBC 11/4    HTN  - BP: 132/73 (11-05-20 @ 07:16) (118/67 - 132/73)  - Telemetry showing SR-ST low 100s. No events. Can discontinue telemetry.   - Continue Norvasc  - Hold losartan for SERENITY, resume when SERENITY resolves   - Echo 10/2/2019: EF 60 to 65% with Grade I diastolic dysfunction    HLD  - Continue statin    DVT  - Pt takes Xarelto for hx of RLE DVT.   - Resume as per GI/medicine     Acute Kidney injury  - Creatinine trend:  <--1.40,  <--1.70,  <--2.00,  <--1.40,  <--1.20,  <--1.20  - Continue to monitor renal indices  - Avoid nephrotoxins     - Monitor and replete lytes, keep K>4, Mg>2.  - All other medical needs as per primary team.  - Other cardiovascular workup will depend on clinical course.  - Will continue to follow.    Marie Vasquez, MS FNP, [facility]  Nurse Practitioner- Cardiology   Spectra #[phone]

## 2020-11-05 NOTE — PROGRESS NOTE ADULT - PROBLEM SELECTOR PLAN 10
SCDs in setting of GIB    11. Dispo: PT consulted to eval/tx for deconditioning. continue dc planning  12. Home gabapentin for neuropathy (s/p work injury)    IMPROVE VTE Individual Risk Assessment       RISK                                                          Points  [  ] Previous VTE                                                3  [  ] Thrombophilia                                             2  [  ] Lower limb paralysis                                   2        (unable to hold up >15 seconds)    [  ] Current Cancer                                             2         (within 6 months)  [  ] Immobilization > 24 hrs                              1  [  ] ICU/CCU stay > 24 hours                             1  [ 1 ] Age > 60                                                         1    IMPROVE VTE Score:         [       1  ] SCDs in setting of GIB    11. Dispo: PT consulted to eval/tx for deconditioning. dc home w/ home PT  12. Home gabapentin for neuropathy (s/p work injury)    IMPROVE VTE Individual Risk Assessment       RISK                                                          Points  [  ] Previous VTE                                                3  [  ] Thrombophilia                                             2  [  ] Lower limb paralysis                                   2        (unable to hold up >15 seconds)    [  ] Current Cancer                                             2         (within 6 months)  [  ] Immobilization > 24 hrs                              1  [  ] ICU/CCU stay > 24 hours                             1  [ 1 ] Age > 60                                                         1    IMPROVE VTE Score:         [       1  ]

## 2020-11-05 NOTE — DIETITIAN INITIAL EVALUATION ADULT. - PROBLEM SELECTOR PLAN 1
Patient with 5 days melena, palpitations, fatigue  -FOBT +, H/H 9.5/28.5 in ED,  -Hold Xarelto, aspirin in setting of GIB, on Xarelto for hx RLE DVT  -Start PPI drip  -f/u repeat H/H 8pm, transfuse Hgb<7  -blood consent obtained, type and screen ordered  -NPO except meds after midnight  -EGD tomorrow AM  -Cardio Dr. Martínez consulted for cardiac/procedure clearance  -GI Dr. DAVINA El consulted in ED  -patient is medically optimized for EGD pending cardiac clearance

## 2020-11-05 NOTE — PROGRESS NOTE ADULT - PROBLEM SELECTOR PLAN 1
Patient with 5 days melena, palpitations, fatigue on admission; now endorsing decreased melena.  -FOBT +, H/H 9.5/28.5 in ED,  -Hb trending down over hospital course--> 7.4 this AM. s/p 1 uPRBC this admission  -f/u H+H___  -colonoscopy unremarkable, pathology pending  -Pt to follow up for outpatient capsule study  -Hold Xarelto, aspirin in setting of GIB, on Xarelto for hx RLE DVT  -Start oral Protonix 40mg PO BID  -consistent carb diet  -EGD with no active bleeding, bx consistent with gastritis, H pylori.  -start clarithromycin, metronidazole. Oral protonix (on 11/3) 14 day course  -GI Dr. DAVINA El following - recs appreciated. Patient with 5 days melena, palpitations, fatigue on admission; now endorsing decreased melena.  -FOBT +, H/H 9.5/28.5 in ED,  -Hb trending down over hospital course--> 7.4 11/4   -s/p 1 uPRBC this admission  -Hb 9.5 today  -colonoscopy unremarkable, w/ no pathologic alterations  -Pt to follow up for outpatient capsule study  -Hold Xarelto, aspirin in setting of GIB, ( hx of RLE DVT) resume prior to DC  -oral Protonix 40mg PO BID  -consistent carb diet  -EGD with no active bleeding, bx consistent with gastritis, H pylori.  -Triple therapy: (clarithromycin, metronidazole, Oral protonix) started on 11/3. complete 14 day course.  -GI Dr. DAVINA El following - recs appreciated.

## 2020-11-05 NOTE — DIETITIAN INITIAL EVALUATION ADULT. - ADD RECOMMEND
1) Continue Consistent CHO diet + Ensure Enlive. 2) Continue bowel regimen for constipation alleviation. Encourage continued intake of meals/supplements. 3) Monitor weights, labs, intake, GI tolerance, skin integrity.

## 2020-11-05 NOTE — PROGRESS NOTE ADULT - PROBLEM SELECTOR PLAN 2
2/2 GIB  -baseline appears to be 7-9 per outpatient chart review  -Plan as above  -Continue home folic acid, iron supplementation  -11/01 Serum iron 32, TIBC 259, Ferritin 39, Transferrin 215.

## 2020-11-05 NOTE — PROGRESS NOTE ADULT - PROBLEM SELECTOR PLAN 3
Cr function worsening in setting of GIB, worsening anemia. Baseline Cr ~1.2 s/p ATN and dialysis x 1 in 2019.  -Peaked Cr 2.00 this admission, now trending down  -f/u renal ultrasound  -Avoid nephrotoxic meds. Losartan held  -gentle hydration with half NS infusion. will reasess need for fluids  -repeat BMP in AM  -Strict I&O's  -consider renal consult if renal function worsens Cr function worsening in setting of GIB, worsening anemia. Baseline Cr ~1.2 s/p ATN and dialysis x 1 in 2019.  -Peaked Cr 2.00 this admission, now trending down. 1.4 this am  -renal ultrasound w/ No hydronephrosis. Enlarged prostate gland   -Avoid nephrotoxic meds. Losartan held  -gentle hydration with half NS infusion. will reasess need for fluids  -repeat BMP in AM  -Strict I&O's

## 2020-11-05 NOTE — DIETITIAN INITIAL EVALUATION ADULT. - PROBLEM SELECTOR PLAN 2
-H/H 9.5/28.5 in ED, +FOBT, 2/2 GIB  -baseline appears to be 7-9 per outpatient chart review  -f/u H/H 8pm, transfuse Hgb<7  -Continue home folic acid, iron supplementation

## 2020-11-05 NOTE — DIETITIAN INITIAL EVALUATION ADULT. - OTHER INFO
Pt is a 69 y/o M with PMH including TIA, HTN, HLD, SERENITY with 1 episode of dialysis in the past, anemia of chronic disease, DM, GERD. Admitted for GI bleed, anemia, H. pylori.    Upon visit, pt denies nausea/vomiting. Constipation reported (last BM 11/2). On bowel regimen, however noted per RN documentation that pt refused Miralax last night. No issues chewing/swallowing. NKFA. Reports good appetite/intake in-house and PTA. Receiving Consistent CHO diet + Ensure Enlive. Pt enjoying Ensure. Pt with PMH T2DM (HgbA1c 6.0% indicating good control), not on insulin. BG checks every morning. Per last RD note (9/2019), pt weighed 154.3 lb and diagnosed with severe chronic malnutrition. Reports -170 lb, states current documented weight of 182.9 lb is erroneous. Weight checks daily at home. States he consumed 100% of breakfast this morning, eating >75% of meals. Pt does not meet criteria for malnutrition diagnosis at this time. Provided dietary education for T2DM management. Per dietary recall, pt aware of portion control and CHO monitoring. Encouraged continued adequate consumption of meals/supplement.

## 2020-11-05 NOTE — PROGRESS NOTE ADULT - SUBJECTIVE AND OBJECTIVE BOX
[facility] -- Sandie Valentine Grossman, [place], Bruce Nagy Savella, Goodger: Office # [phone]    Follow Up:  Palpitations     Subjective/Observations: Patient seen and examined. Patient awake and alert, resting comfortably in bed. No complaints of chest pain, SOB, LE edema, cough. No signs of orthopnea or PND. No dizziness or lightheadedness. Feels like he is constipated.     REVIEW OF SYSTEMS: All review of systems is negative for eye, ENT, GI, , allergic, dermatologic, musculoskeletal and neurologic except as described above    PAST MEDICAL & SURGICAL HISTORY:  Deep vein thrombosis (DVT)  Neuropathy  BPH (benign prostatic hyperplasia)  HLD (hyperlipidemia)  HTN (hypertension)  GERD (gastroesophageal reflux disease)  HLD (hyperlipidemia)  History of femoropopliteal bypass  No significant past surgical history    MEDICATIONS  (STANDING):  amLODIPine   Tablet 10 milliGRAM(s) Oral daily  atorvastatin 10 milliGRAM(s) Oral <User Schedule>  bisacodyl Suppository 10 milliGRAM(s) Rectal daily  clarithromycin 500 milliGRAM(s) Oral two times a day  dextrose 5%. 1000 milliLiter(s) (50 mL/Hr) IV Continuous <Continuous>  dextrose 50% Injectable 12.5 Gram(s) IV Push once  dextrose 50% Injectable 25 Gram(s) IV Push once  dextrose 50% Injectable 25 Gram(s) IV Push once  ferrous    sulfate 325 milliGRAM(s) Oral daily  folic acid 1 milliGRAM(s) Oral daily  gabapentin 300 milliGRAM(s) Oral two times a day  influenza   Vaccine 0.5 milliLiter(s) IntraMuscular once  insulin lispro (ADMELOG) corrective regimen sliding scale   SubCutaneous Before meals and at bedtime  lactobacillus acidophilus 1 Tablet(s) Oral three times a day  metroNIDAZOLE    Tablet 500 milliGRAM(s) Oral three times a day  pantoprazole    Tablet 40 milliGRAM(s) Oral two times a day  polyethylene glycol 3350 17 Gram(s) Oral two times a day  senna 2 Tablet(s) Oral at bedtime  sodium chloride 0.45%. 1000 milliLiter(s) (75 mL/Hr) IV Continuous <Continuous>  tamsulosin 0.4 milliGRAM(s) Oral at bedtime    MEDICATIONS  (PRN):  dextrose 40% Gel 15 Gram(s) Oral once PRN Blood Glucose LESS THAN 70 milliGRAM(s)/deciliter  glucagon  Injectable 1 milliGRAM(s) IntraMuscular once PRN Glucose LESS THAN 70 milligrams/deciliter    Allergies  No Known Drug Allergies  Prefers chocolate ensure drinks (Unknown)    Vital Signs Last 24 Hrs  T(C): 36.4 (05 Nov 2020 07:16), Max: 37.2 (04 Nov 2020 15:50)  T(F): 97.6 (05 Nov 2020 07:16), Max: 99 (04 Nov 2020 15:50)  HR: 98 (05 Nov 2020 07:16) (86 - 98)  BP: 132/73 (05 Nov 2020 07:16) (118/67 - 132/73)  BP(mean): --  RR: 17 (05 Nov 2020 07:16) (17 - 20)  SpO2: 96% (05 Nov 2020 07:16) (94% - 97%)  I&O's Summary    04 Nov 2020 07:01  -  05 Nov 2020 07:00  --------------------------------------------------------  IN: 0 mL / OUT: 1750 mL / NET: -1750 mL    05 Nov 2020 07:01  -  05 Nov 2020 10:13  --------------------------------------------------------  IN: 0 mL / OUT: 300 mL / NET: -300 mL    TELE:   PHYSICAL EXAM:  Appearance: NAD, no distress, alert, Well developed   HEENT: Moist Mucous Membranes, Anicteric  Cardiovascular: Regular rate and rhythm, Normal S1 S2, No JVD, No murmurs, No rubs, gallops or clicks  Respiratory: Non-labored, Clear to auscultation, No rales, No rhonchi, No wheezing.   Gastrointestinal:  Soft, Non-tender, + BS  Neurologic: Non-focal  Skin: Warm and dry, No visible rashes or ulcers, No ecchymosis, No cyanosis  Musculoskeletal: No clubbing, No cyanosis, No joint swelling/tenderness  Psychiatry: Mood & affect appropriate  Lymph: No peripheral edema.     LABS: All Labs Reviewed:                        9.5    6.09  )-----------( 338      ( 05 Nov 2020 09:20 )             27.3                         9.1    x     )-----------( x        ( 04 Nov 2020 16:48 )             26.2                         7.4    5.97  )-----------( 289      ( 04 Nov 2020 07:35 )             22.1     05 Nov 2020 09:20    140    |  106    |  14     ----------------------------<  126    3.5     |  28     |  1.40   04 Nov 2020 07:35    143    |  108    |  15     ----------------------------<  95     3.8     |  29     |  1.70   03 Nov 2020 07:26    142    |  107    |  15     ----------------------------<  106    3.6     |  30     |  2.00     Ca    8.7        05 Nov 2020 09:20  Ca    8.4        04 Nov 2020 07:35  Ca    9.0        03 Nov 2020 07:26    TPro  7.7    /  Alb  3.5    /  TBili  1.0    /  DBili  x      /  AST  15     /  ALT  15     /  AlkPhos  109    05 Nov 2020 09:20  Troponin I, Serum: <.015 ng/mL (10-29-20 @ 13:45)    12 Lead ECG:   Ventricular Rate 90 BPM  Atrial Rate 90 BPM  P-R Interval 144 ms  QRS Duration 96 ms  Q-T Interval 362 ms  QTC Calculation(Bazett) 442 ms  P Axis 64 degrees  R Axis -57 degrees  T Axis 74 degrees  Diagnosis Line Normal sinus rhythm  Left axis deviation  Abnormal ECG  No previous ECGs available  Confirmed by anjali Martínez ([id number]) on 10/29/2020 3:51:30 PM (10-29-20 @ 13:55)    < from: TTE Echo Complete w/Doppler (10.02.19 @ 13:35) >  Summary:   1. Left ventricular ejection fraction, by visual estimation, is 60 to  65%.   2. Normal global left ventricular systolic function.   3. Normal left ventricular internal cavity size.   4. Spectral Doppler shows impaired relaxation pattern of left  ventricular myocardial filling (Grade I diastolic dysfunction).   5. There is no evidence of pericardial effusion.  < end of copied text >    < from: US Duplex Venous Lower Ext Ltd, Right (10.31.20 @ 12:33) >  IMPRESSION:  Mural thickening as described above consistent with sequela of chronic deep vein thrombosis.  No evidence of acute deep vein thrombosis.  < end of copied text >    < from: Xray Chest 1 View- PORTABLE-Urgent (Xray Chest 1 View- PORTABLE-Urgent .) (10.29.20 @ 15:02) >  Heart size is within normal limits.  The lung fields and pleural surfaces are unremarkable.  < end of copied text > [facility] -- Sandie Valentine Grossman, [place], Bruce Nagy Savella, Goodger: Office # [phone]    Follow Up:  Palpitations     Subjective/Observations: Patient seen and examined. Patient awake and alert, resting comfortably in bed. No complaints of chest pain, SOB, LE edema, cough. No signs of orthopnea or PND. No dizziness or lightheadedness. Feels like he is constipated.     REVIEW OF SYSTEMS: All review of systems is negative for eye, ENT, GI, , allergic, dermatologic, musculoskeletal and neurologic except as described above    PAST MEDICAL & SURGICAL HISTORY:  Deep vein thrombosis (DVT)  Neuropathy  BPH (benign prostatic hyperplasia)  HLD (hyperlipidemia)  HTN (hypertension)  GERD (gastroesophageal reflux disease)  HLD (hyperlipidemia)  History of femoropopliteal bypass  No significant past surgical history    MEDICATIONS  (STANDING):  amLODIPine   Tablet 10 milliGRAM(s) Oral daily  atorvastatin 10 milliGRAM(s) Oral <User Schedule>  bisacodyl Suppository 10 milliGRAM(s) Rectal daily  clarithromycin 500 milliGRAM(s) Oral two times a day  dextrose 5%. 1000 milliLiter(s) (50 mL/Hr) IV Continuous <Continuous>  dextrose 50% Injectable 12.5 Gram(s) IV Push once  dextrose 50% Injectable 25 Gram(s) IV Push once  dextrose 50% Injectable 25 Gram(s) IV Push once  ferrous    sulfate 325 milliGRAM(s) Oral daily  folic acid 1 milliGRAM(s) Oral daily  gabapentin 300 milliGRAM(s) Oral two times a day  influenza   Vaccine 0.5 milliLiter(s) IntraMuscular once  insulin lispro (ADMELOG) corrective regimen sliding scale   SubCutaneous Before meals and at bedtime  lactobacillus acidophilus 1 Tablet(s) Oral three times a day  metroNIDAZOLE    Tablet 500 milliGRAM(s) Oral three times a day  pantoprazole    Tablet 40 milliGRAM(s) Oral two times a day  polyethylene glycol 3350 17 Gram(s) Oral two times a day  senna 2 Tablet(s) Oral at bedtime  sodium chloride 0.45%. 1000 milliLiter(s) (75 mL/Hr) IV Continuous <Continuous>  tamsulosin 0.4 milliGRAM(s) Oral at bedtime    MEDICATIONS  (PRN):  dextrose 40% Gel 15 Gram(s) Oral once PRN Blood Glucose LESS THAN 70 milliGRAM(s)/deciliter  glucagon  Injectable 1 milliGRAM(s) IntraMuscular once PRN Glucose LESS THAN 70 milligrams/deciliter    Allergies  No Known Drug Allergies  Prefers chocolate ensure drinks (Unknown)    Vital Signs Last 24 Hrs  T(C): 36.4 (05 Nov 2020 07:16), Max: 37.2 (04 Nov 2020 15:50)  T(F): 97.6 (05 Nov 2020 07:16), Max: 99 (04 Nov 2020 15:50)  HR: 98 (05 Nov 2020 07:16) (86 - 98)  BP: 132/73 (05 Nov 2020 07:16) (118/67 - 132/73)  BP(mean): --  RR: 17 (05 Nov 2020 07:16) (17 - 20)  SpO2: 96% (05 Nov 2020 07:16) (94% - 97%)  I&O's Summary    04 Nov 2020 07:01  -  05 Nov 2020 07:00  --------------------------------------------------------  IN: 0 mL / OUT: 1750 mL / NET: -1750 mL    05 Nov 2020 07:01  -  05 Nov 2020 10:13  --------------------------------------------------------  IN: 0 mL / OUT: 300 mL / NET: -300 mL    TELE: Tele showing SR -ST  PHYSICAL EXAM:  Appearance: NAD, no distress, alert, Well developed   HEENT: Moist Mucous Membranes, Anicteric  Cardiovascular: Regular rate and rhythm, Normal S1 S2, No JVD, No murmurs, No rubs, gallops or clicks  Respiratory: Non-labored, Clear to auscultation, No rales, No rhonchi, No wheezing.   Gastrointestinal:  Soft, Non-tender, + BS  Neurologic: Non-focal  Skin: Warm and dry, No visible rashes or ulcers, No ecchymosis, No cyanosis  Musculoskeletal: No clubbing, No cyanosis, No joint swelling/tenderness  Psychiatry: Mood & affect appropriate  Lymph: No peripheral edema.     LABS: All Labs Reviewed:                        9.5    6.09  )-----------( 338      ( 05 Nov 2020 09:20 )             27.3                         9.1    x     )-----------( x        ( 04 Nov 2020 16:48 )             26.2                         7.4    5.97  )-----------( 289      ( 04 Nov 2020 07:35 )             22.1     05 Nov 2020 09:20    140    |  106    |  14     ----------------------------<  126    3.5     |  28     |  1.40   04 Nov 2020 07:35    143    |  108    |  15     ----------------------------<  95     3.8     |  29     |  1.70   03 Nov 2020 07:26    142    |  107    |  15     ----------------------------<  106    3.6     |  30     |  2.00     Ca    8.7        05 Nov 2020 09:20  Ca    8.4        04 Nov 2020 07:35  Ca    9.0        03 Nov 2020 07:26    TPro  7.7    /  Alb  3.5    /  TBili  1.0    /  DBili  x      /  AST  15     /  ALT  15     /  AlkPhos  109    05 Nov 2020 09:20  Troponin I, Serum: <.015 ng/mL (10-29-20 @ 13:45)    12 Lead ECG:   Ventricular Rate 90 BPM  Atrial Rate 90 BPM  P-R Interval 144 ms  QRS Duration 96 ms  Q-T Interval 362 ms  QTC Calculation(Bazett) 442 ms  P Axis 64 degrees  R Axis -57 degrees  T Axis 74 degrees  Diagnosis Line Normal sinus rhythm  Left axis deviation  Abnormal ECG  No previous ECGs available  Confirmed by anjali Martínez ([id number]) on 10/29/2020 3:51:30 PM (10-29-20 @ 13:55)    < from: TTE Echo Complete w/Doppler (10.02.19 @ 13:35) >  Summary:   1. Left ventricular ejection fraction, by visual estimation, is 60 to  65%.   2. Normal global left ventricular systolic function.   3. Normal left ventricular internal cavity size.   4. Spectral Doppler shows impaired relaxation pattern of left  ventricular myocardial filling (Grade I diastolic dysfunction).   5. There is no evidence of pericardial effusion.  < end of copied text >    < from: US Duplex Venous Lower Ext Ltd, Right (10.31.20 @ 12:33) >  IMPRESSION:  Mural thickening as described above consistent with sequela of chronic deep vein thrombosis.  No evidence of acute deep vein thrombosis.  < end of copied text >    < from: Xray Chest 1 View- PORTABLE-Urgent (Xray Chest 1 View- PORTABLE-Urgent .) (10.29.20 @ 15:02) >  Heart size is within normal limits.  The lung fields and pleural surfaces are unremarkable.  < end of copied text >

## 2020-11-05 NOTE — PROGRESS NOTE ADULT - SUBJECTIVE AND OBJECTIVE BOX
INTERVAL HPI/OVERNIGHT EVENTS:  pt seen and examined  reports flatus but no bm, received senna yesterday and overnight per nursing  denies n/v/abd pain  alisia po  sp 1u prbc yesterday    MEDICATIONS  (STANDING):  amLODIPine   Tablet 10 milliGRAM(s) Oral daily  atorvastatin 10 milliGRAM(s) Oral <User Schedule>  clarithromycin 500 milliGRAM(s) Oral two times a day  dextrose 5%. 1000 milliLiter(s) (50 mL/Hr) IV Continuous <Continuous>  dextrose 50% Injectable 12.5 Gram(s) IV Push once  dextrose 50% Injectable 25 Gram(s) IV Push once  dextrose 50% Injectable 25 Gram(s) IV Push once  ferrous    sulfate 325 milliGRAM(s) Oral daily  folic acid 1 milliGRAM(s) Oral daily  gabapentin 300 milliGRAM(s) Oral two times a day  influenza   Vaccine 0.5 milliLiter(s) IntraMuscular once  insulin lispro (ADMELOG) corrective regimen sliding scale   SubCutaneous Before meals and at bedtime  lactobacillus acidophilus 1 Tablet(s) Oral three times a day  metroNIDAZOLE    Tablet 500 milliGRAM(s) Oral three times a day  pantoprazole    Tablet 40 milliGRAM(s) Oral two times a day  sodium chloride 0.45%. 1000 milliLiter(s) (75 mL/Hr) IV Continuous <Continuous>  tamsulosin 0.4 milliGRAM(s) Oral at bedtime    MEDICATIONS  (PRN):  dextrose 40% Gel 15 Gram(s) Oral once PRN Blood Glucose LESS THAN 70 milliGRAM(s)/deciliter  glucagon  Injectable 1 milliGRAM(s) IntraMuscular once PRN Glucose LESS THAN 70 milligrams/deciliter  polyethylene glycol 3350 17 Gram(s) Oral daily PRN Constipation      Allergies    No Known Drug Allergies  Prefers chocolate ensure drinks (Unknown)    Intolerances        Review of Systems:    General:  No wt loss, fevers, chills, night sweats, fatigue   Eyes:  Good vision, no reported pain  ENT:  No sore throat, pain, runny nose, dysphagia  CV:  No pain, palpitations, hypo/hypertension  Resp:  No dyspnea, cough, tachypnea, wheezing  GI:  see above   :  No pain, bleeding, incontinence, nocturia  Muscle:  No pain, weakness  Neuro:  No weakness, tingling, memory problems  Psych:  No fatigue, insomnia, mood problems, depression  Endocrine:  No polyuria, polydypsia, cold/heat intolerance  Heme:  No petechiae, ecchymosis, easy bruisability  Skin:  No rash, tattoos, scars, edema      Vital Signs Last 24 Hrs  T(C): 36.9 (04 Nov 2020 07:26), Max: 37.2 (04 Nov 2020 00:35)  T(F): 98.5 (04 Nov 2020 07:26), Max: 98.9 (04 Nov 2020 00:35)  HR: 84 (04 Nov 2020 07:26) (64 - 98)  BP: 119/56 (04 Nov 2020 07:26) (113/68 - 130/72)  BP(mean): --  RR: 18 (04 Nov 2020 07:26) (18 - 18)  SpO2: 95% (04 Nov 2020 07:26) (93% - 97%)    PHYSICAL EXAM:      Constitutional: lying in bed  HEENT: ncat  Gastrointestinal: soft nt dt  Extremities: No peripheral edema  Vascular: + peripheral pulses  Neurological: Awake alert appropriate        LABS:                        7.4    5.97  )-----------( 289      ( 04 Nov 2020 07:35 )             22.1     11-04    143  |  108  |  15  ----------------------------<  95  3.8   |  29  |  1.70<H>    Ca    8.4<L>      04 Nov 2020 07:35            RADIOLOGY & ADDITIONAL TESTS:

## 2020-11-05 NOTE — PROGRESS NOTE ADULT - PROBLEM SELECTOR PLAN 9
Chronic, stable  -On home simvastatin 20mg qhs. currently on atorvastatin 10mg qOD while on H pylori Triple therapy to reduce Rx interaction for 14 days  -Monitor CMP

## 2020-11-05 NOTE — PROGRESS NOTE ADULT - PROBLEM SELECTOR PLAN 1
sp neg egd/colon  +hpylori on path- started on tx regimen (to complete 14d course)  no overt gib per nursing; hgb stable sp transfusion  monitor cbc, transfuse prn  diet as tolerated  cont ppi ppx  increase bowel regimen as ordered  monitor abdominal exam/gi function  no gi objection to ac w close monitoring of h/h  will need close op gi f/u w capsule study upon dc

## 2020-11-05 NOTE — PROGRESS NOTE ADULT - SUBJECTIVE AND OBJECTIVE BOX
Patient is a 70y old  Male who presents with a chief complaint of GI bleed (05 Nov 2020 10:22)      INTERVAL HPI/OVERNIGHT EVENTS: Patient seen and examined at bedside. No overnight events occurred. He still has not had a bowel movement but is passing gas. Patient has no complaints at this time. Denies fevers, chills, headache, lightheadedness, chest pain, dyspnea, abdominal pain, n/v/d/c.    MEDICATIONS  (STANDING):  amLODIPine   Tablet 10 milliGRAM(s) Oral daily  atorvastatin 10 milliGRAM(s) Oral <User Schedule>  bisacodyl Suppository 10 milliGRAM(s) Rectal daily  clarithromycin 500 milliGRAM(s) Oral two times a day  dextrose 5%. 1000 milliLiter(s) (50 mL/Hr) IV Continuous <Continuous>  dextrose 50% Injectable 12.5 Gram(s) IV Push once  dextrose 50% Injectable 25 Gram(s) IV Push once  dextrose 50% Injectable 25 Gram(s) IV Push once  ferrous    sulfate 325 milliGRAM(s) Oral daily  folic acid 1 milliGRAM(s) Oral daily  gabapentin 300 milliGRAM(s) Oral two times a day  influenza   Vaccine 0.5 milliLiter(s) IntraMuscular once  insulin lispro (ADMELOG) corrective regimen sliding scale   SubCutaneous Before meals and at bedtime  lactobacillus acidophilus 1 Tablet(s) Oral three times a day  metroNIDAZOLE    Tablet 500 milliGRAM(s) Oral three times a day  pantoprazole    Tablet 40 milliGRAM(s) Oral two times a day  polyethylene glycol 3350 17 Gram(s) Oral two times a day  senna 2 Tablet(s) Oral at bedtime  sodium chloride 0.45%. 1000 milliLiter(s) (75 mL/Hr) IV Continuous <Continuous>  tamsulosin 0.4 milliGRAM(s) Oral at bedtime    MEDICATIONS  (PRN):  dextrose 40% Gel 15 Gram(s) Oral once PRN Blood Glucose LESS THAN 70 milliGRAM(s)/deciliter  glucagon  Injectable 1 milliGRAM(s) IntraMuscular once PRN Glucose LESS THAN 70 milligrams/deciliter      Allergies    No Known Drug Allergies  Prefers chocolate ensure drinks (Unknown)    Intolerances        REVIEW OF SYSTEMS:  CONSTITUTIONAL: No fever or chills  HEENT:  No headache, no sore throat  RESPIRATORY: No cough, wheezing, or shortness of breath  CARDIOVASCULAR: No chest pain, palpitations  GASTROINTESTINAL: No abd pain, nausea, vomiting, or diarrhea  GENITOURINARY: No dysuria, frequency, or hematuria  NEUROLOGICAL: no focal weakness or dizziness. Neuropathic pain RLE.  MUSCULOSKELETAL: LUE weakness 2/2 pain at prior IV site     Vital Signs Last 24 Hrs  T(C): 36.6 (05 Nov 2020 13:24), Max: 37.2 (04 Nov 2020 15:50)  T(F): 97.9 (05 Nov 2020 13:24), Max: 99 (04 Nov 2020 15:50)  HR: 95 (05 Nov 2020 13:24) (86 - 98)  BP: 136/71 (05 Nov 2020 13:24) (119/53 - 136/71)  BP(mean): --  RR: 17 (05 Nov 2020 13:24) (17 - 18)  SpO2: 95% (05 Nov 2020 13:24) (94% - 97%)    PHYSICAL EXAM:  GENERAL: NAD  HEENT:  anicteric, moist mucous membranes  CHEST/LUNG:  CTA b/l, no rales, wheezes, or rhonchi  HEART:  RRR, S1, S2  ABDOMEN:  BS+, soft, nontender, nondistended  EXTREMITIES: no edema, cyanosis, or calf tenderness. RLE healed skin grafts present  NEURO: A&Ox3, nonfocal, CN II-XII grossly intact, sensation intact, no gait abnormalities.  NERVOUS SYSTEM: answers questions and follows commands appropriately    LABS:                        9.5    6.09  )-----------( 338      ( 05 Nov 2020 09:20 )             27.3     CBC Full  -  ( 05 Nov 2020 09:20 )  WBC Count : 6.09 K/uL  Hemoglobin : 9.5 g/dL  Hematocrit : 27.3 %  Platelet Count - Automated : 338 K/uL  Mean Cell Volume : 84.5 fl  Mean Cell Hemoglobin : 29.4 pg  Mean Cell Hemoglobin Concentration : 34.8 gm/dL  Auto Neutrophil # : x  Auto Lymphocyte # : x  Auto Monocyte # : x  Auto Eosinophil # : x  Auto Basophil # : x  Auto Neutrophil % : x  Auto Lymphocyte % : x  Auto Monocyte % : x  Auto Eosinophil % : x  Auto Basophil % : x    05 Nov 2020 09:20    140    |  106    |  14     ----------------------------<  126    3.5     |  28     |  1.40     Ca    8.7        05 Nov 2020 09:20    TPro  7.7    /  Alb  3.5    /  TBili  1.0    /  DBili  x      /  AST  15     /  ALT  15     /  AlkPhos  109    05 Nov 2020 09:20        CAPILLARY BLOOD GLUCOSE      POCT Blood Glucose.: 123 mg/dL (05 Nov 2020 11:52)  POCT Blood Glucose.: 111 mg/dL (05 Nov 2020 07:57)  POCT Blood Glucose.: 139 mg/dL (04 Nov 2020 21:48)  POCT Blood Glucose.: 99 mg/dL (04 Nov 2020 17:11)          RADIOLOGY & ADDITIONAL TESTS:    Personally reviewed.     Consultant(s) Notes Reviewed:  [x] YES  [ ] NO     Patient is a 70y old  Male who presents with a chief complaint of GI bleed (05 Nov 2020 10:22)      INTERVAL HPI/OVERNIGHT EVENTS: Patient seen and examined at bedside. No overnight events occurred. He still has not had a bowel movement but is passing gas. Patient has no complaints at this time. Denies fevers, chills, headache, lightheadedness, chest pain, dyspnea, abdominal pain, n/v/d/c.    MEDICATIONS  (STANDING):  amLODIPine   Tablet 10 milliGRAM(s) Oral daily  atorvastatin 10 milliGRAM(s) Oral <User Schedule>  bisacodyl Suppository 10 milliGRAM(s) Rectal daily  clarithromycin 500 milliGRAM(s) Oral two times a day  dextrose 5%. 1000 milliLiter(s) (50 mL/Hr) IV Continuous <Continuous>  dextrose 50% Injectable 12.5 Gram(s) IV Push once  dextrose 50% Injectable 25 Gram(s) IV Push once  dextrose 50% Injectable 25 Gram(s) IV Push once  ferrous    sulfate 325 milliGRAM(s) Oral daily  folic acid 1 milliGRAM(s) Oral daily  gabapentin 300 milliGRAM(s) Oral two times a day  influenza   Vaccine 0.5 milliLiter(s) IntraMuscular once  insulin lispro (ADMELOG) corrective regimen sliding scale   SubCutaneous Before meals and at bedtime  lactobacillus acidophilus 1 Tablet(s) Oral three times a day  metroNIDAZOLE    Tablet 500 milliGRAM(s) Oral three times a day  pantoprazole    Tablet 40 milliGRAM(s) Oral two times a day  polyethylene glycol 3350 17 Gram(s) Oral two times a day  senna 2 Tablet(s) Oral at bedtime  sodium chloride 0.45%. 1000 milliLiter(s) (75 mL/Hr) IV Continuous <Continuous>  tamsulosin 0.4 milliGRAM(s) Oral at bedtime    MEDICATIONS  (PRN):  dextrose 40% Gel 15 Gram(s) Oral once PRN Blood Glucose LESS THAN 70 milliGRAM(s)/deciliter  glucagon  Injectable 1 milliGRAM(s) IntraMuscular once PRN Glucose LESS THAN 70 milligrams/deciliter      Allergies    No Known Drug Allergies  Prefers chocolate ensure drinks (Unknown)    Intolerances        REVIEW OF SYSTEMS:  CONSTITUTIONAL: No fever or chills  HEENT:  No headache, no sore throat  RESPIRATORY: No cough, wheezing, or shortness of breath  CARDIOVASCULAR: No chest pain, palpitations  GASTROINTESTINAL: No abd pain, nausea, vomiting, or diarrhea  GENITOURINARY: No dysuria, frequency, or hematuria  NEUROLOGICAL: no focal weakness or dizziness. Neuropathic pain RLE.  MUSCULOSKELETAL: LUE hand pain now minimal     Vital Signs Last 24 Hrs  T(C): 36.6 (05 Nov 2020 13:24), Max: 37.2 (04 Nov 2020 15:50)  T(F): 97.9 (05 Nov 2020 13:24), Max: 99 (04 Nov 2020 15:50)  HR: 95 (05 Nov 2020 13:24) (86 - 98)  BP: 136/71 (05 Nov 2020 13:24) (119/53 - 136/71)  BP(mean): --  RR: 17 (05 Nov 2020 13:24) (17 - 18)  SpO2: 95% (05 Nov 2020 13:24) (94% - 97%)    PHYSICAL EXAM:  GENERAL: NAD  HEENT:  anicteric, moist mucous membranes  CHEST/LUNG:  CTA b/l, no rales, wheezes, or rhonchi  HEART:  RRR, S1, S2  ABDOMEN:  BS+, soft, nontender, nondistended  EXTREMITIES: no edema, cyanosis, or calf tenderness. RLE healed skin grafts present  NEURO: A&Ox3, nonfocal, CN II-XII grossly intact, sensation intact  NERVOUS SYSTEM: answers questions and follows commands appropriately    LABS:                        9.5    6.09  )-----------( 338      ( 05 Nov 2020 09:20 )             27.3     CBC Full  -  ( 05 Nov 2020 09:20 )  WBC Count : 6.09 K/uL  Hemoglobin : 9.5 g/dL  Hematocrit : 27.3 %  Platelet Count - Automated : 338 K/uL  Mean Cell Volume : 84.5 fl  Mean Cell Hemoglobin : 29.4 pg  Mean Cell Hemoglobin Concentration : 34.8 gm/dL  Auto Neutrophil # : x  Auto Lymphocyte # : x  Auto Monocyte # : x  Auto Eosinophil # : x  Auto Basophil # : x  Auto Neutrophil % : x  Auto Lymphocyte % : x  Auto Monocyte % : x  Auto Eosinophil % : x  Auto Basophil % : x    05 Nov 2020 09:20    140    |  106    |  14     ----------------------------<  126    3.5     |  28     |  1.40     Ca    8.7        05 Nov 2020 09:20    TPro  7.7    /  Alb  3.5    /  TBili  1.0    /  DBili  x      /  AST  15     /  ALT  15     /  AlkPhos  109    05 Nov 2020 09:20        CAPILLARY BLOOD GLUCOSE      POCT Blood Glucose.: 123 mg/dL (05 Nov 2020 11:52)  POCT Blood Glucose.: 111 mg/dL (05 Nov 2020 07:57)  POCT Blood Glucose.: 139 mg/dL (04 Nov 2020 21:48)  POCT Blood Glucose.: 99 mg/dL (04 Nov 2020 17:11)          RADIOLOGY & ADDITIONAL TESTS:    Personally reviewed.     Consultant(s) Notes Reviewed:  [x] YES  [ ] NO

## 2020-11-05 NOTE — PROGRESS NOTE ADULT - PROBLEM SELECTOR PLAN 4
Pt CMP revealed NA of 147  -Resolved  -s/p D5 at 60 cc/hr for 10 hours.   -c/w half NS infusion (12 hours) for High NA; SERENITY vs ATN  -monitor renal indices, lytes in CMP  -strict I&Os

## 2020-11-05 NOTE — PROGRESS NOTE ADULT - PROBLEM SELECTOR PLAN 5
Pt admitted for GI bleed, now s/p EGD. Bx shows Chronic active gastritis involving oxyntic and non-oxyntic mucosa.  -Diff Quik stain for H. pylori is positive.  -continue protonix 40 mg PO BID, clarithromycin, metronidazole 14 day course

## 2020-11-06 LAB
ALBUMIN SERPL ELPH-MCNC: 3.6 G/DL — SIGNIFICANT CHANGE UP (ref 3.3–5)
ALP SERPL-CCNC: 106 U/L — SIGNIFICANT CHANGE UP (ref 40–120)
ALT FLD-CCNC: 14 U/L — SIGNIFICANT CHANGE UP (ref 12–78)
ANION GAP SERPL CALC-SCNC: 7 MMOL/L — SIGNIFICANT CHANGE UP (ref 5–17)
AST SERPL-CCNC: 15 U/L — SIGNIFICANT CHANGE UP (ref 15–37)
BILIRUB SERPL-MCNC: 0.8 MG/DL — SIGNIFICANT CHANGE UP (ref 0.2–1.2)
BUN SERPL-MCNC: 15 MG/DL — SIGNIFICANT CHANGE UP (ref 7–23)
CALCIUM SERPL-MCNC: 9 MG/DL — SIGNIFICANT CHANGE UP (ref 8.5–10.1)
CHLORIDE SERPL-SCNC: 107 MMOL/L — SIGNIFICANT CHANGE UP (ref 96–108)
CO2 SERPL-SCNC: 27 MMOL/L — SIGNIFICANT CHANGE UP (ref 22–31)
CREAT SERPL-MCNC: 1.4 MG/DL — HIGH (ref 0.5–1.3)
GLUCOSE SERPL-MCNC: 104 MG/DL — HIGH (ref 70–99)
HCT VFR BLD CALC: 28.4 % — LOW (ref 39–50)
HGB BLD-MCNC: 9.7 G/DL — LOW (ref 13–17)
MCHC RBC-ENTMCNC: 29.2 PG — SIGNIFICANT CHANGE UP (ref 27–34)
MCHC RBC-ENTMCNC: 34.2 GM/DL — SIGNIFICANT CHANGE UP (ref 32–36)
MCV RBC AUTO: 85.5 FL — SIGNIFICANT CHANGE UP (ref 80–100)
NRBC # BLD: 0 /100 WBCS — SIGNIFICANT CHANGE UP (ref 0–0)
PLATELET # BLD AUTO: 381 K/UL — SIGNIFICANT CHANGE UP (ref 150–400)
POTASSIUM SERPL-MCNC: 3.5 MMOL/L — SIGNIFICANT CHANGE UP (ref 3.5–5.3)
POTASSIUM SERPL-SCNC: 3.5 MMOL/L — SIGNIFICANT CHANGE UP (ref 3.5–5.3)
PROT SERPL-MCNC: 7.8 G/DL — SIGNIFICANT CHANGE UP (ref 6–8.3)
RBC # BLD: 3.32 M/UL — LOW (ref 4.2–5.8)
RBC # FLD: 13.6 % — SIGNIFICANT CHANGE UP (ref 10.3–14.5)
SODIUM SERPL-SCNC: 141 MMOL/L — SIGNIFICANT CHANGE UP (ref 135–145)
WBC # BLD: 6.98 K/UL — SIGNIFICANT CHANGE UP (ref 3.8–10.5)
WBC # FLD AUTO: 6.98 K/UL — SIGNIFICANT CHANGE UP (ref 3.8–10.5)

## 2020-11-06 PROCEDURE — 99497 ADVNCD CARE PLAN 30 MIN: CPT

## 2020-11-06 PROCEDURE — 99232 SBSQ HOSP IP/OBS MODERATE 35: CPT | Mod: GC

## 2020-11-06 PROCEDURE — 93970 EXTREMITY STUDY: CPT | Mod: 26

## 2020-11-06 PROCEDURE — 99232 SBSQ HOSP IP/OBS MODERATE 35: CPT

## 2020-11-06 RX ORDER — GABAPENTIN 400 MG/1
1 CAPSULE ORAL
Qty: 0 | Refills: 0 | DISCHARGE

## 2020-11-06 RX ORDER — AMLODIPINE BESYLATE AND OLMESARTRAN MEDOXOMIL 10; 40 MG/1; MG/1
1 TABLET, FILM COATED ORAL
Qty: 0 | Refills: 0 | DISCHARGE

## 2020-11-06 RX ORDER — TAMSULOSIN HYDROCHLORIDE 0.4 MG/1
1 CAPSULE ORAL
Qty: 0 | Refills: 0 | DISCHARGE

## 2020-11-06 RX ORDER — AMLODIPINE BESYLATE 2.5 MG/1
1 TABLET ORAL
Qty: 0 | Refills: 0 | DISCHARGE

## 2020-11-06 RX ORDER — ACETAMINOPHEN 500 MG
650 TABLET ORAL ONCE
Refills: 0 | Status: COMPLETED | OUTPATIENT
Start: 2020-11-06 | End: 2020-11-06

## 2020-11-06 RX ORDER — ASPIRIN/CALCIUM CARB/MAGNESIUM 324 MG
1 TABLET ORAL
Qty: 0 | Refills: 0 | DISCHARGE

## 2020-11-06 RX ORDER — RIVAROXABAN 15 MG-20MG
1 KIT ORAL
Qty: 0 | Refills: 0 | DISCHARGE

## 2020-11-06 RX ORDER — FERROUS SULFATE 325(65) MG
0 TABLET ORAL
Qty: 0 | Refills: 0 | DISCHARGE

## 2020-11-06 RX ORDER — SENNA PLUS 8.6 MG/1
2 TABLET ORAL
Qty: 0 | Refills: 0 | DISCHARGE

## 2020-11-06 RX ORDER — FOLIC ACID 0.8 MG
1 TABLET ORAL
Qty: 0 | Refills: 0 | DISCHARGE

## 2020-11-06 RX ORDER — SITAGLIPTIN 50 MG/1
1 TABLET, FILM COATED ORAL
Qty: 0 | Refills: 0 | DISCHARGE

## 2020-11-06 RX ORDER — SIMVASTATIN 20 MG/1
1 TABLET, FILM COATED ORAL
Qty: 0 | Refills: 0 | DISCHARGE

## 2020-11-06 RX ADMIN — GABAPENTIN 300 MILLIGRAM(S): 400 CAPSULE ORAL at 05:49

## 2020-11-06 RX ADMIN — Medication 1 TABLET(S): at 13:12

## 2020-11-06 RX ADMIN — TAMSULOSIN HYDROCHLORIDE 0.4 MILLIGRAM(S): 0.4 CAPSULE ORAL at 21:28

## 2020-11-06 RX ADMIN — Medication 500 MILLIGRAM(S): at 13:12

## 2020-11-06 RX ADMIN — POLYETHYLENE GLYCOL 3350 17 GRAM(S): 17 POWDER, FOR SOLUTION ORAL at 06:43

## 2020-11-06 RX ADMIN — Medication 1 MILLIGRAM(S): at 11:33

## 2020-11-06 RX ADMIN — Medication 650 MILLIGRAM(S): at 05:49

## 2020-11-06 RX ADMIN — AMLODIPINE BESYLATE 10 MILLIGRAM(S): 2.5 TABLET ORAL at 05:49

## 2020-11-06 RX ADMIN — Medication 1 TABLET(S): at 05:50

## 2020-11-06 RX ADMIN — Medication 325 MILLIGRAM(S): at 11:33

## 2020-11-06 RX ADMIN — Medication 500 MILLIGRAM(S): at 05:51

## 2020-11-06 RX ADMIN — GABAPENTIN 300 MILLIGRAM(S): 400 CAPSULE ORAL at 17:02

## 2020-11-06 RX ADMIN — Medication 650 MILLIGRAM(S): at 06:49

## 2020-11-06 NOTE — PROGRESS NOTE ADULT - PROBLEM SELECTOR PLAN 8
Chronic, stable  -Patient takes amlodipine 10mg 2x daily, confirmed with pharmacy  -BP currently stable, continue amlodipine 10mg once daily and continue to monitor  -home losartan on hold. consider resumption upon dc

## 2020-11-06 NOTE — PROGRESS NOTE ADULT - ASSESSMENT
70 year old male PMH RLE DVT on Xarelto, HTN, HLD, BPH, GERD, neuropathy presents to ED with palpitations, fatigue, melena, admitted for GI bleed. Cardio consulted for cardiac clearance for EGD.     GI bleed  - + FOBT s/p EGD, found to have gastritis with no bleeding  - S/p colonoscopy unremarkable, plans for capsule outpatient per GI  - Had dark-colored stools overnight, though, today, less dark.  claims to have the same color when he takes iron  - Xarelto remains on hold- resume when okay with GI/primary team.   - s/p PRBC for Hgb og 7.4.  Has been stable at ~9    HTN  - BP remains stable and controlled  - Continue Norvasc  - Hold losartan for SERENITY, resume when SERENITY resolves   - Echo 10/2/2019: EF 60 to 65% with Grade I diastolic dysfunction    SERENITY  - Renal indices improving (1.4 <--1.7)  - Avoid nephrotoxics    HLD  - Continue statin    DVT  - Per Primary  - Resume Xarelto per GI/medicine     - Monitor and replete lytes, keep K>4, Mg>2.    - All other medical needs as per primary team.  - Other cardiovascular workup will depend on clinical course.  - Will continue to follow.    Julia Gibbons DNP, NP-C  Cardiology   Spectra #[phone]

## 2020-11-06 NOTE — PROVIDER CONTACT NOTE (OTHER) - ASSESSMENT
pt is able to move leg, +pedal pulses, pt advises that this is a new a pain for him, that he has never felt this type of pain before

## 2020-11-06 NOTE — PROGRESS NOTE ADULT - SUBJECTIVE AND OBJECTIVE BOX
[facility] -- Sandie Valentine, Mina Sullivan, Bruce Nagy Savella, [place]  Office # [phone]    Follow Up:  Afib on AC, GIB    Subjective/Observations: Awake and alert.  Ambulated to the BR, denies dizziness or lightheadedness.  However, c/o leg weakness.  claims to have had BM for the second time but color was less dark than previous.  Denies chest pain, palpitations, SOB, KEE or orthopnea       REVIEW OF SYSTEMS: All other review of systems is negative unless indicated above  PAST MEDICAL & SURGICAL HISTORY:  Deep vein thrombosis (DVT)    Neuropathy    BPH (benign prostatic hyperplasia)    HLD (hyperlipidemia)    HTN (hypertension)    GERD (gastroesophageal reflux disease)    HLD (hyperlipidemia)    History of femoropopliteal bypass    No significant past surgical history      MEDICATIONS  (STANDING):  amLODIPine   Tablet 10 milliGRAM(s) Oral daily  atorvastatin 10 milliGRAM(s) Oral <User Schedule>  bisacodyl Suppository 10 milliGRAM(s) Rectal daily  clarithromycin 500 milliGRAM(s) Oral two times a day  dextrose 5%. 1000 milliLiter(s) (50 mL/Hr) IV Continuous <Continuous>  dextrose 50% Injectable 12.5 Gram(s) IV Push once  dextrose 50% Injectable 25 Gram(s) IV Push once  dextrose 50% Injectable 25 Gram(s) IV Push once  ferrous    sulfate 325 milliGRAM(s) Oral daily  folic acid 1 milliGRAM(s) Oral daily  gabapentin 300 milliGRAM(s) Oral two times a day  influenza   Vaccine 0.5 milliLiter(s) IntraMuscular once  insulin lispro (ADMELOG) corrective regimen sliding scale   SubCutaneous Before meals and at bedtime  lactobacillus acidophilus 1 Tablet(s) Oral three times a day  metroNIDAZOLE    Tablet 500 milliGRAM(s) Oral three times a day  pantoprazole    Tablet 40 milliGRAM(s) Oral two times a day  polyethylene glycol 3350 17 Gram(s) Oral two times a day  senna 2 Tablet(s) Oral at bedtime  sodium chloride 0.45%. 1000 milliLiter(s) (75 mL/Hr) IV Continuous <Continuous>  tamsulosin 0.4 milliGRAM(s) Oral at bedtime    MEDICATIONS  (PRN):  dextrose 40% Gel 15 Gram(s) Oral once PRN Blood Glucose LESS THAN 70 milliGRAM(s)/deciliter  glucagon  Injectable 1 milliGRAM(s) IntraMuscular once PRN Glucose LESS THAN 70 milligrams/deciliter    Allergies    No Known Drug Allergies  Prefers chocolate ensure drinks (Unknown)    Intolerances      Vital Signs Last 24 Hrs  T(C): 36.4 (2020 05:00), Max: 36.7 (2020 15:48)  T(F): 97.5 (2020 05:00), Max: 98 (2020 15:48)  HR: 93 (2020 05:00) (92 - 102)  BP: 144/79 (2020 05:00) (132/79 - 146/84)  BP(mean): --  RR: 17 (2020 05:00) (17 - 17)  SpO2: 97% (2020 05:00) (94% - 97%)  I&O's Summary    2020 07:01  -  2020 07:00  --------------------------------------------------------  IN: 0 mL / OUT: 2550 mL / NET: -2550 mL    2020 07:01  -  2020 13:26  --------------------------------------------------------  IN: 480 mL / OUT: 600 mL / NET: -120 mL    PHYSICAL EXAM:  TELE: Not on tele  Constitutional: NAD, awake and alert, obese  HEENT: Moist Mucous Membranes, Anicteric  Pulmonary: Non-labored, breath sounds are clear bilaterally, No wheezing, rales or rhonchi  Cardiovascular: RRR, S1 and S2, +murmurs, no rubs, gallops or clicks  Gastrointestinal: Bowel Sounds present, soft, nontender.   Lymph: No peripheral edema. No lymphadenopathy.  Skin: No visible rashes or ulcers.  Psych:  Mood & affect appropriate  LABS: All Labs Reviewed:                        9.7    6.98  )-----------( 381      ( 2020 09:06 )             28.4                         9.5    6.09  )-----------( 338      ( 2020 09:20 )             27.3                         9.1    x     )-----------( x        ( 2020 16:48 )             26.2     2020 09:06    141    |  107    |  15     ----------------------------<  104    3.5     |  27     |  1.40   2020 09:20    140    |  106    |  14     ----------------------------<  126    3.5     |  28     |  1.40   2020 07:35    143    |  108    |  15     ----------------------------<  95     3.8     |  29     |  1.70     Ca    9.0        2020 09:06  Ca    8.7        2020 09:20  Ca    8.4        2020 07:35    TPro  7.8    /  Alb  3.6    /  TBili  0.8    /  DBili  x      /  AST  15     /  ALT  14     /  AlkPhos  106    2020 09:06  TPro  7.7    /  Alb  3.5    /  TBili  1.0    /  DBili  x      /  AST  15     /  ALT  15     /  AlkPhos  109    2020 09:20      EXAM:  ECHO TRANSTHORACIC COMP W DOPP      PROCEDURE DATE:  Oct  2 2019   .      INTERPRETATION:  REPORT:    TRANSTHORACIC ECHOCARDIOGRAM REPORT         Patient Name:   AIMEE SOLER Patient Location: [facility]  Medical Rec #:  [id number]         Accession #:      [id number]  Account #:                       Height:           68.9 in 175.0 cm  YOB: 1950        Weight:           154.3 lb 70.00 kg  Patient Age:    69 years         BSA:              1.85 m²  Patient Gender: M        BP:               127/75 mmHg       Date of Exam:        10/2/2019 1:35:11 PM  Sonographer:         Luiza Andujar  Referring Physician: Maxwell Benavides    Procedure:   Follow up or Limited Echocardiogram.  Indications: Dyspnea, unspecified -R06.00  Diagnosis:   Dyspnea, unspecified - R06.00         2D AND M-MODE MEASUREMENTS (normal ranges within parentheses):  Left Ventricle:                  Normal   Aorta/Left Atrium:            Normal  IVSd (2D):              1.22 cm (0.7-1.1) Aortic Root (2D):  2.61 cm   (2.4-3.7)  LVPWd (2D):             1.22 cm (0.7-1.1) Left Atrium (2D):  2.68 cm   (1.9-4.0)  LVIDd (2D):             3.69 cm (3.4-5.7)  LVIDs (2D):             2.84 cm  LV FS (2D):             23.2 %   (>25%)  Relative Wall Thickness  0.66    (<0.42)    LV DIASTOLIC FUNCTION:  MV Peak E: 0.82 m/s e', MV Annika: 0.06 m/s  MV Peak A: 0.96 m/s E/e' Ratio: 13.09  E/A Ratio: 0.86    SPECTRAL DOPPLER ANALYSIS (where applicable):  Aortic Valve: AoV Max Cornelius: 1.75 m/s AoV Peak P.3mmHg AoV Mean P.0 mmHg    LVOT Vmax: 1.21 m/s LVOT VTI: 0.235 m LVOT Diameter: 2.02 cm    AoV Area, Vmax: 2.21 cm² AoV Area, VTI: 2.39 cm² AoV Area, Vmn: 2.28 cm²  Ao VTI: 0.316  Tricuspid Valve and PA/RV Systolic Pressure: TR Max Velocity: 2.30 m/s RA   Pressure: 3 mmHg RVSP/PASP: 24.2 mmHg       PHYSICIAN INTERPRETATION:  Left Ventricle: The left ventricular internal cavity size is normal. Left   ventricular wall thickness is normal.  Global LV systolic function was normal. Left ventricular ejection   fraction, by visual estimation, is 60 to 65%. Spectral Doppler shows   impaired relaxation pattern of left ventricular myocardial filling (Grade   I diastolic dysfunction).  Right Ventricle: Normal right ventricular size and function.  Left Atrium: The left atrium is normal in size.  Right Atrium: The right atrium is normal in size.  Pericardium: There is no evidence of pericardial effusion.  Mitral Valve: Structurally normal mitral valve, with normal leaflet   excursion. Trace mitral valve regurgitation is seen.  Tricuspid Valve: Structurally normal tricuspid valve, with normal leaflet   excursion. Mild tricuspid regurgitation is visualized.  Aortic Valve: Normal trileaflet aortic valve with normal opening. No   evidence of aortic valve regurgitation is seen.  Pulmonic Valve: Structurally normal pulmonic valve, with normal leaflet   excursion. Trace pulmonic valve regurgitation.  Aorta: The aortic root is normal in size and structure.  Pulmonary Artery: The main pulmonary artery is normal in size.  Venous: The inferior vena cava is normal. The inferior vena cava was   normal sized, with respiratory size variation greater than 50%. The   inferior vena cava and the hepatic vein show a normal flow pattern.     Summary:   1. Left ventricular ejection fraction, by visual estimation, is 60 to   65%.   2. Normal global left ventricular systolic function.   3. Normal left ventricular internal cavity size.   4. Spectral Doppler shows impaired relaxation pattern of left   ventricular myocardial filling (Grade I diastolic dysfunction).   5. There is no evidence of pericardial effusion.    Jing Clark MD Electronically signed on 10/2/2019 at 3:45:53 PM     *** Final ***    JING CLARK M.D.  This document has been electronically signed. Oct  2 2019  1:35PM        EXAM:  ECHO TRANSTHORACIC COMP W DOPP      PROCEDURE DATE:  Oct  2 2019   .      INTERPRETATION:  REPORT:    TRANSTHORACIC ECHOCARDIOGRAM REPORT         Patient Name:   AIMEE SOLER Patient Location: [facility] Rec #:  [id number]         Accession #:      [id number]  Account #:                       Height:           68.9 in 175.0 cm  YOB: 1950        Weight:           154.3 lb 70.00 kg  Patient Age:    69 years         BSA:              1.85 m²  Patient Gender: M        BP:               127/75 mmHg       Date of Exam:        10/2/2019 1:35:11 PM  Sonographer:         Luiza Andujar  Referring Physician: Maxwell Benavides    Procedure:   Follow up or Limited Echocardiogram.  Indications: Dyspnea, unspecified -R06.00  Diagnosis:   Dyspnea, unspecified - R06.00         2D AND M-MODE MEASUREMENTS (normal ranges within parentheses):  Left Ventricle:                  Normal   Aorta/Left Atrium:            Normal  IVSd (2D):              1.22 cm (0.7-1.1) Aortic Root (2D):  2.61 cm   (2.4-3.7)  LVPWd (2D):             1.22 cm (0.7-1.1) Left Atrium (2D):  2.68 cm   (1.9-4.0)  LVIDd (2D):             3.69 cm (3.4-5.7)  LVIDs (2D):             2.84 cm  LV FS (2D):             23.2 %   (>25%)  Relative Wall Thickness  0.66    (<0.42)    LV DIASTOLIC FUNCTION:  MV Peak E: 0.82 m/s e', MV Annika: 0.06 m/s  MV Peak A: 0.96 m/s E/e' Ratio: 13.09  E/A Ratio: 0.86    SPECTRAL DOPPLER ANALYSIS (where applicable):  Aortic Valve: AoV Max Cornelius: 1.75 m/s AoV Peak P.3mmHg AoV Mean P.0 mmHg    LVOT Vmax: 1.21 m/s LVOT VTI: 0.235 m LVOT Diameter: 2.02 cm    AoV Area, Vmax: 2.21 cm² AoV Area, VTI: 2.39 cm² AoV Area, Vmn: 2.28 cm²  Ao VTI: 0.316  Tricuspid Valve and PA/RV Systolic Pressure: TR Max Velocity: 2.30 m/s RA   Pressure: 3 mmHg RVSP/PASP: 24.2 mmHg       PHYSICIAN INTERPRETATION:  Left Ventricle: The left ventricular internal cavity size is normal. Left   ventricular wall thickness is normal.  Global LV systolic function was normal. Left ventricular ejection   fraction, by visual estimation, is 60 to 65%. Spectral Doppler shows   impaired relaxation pattern of left ventricular myocardial filling (Grade   I diastolic dysfunction).  Right Ventricle: Normal right ventricular size and function.  Left Atrium: The left atrium is normal in size.  Right Atrium: The right atrium is normal in size.  Pericardium: There is no evidence of pericardial effusion.  Mitral Valve: Structurally normal mitral valve, with normal leaflet   excursion. Trace mitral valve regurgitation is seen.  Tricuspid Valve: Structurally normal tricuspid valve, with normal leaflet   excursion. Mild tricuspid regurgitation is visualized.  Aortic Valve: Normal trileaflet aortic valve with normal opening. No   evidence of aortic valve regurgitation is seen.  Pulmonic Valve: Structurally normal pulmonic valve, with normal leaflet   excursion. Trace pulmonic valve regurgitation.  Aorta: The aortic root is normal in size and structure.  Pulmonary Artery: The main pulmonary artery is normal in size.  Venous: The inferior vena cava is normal. The inferior vena cava was   normal sized, with respiratory size variation greater than 50%. The   inferior vena cava and the hepatic vein show a normal flow pattern.     Summary:   1. Left ventricular ejection fraction, by visual estimation, is 60 to   65%.   2. Normal global left ventricular systolic function.   3. Normal left ventricular internal cavity size.   4. Spectral Doppler shows impaired relaxation pattern of left   ventricular myocardial filling (Grade I diastolic dysfunction).   5. There is no evidence of pericardial effusion.    MD Shad Electronically signed on 10/2/2019 at 3:45:53 PM     *** Final ***  JING CLARK M.D.  This document has been electronically signed. Oct  2 2019  1:35PM        Ventricular Rate 90 BPM    Atrial Rate 90 BPM    P-R Interval 144 ms    QRS Duration 96 ms    Q-T Interval 362 ms    QTC Calculation(Bazett) 442 ms    P Axis 64 degrees    R Axis -57 degrees    T Axis 74 degrees    Diagnosis Line Normal sinus rhythm  Left axis deviation  Abnormal ECG  No previous ECGs available  Confirmed by anjali Martínez ([id number]) on 10/29/2020 3:51:30 PM

## 2020-11-06 NOTE — PROGRESS NOTE ADULT - PROBLEM SELECTOR PLAN 1
Patient with 5 days melena, palpitations, fatigue on admission; now endorsing decreased melena.  -FOBT +, H/H 9.5/28.5 in ED,  -Hb trending down over hospital course--> 7.4 11/4   -s/p 1 uPRBC this admission  -Hb 9.5 today  -colonoscopy unremarkable, w/ no pathologic alterations  -Pt to follow up for outpatient capsule study  -Hold Xarelto, aspirin in setting of GIB, ( hx of RLE DVT) resume prior to DC  -oral Protonix 40mg PO BID  -consistent carb diet  -EGD with no active bleeding, bx consistent with gastritis, H pylori.  -Triple therapy: (clarithromycin, metronidazole, Oral protonix) started on 11/3. complete 14 day course.  -GI Dr. DAVINA El following - recs appreciated. Patient with 5 days melena, palpitations, fatigue on admission; now endorsing melena s/p benign colonoscopy. ?hold iron  -FOBT +, H/H 9.5/28.5 in ED,  -Hb trending down over hospital course--> 7.4 11/4. now rising s/p 1 u PRBC  -Stable. no obvious signs/sx of bleeding  -Hb 9.7 today  -colonoscopy unremarkable, w/ no pathologic alterations  -Pt to follow up for outpatient capsule study  -Hold Xarelto, aspirin in setting of melena complaint, ( hx of RLE DVT) resume prior to DC  -oral Protonix 40mg PO BID  -consistent carb diet  -EGD with no active bleeding, bx consistent with gastritis, H pylori.  -Triple therapy: (clarithromycin, metronidazole, Oral protonix) started on 11/3. complete 14 day course.  -GI Dr. DAVINA El following - recs appreciated.

## 2020-11-06 NOTE — PROGRESS NOTE ADULT - PROBLEM SELECTOR PLAN 4
Pt CMP revealed NA of 147  -Resolved  -s/p D5 at 60 cc/hr for 10 hours.   -c/w half NS infusion (12 hours) for High NA; SERENITY vs ATN  -monitor renal indices, lytes in CMP  -strict I&Os Pt CMP revealed NA of 147  -Resolved  -s/p D5 at 60 cc/hr for 10 hours.   -c/w half NS infusion for High NA; SERENITY vs ATN  -monitor renal indices, lytes in CMP  -strict I&Os

## 2020-11-06 NOTE — PROGRESS NOTE ADULT - PROBLEM SELECTOR PLAN 1
sp neg egd/colon  +hpylori on path- started on tx regimen (to complete 14d course)  reported melena per pt; HD stable; am labs pending  monitor cbc, transfuse prn  diet as tolerated  cont ppi ppx  prn bowel regimen  monitor abdominal exam/gi function  rec holding po iron while inpt to better assess stool color for evidence of gib  to follow; will need close op gi f/u w capsule study upon dc

## 2020-11-06 NOTE — PROGRESS NOTE ADULT - SUBJECTIVE AND OBJECTIVE BOX
INTERVAL HPI/OVERNIGHT EVENTS:  pt seen and examined  interim events noted  reports nausea after abx yesterday and black stool yesterday and this am but unwitnessed as per nursing  denies vomiting and abd pain  alisia diet    MEDICATIONS  (STANDING):  amLODIPine   Tablet 10 milliGRAM(s) Oral daily  atorvastatin 10 milliGRAM(s) Oral <User Schedule>  clarithromycin 500 milliGRAM(s) Oral two times a day  dextrose 5%. 1000 milliLiter(s) (50 mL/Hr) IV Continuous <Continuous>  dextrose 50% Injectable 12.5 Gram(s) IV Push once  dextrose 50% Injectable 25 Gram(s) IV Push once  dextrose 50% Injectable 25 Gram(s) IV Push once  ferrous    sulfate 325 milliGRAM(s) Oral daily  folic acid 1 milliGRAM(s) Oral daily  gabapentin 300 milliGRAM(s) Oral two times a day  influenza   Vaccine 0.5 milliLiter(s) IntraMuscular once  insulin lispro (ADMELOG) corrective regimen sliding scale   SubCutaneous Before meals and at bedtime  lactobacillus acidophilus 1 Tablet(s) Oral three times a day  metroNIDAZOLE    Tablet 500 milliGRAM(s) Oral three times a day  pantoprazole    Tablet 40 milliGRAM(s) Oral two times a day  sodium chloride 0.45%. 1000 milliLiter(s) (75 mL/Hr) IV Continuous <Continuous>  tamsulosin 0.4 milliGRAM(s) Oral at bedtime    MEDICATIONS  (PRN):  dextrose 40% Gel 15 Gram(s) Oral once PRN Blood Glucose LESS THAN 70 milliGRAM(s)/deciliter  glucagon  Injectable 1 milliGRAM(s) IntraMuscular once PRN Glucose LESS THAN 70 milligrams/deciliter  polyethylene glycol 3350 17 Gram(s) Oral daily PRN Constipation      Allergies    No Known Drug Allergies  Prefers chocolate ensure drinks (Unknown)    Intolerances        Review of Systems:    General:  No wt loss, fevers, chills, night sweats, fatigue   Eyes:  Good vision, no reported pain  ENT:  No sore throat, pain, runny nose, dysphagia  CV:  No pain, palpitations, hypo/hypertension  Resp:  No dyspnea, cough, tachypnea, wheezing  GI:  see above   :  No pain, bleeding, incontinence, nocturia  Muscle:  No pain, weakness  Neuro:  No weakness, tingling, memory problems  Psych:  No fatigue, insomnia, mood problems, depression  Endocrine:  No polyuria, polydypsia, cold/heat intolerance  Heme:  No petechiae, ecchymosis, easy bruisability  Skin:  No rash, tattoos, scars, edema      Vital Signs Last 24 Hrs  T(C): 36.9 (04 Nov 2020 07:26), Max: 37.2 (04 Nov 2020 00:35)  T(F): 98.5 (04 Nov 2020 07:26), Max: 98.9 (04 Nov 2020 00:35)  HR: 84 (04 Nov 2020 07:26) (64 - 98)  BP: 119/56 (04 Nov 2020 07:26) (113/68 - 130/72)  BP(mean): --  RR: 18 (04 Nov 2020 07:26) (18 - 18)  SpO2: 95% (04 Nov 2020 07:26) (93% - 97%)    PHYSICAL EXAM:      Constitutional: lying in bed  HEENT: ncat  Gastrointestinal: soft nt mild dt  Extremities: No peripheral edema  Vascular: + peripheral pulses  Neurological: Awake alert appropriate        LABS:                        7.4    5.97  )-----------( 289      ( 04 Nov 2020 07:35 )             22.1     11-04    143  |  108  |  15  ----------------------------<  95  3.8   |  29  |  1.70<H>    Ca    8.4<L>      04 Nov 2020 07:35            RADIOLOGY & ADDITIONAL TESTS:

## 2020-11-06 NOTE — PROGRESS NOTE ADULT - SUBJECTIVE AND OBJECTIVE BOX
INCOMPLETE  Patient is a 70y old  Male who presents with a chief complaint of GI bleed (06 Nov 2020 09:04)      INTERVAL HPI/OVERNIGHT EVENTS: Patient seen and examined at bedside. No overnight events occurred. Patient has no complaints at this time. Denies fevers, chills, headache, lightheadedness, chest pain, dyspnea, abdominal pain, n/v/d/c.    MEDICATIONS  (STANDING):  amLODIPine   Tablet 10 milliGRAM(s) Oral daily  atorvastatin 10 milliGRAM(s) Oral <User Schedule>  bisacodyl Suppository 10 milliGRAM(s) Rectal daily  clarithromycin 500 milliGRAM(s) Oral two times a day  dextrose 5%. 1000 milliLiter(s) (50 mL/Hr) IV Continuous <Continuous>  dextrose 50% Injectable 12.5 Gram(s) IV Push once  dextrose 50% Injectable 25 Gram(s) IV Push once  dextrose 50% Injectable 25 Gram(s) IV Push once  ferrous    sulfate 325 milliGRAM(s) Oral daily  folic acid 1 milliGRAM(s) Oral daily  gabapentin 300 milliGRAM(s) Oral two times a day  influenza   Vaccine 0.5 milliLiter(s) IntraMuscular once  insulin lispro (ADMELOG) corrective regimen sliding scale   SubCutaneous Before meals and at bedtime  lactobacillus acidophilus 1 Tablet(s) Oral three times a day  metroNIDAZOLE    Tablet 500 milliGRAM(s) Oral three times a day  pantoprazole    Tablet 40 milliGRAM(s) Oral two times a day  polyethylene glycol 3350 17 Gram(s) Oral two times a day  senna 2 Tablet(s) Oral at bedtime  sodium chloride 0.45%. 1000 milliLiter(s) (75 mL/Hr) IV Continuous <Continuous>  tamsulosin 0.4 milliGRAM(s) Oral at bedtime    MEDICATIONS  (PRN):  dextrose 40% Gel 15 Gram(s) Oral once PRN Blood Glucose LESS THAN 70 milliGRAM(s)/deciliter  glucagon  Injectable 1 milliGRAM(s) IntraMuscular once PRN Glucose LESS THAN 70 milligrams/deciliter      Allergies    No Known Drug Allergies  Prefers chocolate ensure drinks (Unknown)    Intolerances        REVIEW OF SYSTEMS:  CONSTITUTIONAL: No fever or chills  HEENT:  No headache, no sore throat  RESPIRATORY: No cough, wheezing, or shortness of breath  CARDIOVASCULAR: No chest pain, palpitations  GASTROINTESTINAL: No abd pain, nausea, vomiting, or diarrhea  GENITOURINARY: No dysuria, frequency, or hematuria  NEUROLOGICAL: no focal weakness or dizziness. Neuropathic pain RLE.  MUSCULOSKELETAL: LUE hand pain now minimal     Vital Signs Last 24 Hrs  T(C): 36.4 (06 Nov 2020 05:00), Max: 36.7 (05 Nov 2020 15:48)  T(F): 97.5 (06 Nov 2020 05:00), Max: 98 (05 Nov 2020 15:48)  HR: 93 (06 Nov 2020 05:00) (92 - 102)  BP: 144/79 (06 Nov 2020 05:00) (132/79 - 146/84)  BP(mean): --  RR: 17 (06 Nov 2020 05:00) (17 - 17)  SpO2: 97% (06 Nov 2020 05:00) (94% - 97%)    PHYSICAL EXAM:  GENERAL: NAD  HEENT:  anicteric, moist mucous membranes  CHEST/LUNG:  CTA b/l, no rales, wheezes, or rhonchi  HEART:  RRR, S1, S2  ABDOMEN:  BS+, soft, nontender, nondistended  EXTREMITIES: no edema, cyanosis, or calf tenderness. RLE healed skin grafts present  NEURO: A&Ox3, nonfocal, CN II-XII grossly intact, sensation intact  NERVOUS SYSTEM: answers questions and follows commands appropriately    LABS:                        9.7    6.98  )-----------( 381      ( 06 Nov 2020 09:06 )             28.4     CBC Full  -  ( 06 Nov 2020 09:06 )  WBC Count : 6.98 K/uL  Hemoglobin : 9.7 g/dL  Hematocrit : 28.4 %  Platelet Count - Automated : 381 K/uL  Mean Cell Volume : 85.5 fl  Mean Cell Hemoglobin : 29.2 pg  Mean Cell Hemoglobin Concentration : 34.2 gm/dL  Auto Neutrophil # : x  Auto Lymphocyte # : x  Auto Monocyte # : x  Auto Eosinophil # : x  Auto Basophil # : x  Auto Neutrophil % : x  Auto Lymphocyte % : x  Auto Monocyte % : x  Auto Eosinophil % : x  Auto Basophil % : x    06 Nov 2020 09:06    141    |  107    |  15     ----------------------------<  104    3.5     |  27     |  1.40     Ca    9.0        06 Nov 2020 09:06    TPro  7.8    /  Alb  3.6    /  TBili  0.8    /  DBili  x      /  AST  15     /  ALT  14     /  AlkPhos  106    06 Nov 2020 09:06        CAPILLARY BLOOD GLUCOSE      POCT Blood Glucose.: 106 mg/dL (06 Nov 2020 12:24)  POCT Blood Glucose.: 114 mg/dL (06 Nov 2020 08:00)  POCT Blood Glucose.: 123 mg/dL (05 Nov 2020 22:06)  POCT Blood Glucose.: 122 mg/dL (05 Nov 2020 16:47)          RADIOLOGY & ADDITIONAL TESTS:    Personally reviewed.     Consultant(s) Notes Reviewed:  [x] YES  [ ] NO     INCOMPLETE  Patient is a 70y old  Male who presents with a chief complaint of GI bleed (06 Nov 2020 09:04)      INTERVAL HPI/OVERNIGHT EVENTS: Patient seen and examined at bedside. Night team received c/o t c/o pain to right leg which he describes as pins and needles. 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 RLE US x 2 revealed no acute DVT, evidence of chronic DVT. Pt endorsing melena. Denies fevers, chills, headache, lightheadedness, chest pain, dyspnea, abdominal pain, n/v/d.    MEDICATIONS  (STANDING):  amLODIPine   Tablet 10 milliGRAM(s) Oral daily  atorvastatin 10 milliGRAM(s) Oral <User Schedule>  bisacodyl Suppository 10 milliGRAM(s) Rectal daily  clarithromycin 500 milliGRAM(s) Oral two times a day  dextrose 5%. 1000 milliLiter(s) (50 mL/Hr) IV Continuous <Continuous>  dextrose 50% Injectable 12.5 Gram(s) IV Push once  dextrose 50% Injectable 25 Gram(s) IV Push once  dextrose 50% Injectable 25 Gram(s) IV Push once  ferrous    sulfate 325 milliGRAM(s) Oral daily  folic acid 1 milliGRAM(s) Oral daily  gabapentin 300 milliGRAM(s) Oral two times a day  influenza   Vaccine 0.5 milliLiter(s) IntraMuscular once  insulin lispro (ADMELOG) corrective regimen sliding scale   SubCutaneous Before meals and at bedtime  lactobacillus acidophilus 1 Tablet(s) Oral three times a day  metroNIDAZOLE    Tablet 500 milliGRAM(s) Oral three times a day  pantoprazole    Tablet 40 milliGRAM(s) Oral two times a day  polyethylene glycol 3350 17 Gram(s) Oral two times a day  senna 2 Tablet(s) Oral at bedtime  sodium chloride 0.45%. 1000 milliLiter(s) (75 mL/Hr) IV Continuous <Continuous>  tamsulosin 0.4 milliGRAM(s) Oral at bedtime    MEDICATIONS  (PRN):  dextrose 40% Gel 15 Gram(s) Oral once PRN Blood Glucose LESS THAN 70 milliGRAM(s)/deciliter  glucagon  Injectable 1 milliGRAM(s) IntraMuscular once PRN Glucose LESS THAN 70 milligrams/deciliter      Allergies    No Known Drug Allergies  Prefers chocolate ensure drinks (Unknown)    Intolerances        REVIEW OF SYSTEMS:  CONSTITUTIONAL: No fever or chills  HEENT:  No headache, no sore throat  RESPIRATORY: No cough, wheezing, or shortness of breath  CARDIOVASCULAR: No chest pain, palpitations  GASTROINTESTINAL: No abd pain, nausea, vomiting, or diarrhea. +melena  GENITOURINARY: No dysuria, frequency, or hematuria  NEUROLOGICAL: no focal weakness or dizziness. Neuropathic pain RLE.  MUSCULOSKELETAL: LUE hand pain now minimal     Vital Signs Last 24 Hrs  T(C): 36.4 (06 Nov 2020 05:00), Max: 36.7 (05 Nov 2020 15:48)  T(F): 97.5 (06 Nov 2020 05:00), Max: 98 (05 Nov 2020 15:48)  HR: 93 (06 Nov 2020 05:00) (92 - 102)  BP: 144/79 (06 Nov 2020 05:00) (132/79 - 146/84)  BP(mean): --  RR: 17 (06 Nov 2020 05:00) (17 - 17)  SpO2: 97% (06 Nov 2020 05:00) (94% - 97%)    PHYSICAL EXAM:  GENERAL: NAD  HEENT:  anicteric, moist mucous membranes  CHEST/LUNG:  CTA b/l, no rales, wheezes, or rhonchi  HEART:  RRR, S1, S2  ABDOMEN:  BS+, soft, nontender, nondistended  EXTREMITIES: no edema, cyanosis, or calf tenderness. RLE healed skin grafts present  NEURO: A&Ox3, nonfocal, CN II-XII grossly intact, sensation intact  NERVOUS SYSTEM: answers questions and follows commands appropriately    LABS:                        9.7    6.98  )-----------( 381      ( 06 Nov 2020 09:06 )             28.4     CBC Full  -  ( 06 Nov 2020 09:06 )  WBC Count : 6.98 K/uL  Hemoglobin : 9.7 g/dL  Hematocrit : 28.4 %  Platelet Count - Automated : 381 K/uL  Mean Cell Volume : 85.5 fl  Mean Cell Hemoglobin : 29.2 pg  Mean Cell Hemoglobin Concentration : 34.2 gm/dL  Auto Neutrophil # : x  Auto Lymphocyte # : x  Auto Monocyte # : x  Auto Eosinophil # : x  Auto Basophil # : x  Auto Neutrophil % : x  Auto Lymphocyte % : x  Auto Monocyte % : x  Auto Eosinophil % : x  Auto Basophil % : x    06 Nov 2020 09:06    141    |  107    |  15     ----------------------------<  104    3.5     |  27     |  1.40     Ca    9.0        06 Nov 2020 09:06    TPro  7.8    /  Alb  3.6    /  TBili  0.8    /  DBili  x      /  AST  15     /  ALT  14     /  AlkPhos  106    06 Nov 2020 09:06        CAPILLARY BLOOD GLUCOSE      POCT Blood Glucose.: 106 mg/dL (06 Nov 2020 12:24)  POCT Blood Glucose.: 114 mg/dL (06 Nov 2020 08:00)  POCT Blood Glucose.: 123 mg/dL (05 Nov 2020 22:06)  POCT Blood Glucose.: 122 mg/dL (05 Nov 2020 16:47)          RADIOLOGY & ADDITIONAL TESTS:    Personally reviewed.     Consultant(s) Notes Reviewed:  [x] YES  [ ] NO     Patient is a 70y old  Male who presents with a chief complaint of GI bleed (06 Nov 2020 09:04)    INTERVAL HPI/OVERNIGHT EVENTS: Patient seen and examined at bedside. Night team received c/o t c/o pain to right leg which he describes as pins and needles. 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 RLE US x 2 revealed no acute DVT, evidence of chronic DVT. Pt endorsing unwitnessed melena. Denies fevers, chills, headache, lightheadedness, chest pain, dyspnea, abdominal pain, n/v/d.    MEDICATIONS  (STANDING):  amLODIPine   Tablet 10 milliGRAM(s) Oral daily  atorvastatin 10 milliGRAM(s) Oral <User Schedule>  bisacodyl Suppository 10 milliGRAM(s) Rectal daily  clarithromycin 500 milliGRAM(s) Oral two times a day  dextrose 5%. 1000 milliLiter(s) (50 mL/Hr) IV Continuous <Continuous>  dextrose 50% Injectable 12.5 Gram(s) IV Push once  dextrose 50% Injectable 25 Gram(s) IV Push once  dextrose 50% Injectable 25 Gram(s) IV Push once  ferrous    sulfate 325 milliGRAM(s) Oral daily  folic acid 1 milliGRAM(s) Oral daily  gabapentin 300 milliGRAM(s) Oral two times a day  influenza   Vaccine 0.5 milliLiter(s) IntraMuscular once  insulin lispro (ADMELOG) corrective regimen sliding scale   SubCutaneous Before meals and at bedtime  lactobacillus acidophilus 1 Tablet(s) Oral three times a day  metroNIDAZOLE    Tablet 500 milliGRAM(s) Oral three times a day  pantoprazole    Tablet 40 milliGRAM(s) Oral two times a day  polyethylene glycol 3350 17 Gram(s) Oral two times a day  senna 2 Tablet(s) Oral at bedtime  sodium chloride 0.45%. 1000 milliLiter(s) (75 mL/Hr) IV Continuous <Continuous>  tamsulosin 0.4 milliGRAM(s) Oral at bedtime    MEDICATIONS  (PRN):  dextrose 40% Gel 15 Gram(s) Oral once PRN Blood Glucose LESS THAN 70 milliGRAM(s)/deciliter  glucagon  Injectable 1 milliGRAM(s) IntraMuscular once PRN Glucose LESS THAN 70 milligrams/deciliter      Allergies    No Known Drug Allergies  Prefers chocolate ensure drinks (Unknown)    Intolerances        REVIEW OF SYSTEMS:  CONSTITUTIONAL: No fever or chills  HEENT:  No headache, no sore throat  RESPIRATORY: No cough, wheezing, or shortness of breath  CARDIOVASCULAR: No chest pain, palpitations  GASTROINTESTINAL: No abd pain, nausea, vomiting, or diarrhea. +melena  GENITOURINARY: No dysuria, frequency, or hematuria  NEUROLOGICAL: no focal weakness or dizziness. Neuropathic pain RLE.  MUSCULOSKELETAL: LUE hand pain now minimal     Vital Signs Last 24 Hrs  T(C): 36.4 (06 Nov 2020 05:00), Max: 36.7 (05 Nov 2020 15:48)  T(F): 97.5 (06 Nov 2020 05:00), Max: 98 (05 Nov 2020 15:48)  HR: 93 (06 Nov 2020 05:00) (92 - 102)  BP: 144/79 (06 Nov 2020 05:00) (132/79 - 146/84)  BP(mean): --  RR: 17 (06 Nov 2020 05:00) (17 - 17)  SpO2: 97% (06 Nov 2020 05:00) (94% - 97%)    PHYSICAL EXAM:  GENERAL: NAD  HEENT:  anicteric, moist mucous membranes  CHEST/LUNG:  CTA b/l, no rales, wheezes, or rhonchi  HEART:  RRR, S1, S2  ABDOMEN:  BS+, soft, nontender, nondistended  EXTREMITIES: no edema, cyanosis, or calf tenderness. RLE healed skin grafts present  MSK: reproducible pain RLE, described as pins and needles. strength 5/5. full ROM.  NEURO: A&Ox3, nonfocal, CN II-XII grossly intact, sensation intact.  NERVOUS SYSTEM: answers questions and follows commands appropriately    LABS:                        9.7    6.98  )-----------( 381      ( 06 Nov 2020 09:06 )             28.4     CBC Full  -  ( 06 Nov 2020 09:06 )  WBC Count : 6.98 K/uL  Hemoglobin : 9.7 g/dL  Hematocrit : 28.4 %  Platelet Count - Automated : 381 K/uL  Mean Cell Volume : 85.5 fl  Mean Cell Hemoglobin : 29.2 pg  Mean Cell Hemoglobin Concentration : 34.2 gm/dL  Auto Neutrophil # : x  Auto Lymphocyte # : x  Auto Monocyte # : x  Auto Eosinophil # : x  Auto Basophil # : x  Auto Neutrophil % : x  Auto Lymphocyte % : x  Auto Monocyte % : x  Auto Eosinophil % : x  Auto Basophil % : x    06 Nov 2020 09:06    141    |  107    |  15     ----------------------------<  104    3.5     |  27     |  1.40     Ca    9.0        06 Nov 2020 09:06    TPro  7.8    /  Alb  3.6    /  TBili  0.8    /  DBili  x      /  AST  15     /  ALT  14     /  AlkPhos  106    06 Nov 2020 09:06        CAPILLARY BLOOD GLUCOSE      POCT Blood Glucose.: 106 mg/dL (06 Nov 2020 12:24)  POCT Blood Glucose.: 114 mg/dL (06 Nov 2020 08:00)  POCT Blood Glucose.: 123 mg/dL (05 Nov 2020 22:06)  POCT Blood Glucose.: 122 mg/dL (05 Nov 2020 16:47)          RADIOLOGY & ADDITIONAL TESTS:  < from: US Duplex Venous Lower Ext Complete, Bilateral (11.06.20 @ 05:00) >  1. No evidence of acute DVT in either lower extremity.  2. Stigmata of chronic DVT in the distal right superficial femoral vein and popliteal vein, unchanged.      Personally reviewed.     Consultant(s) Notes Reviewed:  [x] YES  [ ] NO

## 2020-11-06 NOTE — PROGRESS NOTE ADULT - ASSESSMENT
[facility]  Office: [address], DO, Corbypa Roche, DO, Theresa Emery, DO, Tangela Osborne Blood, DO, Celena Ariza MD, Gabriel Amos MD, Josie Moseley MD, Darryl Collins MD, Matt Buenrostro MD, Perfecto Baumann MD, Alexandre Savage MD, Tariq Son MD, Emily Salinas MD, Wse Quarles DO, Judy Storm MD, Calixto Valentin MD, Johnny Crabtree DO, Jt Acevedo MD,  Gaviota Ramos DO, Brian Street MD, Galo Rey MD, Raymundo Marquez, [facility], [facility] ([place], [place])duane, [facility], Milton Berry, CNP, Flynn Merchant, CNS, Sheldon Richardson, CNP, Vivi Solorio, CNP, Yoni Rizzo, CNP, Alia Young, CNP, Fareed De Luna, CNP, El Chance PA-C, Yoel Myles, [facility], Mari Moseley, CNP, Lieutenant Rios, CNP, Nuria Velásquez, CNP, Kolby Louie, CNP, [facility]    Progress Note    11/6/2020    12:11 PM    Name:   Oren Woodward  MRN:     [id number]     Acct:      [de-identified]   Room:   [address] [place] [address] Day:  2  Admit Date:  11/4/2020 11:46 AM    PCP:   Marce Butts MD  Code Status:  Full Code    Subjective:     C/C:   Chief Complaint   Patient presents with    Altered Mental Status     Interval History Status: not changed. No issues overnight  Remains altered  Ex wife / caregiver at bedside  Patient more alert/responsive  Still altered, responding inappropriately  Urine culture with mixed organisms     Brief History:     66 M who presented with AMS. Currently altered, unable to provide history. Per chart review presented with worsening confusion. Has chronic indwelling wells, follows with urology. In ER UA demonstrating UTI. Review of Systems:     Unable to obtain - altered     Medications: Allergies:     Allergies   Allergen Reactions    No Known Allergies        Current Meds:   Scheduled Meds:    ciprofloxacin  400 mg Intravenous Q12H    sodium chloride flush  10 mL Intravenous 2 times per day    enoxaparin  40 mg Subcutaneous BID    aspirin  81 mg Oral Daily    tamsulosin  0.4 mg Oral Daily    miconazole   Topical BID    pantoprazole  40 mg Oral QAM AC    venlafaxine  150 mg Oral Daily    bumetanide  2 mg Oral BID    bethanechol  10 mg Oral TID    DULoxetine  60 mg Oral QAM    levothyroxine  150 mcg Oral Daily    metFORMIN  500 mg Oral BID    nadolol  40 mg Oral Daily    pregabalin  150 mg Oral BID    alogliptin  25 mg Oral Daily    rifaximin  550 mg Oral BID    oxybutynin  5 mg Oral BID    naloxegol  25 mg Oral QAM    spironolactone  75 mg Oral BID    potassium chloride  20 mEq Oral Daily with breakfast    insulin glargine  80 Units Subcutaneous BID    insulin lispro  0-6 Units Subcutaneous TID WC    insulin lispro  0-3 Units Subcutaneous Nightly     Continuous Infusions:    sodium chloride 75 mL/hr at 11/05/20 2205    dextrose       PRN Meds: metoprolol, potassium chloride, LORazepam, sodium chloride flush, acetaminophen **OR** acetaminophen, polyethylene glycol, promethazine **OR** ondansetron, nicotine, albuterol sulfate HFA, clonazePAM, oxyCODONE-acetaminophen, hydrOXYzine, ibuprofen, glucose, dextrose, glucagon (rDNA), dextrose, albuterol    Data:     Past Medical History:   has a past medical history of Anxiety and depression, Back pain, chronic, BPH (benign prostatic hyperplasia), CHF (congestive heart failure) ([facility] Cirrhosis ([facility] Diabetes mellitus ([facility] GERD (gastroesophageal reflux disease), H/O cardiac catheterization, Hepatitis, Hiatal hernia, History of alcohol abuse, Hyperlipidemia, Hypertension, IBS (irritable bowel syndrome), Morbid obesity ([facility] Neuropathy, On home oxygen therapy, Pancreatitis, Primary osteoarthritis of right knee, Sleep apnea, Thyroid disease, and Urinary bladder neurogenic dysfunction. Social History:   reports that he has never smoked. He has never used smokeless tobacco. He reports that he does not drink alcohol or use drugs.      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    -daily dressings change: pressure dressing xeroform and ABD pads  -donor site change dressing on POD8 (7/10)  -Stop Abx History   Adopted: Yes       Vitals:  BP (!) 167/60   Pulse 75   Temp 98.2 °F (36.8 °C) (Axillary)   Resp 20   Ht 5' 11\" (1.803 m)   Wt (!) 400 lb (181.4 kg)   SpO2 97%   BMI 55.79 kg/m²   Temp (24hrs), Av.8 °F (37.1 °C), Min:98.2 °F (36.8 °C), Max:99.3 °F (37.4 °C)    Recent Labs     20  1701 20  2101 20  2253 20  0744   POCGLU 195* 160* 172* 172*       I/O (24Hr):     Intake/Output Summary (Last 24 hours) at 2020 1211  Last data filed at 2020 7171  Gross per 24 hour   Intake 3268 ml   Output 975 ml   Net 2293 ml       Labs:  Hematology:  Recent Labs     20  0620  0607   WBC 10.0 9.3 9.7   RBC 4.88 4.63 4.39   HGB 12.3* 11.7* 11.2*   HCT 41.3 40.6* 38.2*   MCV 84.6 87.7 87.0   MCH 25.2 25.3* 25.5   MCHC 29.8 28.8* 29.3   RDW 15.0* 15.1* 15.0*    133 133*   MPV 10.9 NOT REPORTED 10.9     Chemistry:  Recent Labs     20  1239 20  0620  0607    141 140 140   K 3.3* 3.2* 3.5* 3.5*   CL 92* 95* 97* 98   CO2 37* 36* 34* 36*   GLUCOSE 165* 182* 164* 189*   BUN 13 12 10 10   CREATININE 0.71 0.70 0.74 0.63*   MG  --  1.6  --  1.6   ANIONGAP 9 10 9 6*   LABGLOM >60 >60 >60 >60   GFRAA >60 >60 >60 >60   CALCIUM 9.2 9.3 8.6 8.9   TROPHS 11  --   --   --    CKTOTAL 45  --   --   --    MYOGLOBIN 64  --   --   --      Recent Labs     20  1239  20  0852 20  1236 20  1701 20  2101 20  2253 20  2317 20  0744   PROT 7.7  --   --   --   --   --   --   --   --    LABALBU 3.3*  --   --   --   --   --   --   --   --    AST 20  --   --   --   --   --   --   --   --    ALT 15  --   --   --   --   --   --   --   --    ALKPHOS 197*  --   --   --   --   --   --   --   --    BILITOT 0.77  --   --   --   --   --   --   --   --    BILIDIR 0.22  --   --   --   --   --   --   --   --    AMMONIA 47  --   --   --   --   --   --  37  --    POCGLU  --    < > 172* 148* 195* 160* 172* --  172*    < > = values in this interval not displayed. ABG:  Lab Results   Component Value Date    POCPH 7.35 11/05/2020    PHART 7.330 06/18/2014    POCPCO2 70 11/05/2020    BLB1FBT 68.0 06/18/2014    POCPO2 68 11/05/2020    PO2ART 84.0 06/18/2014    POCHCO3 39.1 11/05/2020    JTG9GWB 34.9 06/18/2014    NBEA NOT REPORTED 11/05/2020    PBEA 14 11/05/2020    NVD6XBH 41 11/05/2020    NEKH2YRP 91 11/05/2020    I5CGVXZU 96.5 06/18/2014    FIO2 30.0 11/05/2020     Lab Results   Component Value Date/Time    SPECIAL 10ML LH 11/04/2020 12:35 PM     Lab Results   Component Value Date/Time    CULTURE NO GROWTH 2 DAYS 11/04/2020 12:35 PM       Radiology:  Ct Head Wo Contrast    Result Date: 11/4/2020  No acute intracranial abnormality. Xr Chest Portable    Result Date: 11/4/2020  Small right lung base opacity is nonspecific but likely atelectasis and a pleural effusion. Pneumonia is not excluded but felt to be less likely.        Physical Examination:        General appearance:  alert, no distress  Mental Status:  Altered, incomprehensible speech   Lungs:  clear to auscultation bilaterally, normal effort  Heart:  regular rate and rhythm  Abdomen:  soft, nontender, nondistended, normal bowel sounds  Extremities:  no edema, redness, tenderness in the calves  Skin:  no gross lesions, rashes, induration    Assessment:        Hospital Problems           Last Modified POA    * (Principal) Urinary tract infection associated with indwelling urethral catheter ([facility] 06/0/2246 Yes    Alcoholic cirrhosis of liver ([address] 11/4/2020 Yes    Bipolar disorder ([address] 11/4/2020 Yes    Type 2 diabetes mellitus with diabetic neuropathy, with long-term current use of insulin ([address] 11/4/2020 Yes    Hyperlipidemia 58/1/6785 Yes    Metabolic encephalopathy 57/1/0661 Yes    FABI (obstructive sleep apnea) 11/4/2020 Yes    Hypothyroidism 11/4/2020 Yes    Urine retention 11/4/2020 Yes    Chronic indwelling Clemons catheter (Chronic) 11/4/2020 Yes Back pain, chronic 11/4/2020 Yes    Chronic diastolic congestive heart failure ([facility] 11/4/2020 Yes    GERD (gastroesophageal reflux disease) 11/4/2020 Yes    Morbid obesity ([facility] 11/4/2020 Yes    Delirium due to another medical condition 11/4/2020 Yes    Musculoskeletal immobility 11/4/2020 Yes          Plan:        - Vitals, labs, imaging, medications reviewed  - No improvement in mentation despite IV antibiotics  - Culture reviewed - colonization  - ID following, antibiotics per ID  - Recheck UA and culture   - Discussed with ex wife (caregiver) at bedside - gradual worsening in mentation last few days  - Neurology consult given persistent encephalopathy     Patricia Cruz MD  11/6/2020  12:11 PM

## 2020-11-06 NOTE — PROGRESS NOTE ADULT - PROBLEM SELECTOR PLAN 10
SCDs in setting of GIB    11. Dispo: PT consulted to eval/tx for deconditioning. dc home w/ home PT  12. Home gabapentin for neuropathy (s/p work injury)    IMPROVE VTE Individual Risk Assessment       RISK                                                          Points  [  ] Previous VTE                                                3  [  ] Thrombophilia                                             2  [  ] Lower limb paralysis                                   2        (unable to hold up >15 seconds)    [  ] Current Cancer                                             2         (within 6 months)  [  ] Immobilization > 24 hrs                              1  [  ] ICU/CCU stay > 24 hours                             1  [ 1 ] Age > 60                                                         1    IMPROVE VTE Score:         [       1  ]

## 2020-11-06 NOTE — CHART NOTE - NSCHARTNOTEFT_GEN_A_CORE
Called by RN for new right lower extremity pain and numbness. Patient seen and examined. Patient complains of 3-4/10 right lower extremity pain, says starting to resolve and was previously 5/10. Says he has numbness on top and bottom of right foot compared to left but it is starting to resolve. He is able to lift his right leg and extend his foot, unable to move toes but states this is chronic s/p previous surgery of right lower extremity. No pain on right foot extension however has calf tenderness to palpation. Patient takes Xarelto at home for past right lower extremity DVT, however is not currently taking due to GIB. Denies headache, chest pain, SOB, palpitations, abdominal pain, n/v. Had episode of dark stools earlier today.     ICU Vital Signs Last 24 Hrs  T(C): 36.4 (05 Nov 2020 20:08), Max: 36.8 (05 Nov 2020 05:04)  T(F): 97.6 (05 Nov 2020 20:08), Max: 98.2 (05 Nov 2020 05:04)  HR: 99 (06 Nov 2020 03:20) (88 - 102)  BP: 133/84 (06 Nov 2020 03:20) (123/62 - 146/84)  BP(mean): --  ABP: --  ABP(mean): --  RR: 17 (05 Nov 2020 20:08) (17 - 18)  SpO2: 95% (05 Nov 2020 20:08) (94% - 96%)    GENERAL: NAD, lying in bed comfortably  HEAD:  Atraumatic, Normocephalic  EYES: EOMI, conjunctiva and sclera clear  CHEST/LUNG: Clear to auscultation bilaterally; No rales, rhonchi, wheezing, or rubs. Unlabored respirations  HEART: Regular rate and rhythm; no murmurs/rubs/gallops  ABDOMEN: Bowel sounds present; Soft, Nontender, Nondistended.  EXTREMITIES:  right calf tenderness to palpation, no pain with right calf extension, palpable pedal pulses, no peripheral edema, left lower extremity larger than right  NERVOUS SYSTEM:  Alert, able to follow commands, right foot decreased sensation than left foot  SKIN: right lower extremity has healed skin grafts    A/P: 71 yo male PMH RLE DVT on Xarelto, TIA, HTN, HLD, SERENITY with 1 episode of dialysis, anemia of chronic disease, DM, BPH, GERD, neuropathy,  presents to ED with palpitations, fatigue, melena.  admitted for GI bleed. Called by nurse for new right lower extremity pain and numbness.    - Given patient's history of RLE DVT, off Xarelto now for GI bleed, and new calf tenderness, will order US Duplex to r/o new DVT  - Patient states pain is now 3-4, starting to resolve, will order Tylenol x1   - Will f/u US and continue to monitor  - RN to call with any changes

## 2020-11-06 NOTE — PROGRESS NOTE ADULT - PROBLEM SELECTOR PLAN 3
Cr function worsening in setting of GIB, worsening anemia. Baseline Cr ~1.2 s/p ATN and dialysis x 1 in 2019.  -Peaked Cr 2.00 this admission, now trending down. 1.4 this am  -renal ultrasound w/ No hydronephrosis. Enlarged prostate gland   -Avoid nephrotoxic meds. Losartan held  -gentle hydration with half NS infusion. will reasess need for fluids  -repeat BMP in AM  -Strict I&O's

## 2020-11-07 VITALS
RESPIRATION RATE: 17 BRPM | DIASTOLIC BLOOD PRESSURE: 85 MMHG | HEART RATE: 94 BPM | SYSTOLIC BLOOD PRESSURE: 131 MMHG | TEMPERATURE: 98 F | OXYGEN SATURATION: 95 %

## 2020-11-07 LAB
ALBUMIN SERPL ELPH-MCNC: 3.5 G/DL — SIGNIFICANT CHANGE UP (ref 3.3–5)
ALP SERPL-CCNC: 101 U/L — SIGNIFICANT CHANGE UP (ref 40–120)
ALT FLD-CCNC: 15 U/L — SIGNIFICANT CHANGE UP (ref 12–78)
ANION GAP SERPL CALC-SCNC: 6 MMOL/L — SIGNIFICANT CHANGE UP (ref 5–17)
AST SERPL-CCNC: 17 U/L — SIGNIFICANT CHANGE UP (ref 15–37)
BASOPHILS # BLD AUTO: 0.03 K/UL — SIGNIFICANT CHANGE UP (ref 0–0.2)
BASOPHILS NFR BLD AUTO: 0.5 % — SIGNIFICANT CHANGE UP (ref 0–2)
BILIRUB SERPL-MCNC: 0.6 MG/DL — SIGNIFICANT CHANGE UP (ref 0.2–1.2)
BUN SERPL-MCNC: 15 MG/DL — SIGNIFICANT CHANGE UP (ref 7–23)
CALCIUM SERPL-MCNC: 9 MG/DL — SIGNIFICANT CHANGE UP (ref 8.5–10.1)
CHLORIDE SERPL-SCNC: 107 MMOL/L — SIGNIFICANT CHANGE UP (ref 96–108)
CO2 SERPL-SCNC: 28 MMOL/L — SIGNIFICANT CHANGE UP (ref 22–31)
CREAT SERPL-MCNC: 1.3 MG/DL — SIGNIFICANT CHANGE UP (ref 0.5–1.3)
EOSINOPHIL # BLD AUTO: 0.29 K/UL — SIGNIFICANT CHANGE UP (ref 0–0.5)
EOSINOPHIL NFR BLD AUTO: 4.5 % — SIGNIFICANT CHANGE UP (ref 0–6)
GLUCOSE SERPL-MCNC: 93 MG/DL — SIGNIFICANT CHANGE UP (ref 70–99)
HCT VFR BLD CALC: 29.6 % — LOW (ref 39–50)
HGB BLD-MCNC: 10.2 G/DL — LOW (ref 13–17)
IMM GRANULOCYTES NFR BLD AUTO: 0.6 % — SIGNIFICANT CHANGE UP (ref 0–1.5)
LYMPHOCYTES # BLD AUTO: 1.75 K/UL — SIGNIFICANT CHANGE UP (ref 1–3.3)
LYMPHOCYTES # BLD AUTO: 27.3 % — SIGNIFICANT CHANGE UP (ref 13–44)
MCHC RBC-ENTMCNC: 29.3 PG — SIGNIFICANT CHANGE UP (ref 27–34)
MCHC RBC-ENTMCNC: 34.5 GM/DL — SIGNIFICANT CHANGE UP (ref 32–36)
MCV RBC AUTO: 85.1 FL — SIGNIFICANT CHANGE UP (ref 80–100)
MONOCYTES # BLD AUTO: 0.45 K/UL — SIGNIFICANT CHANGE UP (ref 0–0.9)
MONOCYTES NFR BLD AUTO: 7 % — SIGNIFICANT CHANGE UP (ref 2–14)
NEUTROPHILS # BLD AUTO: 3.86 K/UL — SIGNIFICANT CHANGE UP (ref 1.8–7.4)
NEUTROPHILS NFR BLD AUTO: 60.1 % — SIGNIFICANT CHANGE UP (ref 43–77)
NRBC # BLD: 0 /100 WBCS — SIGNIFICANT CHANGE UP (ref 0–0)
PLATELET # BLD AUTO: 384 K/UL — SIGNIFICANT CHANGE UP (ref 150–400)
POTASSIUM SERPL-MCNC: 3.8 MMOL/L — SIGNIFICANT CHANGE UP (ref 3.5–5.3)
POTASSIUM SERPL-SCNC: 3.8 MMOL/L — SIGNIFICANT CHANGE UP (ref 3.5–5.3)
PROT SERPL-MCNC: 7.6 G/DL — SIGNIFICANT CHANGE UP (ref 6–8.3)
RBC # BLD: 3.48 M/UL — LOW (ref 4.2–5.8)
RBC # FLD: 13.8 % — SIGNIFICANT CHANGE UP (ref 10.3–14.5)
SODIUM SERPL-SCNC: 141 MMOL/L — SIGNIFICANT CHANGE UP (ref 135–145)
WBC # BLD: 6.42 K/UL — SIGNIFICANT CHANGE UP (ref 3.8–10.5)
WBC # FLD AUTO: 6.42 K/UL — SIGNIFICANT CHANGE UP (ref 3.8–10.5)

## 2020-11-07 PROCEDURE — 93005 ELECTROCARDIOGRAM TRACING: CPT

## 2020-11-07 PROCEDURE — 82728 ASSAY OF FERRITIN: CPT

## 2020-11-07 PROCEDURE — 99285 EMERGENCY DEPT VISIT HI MDM: CPT | Mod: 25

## 2020-11-07 PROCEDURE — 86803 HEPATITIS C AB TEST: CPT

## 2020-11-07 PROCEDURE — 82962 GLUCOSE BLOOD TEST: CPT

## 2020-11-07 PROCEDURE — 85018 HEMOGLOBIN: CPT

## 2020-11-07 PROCEDURE — U0003: CPT

## 2020-11-07 PROCEDURE — P9016: CPT

## 2020-11-07 PROCEDURE — 93971 EXTREMITY STUDY: CPT

## 2020-11-07 PROCEDURE — 99239 HOSP IP/OBS DSCHRG MGMT >30: CPT | Mod: GC

## 2020-11-07 PROCEDURE — 82272 OCCULT BLD FECES 1-3 TESTS: CPT

## 2020-11-07 PROCEDURE — 36415 COLL VENOUS BLD VENIPUNCTURE: CPT

## 2020-11-07 PROCEDURE — 86901 BLOOD TYPING SEROLOGIC RH(D): CPT

## 2020-11-07 PROCEDURE — 96374 THER/PROPH/DIAG INJ IV PUSH: CPT

## 2020-11-07 PROCEDURE — 36430 TRANSFUSION BLD/BLD COMPNT: CPT

## 2020-11-07 PROCEDURE — 97116 GAIT TRAINING THERAPY: CPT

## 2020-11-07 PROCEDURE — 88305 TISSUE EXAM BY PATHOLOGIST: CPT

## 2020-11-07 PROCEDURE — 82553 CREATINE MB FRACTION: CPT

## 2020-11-07 PROCEDURE — 81003 URINALYSIS AUTO W/O SCOPE: CPT

## 2020-11-07 PROCEDURE — 86850 RBC ANTIBODY SCREEN: CPT

## 2020-11-07 PROCEDURE — 99232 SBSQ HOSP IP/OBS MODERATE 35: CPT

## 2020-11-07 PROCEDURE — 97110 THERAPEUTIC EXERCISES: CPT

## 2020-11-07 PROCEDURE — 83036 HEMOGLOBIN GLYCOSYLATED A1C: CPT

## 2020-11-07 PROCEDURE — 85014 HEMATOCRIT: CPT

## 2020-11-07 PROCEDURE — 80048 BASIC METABOLIC PNL TOTAL CA: CPT

## 2020-11-07 PROCEDURE — 76770 US EXAM ABDO BACK WALL COMP: CPT

## 2020-11-07 PROCEDURE — 80053 COMPREHEN METABOLIC PANEL: CPT

## 2020-11-07 PROCEDURE — 86900 BLOOD TYPING SEROLOGIC ABO: CPT

## 2020-11-07 PROCEDURE — 71045 X-RAY EXAM CHEST 1 VIEW: CPT

## 2020-11-07 PROCEDURE — 83735 ASSAY OF MAGNESIUM: CPT

## 2020-11-07 PROCEDURE — 97530 THERAPEUTIC ACTIVITIES: CPT

## 2020-11-07 PROCEDURE — 83550 IRON BINDING TEST: CPT

## 2020-11-07 PROCEDURE — 97162 PT EVAL MOD COMPLEX 30 MIN: CPT

## 2020-11-07 PROCEDURE — 85027 COMPLETE CBC AUTOMATED: CPT

## 2020-11-07 PROCEDURE — 93970 EXTREMITY STUDY: CPT

## 2020-11-07 PROCEDURE — 84466 ASSAY OF TRANSFERRIN: CPT

## 2020-11-07 PROCEDURE — 85730 THROMBOPLASTIN TIME PARTIAL: CPT

## 2020-11-07 PROCEDURE — 85025 COMPLETE CBC W/AUTO DIFF WBC: CPT

## 2020-11-07 PROCEDURE — 85610 PROTHROMBIN TIME: CPT

## 2020-11-07 PROCEDURE — 84484 ASSAY OF TROPONIN QUANT: CPT

## 2020-11-07 PROCEDURE — 90686 IIV4 VACC NO PRSV 0.5 ML IM: CPT

## 2020-11-07 PROCEDURE — 86923 COMPATIBILITY TEST ELECTRIC: CPT

## 2020-11-07 PROCEDURE — 88312 SPECIAL STAINS GROUP 1: CPT

## 2020-11-07 PROCEDURE — 83880 ASSAY OF NATRIURETIC PEPTIDE: CPT

## 2020-11-07 PROCEDURE — 83540 ASSAY OF IRON: CPT

## 2020-11-07 PROCEDURE — 86769 SARS-COV-2 COVID-19 ANTIBODY: CPT

## 2020-11-07 RX ORDER — SIMVASTATIN 20 MG/1
1 TABLET, FILM COATED ORAL
Qty: 0 | Refills: 0 | DISCHARGE

## 2020-11-07 RX ORDER — CLARITHROMYCIN 500 MG
1 TABLET ORAL
Qty: 22 | Refills: 0
Start: 2020-11-07 | End: 2020-11-17

## 2020-11-07 RX ORDER — PANTOPRAZOLE SODIUM 20 MG/1
1 TABLET, DELAYED RELEASE ORAL
Qty: 22 | Refills: 0
Start: 2020-11-07 | End: 2020-11-17

## 2020-11-07 RX ORDER — LOSARTAN POTASSIUM 100 MG/1
1 TABLET, FILM COATED ORAL
Qty: 0 | Refills: 0 | DISCHARGE

## 2020-11-07 RX ORDER — HYDROXYZINE HCL 10 MG
1 TABLET ORAL
Qty: 44 | Refills: 0
Start: 2020-11-07 | End: 2020-11-17

## 2020-11-07 RX ORDER — PANTOPRAZOLE SODIUM 20 MG/1
1 TABLET, DELAYED RELEASE ORAL
Qty: 0 | Refills: 0 | DISCHARGE

## 2020-11-07 RX ORDER — RIVAROXABAN 15 MG-20MG
20 KIT ORAL
Refills: 0 | Status: DISCONTINUED | OUTPATIENT
Start: 2020-11-07 | End: 2020-11-07

## 2020-11-07 RX ORDER — FERROUS SULFATE 325(65) MG
325 TABLET ORAL DAILY
Refills: 0 | Status: DISCONTINUED | OUTPATIENT
Start: 2020-11-07 | End: 2020-11-07

## 2020-11-07 RX ORDER — HYDROXYZINE HCL 10 MG
25 TABLET ORAL EVERY 6 HOURS
Refills: 0 | Status: DISCONTINUED | OUTPATIENT
Start: 2020-11-07 | End: 2020-11-07

## 2020-11-07 RX ORDER — LACTOBACILLUS ACIDOPHILUS 100MM CELL
2 CAPSULE ORAL
Qty: 44 | Refills: 0
Start: 2020-11-07 | End: 2020-11-17

## 2020-11-07 RX ORDER — ATORVASTATIN CALCIUM 80 MG/1
1 TABLET, FILM COATED ORAL
Qty: 6 | Refills: 0
Start: 2020-11-07 | End: 2020-11-17

## 2020-11-07 RX ORDER — METRONIDAZOLE 500 MG
1 TABLET ORAL
Qty: 33 | Refills: 0
Start: 2020-11-07 | End: 2020-11-17

## 2020-11-07 RX ADMIN — GABAPENTIN 300 MILLIGRAM(S): 400 CAPSULE ORAL at 05:34

## 2020-11-07 RX ADMIN — Medication 1 TABLET(S): at 13:18

## 2020-11-07 RX ADMIN — Medication 500 MILLIGRAM(S): at 13:18

## 2020-11-07 RX ADMIN — AMLODIPINE BESYLATE 10 MILLIGRAM(S): 2.5 TABLET ORAL at 05:34

## 2020-11-07 RX ADMIN — Medication 325 MILLIGRAM(S): at 11:25

## 2020-11-07 RX ADMIN — INFLUENZA VIRUS VACCINE 0.5 MILLILITER(S): 15; 15; 15; 15 SUSPENSION INTRAMUSCULAR at 11:22

## 2020-11-07 RX ADMIN — Medication 1 MILLIGRAM(S): at 11:22

## 2020-11-07 NOTE — PROGRESS NOTE ADULT - PROBLEM SELECTOR PLAN 6
Hx of RLE DVT, on Xarelto 2019   -Hold in setting of GIB  -SCDs for PPx  -RLE US: Mural thickening consistent with sequela of chronic deep vein thrombosis. No evidence of acute deep vein thrombosis. - H/o DVT in 2019  - RLE US: Mural thickening consistent with sequela of chronic deep vein thrombosis. No evidence of acute deep vein thrombosis.  - Xarelto restarted today now that H/H stable

## 2020-11-07 NOTE — PROGRESS NOTE ADULT - PROBLEM SELECTOR PLAN 2
2/2 GIB  -baseline appears to be 7-9 per outpatient chart review  -Plan as above  -Continue home folic acid, iron supplementation  -11/01 Serum iron 32, TIBC 259, Ferritin 39, Transferrin 215. - Likely 2/2 UGIB, baseline appears to be 7-9 per outpatient chart review  - S/p 1 unit this admission, H/H stable now with no signs/symptoms of continued bleeding  - Continue home folic acid, iron supplementation

## 2020-11-07 NOTE — PROGRESS NOTE ADULT - PROBLEM SELECTOR PLAN 9
Chronic, stable  -On home simvastatin 20mg qhs. currently on atorvastatin 10mg qOD while on H pylori Triple therapy to reduce Rx interaction for 14 days  -Monitor CMP - Chronic, stable  - On home simvastatin 20mg qhs, currently on atorvastatin 10mg qOD while on H pylori triple therapy to reduce Rx interaction for 14 days  - Monitor CMP

## 2020-11-07 NOTE — PROGRESS NOTE ADULT - PROBLEM SELECTOR PLAN 7
Hx of TIA without residual weakness.   -Hold ASA in setting of GIB  -start atorvastatin 10 mg qOD in during tx for H pylori positive gastritis - H/o TIA without residual weakness  - C/w atorvastatin 10 mg qOD while receiving clarithomycine due to interaction with home simvastatin

## 2020-11-07 NOTE — PROGRESS NOTE ADULT - PROBLEM SELECTOR PLAN 1
Patient with 5 days melena, palpitations, fatigue on admission; now endorsing melena s/p benign colonoscopy. ?hold iron  -FOBT +, H/H 9.5/28.5 in ED,  -Hb trending down over hospital course--> 7.4 11/4. now rising s/p 1 u PRBC  -Stable. no obvious signs/sx of bleeding  -Hb 9.7 today  -colonoscopy unremarkable, w/ no pathologic alterations  -Pt to follow up for outpatient capsule study  -Hold Xarelto, aspirin in setting of melena complaint, ( hx of RLE DVT) resume prior to DC  -oral Protonix 40mg PO BID  -consistent carb diet  -EGD with no active bleeding, bx consistent with gastritis, H pylori.  -Triple therapy: (clarithromycin, metronidazole, Oral protonix) started on 11/3. complete 14 day course.  -GI Dr. DAVINA El following - recs appreciated. - No more occurrences of melena overnight, restart PO Fe  - Hb downtrending during admission, now s/p 1 unit pRBC with stable H/H  - EGD with no active bleeding, bx consistent with gastritis, H pylori  - C/w triple therapy for total 14d course until 11/17, however patient refusing meds due to itchiness  - Hydroxyzine 25mg q6h prn ordered  - Colonoscopy unremarkable, w/ no pathologic alterations  - Restarted Xarelto today  - Tolerating diet  - GI Dr. DAVINA El following, patient to have otpt f/u for capsule study  - Plan to d/c today with home PT

## 2020-11-07 NOTE — PROGRESS NOTE ADULT - ASSESSMENT
CHARTING IN PROGRESS      69 yo male PMH RLE DVT on Xarelto, TIA, HTN, HLD, SERENITY with 1 episode of dialysis, anemia of chronic disease, DM, BPH, GERD, neuropathy,  presents to ED with palpitations, fatigue, melena.  admitted for GI bleed. 71 yo male PMH RLE DVT on Xarelto, TIA, HTN, HLD, SERENITY with 1 episode of dialysis, anemia of chronic disease, DM, BPH, GERD, neuropathy,  presents to ED with palpitations, fatigue, melena.  admitted for GI bleed.

## 2020-11-07 NOTE — PROGRESS NOTE ADULT - REASON FOR ADMISSION
GI bleed

## 2020-11-07 NOTE — PROGRESS NOTE ADULT - PROBLEM SELECTOR PLAN 3
Cr function worsening in setting of GIB, worsening anemia. Baseline Cr ~1.2 s/p ATN and dialysis x 1 in 2019.  -Peaked Cr 2.00 this admission, now trending down. 1.4 this am  -renal ultrasound w/ No hydronephrosis. Enlarged prostate gland   -Avoid nephrotoxic meds. Losartan held  -gentle hydration with half NS infusion. will reasess need for fluids  -repeat BMP in AM  -Strict I&O's - Cr function initially worsened in setting of GIB, worsening anemia; Baseline Cr ~1.2 s/p ATN and dialysis x 1 in 2019  - Elevated Cr now resolved s/p IVF  - Renal ultrasound w/ no hydronephrosis, enlarged prostate gland present  - Avoid nephrotoxic meds, home Losartan held with BPs well controlled  - Monitor daily renal function

## 2020-11-07 NOTE — PHARMACOTHERAPY INTERVENTION NOTE - COMMENTS
Prescriptions filled at [facility].  Prescriptions: clarithromycin, metronidazole, pantoprazole, atorvastatin, hydroxyzine, acidophilus  Brought to bedside and counseled.  [facility] name: Lionel Black, Pharm. D  Person(s) counseled (translation used?,family member called? if relevant):Patient, at bedside  Counseling materials provided/counseling aids used: rx leaflet  Time spent Counselin minutes  Counseling provided according to ASHP guidelines including side effects  Notes:  pt understands atorva sub for simvastatin while on clarithromycin

## 2020-11-07 NOTE — PROGRESS NOTE ADULT - ASSESSMENT
70 year old male PMH RLE DVT on Xarelto, HTN, HLD, BPH, GERD, neuropathy presents to ED with palpitations, fatigue, melena, admitted for GI bleed. Cardio consulted for cardiac clearance for EGD.     GI bleed  - s/p PRBC for Hgb of 7.4.    - + FOBT s/p EGD, found to have gastritis with no bleeding.  Patient on home Iron   - S/p colonoscopy unremarkable, with plans for capsule outpatient per GI  - H/H remain stable  - Xarelto to be resumed if H/H remain stable per GI    HTN  - BP remains stable and controlled  - Continue Norvasc  - Id renal function remains normal, can resume ARB at a lower dose  - Echo 10/2/2019: EF 60 to 65% with Grade I diastolic dysfunction    SERENITY  - Resolved    HLD  - Continue statin    DVT  - Per Primary  - To resume Xarelto    - Monitor and replete lytes, keep K>4, Mg>2.    - All other medical needs as per primary team.  - Stable from cardiac standpoint  - Will continue to follow.    Julia Gibbons DNP, NP-C  Cardiology   Spectra #[phone]

## 2020-11-07 NOTE — PROGRESS NOTE ADULT - PROBLEM SELECTOR PLAN 5
Pt admitted for GI bleed, now s/p EGD. Bx shows Chronic active gastritis involving oxyntic and non-oxyntic mucosa.  -Diff Quik stain for H. pylori is positive.  -continue protonix 40 mg PO BID, clarithromycin, metronidazole 14 day course - S/p EGD: Bx shows Chronic active gastritis w/ positive Diff Quik stain for H. pylori  - C/w triple therapy

## 2020-11-07 NOTE — PROGRESS NOTE ADULT - PROBLEM SELECTOR PLAN 4
Pt CMP revealed NA of 147  -Resolved  -s/p D5 at 60 cc/hr for 10 hours.   -c/w half NS infusion for High NA; SERENITY vs ATN  -monitor renal indices, lytes in CMP  -strict I&Os - Resolved  - S/p D5 at 60 cc/hr for 10 hours and half NS infusion  - Monitor daily CMP

## 2020-11-07 NOTE — PROGRESS NOTE ADULT - PROBLEM SELECTOR PLAN 8
Chronic, stable  -Patient takes amlodipine 10mg 2x daily, confirmed with pharmacy  -BP currently stable, continue amlodipine 10mg once daily and continue to monitor  -home losartan on hold. consider resumption upon dc - Chronic, stable  - Patient at home takes amlodipine 10mg 2x daily, confirmed with pharmacy  - BP currently stable, continue amlodipine 10mg once daily with hold parameters  - Home Losartan held in setting of previous SERENITY, BPs well controlled  - Monitor routine hemodynamics

## 2020-11-07 NOTE — PROGRESS NOTE ADULT - SUBJECTIVE AND OBJECTIVE BOX
[facility] -- Sandie Valentine, Mina Sullivan, Bruce Nagy [place], [place]  Office # [phone]    Follow Up:  Afib on AC, GIB    Subjective/Observations: Awake and alert, comfortable on RA.  Had BM overnight, gray in color per patient.  Denies any form of bleeding.  Denies any respiratory or cardiac discomfort     REVIEW OF SYSTEMS: All other review of systems is negative unless indicated above  PAST MEDICAL & SURGICAL HISTORY:  Deep vein thrombosis (DVT)    Neuropathy    BPH (benign prostatic hyperplasia)    HLD (hyperlipidemia)    HTN (hypertension)    GERD (gastroesophageal reflux disease)    HLD (hyperlipidemia)    History of femoropopliteal bypass    No significant past surgical history    MEDICATIONS  (STANDING):  amLODIPine   Tablet 10 milliGRAM(s) Oral daily  atorvastatin 10 milliGRAM(s) Oral <User Schedule>  bisacodyl Suppository 10 milliGRAM(s) Rectal daily  clarithromycin 500 milliGRAM(s) Oral two times a day  dextrose 5%. 1000 milliLiter(s) (50 mL/Hr) IV Continuous <Continuous>  dextrose 50% Injectable 12.5 Gram(s) IV Push once  dextrose 50% Injectable 25 Gram(s) IV Push once  dextrose 50% Injectable 25 Gram(s) IV Push once  folic acid 1 milliGRAM(s) Oral daily  gabapentin 300 milliGRAM(s) Oral two times a day  influenza   Vaccine 0.5 milliLiter(s) IntraMuscular once  insulin lispro (ADMELOG) corrective regimen sliding scale   SubCutaneous Before meals and at bedtime  lactobacillus acidophilus 1 Tablet(s) Oral three times a day  metroNIDAZOLE    Tablet 500 milliGRAM(s) Oral three times a day  pantoprazole    Tablet 40 milliGRAM(s) Oral two times a day  polyethylene glycol 3350 17 Gram(s) Oral two times a day  rivaroxaban 20 milliGRAM(s) Oral with dinner  senna 2 Tablet(s) Oral at bedtime  sodium chloride 0.45%. 1000 milliLiter(s) (75 mL/Hr) IV Continuous <Continuous>  tamsulosin 0.4 milliGRAM(s) Oral at bedtime    MEDICATIONS  (PRN):  dextrose 40% Gel 15 Gram(s) Oral once PRN Blood Glucose LESS THAN 70 milliGRAM(s)/deciliter  glucagon  Injectable 1 milliGRAM(s) IntraMuscular once PRN Glucose LESS THAN 70 milligrams/deciliter  hydrOXYzine hydrochloride 25 milliGRAM(s) Oral every 6 hours PRN Itching    Allergies    No Known Drug Allergies  Prefers chocolate ensure drinks (Unknown)    Intolerances    Vital Signs Last 24 Hrs  T(C): 36.7 (2020 05:10), Max: 37.6 (2020 13:25)  T(F): 98.1 (2020 05:10), Max: 99.6 (2020 13:25)  HR: 93 (2020 05:10) (86 - 93)  BP: 112/64 (2020 05:10) (112/64 - 116/71)  BP(mean): --  RR: 17 (2020 05:10) (17 - 17)  SpO2: 97% (2020 05:10) (96% - 97%)  I&O's Summary    2020 07:01  -  2020 07:00  --------------------------------------------------------  IN: 480 mL / OUT: 2450 mL / NET: -1970 mL    PHYSICAL EXAM:  TELE: Not on tele  Constitutional: NAD, awake and alert, obese  HEENT: Moist Mucous Membranes, Anicteric  Pulmonary: Non-labored, breath sounds are clear bilaterally, No wheezing, rales or rhonchi  Cardiovascular: Regular, S1 and S2, +murmurs, no rubs, gallops or clicks  Gastrointestinal: Bowel Sounds present, soft, nontender.   Lymph: No peripheral edema. No lymphadenopathy.  Skin: No visible rashes or ulcers.  Psych:  Mood & affect appropriate  LABS: All Labs Reviewed:                        10.2   6.42  )-----------( 384      ( 2020 07:21 )             29.6                         9.7    6.98  )-----------( 381      ( 2020 09:06 )             28.4                         9.5    6.09  )-----------( 338      ( 2020 09:20 )             27.3     2020 07:21    141    |  107    |  15     ----------------------------<  93     3.8     |  28     |  1.30   2020 09:06    141    |  107    |  15     ----------------------------<  104    3.5     |  27     |  1.40   2020 09:20    140    |  106    |  14     ----------------------------<  126    3.5     |  28     |  1.40     Ca    9.0        2020 07:21  Ca    9.0        2020 09:06  Ca    8.7        2020 09:20    TPro  7.6    /  Alb  3.5    /  TBili  0.6    /  DBili  x      /  AST  17     /  ALT  15     /  AlkPhos  101    2020 07:21  TPro  7.8    /  Alb  3.6    /  TBili  0.8    /  DBili  x      /  AST  15     /  ALT  14     /  AlkPhos  106    2020 09:06  TPro  7.7    /  Alb  3.5    /  TBili  1.0    /  DBili  x      /  AST  15     /  ALT  15     /  AlkPhos  109    2020 09:20    EXAM:  ECHO TRANSTHORACIC COMP W DOPP      PROCEDURE DATE:  Oct  2 2019   .      INTERPRETATION:  REPORT:    TRANSTHORACIC ECHOCARDIOGRAM REPORT         Patient Name:   AIMEE SOLER Patient Location: [facility] Rec #:  [id number]         Accession #:      [id number]  Account #:                       Height:           68.9 in 175.0 cm  YOB: 1950        Weight:           154.3 lb 70.00 kg  Patient Age:    69 years         BSA:              1.85 m²  Patient Gender: M        BP:               127/75 mmHg       Date of Exam:        10/2/2019 1:35:11 PM  Sonographer:         Luiza Andujar  Referring Physician: Maxwell Benavides    Procedure:   Follow up or Limited Echocardiogram.  Indications: Dyspnea, unspecified -R06.00  Diagnosis:   Dyspnea, unspecified - R06.00         2D AND M-MODE MEASUREMENTS (normal ranges within parentheses):  Left Ventricle:                  Normal   Aorta/Left Atrium:            Normal  IVSd (2D):              1.22 cm (0.7-1.1) Aortic Root (2D):  2.61 cm   (2.4-3.7)  LVPWd (2D):             1.22 cm (0.7-1.1) Left Atrium (2D):  2.68 cm   (1.9-4.0)  LVIDd (2D):             3.69 cm (3.4-5.7)  LVIDs (2D):             2.84 cm  LV FS (2D):             23.2 %   (>25%)  Relative Wall Thickness  0.66    (<0.42)    LV DIASTOLIC FUNCTION:  MV Peak E: 0.82 m/s e', MV Annika: 0.06 m/s  MV Peak A: 0.96 m/s E/e' Ratio: 13.09  E/A Ratio: 0.86    SPECTRAL DOPPLER ANALYSIS (where applicable):  Aortic Valve: AoV Max Cornelius: 1.75 m/s AoV Peak P.3mmHg AoV Mean P.0 mmHg    LVOT Vmax: 1.21 m/s LVOT VTI: 0.235 m LVOT Diameter: 2.02 cm    AoV Area, Vmax: 2.21 cm² AoV Area, VTI: 2.39 cm² AoV Area, Vmn: 2.28 cm²  Ao VTI: 0.316  Tricuspid Valve and PA/RV Systolic Pressure: TR Max Velocity: 2.30 m/s RA   Pressure: 3 mmHg RVSP/PASP: 24.2 mmHg       PHYSICIAN INTERPRETATION:  Left Ventricle: The left ventricular internal cavity size is normal. Left   ventricular wall thickness is normal.  Global LV systolic function was normal. Left ventricular ejection   fraction, by visual estimation, is 60 to 65%. Spectral Doppler shows   impaired relaxation pattern of left ventricular myocardial filling (Grade   I diastolic dysfunction).  Right Ventricle: Normal right ventricular size and function.  Left Atrium: The left atrium is normal in size.  Right Atrium: The right atrium is normal in size.  Pericardium: There is no evidence of pericardial effusion.  Mitral Valve: Structurally normal mitral valve, with normal leaflet   excursion. Trace mitral valve regurgitation is seen.  Tricuspid Valve: Structurally normal tricuspid valve, with normal leaflet   excursion. Mild tricuspid regurgitation is visualized.  Aortic Valve: Normal trileaflet aortic valve with normal opening. No   evidence of aortic valve regurgitation is seen.  Pulmonic Valve: Structurally normal pulmonic valve, with normal leaflet   excursion. Trace pulmonic valve regurgitation.  Aorta: The aortic root is normal in size and structure.  Pulmonary Artery: The main pulmonary artery is normal in size.  Venous: The inferior vena cava is normal. The inferior vena cava was   normal sized, with respiratory size variation greater than 50%. The   inferior vena cava and the hepatic vein show a normal flow pattern.     Summary:   1. Left ventricular ejection fraction, by visual estimation, is 60 to   65%.   2. Normal global left ventricular systolic function.   3. Normal left ventricular internal cavity size.   4. Spectral Doppler shows impaired relaxation pattern of left   ventricular myocardial filling (Grade I diastolic dysfunction).   5. There is no evidence of pericardial effusion.    Jing Clark MD Electronically signed on 10/2/2019 at 3:45:53 PM     *** Final ***    JING CLARK M.D.  This document has been electronically signed. Oct  2 2019  1:35PM        EXAM:  ECHO TRANSTHORACIC COMP W DOPP      PROCEDURE DATE:  Oct  2 2019   .      INTERPRETATION:  REPORT:    TRANSTHORACIC ECHOCARDIOGRAM REPORT         Patient Name:   AIMEE SOLER Patient Location: [facility] Rec #:  [id number]         Accession #:      [id number]  Account #:                       Height:           68.9 in 175.0 cm  YOB: 1950        Weight:           154.3 lb 70.00 kg  Patient Age:    69 years         BSA:              1.85 m²  Patient Gender: M        BP:               127/75 mmHg       Date of Exam:        10/2/2019 1:35:11 PM  Sonographer:         Luiza Andujar  Referring Physician: Maxwell Benavides    Procedure:   Follow up or Limited Echocardiogram.  Indications: Dyspnea, unspecified -R06.00  Diagnosis:   Dyspnea, unspecified - R06.00         2D AND M-MODE MEASUREMENTS (normal ranges within parentheses):  Left Ventricle:                  Normal   Aorta/Left Atrium:            Normal  IVSd (2D):              1.22 cm (0.7-1.1) Aortic Root (2D):  2.61 cm   (2.4-3.7)  LVPWd (2D):             1.22 cm (0.7-1.1) Left Atrium (2D):  2.68 cm   (1.9-4.0)  LVIDd (2D):             3.69 cm (3.4-5.7)  LVIDs (2D):             2.84 cm  LV FS (2D):             23.2 %   (>25%)  Relative Wall Thickness  0.66    (<0.42)    LV DIASTOLIC FUNCTION:  MV Peak E: 0.82 m/s e', MV Annika: 0.06 m/s  MV Peak A: 0.96 m/s E/e' Ratio: 13.09  E/A Ratio: 0.86    SPECTRAL DOPPLER ANALYSIS (where applicable):  Aortic Valve: AoV Max Cornelius: 1.75 m/s AoV Peak P.3mmHg AoV Mean P.0 mmHg    LVOT Vmax: 1.21 m/s LVOT VTI: 0.235 m LVOT Diameter: 2.02 cm    AoV Area, Vmax: 2.21 cm² AoV Area, VTI: 2.39 cm² AoV Area, Vmn: 2.28 cm²  Ao VTI: 0.316  Tricuspid Valve and PA/RV Systolic Pressure: TR Max Velocity: 2.30 m/s RA   Pressure: 3 mmHg RVSP/PASP: 24.2 mmHg       PHYSICIAN INTERPRETATION:  Left Ventricle: The left ventricular internal cavity size is normal. Left   ventricular wall thickness is normal.  Global LV systolic function was normal. Left ventricular ejection   fraction, by visual estimation, is 60 to 65%. Spectral Doppler shows   impaired relaxation pattern of left ventricular myocardial filling (Grade   I diastolic dysfunction).  Right Ventricle: Normal right ventricular size and function.  Left Atrium: The left atrium is normal in size.  Right Atrium: The right atrium is normal in size.  Pericardium: There is no evidence of pericardial effusion.  Mitral Valve: Structurally normal mitral valve, with normal leaflet   excursion. Trace mitral valve regurgitation is seen.  Tricuspid Valve: Structurally normal tricuspid valve, with normal leaflet   excursion. Mild tricuspid regurgitation is visualized.  Aortic Valve: Normal trileaflet aortic valve with normal opening. No   evidence of aortic valve regurgitation is seen.  Pulmonic Valve: Structurally normal pulmonic valve, with normal leaflet   excursion. Trace pulmonic valve regurgitation.  Aorta: The aortic root is normal in size and structure.  Pulmonary Artery: The main pulmonary artery is normal in size.  Venous: The inferior vena cava is normal. The inferior vena cava was   normal sized, with respiratory size variation greater than 50%. The   inferior vena cava and the hepatic vein show a normal flow pattern.     Summary:   1. Left ventricular ejection fraction, by visual estimation, is 60 to   65%.   2. Normal global left ventricular systolic function.   3. Normal left ventricular internal cavity size.   4. Spectral Doppler shows impaired relaxation pattern of left   ventricular myocardial filling (Grade I diastolic dysfunction).   5. There is no evidence of pericardial effusion.    MD Shad Electronically signed on 10/2/2019 at 3:45:53 PM     *** Final ***  JING CLARK M.D.  This document has been electronically signed. Oct  2 2019  1:35PM      Ventricular Rate 90 BPM    Atrial Rate 90 BPM    P-R Interval 144 ms    QRS Duration 96 ms    Q-T Interval 362 ms    QTC Calculation(Bazett) 442 ms    P Axis 64 degrees    R Axis -57 degrees    T Axis 74 degrees    Diagnosis Line Normal sinus rhythm  Left axis deviation  Abnormal ECG  No previous ECGs available  Confirmed by anjali Martínez ([id number]) on 10/29/2020 3:51:30 PM

## 2020-11-07 NOTE — PROGRESS NOTE ADULT - PROBLEM SELECTOR PROBLEM 1
Anemia, unspecified type
Upper GI bleed
Upper GI bleed
Anemia, unspecified type
Upper GI bleed

## 2020-11-07 NOTE — PROGRESS NOTE ADULT - SUBJECTIVE AND OBJECTIVE BOX
Patient is a 70y old  Male who presents with a chief complaint of GI bleed (06 Nov 2020 13:26)      INTERVAL HPI/OVERNIGHT EVENTS: Patient seen and examined at bedside. No overnight events occurred. Patient has no complaints at this time. Denies fevers, chills, headache, lightheadedness, chest pain, dyspnea, abdominal pain, n/v/d/c.    MEDICATIONS  (STANDING):  amLODIPine   Tablet 10 milliGRAM(s) Oral daily  atorvastatin 10 milliGRAM(s) Oral <User Schedule>  bisacodyl Suppository 10 milliGRAM(s) Rectal daily  clarithromycin 500 milliGRAM(s) Oral two times a day  dextrose 5%. 1000 milliLiter(s) (50 mL/Hr) IV Continuous <Continuous>  dextrose 50% Injectable 12.5 Gram(s) IV Push once  dextrose 50% Injectable 25 Gram(s) IV Push once  dextrose 50% Injectable 25 Gram(s) IV Push once  folic acid 1 milliGRAM(s) Oral daily  gabapentin 300 milliGRAM(s) Oral two times a day  influenza   Vaccine 0.5 milliLiter(s) IntraMuscular once  insulin lispro (ADMELOG) corrective regimen sliding scale   SubCutaneous Before meals and at bedtime  lactobacillus acidophilus 1 Tablet(s) Oral three times a day  metroNIDAZOLE    Tablet 500 milliGRAM(s) Oral three times a day  pantoprazole    Tablet 40 milliGRAM(s) Oral two times a day  polyethylene glycol 3350 17 Gram(s) Oral two times a day  senna 2 Tablet(s) Oral at bedtime  sodium chloride 0.45%. 1000 milliLiter(s) (75 mL/Hr) IV Continuous <Continuous>  tamsulosin 0.4 milliGRAM(s) Oral at bedtime    MEDICATIONS  (PRN):  dextrose 40% Gel 15 Gram(s) Oral once PRN Blood Glucose LESS THAN 70 milliGRAM(s)/deciliter  glucagon  Injectable 1 milliGRAM(s) IntraMuscular once PRN Glucose LESS THAN 70 milligrams/deciliter      Allergies    No Known Drug Allergies  Prefers chocolate ensure drinks (Unknown)    Intolerances        REVIEW OF SYSTEMS:  CONSTITUTIONAL: No fever or chills  HEENT:  No headache, no sore throat  RESPIRATORY: No cough, wheezing, or shortness of breath  CARDIOVASCULAR: No chest pain, palpitations  GASTROINTESTINAL: No abd pain, nausea, vomiting, or diarrhea  GENITOURINARY: No dysuria, frequency, or hematuria  NEUROLOGICAL: no focal weakness or dizziness  MUSCULOSKELETAL: no myalgias     Vital Signs Last 24 Hrs  T(C): 36.7 (07 Nov 2020 05:10), Max: 37.6 (06 Nov 2020 13:25)  T(F): 98.1 (07 Nov 2020 05:10), Max: 99.6 (06 Nov 2020 13:25)  HR: 93 (07 Nov 2020 05:10) (86 - 93)  BP: 112/64 (07 Nov 2020 05:10) (112/64 - 116/71)  BP(mean): --  RR: 17 (07 Nov 2020 05:10) (17 - 17)  SpO2: 97% (07 Nov 2020 05:10) (96% - 97%)    PHYSICAL EXAM:  GENERAL: NAD  HEENT:  anicteric, moist mucous membranes  CHEST/LUNG:  CTA b/l, no rales, wheezes, or rhonchi  HEART:  RRR, S1, S2  ABDOMEN:  BS+, soft, nontender, nondistended  EXTREMITIES: no edema, cyanosis, or calf tenderness  NERVOUS SYSTEM: answers questions and follows commands appropriately    LABS:                        10.2   6.42  )-----------( 384      ( 07 Nov 2020 07:21 )             29.6     CBC Full  -  ( 07 Nov 2020 07:21 )  WBC Count : 6.42 K/uL  Hemoglobin : 10.2 g/dL  Hematocrit : 29.6 %  Platelet Count - Automated : 384 K/uL  Mean Cell Volume : 85.1 fl  Mean Cell Hemoglobin : 29.3 pg  Mean Cell Hemoglobin Concentration : 34.5 gm/dL  Auto Neutrophil # : 3.86 K/uL  Auto Lymphocyte # : 1.75 K/uL  Auto Monocyte # : 0.45 K/uL  Auto Eosinophil # : 0.29 K/uL  Auto Basophil # : 0.03 K/uL  Auto Neutrophil % : 60.1 %  Auto Lymphocyte % : 27.3 %  Auto Monocyte % : 7.0 %  Auto Eosinophil % : 4.5 %  Auto Basophil % : 0.5 %    07 Nov 2020 07:21    141    |  107    |  15     ----------------------------<  93     3.8     |  28     |  1.30     Ca    9.0        07 Nov 2020 07:21    TPro  7.6    /  Alb  3.5    /  TBili  0.6    /  DBili  x      /  AST  17     /  ALT  15     /  AlkPhos  101    07 Nov 2020 07:21        CAPILLARY BLOOD GLUCOSE      POCT Blood Glucose.: 104 mg/dL (07 Nov 2020 07:59)  POCT Blood Glucose.: 117 mg/dL (06 Nov 2020 21:52)  POCT Blood Glucose.: 126 mg/dL (06 Nov 2020 16:56)  POCT Blood Glucose.: 106 mg/dL (06 Nov 2020 12:24)          RADIOLOGY & ADDITIONAL TESTS:    Personally reviewed.     Consultant(s) Notes Reviewed:  [x] YES  [ ] NO     Patient is a 70y old  Male who presents with a chief complaint of GI bleed (06 Nov 2020 13:26)      INTERVAL HPI/OVERNIGHT EVENTS: Patient seen and examined at bedside. No overnight events occurred. Patient has no complaints at this time, however he has been refusing triple therapy meds. He states he "doesn't feel good" and develops itchiness when he takes them. Has been tolerating diet. Last BM overnight was "ruiz" in color. Denies fevers, chills, headache, lightheadedness, chest pain, dyspnea, abdominal pain, n/v/d/c.    MEDICATIONS  (STANDING):  amLODIPine   Tablet 10 milliGRAM(s) Oral daily  atorvastatin 10 milliGRAM(s) Oral <User Schedule>  bisacodyl Suppository 10 milliGRAM(s) Rectal daily  clarithromycin 500 milliGRAM(s) Oral two times a day  dextrose 5%. 1000 milliLiter(s) (50 mL/Hr) IV Continuous <Continuous>  dextrose 50% Injectable 12.5 Gram(s) IV Push once  dextrose 50% Injectable 25 Gram(s) IV Push once  dextrose 50% Injectable 25 Gram(s) IV Push once  folic acid 1 milliGRAM(s) Oral daily  gabapentin 300 milliGRAM(s) Oral two times a day  influenza   Vaccine 0.5 milliLiter(s) IntraMuscular once  insulin lispro (ADMELOG) corrective regimen sliding scale   SubCutaneous Before meals and at bedtime  lactobacillus acidophilus 1 Tablet(s) Oral three times a day  metroNIDAZOLE    Tablet 500 milliGRAM(s) Oral three times a day  pantoprazole    Tablet 40 milliGRAM(s) Oral two times a day  polyethylene glycol 3350 17 Gram(s) Oral two times a day  senna 2 Tablet(s) Oral at bedtime  sodium chloride 0.45%. 1000 milliLiter(s) (75 mL/Hr) IV Continuous <Continuous>  tamsulosin 0.4 milliGRAM(s) Oral at bedtime    MEDICATIONS  (PRN):  dextrose 40% Gel 15 Gram(s) Oral once PRN Blood Glucose LESS THAN 70 milliGRAM(s)/deciliter  glucagon  Injectable 1 milliGRAM(s) IntraMuscular once PRN Glucose LESS THAN 70 milligrams/deciliter      Allergies    No Known Drug Allergies  Prefers chocolate ensure drinks (Unknown)      REVIEW OF SYSTEMS:  CONSTITUTIONAL: No fever or chills  HEENT:  No headache, no sore throat  RESPIRATORY: No cough, wheezing, or shortness of breath  CARDIOVASCULAR: No chest pain, palpitations  GASTROINTESTINAL: No abd pain, nausea, vomiting, or diarrhea  GENITOURINARY: No dysuria, frequency, or hematuria  NEUROLOGICAL: no focal weakness or dizziness  MUSCULOSKELETAL: no myalgias     Vital Signs Last 24 Hrs  T(C): 36.7 (07 Nov 2020 05:10), Max: 37.6 (06 Nov 2020 13:25)  T(F): 98.1 (07 Nov 2020 05:10), Max: 99.6 (06 Nov 2020 13:25)  HR: 93 (07 Nov 2020 05:10) (86 - 93)  BP: 112/64 (07 Nov 2020 05:10) (112/64 - 116/71)  RR: 17 (07 Nov 2020 05:10) (17 - 17)  SpO2: 97% (07 Nov 2020 05:10) (96% - 97%)    PHYSICAL EXAM:  GENERAL: NAD  HEENT:  anicteric, moist mucous membranes  CHEST/LUNG:  CTA b/l, no rales, wheezes, or rhonchi  HEART:  RRR, S1, S2  ABDOMEN:  BS+, soft, nontender, nondistended  EXTREMITIES: no edema, cyanosis, or calf tenderness  NERVOUS SYSTEM: answers questions and follows commands appropriately    LABS:                        10.2   6.42  )-----------( 384      ( 07 Nov 2020 07:21 )             29.6     CBC Full  -  ( 07 Nov 2020 07:21 )  WBC Count : 6.42 K/uL  Hemoglobin : 10.2 g/dL  Hematocrit : 29.6 %  Platelet Count - Automated : 384 K/uL  Mean Cell Volume : 85.1 fl  Mean Cell Hemoglobin : 29.3 pg  Mean Cell Hemoglobin Concentration : 34.5 gm/dL  Auto Neutrophil # : 3.86 K/uL  Auto Lymphocyte # : 1.75 K/uL  Auto Monocyte # : 0.45 K/uL  Auto Eosinophil # : 0.29 K/uL  Auto Basophil # : 0.03 K/uL  Auto Neutrophil % : 60.1 %  Auto Lymphocyte % : 27.3 %  Auto Monocyte % : 7.0 %  Auto Eosinophil % : 4.5 %  Auto Basophil % : 0.5 %    07 Nov 2020 07:21    141    |  107    |  15     ----------------------------<  93     3.8     |  28     |  1.30     Ca    9.0        07 Nov 2020 07:21    TPro  7.6    /  Alb  3.5    /  TBili  0.6    /  DBili  x      /  AST  17     /  ALT  15     /  AlkPhos  101    07 Nov 2020 07:21        CAPILLARY BLOOD GLUCOSE      POCT Blood Glucose.: 104 mg/dL (07 Nov 2020 07:59)  POCT Blood Glucose.: 117 mg/dL (06 Nov 2020 21:52)  POCT Blood Glucose.: 126 mg/dL (06 Nov 2020 16:56)  POCT Blood Glucose.: 106 mg/dL (06 Nov 2020 12:24)          RADIOLOGY & ADDITIONAL TESTS:    EXAM:  US DPLX LWR EXT VEINS COMPL BI                        PROCEDURE DATE:  11/06/2020      INTERPRETATION:  CLINICAL INFORMATION: Right lower extremity pain, history of DVT    COMPARISON: October 31, 2021    TECHNIQUE: Duplex sonography of the BILATERAL LOWER extremity veins with color and spectral Doppler, with and without compression.    FINDINGS:    Again seen, incompletely compression of the distal right superficial femoral vein and popliteal vein, the stigmata of chronic DVT.  The right common femoral, proximal and mid superficial femoral, and posterior tibial veins are patent with normal, spontaneous and phasic flow.    There is normal compressibility of the left common femoral, femoral, popliteal and posterior tibial veins.  Doppler examination shows normal spontaneous and phasic flow.    IMPRESSION:    1. No evidence of acute DVT in either lower extremity.  2. Stigmata of chronic DVT in the distal right superficial femoral vein and popliteal vein, unchanged.      Personally reviewed.     Consultant(s) Notes Reviewed:  [x] YES  [ ] NO

## 2020-11-07 NOTE — PROGRESS NOTE ADULT - SUBJECTIVE AND OBJECTIVE BOX
INTERVAL HPI/OVERNIGHT EVENTS:  No new overnight event.  No N/V/D.  Tolerating diet.     MEDICATIONS  (STANDING):  amLODIPine   Tablet 10 milliGRAM(s) Oral daily  atorvastatin 10 milliGRAM(s) Oral <User Schedule>  bisacodyl Suppository 10 milliGRAM(s) Rectal daily  clarithromycin 500 milliGRAM(s) Oral two times a day  dextrose 5%. 1000 milliLiter(s) (50 mL/Hr) IV Continuous <Continuous>  dextrose 50% Injectable 12.5 Gram(s) IV Push once  dextrose 50% Injectable 25 Gram(s) IV Push once  dextrose 50% Injectable 25 Gram(s) IV Push once  ferrous    sulfate 325 milliGRAM(s) Oral daily  folic acid 1 milliGRAM(s) Oral daily  gabapentin 300 milliGRAM(s) Oral two times a day  insulin lispro (ADMELOG) corrective regimen sliding scale   SubCutaneous Before meals and at bedtime  lactobacillus acidophilus 1 Tablet(s) Oral three times a day  metroNIDAZOLE    Tablet 500 milliGRAM(s) Oral three times a day  pantoprazole    Tablet 40 milliGRAM(s) Oral two times a day  polyethylene glycol 3350 17 Gram(s) Oral two times a day  rivaroxaban 20 milliGRAM(s) Oral with dinner  senna 2 Tablet(s) Oral at bedtime  sodium chloride 0.45%. 1000 milliLiter(s) (75 mL/Hr) IV Continuous <Continuous>  tamsulosin 0.4 milliGRAM(s) Oral at bedtime    MEDICATIONS  (PRN):  dextrose 40% Gel 15 Gram(s) Oral once PRN Blood Glucose LESS THAN 70 milliGRAM(s)/deciliter  glucagon  Injectable 1 milliGRAM(s) IntraMuscular once PRN Glucose LESS THAN 70 milligrams/deciliter  hydrOXYzine hydrochloride 25 milliGRAM(s) Oral every 6 hours PRN Itching      Allergies    No Known Drug Allergies  Prefers chocolate ensure drinks (Unknown)    Intolerances        Review of Systems:    General:  No wt loss, fevers, chills, night sweats,fatigue,   Eyes:  Good vision, no reported pain  ENT:  No sore throat, pain, runny nose, dysphagia  CV:  No pain, palpitatioins, hypo/hypertension  Resp:  No dyspnea, cough, tachypnea, wheezing  GI:  No pain, No nausea, No vomiting, No diarrhea, No constipatiion, No weight loss, No fever, No pruritis, No rectal bleeding, No tarry stools, No dysphagia,  :  No pain, bleeding, incontinence, nocturia  Muscle:  No pain, weakness  Neuro:  No weakness, tingling, memory problems  Psych:  No fatigue, insomnia, mood problems, depression  Endocrine:  No polyuria, polydypsia, cold/heat intolerance  Heme:  No petechiae, ecchymosis, easy bruisability  Skin:  No rash, tattoos, scars, edema      Vital Signs Last 24 Hrs  T(C): 36.7 (07 Nov 2020 05:10), Max: 37.6 (06 Nov 2020 13:25)  T(F): 98.1 (07 Nov 2020 05:10), Max: 99.6 (06 Nov 2020 13:25)  HR: 93 (07 Nov 2020 05:10) (86 - 93)  BP: 112/64 (07 Nov 2020 05:10) (112/64 - 116/71)  BP(mean): --  RR: 17 (07 Nov 2020 05:10) (17 - 17)  SpO2: 97% (07 Nov 2020 05:10) (96% - 97%)    PHYSICAL EXAM:    Constitutional: NAD, well-developed  HEENT: EOMI, throat clear  Neck: No LAD, supple  Respiratory: CTA and P  Cardiovascular: S1 and S2, RRR, no M  Gastrointestinal: BS+, soft, NT/ND, neg HSM,  Extremities: No peripheral edema, neg clubing, cyanosis  Vascular: 2+ peripheral pulses  Neurological: A/O x 3, no focal deficits  Psychiatric: Normal mood, normal affect  Skin: No rashes      LABS:                        10.2   6.42  )-----------( 384      ( 07 Nov 2020 07:21 )             29.6     11-07    141  |  107  |  15  ----------------------------<  93  3.8   |  28  |  1.30    Ca    9.0      07 Nov 2020 07:21    TPro  7.6  /  Alb  3.5  /  TBili  0.6  /  DBili  x   /  AST  17  /  ALT  15  /  AlkPhos  101  11-07          RADIOLOGY & ADDITIONAL TESTS:

## 2020-11-09 NOTE — PATIENT PROFILE ADULT - NSPROIMPLANTSMEDDEV_GEN_A_NUR
Subjective   Patient ID: Juan is a 11 year old male who is accompanied by:mother     Well Child Assessment:  History was provided by the mother. Juan lives with his mother and father. Interval problems do not include recent illness or recent injury.   Nutrition  Food source: in HPI.   Dental  The patient has a dental home. The patient brushes teeth regularly. The patient does not floss regularly. Last dental exam was less than 6 months ago.   Elimination  Elimination problems do not include constipation.   Behavioral  Behavioral issues do not include misbehaving with peers or performing poorly at school. Disciplinary methods include consistency among caregivers.   Sleep  Average sleep duration (hrs): 10. The patient does not snore. There are no sleep problems.   Safety  There is no smoking in the home. Home has working smoke alarms? yes. Home has working carbon monoxide alarms? yes. There is no gun in home.   School  Current grade level is 4th. There are signs of learning disabilities. Child is doing well in school.   Screening  Immunizations are up-to-date. There are no risk factors for hearing loss. There are no risk factors for anemia. There are no risk factors for dyslipidemia. There are no risk factors for tuberculosis.   Social  The caregiver enjoys the child. After school, the child is at home with a parent. Screen time per day: several.       HPI   No concers.  Nutrition- not balanced, drinks 2% milk 1 glass a day, also water, no soda, eats fruits 1-2 servings a day, no vegetables, eats nuts, likes chicken, no sea food, sometimes mac and cheese, likes pizza and carbohydrates, nightly sweets for a snack.  BMs daily, NL.   Active physically, walks, sometimes plays soccer.  Bikes.   School- in school physically  In 4 th grade, good student, social functioning-good.  Emotions- stable but still some outburst at home.  Needs to be bribed to do chores at home.  Sleeps- 10 hrs, sleeps well.  Electronics-  parents struggle to  Limit time.  Vision- last tested 2019, NL  Additional concerns today: None     Review of Systems   Constitutional: Negative.    HENT: Negative.    Eyes: Negative.    Respiratory: Negative.  Negative for snoring.    Cardiovascular: Negative.    Gastrointestinal: Negative.  Negative for constipation.   Endocrine: Negative.    Genitourinary: Negative.    Skin: Negative.    Allergic/Immunologic: Negative.    Neurological: Negative.    Hematological: Negative.    Psychiatric/Behavioral: Negative.  Negative for sleep disturbance.       Patient's medications, allergies, past medical, surgical, social and family histories were reviewed and updated as appropriate.    Objective   Vitals: /64   Pulse 113   Temp 98 °F (36.7 °C) (Temporal)   Ht 4' 8.69\" (1.44 m)   Wt 36.4 kg (80 lb 2.2 oz)   BMI 17.53 kg/m²   BSA 1.22 m²   Growth parameters are noted and are appropriate for age.    Physical Exam  Constitutional:       General: He is active.      Appearance: Normal appearance. He is well-developed and normal weight.      Comments: Polite, calm   HENT:      Head: Normocephalic and atraumatic.      Right Ear: Tympanic membrane, ear canal and external ear normal.      Left Ear: Tympanic membrane, ear canal and external ear normal.      Nose: Nose normal.      Mouth/Throat:      Mouth: Mucous membranes are moist.      Pharynx: Oropharynx is clear.   Eyes:      Extraocular Movements: Extraocular movements intact.      Conjunctiva/sclera: Conjunctivae normal.      Pupils: Pupils are equal, round, and reactive to light.   Neck:      Musculoskeletal: Normal range of motion and neck supple.      Comments: No thyromegaly  Cardiovascular:      Rate and Rhythm: Normal rate and regular rhythm.      Heart sounds: Normal heart sounds. No murmur.   Pulmonary:      Effort: Pulmonary effort is normal.      Breath sounds: Normal breath sounds.   Abdominal:      General: Abdomen is flat. Bowel sounds are normal.  There is no distension.      Palpations: Abdomen is soft. There is no mass.      Hernia: No hernia is present.      Comments: No HSM   Genitourinary:     Penis: Normal.       Scrotum/Testes: Normal.      Comments: Circumcised, Rt testes high in scrotum, able to bring down, same size testes, T1  Musculoskeletal: Normal range of motion.         General: No swelling, tenderness, deformity or signs of injury.      Comments: No scoliosis   Lymphadenopathy:      Cervical: No cervical adenopathy.   Skin:     General: Skin is warm.      Coloration: Skin is not pale.      Findings: No rash.      Comments: Small plantar wart Rt toe   Neurological:      General: No focal deficit present.      Mental Status: He is alert and oriented for age.      Cranial Nerves: No cranial nerve deficit.      Sensory: No sensory deficit.      Motor: No weakness.      Coordination: Coordination normal.   Psychiatric:         Mood and Affect: Mood normal.         Behavior: Behavior normal.         Assessment   Problem List Items Addressed This Visit     None      Visit Diagnoses     Encounter for routine child health examination without abnormal findings    -  Primary    Need for vaccination        Relevant Orders    INFLUENZA QUADRIVALENT SPLIT PRES FREE 0.5 ML VACC, IM (FLULAVAL,FLUARIX,FLUZONE) (Completed)    Plantar warts            Only child, parents have difficulties setting limits.  Sometimes anger outbursts at home. Getting better. N longer misbehaves at school. Referral to therapist discussed, parents want to work on changes at home since things are much better.  Counseling  Nutrition/Weight Management:  Assessment and discussion of current Nutrition behaviors performed:  Yes  Assessment and discussion of current Physical Activity behaviors performed: Yes    Immunizations: per orders.  History of previous adverse reactions to immunizations? no  Immunizations given today: Yes - Immunizations given today and vaccine counseling including  benefits, risks, and adverse reactions were provided by myself during the visit.Flu vaccine administered, patient observed, tolerated well.    Follow-up yearly for a well visit, or sooner as needed.   None

## 2020-11-16 PROBLEM — I10 ESSENTIAL (PRIMARY) HYPERTENSION: Chronic | Status: ACTIVE | Noted: 2020-10-29

## 2020-11-16 PROBLEM — E78.5 HYPERLIPIDEMIA, UNSPECIFIED: Chronic | Status: ACTIVE | Noted: 2020-10-29

## 2020-11-16 PROBLEM — I82.409 ACUTE EMBOLISM AND THROMBOSIS OF UNSPECIFIED DEEP VEINS OF UNSPECIFIED LOWER EXTREMITY: Chronic | Status: ACTIVE | Noted: 2020-10-29

## 2020-11-16 PROBLEM — G62.9 POLYNEUROPATHY, UNSPECIFIED: Chronic | Status: ACTIVE | Noted: 2020-10-29

## 2020-11-16 PROBLEM — N40.0 BENIGN PROSTATIC HYPERPLASIA WITHOUT LOWER URINARY TRACT SYMPTOMS: Chronic | Status: ACTIVE | Noted: 2020-10-29

## 2020-12-01 ENCOUNTER — APPOINTMENT (OUTPATIENT)
Dept: VASCULAR SURGERY | Facility: CLINIC | Age: 70
End: 2020-12-01
Payer: COMMERCIAL

## 2020-12-01 VITALS
HEART RATE: 74 BPM | OXYGEN SATURATION: 99 % | TEMPERATURE: 99 F | DIASTOLIC BLOOD PRESSURE: 78 MMHG | WEIGHT: 148 LBS | HEIGHT: 67 IN | BODY MASS INDEX: 23.23 KG/M2 | SYSTOLIC BLOOD PRESSURE: 123 MMHG

## 2020-12-01 PROCEDURE — XXXXX: CPT

## 2020-12-01 PROCEDURE — 93926 LOWER EXTREMITY STUDY: CPT

## 2020-12-01 PROCEDURE — 99072 ADDL SUPL MATRL&STAF TM PHE: CPT

## 2020-12-01 PROCEDURE — 99214 OFFICE O/P EST MOD 30 MIN: CPT

## 2021-01-04 ENCOUNTER — APPOINTMENT (OUTPATIENT)
Dept: ORTHOPEDIC SURGERY | Facility: CLINIC | Age: 71
End: 2021-01-04
Payer: OTHER MISCELLANEOUS

## 2021-01-04 PROCEDURE — 99214 OFFICE O/P EST MOD 30 MIN: CPT

## 2021-01-04 PROCEDURE — 99072 ADDL SUPL MATRL&STAF TM PHE: CPT

## 2021-01-04 NOTE — HISTORY OF PRESENT ILLNESS
[de-identified] : 70-year-old male Status post open reduction internal fixation of the left lateral tibial plateau fracture on June 26, 2019. Patient comes today via telehealth appointment.  He is at his home and I am at our [place] office. He has been seen by Dr. Ron for skin graft to his right lower extremity. Dr. Comer also saw patient in the office for his right knee I&D that was performed on 7/14/19. He denies pain at this time. Current symptoms include no chills and no fever.  [Bending] : worsened by bending [Lifting] : worsened by lifting [Recumbency] : relieved by recumbency [Rest] : relieved by rest

## 2021-01-04 NOTE — DISCUSSION/SUMMARY
[de-identified] : 70-year-old male status post left tibial plateau fracture.  The left lower extremity is doing quite well. The fracture is healing well. Previous MCL insufficiency appears to have healed as well.  He is allowed to weight-bear as tolerated on left lower extremity. The right lower extremity follow up with plastic surgery as well as vascular surgery if needed for possible discussion of future care. For the left lower extremity, he may followup p.r.n.\par \par The patient was given the opportunity to ask questions and all questions were answered to their satisfaction.\par \par Maxwell Dela Cruz MD\par Orthopaedic Trauma Surgeon\par [facility] [place]\par [facility] [place]\par \par

## 2021-01-04 NOTE — PHYSICAL EXAM
[de-identified] : Exam performed via telehealth with patient description as well as comparison to previous examination\par \par Physical Exam:\par General: Well appearing, no acute distress, A&O\par Neurologic: A&Ox3, No focal deficits\par Head: NCAT without abrasions, lacerations, or ecchymosis to head, face, or scalp\par Respiratory: Equal chest wall expansion bilaterally, no accessory muscle use\par Lymphatic: No lymphadenopathy palpated\par Skin: Warm and dry\par Psychiatric: Normal mood and affect\par \par Left lower extremity:\par Knee range of motion °. The surgical incision site was clean, dry and intact, healed, not erythematous, absent of discharge, not swollen and not dehisced. \par SILT s/s/sp/dp/t\par Fires EHL/FHL/GS/TA\par 2+ DP/PT pulse\par brisk capillary refill [de-identified] : no new imaging

## 2021-03-04 ENCOUNTER — APPOINTMENT (OUTPATIENT)
Dept: VASCULAR SURGERY | Facility: CLINIC | Age: 71
End: 2021-03-04

## 2021-03-04 ENCOUNTER — APPOINTMENT (OUTPATIENT)
Dept: VASCULAR SURGERY | Facility: CLINIC | Age: 71
End: 2021-03-04
Payer: COMMERCIAL

## 2021-03-04 VITALS
BODY MASS INDEX: 23.23 KG/M2 | OXYGEN SATURATION: 99 % | HEART RATE: 91 BPM | HEIGHT: 67 IN | WEIGHT: 148 LBS | DIASTOLIC BLOOD PRESSURE: 85 MMHG | TEMPERATURE: 97.2 F | SYSTOLIC BLOOD PRESSURE: 146 MMHG

## 2021-03-04 DIAGNOSIS — R60.0 LOCALIZED EDEMA: ICD-10-CM

## 2021-03-04 PROCEDURE — 99214 OFFICE O/P EST MOD 30 MIN: CPT

## 2021-03-04 PROCEDURE — 99072 ADDL SUPL MATRL&STAF TM PHE: CPT

## 2021-03-07 PROBLEM — R60.0 EDEMA, LEG: Status: ACTIVE | Noted: 2019-11-04

## 2021-03-07 NOTE — REVIEW OF SYSTEMS
[Lower Ext Edema] : lower extremity edema [Limb Pain] : no limb pain [Limb Swelling] : limb swelling [Limb Weakness] : limb weakness [Difficulty Walking] : difficulty walking [Negative] : Heme/Lymph

## 2021-03-07 NOTE — HISTORY OF PRESENT ILLNESS
[FreeTextEntry1] : 69-year-old nonsmoking male being seen for followup after a penetrating traumatic injury to right lower extremity in June of 2019. Patient was injured by a forklift and suffered a transection of the right popliteal artery and tibial nerve. Patient underwent right superficial femoral artery to popliteal artery bypass with reverse saphenous vein graft by Dr. Malone. He underwent multiple subsequent procedures by plastic surgery (Dr. Forman), including split-thickness skin graft and tibial nerve repair. He currently cannot weight-bear on the right lower extremity but is currently in rehabilitation. Spends about 5-6 hours in a wheelchair daily, and his RLE is swollen and painful. He does not wear compression stockings or elevate the legs. He denies pain in the foot or discoloration of the toes. He was also diagnosed with a right femoral and popliteal vein DVT and is currently on anticoagulation. [de-identified] : RLE edema has resolved. Denies pain in the leg. Remains in wheelchair and ambulates minimally with a walker due to RLE drop foot and overall deconditioning.\par Denies rest pain in the foot.

## 2021-03-07 NOTE — PHYSICAL EXAM
[Normal Rate and Rhythm] : normal rate and rhythm [2+] : left 2+ [Ankle Swelling (On Exam)] : not present [Varicose Veins Of Lower Extremities] : not present [] : not present [No Rash or Lesion] : No rash or lesion [Alert] : alert [Oriented to Person] : oriented to person [Oriented to Place] : oriented to place [Oriented to Time] : oriented to time [Calm] : calm [de-identified] : NAD [de-identified] : no edema or facial droop [de-identified] : supple [de-identified] : unlabored breathing [de-identified] : soft, NT, ND [de-identified] : weakness of RLE dorsiflexion and knee flexion and extension

## 2021-03-07 NOTE — PHYSICAL EXAM
[Normal Rate and Rhythm] : normal rate and rhythm [2+] : left 2+ [Ankle Swelling (On Exam)] : not present [Varicose Veins Of Lower Extremities] : not present [] : not present [No Rash or Lesion] : No rash or lesion [Alert] : alert [Oriented to Person] : oriented to person [Oriented to Place] : oriented to place [Oriented to Time] : oriented to time [Calm] : calm [de-identified] : NAD [de-identified] : no edema or facial droop [de-identified] : supple [de-identified] : unlabored breathing [de-identified] : soft, NT, ND [de-identified] : weakness of RLE dorsiflexion and knee flexion and extension

## 2021-03-07 NOTE — HISTORY OF PRESENT ILLNESS
[FreeTextEntry1] : 69-year-old nonsmoking male being seen for followup after a penetrating traumatic injury to right lower extremity in June of 2019. Patient was injured by a forklift and suffered a transection of the right popliteal artery and tibial nerve. Patient underwent right superficial femoral artery to popliteal artery bypass with reverse saphenous vein graft by Dr. Malone. He underwent multiple subsequent procedures by plastic surgery (Dr. Forman), including split-thickness skin graft and tibial nerve repair. He currently cannot weight-bear on the right lower extremity but is currently in rehabilitation. Spends about 5-6 hours in a wheelchair daily, and his RLE is swollen and painful. He does not wear compression stockings or elevate the legs. He denies pain in the foot or discoloration of the toes. He was also diagnosed with a right femoral and popliteal vein DVT and is currently on anticoagulation. [de-identified] : RLE edema has resolved. Denies pain in the leg. Remains in wheelchair and ambulates minimally with a walker due to RLE drop foot and overall deconditioning.\par Denies rest pain in the foot.\par \par No new complaints

## 2021-03-07 NOTE — ASSESSMENT
[FreeTextEntry1] : 69-year-old nonsmoking male being seen for followup after a penetrating traumatic injury to right lower extremity in June of 2019 s/p R SFA to popliteal RSVG bypass and R femoral and popliteal DVT, doing well.\par  [Arterial/Venous Disease] : arterial/venous disease [Medication Management] : medication management [Foot care/Footwear] : foot care/footwear

## 2021-03-07 NOTE — ASSESSMENT
[FreeTextEntry1] : 69-year-old nonsmoking male being seen for followup after a penetrating traumatic injury to right lower extremity in June of 2019 s/p R SFA to popliteal RSVG bypass and R femoral and popliteal DVT, doing well.\par \par Arterial duplex demonstrates patent R distal SFA to popliteal artery bypass graft with no flow restrictive stenosis and patent run off arteries. [Arterial/Venous Disease] : arterial/venous disease [Medication Management] : medication management [Foot care/Footwear] : foot care/footwear

## 2021-03-09 ENCOUNTER — APPOINTMENT (OUTPATIENT)
Dept: ORTHOPEDIC SURGERY | Facility: CLINIC | Age: 71
End: 2021-03-09
Payer: OTHER MISCELLANEOUS

## 2021-03-09 VITALS
SYSTOLIC BLOOD PRESSURE: 143 MMHG | HEIGHT: 67 IN | HEART RATE: 100 BPM | BODY MASS INDEX: 23.23 KG/M2 | DIASTOLIC BLOOD PRESSURE: 86 MMHG | WEIGHT: 148 LBS

## 2021-03-09 VITALS — TEMPERATURE: 94.2 F

## 2021-03-09 PROCEDURE — 99214 OFFICE O/P EST MOD 30 MIN: CPT

## 2021-03-09 PROCEDURE — 73560 X-RAY EXAM OF KNEE 1 OR 2: CPT | Mod: LT

## 2021-03-09 PROCEDURE — 99072 ADDL SUPL MATRL&STAF TM PHE: CPT

## 2021-03-09 NOTE — DISCUSSION/SUMMARY
[de-identified] : 70-year-old male status post left tibial plateau fracture.  The left lower extremity is doing quite well. The fracture is healing well. Previous MCL insufficiency appears to have healed as well.  He is allowed to weight-bear as tolerated on left lower extremity. The right lower extremity follow up with plastic surgery as well as vascular surgery if needed for possible discussion of future care. For the left lower extremity, he may followup p.r.n.\par \par No further orthopedic trauma intervention is required but we will help facilitate future care as needed. The patient may follow up as needed.\par \par The patient was given the opportunity to ask questions and all questions were answered to their satisfaction.\par \par Maxwell Dela Cruz MD\par Orthopaedic Trauma Surgeon\par [facility]\par [facility] [place]\par \par

## 2021-03-09 NOTE — HISTORY OF PRESENT ILLNESS
[de-identified] : 70-year-old male Status post open reduction internal fixation of the left lateral tibial plateau fracture on June 26, 2019. He has been seen by Dr. Ron for skin graft to his right lower extremity. Dr. Comer also saw patient in the office for his right knee I&D that was performed on 7/14/19. He denies pain at this time. Current symptoms include no chills and no fever.  [0] : a current pain level of 0/10 [Bending] : worsened by bending [Lifting] : worsened by lifting [Weight Bearing] : worsened by weight bearing [Recumbency] : relieved by recumbency [Rest] : relieved by rest

## 2021-03-23 ENCOUNTER — FORM ENCOUNTER (OUTPATIENT)
Age: 71
End: 2021-03-23

## 2021-04-16 ENCOUNTER — NON-APPOINTMENT (OUTPATIENT)
Age: 71
End: 2021-04-16

## 2021-05-10 NOTE — PATIENT PROFILE ADULT - DO YOU FEEL UNSAFE AT SCHOOL?
FOLLOW UP WITH DR NEWTON coleman for pain  follow up with gi specialist dr Glez    Abdominal Pain    WHAT YOU NEED TO KNOW:    Abdominal pain can be dull, achy, or sharp. You may have pain in one area of your abdomen, or in your entire abdomen. Your pain may be caused by a condition such as constipation, food sensitivity or poisoning, infection, or a blockage. Abdominal pain can also be from a hernia, appendicitis, or an ulcer. Liver, gallbladder, or kidney conditions can also cause abdominal pain. The cause of your abdominal pain may be unknown.     DISCHARGE INSTRUCTIONS:    Return to the emergency department if:   •You have new chest pain or shortness of breath.      •You have pulsing pain in your upper abdomen or lower back that suddenly becomes constant.      •Your pain is in the right lower abdominal area and worsens with movement.       •You have a fever over 100.4°F (38°C) or shaking chills.       •You are vomiting and cannot keep food or liquids down.       •Your pain does not improve or gets worse over the next 8 to 12 hours.       •You see blood in your vomit or bowel movements, or they look black and tarry.       •Your skin or the whites of your eyes turn yellow.       •You are a woman and have a large amount of vaginal bleeding that is not your monthly period.       Contact your healthcare provider if:   •You have pain in your lower back.       •You are a man and have pain in your testicles.      •You have pain when you urinate.       •You have questions or concerns about your condition or care.      Follow up with your healthcare provider within 24 hours or as directed: Write down your questions so you remember to ask them during your visits.     Medicines:   •Medicines may be given to calm your stomach and prevent vomiting or to decrease pain. Ask how to take pain medicine safely.      •Take your medicine as directed. Contact your healthcare provider if you think your medicine is not helping or if you have side effects. Tell him of her if you are allergic to any medicine. Keep a list of the medicines, vitamins, and herbs you take. Include the amounts, and when and why you take them. Bring the list or the pill bottles to follow-up visits. Carry your medicine list with you in case of an emergency. not applicable

## 2021-08-24 ENCOUNTER — OUTPATIENT (OUTPATIENT)
Dept: OUTPATIENT SERVICES | Facility: HOSPITAL | Age: 71
LOS: 1 days | End: 2021-08-24
Payer: COMMERCIAL

## 2021-08-24 ENCOUNTER — APPOINTMENT (OUTPATIENT)
Dept: MRI IMAGING | Facility: CLINIC | Age: 71
End: 2021-08-24
Payer: COMMERCIAL

## 2021-08-24 DIAGNOSIS — Z00.8 ENCOUNTER FOR OTHER GENERAL EXAMINATION: ICD-10-CM

## 2021-08-24 DIAGNOSIS — Z98.890 OTHER SPECIFIED POSTPROCEDURAL STATES: Chronic | ICD-10-CM

## 2021-08-24 PROCEDURE — 76377 3D RENDER W/INTRP POSTPROCES: CPT | Mod: 26

## 2021-08-24 PROCEDURE — 76377 3D RENDER W/INTRP POSTPROCES: CPT

## 2021-08-24 PROCEDURE — 72197 MRI PELVIS W/O & W/DYE: CPT | Mod: 26

## 2021-08-24 PROCEDURE — A9585: CPT

## 2021-08-24 PROCEDURE — 72197 MRI PELVIS W/O & W/DYE: CPT

## 2021-12-09 ENCOUNTER — APPOINTMENT (OUTPATIENT)
Dept: VASCULAR SURGERY | Facility: CLINIC | Age: 71
End: 2021-12-09
Payer: COMMERCIAL

## 2021-12-09 VITALS — DIASTOLIC BLOOD PRESSURE: 81 MMHG | SYSTOLIC BLOOD PRESSURE: 150 MMHG | OXYGEN SATURATION: 98 % | HEART RATE: 97 BPM

## 2021-12-09 PROCEDURE — 99214 OFFICE O/P EST MOD 30 MIN: CPT

## 2021-12-09 PROCEDURE — 93926 LOWER EXTREMITY STUDY: CPT

## 2022-01-18 NOTE — ASSESSMENT
[FreeTextEntry1] : 69-year-old nonsmoking male being seen for followup after a penetrating traumatic injury to right lower extremity in June of 2019 s/p R SFA to popliteal RSVG bypass and R femoral and popliteal DVT, doing well.\par \par Arterial duplex dmeonstrates patent bypass without significant stenosis\par  [Arterial/Venous Disease] : arterial/venous disease [Medication Management] : medication management [Foot care/Footwear] : foot care/footwear

## 2022-01-18 NOTE — HISTORY OF PRESENT ILLNESS
[FreeTextEntry1] : 69-year-old nonsmoking male being seen for followup after a penetrating traumatic injury to right lower extremity in June of 2019. Patient was injured by a forklift and suffered a transection of the right popliteal artery and tibial nerve. Patient underwent right superficial femoral artery to popliteal artery bypass with reverse saphenous vein graft by Dr. Malone. He underwent multiple subsequent procedures by plastic surgery (Dr. Forman), including split-thickness skin graft and tibial nerve repair. He currently cannot weight-bear on the right lower extremity but is currently in rehabilitation. Spends about 5-6 hours in a wheelchair daily, and his RLE is swollen and painful. He does not wear compression stockings or elevate the legs. He denies pain in the foot or discoloration of the toes. He was also diagnosed with a right femoral and popliteal vein DVT and is currently on anticoagulation. [de-identified] : RLE edema has resolved. Denies pain in the leg. Ambulates with difficulty due to RLE drop foot. He lost his RLE foot drop splint, and states he cannot obtain a new one.\par Denies rest pain in the foot.\par \par No new complaints

## 2022-01-18 NOTE — PHYSICAL EXAM
[Normal Rate and Rhythm] : normal rate and rhythm [2+] : left 2+ [Ankle Swelling (On Exam)] : not present [Varicose Veins Of Lower Extremities] : not present [] : not present [No Rash or Lesion] : No rash or lesion [Alert] : alert [Oriented to Person] : oriented to person [Oriented to Place] : oriented to place [Oriented to Time] : oriented to time [Calm] : calm [de-identified] : NAD [de-identified] : no edema or facial droop [de-identified] : supple [de-identified] : unlabored breathing [de-identified] : soft, NT, ND [de-identified] : weakness of RLE dorsiflexion and knee flexion and extension

## 2022-05-02 NOTE — PATIENT PROFILE ADULT - SW.
social work Oxybutynin Counseling:  I discussed with the patient the risks of oxybutynin including but not limited to skin rash, drowsiness, dry mouth, difficulty urinating, and blurred vision.

## 2022-05-09 NOTE — PROGRESS NOTE ADULT - ATTENDING COMMENTS
"Oncology Rooming Note    May 9, 2022 8:40 AM   Antonio Sharpe is a 60 year old male who presents for:    Chief Complaint   Patient presents with     Oncology Clinic Visit     SCC of mandible     Initial Vitals: /79 (BP Location: Right arm)   Pulse 71   Temp 97.6  F (36.4  C) (Oral)   Resp 16   Wt 84.5 kg (186 lb 3.2 oz)   SpO2 100%   BMI 26.72 kg/m   Estimated body mass index is 26.72 kg/m  as calculated from the following:    Height as of 4/7/22: 1.778 m (5' 10\").    Weight as of this encounter: 84.5 kg (186 lb 3.2 oz). Body surface area is 2.04 meters squared.  Extreme Pain (8) Comment: Data Unavailable   No LMP for male patient.  Allergies reviewed: Yes  Medications reviewed: Yes    Medications: Medication refills not needed today.  Pharmacy name entered into Web and Rank: [id number] - [place] [employer] [place], [place] - [phone]  [place]    Clinical concerns: mouth sores for the last week or so (since last infusion)      Siria Parekh CMA            " Advanced care planning was discussed with patient and family.  Advanced care planning forms were reviewed and discussed.  Risks, benefits and alternatives of gastroenterologic procedures were discussed in detail and all questions were answered.    30 minutes spent.

## 2022-05-31 NOTE — OCCUPATIONAL THERAPY INITIAL EVALUATION ADULT - PROPRIOCEPTION, LUE, OT EVAL
within normal limits Itraconazole Counseling:  I discussed with the patient the risks of itraconazole including but not limited to liver damage, nausea/vomiting, neuropathy, and severe allergy.  The patient understands that this medication is best absorbed when taken with acidic beverages such as non-diet cola or ginger ale.  The patient understands that monitoring is required including baseline LFTs and repeat LFTs at intervals.  The patient understands that they are to contact us or the primary physician if concerning signs are noted.

## 2022-06-02 NOTE — PRE-OP CHECKLIST - WARM FLUIDS/WARM BLANKETS
yes
no
[Mother] : mother
[Yes] : Patient goes to dentist yearly
[Needs Immunizations] : needs immunizations
[Normal] : normal
[Eats meals with family] : eats meals with family
[Has family members/adults to turn to for help] : has family members/adults to turn to for help
[Is permitted and is able to make independent decisions] : Is permitted and is able to make independent decisions
[Grade: ____] : Grade: [unfilled]
[Normal Performance] : normal performance
[Normal Behavior/Attention] : normal behavior/attention
[Normal Homework] : normal homework
no
[Eats regular meals including adequate fruits and vegetables] : eats regular meals including adequate fruits and vegetables
[Drinks non-sweetened liquids] : drinks non-sweetened liquids 
[Calcium source] : calcium source
[Has interests/participates in community activities/volunteers] : has interests/participates in community activities/volunteers.
[Uses safety belts/safety equipment] : uses safety belts/safety equipment 
[Sleep Concerns] : no sleep concerns
no
[Has friends] : has friends
[At least 1 hour of physical activity a day] : does not do at least 1 hour of physical activity a day
[Screen time (except homework) less than 2 hours a day] : no screen time (except homework) less than 2 hours a day
[Uses electronic nicotine delivery system] : does not use electronic nicotine delivery system
[Exposure to electronic nicotine delivery system] : no exposure to electronic nicotine delivery system
no
[Uses tobacco] : does not use tobacco
[Exposure to tobacco] : no exposure to tobacco
[Uses drugs] : does not use drugs 
[Exposure to drugs] : no exposure to drugs
[No] : Patient has not had sexual intercourse.
[Has ways to cope with stress] : has ways to cope with stress
[Displays self-confidence] : displays self-confidence
[Has problems with sleep] : does not have problems with sleep
[Gets depressed, anxious, or irritable/has mood swings] : does not get depressed, anxious, or irritable/has mood swings
[Has thought about hurting self or considered suicide] : has not thought about hurting self or considered suicide
[With Teen] : teen
[With Parent/Guardian] : parent/guardian
[FreeTextEntry7] : 18 year [facility]
[de-identified] : Needs Meningitis 
[FreeTextEntry8] : Now regular- on OCPs for irregular menses, heavy periods. uses Naproxen prn due to painful cramps
[FreeTextEntry1] : Concerned with almost daily abdominal pain, sometimes associated with nausea. This has been going on for years. Has tried to cut out dairy and did not notice it made any difference. Denies constipation or diarrhea.\par \par Interval hx- [place] tooth extraction x 4 in April 2022 \par SB gynocology for OCPs, had tyroid tested there.\par Saw Cardio 5/21 due to family history (father had heart attack at 40 yo), prior to starting OCPs 
[de-identified] : Works at [employer], in [place], likes to read, doing puzzles

## 2022-10-26 ENCOUNTER — APPOINTMENT (OUTPATIENT)
Dept: ORTHOPEDIC SURGERY | Facility: CLINIC | Age: 72
End: 2022-10-26

## 2022-10-26 VITALS — BODY MASS INDEX: 23.23 KG/M2 | HEIGHT: 67 IN | WEIGHT: 148 LBS

## 2022-10-26 DIAGNOSIS — S82.142D DISPLACED BICONDYLAR FRACTURE OF LEFT TIBIA, SUBSEQUENT ENCOUNTER FOR CLOSED FRACTURE WITH ROUTINE HEALING: ICD-10-CM

## 2022-10-26 PROCEDURE — 73560 X-RAY EXAM OF KNEE 1 OR 2: CPT | Mod: LT

## 2022-10-26 PROCEDURE — 99214 OFFICE O/P EST MOD 30 MIN: CPT

## 2022-10-26 PROCEDURE — 99072 ADDL SUPL MATRL&STAF TM PHE: CPT
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
## 2022-10-26 NOTE — PHYSICAL EXAM
[de-identified] : Physical Exam:\par General: Well appearing, no acute distress, A&O\par Neurologic: A&Ox3, No focal deficits\par Head: NCAT without abrasions, lacerations, or ecchymosis to head, face, or scalp\par Respiratory: Equal chest wall expansion bilaterally, no accessory muscle use\par Lymphatic: No lymphadenopathy palpated\par Skin: Warm and dry\par Psychiatric: Normal mood and affect\par \par Left lower extremity:\par Knee range of motion °. The surgical incision site was clean, dry and intact, healed, not erythematous, absent of discharge, not swollen and not dehisced. \par SILT s/s/sp/dp/t\par Fires EHL/FHL/GS/TA\par 2+ DP/PT pulse\par brisk capillary refill [de-identified] : Three-view plain films of the left knee obtained today show a well-healed tibial plateau fracture

## 2022-10-27 ENCOUNTER — APPOINTMENT (OUTPATIENT)
Dept: VASCULAR SURGERY | Facility: CLINIC | Age: 72
End: 2022-10-27

## 2022-10-27 VITALS
HEIGHT: 66 IN | BODY MASS INDEX: 28.61 KG/M2 | SYSTOLIC BLOOD PRESSURE: 129 MMHG | WEIGHT: 178 LBS | OXYGEN SATURATION: 97 % | HEART RATE: 82 BPM | DIASTOLIC BLOOD PRESSURE: 73 MMHG

## 2022-10-27 DIAGNOSIS — S85.001D: ICD-10-CM

## 2022-10-27 PROCEDURE — 99072 ADDL SUPL MATRL&STAF TM PHE: CPT

## 2022-10-27 PROCEDURE — 99214 OFFICE O/P EST MOD 30 MIN: CPT

## 2022-10-27 RX ORDER — CHOLECALCIFEROL (VITAMIN D3) 50 MCG
50 MCG TABLET ORAL
Qty: 30 | Refills: 0 | Status: ACTIVE | COMMUNITY
Start: 2022-01-10

## 2022-10-27 RX ORDER — TOLTERODINE TARTARATE 2 MG/1
2 TABLET ORAL
Qty: 90 | Refills: 0 | Status: ACTIVE | COMMUNITY
Start: 2022-10-15

## 2022-10-27 RX ORDER — LOSARTAN POTASSIUM 50 MG/1
50 TABLET, FILM COATED ORAL
Qty: 90 | Refills: 0 | Status: ACTIVE | COMMUNITY
Start: 2022-05-11

## 2022-10-27 RX ORDER — PANTOPRAZOLE 40 MG/1
40 TABLET, DELAYED RELEASE ORAL
Qty: 90 | Refills: 0 | Status: ACTIVE | COMMUNITY
Start: 2022-07-28

## 2022-10-27 RX ORDER — TAMSULOSIN HYDROCHLORIDE 0.4 MG/1
0.4 CAPSULE ORAL
Qty: 90 | Refills: 0 | Status: ACTIVE | COMMUNITY
Start: 2022-07-28

## 2022-10-27 RX ORDER — BACLOFEN 10 MG/1
10 TABLET ORAL
Qty: 20 | Refills: 0 | Status: ACTIVE | COMMUNITY
Start: 2022-08-05

## 2022-10-27 NOTE — PHYSICAL EXAM
[Normal Rate and Rhythm] : normal rate and rhythm [2+] : left 2+ [Ankle Swelling (On Exam)] : not present [Varicose Veins Of Lower Extremities] : not present [] : not present [No Rash or Lesion] : No rash or lesion [Alert] : alert [Oriented to Person] : oriented to person [Oriented to Place] : oriented to place [Oriented to Time] : oriented to time [Calm] : calm [de-identified] : NAD [de-identified] : no edema or facial droop [de-identified] : supple [de-identified] : unlabored breathing [de-identified] : soft, NT, ND [de-identified] : weakness of RLE dorsiflexion and knee flexion and extension
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
## 2023-02-23 NOTE — H&P ADULT - GLASGOW COMA SCALE: SCORE, MLM
Yes - he needs to stop all supplements, NSAIDs and aspirin containing products for 1 week prior to surgery.    15

## 2023-06-21 NOTE — DIETITIAN INITIAL EVALUATION ADULT. - PROBLEM/PLAN-6
DISPLAY PLAN FREE TEXT Topical Sulfur Applications Counseling: Topical Sulfur Counseling: Patient counseled that this medication may cause skin irritation or allergic reactions.  In the event of skin irritation, the patient was advised to reduce the amount of the drug applied or use it less frequently.   The patient verbalized understanding of the proper use and possible adverse effects of topical sulfur application.  All of the patient's questions and concerns were addressed.

## 2023-06-26 NOTE — PROGRESS NOTE ADULT - PROBLEM/PLAN-2
DISPLAY PLAN FREE TEXT
n/a

## 2024-02-06 NOTE — H&P ADULT - PROBLEM SELECTOR PROBLEM 7
Patient Name: Magdaleno Castaneda  MR#: [id number]  Date: 02/06/24     Subjective:     CC: Right shoulder pain    HPI: Magdaleno Castaneda is a right handed 62 year old male who presents to clinic today for evaluation of right shoulder pain.  He underwent a rotator cuff repair in 1999 and recovered well from that.  The shoulder did nicely for him for many years.  Unfortunately, about 4 months ago it started bothering him again.  The pain has been fairly constant but is worse with movements, especially overhead movement.  The pain is over the outside aspect of the arm and down to the elbow.  He states that sometimes it is a burning pain and sometimes it feels achy and muscular.  He is having trouble sleeping due to the pain.  He has been trying Aleve and hemp cream.  He does also note that he has a neck pain at times.  He denies any numbness or tingling in the arm.    He works as a  and often has to lift things which does cause trouble for her shoulder.  Usually lifting around 30-35 lb.    Past Medical History:   Diagnosis Date    Arthritis     Diverticulosis of colon (without mention of hemorrhage) 7/9/15    mild sigmoid colon    Essential (primary) hypertension     Essential hypertension 8/22/2016    Gastroesophageal reflux disease     Gastroesophageal reflux disease without esophagitis 9/3/2019    Osteoarthritis of hip 12/12/2011    Osteoarthrosis, unspecified whether generalized or localized, pelvic region and thigh 12/13/2011    Other hyperlipidemia 8/18/2022    Staphylococcus aureus infection     Unspecified hemorrhoids without mention of complication 7/9/15    tiny internal        Past Surgical History:   Procedure Laterality Date    Colonoscopy diagnostic  7/9/15 recall due 7/2025    Dr Zavaleta    Joint replacement  2013    left hip resurfacing    Past surgical history  1977    Slip Hip Epiphysis    Past surgical history  1999/2003    Bilateral Rotator Cuff surgery        Current Outpatient Medications    Medication Sig    rosuvastatin (CRESTOR) 20 MG tablet Take 1 tablet by mouth daily.    amLODIPine (NORVASC) 5 MG tablet Take 1 tablet by mouth daily.    losartan-hydrochlorothiazide (HYZAAR) 100-25 MG per tablet TAKE 1 TABLET BY MOUTH DAILY    omeprazole (PrilOSEC) 20 MG capsule Take 1 capsule by mouth in the morning and in the evening.    aspirin 81 MG tablet Take 1 tablet by mouth daily.    ibuprofen (MOTRIN) 200 MG tablet Take 600 mg by mouth every 6 hours as needed for Pain.     No current facility-administered medications for this visit.       ALLERGIES:   Allergen Reactions    Penicillin G RASH       Social History     Socioeconomic History    Marital status: /Civil Union     Spouse name: June    Number of children: 2    Years of education: Not on file    Highest education level: Not on file   Occupational History    Occupation: [employer]     Employer: [employer]   Tobacco Use    Smoking status: Never    Smokeless tobacco: Never   Substance and Sexual Activity    Alcohol use: Yes     Alcohol/week: 3.3 standard drinks of alcohol     Types: 4 Standard drinks or equivalent per week     Comment: socially    Drug use: No    Sexual activity: Not on file   Other Topics Concern    Not on file   Social History Narrative    Not on file     Social Determinants of Health     Financial Resource Strain: Not on file   Food Insecurity: Not on file   Transportation Needs: Not on file   Physical Activity: Not on file   Stress: Not on file   Social Connections: Not on file   Interpersonal Safety: Not At Risk (3/31/2021)    Interpersonal Safety     Social Determinants: Intimate Partner Violence Past Fear: Not on file     Social Determinants: Intimate Partner Violence Current Fear: Not on file        ROS:   Eye Problem(s):negative  ENT Problem(s):negative  Cardiovascular problem(s):negative  Respiratory problem(s):negative  Gastro-intestinal problem(s):negative GI  Genito-urinary  problem(s):negative  Musculoskeletal problem(s): as described in HPI  Integumentary problem(s):negative  Neurological problem(s):negative  Psychiatric problem(s):negative  Endocrine problem(s):negative  Hematologic and/or Lymphatic problem(s):negative    Objective:     Physical Exam:   General: No acute distress  Respiratory: Normal work of breathing  Cardiovascular: Regular heart rate    Orthopaedic:  RightShoulder:    Inspection: No asymmetry or deformity of the bilateral SC joints, clavicles, or AC Joints. No prior incisions. No ecchymosis or erythema.    Palpation:   ACJ: Non-tender  Biceps: Non-tender  Joint Line: Non-tender  Tuberosity: Non-tender    AROM reveals:  (*=pain)  FF: 160 (contralateral 170)  ABD: 95 (contralateral 95)  ER at the side: 30 (contralateral 60)  IR at the side: L3 (contralateral L1)    PROM reveals similar motion to active.    Strength:    Deltoid fires in all three planes  Resisted Abduction Full Can: 5/5  Resisted Abduction Empty Can: 4/5  External Rotation in adduction: 5/5  Internal Rotation in adduction: 5/5  Drop Arm Sign: Negative  ER Lag sign: Negative  Hornblower's sign: Negative  Bear Hug Test: Negative    Provocative Tests:  Neer test: Negative  Cornejo' test: Negative    Cross-chest adduction: Negative    Speed's test: mild pain    Cervical Spine:  Pain with ROM: Negative  Tenderness to palpation: Negative  Spurling's test:  Negative    Neurovascular Exam:  sensation intact to light touch in all peripheral nerve distributions and 2+ radial pulse       Diagnostic Studies:     X-rays right shoulder 12/14/2023 and 02/06/2024: I independently reviewed and interpreted these images which reveal mild to moderate narrowing of the glenohumeral joint with significant narrowing of the acromiohumeral interval.      Assessment:   62 year old male with right shoulder rotator cuff arthropathy    Plan of care:     I had a long discussion today with Walt regarding his shoulder.  Based on his  AHI distance and some of the degenerative changes on his shoulder x-ray, I explained that while his rotator cuff is likely deficient/torn, the arthropathy is too advanced for a rotator cuff repair to be of value.  Ultimately, his surgical option would be a reverse total shoulder.  He is not interested in a replacement at this time.  We discussed conservative options for managing his symptoms.  These include but are not limited to activity modification, anti-inflammatories, topical medications, physical therapy and injection management.  At this time, he is most interested in a steroid injection.  See procedure note below.  Given some of his neck pain and burning type pain, I did ask him to pay close attention to which of his symptoms are relieved while the local anesthetic is in effect today.  Return to clinic for follow up in approximately 6 weeks, can also call back as needed if preferred.  Also call back with any worsening symptoms or other concerns.    PROCEDURE NOTE :  Right shoulder Glenohumeral Injection:   After obtaining informed consent from the patient, a discussion of the possible adverse reactions was undertaken with them.  A time out was performed to ensure that the correct body part was being injected.  Then the area was sterilly prepped with chloraprep.     An injection consisting of the below medications was administered into the right shoulder via a posterior entry site.     40 mg of triamcinalone   2 cc of 1% lidocaine   2 cc of .5% marcaine    The patient tolerated this well, the site was dressed with an adhesive dressing and the patient will follow-up as scheduled.       Prophylactic measure Neuropathy BPH (benign prostatic hyperplasia)

## 2024-02-23 NOTE — DIETITIAN INITIAL EVALUATION ADULT. - PATIENT MEETS CRITERIA FOR MALNUTRITION
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         OBJECTIVE:  Vitals:    02/23/24 1053   BP: 116/70   Site: Left Upper Arm   Position: Sitting   Cuff Size: Medium Adult   Pulse: 70   Resp: 18   Temp: 97.4 °F (36.3 °C)   SpO2: 95%   Weight: 93.8 kg (206 lb 12.8 oz)   Height: 1.797 m (5' 10.75\")      Body mass index is 29.05 kg/m².   Physical Exam  Vitals reviewed.   Constitutional:       Appearance: Normal appearance.   HENT:      Head: Normocephalic and atraumatic.   Eyes:      General: No scleral icterus.     Conjunctiva/sclera: Conjunctivae normal.   Cardiovascular:      Rate and Rhythm: Normal rate and regular rhythm.   Pulmonary:      Breath sounds: Normal breath sounds.   Musculoskeletal:      Right lower leg: No edema.      Left lower leg: No edema.      Comments: No tenderness of muscles on exam   Neurological:      General: No focal deficit present.      Mental Status: He is alert and oriented to person, place, and time.      Cranial Nerves: No cranial nerve deficit.   Psychiatric:         Attention and Perception: Attention and perception normal.         Mood and Affect: Mood and affect normal.         Speech: Speech normal.         Behavior: Behavior normal. Behavior is cooperative.         Thought Content: Thought content normal.         Cognition and Memory: Cognition and memory normal.         Judgment: Judgment normal.             Electronically signed by YUMIKO ANG  no

## 2024-02-28 NOTE — ED ADULT NURSE NOTE - CAS TRG GENERAL NORM CIRC DET
Strong peripheral pulses Detail Level: Detailed Add 74940 Cpt? (Important Note: In 2017 The Use Of 83752 Is Being Tracked By Cms To Determine Future Global Period Reimbursement For Global Periods): yes

## 2024-04-09 NOTE — PHYSICAL THERAPY INITIAL EVALUATION ADULT - ACTIVE RANGE OF MOTION EXAMINATION, REHAB EVAL
left knee NT due to hinged knee brace in place and c/o pain with movement.  left hip flex ~60 degrees during supine to sit, left ankle df/pf WFL...right hip flex ~60 degrees in semi-torres position, knee flex ~40 degrees in semi-torres position, no active ankle df/pf noted at this time./yulissa. upper extremity Active ROM was WNL (within normal limits)
right LE with limited hip/knee flexion due to pain, no active right df/pf...left hip flex WFL(limited by pain), knee exam limited by joanne brace, left ankle df/pf WNL
right LE; WFL with exception to right ankle N/A in PRAFO, left LE in joanne at 0 degrees; hip flexion and ankle WFL and knee N/A
Private car

## 2024-05-03 NOTE — PROGRESS NOTE ADULT - ATTENDING COMMENTS
TY probe inserted. By Dr Eaton pt seen and examined.  await GI prep for GI  Colonoscopy on monday  NPO after midnight.

## 2024-09-15 NOTE — PHYSICAL THERAPY INITIAL EVALUATION ADULT - LEVEL OF CONSCIOUSNESS, REHAB EVAL
82-year-old LHD adult with prior TIA's (2003, 2021), prior left hemispheric CVA, CAD s/p CEA (2021 on DAPT), CKD, HLD, DM, BPH and COPD who presented on 9/13 for strokelike symptoms.  Patient reports that approximately 7 PM on 9/13, he had an acute onset of garbled/dysarthric speech that was progressively worsening.  He also noticed water was spilling out of his mouth, did not specify what side.  While he was en route to the hospital, his wife stated he was experiencing mild dizziness.    BP on arrival 141/67. NIHSS 1 for speech.  Initial neuroimaging (CT head, CTA head/neck) was unremarkable for acute intracranial abnormalities.   Per on-call neurology and ED documentation, patient was agreeable for TNK administration.  TNK was administered at 2151 and he was transferred to the ICU.    Neurological workup:  - Labs on admission revealed elevated white count (10.77)  - CTH negative for acute intracranial abnormality  - CTA head/neck: Negative for LVO or high-grade stenosis  - Repeat CTH s/p 24 hours post tNK: unremarkable   - MRI brain wo pending  - Echo pending  - Lipid panel: Cholesterol 83, LDL 37  - Hemoglobin A1c 6.3    Plan:  - Acute ischemic stroke protocol/tNK protocol  - Will follow-up on above-mentioned studies  - Aspirin 81 mg and Plavix 75 mg restarted s/p stable repeat CTH  - If MRI brain is positive for acute infarct, recommend switching Plavix to Brilinta given most recent P2Y12 was 199  - Continue with Atorvastatin 80 mg   - Maintain normotension  - Euglycemic, normothermic goal  - Continue telemetry; monitor for any underlying arrhthymias  - PT/OT/ST  - Secondary risk factor modification.   - Stroke education  - Frequent neuro checks. Continue to monitor and notify neurology with any changes.  - STAT CT head for any acute change in neuro exam  - Medical management and supportive care per primary team. Correction of any metabolic or infectious disturbances.      Plan discussed with Attending  Neurologist, please see attestation for further input/recommendations.     alert

## 2025-02-18 NOTE — ED ADULT NURSE NOTE - SUICIDE SCREENING DEPRESSION
Bleeding that does not stop/Pain not relieved by Medications/Fever greater than (need to indicate Fahrenheit or Celsius)/Nausea and vomiting that does not stop/Excessive diarrhea/Inability to tolerate liquids or foods Negative

## 2025-03-24 NOTE — OCCUPATIONAL THERAPY INITIAL EVALUATION ADULT - PRECAUTIONS/LIMITATIONS, REHAB EVAL
MRI Brain without contrast with no acute intracranial abnormalities. Patient's symptoms are not from a cerebrovascular origin. Recommend metabolic workup. Further workup per primary provider.     Thank you for including us in the care of this patient. Vascular neurology will sign off at this time. Please do not hesitate to contact us [place] [phone] with any questions or concerns. Re-consult with any new symptoms concerning for acute stroke.      fall precautions